# Patient Record
Sex: FEMALE | Race: WHITE | Employment: OTHER | URBAN - NONMETROPOLITAN AREA
[De-identification: names, ages, dates, MRNs, and addresses within clinical notes are randomized per-mention and may not be internally consistent; named-entity substitution may affect disease eponyms.]

---

## 2017-06-01 PROBLEM — Z86.79 H/O CLASS III ANGINA PECTORIS: Status: ACTIVE | Noted: 2017-06-01

## 2017-06-01 PROBLEM — I34.0 NON-RHEUMATIC MITRAL REGURGITATION: Status: ACTIVE | Noted: 2017-06-01

## 2017-06-01 PROBLEM — I10 HTN, GOAL BELOW 130/80: Status: ACTIVE | Noted: 2017-06-01

## 2017-06-01 PROBLEM — R94.39 ABNORMAL NUCLEAR STRESS TEST: Status: ACTIVE | Noted: 2017-06-01

## 2017-10-12 ENCOUNTER — OFFICE VISIT (OUTPATIENT)
Dept: RHEUMATOLOGY | Age: 77
End: 2017-10-12
Payer: MEDICARE

## 2017-10-12 VITALS
RESPIRATION RATE: 98 BRPM | DIASTOLIC BLOOD PRESSURE: 75 MMHG | HEART RATE: 50 BPM | WEIGHT: 172 LBS | HEIGHT: 66 IN | BODY MASS INDEX: 27.64 KG/M2 | SYSTOLIC BLOOD PRESSURE: 147 MMHG

## 2017-10-12 DIAGNOSIS — M54.42 CHRONIC MIDLINE LOW BACK PAIN WITH LEFT-SIDED SCIATICA: ICD-10-CM

## 2017-10-12 DIAGNOSIS — M25.511 CHRONIC RIGHT SHOULDER PAIN: ICD-10-CM

## 2017-10-12 DIAGNOSIS — M25.50 POLYARTHRALGIA: Primary | ICD-10-CM

## 2017-10-12 DIAGNOSIS — G89.29 CHRONIC MIDLINE LOW BACK PAIN WITH LEFT-SIDED SCIATICA: ICD-10-CM

## 2017-10-12 DIAGNOSIS — G89.29 CHRONIC RIGHT SHOULDER PAIN: ICD-10-CM

## 2017-10-12 DIAGNOSIS — M25.561 CHRONIC PAIN OF RIGHT KNEE: ICD-10-CM

## 2017-10-12 DIAGNOSIS — G89.29 CHRONIC PAIN OF RIGHT KNEE: ICD-10-CM

## 2017-10-12 PROCEDURE — 4040F PNEUMOC VAC/ADMIN/RCVD: CPT | Performed by: INTERNAL MEDICINE

## 2017-10-12 PROCEDURE — 4004F PT TOBACCO SCREEN RCVD TLK: CPT | Performed by: INTERNAL MEDICINE

## 2017-10-12 PROCEDURE — 1090F PRES/ABSN URINE INCON ASSESS: CPT | Performed by: INTERNAL MEDICINE

## 2017-10-12 PROCEDURE — G8598 ASA/ANTIPLAT THER USED: HCPCS | Performed by: INTERNAL MEDICINE

## 2017-10-12 PROCEDURE — G8427 DOCREV CUR MEDS BY ELIG CLIN: HCPCS | Performed by: INTERNAL MEDICINE

## 2017-10-12 PROCEDURE — 99204 OFFICE O/P NEW MOD 45 MIN: CPT | Performed by: INTERNAL MEDICINE

## 2017-10-12 PROCEDURE — G8419 CALC BMI OUT NRM PARAM NOF/U: HCPCS | Performed by: INTERNAL MEDICINE

## 2017-10-12 PROCEDURE — G8484 FLU IMMUNIZE NO ADMIN: HCPCS | Performed by: INTERNAL MEDICINE

## 2017-10-12 ASSESSMENT — ENCOUNTER SYMPTOMS
WHEEZING: 1
HEARTBURN: 1
COUGH: 1
DOUBLE VISION: 1
BLURRED VISION: 1
ORTHOPNEA: 0
CONSTIPATION: 1

## 2017-10-12 NOTE — PROGRESS NOTES
point injections. , physical therapy, tramadol (no relief), gabapentin (no relief), lyrica (not covered by insurance). Associated symptoms:  Swelling of the finger joints but denies redness/warmth  Daily Left leg swelling  AM stiffness lasting 20-25 minutes, improved w/ movement. + gelling   pre-term labor loss b/c of tubal pregnancy. Urinary incontinence for the past 3 years,  Leg weakness with prolonged standing, walking that's improved with sitting. Denies fecal incontinence, saddle anesthesia. - throbbing pian in the legs, greatest in the evening and occasionally waking the pt. Improved w/ movement.     -denies Photosenstivity, Rash, dry mouth/dry eyes, oral/nasal sores, Raynaud's, digital ulcerations, skin tightening, renal disease, foamy urination, hematuria, sz's, blood clots,, AIHA, leukpenia/lymphopenia, thrombocytopenia, hair loss, serositis, arthritis. Cancer screening up to date per pt   Travel:denies   Exposure(s): denies     ROS:  Review of Systems   Constitutional: Positive for malaise/fatigue. Negative for chills, fever and weight loss. Eyes: Positive for blurred vision and double vision. Dry eyes, itching eyes   Respiratory: Positive for cough and wheezing. Cardiovascular: Positive for chest pain (with activity), palpitations and leg swelling. Negative for orthopnea and PND. Gastrointestinal: Positive for constipation and heartburn. Musculoskeletal: Positive for myalgias. Skin: Negative. Neurological: Positive for dizziness (with getting up from seated position quickly) and weakness (generalized, but greatest in the legs). Negative for tingling. Endo/Heme/Allergies: Bruises/bleeds easily. Psychiatric/Behavioral: Positive for depression. The patient is nervous/anxious.         PAST MEDICAL HISTORY  Past Medical History:   Diagnosis Date    Arthritis     Asthma 1978    Atrial fibrillation [facility] [address]     Blood circulation, collateral     CAD (coronary artery disease)     CHF (congestive [facility] failure) ([facility] 1999    Chronic kidney disease, stage III (moderate)     COPD (chronic obstructive pulmonary disease) ([facility])     Depression     Diabetes mellitus ([facility] [address]     Diabetic hypertension-nephrosis syndrome ([facility])     Dysuria     GERD (gastroesophageal reflux disease)     Hyperlipidemia     Hypertension     Unspecified cerebral artery occlusion with cerebral infarction 1999    Dr Elliott Hernandez informed patient after Heart Attack       SOCIAL HISTORY  Social History     Social History    Marital status:      Spouse name: N/A    Number of children: 2    Years of education: 10     Social History Main Topics    Smoking status: Current Every Day Smoker     Packs/day: 0.50     Years: 10.00     Types: Cigarettes     Start date: 10/21/2013    Smokeless tobacco: Not on file    Alcohol use No    Drug use: No    Sexual activity: No     Other Topics Concern    Not on file     Social History Narrative    No narrative on file       FAMILY HISTORY  Family History   Problem Relation Age of Onset    Heart Disease Mother     Diabetes Mother     Kidney Disease Mother     Heart Disease Father     Diabetes Father     Cancer Sister     Stroke Brother     Cancer Sister        SURGICAL HISTORY  Past Surgical History:   Procedure Laterality Date    APPENDECTOMY      BACK SURGERY      BLADDER SUSPENSION      CARDIAC CATHETERIZATION  2009    with stent  [facility]    CARPAL TUNNEL RELEASE      COLONOSCOPY  2013    ENDOSCOPY, COLON, DIAGNOSTIC      ESOPHAGEAL DILATATION      HYSTERECTOMY  1974    JOINT REPLACEMENT  1992    Left Knee       ALLERGIES  Allergies   Allergen Reactions    Nuts [Peanut-Containing Drug Products]      Pt suffers from diverticulitis and nut products irritate her stomach.     Strawberry (Diagnostic)      Diverticulitis    Sulfa Antibiotics     Tape [place] Tape] Other (See Comments)     tears skin off    Bactrim [Sulfamethoxazole-Trimethoprim] nitroGLYCERIN (NITROSTAT) 0.4 MG SL tablet Place 0.4 mg under the tongue every 5 minutes as needed. No current facility-administered medications for this visit. Objective:  BP (!) 147/75   Pulse 50   Resp (!) 98   Ht 5' 6\" (1.676 m)   Wt 172 lb (78 kg)   BMI 27.76 kg/m²     General: No distress. Alert. Eyes: P ERRL. No sclera icterus. No conjunctival injection. ENT: No discharge. Pharynx clear. Neck: Trachea midline. Normal thyroid. Resp: No accessory muscle use. No crackles. No wheezing. No rhonchi. No dullness on percussion. CV: Regular rate. Regular rhythm. No mumur or rub. No edema. GI: Non-tender. Non-distended. No masses. No organmegaly. Normal bowel sounds. M/S:   Upper extremities:  Muscle strength 5/5, FROM, No Synovitis   Shoulder: (+) dania, speed, hawking, infraspinatous, and scarf testing. Wrist: fullness along the ulnar styloid bilaterally. Fingers:     - tender: MCPs Right #2-3,  PIPs: Right # 2-5, Left # 5, DIPs: left # 2,3    - fullness/swelling PIPs Right 2-4, Left # 5   - heberden nodes bilat. Flexion deformites as several DIPS bilat. Lower Extremities:   Muscle strength 5/5, FROM, No Synovitis   - crepitus of the Rt knee. Negative Rt knee medila/latearl compression, anterior/posterior draw, patellar grind. Spine:   - limited given pt's inability to get on table. - tenderness along the lumbosarcral spine, Negative SLR       Neuro: CN II-XII grossly intact, DTR's 2/4 bilat and equal  Psych: Oriented to person, place, time. No anxiety or agitation. Skin: Warm and dry. No nodule on exposed extremities. No rash on exposed extremities. Lymph: No cervical LAD. No supraclavicular LAD.       RAPID 3    LABS:  CBC  Lab Results   Component Value Date    WBC 8.2 06/01/2017    RBC 4.04 06/01/2017    HGB 12.4 06/01/2017    HCT 38.6 06/01/2017    MCV 95.5 06/01/2017    MCH 30.8 06/01/2017    MCHC 32.2 06/01/2017    RDW 14.4 06/01/2017     06/01/2017 CMP  Lab Results   Component Value Date    CALCIUM 10.6 06/01/2017    LABALBU 4.2 06/01/2017    PROT 7.0 06/01/2017     06/01/2017    K 4.6 06/01/2017    CO2 24 06/01/2017     06/01/2017    BUN 39 06/01/2017    CREATININE 1.4 06/01/2017    ALT 12 06/01/2017    AST 13 06/01/2017       HgBA1c: No components found for: HGBA1C    Lab Results   Component Value Date    TSH 1.297 12/03/2013     No results found for: VITD25      No results found for: JAMAL  No components found for: SSAABIGGREF  No components found for: SSBABIGGREF  No components found for: SMITHABIGGAR  No results found for: DSDNAAB   No results found for: C3, C4  No components found for: CYCCITPEPIGG  No results found for: RF    No components found for: CANCASCRN, APANCASCRN  No results found for: SEDRATE  No results found for: CRP    RADIOLOGY:       Assessment/ Plan:  1. Polyarthralgia  The patient reports genearlized joint pain in right shoulder, hands, entire back, knees. Symptoms started: back pain started several years ago with 2 reported lumbar spine surgery. The Rt shoulder pain has been present for \"years\". Knee pain present years ago but s/p left Knee replacement ~5 years ago improved sx's until the past year. - knee pain started over the past year. Pain in the hands x 8-9 years. Pt has been treated by Dr. Zhen Anguiano for the past 8-9 year. Tx'ed with plaquenil 200mg and once monthly trigger point injections   Previous therapy:Lumbar Epidural injections (10/11/17) from main management, monthly trigger point injections. , physical therapy, tramadol (no relief), gabapentin (no relief), lyrica (not covered by insurance). Prednisone (high blood sugars and high blood pressure)   Current therapy: Plaquenil 200mg daily, Cymbalta 30mg daily, trazadone 100mg nightly,     - exam with mild fullness/swelling at the Right > left PIP, tender Rt MCPs, PIP, left PIPs and DIP. Mild fullness/swelling along the ulnar styloids. (+) heberden nodes     - ? Crystalline arthropathy (Gout vs CPPD), evaluation for RA given the fullness of the PIPs, ? Sjogren vs SLE given PIP involvement and sicca sx's. ? infalmmatory osteoarthritis. - continue plaquenil 200mg qd at this time. - if creatinine clearance drops to < 30 pt may benefit from d/c of cymbalta. - Sedimentation Rate; Future  - C-Reactive Protein; Future  - CBC Auto Differential; Future  - Comprehensive Metabolic Panel; Future  - Rheumatoid Factor; Future  - Cyclic Citrul Peptide Antibody, IgG; Future  - Anti SSA; Future  - Anti SSB; Future  - XR HAND LEFT (MIN 3 VIEWS); Future  - XR HAND RIGHT (MIN 3 VIEWS); Future  - XR KNEE RIGHT (3 VIEWS); Future  - Uric Acid; Future  - Ferritin; Future  - Iron; Future  - Magnesium; Future    2. Chronic pain of right knee  - H/o osteoarthritis, knee pain worse with wt bearing, improved w/ non-wt bearing.   - avoinding oral NSAIDs given CKD and CAD   - topical PENNSAID sample provided. - XR KNEE RIGHT (3 VIEWS); Future  - could try knee injection, could try physical therapy      3. Chronic midline low back pain with left-sided sciatica  Chronic back pain with persistent pain. Failed Tramadol, back surgery x 2  - avoinding oral NSAIDs given CKD and CAD   - on cymbalta 30mg daily  - recent lumbar epidural injections from pain management. - defer treatment to pain management. 4. Chronic right shoulder pain  Chronic lowcliazed shoulder pain worse w/ movement. Difficulty reaching above the head, behind the head and back. Prior hx of Rt rotator cuff injury and repair. Exam with (+) impingement testing, AC joint and lateral shoulder tenderness. Suspected rotator cuff syndrome +/- bursitis and osteoarthritis. - x-rya evaluation for further evaluation   - could try knee injection, could try physical therapy      Return in about 2 months (around 12/12/2017).     Electronically signed by Cristofer Alonzo DO on 10/12/2017 at 1:35 PM      Thank you for allowing me to participate in the care of this patient. Please call if there are any questions.

## 2017-10-12 NOTE — LETTER
[address] [facility] [address]  Phone: [phone]  Fax: [phone]    No ref. provider found        October 12, 2017       Patient: Anya Kay   MR Number: [id number]   YOB: 1940   Date of Visit: 10/12/2017       Dear Dr. Castro Orr ref. provider found: Thank you for the request for consultation for Megha Encarnacion to me for the evaluation of her polyarthralgia. Below are the relevant portions of my assessment and plan of care. If you have questions, please do not hesitate to call me. I look forward to following Racheal Cobian along with you.     Sincerely,        Eunice DAS DO    [facility] providers:  Rigo Yeboah DO  [address] [place] [address]  VIA Mail

## 2017-11-09 LAB
ALBUMIN SERPL-MCNC: 3.8 G/DL
ALP BLD-CCNC: 54 U/L
ALT SERPL-CCNC: 21 U/L
ANION GAP SERPL CALCULATED.3IONS-SCNC: 13 MMOL/L
AST SERPL-CCNC: 21 U/L
BASOPHILS ABSOLUTE: 0 /ΜL
BASOPHILS RELATIVE PERCENT: 0.6 %
BILIRUB SERPL-MCNC: 0.5 MG/DL (ref 0.1–1.4)
BUN BLDV-MCNC: 24 MG/DL
C-REACTIVE PROTEIN: 0.19
CALCIUM SERPL-MCNC: 10.5 MG/DL
CHLORIDE BLD-SCNC: 109 MMOL/L
CO2: 24 MMOL/L
CREAT SERPL-MCNC: 1.36 MG/DL
EOSINOPHILS ABSOLUTE: 0.2 /ΜL
EOSINOPHILS RELATIVE PERCENT: 3.8 %
FERRITIN: 48.3 NG/ML (ref 9–150)
GFR CALCULATED: 38
GLUCOSE BLD-MCNC: 89 MG/DL
HCT VFR BLD CALC: 34 % (ref 36–46)
HEMOGLOBIN: 11.3 G/DL (ref 12–16)
IRON: 92
LYMPHOCYTES ABSOLUTE: 1.4 /ΜL
LYMPHOCYTES RELATIVE PERCENT: 24.2 %
MAGNESIUM: 1.8 MG/DL
MCH RBC QN AUTO: 31.7 PG
MCHC RBC AUTO-ENTMCNC: 33.2 G/DL
MCV RBC AUTO: 95.3 FL
MONOCYTES ABSOLUTE: 0.5 /ΜL
MONOCYTES RELATIVE PERCENT: 9.6 %
NEUTROPHILS ABSOLUTE: 3.5 /ΜL
NEUTROPHILS RELATIVE PERCENT: 61.8 %
PDW BLD-RTO: 13.4 %
PLATELET # BLD: 267 K/ΜL
PMV BLD AUTO: 8.6 FL
POTASSIUM SERPL-SCNC: 4.1 MMOL/L
RBC # BLD: 3.57 10^6/ΜL
RHEUMATOID FACTOR: <15
SODIUM BLD-SCNC: 142 MMOL/L
TOTAL PROTEIN: 6.9
URIC ACID: 5.5
WBC # BLD: 5.6 10^3/ML

## 2017-11-13 ENCOUNTER — TELEPHONE (OUTPATIENT)
Dept: RHEUMATOLOGY | Age: 77
End: 2017-11-13

## 2017-11-13 DIAGNOSIS — E83.52 HYPERCALCEMIA: Primary | ICD-10-CM

## 2017-11-13 DIAGNOSIS — M19.90 INFLAMMATORY ARTHRITIS: ICD-10-CM

## 2017-11-13 DIAGNOSIS — M25.50 POLYARTHRALGIA: ICD-10-CM

## 2017-11-13 RX ORDER — LEFLUNOMIDE 10 MG/1
10 TABLET ORAL DAILY
Qty: 30 TABLET | Refills: 1 | Status: SHIPPED | OUTPATIENT
Start: 2017-11-13 | End: 2017-11-13 | Stop reason: SDUPTHER

## 2017-11-13 RX ORDER — LEFLUNOMIDE 10 MG/1
10 TABLET ORAL DAILY
Qty: 30 TABLET | Refills: 0 | Status: SHIPPED | OUTPATIENT
Start: 2017-11-13 | End: 2017-12-28 | Stop reason: SINTOL

## 2017-11-13 NOTE — TELEPHONE ENCOUNTER
Pt called and informed of the mild anemia, Creatinine elevation (consistent with her CKD stage 3), and hypercalcemia. X-ray bilateral  hands revealed revealed diffuse degenerative joint disease throughout the hand and wrist bilaterally. (+) chondrocalcinosis of the triangular fibrocartilage. Polyarthralgia: ? Inflammatory arthritis. - given the joint swelling/fullness, distribution and Chondrocalcinosis on x-ray there is concern for possible CPPD arthropathy. - prednisone previously provided no significant relief. - discused tx option of colchicine or DMARDs. After discussion of side effects the pt has opted to pursue Arava 10mg daily. -  We discussed the risks of Arava including liver toxicity, blood dyscrasias, and increased risk of infection. she was instructed to avoid ETOH use. she was instructed to stop Arava at the onset of an infection. she was instructed to call my office if there was concern for infection. Hypercalcemia:   - checking PTH, SPEP, Ionized calcium, vitamin D 25-oh and 1-25-OH. Pt reported understanding and had no further questions.

## 2017-12-20 RX ORDER — HYDROXYCHLOROQUINE SULFATE 200 MG/1
200 TABLET, FILM COATED ORAL DAILY
Qty: 90 TABLET | Refills: 1 | Status: SHIPPED | OUTPATIENT
Start: 2017-12-20 | End: 2018-05-03 | Stop reason: SDUPTHER

## 2017-12-28 ENCOUNTER — HOSPITAL ENCOUNTER (OUTPATIENT)
Age: 77
Discharge: HOME | End: 2017-12-28
Payer: MEDICARE

## 2017-12-28 ENCOUNTER — OFFICE VISIT (OUTPATIENT)
Dept: RHEUMATOLOGY | Age: 77
End: 2017-12-28
Payer: MEDICARE

## 2017-12-28 VITALS
BODY MASS INDEX: 27.83 KG/M2 | DIASTOLIC BLOOD PRESSURE: 70 MMHG | WEIGHT: 173.2 LBS | RESPIRATION RATE: 14 BRPM | SYSTOLIC BLOOD PRESSURE: 137 MMHG | HEART RATE: 97 BPM | HEIGHT: 66 IN

## 2017-12-28 DIAGNOSIS — Z51.81 MEDICATION MONITORING ENCOUNTER: Primary | ICD-10-CM

## 2017-12-28 DIAGNOSIS — M25.50 POLYARTHRALGIA: ICD-10-CM

## 2017-12-28 DIAGNOSIS — E83.52 HYPERCALCEMIA: ICD-10-CM

## 2017-12-28 DIAGNOSIS — R20.2 PARESTHESIA OF BOTH HANDS: ICD-10-CM

## 2017-12-28 LAB
CALCIUM IONIZED: 1.36 MMOL/L (ref 1.12–1.32)
CALCIUM SERPL-MCNC: 11 MG/DL (ref 8.5–10.5)
HBV SURFACE AB TITR SER: NEGATIVE {TITER}
HEPATITIS C ANTIBODY: NEGATIVE
PTH INTACT: 79.8 PG/ML (ref 15–65)
VITAMIN D 25-HYDROXY: 34 NG/ML (ref 30–100)

## 2017-12-28 PROCEDURE — 4040F PNEUMOC VAC/ADMIN/RCVD: CPT | Performed by: INTERNAL MEDICINE

## 2017-12-28 PROCEDURE — 82652 VIT D 1 25-DIHYDROXY: CPT

## 2017-12-28 PROCEDURE — G8427 DOCREV CUR MEDS BY ELIG CLIN: HCPCS | Performed by: INTERNAL MEDICINE

## 2017-12-28 PROCEDURE — G8598 ASA/ANTIPLAT THER USED: HCPCS | Performed by: INTERNAL MEDICINE

## 2017-12-28 PROCEDURE — 83970 ASSAY OF PARATHORMONE: CPT

## 2017-12-28 PROCEDURE — 86803 HEPATITIS C AB TEST: CPT

## 2017-12-28 PROCEDURE — G8484 FLU IMMUNIZE NO ADMIN: HCPCS | Performed by: INTERNAL MEDICINE

## 2017-12-28 PROCEDURE — 4004F PT TOBACCO SCREEN RCVD TLK: CPT | Performed by: INTERNAL MEDICINE

## 2017-12-28 PROCEDURE — 82310 ASSAY OF CALCIUM: CPT

## 2017-12-28 PROCEDURE — G8419 CALC BMI OUT NRM PARAM NOF/U: HCPCS | Performed by: INTERNAL MEDICINE

## 2017-12-28 PROCEDURE — 1090F PRES/ABSN URINE INCON ASSESS: CPT | Performed by: INTERNAL MEDICINE

## 2017-12-28 PROCEDURE — 82306 VITAMIN D 25 HYDROXY: CPT

## 2017-12-28 PROCEDURE — 86706 HEP B SURFACE ANTIBODY: CPT

## 2017-12-28 PROCEDURE — 36415 COLL VENOUS BLD VENIPUNCTURE: CPT

## 2017-12-28 PROCEDURE — 1123F ACP DISCUSS/DSCN MKR DOCD: CPT | Performed by: INTERNAL MEDICINE

## 2017-12-28 PROCEDURE — G8400 PT W/DXA NO RESULTS DOC: HCPCS | Performed by: INTERNAL MEDICINE

## 2017-12-28 PROCEDURE — 84165 PROTEIN E-PHORESIS SERUM: CPT

## 2017-12-28 PROCEDURE — 84160 ASSAY OF PROTEIN ANY SOURCE: CPT

## 2017-12-28 PROCEDURE — 82330 ASSAY OF CALCIUM: CPT

## 2017-12-28 PROCEDURE — 86704 HEP B CORE ANTIBODY TOTAL: CPT

## 2017-12-28 PROCEDURE — 99214 OFFICE O/P EST MOD 30 MIN: CPT | Performed by: INTERNAL MEDICINE

## 2017-12-28 RX ORDER — AMLODIPINE BESYLATE 5 MG/1
5 TABLET ORAL DAILY
Status: ON HOLD | COMMUNITY
End: 2020-10-01

## 2017-12-28 ASSESSMENT — ENCOUNTER SYMPTOMS
CONSTIPATION: 1
ORTHOPNEA: 0
HEARTBURN: 1
COUGH: 1
BLURRED VISION: 1
DOUBLE VISION: 1
WHEEZING: 1

## 2017-12-28 NOTE — PROGRESS NOTES
Fingers:     - tender: MCPs Right #2-3,  PIPs: Right # 2-5, Left # 5, DIPs: left # 2,3    - fullness/swelling PIPs Right 2,4, Left  3,5   - heberden nodes bilat. Flexion deformites as several DIPS bilat. Lower Extremities:   Muscle strength 5/5, FROM, No Synovitis   - crepitus of the Rt knee. Negative Rt knee medila/latearl compression, anterior/posterior draw, patellar grind. Spine:   - limited given pt's inability to get on table. - tenderness and hypertonicity along the Right cervical perispinal musculature and Right upper trapezius  - tenderness along the lumbosarcral spine, Negative SLR       Psych: Oriented to person, place, time. No anxiety or agitation. Skin: Warm and dry. No nodule on exposed extremities. No rash on exposed extremities. Lymph: No cervical LAD. No supraclavicular LAD.       RAPID 3:    LABS:  CBC  Lab Results   Component Value Date    WBC 5.6 11/09/2017    RBC 3.57 11/09/2017    HGB 11.3 11/09/2017    HCT 34.0 11/09/2017    MCV 95.3 11/09/2017    MCH 31.7 11/09/2017    MCHC 33.2 11/09/2017    RDW 13.4 11/09/2017     11/09/2017       CMP  Lab Results   Component Value Date    CALCIUM 10.5 11/09/2017    LABALBU 3.8 11/09/2017    PROT 7.0 06/01/2017     11/09/2017    K 4.1 11/09/2017    CO2 24 11/09/2017     11/09/2017    BUN 24 11/09/2017    CREATININE 1.36 11/09/2017    ALT 21 11/09/2017    AST 21 11/09/2017       HgBA1c: No components found for: HGBA1C    Lab Results   Component Value Date    TSH 1.297 12/03/2013     No results found for: VITD25      No results found for: JAMAL  No components found for: SSAABIGGREF  No components found for: SSBABIGGREF  No components found for: SMITHABIGGAR  No results found for: DSDNAAB   No results found for: C3, C4  No components found for: CYCCITPEPIGG  Lab Results   Component Value Date    RF <15 11/09/2017       No components found for: CANCASCRN, APANCASCRN  No results found for: SEDRATE  Lab Results   Component Value Date    CRP 0.19 11/09/2017       RADIOLOGY:       Assessment/ Plan:  1. Polyarthralgia  The patient reports genearlized joint pain in right shoulder, hands, entire back, knees. Symptoms started: back pain started several years ago with 2 reported lumbar spine surgery. The Rt shoulder pain has been present for \"years\". Knee pain present years ago but s/p left Knee replacement ~5 years ago improved sx's until the past year. - knee pain started over the past year. Pain in the hands x 8-9 years. Pt has been treated by Dr. Yosvany Abbott for the past 8-9 year. Tx'ed with plaquenil 200mg and once monthly trigger point injections  - Previous therapy:Lumbar Epidural injections (10/11/17) from main management, monthly trigger point injections. , physical therapy, tramadol (no relief), gabapentin (no relief), lyrica (not covered by insurance). Prednisone (high blood sugars and high blood pressure)    - Current therapy: Plaquenil 200mg daily, Cymbalta 30mg daily, trazadone 100mg nightly,    -negative Evaluation of RA, SLE, Sjogren, Gout, secondary causes of CPPD arthropathy (iron, mag studies)   - ? CPPD arthropathy vs seronegative inflammatory arthritis vs inflammatory osteoarthritis. - d/c arava 10mg qd b/c of Diarrhea   - continue plaquenil 200mg qd.    - discussed trial of colchicine or methotrexate    - pt opting to starrt methotrexate 7.5mg  q wk. Risks of therapy discussed with the patient including risk of hepatotoxicity, bone marrow toxicity, pneumonitis, cancers, lymphoma, teratogenicity and susceptibility to infections. Written information provided. - leucovorin 5mg q wk (to decrease pill burden)   Hepatitis panel checked and negative. CxR obtained prior to initiation of therapy. Hold MTX while treated for infections. Avoid live vaccines. Hypercalcemia:   - checking PTH, SPEP, Ionized calcium, vitamin D 25-oh and 1-25-OH.      Bilateral hand paresthesia: suspected carpal tunnel syndrome.   - worsening over the past few months. - ? If related to # 1  - pt asked to try wrist splinting nightly. - discussed EMG/NCV if sx's worsen    Chronic pain of right knee  - H/o osteoarthritis, knee pain worse with wt bearing, improved w/ non-wt bearing.   - avoinding oral NSAIDs given CKD and CAD   - topical PENNSAID sample provided. - XR KNEE RIGHT (3 VIEWS); 11/2017 no abnoramlities (discussed results with pt)    - could try knee injection or  physical therapy      Chronic right shoulder pain  Chronic lowcliazed shoulder pain worse w/ movement. Difficulty reaching above the head, behind the head and back. Prior hx of Rt rotator cuff injury and repair. Exam with (+) impingement testing, AC joint and lateral shoulder tenderness. Suspected rotator cuff syndrome +/- bursitis and osteoarthritis. - x-rya evaluation for further evaluation if sx's persist    - could try shoulder injection, could try physical therapy    Chronic midline low back pain with left-sided sciatica  Chronic back pain with persistent pain. Failed Tramadol, back surgery x 2  - avoinding oral NSAIDs given CKD and CAD   - on cymbalta 30mg daily  - recent lumbar epidural injections from pain management. - defer treatment to pain management. No Follow-up on file. Electronically signed by Sujatha Jaime DO on 12/28/2017 at 1:03 PM      Thank you for allowing me to participate in the care of this patient. Please call if there are any questions.

## 2017-12-29 ENCOUNTER — TELEPHONE (OUTPATIENT)
Dept: RHEUMATOLOGY | Age: 77
End: 2017-12-29

## 2017-12-29 DIAGNOSIS — R79.89 HIGH SERUM PARATHYROID HORMONE (PTH): ICD-10-CM

## 2017-12-29 DIAGNOSIS — E83.52 HYPERCALCEMIA: Primary | ICD-10-CM

## 2017-12-29 NOTE — TELEPHONE ENCOUNTER
Pt called and informed of the hypercalcemia and PTH elevation.     - Vitamin D level pending    Hypercalcemia:   - pt asked to stop supplemental calcium from her PCP  - re-evaluation of Calcium level in 1-2 weeks  - can be related to hyperparathryoidism     PTH elevation:   - vitamin D levels pending.   - ? If related to CKD vs primary   - pt asked to follow with PCP. She reported understanding and had no additional questions.

## 2017-12-30 LAB — HEPATITIS B CORE TOTAL ANTIBODY: NEGATIVE

## 2017-12-31 LAB
PROTEIN ELECTROPHORESIS, SERUM: NORMAL
VITAMIN D 1,25-DIHYDROXY: 26.4 PG/ML (ref 19.9–79.3)

## 2018-02-27 ENCOUNTER — OFFICE VISIT (OUTPATIENT)
Dept: RHEUMATOLOGY | Age: 78
End: 2018-02-27
Payer: MEDICARE

## 2018-02-27 VITALS
HEIGHT: 66 IN | SYSTOLIC BLOOD PRESSURE: 140 MMHG | DIASTOLIC BLOOD PRESSURE: 66 MMHG | BODY MASS INDEX: 27.48 KG/M2 | HEART RATE: 98 BPM | WEIGHT: 171 LBS | RESPIRATION RATE: 14 BRPM

## 2018-02-27 DIAGNOSIS — M70.62 GREATER TROCHANTERIC BURSITIS OF LEFT HIP: ICD-10-CM

## 2018-02-27 DIAGNOSIS — G89.29 CHRONIC MIDLINE LOW BACK PAIN WITHOUT SCIATICA: ICD-10-CM

## 2018-02-27 DIAGNOSIS — Z51.81 MEDICATION MONITORING ENCOUNTER: ICD-10-CM

## 2018-02-27 DIAGNOSIS — M54.50 CHRONIC MIDLINE LOW BACK PAIN WITHOUT SCIATICA: ICD-10-CM

## 2018-02-27 DIAGNOSIS — E83.52 HYPERCALCEMIA: ICD-10-CM

## 2018-02-27 DIAGNOSIS — M19.90 INFLAMMATORY ARTHRITIS: Primary | ICD-10-CM

## 2018-02-27 PROCEDURE — G8598 ASA/ANTIPLAT THER USED: HCPCS | Performed by: INTERNAL MEDICINE

## 2018-02-27 PROCEDURE — 20610 DRAIN/INJ JOINT/BURSA W/O US: CPT | Performed by: INTERNAL MEDICINE

## 2018-02-27 PROCEDURE — G8427 DOCREV CUR MEDS BY ELIG CLIN: HCPCS | Performed by: INTERNAL MEDICINE

## 2018-02-27 PROCEDURE — 1123F ACP DISCUSS/DSCN MKR DOCD: CPT | Performed by: INTERNAL MEDICINE

## 2018-02-27 PROCEDURE — 4004F PT TOBACCO SCREEN RCVD TLK: CPT | Performed by: INTERNAL MEDICINE

## 2018-02-27 PROCEDURE — G8484 FLU IMMUNIZE NO ADMIN: HCPCS | Performed by: INTERNAL MEDICINE

## 2018-02-27 PROCEDURE — 4040F PNEUMOC VAC/ADMIN/RCVD: CPT | Performed by: INTERNAL MEDICINE

## 2018-02-27 PROCEDURE — G8400 PT W/DXA NO RESULTS DOC: HCPCS | Performed by: INTERNAL MEDICINE

## 2018-02-27 PROCEDURE — 1090F PRES/ABSN URINE INCON ASSESS: CPT | Performed by: INTERNAL MEDICINE

## 2018-02-27 PROCEDURE — 99214 OFFICE O/P EST MOD 30 MIN: CPT | Performed by: INTERNAL MEDICINE

## 2018-02-27 PROCEDURE — G8419 CALC BMI OUT NRM PARAM NOF/U: HCPCS | Performed by: INTERNAL MEDICINE

## 2018-02-27 RX ORDER — LEUCOVORIN CALCIUM 5 MG/1
5 TABLET ORAL WEEKLY
Qty: 12 TABLET | Refills: 0 | Status: SHIPPED | OUTPATIENT
Start: 2018-02-27 | End: 2018-04-20 | Stop reason: SDUPTHER

## 2018-02-27 RX ORDER — TRIAMCINOLONE ACETONIDE 40 MG/ML
40 INJECTION, SUSPENSION INTRA-ARTICULAR; INTRAMUSCULAR ONCE
Status: COMPLETED | OUTPATIENT
Start: 2018-02-27 | End: 2018-02-27

## 2018-02-27 RX ORDER — METHYLPREDNISOLONE ACETATE 80 MG/ML
40 INJECTION, SUSPENSION INTRA-ARTICULAR; INTRALESIONAL; INTRAMUSCULAR; SOFT TISSUE ONCE
Status: CANCELLED | OUTPATIENT
Start: 2018-02-27 | End: 2018-02-27

## 2018-02-27 RX ADMIN — TRIAMCINOLONE ACETONIDE 40 MG: 40 INJECTION, SUSPENSION INTRA-ARTICULAR; INTRAMUSCULAR at 17:30

## 2018-02-27 ASSESSMENT — ENCOUNTER SYMPTOMS
WHEEZING: 1
CONSTIPATION: 1
HEARTBURN: 1
ORTHOPNEA: 0
DOUBLE VISION: 1
COUGH: 1
BLURRED VISION: 1

## 2018-02-27 NOTE — PROGRESS NOTES
Epidural injections (10/11/17) from main management, monthly trigger point injections. , physical therapy, tramadol (no relief), gabapentin (no relief), lyrica (not covered by insurance). ROS:  Review of Systems   Constitutional: Positive for malaise/fatigue. Negative for chills, fever and weight loss. Eyes: Positive for blurred vision and double vision. Dry eyes, itching eyes   Respiratory: Positive for cough and wheezing. Cardiovascular: Positive for chest pain (with activity), palpitations and leg swelling. Negative for orthopnea and PND. Gastrointestinal: Positive for constipation and heartburn. Genitourinary: Positive for urgency (waking up several times per night). Musculoskeletal: Positive for myalgias. Skin: Negative. Neurological: Positive for dizziness (with getting up from seated position quickly) and weakness (generalized, but greatest in the legs). Negative for tingling. Endo/Heme/Allergies: Bruises/bleeds easily. Psychiatric/Behavioral: Positive for depression. The patient is nervous/anxious. PAST MEDICAL HISTORY  Past Medical History:   Diagnosis Date    Arthritis     Asthma 1978    Atrial fibrillation ([facility] [address]     Blood circulation, collateral     CAD (coronary artery disease)     CHF (congestive heart failure) ([facility] 1999    Chronic kidney disease, stage III (moderate)     COPD (chronic obstructive pulmonary disease) ([facility] [address]     Depression     Diabetes mellitus ([facility])     Diabetic hypertension-nephrosis syndrome ([facility])     Dysuria     GERD (gastroesophageal reflux disease)     Hyperlipidemia     Hypertension     Unspecified cerebral artery occlusion with cerebral infarction 1999    Dr Hunter Grace informed patient after Heart Attack       SOCIAL HISTORY  Social History     Social History    Marital status:       Spouse name: N/A    Number of children: 2    Years of education: 10     Social History Main Topics    Smoking status: Current Every Day Fingers:     - tender: MCPs Right #2-3,  PIPs: Right # 2-5, Left # 5, DIPs: left # 2,3    - fullness/swelling PIPs Right 2,4, Left  2-5   - heberden nodes bilat. Flexion deformites as several DIPS bilat. Lower Extremities:   Muscle strength 5/5, FROM, No Synovitis   - Left outer hips: tenderness overlying the greater trochanter  - crepitus of the Rt knee. Negative Rt knee medila/latearl compression, anterior/posterior draw, patellar grind. Spine:   - limited given pt's inability to get on table. - tenderness and hypertonicity along the Right cervical perispinal musculature and Right upper trapezius  - tenderness along the lumbosarcral spine, Negative SLR       Psych: Oriented to person, place, time. No anxiety or agitation. Skin: Warm and dry. No nodule on exposed extremities. No rash on exposed extremities. Lymph: No cervical LAD. No supraclavicular LAD.       RAPID 3:    LABS:  CBC  Lab Results   Component Value Date    WBC 5.6 11/09/2017    RBC 3.57 11/09/2017    HGB 11.3 11/09/2017    HCT 34.0 11/09/2017    MCV 95.3 11/09/2017    MCH 31.7 11/09/2017    MCHC 33.2 11/09/2017    RDW 13.4 11/09/2017     11/09/2017       CMP  Lab Results   Component Value Date    CALCIUM 11.0 12/28/2017    LABALBU 3.8 11/09/2017    PROT 7.0 06/01/2017     11/09/2017    K 4.1 11/09/2017    CO2 24 11/09/2017     11/09/2017    BUN 24 11/09/2017    CREATININE 1.36 11/09/2017    ALT 21 11/09/2017    AST 21 11/09/2017       HgBA1c: No components found for: HGBA1C    Lab Results   Component Value Date    TSH 1.297 12/03/2013     Lab Results   Component Value Date    VITD25 34 12/28/2017         No results found for: JAMAL  No components found for: SSAABIGGREF  No components found for: SSBABIGGREF  No components found for: SMITHABIGGAR  No results found for: DSDNAAB   No results found for: C3, C4  No components found for: CYCCITPEPIGG  Lab Results   Component Value Date    RF <15 11/09/2017       No pain, written consent was obtained for the procedure. The site was prepped with Betadine and  Chlorhexadine. 1ml of Kenalog 40 mg/ml mixed with 1 ml of lidocaine was injected into the bursa. The injection site was cleansed with topical alcohol and a dressing was applied. The patient was asked to continue to rest the for a few more days before resuming regular activities. It may be more painful for the first 1-2 days. Watch for fever, or increased swelling or persistent pain in the joint. Call or return to clinic prn if such symptoms occur or there is failure to improve as anticipated. Complications:  None; patient tolerated the procedure well.

## 2018-03-08 LAB
ALBUMIN SERPL-MCNC: 4 G/DL
ALP BLD-CCNC: 55 U/L
ALT SERPL-CCNC: 14 U/L
ANION GAP SERPL CALCULATED.3IONS-SCNC: 10 MMOL/L
AST SERPL-CCNC: 17 U/L
BASOPHILS ABSOLUTE: 0.1 /ΜL
BASOPHILS RELATIVE PERCENT: 0.7 %
BILIRUB SERPL-MCNC: 0.4 MG/DL (ref 0.1–1.4)
BUN BLDV-MCNC: 31 MG/DL
CALCIUM SERPL-MCNC: 10.7 MG/DL
CHLORIDE BLD-SCNC: 108 MMOL/L
CO2: 25 MMOL/L
CREAT SERPL-MCNC: 1.5 MG/DL
EOSINOPHILS ABSOLUTE: 0.2 /ΜL
EOSINOPHILS RELATIVE PERCENT: 2.8 %
GFR CALCULATED: 34
GLUCOSE BLD-MCNC: 94 MG/DL
HCT VFR BLD CALC: 35.6 % (ref 36–46)
HEMOGLOBIN: 11.9 G/DL (ref 12–16)
LYMPHOCYTES ABSOLUTE: 2.1 /ΜL
LYMPHOCYTES RELATIVE PERCENT: 25.9 %
MCH RBC QN AUTO: 31.7 PG
MCHC RBC AUTO-ENTMCNC: 33.6 G/DL
MCV RBC AUTO: 94.5 FL
MONOCYTES ABSOLUTE: 0.5 /ΜL
MONOCYTES RELATIVE PERCENT: 6.5 %
NEUTROPHILS ABSOLUTE: 5.2 /ΜL
NEUTROPHILS RELATIVE PERCENT: 64.1 %
PDW BLD-RTO: 13.7 %
PLATELET # BLD: 340 K/ΜL
PMV BLD AUTO: 8.9 FL
POTASSIUM SERPL-SCNC: 4.5 MMOL/L
RBC # BLD: 3.77 10^6/ΜL
SODIUM BLD-SCNC: 138 MMOL/L
TOTAL PROTEIN: 6.4
WBC # BLD: 8.2 10^3/ML

## 2018-03-12 ENCOUNTER — TELEPHONE (OUTPATIENT)
Dept: RHEUMATOLOGY | Age: 78
End: 2018-03-12

## 2018-03-12 DIAGNOSIS — M19.90 INFLAMMATORY ARTHRITIS: ICD-10-CM

## 2018-03-29 LAB
ALBUMIN SERPL-MCNC: 4 G/DL
ALP BLD-CCNC: 54 U/L
ALT SERPL-CCNC: 17 U/L
ANION GAP SERPL CALCULATED.3IONS-SCNC: NORMAL MMOL/L
AST SERPL-CCNC: 20 U/L
BASOPHILS ABSOLUTE: 0 /ΜL
BASOPHILS RELATIVE PERCENT: 0.8 %
BILIRUB SERPL-MCNC: 0.4 MG/DL (ref 0.1–1.4)
BUN BLDV-MCNC: 30 MG/DL
CALCIUM SERPL-MCNC: 10.4 MG/DL
CHLORIDE BLD-SCNC: 110 MMOL/L
CO2: 22 MMOL/L
CREAT SERPL-MCNC: 1.4 MG/DL
EOSINOPHILS ABSOLUTE: 0.2 /ΜL
EOSINOPHILS RELATIVE PERCENT: 4.7 %
GFR CALCULATED: 35
GLUCOSE BLD-MCNC: 91 MG/DL
HCT VFR BLD CALC: 33.8 % (ref 36–46)
HEMOGLOBIN: 11.3 G/DL (ref 12–16)
LYMPHOCYTES ABSOLUTE: 1.3 /ΜL
LYMPHOCYTES RELATIVE PERCENT: 27.9 %
MCH RBC QN AUTO: 31.7 PG
MCHC RBC AUTO-ENTMCNC: 33.5 G/DL
MCV RBC AUTO: 94.5 FL
MONOCYTES ABSOLUTE: 0.4 /ΜL
MONOCYTES RELATIVE PERCENT: 7.8 %
NEUTROPHILS ABSOLUTE: 2.7 /ΜL
NEUTROPHILS RELATIVE PERCENT: 58.8 %
PDW BLD-RTO: 14.4 %
PLATELET # BLD: 306 K/ΜL
PMV BLD AUTO: 8.5 FL
POTASSIUM SERPL-SCNC: 4.2 MMOL/L
RBC # BLD: 3.58 10^6/ΜL
SODIUM BLD-SCNC: 139 MMOL/L
TOTAL PROTEIN: 6.5
WBC # BLD: 4.5 10^3/ML

## 2018-04-02 ENCOUNTER — TELEPHONE (OUTPATIENT)
Dept: RHEUMATOLOGY | Age: 78
End: 2018-04-02

## 2018-04-19 DIAGNOSIS — M19.90 INFLAMMATORY ARTHRITIS: ICD-10-CM

## 2018-04-19 LAB
ALBUMIN SERPL-MCNC: 3.7 G/DL
ALP BLD-CCNC: 50 U/L
ALT SERPL-CCNC: 17 U/L
ANION GAP SERPL CALCULATED.3IONS-SCNC: 10 MMOL/L
AST SERPL-CCNC: 21 U/L
BASOPHILS ABSOLUTE: 0 /ΜL
BASOPHILS RELATIVE PERCENT: 0.3 %
BILIRUB SERPL-MCNC: 0.4 MG/DL (ref 0.1–1.4)
BUN BLDV-MCNC: 28 MG/DL
CALCIUM SERPL-MCNC: 10.3 MG/DL
CHLORIDE BLD-SCNC: 109 MMOL/L
CO2: 25 MMOL/L
CREAT SERPL-MCNC: 1.4 MG/DL
EOSINOPHILS ABSOLUTE: 0.1 /ΜL
EOSINOPHILS RELATIVE PERCENT: 2.1 %
GFR CALCULATED: 36
GLUCOSE BLD-MCNC: 93 MG/DL
HCT VFR BLD CALC: 31.3 % (ref 36–46)
HEMOGLOBIN: 10.4 G/DL (ref 12–16)
LYMPHOCYTES ABSOLUTE: 1.3 /ΜL
LYMPHOCYTES RELATIVE PERCENT: 18.3 %
MCH RBC QN AUTO: 32 PG
MCHC RBC AUTO-ENTMCNC: 33.4 G/DL
MCV RBC AUTO: 95.7 FL
MONOCYTES ABSOLUTE: 0.4 /ΜL
MONOCYTES RELATIVE PERCENT: 5.2 %
NEUTROPHILS ABSOLUTE: 5.2 /ΜL
NEUTROPHILS RELATIVE PERCENT: 74.1 %
PDW BLD-RTO: 15.1 %
PLATELET # BLD: 265 K/ΜL
PMV BLD AUTO: 8.4 FL
POTASSIUM SERPL-SCNC: 4.3 MMOL/L
RBC # BLD: 3.26 10^6/ΜL
SODIUM BLD-SCNC: 140 MMOL/L
TOTAL PROTEIN: 6.3
WBC # BLD: 7 10^3/ML

## 2018-04-20 DIAGNOSIS — M19.90 INFLAMMATORY ARTHRITIS: ICD-10-CM

## 2018-04-20 RX ORDER — LEUCOVORIN CALCIUM 5 MG/1
5 TABLET ORAL WEEKLY
Qty: 12 TABLET | Refills: 0 | Status: SHIPPED | OUTPATIENT
Start: 2018-04-20 | End: 2018-05-03 | Stop reason: SDUPTHER

## 2018-05-03 ENCOUNTER — OFFICE VISIT (OUTPATIENT)
Dept: RHEUMATOLOGY | Age: 78
End: 2018-05-03
Payer: MEDICARE

## 2018-05-03 VITALS
OXYGEN SATURATION: 97 % | DIASTOLIC BLOOD PRESSURE: 63 MMHG | BODY MASS INDEX: 27 KG/M2 | SYSTOLIC BLOOD PRESSURE: 140 MMHG | HEIGHT: 66 IN | WEIGHT: 168 LBS | HEART RATE: 70 BPM

## 2018-05-03 DIAGNOSIS — Z51.81 MEDICATION MONITORING ENCOUNTER: ICD-10-CM

## 2018-05-03 DIAGNOSIS — M54.50 CHRONIC MIDLINE LOW BACK PAIN WITHOUT SCIATICA: ICD-10-CM

## 2018-05-03 DIAGNOSIS — R20.2 PARESTHESIA OF BOTH HANDS: ICD-10-CM

## 2018-05-03 DIAGNOSIS — M19.90 INFLAMMATORY ARTHRITIS: Primary | ICD-10-CM

## 2018-05-03 DIAGNOSIS — G89.29 CHRONIC MIDLINE LOW BACK PAIN WITHOUT SCIATICA: ICD-10-CM

## 2018-05-03 PROCEDURE — 4004F PT TOBACCO SCREEN RCVD TLK: CPT | Performed by: INTERNAL MEDICINE

## 2018-05-03 PROCEDURE — G8419 CALC BMI OUT NRM PARAM NOF/U: HCPCS | Performed by: INTERNAL MEDICINE

## 2018-05-03 PROCEDURE — 4040F PNEUMOC VAC/ADMIN/RCVD: CPT | Performed by: INTERNAL MEDICINE

## 2018-05-03 PROCEDURE — G8400 PT W/DXA NO RESULTS DOC: HCPCS | Performed by: INTERNAL MEDICINE

## 2018-05-03 PROCEDURE — 1090F PRES/ABSN URINE INCON ASSESS: CPT | Performed by: INTERNAL MEDICINE

## 2018-05-03 PROCEDURE — 99214 OFFICE O/P EST MOD 30 MIN: CPT | Performed by: INTERNAL MEDICINE

## 2018-05-03 PROCEDURE — G8427 DOCREV CUR MEDS BY ELIG CLIN: HCPCS | Performed by: INTERNAL MEDICINE

## 2018-05-03 PROCEDURE — 1123F ACP DISCUSS/DSCN MKR DOCD: CPT | Performed by: INTERNAL MEDICINE

## 2018-05-03 PROCEDURE — G8598 ASA/ANTIPLAT THER USED: HCPCS | Performed by: INTERNAL MEDICINE

## 2018-05-03 RX ORDER — HYDROXYCHLOROQUINE SULFATE 200 MG/1
200 TABLET, FILM COATED ORAL DAILY
Qty: 90 TABLET | Refills: 1 | Status: SHIPPED | OUTPATIENT
Start: 2018-05-03 | End: 2018-08-28 | Stop reason: SDUPTHER

## 2018-05-03 RX ORDER — OXYCODONE AND ACETAMINOPHEN 10; 325 MG/1; MG/1
1 TABLET ORAL EVERY 4 HOURS PRN
Status: ON HOLD | COMMUNITY
End: 2020-11-06

## 2018-05-03 RX ORDER — LEUCOVORIN CALCIUM 5 MG/1
5 TABLET ORAL WEEKLY
Qty: 12 TABLET | Refills: 0 | Status: SHIPPED | OUTPATIENT
Start: 2018-05-03 | End: 2018-06-27 | Stop reason: SDUPTHER

## 2018-05-03 RX ORDER — INSULIN GLARGINE 100 [IU]/ML
8 INJECTION, SOLUTION SUBCUTANEOUS NIGHTLY
COMMUNITY
End: 2018-06-26

## 2018-06-26 ENCOUNTER — OFFICE VISIT (OUTPATIENT)
Dept: RHEUMATOLOGY | Age: 78
End: 2018-06-26
Payer: MEDICARE

## 2018-06-26 VITALS
SYSTOLIC BLOOD PRESSURE: 172 MMHG | BODY MASS INDEX: 26.68 KG/M2 | OXYGEN SATURATION: 96 % | WEIGHT: 166 LBS | HEART RATE: 59 BPM | HEIGHT: 66 IN | DIASTOLIC BLOOD PRESSURE: 62 MMHG

## 2018-06-26 DIAGNOSIS — G89.29 CHRONIC RIGHT SHOULDER PAIN: ICD-10-CM

## 2018-06-26 DIAGNOSIS — Z51.81 MEDICATION MONITORING ENCOUNTER: ICD-10-CM

## 2018-06-26 DIAGNOSIS — M25.511 CHRONIC RIGHT SHOULDER PAIN: ICD-10-CM

## 2018-06-26 DIAGNOSIS — G89.29 CHRONIC MIDLINE LOW BACK PAIN WITHOUT SCIATICA: ICD-10-CM

## 2018-06-26 DIAGNOSIS — M54.50 CHRONIC MIDLINE LOW BACK PAIN WITHOUT SCIATICA: ICD-10-CM

## 2018-06-26 DIAGNOSIS — R20.2 PARESTHESIA OF BOTH HANDS: ICD-10-CM

## 2018-06-26 DIAGNOSIS — M19.90 INFLAMMATORY ARTHRITIS: Primary | ICD-10-CM

## 2018-06-26 PROCEDURE — 1090F PRES/ABSN URINE INCON ASSESS: CPT | Performed by: INTERNAL MEDICINE

## 2018-06-26 PROCEDURE — 99214 OFFICE O/P EST MOD 30 MIN: CPT | Performed by: INTERNAL MEDICINE

## 2018-06-26 PROCEDURE — 4040F PNEUMOC VAC/ADMIN/RCVD: CPT | Performed by: INTERNAL MEDICINE

## 2018-06-26 PROCEDURE — 1123F ACP DISCUSS/DSCN MKR DOCD: CPT | Performed by: INTERNAL MEDICINE

## 2018-06-26 PROCEDURE — G8400 PT W/DXA NO RESULTS DOC: HCPCS | Performed by: INTERNAL MEDICINE

## 2018-06-26 PROCEDURE — 4004F PT TOBACCO SCREEN RCVD TLK: CPT | Performed by: INTERNAL MEDICINE

## 2018-06-26 PROCEDURE — G8419 CALC BMI OUT NRM PARAM NOF/U: HCPCS | Performed by: INTERNAL MEDICINE

## 2018-06-26 PROCEDURE — G8598 ASA/ANTIPLAT THER USED: HCPCS | Performed by: INTERNAL MEDICINE

## 2018-06-26 PROCEDURE — G8428 CUR MEDS NOT DOCUMENT: HCPCS | Performed by: INTERNAL MEDICINE

## 2018-06-26 ASSESSMENT — ENCOUNTER SYMPTOMS
ABDOMINAL PAIN: 0
SPUTUM PRODUCTION: 0
HEMOPTYSIS: 0
BACK PAIN: 1
CONSTIPATION: 0
BLURRED VISION: 1
COUGH: 0
SHORTNESS OF BREATH: 0
DIARRHEA: 0

## 2018-06-27 DIAGNOSIS — M19.90 INFLAMMATORY ARTHRITIS: ICD-10-CM

## 2018-06-27 LAB
ALBUMIN SERPL-MCNC: 4 G/DL
ALP BLD-CCNC: 53 U/L
ALT SERPL-CCNC: 14 U/L
ANION GAP SERPL CALCULATED.3IONS-SCNC: 10 MMOL/L
AST SERPL-CCNC: 17 U/L
BASOPHILS ABSOLUTE: 0 /ΜL
BASOPHILS RELATIVE PERCENT: 0.8 %
BILIRUB SERPL-MCNC: 0.5 MG/DL (ref 0.1–1.4)
BUN BLDV-MCNC: 28 MG/DL
CALCIUM SERPL-MCNC: 10.3 MG/DL
CHLORIDE BLD-SCNC: 109 MMOL/L
CO2: 27 MMOL/L
CREAT SERPL-MCNC: 1.5 MG/DL
EOSINOPHILS ABSOLUTE: 0.1 /ΜL
EOSINOPHILS RELATIVE PERCENT: 1.8 %
GFR CALCULATED: 34
GLUCOSE BLD-MCNC: 84 MG/DL
HCT VFR BLD CALC: 35.8 % (ref 36–46)
HEMOGLOBIN: 11.6 G/DL (ref 12–16)
LYMPHOCYTES ABSOLUTE: 1.4 /ΜL
LYMPHOCYTES RELATIVE PERCENT: 23.2 %
MCH RBC QN AUTO: 32.3 PG
MCHC RBC AUTO-ENTMCNC: 32.4 G/DL
MCV RBC AUTO: 99.7 FL
MONOCYTES ABSOLUTE: 0.4 /ΜL
MONOCYTES RELATIVE PERCENT: 6.5 %
NEUTROPHILS ABSOLUTE: 4 /ΜL
NEUTROPHILS RELATIVE PERCENT: 67.7 %
PDW BLD-RTO: 14.5 %
PLATELET # BLD: 311 K/ΜL
PMV BLD AUTO: 8.6 FL
POTASSIUM SERPL-SCNC: 4.2 MMOL/L
RBC # BLD: 3.59 10^6/ΜL
SODIUM BLD-SCNC: 142 MMOL/L
TOTAL PROTEIN: 6.4
WBC # BLD: 5.9 10^3/ML

## 2018-06-27 RX ORDER — LEUCOVORIN CALCIUM 5 MG/1
5 TABLET ORAL WEEKLY
Qty: 12 TABLET | Refills: 0 | Status: SHIPPED | OUTPATIENT
Start: 2018-06-27 | End: 2018-07-25 | Stop reason: SDUPTHER

## 2018-07-25 DIAGNOSIS — M19.90 INFLAMMATORY ARTHRITIS: ICD-10-CM

## 2018-07-25 LAB
ALBUMIN SERPL-MCNC: 3.7 G/DL
ALP BLD-CCNC: 49 U/L
ALT SERPL-CCNC: 13 U/L
ANION GAP SERPL CALCULATED.3IONS-SCNC: 8 MMOL/L
AST SERPL-CCNC: 17 U/L
BASOPHILS ABSOLUTE: 0 /ΜL
BASOPHILS RELATIVE PERCENT: 0.7 %
BILIRUB SERPL-MCNC: 0.4 MG/DL (ref 0.1–1.4)
BUN BLDV-MCNC: 27 MG/DL
CALCIUM SERPL-MCNC: 9.9 MG/DL
CHLORIDE BLD-SCNC: 110 MMOL/L
CO2: 26 MMOL/L
CREAT SERPL-MCNC: 1.4 MG/DL
EOSINOPHILS ABSOLUTE: 0.2 /ΜL
EOSINOPHILS RELATIVE PERCENT: 3.6 %
GFR CALCULATED: 35
GLUCOSE BLD-MCNC: 94 MG/DL
HCT VFR BLD CALC: 30.4 % (ref 36–46)
HEMOGLOBIN: 10.1 G/DL (ref 12–16)
LYMPHOCYTES ABSOLUTE: 1.2 /ΜL
LYMPHOCYTES RELATIVE PERCENT: 27 %
MCH RBC QN AUTO: ABNORMAL PG
MCHC RBC AUTO-ENTMCNC: ABNORMAL G/DL
MCV RBC AUTO: ABNORMAL FL
MONOCYTES ABSOLUTE: 0.4 /ΜL
MONOCYTES RELATIVE PERCENT: 9.2 %
NEUTROPHILS ABSOLUTE: 2.7 /ΜL
NEUTROPHILS RELATIVE PERCENT: 59.5 %
PDW BLD-RTO: ABNORMAL %
PLATELET # BLD: 305 K/ΜL
PMV BLD AUTO: 8.7 FL
POTASSIUM SERPL-SCNC: 4.4 MMOL/L
RBC # BLD: 3.08 10^6/ΜL
SODIUM BLD-SCNC: 140 MMOL/L
TOTAL PROTEIN: 6
WBC # BLD: 4.6 10^3/ML

## 2018-07-25 RX ORDER — LEUCOVORIN CALCIUM 5 MG/1
5 TABLET ORAL WEEKLY
Qty: 12 TABLET | Refills: 0 | Status: SHIPPED | OUTPATIENT
Start: 2018-07-25 | End: 2018-08-28 | Stop reason: SDUPTHER

## 2018-08-28 DIAGNOSIS — M19.90 INFLAMMATORY ARTHRITIS: ICD-10-CM

## 2018-08-28 LAB
ALBUMIN SERPL-MCNC: 4.1 G/DL
ALP BLD-CCNC: 50 U/L
ALT SERPL-CCNC: 16 U/L
ANION GAP SERPL CALCULATED.3IONS-SCNC: 11 MMOL/L
AST SERPL-CCNC: 18 U/L
BASOPHILS ABSOLUTE: 0 /ΜL
BASOPHILS RELATIVE PERCENT: 0.7 %
BILIRUB SERPL-MCNC: 0.4 MG/DL (ref 0.1–1.4)
BUN BLDV-MCNC: 30 MG/DL
CALCIUM SERPL-MCNC: 10.5 MG/DL
CHLORIDE BLD-SCNC: 109 MMOL/L
CO2: 25 MMOL/L
CREAT SERPL-MCNC: 1.4 MG/DL
EOSINOPHILS ABSOLUTE: 0.2 /ΜL
EOSINOPHILS RELATIVE PERCENT: 4 %
GFR CALCULATED: NORMAL
GLUCOSE BLD-MCNC: 93 MG/DL
HCT VFR BLD CALC: 34.1 % (ref 36–46)
HEMOGLOBIN: 11.4 G/DL (ref 12–16)
LYMPHOCYTES ABSOLUTE: 1.4 /ΜL
LYMPHOCYTES RELATIVE PERCENT: 24.3 %
MCH RBC QN AUTO: 32.6 PG
MCHC RBC AUTO-ENTMCNC: 33.4 G/DL
MCV RBC AUTO: 97.6 FL
MONOCYTES ABSOLUTE: 0.4 /ΜL
MONOCYTES RELATIVE PERCENT: 7.5 %
NEUTROPHILS ABSOLUTE: 3.8 /ΜL
NEUTROPHILS RELATIVE PERCENT: 63.5 %
PDW BLD-RTO: 14 %
PLATELET # BLD: 324 K/ΜL
PMV BLD AUTO: 8.3 FL
POTASSIUM SERPL-SCNC: 4.3 MMOL/L
RBC # BLD: 3.5 10^6/ΜL
SODIUM BLD-SCNC: 141 MMOL/L
TOTAL PROTEIN: 6.8
WBC # BLD: 5.9 10^3/ML

## 2018-08-28 RX ORDER — HYDROXYCHLOROQUINE SULFATE 200 MG/1
200 TABLET, FILM COATED ORAL DAILY
Qty: 90 TABLET | Refills: 1 | Status: SHIPPED | OUTPATIENT
Start: 2018-08-28 | End: 2018-10-24 | Stop reason: SDUPTHER

## 2018-08-28 RX ORDER — LEUCOVORIN CALCIUM 5 MG/1
5 TABLET ORAL WEEKLY
Qty: 12 TABLET | Refills: 0 | Status: SHIPPED | OUTPATIENT
Start: 2018-08-28 | End: 2018-10-24 | Stop reason: SDUPTHER

## 2018-08-28 NOTE — PROGRESS NOTES
The Labs have been reviewed and revealed no significant abnormalities compared to prior evaluation. The Medications have been refilled. Diagnosis Orders   1.  Inflammatory arthritis  leucovorin calcium (WELLCOVORIN) 5 MG tablet    methotrexate (RHEUMATREX) 2.5 MG chemo tablet    hydroxychloroquine (PLAQUENIL) 200 MG tablet

## 2018-10-02 ENCOUNTER — OFFICE VISIT (OUTPATIENT)
Dept: RHEUMATOLOGY | Age: 78
End: 2018-10-02
Payer: MEDICARE

## 2018-10-02 VITALS
SYSTOLIC BLOOD PRESSURE: 139 MMHG | HEART RATE: 54 BPM | DIASTOLIC BLOOD PRESSURE: 59 MMHG | WEIGHT: 162 LBS | OXYGEN SATURATION: 98 % | BODY MASS INDEX: 26.15 KG/M2

## 2018-10-02 DIAGNOSIS — G89.29 CHRONIC MIDLINE LOW BACK PAIN WITH LEFT-SIDED SCIATICA: ICD-10-CM

## 2018-10-02 DIAGNOSIS — Z51.81 MEDICATION MONITORING ENCOUNTER: ICD-10-CM

## 2018-10-02 DIAGNOSIS — M54.50 CHRONIC MIDLINE LOW BACK PAIN WITHOUT SCIATICA: ICD-10-CM

## 2018-10-02 DIAGNOSIS — M54.42 CHRONIC MIDLINE LOW BACK PAIN WITH LEFT-SIDED SCIATICA: ICD-10-CM

## 2018-10-02 DIAGNOSIS — M19.90 INFLAMMATORY ARTHRITIS: Primary | ICD-10-CM

## 2018-10-02 DIAGNOSIS — G89.29 CHRONIC MIDLINE LOW BACK PAIN WITHOUT SCIATICA: ICD-10-CM

## 2018-10-02 PROCEDURE — G8419 CALC BMI OUT NRM PARAM NOF/U: HCPCS | Performed by: INTERNAL MEDICINE

## 2018-10-02 PROCEDURE — 99214 OFFICE O/P EST MOD 30 MIN: CPT | Performed by: INTERNAL MEDICINE

## 2018-10-02 PROCEDURE — 1123F ACP DISCUSS/DSCN MKR DOCD: CPT | Performed by: INTERNAL MEDICINE

## 2018-10-02 PROCEDURE — 1090F PRES/ABSN URINE INCON ASSESS: CPT | Performed by: INTERNAL MEDICINE

## 2018-10-02 PROCEDURE — 1101F PT FALLS ASSESS-DOCD LE1/YR: CPT | Performed by: INTERNAL MEDICINE

## 2018-10-02 PROCEDURE — 4040F PNEUMOC VAC/ADMIN/RCVD: CPT | Performed by: INTERNAL MEDICINE

## 2018-10-02 PROCEDURE — G8484 FLU IMMUNIZE NO ADMIN: HCPCS | Performed by: INTERNAL MEDICINE

## 2018-10-02 PROCEDURE — G8598 ASA/ANTIPLAT THER USED: HCPCS | Performed by: INTERNAL MEDICINE

## 2018-10-02 PROCEDURE — G8400 PT W/DXA NO RESULTS DOC: HCPCS | Performed by: INTERNAL MEDICINE

## 2018-10-02 PROCEDURE — G8427 DOCREV CUR MEDS BY ELIG CLIN: HCPCS | Performed by: INTERNAL MEDICINE

## 2018-10-02 PROCEDURE — 4004F PT TOBACCO SCREEN RCVD TLK: CPT | Performed by: INTERNAL MEDICINE

## 2018-10-02 ASSESSMENT — ENCOUNTER SYMPTOMS
DIARRHEA: 0
SPUTUM PRODUCTION: 0
HEMOPTYSIS: 0
CONSTIPATION: 0
ABDOMINAL PAIN: 0
COUGH: 0
BACK PAIN: 1
SHORTNESS OF BREATH: 0
BLURRED VISION: 1

## 2018-10-02 NOTE — PROGRESS NOTES
could try knee injection or  physical therapy      Chronic right shoulder pain  Chronic lowcliazed shoulder pain worse w/ movement. Difficulty reaching above the head, behind the head and back. Prior hx of Rt rotator cuff injury and repair. Exam with (+) impingement testing, AC joint and lateral shoulder tenderness. Suspected rotator cuff syndrome +/- bursitis and osteoarthritis. - x-rya evaluation for further evaluation if sx's persist    - could try shoulder injection, could try physical therapy    Chronic midline low back pain with left-sided sciatica  Chronic neck pain:    Prior tx: NO relief with lumbar epidural injections from pain management. Mild relief with RFA. Chronic back pain with persistent pain. Failed Tramadol, back surgery x 2  - avoinding oral NSAIDs given CKD and CAD    - Cymbalta 30mg once daily    - percocet 5/325mg    Medication monitoring:   - CBC, CMP, ESR, CRP q 8 kws   - reports previously received the pneumococcal vaccinations and zoster vaccinations. Return in about 4 months (around 2/2/2019).

## 2018-10-23 LAB
ALBUMIN SERPL-MCNC: 3.9 G/DL
ALP BLD-CCNC: 45 U/L
ALT SERPL-CCNC: 14 U/L
ANION GAP SERPL CALCULATED.3IONS-SCNC: 12 MMOL/L
AST SERPL-CCNC: 17 U/L
BASOPHILS ABSOLUTE: 0 /ΜL
BASOPHILS RELATIVE PERCENT: 0.7 %
BILIRUB SERPL-MCNC: 0.4 MG/DL (ref 0.1–1.4)
BUN BLDV-MCNC: 28 MG/DL
CALCIUM SERPL-MCNC: 10.2 MG/DL
CHLORIDE BLD-SCNC: 111 MMOL/L
CO2: 24 MMOL/L
CREAT SERPL-MCNC: 1.4 MG/DL
EOSINOPHILS ABSOLUTE: 0.2 /ΜL
EOSINOPHILS RELATIVE PERCENT: 3.2 %
GFR CALCULATED: NORMAL
GLUCOSE BLD-MCNC: 92 MG/DL
HCT VFR BLD CALC: 32.6 % (ref 36–46)
HEMOGLOBIN: 10.8 G/DL (ref 12–16)
LYMPHOCYTES ABSOLUTE: 1.2 /ΜL
LYMPHOCYTES RELATIVE PERCENT: 22.9 %
MCH RBC QN AUTO: 32.7 PG
MCHC RBC AUTO-ENTMCNC: 33.1 G/DL
MCV RBC AUTO: 99.1 FL
MONOCYTES ABSOLUTE: 0.4 /ΜL
MONOCYTES RELATIVE PERCENT: 7 %
NEUTROPHILS ABSOLUTE: 3.5 /ΜL
NEUTROPHILS RELATIVE PERCENT: 66.2 %
PDW BLD-RTO: 14.8 %
PLATELET # BLD: 311 K/ΜL
PMV BLD AUTO: 8.6 FL
POTASSIUM SERPL-SCNC: 4.6 MMOL/L
RBC # BLD: 3.29 10^6/ΜL
SODIUM BLD-SCNC: 142 MMOL/L
TOTAL PROTEIN: 6.5
WBC # BLD: 5.4 10^3/ML

## 2018-10-24 DIAGNOSIS — M19.90 INFLAMMATORY ARTHRITIS: ICD-10-CM

## 2018-10-24 RX ORDER — HYDROXYCHLOROQUINE SULFATE 200 MG/1
200 TABLET, FILM COATED ORAL DAILY
Qty: 90 TABLET | Refills: 1 | Status: SHIPPED | OUTPATIENT
Start: 2018-10-24 | End: 2019-02-12 | Stop reason: SDUPTHER

## 2018-10-24 RX ORDER — LEUCOVORIN CALCIUM 5 MG/1
5 TABLET ORAL WEEKLY
Qty: 12 TABLET | Refills: 0 | Status: SHIPPED | OUTPATIENT
Start: 2018-10-24 | End: 2019-02-12 | Stop reason: SDUPTHER

## 2018-10-24 NOTE — PROGRESS NOTES
The Labs have been reviewed and revealed no significant abnormalities compared to prior evaluation. The Medications have been refilled.

## 2019-02-07 LAB
ALBUMIN SERPL-MCNC: 3.9 G/DL
ALP BLD-CCNC: 51 U/L
ALT SERPL-CCNC: 22 U/L
ANION GAP SERPL CALCULATED.3IONS-SCNC: 1 MMOL/L
AST SERPL-CCNC: 21 U/L
BASOPHILS ABSOLUTE: 0 /ΜL
BASOPHILS RELATIVE PERCENT: 0.2 %
BILIRUB SERPL-MCNC: 0.5 MG/DL (ref 0.1–1.4)
BUN BLDV-MCNC: 12 MG/DL
C-REACTIVE PROTEIN: 0.07
CALCIUM SERPL-MCNC: 9.4 MG/DL
CHLORIDE BLD-SCNC: 104 MMOL/L
CO2: 27 MMOL/L
CREAT SERPL-MCNC: 0.9 MG/DL
EOSINOPHILS ABSOLUTE: 0 /ΜL
EOSINOPHILS RELATIVE PERCENT: 0.2 %
GFR CALCULATED: 60
GLUCOSE BLD-MCNC: 98 MG/DL
HCT VFR BLD CALC: 42.2 % (ref 36–46)
HEMOGLOBIN: 14 G/DL (ref 12–16)
LYMPHOCYTES ABSOLUTE: 1.5 /ΜL
LYMPHOCYTES RELATIVE PERCENT: 13.1 %
MCH RBC QN AUTO: 30.2 PG
MCHC RBC AUTO-ENTMCNC: 33.1 G/DL
MCV RBC AUTO: 91.1 FL
MONOCYTES ABSOLUTE: 0.8 /ΜL
MONOCYTES RELATIVE PERCENT: 7.5 %
NEUTROPHILS ABSOLUTE: 8.8 /ΜL
NEUTROPHILS RELATIVE PERCENT: 79 %
PDW BLD-RTO: 13.7 %
PLATELET # BLD: 184 K/ΜL
PMV BLD AUTO: 10.4 FL
POTASSIUM SERPL-SCNC: 4.6 MMOL/L
RBC # BLD: 4.63 10^6/ΜL
SEDIMENTATION RATE, ERYTHROCYTE: 12
SODIUM BLD-SCNC: 139 MMOL/L
TOTAL PROTEIN: 7.7
WBC # BLD: 11.2 10^3/ML

## 2019-02-12 ENCOUNTER — OFFICE VISIT (OUTPATIENT)
Dept: RHEUMATOLOGY | Age: 79
End: 2019-02-12
Payer: MEDICARE

## 2019-02-12 VITALS
OXYGEN SATURATION: 97 % | BODY MASS INDEX: 26.15 KG/M2 | HEART RATE: 61 BPM | DIASTOLIC BLOOD PRESSURE: 76 MMHG | WEIGHT: 162.04 LBS | SYSTOLIC BLOOD PRESSURE: 157 MMHG

## 2019-02-12 DIAGNOSIS — G89.29 CHRONIC MIDLINE LOW BACK PAIN WITH LEFT-SIDED SCIATICA: ICD-10-CM

## 2019-02-12 DIAGNOSIS — Z78.0 POST-MENOPAUSAL: Primary | ICD-10-CM

## 2019-02-12 DIAGNOSIS — M70.62 GREATER TROCHANTERIC BURSITIS OF LEFT HIP: ICD-10-CM

## 2019-02-12 DIAGNOSIS — R20.2 PARESTHESIA OF BOTH HANDS: ICD-10-CM

## 2019-02-12 DIAGNOSIS — M54.42 CHRONIC MIDLINE LOW BACK PAIN WITH LEFT-SIDED SCIATICA: ICD-10-CM

## 2019-02-12 DIAGNOSIS — Z51.81 MEDICATION MONITORING ENCOUNTER: ICD-10-CM

## 2019-02-12 DIAGNOSIS — M19.90 INFLAMMATORY ARTHRITIS: ICD-10-CM

## 2019-02-12 PROCEDURE — 1090F PRES/ABSN URINE INCON ASSESS: CPT | Performed by: INTERNAL MEDICINE

## 2019-02-12 PROCEDURE — G8419 CALC BMI OUT NRM PARAM NOF/U: HCPCS | Performed by: INTERNAL MEDICINE

## 2019-02-12 PROCEDURE — 1101F PT FALLS ASSESS-DOCD LE1/YR: CPT | Performed by: INTERNAL MEDICINE

## 2019-02-12 PROCEDURE — G8427 DOCREV CUR MEDS BY ELIG CLIN: HCPCS | Performed by: INTERNAL MEDICINE

## 2019-02-12 PROCEDURE — 99214 OFFICE O/P EST MOD 30 MIN: CPT | Performed by: INTERNAL MEDICINE

## 2019-02-12 PROCEDURE — 4040F PNEUMOC VAC/ADMIN/RCVD: CPT | Performed by: INTERNAL MEDICINE

## 2019-02-12 PROCEDURE — 1123F ACP DISCUSS/DSCN MKR DOCD: CPT | Performed by: INTERNAL MEDICINE

## 2019-02-12 PROCEDURE — G8484 FLU IMMUNIZE NO ADMIN: HCPCS | Performed by: INTERNAL MEDICINE

## 2019-02-12 PROCEDURE — 4004F PT TOBACCO SCREEN RCVD TLK: CPT | Performed by: INTERNAL MEDICINE

## 2019-02-12 PROCEDURE — G8400 PT W/DXA NO RESULTS DOC: HCPCS | Performed by: INTERNAL MEDICINE

## 2019-02-12 PROCEDURE — G8598 ASA/ANTIPLAT THER USED: HCPCS | Performed by: INTERNAL MEDICINE

## 2019-02-12 RX ORDER — HYDROXYCHLOROQUINE SULFATE 200 MG/1
200 TABLET, FILM COATED ORAL DAILY
Qty: 90 TABLET | Refills: 1 | Status: SHIPPED | OUTPATIENT
Start: 2019-02-12 | End: 2019-03-12 | Stop reason: SDUPTHER

## 2019-02-12 RX ORDER — LEUCOVORIN CALCIUM 5 MG/1
5 TABLET ORAL WEEKLY
Qty: 12 TABLET | Refills: 0 | Status: SHIPPED | OUTPATIENT
Start: 2019-02-12 | End: 2019-03-12 | Stop reason: SDUPTHER

## 2019-02-12 ASSESSMENT — ENCOUNTER SYMPTOMS
VOMITING: 0
EYE REDNESS: 0
CONSTIPATION: 0
COUGH: 1
BACK PAIN: 1
WHEEZING: 1
ABDOMINAL PAIN: 0
NAUSEA: 0
SHORTNESS OF BREATH: 1
EYES NEGATIVE: 1
EYE PAIN: 0
COLOR CHANGE: 0
GASTROINTESTINAL NEGATIVE: 1

## 2019-02-28 ENCOUNTER — HOSPITAL ENCOUNTER (OUTPATIENT)
Age: 79
Discharge: HOME | End: 2019-02-28
Payer: MEDICARE

## 2019-02-28 ENCOUNTER — HOSPITAL ENCOUNTER (OUTPATIENT)
Dept: GENERAL RADIOLOGY | Age: 79
Discharge: HOME | End: 2019-02-28
Payer: MEDICARE

## 2019-02-28 ENCOUNTER — TELEPHONE (OUTPATIENT)
Dept: RHEUMATOLOGY | Age: 79
End: 2019-02-28

## 2019-02-28 ENCOUNTER — HOSPITAL ENCOUNTER (OUTPATIENT)
Dept: WOMENS IMAGING | Age: 79
Discharge: HOME | End: 2019-02-28
Payer: MEDICARE

## 2019-02-28 DIAGNOSIS — M85.89 OSTEOPENIA OF MULTIPLE SITES: ICD-10-CM

## 2019-02-28 DIAGNOSIS — Z51.81 MEDICATION MONITORING ENCOUNTER: ICD-10-CM

## 2019-02-28 DIAGNOSIS — M19.90 INFLAMMATORY ARTHRITIS: ICD-10-CM

## 2019-02-28 DIAGNOSIS — Z78.0 POST-MENOPAUSAL: ICD-10-CM

## 2019-02-28 PROCEDURE — 77080 DXA BONE DENSITY AXIAL: CPT

## 2019-02-28 PROCEDURE — 71046 X-RAY EXAM CHEST 2 VIEWS: CPT

## 2019-02-28 RX ORDER — DIPHENHYDRAMINE HYDROCHLORIDE 50 MG/ML
50 INJECTION INTRAMUSCULAR; INTRAVENOUS ONCE
Status: CANCELLED | OUTPATIENT
Start: 2019-02-28 | End: 2019-02-28

## 2019-02-28 RX ORDER — METHYLPREDNISOLONE SODIUM SUCCINATE 125 MG/2ML
125 INJECTION, POWDER, LYOPHILIZED, FOR SOLUTION INTRAMUSCULAR; INTRAVENOUS ONCE
Status: CANCELLED | OUTPATIENT
Start: 2019-02-28 | End: 2019-02-28

## 2019-02-28 RX ORDER — SODIUM CHLORIDE 9 MG/ML
100 INJECTION, SOLUTION INTRAVENOUS CONTINUOUS
Status: CANCELLED | OUTPATIENT
Start: 2019-02-28

## 2019-02-28 RX ORDER — 0.9 % SODIUM CHLORIDE 0.9 %
10 VIAL (ML) INJECTION ONCE
Status: CANCELLED | OUTPATIENT
Start: 2019-02-28 | End: 2019-02-28

## 2019-03-12 DIAGNOSIS — M19.90 INFLAMMATORY ARTHRITIS: ICD-10-CM

## 2019-03-12 LAB
ALBUMIN SERPL-MCNC: 4 G/DL
ALP BLD-CCNC: 47 U/L
ALT SERPL-CCNC: 12 U/L
ANION GAP SERPL CALCULATED.3IONS-SCNC: 1.6 MMOL/L
AST SERPL-CCNC: 17 U/L
BASOPHILS ABSOLUTE: 0 /ΜL
BASOPHILS RELATIVE PERCENT: 0.6 %
BILIRUB SERPL-MCNC: 0.4 MG/DL (ref 0.1–1.4)
BUN BLDV-MCNC: 21 MG/DL
C-REACTIVE PROTEIN: 0.07
CALCIUM SERPL-MCNC: 10.4 MG/DL
CHLORIDE BLD-SCNC: 110 MMOL/L
CO2: 28 MMOL/L
CREAT SERPL-MCNC: 1 MG/DL
EOSINOPHILS ABSOLUTE: 0.3 /ΜL
EOSINOPHILS RELATIVE PERCENT: 6 %
GFR CALCULATED: 51
GLUCOSE BLD-MCNC: 99 MG/DL
HCT VFR BLD CALC: 35.2 % (ref 36–46)
HEMOGLOBIN: 11.6 G/DL (ref 12–16)
LYMPHOCYTES ABSOLUTE: 0.9 /ΜL
LYMPHOCYTES RELATIVE PERCENT: 16.4 %
MCH RBC QN AUTO: 31.8 PG
MCHC RBC AUTO-ENTMCNC: 32.8 G/DL
MCV RBC AUTO: 96.9 FL
MONOCYTES ABSOLUTE: 0.5 /ΜL
MONOCYTES RELATIVE PERCENT: 8.1 %
NEUTROPHILS ABSOLUTE: 4 /ΜL
NEUTROPHILS RELATIVE PERCENT: 68.9 %
PDW BLD-RTO: 15.2 %
PLATELET # BLD: 318 K/ΜL
PMV BLD AUTO: 8 FL
POTASSIUM SERPL-SCNC: 3.9 MMOL/L
RBC # BLD: 3.63 10^6/ΜL
SEDIMENTATION RATE, ERYTHROCYTE: 5
SODIUM BLD-SCNC: 144 MMOL/L
TOTAL PROTEIN: 6.5
WBC # BLD: 5.8 10^3/ML

## 2019-03-12 RX ORDER — HYDROXYCHLOROQUINE SULFATE 200 MG/1
200 TABLET, FILM COATED ORAL DAILY
Qty: 90 TABLET | Refills: 1 | Status: SHIPPED | OUTPATIENT
Start: 2019-03-12 | End: 2019-04-02 | Stop reason: ALTCHOICE

## 2019-03-12 RX ORDER — LEUCOVORIN CALCIUM 5 MG/1
5 TABLET ORAL WEEKLY
Qty: 12 TABLET | Refills: 0 | Status: SHIPPED | OUTPATIENT
Start: 2019-03-12 | End: 2019-06-09 | Stop reason: SDUPTHER

## 2019-04-02 ENCOUNTER — OFFICE VISIT (OUTPATIENT)
Dept: RHEUMATOLOGY | Age: 79
End: 2019-04-02
Payer: MEDICARE

## 2019-04-02 VITALS
DIASTOLIC BLOOD PRESSURE: 80 MMHG | HEIGHT: 66 IN | WEIGHT: 162.04 LBS | BODY MASS INDEX: 26.04 KG/M2 | SYSTOLIC BLOOD PRESSURE: 132 MMHG | HEART RATE: 57 BPM | OXYGEN SATURATION: 99 %

## 2019-04-02 DIAGNOSIS — M70.62 GREATER TROCHANTERIC BURSITIS OF LEFT HIP: ICD-10-CM

## 2019-04-02 DIAGNOSIS — M54.42 CHRONIC MIDLINE LOW BACK PAIN WITH LEFT-SIDED SCIATICA: ICD-10-CM

## 2019-04-02 DIAGNOSIS — Z51.81 MEDICATION MONITORING ENCOUNTER: ICD-10-CM

## 2019-04-02 DIAGNOSIS — M19.90 INFLAMMATORY ARTHRITIS: Primary | ICD-10-CM

## 2019-04-02 DIAGNOSIS — M15.9 OSTEOARTHRITIS OF MULTIPLE JOINTS, UNSPECIFIED OSTEOARTHRITIS TYPE: ICD-10-CM

## 2019-04-02 DIAGNOSIS — M85.89 OSTEOPENIA OF MULTIPLE SITES: ICD-10-CM

## 2019-04-02 DIAGNOSIS — G89.29 CHRONIC MIDLINE LOW BACK PAIN WITH LEFT-SIDED SCIATICA: ICD-10-CM

## 2019-04-02 PROCEDURE — 4040F PNEUMOC VAC/ADMIN/RCVD: CPT | Performed by: INTERNAL MEDICINE

## 2019-04-02 PROCEDURE — 1090F PRES/ABSN URINE INCON ASSESS: CPT | Performed by: INTERNAL MEDICINE

## 2019-04-02 PROCEDURE — 99214 OFFICE O/P EST MOD 30 MIN: CPT | Performed by: INTERNAL MEDICINE

## 2019-04-02 PROCEDURE — G8427 DOCREV CUR MEDS BY ELIG CLIN: HCPCS | Performed by: INTERNAL MEDICINE

## 2019-04-02 PROCEDURE — G8598 ASA/ANTIPLAT THER USED: HCPCS | Performed by: INTERNAL MEDICINE

## 2019-04-02 PROCEDURE — G8399 PT W/DXA RESULTS DOCUMENT: HCPCS | Performed by: INTERNAL MEDICINE

## 2019-04-02 PROCEDURE — G8419 CALC BMI OUT NRM PARAM NOF/U: HCPCS | Performed by: INTERNAL MEDICINE

## 2019-04-02 PROCEDURE — 4004F PT TOBACCO SCREEN RCVD TLK: CPT | Performed by: INTERNAL MEDICINE

## 2019-04-02 PROCEDURE — 1123F ACP DISCUSS/DSCN MKR DOCD: CPT | Performed by: INTERNAL MEDICINE

## 2019-04-02 ASSESSMENT — ENCOUNTER SYMPTOMS
NAUSEA: 0
SHORTNESS OF BREATH: 1
COUGH: 1
EYES NEGATIVE: 1
EYE PAIN: 0
CONSTIPATION: 0
VOMITING: 0
ABDOMINAL PAIN: 0
EYE REDNESS: 0
BACK PAIN: 1
GASTROINTESTINAL NEGATIVE: 1
COLOR CHANGE: 0
WHEEZING: 1

## 2019-04-02 NOTE — PROGRESS NOTES
[facility] RHEUMATOLOGY FOLLOW UP NOTE       Date Of Service: 4/2/2019  Provider: Zo Mcclure DO    Name: Michael Mcnamara   MRN: [id number]    Chief Complaint(s)     Chief Complaint   Patient presents with    Follow-up     6 week         History of Present Illness (HPI)     Michael Mcnamara  is a(n)79 y.o. female with a hx of Obesity, angina, CAD, T2DM, Non-rheumatic mitral regurg, HTN, GERD, CKD stage III, COPD, depression, here for the f/u of her osteoarthritis multiple joints,  inflammatory arthritis, chondrocalcinosis in b/l wrist (? CPPD arthropathy), Hypercalcemia,      · Denies new medications    · Pt scheduled for left hip replacement around May 2018   · No relief with methotrexate titration   · Injections of the left hips, troc bursal and dorsal left foot --- by Dr. Herminio Laboy --- short term pain relief   Generalied joint pains \"whole body pain\". Fingers, wrists, , shoulder, hips, knee, ankles, neck and back. Pain 10/10. Most severe pain in the shoulder and entire spine. Timing : all days. cosntant daily pain. Some shootingp ain in the hands and right left. Aggravating factors:  Weather changes. Hands: use. Knee: standing, wt bearing. Back: walking, standing, bending, prolonged sitting. Neck: turning head to left. Alleviating factors: none. + numbness of b/l finger tips --- variable constant. Continued dropping objects. Worse with increased use. waking pt at night. - dry eyes -- no relief with eye drops, + dry mouth   -     Current therapy:   Plaquenil 200mg daily  Methotrexate 20 mg q wk   Leucovorin 5mg weekly   Cymbalta 30mg daily   Trazadone 100mg nightly   Percocet 5/325mg      REVIEW OF SYSTEMS: (ROS)    Review of Systems   Constitutional: Positive for fatigue. Negative for fever and unexpected weight change. HENT: Negative. Negative for congestion and mouth sores. Eyes: Negative. Negative for pain and redness.    Respiratory: Positive for cough, shortness of breath and wheezing. Cardiovascular: Negative. Negative for chest pain and leg swelling. Gastrointestinal: Negative. Negative for abdominal pain, constipation, nausea and vomiting. Endocrine: Negative for polyuria. Genitourinary: Negative. Negative for difficulty urinating, frequency and hematuria. Musculoskeletal: Positive for arthralgias, back pain, joint swelling and myalgias. Skin: Negative. Negative for color change and rash. Neurological: Positive for weakness and numbness. Negative for dizziness and headaches. Hematological: Negative. Negative for adenopathy. Does not bruise/bleed easily. Psychiatric/Behavioral: Negative. Negative for agitation and sleep disturbance. The patient is not nervous/anxious.           PAST MEDICAL HISTORY      Past Medical History:   Diagnosis Date    Arthritis     Asthma 1978    [facility]rial [facility] [facility]     Blood circulation, collateral     CAD (coronary artery disease)     CHF (congestive heart failure) ([facility]) 1999    Chronic kidney disease, stage III (moderate) ([facility])     COPD (chronic obstructive pulmonary disease) [facility] [address] [facility]     Depression     Diabetes mellitus ([facility] [address] [facility]     Diabetic hypertension-nephrosis syndrome ([facility])     Dysuria     GERD (gastroesophageal reflux disease)     Hyperlipidemia     Hypertension     Unspecified cerebral artery occlusion with cerebral infarction 1999    Dr Jewell Oshea informed patient after Heart Attack       PAST SURGICAL HISTORY      Past Surgical History:   Procedure Laterality Date    APPENDECTOMY      BACK SURGERY      lumbar spine    BLADDER SUSPENSION      CARDIAC CATHETERIZATION  2009    [facility] stent  [facility]    CARPAL TUNNEL RELEASE      COLONOSCOPY  2013    ENDOSCOPY, COLON, DIAGNOSTIC      ESOPHAGEAL DILATATION      HYSTERECTOMY  1974    JOINT REPLACEMENT Left 1992    Left Knee       FAMILY HISTORY      Family History   Problem Relation Age of Onset    Heart Disease Mother     Diabetes Mother     Kidney Disease Mother     Heart Disease Father     Diabetes Father     Cancer Sister     Stroke Brother     Cancer Sister        SOCIAL HISTORY      Social History     Tobacco History     Smoking Status  Light Tobacco Smoker Smoking Start Date  10/21/2013 Smoking Frequency  0.15 packs/day for 10 years (1.5 pk yrs) Smoking Tobacco Type  Cigarettes    Smokeless Tobacco Use  Never Used          Alcohol History     Alcohol Use Status  No          Drug Use     Drug Use Status  No          Sexual Activity     Sexually Active  No                ALLERGIES     Allergies   Allergen Reactions    Nuts [Peanut-Containing Drug Products]      Pt suffers from diverticulitis and nut products irritate her stomach.  Strawberry (Diagnostic)      Diverticulitis    Sulfa Antibiotics     Tape Roca Bills Tape] Other (See Comments)     tears skin off    Bactrim [Sulfamethoxazole-Trimethoprim] Rash     Little red rash    Chocolate Rash       CURRENT MEDICATIONS      Current Outpatient Medications   Medication Sig Dispense Refill    methotrexate (RHEUMATREX) 2.5 MG chemo tablet TAKE 8 TABLETS BY MOUTH ONCE WEEKLY 4 TABLETS IN THE MORNING AND 4 TABLETS IN THE EVENING. 32 tablet 0    leucovorin calcium (WELLCOVORIN) 5 MG tablet Take 1 tablet by mouth once a week 12 tablet 0    hydroxychloroquine (PLAQUENIL) 200 MG tablet Take 1 tablet by mouth daily 90 tablet 1    insulin glargine (LANTUS) 100 UNIT/ML injection pen Inject into the skin nightly      oxyCODONE-acetaminophen (PERCOCET)  MG per tablet Take 1 tablet by mouth every 4 hours as needed for Pain. Jared Duggan amLODIPine (NORVASC) 5 MG tablet Take 5 mg by mouth daily      dexlansoprazole (DEXILANT) 60 MG CPDR delayed release capsule Take 60 mg by mouth daily      isosorbide mononitrate (IMDUR) 120 MG extended release tablet Take 1 tablet by mouth daily 30 tablet 3    hydrALAZINE (APRESOLINE) 100 MG tablet Take 1 tablet by mouth 3 times daily (Patient taking differently: Take 50 mg by mouth 3 times daily ) 90 tablet 3    metoprolol tartrate (LOPRESSOR) 100 MG tablet Take 1 tablet by mouth daily 60 tablet 3    potassium chloride (KLOR-CON) 20 MEQ packet Take 10 mEq by mouth daily       pantoprazole (PROTONIX) 40 MG tablet Take 40 mg by mouth daily      atorvastatin (LIPITOR) 40 MG tablet Take 40 mg by mouth daily      famotidine (PEPCID) 40 MG tablet Take 40 mg by mouth daily      fenofibrate (TRICOR) 145 MG tablet Take 145 mg by mouth daily      aspirin 81 MG EC tablet Take 162 mg by mouth daily      ranolazine (RANEXA) 500 MG SR tablet Take 1 tablet by mouth 2 times daily. 60 tablet 3    budesonide-formoterol (SYMBICORT) 160-4.5 MCG/ACT AERO Inhale 2 puffs into the lungs 2 times daily.  spironolactone (ALDACTONE) 50 MG tablet Take 50 mg by mouth daily.  magnesium oxide (MAG-OX) 400 MG tablet Take 400 mg by mouth 2 times daily.  DULoxetine (CYMBALTA) 30 MG capsule Take 30 mg by mouth 2 times daily.  albuterol (PROVENTIL HFA;VENTOLIN HFA) 108 (90 BASE) MCG/ACT inhaler Inhale 2 puffs into the lungs every 6 hours as needed.  montelukast (SINGULAIR) 5 MG chewable tablet Take 5 mg by mouth nightly.  traZODone (DESYREL) 50 MG tablet Take 100 mg by mouth nightly       nitroGLYCERIN (NITROSTAT) 0.4 MG SL tablet Place 0.4 mg under the tongue every 5 minutes as needed. No current facility-administered medications for this visit. PHYSICAL EXAMINATION / OBJECTIVE     Objective:  /80 (Site: Left Upper Arm, Position: Sitting, Cuff Size: Medium Adult)   Pulse 57   Ht 5' 5.98\" (1.676 m)   Wt 162 lb 0.6 oz (73.5 kg)   SpO2 99%   BMI 26.17 kg/m²     Physical Exam    General  Alert, cooperative, oriented, mood and affect appropriate. Well-nourished, well-developed. Head  Normocephalic, no scars   Eyes   No sclera icterus.  No conjunctival injection,  EOMI   ENT No discharge, pharynx clear, moist mucous membranes, no nasal sores appreciated Respiratory  CTA bilaterally, no rales, rhonchi, wheezing , symmetric chest expansion , No accessory muscle use. Cardiovascular  Regular rate, rhythm, NO murmurs, gallops, rubs , edema   Integumentary Bruising bilat upper ext. Psych Oriented to person, place, time. No anxiety or agitation , verbalize understanding of conversation, coherent speech. Lymph No lymphadenopathy in the cervical or supraclavicular regions    Extremities No cyanosis, clubbing, or edema. Pulses 4+ and equal bilaterally (radial, dorsal pedis, posterior tibial)    Msk:    Muscle strength 5/5, FROM, No Synovitis   Shoulder: (+) dania, speed, hawking, infraspinatous, and scarf testing.  + swelling bilateral shoulder     Wrist: swelling along Rt wrist.  tenosynovitis around the ulnar styloid bilat. (+) bilateral phalen testing. Fingers:                            - tender: mcps, pips, wrists,                - heberden nodes bilat. Flexion deformites as several DIPS bilat. - synovitis of PIPs Right 3-4 , Left 3-4           Lower Extremities:   Muscle strength 5/5, FROM, No Synovitis   - Left outer hips: tenderness overlying the greater trochanter  - knees: tender- medialknee bilat  - ankles: tender bilat. Back/spine - pain along the lumbosacral spine and cervical spine.    - tenderness and hypertonicity along the Right cervical perispinal musculature and Right upper trapezius  - thoracic kyphsosi    Dermatology No rashes, lesions, scars , nodules on exposed extremities             RAPID3 Composite Score  4/2/2019 --- RAPID 3: 3.7 + 10 + 9 = 22.7     RAPID3 Total Score: 21.3      Remission: <3  Low Disease Activity: <6  Moderate Disease Activity: >=6 and <=12  High Disease Activity: >12     LABS      CBC  Lab Results   Component Value Date    WBC 5.8 03/12/2019    RBC 3.63 03/12/2019    HGB 11.6 03/12/2019    HCT 35.2 03/12/2019    MCV 96.9 03/12/2019    MCH 31.8 03/12/2019    MCHC 32.8 03/12/2019    RDW 15.2 03/12/2019     03/12/2019       CMP  Lab Results   Component Value Date    CALCIUM 10.4 03/12/2019    LABALBU 4.0 03/12/2019    PROT 7.0 06/01/2017     03/12/2019    K 3.9 03/12/2019    CO2 28 03/12/2019     03/12/2019    BUN 21 03/12/2019    CREATININE 1.0 03/12/2019    ALKPHOS 47 03/12/2019    ALT 12 03/12/2019    AST 17 03/12/2019       HgBA1c: No components found for: HGBA1C    Lab Results   Component Value Date    TSH 1.297 12/03/2013     Lab Results   Component Value Date    VITD25 34 12/28/2017         No results found for: ANASCRN  No results found for: SSA  No results found for: SSB  No results found for: ANTI-SMITH  No results found for: DSDNAAB   No results found for: ANTIRNP  No results found for: C3, C4  No results found for: CCPAB  Lab Results   Component Value Date    RF <15 11/09/2017       No components found for: CANCASCRN, APANCASCRN  Lab Results   Component Value Date    SEDRATE 5 03/12/2019     Lab Results   Component Value Date    CRP 0.07 03/12/2019       RADIOLOGY:         ASSESSMENT/PLAN    Assessment   Plan   1. Inflammatory arthritis:  --- worse actrivity   -  chondrocalcinosis in b/l wrist (? CPPD arthropathy)  Vs seronegative inflammatory arthritis vs inflammatory OA  The patient reports genearlized joint pain in right shoulder, hands, entire back, knees. Symptoms started: back pain started several years ago with 2 reported lumbar spine surgery. The Rt shoulder pain has been present for \"years\". Knee pain present years ago but s/p left Knee replacement ~5 years ago improved sx's until the past year. - knee pain started over the past year. Pain in the hands x 8-9 years. Pt has been treated by Dr. Max Doran for the past 8-9 year. Tx'ed with plaquenil 200mg and once monthly trigger point injections  - Previous therapy:Lumbar Epidural injections (10/11/17) from main management, monthly trigger point injections.  , physical therapy, tramadol (no relief), gabapentin (no relief), lyrica (not covered by insurance). Prednisone (high blood sugars and high blood pressure) arava 10mg qd (Diarrhea)   - Current therapy: Plaquenil 200mg daily, Cymbalta 30mg daily, trazadone 100mg nightly,                    -negative Evaluation of RA, SLE, Sjogren, Gout, secondary causes of CPPD arthropathy (iron, mag studies)                  -  stop plaquenil 200mg qd. (Eye exam May 17, 2018) --- no relief   -  Transition to . Methotrexate  To 20mg subcu o weekly b/c decreased synovitis with oral formulation   -  Leucovorin 5mg q wk (to decrease pill burden)               Left lateral hip pain:   - prior tx: bursal injections: relief (february 2018), no relief on repeat by Dr. Wing Huang  - stress test for cardiac clearance for her hip replacement.             - Hip replacement possibly in may 2019                        Bilateral hand paresthesia: worsened sx's  - prior bilateral carpal tunnel   - increased weakaness.      Chronic pain of right knee  - H/o osteoarthritis, knee pain worse with wt bearing, improved w/ non-wt bearing.   - XR Rt knee; 11/2017 no abnoramlities (discussed results with pt)   - avoinding oral NSAIDs given CKD and CAD , failed Tx:        Chronic bilateral shoulder pain. Chronic lowcliazed shoulder pain worse w/ movement. Difficulty reaching above the head, behind the head and back. Prior hx of Rt rotator cuff injury and repair. Exam with (+) impingement testing, AC joint and lateral shoulder tenderness. Suspected rotator cuff syndrome +/- bursitis and osteoarthritis. - x-ray evaluation ordered                  - could try shoulder injection, could try physical therapy     Chronic midline low back pain with left-sided sciatica  Chronic neck pain:    Prior tx: NO relief with lumbar epidural injections from pain management. Mild relief with RFA. Chronic back pain with persistent pain.  Failed Tramadol, back surgery x 2  - avoinding oral NSAIDs given CKD and CAD

## 2019-04-10 ENCOUNTER — TELEPHONE (OUTPATIENT)
Dept: RHEUMATOLOGY | Age: 79
End: 2019-04-10

## 2019-04-10 NOTE — TELEPHONE ENCOUNTER
Patient called and notified of Prolia injection being scheduled for April 16th @ 11:00. She voiced understanding.

## 2019-04-16 ENCOUNTER — HOSPITAL ENCOUNTER (OUTPATIENT)
Dept: NURSING | Age: 79
Discharge: HOME | End: 2019-04-16
Payer: MEDICARE

## 2019-04-16 VITALS
RESPIRATION RATE: 20 BRPM | HEART RATE: 99 BPM | SYSTOLIC BLOOD PRESSURE: 163 MMHG | TEMPERATURE: 96.7 F | DIASTOLIC BLOOD PRESSURE: 80 MMHG | OXYGEN SATURATION: 98 %

## 2019-04-16 DIAGNOSIS — M85.89 OSTEOPENIA OF MULTIPLE SITES: Primary | ICD-10-CM

## 2019-04-16 PROCEDURE — 96372 THER/PROPH/DIAG INJ SC/IM: CPT

## 2019-04-16 PROCEDURE — 6360000002 HC RX W HCPCS: Performed by: INTERNAL MEDICINE

## 2019-04-16 RX ORDER — SODIUM CHLORIDE 9 MG/ML
100 INJECTION, SOLUTION INTRAVENOUS CONTINUOUS
Status: CANCELLED | OUTPATIENT
Start: 2019-10-15

## 2019-04-16 RX ORDER — 0.9 % SODIUM CHLORIDE 0.9 %
10 VIAL (ML) INJECTION ONCE
Status: CANCELLED | OUTPATIENT
Start: 2019-10-15

## 2019-04-16 RX ORDER — METHYLPREDNISOLONE SODIUM SUCCINATE 125 MG/2ML
125 INJECTION, POWDER, LYOPHILIZED, FOR SOLUTION INTRAMUSCULAR; INTRAVENOUS ONCE
Status: CANCELLED | OUTPATIENT
Start: 2019-10-15

## 2019-04-16 RX ORDER — DIPHENHYDRAMINE HYDROCHLORIDE 50 MG/ML
50 INJECTION INTRAMUSCULAR; INTRAVENOUS ONCE
Status: CANCELLED | OUTPATIENT
Start: 2019-10-15

## 2019-04-16 RX ADMIN — DENOSUMAB 60 MG: 60 INJECTION SUBCUTANEOUS at 11:17

## 2019-04-16 ASSESSMENT — PAIN - FUNCTIONAL ASSESSMENT: PAIN_FUNCTIONAL_ASSESSMENT: 0-10

## 2019-04-16 NOTE — PROGRESS NOTES
__M__ Safety:       (Environmental)   [place] to environment   Ensure ID band is correct and in place/ allergy band as needed   Assess for fall risk   Initiate fall precautions as applicable (fall band, side rails, etc.)   Call light within reach   Bed in low position/ wheels locked

## 2019-04-16 NOTE — PROGRESS NOTES
Patient being discharged in stable condition. Written and verbal instructions given to patient, she  voices no questions or concerns.

## 2019-04-23 LAB
ALBUMIN SERPL-MCNC: 3.6 G/DL
ALP BLD-CCNC: 56 U/L
ALT SERPL-CCNC: 16 U/L
ANION GAP SERPL CALCULATED.3IONS-SCNC: 1.2 MMOL/L
AST SERPL-CCNC: 15 U/L
BASOPHILS ABSOLUTE: 0 /ΜL
BASOPHILS RELATIVE PERCENT: 0.4 %
BILIRUB SERPL-MCNC: 0.3 MG/DL (ref 0.1–1.4)
BUN BLDV-MCNC: 18 MG/DL
C-REACTIVE PROTEIN: 2.29
CALCIUM SERPL-MCNC: 8.8 MG/DL
CHLORIDE BLD-SCNC: 111 MMOL/L
CO2: 23 MMOL/L
CREAT SERPL-MCNC: 0.9 MG/DL
EOSINOPHILS ABSOLUTE: 0.5 /ΜL
EOSINOPHILS RELATIVE PERCENT: 7 %
GFR CALCULATED: 58
GLUCOSE BLD-MCNC: 130 MG/DL
HCT VFR BLD CALC: 34 % (ref 36–46)
HEMOGLOBIN: 11.2 G/DL (ref 12–16)
LYMPHOCYTES ABSOLUTE: 0.8 /ΜL
LYMPHOCYTES RELATIVE PERCENT: 12.2 %
MCH RBC QN AUTO: 31.8 PG
MCHC RBC AUTO-ENTMCNC: 32.8 G/DL
MCV RBC AUTO: 97.1 FL
MONOCYTES ABSOLUTE: 0.6 /ΜL
MONOCYTES RELATIVE PERCENT: 8.3 %
NEUTROPHILS ABSOLUTE: 4.9 /ΜL
NEUTROPHILS RELATIVE PERCENT: 72.1 %
PDW BLD-RTO: 14.7 %
PLATELET # BLD: 362 K/ΜL
PMV BLD AUTO: 7.4 FL
POTASSIUM SERPL-SCNC: 3.9 MMOL/L
RBC # BLD: 3.5 10^6/ΜL
SEDIMENTATION RATE, ERYTHROCYTE: 40
SODIUM BLD-SCNC: 143 MMOL/L
TOTAL PROTEIN: 6.6
WBC # BLD: 6.9 10^3/ML

## 2019-04-24 NOTE — PROGRESS NOTES
ESR/CRP elevation  ? Worsening inflammatory arthritis versus infection versus other causes of inflammation    Anemia:  Mild progression. Reevaluation in 4 weeks.

## 2019-04-25 ENCOUNTER — TELEPHONE (OUTPATIENT)
Dept: RHEUMATOLOGY | Age: 79
End: 2019-04-25

## 2019-04-25 NOTE — TELEPHONE ENCOUNTER
We can hold on changing the medications if she is not having any increased pain for symptoms. Would like for her to call if the symptosm worsen.

## 2019-05-30 LAB
ALBUMIN SERPL-MCNC: 3.9 G/DL
ALP BLD-CCNC: 52 U/L
ALT SERPL-CCNC: 12 U/L
ANION GAP SERPL CALCULATED.3IONS-SCNC: 1.3 MMOL/L
AST SERPL-CCNC: 14 U/L
BASOPHILS ABSOLUTE: 0.1 /ΜL
BASOPHILS RELATIVE PERCENT: 0.5 %
BILIRUB SERPL-MCNC: 0.5 MG/DL (ref 0.1–1.4)
BUN BLDV-MCNC: 23 MG/DL
C-REACTIVE PROTEIN: 1.35
CALCIUM SERPL-MCNC: 9.9 MG/DL
CHLORIDE BLD-SCNC: 109 MMOL/L
CO2: 24 MMOL/L
CREAT SERPL-MCNC: 1.2 MG/DL
EOSINOPHILS ABSOLUTE: 0.1 /ΜL
EOSINOPHILS RELATIVE PERCENT: 1.4 %
GFR CALCULATED: 52
GLUCOSE BLD-MCNC: 104 MG/DL
HCT VFR BLD CALC: 34.9 % (ref 36–46)
HEMOGLOBIN: 11.5 G/DL (ref 12–16)
LYMPHOCYTES ABSOLUTE: 0.9 /ΜL
LYMPHOCYTES RELATIVE PERCENT: 8.3 %
MCH RBC QN AUTO: 31.3 PG
MCHC RBC AUTO-ENTMCNC: 33.1 G/DL
MCV RBC AUTO: 94.5 FL
MONOCYTES ABSOLUTE: 0.6 /ΜL
MONOCYTES RELATIVE PERCENT: 5.8 %
NEUTROPHILS ABSOLUTE: 8.9 /ΜL
NEUTROPHILS RELATIVE PERCENT: 84 %
PDW BLD-RTO: 15.4 %
PLATELET # BLD: 389 K/ΜL
PMV BLD AUTO: 7.6 FL
POTASSIUM SERPL-SCNC: 4.2 MMOL/L
RBC # BLD: 3.69 10^6/ΜL
SEDIMENTATION RATE, ERYTHROCYTE: 23
SODIUM BLD-SCNC: 140 MMOL/L
TOTAL PROTEIN: 7
WBC # BLD: 10.7 10^3/ML

## 2019-06-07 DIAGNOSIS — M19.90 INFLAMMATORY ARTHRITIS: ICD-10-CM

## 2019-06-09 RX ORDER — LEUCOVORIN CALCIUM 5 MG/1
5 TABLET ORAL WEEKLY
Qty: 12 TABLET | Refills: 0 | Status: SHIPPED | OUTPATIENT
Start: 2019-06-09 | End: 2019-12-02 | Stop reason: SDUPTHER

## 2019-07-02 LAB
ALBUMIN SERPL-MCNC: 3.7 G/DL
ALP BLD-CCNC: 44 U/L
ALT SERPL-CCNC: 18 U/L
ANION GAP SERPL CALCULATED.3IONS-SCNC: 1.3 MMOL/L
AST SERPL-CCNC: 23 U/L
BASOPHILS ABSOLUTE: 0 /ΜL
BASOPHILS RELATIVE PERCENT: 0.8 %
BILIRUB SERPL-MCNC: 0.3 MG/DL (ref 0.1–1.4)
BUN BLDV-MCNC: 22 MG/DL
C-REACTIVE PROTEIN: 1.79
CALCIUM SERPL-MCNC: 9.1 MG/DL
CHLORIDE BLD-SCNC: 111 MMOL/L
CO2: 21 MMOL/L
CREAT SERPL-MCNC: 1.3 MG/DL
EOSINOPHILS ABSOLUTE: 0.1 /ΜL
EOSINOPHILS RELATIVE PERCENT: 2.3 %
GFR CALCULATED: 40
GLUCOSE BLD-MCNC: 174 MG/DL
HCT VFR BLD CALC: 34.3 % (ref 36–46)
HEMOGLOBIN: 11.3 G/DL (ref 12–16)
LYMPHOCYTES ABSOLUTE: 0.7 /ΜL
LYMPHOCYTES RELATIVE PERCENT: 12.2 %
MCH RBC QN AUTO: 30.4 PG
MCHC RBC AUTO-ENTMCNC: 32.9 G/DL
MCV RBC AUTO: 92.5 FL
MONOCYTES ABSOLUTE: 0.5 /ΜL
MONOCYTES RELATIVE PERCENT: 7.8 %
NEUTROPHILS ABSOLUTE: 4.5 /ΜL
NEUTROPHILS RELATIVE PERCENT: 76.9 %
PDW BLD-RTO: 15.8 %
PLATELET # BLD: 373 K/ΜL
PMV BLD AUTO: 8 FL
POTASSIUM SERPL-SCNC: 3.9 MMOL/L
RBC # BLD: 3.71 10^6/ΜL
SEDIMENTATION RATE, ERYTHROCYTE: 27
SODIUM BLD-SCNC: 142 MMOL/L
TOTAL PROTEIN: 6.5
WBC # BLD: 5.9 10^3/ML

## 2019-07-03 DIAGNOSIS — M19.90 INFLAMMATORY ARTHRITIS: Primary | ICD-10-CM

## 2019-07-03 NOTE — PROGRESS NOTES
Diagnosis Orders   1. Inflammatory arthritis  methotrexate (RHEUMATREX) 2.5 MG chemo tablet     - Esr/crp elevation, mild lymphopenia  - may need to discuss additional medication treatment options if synovitis still present   - repeat labs in 8 weeks.

## 2019-08-22 ENCOUNTER — OFFICE VISIT (OUTPATIENT)
Dept: RHEUMATOLOGY | Age: 79
End: 2019-08-22
Payer: MEDICARE

## 2019-08-22 VITALS
DIASTOLIC BLOOD PRESSURE: 82 MMHG | HEART RATE: 99 BPM | BODY MASS INDEX: 26.52 KG/M2 | WEIGHT: 165 LBS | HEIGHT: 66 IN | OXYGEN SATURATION: 99 % | SYSTOLIC BLOOD PRESSURE: 130 MMHG

## 2019-08-22 DIAGNOSIS — Z51.81 MEDICATION MONITORING ENCOUNTER: ICD-10-CM

## 2019-08-22 DIAGNOSIS — M85.89 OSTEOPENIA OF MULTIPLE SITES: ICD-10-CM

## 2019-08-22 DIAGNOSIS — R06.09 DOE (DYSPNEA ON EXERTION): ICD-10-CM

## 2019-08-22 DIAGNOSIS — M54.42 CHRONIC MIDLINE LOW BACK PAIN WITH LEFT-SIDED SCIATICA: ICD-10-CM

## 2019-08-22 DIAGNOSIS — M19.90 INFLAMMATORY ARTHRITIS: Primary | ICD-10-CM

## 2019-08-22 DIAGNOSIS — G89.29 CHRONIC MIDLINE LOW BACK PAIN WITH LEFT-SIDED SCIATICA: ICD-10-CM

## 2019-08-22 DIAGNOSIS — M15.9 OSTEOARTHRITIS OF MULTIPLE JOINTS, UNSPECIFIED OSTEOARTHRITIS TYPE: ICD-10-CM

## 2019-08-22 DIAGNOSIS — M70.62 GREATER TROCHANTERIC BURSITIS OF LEFT HIP: ICD-10-CM

## 2019-08-22 PROCEDURE — 1123F ACP DISCUSS/DSCN MKR DOCD: CPT | Performed by: INTERNAL MEDICINE

## 2019-08-22 PROCEDURE — G8598 ASA/ANTIPLAT THER USED: HCPCS | Performed by: INTERNAL MEDICINE

## 2019-08-22 PROCEDURE — G8427 DOCREV CUR MEDS BY ELIG CLIN: HCPCS | Performed by: INTERNAL MEDICINE

## 2019-08-22 PROCEDURE — G8419 CALC BMI OUT NRM PARAM NOF/U: HCPCS | Performed by: INTERNAL MEDICINE

## 2019-08-22 PROCEDURE — 1090F PRES/ABSN URINE INCON ASSESS: CPT | Performed by: INTERNAL MEDICINE

## 2019-08-22 PROCEDURE — G8399 PT W/DXA RESULTS DOCUMENT: HCPCS | Performed by: INTERNAL MEDICINE

## 2019-08-22 PROCEDURE — 4040F PNEUMOC VAC/ADMIN/RCVD: CPT | Performed by: INTERNAL MEDICINE

## 2019-08-22 PROCEDURE — 4004F PT TOBACCO SCREEN RCVD TLK: CPT | Performed by: INTERNAL MEDICINE

## 2019-08-22 PROCEDURE — 99215 OFFICE O/P EST HI 40 MIN: CPT | Performed by: INTERNAL MEDICINE

## 2019-08-22 ASSESSMENT — ENCOUNTER SYMPTOMS
CONSTIPATION: 0
EYE PAIN: 0
BACK PAIN: 1
COLOR CHANGE: 0
EYES NEGATIVE: 1
VOMITING: 0
GASTROINTESTINAL NEGATIVE: 1
WHEEZING: 1
EYE REDNESS: 0
COUGH: 1
ABDOMINAL PAIN: 0
NAUSEA: 0
SHORTNESS OF BREATH: 1

## 2019-08-22 ASSESSMENT — JOINT PAIN
TOTAL NUMBER OF SWOLLEN JOINTS: 15
TOTAL NUMBER OF TENDER JOINTS: 18

## 2019-08-22 NOTE — PATIENT INSTRUCTIONS
rare type of lymphoma (cancer) of the liver, spleen, and bone marrow that can be fatal. This has occurred mainly in teenagers and young men with Crohn's disease or ulcerative colitis. However, anyone with an inflammatory autoimmune disorder may have a higher risk of lymphoma. Talk with your doctor about your own risk. Tell your doctor if you are pregnant or plan to become pregnant. Your baby could have an increased risk of infection for up to 6 months if you use golimumab during pregnancy. If you use golimumab during pregnancy, your baby should not receive a live vaccine for the first 6 months after birth. You should not breast-feed while using this medicine. Do not give this medication to anyone under 25years old without medical advice. How should I use golimumab? Follow all directions on your prescription label and read all medication guides or instruction sheets. Use the medicine exactly as directed. If you have ever had tuberculosis or hepatitis B, golimumab can cause these conditions to come back or get worse. You should be tested for these conditions before you start using golimumab. Golimumab is sometimes given as an infusion into a vein. A healthcare provider will give you this type of injection once every 4 to 8 weeks. Golimumab may also be injected under the skin once every 2 to 4 weeks. A healthcare provider may teach you how to properly use the medication by yourself. Read and carefully follow any Instructions for Use provided with your medicine. Ask your doctor or pharmacist if you have questions. Prepare your injection only when you are ready to give it. Do not use if the medicine has changed colors or has particles in it. Call your pharmacist for new medicine. Your care provider will show you the best places on your body to inject golimumab. Use a different place each time you give an injection. Do not inject into the same place two times in a row.   Golimumab can weaken your immune appetite, weight loss, and feeling very tired;  · skin sores, warmth, or redness;  · diarrhea, stomach pain, coughing up blood; or  · increased urination, or burning when you urinate. Also call your doctor at once if you have:  · skin growths or changes in skin appearance;  · shortness of breath with swelling of your ankles or feet;  · vision changes;  · numbness or tingly feeling, weakness in your arms or legs;  · pale skin, easy bruising or bleeding;  · liver problems --right-sided upper stomach pain, loss of appetite, dark urine, leeanna-colored stools, jaundice (yellowing of the skin or eyes);  · new or worsening symptoms of lupus --muscle or joint pain, and a skin rash on your cheeks or arms that worsens in sunlight; or  · signs of psoriasis --red or scaly patches of skin, flaking, pus. Common side effects may include:  · infections, cold or flu symptoms;  · abnormal liver function tests;  · high blood pressure;  · rash; or  · pain, itching, redness, or swelling where the medicine was injected. This is not a complete list of side effects and others may occur. Call your doctor for medical advice about side effects. You may report side effects to FDA at 6-261-FDA-2497. What other drugs will affect golimumab? Tell your doctor about all your other medicines, especially:  · anakinra;  · abatacept, etanercept; or  · adalimumab, certolizumab, infliximab, rituximab, or tocilizumab. This list is not complete. Other drugs may affect golimumab, including prescription and over-the-counter medicines, vitamins, and herbal products. Not all possible drug interactions are listed here. Where can I get more information? Your pharmacist can provide more information about golimumab. Remember, keep this and all other medicines out of the reach of children, never share your medicines with others, and use this medication only for the indication prescribed.   Every effort has been made to ensure that the information provided by

## 2019-08-22 NOTE — PROGRESS NOTES
[facility] RHEUMATOLOGY FOLLOW UP NOTE       Date Of Service: 8/22/2019  Provider: Eric Escobedo DO    Name: King Busch   MRN: [id number]    Chief Complaint(s)     Chief Complaint   Patient presents with    Follow-up     4 MONTHS- INFLAMMATORY ARTHRITIS         History of Present Illness (HPI)     King Busch  is a(n)79 y.o. female with a hx of Obesity, angina, CAD, T2DM, Non-rheumatic mitral regurg, HTN, GERD, CKD stage III, COPD, depression, here for the f/u of her osteoarthritis multiple joints,  inflammatory arthritis, chondrocalcinosis in b/l wrist (? CPPD arthropathy), Hypercalcemia,      · WORSENING PAIN FOR PAST SEVERAL MONTHS   Generalied joint pains \"whole body pain\". Fingers, wrists, ELBOWS, shoulder, hips, knee, ankles, neck and back. Pain 8.5/10. Most severe pain in the shoulder and entire spine. Timing : all days. cosntant daily pain. shooting pain in hands. + numbness of hands. Aggravating factors:  Weather changes. Hands: use. Knee: standing, wt bearing. Back: walking, standing, bending, prolonged sitting. Neck: turning head to left. Alleviating factors: none. + numbness of b/l finger tips --- variable constant. Continued dropping objects. Worse with increased use. waking pt at night.   + joint swelling  Sicca - dry mouth and eyes, + coug, SOB, constipation, myalgia - generalized. - intermittent lower extrmeity rash fot the past year. + denies pain, itching,. Current therapy:   Plaquenil 200mg daily  Methotrexate 20 mg q wk   Leucovorin 5mg weekly   Cymbalta 30mg bid   diclofenac gel   Trazadone 100mg nightly   Percocet 5/325mg      REVIEW OF SYSTEMS: (ROS)    Review of Systems   Constitutional: Positive for fatigue. Negative for fever and unexpected weight change. HENT: Negative. Negative for congestion and mouth sores. Eyes: Negative. Negative for pain and redness. Respiratory: Positive for cough, shortness of breath and wheezing.     Cardiovascular: Father     Diabetes Father     Cancer Sister     Stroke Brother     Cancer Sister        SOCIAL HISTORY      Social History     Tobacco History     Smoking Status  Light Tobacco Smoker Smoking Start Date  10/21/2013 Smoking Frequency  0.15 packs/day for 10 years (1.5 pk yrs) Smoking Tobacco Type  Cigarettes    Smokeless Tobacco Use  Never Used          Alcohol History     Alcohol Use Status  No          Drug Use     Drug Use Status  No          Sexual Activity     Sexually Active  No                ALLERGIES     Allergies   Allergen Reactions    Nuts [Peanut-Containing Drug Products]      Pt suffers from diverticulitis and nut products irritate her stomach.  Strawberry (Diagnostic)      Diverticulitis    Sulfa Antibiotics     Tape Chidi Leas Tape] Other (See Comments)     tears skin off    Bactrim [Sulfamethoxazole-Trimethoprim] Rash     Little red rash    Chocolate Rash       CURRENT MEDICATIONS      Current Outpatient Medications   Medication Sig Dispense Refill    methotrexate (RHEUMATREX) 2.5 MG chemo tablet Take 8 tablets by mouth once a week 32 tablet 0    leucovorin calcium (WELLCOVORIN) 5 MG tablet Take 1 tablet by mouth once a week 12 tablet 0    diclofenac sodium 1 % GEL Apply 2 g topically 4 times daily 2 Tube 1    insulin glargine (LANTUS) 100 UNIT/ML injection pen Inject into the skin nightly      oxyCODONE-acetaminophen (PERCOCET)  MG per tablet Take 1 tablet by mouth every 4 hours as needed for Pain. Sim Lofty amLODIPine (NORVASC) 5 MG tablet Take 5 mg by mouth daily      dexlansoprazole (DEXILANT) 60 MG CPDR delayed release capsule Take 60 mg by mouth daily      isosorbide mononitrate (IMDUR) 120 MG extended release tablet Take 1 tablet by mouth daily 30 tablet 3    hydrALAZINE (APRESOLINE) 100 MG tablet Take 1 tablet by mouth 3 times daily (Patient taking differently: Take 50 mg by mouth 3 times daily ) 90 tablet 3    metoprolol tartrate (LOPRESSOR) 100 MG tablet Take 1 tablet

## 2019-09-05 ENCOUNTER — TELEPHONE (OUTPATIENT)
Dept: RHEUMATOLOGY | Age: 79
End: 2019-09-05

## 2019-09-05 DIAGNOSIS — M06.00 SERONEGATIVE RHEUMATOID ARTHRITIS (HCC): ICD-10-CM

## 2019-09-05 RX ORDER — METHYLPREDNISOLONE SODIUM SUCCINATE 125 MG/2ML
125 INJECTION, POWDER, LYOPHILIZED, FOR SOLUTION INTRAMUSCULAR; INTRAVENOUS ONCE
Status: CANCELLED | OUTPATIENT
Start: 2019-09-05

## 2019-09-05 RX ORDER — DIPHENHYDRAMINE HCL 25 MG
25 TABLET ORAL ONCE
Status: CANCELLED | OUTPATIENT
Start: 2019-09-05

## 2019-09-05 RX ORDER — SODIUM CHLORIDE 0.9 % (FLUSH) 0.9 %
5 SYRINGE (ML) INJECTION PRN
Status: CANCELLED | OUTPATIENT
Start: 2019-09-05

## 2019-09-05 RX ORDER — CETIRIZINE HYDROCHLORIDE 10 MG/1
10 TABLET ORAL ONCE
Status: CANCELLED | OUTPATIENT
Start: 2019-09-05

## 2019-09-05 RX ORDER — CETIRIZINE HYDROCHLORIDE 5 MG/1
10 TABLET ORAL ONCE
Status: CANCELLED | OUTPATIENT
Start: 2019-09-05

## 2019-09-05 RX ORDER — SODIUM CHLORIDE 9 MG/ML
INJECTION, SOLUTION INTRAVENOUS ONCE
Status: CANCELLED | OUTPATIENT
Start: 2019-09-05

## 2019-09-05 RX ORDER — SODIUM CHLORIDE 9 MG/ML
INJECTION, SOLUTION INTRAVENOUS CONTINUOUS
Status: CANCELLED | OUTPATIENT
Start: 2019-09-05

## 2019-09-05 RX ORDER — ACETAMINOPHEN 325 MG/1
650 TABLET ORAL ONCE
Status: CANCELLED | OUTPATIENT
Start: 2019-09-05

## 2019-09-05 RX ORDER — DIPHENHYDRAMINE HYDROCHLORIDE 50 MG/ML
50 INJECTION INTRAMUSCULAR; INTRAVENOUS ONCE
Status: CANCELLED | OUTPATIENT
Start: 2019-09-05

## 2019-09-05 RX ORDER — HEPARIN SODIUM (PORCINE) LOCK FLUSH IV SOLN 100 UNIT/ML 100 UNIT/ML
500 SOLUTION INTRAVENOUS PRN
Status: CANCELLED | OUTPATIENT
Start: 2019-09-05

## 2019-09-05 RX ORDER — SODIUM CHLORIDE 0.9 % (FLUSH) 0.9 %
10 SYRINGE (ML) INJECTION PRN
Status: CANCELLED | OUTPATIENT
Start: 2019-09-05

## 2019-09-05 RX ORDER — EPINEPHRINE 1 MG/ML
0.3 INJECTION, SOLUTION, CONCENTRATE INTRAVENOUS PRN
Status: CANCELLED | OUTPATIENT
Start: 2019-09-05

## 2019-09-05 NOTE — TELEPHONE ENCOUNTER
Seronegative rheumatoid arthritis. - TB testing negative Sept 2019   - start renflexis 3mg/kg in the near future.

## 2019-09-09 LAB
LEFT VENTRICULAR EJECTION FRACTION HIGH VALUE: NORMAL %
LEFT VENTRICULAR EJECTION FRACTION MODE: NORMAL
LV EF: NORMAL %

## 2019-09-11 DIAGNOSIS — R06.09 DOE (DYSPNEA ON EXERTION): ICD-10-CM

## 2019-09-12 ENCOUNTER — TELEPHONE (OUTPATIENT)
Dept: RHEUMATOLOGY | Age: 79
End: 2019-09-12

## 2019-09-12 NOTE — TELEPHONE ENCOUNTER
Patient called and notified of results. She voiced understanding. Patient okay with pursuing Remicade. Side effects reviewed and she voiced understanding. Order already in epic. Patient will call [facility] (phone # provided) to schedule.

## 2019-09-27 DIAGNOSIS — M19.90 INFLAMMATORY ARTHRITIS: ICD-10-CM

## 2019-09-27 DIAGNOSIS — Z51.81 MEDICATION MONITORING ENCOUNTER: Primary | ICD-10-CM

## 2019-10-03 LAB
ALBUMIN SERPL-MCNC: 4 G/DL
ALP BLD-CCNC: 55 U/L
ALT SERPL-CCNC: 46 U/L
ANION GAP SERPL CALCULATED.3IONS-SCNC: 12 MMOL/L
AST SERPL-CCNC: 45 U/L
BASOPHILS ABSOLUTE: 0 /ΜL
BASOPHILS RELATIVE PERCENT: 0.5 %
BILIRUB SERPL-MCNC: 0.4 MG/DL (ref 0.1–1.4)
BUN BLDV-MCNC: 18 MG/DL
C-REACTIVE PROTEIN: 0.21
CALCIUM SERPL-MCNC: 10.7 MG/DL
CHLORIDE BLD-SCNC: 108 MMOL/L
CO2: 25 MMOL/L
CREAT SERPL-MCNC: 0.9 MG/DL
EOSINOPHILS ABSOLUTE: 0.2 /ΜL
EOSINOPHILS RELATIVE PERCENT: 4 %
GFR CALCULATED: 60
GLUCOSE BLD-MCNC: 88 MG/DL
HCT VFR BLD CALC: 33.8 % (ref 36–46)
HEMOGLOBIN: 11.4 G/DL (ref 12–16)
LYMPHOCYTES ABSOLUTE: 0.9 /ΜL
LYMPHOCYTES RELATIVE PERCENT: 18.9 %
MCH RBC QN AUTO: 30.4 PG
MCHC RBC AUTO-ENTMCNC: 33.6 G/DL
MCV RBC AUTO: 90.4 FL
MONOCYTES ABSOLUTE: 0.1 /ΜL
MONOCYTES RELATIVE PERCENT: 3.1 %
NEUTROPHILS ABSOLUTE: 3.4 /ΜL
NEUTROPHILS RELATIVE PERCENT: 73.5 %
PDW BLD-RTO: 17.2 %
PLATELET # BLD: 357 K/ΜL
PMV BLD AUTO: 7.7 FL
POTASSIUM SERPL-SCNC: 3.7 MMOL/L
RBC # BLD: 3.74 10^6/ΜL
SEDIMENTATION RATE, ERYTHROCYTE: 17
SODIUM BLD-SCNC: 141 MMOL/L
TOTAL PROTEIN: 6.8
WBC # BLD: 4.7 10^3/ML

## 2019-10-04 DIAGNOSIS — M19.90 INFLAMMATORY ARTHRITIS: ICD-10-CM

## 2019-10-07 ENCOUNTER — TELEPHONE (OUTPATIENT)
Dept: RHEUMATOLOGY | Age: 79
End: 2019-10-07

## 2019-10-22 DIAGNOSIS — M19.90 INFLAMMATORY ARTHRITIS: ICD-10-CM

## 2019-10-22 LAB
ALBUMIN SERPL-MCNC: 4 G/DL
ALP BLD-CCNC: 62 U/L
ALT SERPL-CCNC: 12 U/L
ANION GAP SERPL CALCULATED.3IONS-SCNC: 12 MMOL/L
AST SERPL-CCNC: 17 U/L
BASOPHILS ABSOLUTE: 0 /ΜL
BASOPHILS RELATIVE PERCENT: 0.4 %
BILIRUB SERPL-MCNC: 0.4 MG/DL (ref 0.1–1.4)
BUN BLDV-MCNC: 22 MG/DL
C-REACTIVE PROTEIN: 0.14
CALCIUM SERPL-MCNC: 10.3 MG/DL
CHLORIDE BLD-SCNC: 107 MMOL/L
CO2: 25 MMOL/L
CREAT SERPL-MCNC: 1 MG/DL
EOSINOPHILS ABSOLUTE: 0.2 /ΜL
EOSINOPHILS RELATIVE PERCENT: 4 %
GFR CALCULATED: 54
GLUCOSE BLD-MCNC: 85 MG/DL
HCT VFR BLD CALC: 35.1 % (ref 36–46)
HEMOGLOBIN: 11.1 G/DL (ref 12–16)
LYMPHOCYTES ABSOLUTE: 1.1 /ΜL
LYMPHOCYTES RELATIVE PERCENT: 17.8 %
MCH RBC QN AUTO: 29.5 PG
MCHC RBC AUTO-ENTMCNC: 31.7 G/DL
MCV RBC AUTO: 93.3 FL
MONOCYTES ABSOLUTE: 0.6 /ΜL
MONOCYTES RELATIVE PERCENT: 9.9 %
NEUTROPHILS ABSOLUTE: 4.1 /ΜL
NEUTROPHILS RELATIVE PERCENT: 67.9 %
PDW BLD-RTO: 16.8 %
PLATELET # BLD: 299 K/ΜL
PMV BLD AUTO: 8.5 FL
POTASSIUM SERPL-SCNC: 4.2 MMOL/L
RBC # BLD: 3.77 10^6/ΜL
SEDIMENTATION RATE, ERYTHROCYTE: 13
SODIUM BLD-SCNC: 140 MMOL/L
TOTAL PROTEIN: 7
WBC # BLD: 6 10^3/ML

## 2019-10-24 ENCOUNTER — HOSPITAL ENCOUNTER (OUTPATIENT)
Dept: NURSING | Age: 79
End: 2019-10-24
Payer: MEDICARE

## 2019-10-31 ENCOUNTER — HOSPITAL ENCOUNTER (OUTPATIENT)
Dept: NURSING | Age: 79
Discharge: HOME | End: 2019-10-31
Payer: MEDICARE

## 2019-10-31 VITALS
OXYGEN SATURATION: 97 % | HEART RATE: 72 BPM | TEMPERATURE: 97.4 F | DIASTOLIC BLOOD PRESSURE: 78 MMHG | RESPIRATION RATE: 20 BRPM | WEIGHT: 160 LBS | SYSTOLIC BLOOD PRESSURE: 165 MMHG | BODY MASS INDEX: 25.84 KG/M2

## 2019-10-31 DIAGNOSIS — M85.89 OSTEOPENIA OF MULTIPLE SITES: Primary | ICD-10-CM

## 2019-10-31 PROCEDURE — 96372 THER/PROPH/DIAG INJ SC/IM: CPT

## 2019-10-31 PROCEDURE — 6360000002 HC RX W HCPCS: Performed by: INTERNAL MEDICINE

## 2019-10-31 RX ORDER — METHYLPREDNISOLONE SODIUM SUCCINATE 125 MG/2ML
125 INJECTION, POWDER, LYOPHILIZED, FOR SOLUTION INTRAMUSCULAR; INTRAVENOUS ONCE
Status: CANCELLED | OUTPATIENT
Start: 2020-04-16

## 2019-10-31 RX ORDER — 0.9 % SODIUM CHLORIDE 0.9 %
10 VIAL (ML) INJECTION ONCE
Status: CANCELLED | OUTPATIENT
Start: 2020-04-16

## 2019-10-31 RX ORDER — SODIUM CHLORIDE 9 MG/ML
100 INJECTION, SOLUTION INTRAVENOUS CONTINUOUS
Status: CANCELLED | OUTPATIENT
Start: 2020-04-16

## 2019-10-31 RX ORDER — DIPHENHYDRAMINE HYDROCHLORIDE 50 MG/ML
50 INJECTION INTRAMUSCULAR; INTRAVENOUS ONCE
Status: CANCELLED | OUTPATIENT
Start: 2020-04-16

## 2019-10-31 RX ADMIN — DENOSUMAB 60 MG: 60 INJECTION SUBCUTANEOUS at 12:08

## 2019-10-31 ASSESSMENT — PAIN - FUNCTIONAL ASSESSMENT: PAIN_FUNCTIONAL_ASSESSMENT: 0-10

## 2019-10-31 ASSESSMENT — PAIN DESCRIPTION - DESCRIPTORS: DESCRIPTORS: CONSTANT

## 2019-11-25 DIAGNOSIS — M19.90 INFLAMMATORY ARTHRITIS: ICD-10-CM

## 2019-11-29 LAB
ALBUMIN SERPL-MCNC: 3.6 G/DL
ALP BLD-CCNC: 56 U/L
ALT SERPL-CCNC: 31 U/L
ANION GAP SERPL CALCULATED.3IONS-SCNC: 11 MMOL/L
AST SERPL-CCNC: 32 U/L
BASOPHILS ABSOLUTE: 0 /ΜL
BASOPHILS RELATIVE PERCENT: 0.8 %
BILIRUB SERPL-MCNC: 0.3 MG/DL (ref 0.1–1.4)
BUN BLDV-MCNC: 15 MG/DL
C-REACTIVE PROTEIN: 0.14
CALCIUM SERPL-MCNC: 8.2 MG/DL
CHLORIDE BLD-SCNC: 111 MMOL/L
CO2: 20 MMOL/L
CREAT SERPL-MCNC: 0.85 MG/DL
EOSINOPHILS ABSOLUTE: 0.2 /ΜL
EOSINOPHILS RELATIVE PERCENT: 4.4 %
GFR CALCULATED: 60
GLUCOSE BLD-MCNC: 93 MG/DL
HCT VFR BLD CALC: 31.2 % (ref 36–46)
HEMOGLOBIN: 10.2 G/DL (ref 12–16)
LYMPHOCYTES ABSOLUTE: 1 /ΜL
LYMPHOCYTES RELATIVE PERCENT: 22.8 %
MCH RBC QN AUTO: 29.4 PG
MCHC RBC AUTO-ENTMCNC: 32.6 G/DL
MCV RBC AUTO: 90.2 FL
MONOCYTES ABSOLUTE: 0.3 /ΜL
MONOCYTES RELATIVE PERCENT: 6.8 %
NEUTROPHILS ABSOLUTE: 3 /ΜL
NEUTROPHILS RELATIVE PERCENT: 65.2 %
PDW BLD-RTO: 16.5 %
PLATELET # BLD: 268 K/ΜL
PMV BLD AUTO: 8.4 FL
POTASSIUM SERPL-SCNC: 4.2 MMOL/L
RBC # BLD: 3.46 10^6/ΜL
SEDIMENTATION RATE, ERYTHROCYTE: 13
SODIUM BLD-SCNC: 138 MMOL/L
TOTAL PROTEIN: 6.5
WBC # BLD: 4.6 10^3/ML

## 2019-12-02 DIAGNOSIS — M19.90 INFLAMMATORY ARTHRITIS: ICD-10-CM

## 2019-12-02 RX ORDER — LEUCOVORIN CALCIUM 5 MG/1
5 TABLET ORAL WEEKLY
Qty: 12 TABLET | Refills: 0 | Status: SHIPPED | OUTPATIENT
Start: 2019-12-02 | End: 2020-01-16 | Stop reason: ALTCHOICE

## 2019-12-03 ENCOUNTER — OFFICE VISIT (OUTPATIENT)
Dept: RHEUMATOLOGY | Age: 79
End: 2019-12-03
Payer: MEDICARE

## 2019-12-03 ENCOUNTER — TELEPHONE (OUTPATIENT)
Dept: RHEUMATOLOGY | Age: 79
End: 2019-12-03

## 2019-12-03 VITALS
HEART RATE: 88 BPM | HEIGHT: 66 IN | OXYGEN SATURATION: 96 % | SYSTOLIC BLOOD PRESSURE: 142 MMHG | DIASTOLIC BLOOD PRESSURE: 90 MMHG | WEIGHT: 171 LBS | BODY MASS INDEX: 27.48 KG/M2

## 2019-12-03 DIAGNOSIS — M54.42 CHRONIC MIDLINE LOW BACK PAIN WITH LEFT-SIDED SCIATICA: ICD-10-CM

## 2019-12-03 DIAGNOSIS — M85.89 OSTEOPENIA OF MULTIPLE SITES: ICD-10-CM

## 2019-12-03 DIAGNOSIS — M19.90 INFLAMMATORY ARTHRITIS: Primary | ICD-10-CM

## 2019-12-03 DIAGNOSIS — M06.00 SERONEGATIVE RHEUMATOID ARTHRITIS (HCC): ICD-10-CM

## 2019-12-03 DIAGNOSIS — M15.9 OSTEOARTHRITIS OF MULTIPLE JOINTS, UNSPECIFIED OSTEOARTHRITIS TYPE: ICD-10-CM

## 2019-12-03 DIAGNOSIS — G89.29 CHRONIC MIDLINE LOW BACK PAIN WITH LEFT-SIDED SCIATICA: ICD-10-CM

## 2019-12-03 DIAGNOSIS — Z51.81 MEDICATION MONITORING ENCOUNTER: ICD-10-CM

## 2019-12-03 PROCEDURE — G8417 CALC BMI ABV UP PARAM F/U: HCPCS | Performed by: INTERNAL MEDICINE

## 2019-12-03 PROCEDURE — 1123F ACP DISCUSS/DSCN MKR DOCD: CPT | Performed by: INTERNAL MEDICINE

## 2019-12-03 PROCEDURE — G8484 FLU IMMUNIZE NO ADMIN: HCPCS | Performed by: INTERNAL MEDICINE

## 2019-12-03 PROCEDURE — G8598 ASA/ANTIPLAT THER USED: HCPCS | Performed by: INTERNAL MEDICINE

## 2019-12-03 PROCEDURE — 1090F PRES/ABSN URINE INCON ASSESS: CPT | Performed by: INTERNAL MEDICINE

## 2019-12-03 PROCEDURE — G8427 DOCREV CUR MEDS BY ELIG CLIN: HCPCS | Performed by: INTERNAL MEDICINE

## 2019-12-03 PROCEDURE — G8399 PT W/DXA RESULTS DOCUMENT: HCPCS | Performed by: INTERNAL MEDICINE

## 2019-12-03 PROCEDURE — 4040F PNEUMOC VAC/ADMIN/RCVD: CPT | Performed by: INTERNAL MEDICINE

## 2019-12-03 PROCEDURE — 4004F PT TOBACCO SCREEN RCVD TLK: CPT | Performed by: INTERNAL MEDICINE

## 2019-12-03 PROCEDURE — 99214 OFFICE O/P EST MOD 30 MIN: CPT | Performed by: INTERNAL MEDICINE

## 2019-12-03 RX ORDER — METHYLPREDNISOLONE SODIUM SUCCINATE 125 MG/2ML
125 INJECTION, POWDER, LYOPHILIZED, FOR SOLUTION INTRAMUSCULAR; INTRAVENOUS ONCE
Status: CANCELLED | OUTPATIENT
Start: 2020-04-20

## 2019-12-03 RX ORDER — DIPHENHYDRAMINE HYDROCHLORIDE 50 MG/ML
50 INJECTION INTRAMUSCULAR; INTRAVENOUS ONCE
Status: CANCELLED | OUTPATIENT
Start: 2020-04-20

## 2019-12-03 RX ORDER — SODIUM CHLORIDE 9 MG/ML
INJECTION, SOLUTION INTRAVENOUS CONTINUOUS
Status: CANCELLED | OUTPATIENT
Start: 2020-04-20

## 2019-12-03 RX ORDER — EPINEPHRINE 1 MG/ML
0.3 INJECTION, SOLUTION, CONCENTRATE INTRAVENOUS PRN
Status: CANCELLED | OUTPATIENT
Start: 2020-04-20

## 2019-12-03 ASSESSMENT — ENCOUNTER SYMPTOMS
VOMITING: 0
WHEEZING: 1
ABDOMINAL PAIN: 0
COLOR CHANGE: 0
COUGH: 1
BACK PAIN: 1
EYE PAIN: 0
GASTROINTESTINAL NEGATIVE: 1
EYE REDNESS: 0
SHORTNESS OF BREATH: 1
EYES NEGATIVE: 1
NAUSEA: 0
CONSTIPATION: 0

## 2019-12-03 ASSESSMENT — JOINT PAIN
TOTAL NUMBER OF TENDER JOINTS: 9
TOTAL NUMBER OF SWOLLEN JOINTS: 9

## 2020-01-06 NOTE — TELEPHONE ENCOUNTER
DOLV: 12/03/19  DONV: 01/30/20  LAST LAB DRAW: 11/29/19 q4wk  LAST TB TEST: 08/29/19 negative    Phoned patient and informed need for labs. She states she will go next week    Lab Results   Component Value Date     11/29/2019    K 4.2 11/29/2019     11/29/2019    CO2 20 11/29/2019    BUN 15 11/29/2019    CREATININE 0.85 11/29/2019    GLUCOSE 93 11/29/2019    CALCIUM 8.2 11/29/2019    PROT 7.0 06/01/2017    LABALBU 3.6 11/29/2019    BILITOT 0.3 11/29/2019    ALKPHOS 56 11/29/2019    AST 32 11/29/2019    ALT 31 11/29/2019    LABGLOM 60 11/29/2019       No results for input(s): WBC, HGB, HCT, MCV, PLT in the last 720 hours.     Lab Results   Component Value Date    SEDRATE 13 11/29/2019       Lab Results   Component Value Date    CRP 0.14 11/29/2019

## 2020-01-14 LAB
ALBUMIN SERPL-MCNC: 4 G/DL
ALP BLD-CCNC: 52 U/L
ALT SERPL-CCNC: 13 U/L
ANION GAP SERPL CALCULATED.3IONS-SCNC: 11 MMOL/L
AST SERPL-CCNC: 15 U/L
BASOPHILS ABSOLUTE: 0.1 /ΜL
BASOPHILS RELATIVE PERCENT: 0.8 %
BILIRUB SERPL-MCNC: 0.4 MG/DL (ref 0.1–1.4)
BUN BLDV-MCNC: 22 MG/DL
C-REACTIVE PROTEIN: 0.16
CALCIUM SERPL-MCNC: 10.2 MG/DL
CHLORIDE BLD-SCNC: 108 MMOL/L
CO2: 24 MMOL/L
CREAT SERPL-MCNC: 0.8 MG/DL
EOSINOPHILS ABSOLUTE: 0.1 /ΜL
EOSINOPHILS RELATIVE PERCENT: 2 %
GFR CALCULATED: 60
GLUCOSE BLD-MCNC: 98 MG/DL
HCT VFR BLD CALC: 35.4 % (ref 36–46)
HEMOGLOBIN: 11.5 G/DL (ref 12–16)
LYMPHOCYTES ABSOLUTE: 1.2 /ΜL
LYMPHOCYTES RELATIVE PERCENT: 18.9 %
MCH RBC QN AUTO: 28.1 PG
MCHC RBC AUTO-ENTMCNC: 32.5 G/DL
MCV RBC AUTO: 86.5 FL
MONOCYTES ABSOLUTE: 0.5 /ΜL
MONOCYTES RELATIVE PERCENT: 6.9 %
NEUTROPHILS ABSOLUTE: 4.7 /ΜL
NEUTROPHILS RELATIVE PERCENT: 71.4 %
PDW BLD-RTO: 17.5 %
PLATELET # BLD: 398 K/ΜL
PMV BLD AUTO: 8.1 FL
POTASSIUM SERPL-SCNC: 3.9 MMOL/L
RBC # BLD: 4.09 10^6/ΜL
SEDIMENTATION RATE, ERYTHROCYTE: 25
SODIUM BLD-SCNC: 139 MMOL/L
TOTAL PROTEIN: 7
WBC # BLD: 6.6 10^3/ML

## 2020-01-16 ENCOUNTER — TELEPHONE (OUTPATIENT)
Dept: PHYSICAL MEDICINE AND REHAB | Age: 80
End: 2020-01-16

## 2020-01-16 RX ORDER — AZATHIOPRINE 50 MG/1
TABLET ORAL
Qty: 60 TABLET | Refills: 0 | Status: SHIPPED | OUTPATIENT
Start: 2020-01-16 | End: 2020-02-14

## 2020-01-16 RX ORDER — PREDNISONE 1 MG/1
5 TABLET ORAL DAILY
Qty: 30 TABLET | Refills: 1 | Status: SHIPPED | OUTPATIENT
Start: 2020-01-16 | End: 2020-03-17

## 2020-01-16 NOTE — TELEPHONE ENCOUNTER
----- Message from LONDON Wang CNP sent at 1/15/2020  9:11 AM EST -----  Anemia back to baseline. Calcium level now normal. Medications refilled, repeat labs in 4 weeks.

## 2020-01-17 ENCOUNTER — TELEPHONE (OUTPATIENT)
Dept: RHEUMATOLOGY | Age: 80
End: 2020-01-17

## 2020-01-30 ENCOUNTER — OFFICE VISIT (OUTPATIENT)
Dept: RHEUMATOLOGY | Age: 80
End: 2020-01-30
Payer: MEDICARE

## 2020-01-30 VITALS
SYSTOLIC BLOOD PRESSURE: 122 MMHG | OXYGEN SATURATION: 96 % | HEART RATE: 80 BPM | DIASTOLIC BLOOD PRESSURE: 68 MMHG | WEIGHT: 163.2 LBS | BODY MASS INDEX: 26.23 KG/M2 | HEIGHT: 66 IN

## 2020-01-30 PROCEDURE — G8427 DOCREV CUR MEDS BY ELIG CLIN: HCPCS | Performed by: INTERNAL MEDICINE

## 2020-01-30 PROCEDURE — 4040F PNEUMOC VAC/ADMIN/RCVD: CPT | Performed by: INTERNAL MEDICINE

## 2020-01-30 PROCEDURE — 99214 OFFICE O/P EST MOD 30 MIN: CPT | Performed by: INTERNAL MEDICINE

## 2020-01-30 PROCEDURE — G8484 FLU IMMUNIZE NO ADMIN: HCPCS | Performed by: INTERNAL MEDICINE

## 2020-01-30 PROCEDURE — 1123F ACP DISCUSS/DSCN MKR DOCD: CPT | Performed by: INTERNAL MEDICINE

## 2020-01-30 PROCEDURE — 4004F PT TOBACCO SCREEN RCVD TLK: CPT | Performed by: INTERNAL MEDICINE

## 2020-01-30 PROCEDURE — G8399 PT W/DXA RESULTS DOCUMENT: HCPCS | Performed by: INTERNAL MEDICINE

## 2020-01-30 PROCEDURE — 1090F PRES/ABSN URINE INCON ASSESS: CPT | Performed by: INTERNAL MEDICINE

## 2020-01-30 PROCEDURE — G8417 CALC BMI ABV UP PARAM F/U: HCPCS | Performed by: INTERNAL MEDICINE

## 2020-01-30 ASSESSMENT — ENCOUNTER SYMPTOMS
EYES NEGATIVE: 1
COUGH: 1
BACK PAIN: 1
CONSTIPATION: 0
EYE PAIN: 0
WHEEZING: 1
EYE REDNESS: 0
SHORTNESS OF BREATH: 1
COLOR CHANGE: 0
GASTROINTESTINAL NEGATIVE: 1
ABDOMINAL PAIN: 0
NAUSEA: 0
VOMITING: 0

## 2020-01-30 NOTE — PROGRESS NOTES
[facility] RHEUMATOLOGY FOLLOW UP NOTE       Date Of Service: 1/30/2020  Provider: Marcio Napoles DO    Name: Brittany Orozco   MRN: [id number]    Chief Complaint(s)     Chief Complaint   Patient presents with    Follow-up     2 months- inflammatory arthritis        History of Present Illness (HPI)     Brittany Orozco  is a(n)80 y.o. female with a hx of Obesity, angina, CAD, T2DM, Non-rheumatic mitral regurg, HTN, GERD, CKD stage III, COPD, depression, here for the f/u of her osteoarthritis multiple joints,  inflammatory arthritis, chondrocalcinosis in b/l wrist (? CPPD arthropathy), Hypercalcemia,      · Stopped azathioprine b/c n/v  · Prednisone provided no relief. Generalied joint pains \"whole body pain\". Fingers, wrists, elbows, shoulder, hips, knee, ankles, neck and back. Pain 10/10. Timing : all day. cosntant daily pain. shooting pain in hands. + numbness of hands. Aggravating factors:  Weather changes. Hands: use. Knee: standing, wt bearing. Back: walking, standing, bending, prolonged sitting. Neck: turning head to left. Alleviating factors: none. AM stiffness lasting at least 3 hour. Denies joint swelling. Swelling bilateral b/l MCPs, lower extremities. + numbness of b/l finger tips dry mouth and eyes, + coug, SOB, constipation, myalgia - generalized. Jaw pain / \"weakness\" with \"opening mouth\"    - no recurrence of lower extremity rash. Current therapy:   Plaquenil 200mg daily  Methotrexate 7.5 mg q wk   Leucovorin 5mg weekly   Cymbalta 30mg bid   diclofenac gel   Trazadone 100mg nightly   Percocet 5/325mg q 6 prn pain     Prior tx:   Methotrexate 20mg, 15mg, 10mg -- LFT elevation of lymphopenia  Azathioprine -- nausea / vomiting         REVIEW OF SYSTEMS: (ROS)    Review of Systems   Constitutional: Positive for fatigue. Negative for fever and unexpected weight change. HENT: Negative. Negative for congestion and mouth sores. Eyes: Negative. Negative for pain and redness. Respiratory: Positive for cough, shortness of breath and wheezing. Cardiovascular: Negative. Negative for chest pain and leg swelling. Gastrointestinal: Negative. Negative for abdominal pain, constipation, nausea and vomiting. Endocrine: Negative for polyuria. Genitourinary: Negative. Negative for difficulty urinating, frequency and hematuria. Musculoskeletal: Positive for arthralgias, back pain, joint swelling and myalgias. Skin: Positive for rash. Negative for color change. Neurological: Positive for weakness, numbness and headaches. Negative for dizziness. Hematological: Negative. Negative for adenopathy. Does not bruise/bleed easily. Psychiatric/Behavioral: Negative. Negative for agitation and sleep disturbance. The patient is not nervous/anxious.           PAST MEDICAL HISTORY      Past Medical History:   Diagnosis Date    Arthritis     Asthma 1978    Atrial fibrillation [facility]     Blood circulation, collateral     CAD (coronary artery disease)     [facility]congestive [facility] failure) ([facility]) 1999    Chronic kidney disease, stage III (moderate) ([facility])     COPD (chronic obstructive pulmonary disease) ([facility]     Depression     Diabetes mellitus ([facility] [address] [facility]     Diabetic hypertension-nephrosis syndrome ([facility])     Dysuria     GERD (gastroesophageal reflux disease)     Hyperlipidemia     Hypertension     Unspecified cerebral artery occlusion with cerebral infarction 1999    Dr Gerard Zambrano informed patient after Heart Attack       PAST SURGICAL HISTORY      Past Surgical History:   Procedure Laterality Date    APPENDECTOMY      BACK SURGERY      lumbar spine    BLADDER SUSPENSION      CARDIAC CATHETERIZATION  2009    with stent  [facility]    CARPAL TUNNEL RELEASE      COLONOSCOPY  2013    ENDOSCOPY, COLON, DIAGNOSTIC      ESOPHAGEAL DILATATION      HYSTERECTOMY  1974    JOINT REPLACEMENT Left 1992    Left Knee       FAMILY HISTORY      Family History   Problem Relation Age of Onset    Heart 01/14/2020    PROT 7.0 06/01/2017     01/14/2020    K 3.9 01/14/2020    CO2 24 01/14/2020     01/14/2020    BUN 22 01/14/2020    CREATININE 0.8 01/14/2020    ALKPHOS 52 01/14/2020    ALT 13 01/14/2020    AST 15 01/14/2020       HgBA1c: No components found for: HGBA1C    Lab Results   Component Value Date    TSH 1.297 12/03/2013     Lab Results   Component Value Date    VITD25 34 12/28/2017         No results found for: ANASCRN  No results found for: SSA  No results found for: SSB  No results found for: ANTI-SMITH  No results found for: DSDNAAB   No results found for: ANTIRNP  No results found for: C3, C4  No results found for: CCPAB  Lab Results   Component Value Date    RF <15 11/09/2017       No components found for: CANCASCRN, APANCASCRN  Lab Results   Component Value Date    SEDRATE 25 01/14/2020     Lab Results   Component Value Date    CRP 0.16 01/14/2020       RADIOLOGY:         ASSESSMENT/PLAN    Assessment   Plan    The patient reports genearlized joint pain in right shoulder, hands, entire back, knees. Symptoms started: back pain started several years ago with 2 reported lumbar spine surgery. The Rt shoulder pain has been present for \"years\". Knee pain present years ago but s/p left Knee replacement ~5 years ago improved sx's until the past year. - knee pain started over the past year. Pain in the hands x 8-9 years. Pt has been treated by Dr. Feliz Rayo for the past 8-9 year. Tx'ed with plaquenil 200mg and once monthly trigger point injections    1. suspected seronegative RA   --- worsened from last evaluation -- dx -- suspected seronegative RA vs undiff inflammatory arthritis vs crystalline arthritis (Chondrocalcinosis in bilaterl wrists) - negative for JAMAL, RF, CCP, SSA, SSB, Normal uric acid and secondary evaluation for CPPD arthropathy    - Previous therapy:Lumbar Epidural injections (10/11/17) from main management, monthly trigger point injections.  , physical therapy, tramadol (no relief),

## 2020-02-12 LAB — T4 FREE: 0.99

## 2020-02-14 RX ORDER — AZATHIOPRINE 50 MG/1
50 TABLET ORAL 2 TIMES DAILY
Qty: 60 TABLET | Refills: 0 | Status: SHIPPED | OUTPATIENT
Start: 2020-02-14 | End: 2020-07-13

## 2020-03-12 RX ORDER — LEUCOVORIN CALCIUM 5 MG/1
5 TABLET ORAL
Qty: 12 TABLET | Refills: 0 | Status: SHIPPED | OUTPATIENT
Start: 2020-03-12 | End: 2020-06-10

## 2020-03-17 RX ORDER — PREDNISONE 1 MG/1
TABLET ORAL
Qty: 30 TABLET | Refills: 1 | Status: SHIPPED | OUTPATIENT
Start: 2020-03-17 | End: 2020-05-11

## 2020-03-23 RX ORDER — AZATHIOPRINE 50 MG/1
TABLET ORAL
Qty: 60 TABLET | Refills: 0 | OUTPATIENT
Start: 2020-03-23

## 2020-04-06 RX ORDER — HYDROXYCHLOROQUINE SULFATE 200 MG/1
TABLET, FILM COATED ORAL
Qty: 90 TABLET | Refills: 1 | Status: SHIPPED | OUTPATIENT
Start: 2020-04-06 | End: 2020-07-13 | Stop reason: SDUPTHER

## 2020-05-06 ENCOUNTER — OFFICE VISIT (OUTPATIENT)
Dept: RHEUMATOLOGY | Age: 80
End: 2020-05-06
Payer: MEDICARE

## 2020-05-06 PROCEDURE — 99441 PR PHYS/QHP TELEPHONE EVALUATION 5-10 MIN: CPT | Performed by: INTERNAL MEDICINE

## 2020-05-06 ASSESSMENT — ENCOUNTER SYMPTOMS
GASTROINTESTINAL NEGATIVE: 1
EYE PAIN: 0
EYES NEGATIVE: 1
COLOR CHANGE: 0
SHORTNESS OF BREATH: 1
EYE REDNESS: 0
CONSTIPATION: 0
VOMITING: 0
NAUSEA: 0
ABDOMINAL PAIN: 0
WHEEZING: 1
BACK PAIN: 1
COUGH: 1

## 2020-05-06 NOTE — PROGRESS NOTES
Axel Kitchen is a 78 y.o. female evaluated via telephone on 2020. Communication via : Telephone --- PER PATIENT REQUEST    Identity verified by: Patient :    Consent:She and/or health care decision maker is aware that that she may receive a bill for this telephone/virtual service, depending on her insurance coverage, and has provided verbal consent to proceed: Yes    I affirm this is a Patient Initiated Episode with an Established Patient who has not had a related appointment within my department in the past 7 days or scheduled within the next 24 hours. Documentation: -- communicated with the patient and/or health care decision maker the  information as listed below. Details of this discussion including any medical advice provided -- SEE Assessment and Plan. Total Time on this encounter : minutes: 5-10 minutes    Note: not billable if this call serves to triage the patient into an appointment for the relevant concern      [address] [facility] NOTE       Date Of Service: 2020  Provider: Anna Hicks DO    Name: Axel Kitchen   MRN: [id number]    Chief Complaint(s)     Chief Complaint   Patient presents with    Follow-up     inflammatory arthritisi        History of Present Illness (HPI)     Axel Kitchen  is a(n)80 y.o. female with a hx of Obesity, angina, CAD, T2DM, Non-rheumatic mitral regurg, HTN, GERD, CKD stage III, COPD, depression, here for the f/u of her osteoarthritis multiple joints,  inflammatory arthritis, chondrocalcinosis in b/l wrist (? CPPD arthropathy), Hypercalcemia,      · MRI lumbar spine  Recently performed --- b/c pain with left sided radiculopathy after recent fall. Generalied joint pains \"whole body pain\". Fingers, wrists, elbows, shoulder, hips, knee, ankles, neck and back. Pain 10/10. Timing : all day. cosntant daily pain. Greatest from the knees down.  + numbness of hands. Aggravating factors:  Weather changes. Hands: use.  Knee: Current Outpatient Medications   Medication Sig Dispense Refill    hydroxychloroquine (PLAQUENIL) 200 MG tablet TAKE 1 TABLET BY MOUTH EVERY DAY 90 tablet 1    predniSONE (DELTASONE) 5 MG tablet TAKE 1 TABLET BY MOUTH EVERY DAY 30 tablet 1    leucovorin calcium (WELLCOVORIN) 5 MG tablet Take 1 tablet by mouth every 7 days 12 tablet 0    azaTHIOprine (IMURAN) 50 MG tablet Take 1 tablet by mouth 2 times daily 60 tablet 0    diclofenac sodium 1 % GEL Apply 2 g topically 4 times daily 2 Tube 1    insulin glargine (LANTUS) 100 UNIT/ML injection pen Inject into the skin nightly      oxyCODONE-acetaminophen (PERCOCET)  MG per tablet Take 1 tablet by mouth every 4 hours as needed for Pain. Claudette Main amLODIPine (NORVASC) 5 MG tablet Take 5 mg by mouth daily      dexlansoprazole (DEXILANT) 60 MG CPDR delayed release capsule Take 60 mg by mouth daily      isosorbide mononitrate (IMDUR) 120 MG extended release tablet Take 1 tablet by mouth daily 30 tablet 3    hydrALAZINE (APRESOLINE) 100 MG tablet Take 1 tablet by mouth 3 times daily (Patient taking differently: Take 50 mg by mouth 3 times daily ) 90 tablet 3    metoprolol tartrate (LOPRESSOR) 100 MG tablet Take 1 tablet by mouth daily 60 tablet 3    potassium chloride (KLOR-CON) 20 MEQ packet Take 10 mEq by mouth daily       pantoprazole (PROTONIX) 40 MG tablet Take 40 mg by mouth daily      atorvastatin (LIPITOR) 40 MG tablet Take 40 mg by mouth daily      famotidine (PEPCID) 40 MG tablet Take 40 mg by mouth daily      fenofibrate (TRICOR) 145 MG tablet Take 145 mg by mouth daily      aspirin 81 MG EC tablet Take 162 mg by mouth daily      ranolazine (RANEXA) 500 MG SR tablet Take 1 tablet by mouth 2 times daily. 60 tablet 3    budesonide-formoterol (SYMBICORT) 160-4.5 MCG/ACT AERO Inhale 2 puffs into the lungs 2 times daily.  spironolactone (ALDACTONE) 50 MG tablet Take 50 mg by mouth daily.       magnesium oxide (MAG-OX) 400 MG tablet Take

## 2020-05-11 RX ORDER — PREDNISONE 1 MG/1
TABLET ORAL
Qty: 30 TABLET | Refills: 1 | Status: SHIPPED | OUTPATIENT
Start: 2020-05-11 | End: 2020-07-02

## 2020-05-26 ENCOUNTER — HOSPITAL ENCOUNTER (OUTPATIENT)
Dept: NURSING | Age: 80
Discharge: HOME | End: 2020-05-26
Payer: MEDICARE

## 2020-05-26 VITALS
TEMPERATURE: 98.6 F | DIASTOLIC BLOOD PRESSURE: 67 MMHG | OXYGEN SATURATION: 97 % | SYSTOLIC BLOOD PRESSURE: 150 MMHG | RESPIRATION RATE: 18 BRPM | HEART RATE: 73 BPM

## 2020-05-26 DIAGNOSIS — M85.89 OSTEOPENIA OF MULTIPLE SITES: Primary | ICD-10-CM

## 2020-05-26 PROCEDURE — 96372 THER/PROPH/DIAG INJ SC/IM: CPT

## 2020-05-26 PROCEDURE — 6360000002 HC RX W HCPCS: Performed by: INTERNAL MEDICINE

## 2020-05-26 RX ORDER — SODIUM CHLORIDE 9 MG/ML
INJECTION, SOLUTION INTRAVENOUS CONTINUOUS
Status: CANCELLED | OUTPATIENT
Start: 2020-11-24

## 2020-05-26 RX ORDER — METHYLPREDNISOLONE SODIUM SUCCINATE 125 MG/2ML
125 INJECTION, POWDER, LYOPHILIZED, FOR SOLUTION INTRAMUSCULAR; INTRAVENOUS ONCE
Status: CANCELLED | OUTPATIENT
Start: 2020-11-24

## 2020-05-26 RX ORDER — DIPHENHYDRAMINE HYDROCHLORIDE 50 MG/ML
50 INJECTION INTRAMUSCULAR; INTRAVENOUS ONCE
Status: CANCELLED | OUTPATIENT
Start: 2020-11-24

## 2020-05-26 RX ADMIN — DENOSUMAB 60 MG: 60 INJECTION SUBCUTANEOUS at 13:04

## 2020-05-26 NOTE — PROGRESS NOTES
1255 pt arrives via wheelchair for prolia injection. Injection explained and questions answered. PT RIGHTS AND RESPONSIBILITIES OFFERED TO PT.  1304 injection given. Pt tolerated it well with no complaints. Vitals stable. Pt discharged via wheelchair with instructions with no complaints.                  __m__ Safety:       (Environmental)   [place] to environment   Ensure ID band is correct and in place/ allergy band as needed   Assess for fall risk   Initiate fall precautions as applicable (fall band, side rails, etc.)   Call light within reach   Bed in low position/ wheels locked    __m__ Pain:        Assess pain level and characteristics   Administer analgesics as ordered   Assess effectiveness of pain management and report to MD as needed    __m__ Knowledge Deficit:   Assess baseline knowledge   Provide teaching at level of understanding   Provide teaching via preferred learning method   Evaluate teaching effectiveness    __m__ Hemodynamic/Respiratory Status:       (Pre and Post Procedure Monitoring)   Assess/Monitor vital signs and LOC   Assess Baseline SpO2 prior to any sedation   Obtain weight/height   Assess vital signs/ LOC until patient meets discharge criteria   Monitor procedure site and notify MD of any issues

## 2020-07-02 RX ORDER — PREDNISONE 1 MG/1
TABLET ORAL
Qty: 30 TABLET | Refills: 1 | Status: SHIPPED | OUTPATIENT
Start: 2020-07-02 | End: 2020-08-31

## 2020-07-13 ENCOUNTER — OFFICE VISIT (OUTPATIENT)
Dept: RHEUMATOLOGY | Age: 80
End: 2020-07-13
Payer: MEDICARE

## 2020-07-13 VITALS
HEART RATE: 65 BPM | SYSTOLIC BLOOD PRESSURE: 132 MMHG | WEIGHT: 170 LBS | BODY MASS INDEX: 27.32 KG/M2 | DIASTOLIC BLOOD PRESSURE: 78 MMHG | HEIGHT: 66 IN | OXYGEN SATURATION: 96 %

## 2020-07-13 PROCEDURE — G8427 DOCREV CUR MEDS BY ELIG CLIN: HCPCS | Performed by: NURSE PRACTITIONER

## 2020-07-13 PROCEDURE — 1090F PRES/ABSN URINE INCON ASSESS: CPT | Performed by: NURSE PRACTITIONER

## 2020-07-13 PROCEDURE — G8417 CALC BMI ABV UP PARAM F/U: HCPCS | Performed by: NURSE PRACTITIONER

## 2020-07-13 PROCEDURE — 4004F PT TOBACCO SCREEN RCVD TLK: CPT | Performed by: NURSE PRACTITIONER

## 2020-07-13 PROCEDURE — 99214 OFFICE O/P EST MOD 30 MIN: CPT | Performed by: NURSE PRACTITIONER

## 2020-07-13 PROCEDURE — 1123F ACP DISCUSS/DSCN MKR DOCD: CPT | Performed by: NURSE PRACTITIONER

## 2020-07-13 PROCEDURE — 4040F PNEUMOC VAC/ADMIN/RCVD: CPT | Performed by: NURSE PRACTITIONER

## 2020-07-13 PROCEDURE — G8399 PT W/DXA RESULTS DOCUMENT: HCPCS | Performed by: NURSE PRACTITIONER

## 2020-07-13 RX ORDER — DIPHENHYDRAMINE HCL 25 MG
25 TABLET ORAL ONCE
Status: CANCELLED | OUTPATIENT
Start: 2020-07-13

## 2020-07-13 RX ORDER — SODIUM CHLORIDE 9 MG/ML
INJECTION, SOLUTION INTRAVENOUS ONCE
Status: CANCELLED | OUTPATIENT
Start: 2020-07-13

## 2020-07-13 RX ORDER — ACETAMINOPHEN 325 MG/1
650 TABLET ORAL ONCE
Status: CANCELLED | OUTPATIENT
Start: 2020-07-13

## 2020-07-13 RX ORDER — DIPHENHYDRAMINE HYDROCHLORIDE 50 MG/ML
50 INJECTION INTRAMUSCULAR; INTRAVENOUS ONCE
Status: CANCELLED | OUTPATIENT
Start: 2020-07-13

## 2020-07-13 RX ORDER — EPINEPHRINE 1 MG/ML
0.3 INJECTION, SOLUTION, CONCENTRATE INTRAVENOUS PRN
Status: CANCELLED | OUTPATIENT
Start: 2020-07-13

## 2020-07-13 RX ORDER — CETIRIZINE HYDROCHLORIDE 5 MG/1
10 TABLET ORAL ONCE
Status: CANCELLED | OUTPATIENT
Start: 2020-07-13

## 2020-07-13 RX ORDER — SODIUM CHLORIDE 0.9 % (FLUSH) 0.9 %
10 SYRINGE (ML) INJECTION PRN
Status: CANCELLED | OUTPATIENT
Start: 2020-07-13

## 2020-07-13 RX ORDER — SODIUM CHLORIDE 0.9 % (FLUSH) 0.9 %
5 SYRINGE (ML) INJECTION PRN
Status: CANCELLED | OUTPATIENT
Start: 2020-07-13

## 2020-07-13 RX ORDER — CETIRIZINE HYDROCHLORIDE 10 MG/1
10 TABLET ORAL ONCE
Status: CANCELLED | OUTPATIENT
Start: 2020-07-13

## 2020-07-13 RX ORDER — SODIUM CHLORIDE 9 MG/ML
INJECTION, SOLUTION INTRAVENOUS CONTINUOUS
Status: CANCELLED | OUTPATIENT
Start: 2020-07-13

## 2020-07-13 RX ORDER — METHYLPREDNISOLONE SODIUM SUCCINATE 125 MG/2ML
125 INJECTION, POWDER, LYOPHILIZED, FOR SOLUTION INTRAMUSCULAR; INTRAVENOUS ONCE
Status: CANCELLED | OUTPATIENT
Start: 2020-07-13

## 2020-07-13 RX ORDER — HEPARIN SODIUM (PORCINE) LOCK FLUSH IV SOLN 100 UNIT/ML 100 UNIT/ML
500 SOLUTION INTRAVENOUS PRN
Status: CANCELLED | OUTPATIENT
Start: 2020-07-13

## 2020-07-13 RX ORDER — HYDROXYCHLOROQUINE SULFATE 200 MG/1
TABLET, FILM COATED ORAL
Qty: 90 TABLET | Refills: 1 | Status: SHIPPED | OUTPATIENT
Start: 2020-07-13 | End: 2020-10-12 | Stop reason: SDUPTHER

## 2020-07-13 RX ORDER — AZATHIOPRINE 50 MG/1
50 TABLET ORAL 2 TIMES DAILY
Qty: 60 TABLET | Refills: 0 | Status: CANCELLED | OUTPATIENT
Start: 2020-07-13

## 2020-07-13 ASSESSMENT — ENCOUNTER SYMPTOMS
COUGH: 1
DIARRHEA: 0
CONSTIPATION: 0
ABDOMINAL PAIN: 0
SHORTNESS OF BREATH: 1
EYE ITCHING: 0
NAUSEA: 0
BACK PAIN: 1
WHEEZING: 1
EYE PAIN: 0
TROUBLE SWALLOWING: 0

## 2020-07-13 NOTE — PROGRESS NOTES
[address] [facility] NOTE       Date Of Service: 7/13/2020  Provider: Lori La APRN - CNP    Name: Derrick Dan   MRN: [id number]    Chief Complaint(s)   No chief complaint on file. History of Present Illness (HPI)     Derrick Dan  is a(n)80 y.o. female with a hx of obesity, angina, CAD, T2DM, non-rheumatic mitral regurg, HTN, GERD, CKD stage III, COPD, depression here for the f/u evaluation of osteoarthritis multiple joints, inflammatory arthritis, chondrocalcinosis bilateral wrists (? CPPD arthropathy), hypercalcemia. Interval hx:    - has not started remicaide    pain affecting the bilateral hands, wrists, elbows, shoulders, hips, knees, toes, neck, back  Pain on a scale 0-10: 9/10  Type of pain: constant  Timing: n/a  Aggravating factors: weather changes, hands: use, knees: standing, back: walking, standing, bending, prolonged sitting. Neck: turning head to left  Alleviating factors: none    Associated symptoms:  + swelling/  Redness/ warmth (bilateral knees), + AM stiffness lasting ~ all day        REVIEW OF SYSTEMS: (ROS)    Review of Systems   Constitutional: Negative for fatigue, fever and unexpected weight change. HENT: Negative for congestion and trouble swallowing. Dry mouth   Eyes: Negative for pain and itching. Dry eyes   Respiratory: Positive for cough, shortness of breath and wheezing. Cardiovascular: Positive for chest pain. Negative for leg swelling. Gastrointestinal: Negative for abdominal pain, constipation, diarrhea and nausea. Endocrine: Negative for cold intolerance and heat intolerance. Genitourinary: Negative for difficulty urinating, frequency and urgency. Musculoskeletal: Positive for arthralgias, back pain, joint swelling, myalgias and neck pain. Skin: Negative for rash. Neurological: Negative for dizziness, weakness, numbness and headaches. Hematological: Bruises/bleeds easily.    Psychiatric/Behavioral: [facility] [place] (Diagnostic)      Diverticulitis    Sulfa Antibiotics     Tape Clarnce Gell Tape] Other (See Comments)     tears skin off    Bactrim [Sulfamethoxazole-Trimethoprim] Rash     Little red rash    Chocolate Rash       CURRENT MEDICATIONS      Current Outpatient Medications   Medication Sig Dispense Refill    predniSONE (DELTASONE) 5 MG tablet TAKE 1 TABLET BY MOUTH EVERY DAY 30 tablet 1    hydroxychloroquine (PLAQUENIL) 200 MG tablet TAKE 1 TABLET BY MOUTH EVERY DAY 90 tablet 1    azaTHIOprine (IMURAN) 50 MG tablet Take 1 tablet by mouth 2 times daily 60 tablet 0    diclofenac sodium 1 % GEL Apply 2 g topically 4 times daily 2 Tube 1    insulin glargine (LANTUS) 100 UNIT/ML injection pen Inject into the skin nightly      oxyCODONE-acetaminophen (PERCOCET)  MG per tablet Take 1 tablet by mouth every 4 hours as needed for Pain. Sloan Trent amLODIPine (NORVASC) 5 MG tablet Take 5 mg by mouth daily      dexlansoprazole (DEXILANT) 60 MG CPDR delayed release capsule Take 60 mg by mouth daily      isosorbide mononitrate (IMDUR) 120 MG extended release tablet Take 1 tablet by mouth daily 30 tablet 3    hydrALAZINE (APRESOLINE) 100 MG tablet Take 1 tablet by mouth 3 times daily (Patient taking differently: Take 50 mg by mouth 3 times daily ) 90 tablet 3    metoprolol tartrate (LOPRESSOR) 100 MG tablet Take 1 tablet by mouth daily 60 tablet 3    potassium chloride (KLOR-CON) 20 MEQ packet Take 10 mEq by mouth daily       pantoprazole (PROTONIX) 40 MG tablet Take 40 mg by mouth daily      atorvastatin (LIPITOR) 40 MG tablet Take 40 mg by mouth daily      famotidine (PEPCID) 40 MG tablet Take 40 mg by mouth daily      fenofibrate (TRICOR) 145 MG tablet Take 145 mg by mouth daily      aspirin 81 MG EC tablet Take 162 mg by mouth daily      ranolazine (RANEXA) 500 MG SR tablet Take 1 tablet by mouth 2 times daily.  60 tablet 3    budesonide-formoterol (SYMBICORT) 160-4.5 MCG/ACT AERO Inhale 2 puffs into hips,  No Deformities and intact ROM  Knees :   + tender right, + warmth right  Ankles:   + tender bilat, no swelling, No Deformities and intact ROM  Feet:       + MTP compression, No Deformities and intact ROM        RAPID 3:   7/13/2020 --- RAPID 3: 2.3 + 9 + 8.5 = 22.8     Remission: <3  Low Disease Activity: <6  Moderate Disease Activity: >=6 and <=12  High Disease Activity: >12     LABS    CBC  Lab Results   Component Value Date    WBC 6.6 01/14/2020    RBC 4.09 01/14/2020    HGB 11.5 01/14/2020    HCT 35.4 01/14/2020    MCV 86.5 01/14/2020    MCH 28.1 01/14/2020    MCHC 32.5 01/14/2020    RDW 17.5 01/14/2020     01/14/2020       CMP  Lab Results   Component Value Date    CALCIUM 10.2 01/14/2020    LABALBU 4.0 01/14/2020    PROT 7.0 06/01/2017     01/14/2020    K 3.9 01/14/2020    CO2 24 01/14/2020     01/14/2020    BUN 22 01/14/2020    CREATININE 0.8 01/14/2020    ALKPHOS 52 01/14/2020    ALT 13 01/14/2020    AST 15 01/14/2020       HgBA1c: No components found for: HGBA1C    Lab Results   Component Value Date    VITD25 34 12/28/2017         No results found for: ANASCRN  No results found for: SSA  No results found for: SSB  No results found for: ANTI-SMITH  No results found for: DSDNAAB   No results found for: ANTIRNP  No results found for: C3, C4  No results found for: CCPAB  Lab Results   Component Value Date    RF <15 11/09/2017       No components found for: CANCASCRN, APANCASCRN  Lab Results   Component Value Date    SEDRATE 25 01/14/2020     Lab Results   Component Value Date    CRP 0.16 01/14/2020       RADIOLOGY:         ASSESSMENT/PLAN    Assessment   Plan     Seronegative rheumatoid arthritis  - negative JAMAL, RF, CCP, SSA, SSB, normal uric acid and negative secondary evaluation for CPPD arthropathy  - prior tx: lumbar epidural injections, trigger point injections, PT, tramadol, gabapentin, prednisone, arava (diarrhea), azathioprine (nausea, vomiting)  - avoiding IL6 & GUILLERMO- diverticulitis, T cell inhibition- COPD   - methotrexate 7.5 mg subcu weekly   - leucovorin 5 mg weekly   - percocet 5/325 mg from pain management   - remicaide 3 mg/kg q 8 weeks- will have patient get infusion scheduled prior to leaving office    Bilateral hip osteoarthritis  Trochanteric bursitis  - prior tx: bursal injections   - hip replacement on hold due to cardiac concerns    Bilateral hand paresthesias  - prior CTS    Chronic pain of right knee  - h/o osteoarthritis, knee pain worse with wt bearing, improved w/ non wt bearing  - XR right knee (11/2017)- no abnormalities  - avoiding oral NSAIDs given CKD and CAD   - cymbalta 30 mg daily   - percocet from pain management    Osteopenia w/ elevated FRAX  - 13%  Major and 4.5% hip   - Prolia 60 mg subcu q 6 months (Oct 2019)    Medication monitoring   - CBC, CMP, sed rate, CRP q 8 weeks- DUE FOR LABS      No follow-ups on file. Electronically signed by LONDON Estrada CNP on 7/13/2020 at 1:33 PM    New Prescriptions    No medications on file       Thank you for allowing me to participate in the care of this patient. Please call if there are any questions.

## 2020-07-22 ENCOUNTER — HOSPITAL ENCOUNTER (OUTPATIENT)
Dept: NURSING | Age: 80
Discharge: HOME | End: 2020-07-22
Payer: MEDICARE

## 2020-07-22 VITALS
SYSTOLIC BLOOD PRESSURE: 128 MMHG | HEART RATE: 52 BPM | WEIGHT: 170 LBS | OXYGEN SATURATION: 97 % | TEMPERATURE: 97.1 F | RESPIRATION RATE: 16 BRPM | DIASTOLIC BLOOD PRESSURE: 69 MMHG | BODY MASS INDEX: 27.45 KG/M2

## 2020-07-22 DIAGNOSIS — M06.00 SERONEGATIVE RHEUMATOID ARTHRITIS (HCC): Primary | ICD-10-CM

## 2020-07-22 PROCEDURE — 6370000000 HC RX 637 (ALT 250 FOR IP): Performed by: NURSE PRACTITIONER

## 2020-07-22 PROCEDURE — 2580000003 HC RX 258: Performed by: NURSE PRACTITIONER

## 2020-07-22 PROCEDURE — 6360000002 HC RX W HCPCS: Performed by: NURSE PRACTITIONER

## 2020-07-22 PROCEDURE — 96365 THER/PROPH/DIAG IV INF INIT: CPT

## 2020-07-22 PROCEDURE — 96366 THER/PROPH/DIAG IV INF ADDON: CPT

## 2020-07-22 RX ORDER — SODIUM CHLORIDE 0.9 % (FLUSH) 0.9 %
10 SYRINGE (ML) INJECTION PRN
Status: CANCELLED | OUTPATIENT
Start: 2020-08-05

## 2020-07-22 RX ORDER — CETIRIZINE HYDROCHLORIDE 10 MG/1
10 TABLET ORAL ONCE
Status: CANCELLED | OUTPATIENT
Start: 2020-08-05

## 2020-07-22 RX ORDER — SODIUM CHLORIDE 9 MG/ML
INJECTION, SOLUTION INTRAVENOUS ONCE
Status: COMPLETED | OUTPATIENT
Start: 2020-07-22 | End: 2020-07-22

## 2020-07-22 RX ORDER — DIPHENHYDRAMINE HCL 25 MG
25 TABLET ORAL ONCE
Status: CANCELLED | OUTPATIENT
Start: 2020-08-05

## 2020-07-22 RX ORDER — SODIUM CHLORIDE 0.9 % (FLUSH) 0.9 %
5 SYRINGE (ML) INJECTION PRN
Status: CANCELLED | OUTPATIENT
Start: 2020-08-05

## 2020-07-22 RX ORDER — ACETAMINOPHEN 325 MG/1
650 TABLET ORAL ONCE
Status: CANCELLED | OUTPATIENT
Start: 2020-08-05

## 2020-07-22 RX ORDER — SODIUM CHLORIDE 9 MG/ML
INJECTION, SOLUTION INTRAVENOUS CONTINUOUS
Status: CANCELLED | OUTPATIENT
Start: 2020-08-05

## 2020-07-22 RX ORDER — SODIUM CHLORIDE 9 MG/ML
INJECTION, SOLUTION INTRAVENOUS ONCE
Status: CANCELLED | OUTPATIENT
Start: 2020-08-05

## 2020-07-22 RX ORDER — CETIRIZINE HYDROCHLORIDE 10 MG/1
10 TABLET ORAL ONCE
Status: COMPLETED | OUTPATIENT
Start: 2020-07-22 | End: 2020-07-22

## 2020-07-22 RX ORDER — METHYLPREDNISOLONE SODIUM SUCCINATE 125 MG/2ML
125 INJECTION, POWDER, LYOPHILIZED, FOR SOLUTION INTRAMUSCULAR; INTRAVENOUS ONCE
Status: CANCELLED | OUTPATIENT
Start: 2020-08-05

## 2020-07-22 RX ORDER — HEPARIN SODIUM (PORCINE) LOCK FLUSH IV SOLN 100 UNIT/ML 100 UNIT/ML
500 SOLUTION INTRAVENOUS PRN
Status: CANCELLED | OUTPATIENT
Start: 2020-08-05

## 2020-07-22 RX ORDER — DIPHENHYDRAMINE HCL 25 MG
25 TABLET ORAL ONCE
Status: COMPLETED | OUTPATIENT
Start: 2020-07-22 | End: 2020-07-22

## 2020-07-22 RX ORDER — DIPHENHYDRAMINE HYDROCHLORIDE 50 MG/ML
50 INJECTION INTRAMUSCULAR; INTRAVENOUS ONCE
Status: CANCELLED | OUTPATIENT
Start: 2020-08-05

## 2020-07-22 RX ORDER — SODIUM CHLORIDE 0.9 % (FLUSH) 0.9 %
10 SYRINGE (ML) INJECTION PRN
Status: DISCONTINUED | OUTPATIENT
Start: 2020-07-22 | End: 2020-07-23 | Stop reason: HOSPADM

## 2020-07-22 RX ORDER — ACETAMINOPHEN 325 MG/1
650 TABLET ORAL ONCE
Status: COMPLETED | OUTPATIENT
Start: 2020-07-22 | End: 2020-07-22

## 2020-07-22 RX ADMIN — SODIUM CHLORIDE: 9 INJECTION, SOLUTION INTRAVENOUS at 13:03

## 2020-07-22 RX ADMIN — SODIUM CHLORIDE 230 MG: 9 INJECTION, SOLUTION INTRAVENOUS at 13:30

## 2020-07-22 RX ADMIN — Medication 10 ML: at 13:02

## 2020-07-22 RX ADMIN — CETIRIZINE HYDROCHLORIDE 10 MG: 10 TABLET, FILM COATED ORAL at 13:07

## 2020-07-22 RX ADMIN — ACETAMINOPHEN 650 MG: 325 TABLET ORAL at 13:07

## 2020-07-22 RX ADMIN — DIPHENHYDRAMINE HCL 25 MG: 25 TABLET ORAL at 13:07

## 2020-07-22 ASSESSMENT — PAIN - FUNCTIONAL ASSESSMENT: PAIN_FUNCTIONAL_ASSESSMENT: 0-10

## 2020-07-22 NOTE — PROGRESS NOTES
1240 Patient arrived to [facility] via wheelchair for renflexis infusion. Oriented to room and call light  PT RIGHTS AND RESPONSIBILITIES OFFERED TO PT.  1330 Medication started patient denies any needs  1430 Patient denies any needs at this time stated just tired  1542 Medication infused patient tolerated well  [phone] Discharge instructions given to patient verbalized understanding.  Patient discharged to home in stable condition    __m__ Safety:       (Environmental)  [place] to environment   Ensure ID band is correct and in place/ allergy band as needed   Assess for fall risk   Initiate fall precautions as applicable (fall band, side rails, etc.)   Call light within reach   Bed in low position/ wheels locked    __m__ Pain:        Assess pain level and characteristics   Administer analgesics as ordered   Assess effectiveness of pain management and report to MD as needed    __m__ Knowledge Deficit:   Assess baseline knowledge   Provide teaching at level of understanding   Provide teaching via preferred learning method   Evaluate teaching effectiveness    __m__ Hemodynamic/Respiratory Status:       (Pre and Post Procedure Monitoring)   Assess/Monitor vital signs and LOC   Assess Baseline SpO2 prior to any sedation   Obtain weight/height   Assess vital signs/ LOC until patient meets discharge criteria   Monitor procedure site and notify MD of any issues

## 2020-08-05 ENCOUNTER — HOSPITAL ENCOUNTER (OUTPATIENT)
Dept: NURSING | Age: 80
Discharge: HOME | DRG: 291 | End: 2020-08-05
Payer: MEDICARE

## 2020-08-05 ENCOUNTER — APPOINTMENT (OUTPATIENT)
Dept: GENERAL RADIOLOGY | Age: 80
DRG: 291 | End: 2020-08-05
Payer: MEDICARE

## 2020-08-05 ENCOUNTER — HOSPITAL ENCOUNTER (INPATIENT)
Age: 80
LOS: 5 days | Discharge: HOME | DRG: 291 | End: 2020-08-10
Attending: STUDENT IN AN ORGANIZED HEALTH CARE EDUCATION/TRAINING PROGRAM | Admitting: INTERNAL MEDICINE
Payer: MEDICARE

## 2020-08-05 VITALS
WEIGHT: 170 LBS | RESPIRATION RATE: 24 BRPM | SYSTOLIC BLOOD PRESSURE: 231 MMHG | DIASTOLIC BLOOD PRESSURE: 122 MMHG | HEART RATE: 133 BPM | TEMPERATURE: 97.9 F | BODY MASS INDEX: 27.45 KG/M2 | OXYGEN SATURATION: 96 %

## 2020-08-05 DIAGNOSIS — M06.00 SERONEGATIVE RHEUMATOID ARTHRITIS (HCC): Primary | ICD-10-CM

## 2020-08-05 PROBLEM — J96.00 ACUTE RESPIRATORY FAILURE (HCC): Status: ACTIVE | Noted: 2020-08-05

## 2020-08-05 LAB
ANION GAP SERPL CALCULATED.3IONS-SCNC: 13 MEQ/L (ref 8–16)
BASOPHILS # BLD: 0.4 %
BASOPHILS ABSOLUTE: 0 THOU/MM3 (ref 0–0.1)
BUN BLDV-MCNC: 19 MG/DL (ref 7–22)
CALCIUM SERPL-MCNC: 9.5 MG/DL (ref 8.5–10.5)
CHLORIDE BLD-SCNC: 107 MEQ/L (ref 98–111)
CO2: 20 MEQ/L (ref 23–33)
CREAT SERPL-MCNC: 0.8 MG/DL (ref 0.4–1.2)
EKG ATRIAL RATE: 141 BPM
EKG Q-T INTERVAL: 324 MS
EKG QRS DURATION: 116 MS
EKG QTC CALCULATION (BAZETT): 496 MS
EKG R AXIS: 44 DEGREES
EKG T AXIS: -23 DEGREES
EKG VENTRICULAR RATE: 141 BPM
EOSINOPHIL # BLD: 0.4 %
EOSINOPHILS ABSOLUTE: 0 THOU/MM3 (ref 0–0.4)
ERYTHROCYTE [DISTWIDTH] IN BLOOD BY AUTOMATED COUNT: 15.1 % (ref 11.5–14.5)
ERYTHROCYTE [DISTWIDTH] IN BLOOD BY AUTOMATED COUNT: 48.5 FL (ref 35–45)
GFR SERPL CREATININE-BSD FRML MDRD: 69 ML/MIN/1.73M2
GLUCOSE BLD-MCNC: 151 MG/DL (ref 70–108)
GLUCOSE BLD-MCNC: 197 MG/DL (ref 70–108)
HCT VFR BLD CALC: 34.4 % (ref 37–47)
HEMOGLOBIN: 10.5 GM/DL (ref 12–16)
IMMATURE GRANS (ABS): 0.02 THOU/MM3 (ref 0–0.07)
IMMATURE GRANULOCYTES: 0.2 %
IRON: 23 UG/DL (ref 50–170)
LYMPHOCYTES # BLD: 19 %
LYMPHOCYTES ABSOLUTE: 2.1 THOU/MM3 (ref 1–4.8)
MCH RBC QN AUTO: 27.1 PG (ref 26–33)
MCHC RBC AUTO-ENTMCNC: 30.5 GM/DL (ref 32.2–35.5)
MCV RBC AUTO: 88.9 FL (ref 81–99)
MONOCYTES # BLD: 6.1 %
MONOCYTES ABSOLUTE: 0.7 THOU/MM3 (ref 0.4–1.3)
NUCLEATED RED BLOOD CELLS: 0 /100 WBC
OSMOLALITY CALCULATION: 284.6 MOSMOL/KG (ref 275–300)
PLATELET # BLD: 308 THOU/MM3 (ref 130–400)
PMV BLD AUTO: 9.9 FL (ref 9.4–12.4)
POTASSIUM SERPL-SCNC: 4.5 MEQ/L (ref 3.5–5.2)
PRO-BNP: 2793 PG/ML (ref 0–1800)
RBC # BLD: 3.87 MILL/MM3 (ref 4.2–5.4)
SEG NEUTROPHILS: 73.9 %
SEGMENTED NEUTROPHILS ABSOLUTE COUNT: 8.1 THOU/MM3 (ref 1.8–7.7)
SODIUM BLD-SCNC: 140 MEQ/L (ref 135–145)
TOTAL IRON BINDING CAPACITY: 303 UG/DL (ref 171–450)
TROPONIN T: < 0.01 NG/ML
WBC # BLD: 10.9 THOU/MM3 (ref 4.8–10.8)

## 2020-08-05 PROCEDURE — 36415 COLL VENOUS BLD VENIPUNCTURE: CPT

## 2020-08-05 PROCEDURE — 83550 IRON BINDING TEST: CPT

## 2020-08-05 PROCEDURE — 84484 ASSAY OF TROPONIN QUANT: CPT

## 2020-08-05 PROCEDURE — 85025 COMPLETE CBC W/AUTO DIFF WBC: CPT

## 2020-08-05 PROCEDURE — 96375 TX/PRO/DX INJ NEW DRUG ADDON: CPT

## 2020-08-05 PROCEDURE — 6360000002 HC RX W HCPCS: Performed by: STUDENT IN AN ORGANIZED HEALTH CARE EDUCATION/TRAINING PROGRAM

## 2020-08-05 PROCEDURE — 6370000000 HC RX 637 (ALT 250 FOR IP): Performed by: FAMILY MEDICINE

## 2020-08-05 PROCEDURE — 94640 AIRWAY INHALATION TREATMENT: CPT

## 2020-08-05 PROCEDURE — 93005 ELECTROCARDIOGRAM TRACING: CPT

## 2020-08-05 PROCEDURE — 96366 THER/PROPH/DIAG IV INF ADDON: CPT

## 2020-08-05 PROCEDURE — 71045 X-RAY EXAM CHEST 1 VIEW: CPT

## 2020-08-05 PROCEDURE — 82948 REAGENT STRIP/BLOOD GLUCOSE: CPT

## 2020-08-05 PROCEDURE — 6370000000 HC RX 637 (ALT 250 FOR IP): Performed by: NURSE PRACTITIONER

## 2020-08-05 PROCEDURE — 94761 N-INVAS EAR/PLS OXIMETRY MLT: CPT

## 2020-08-05 PROCEDURE — 6370000000 HC RX 637 (ALT 250 FOR IP): Performed by: STUDENT IN AN ORGANIZED HEALTH CARE EDUCATION/TRAINING PROGRAM

## 2020-08-05 PROCEDURE — 2140000000 HC CCU INTERMEDIATE R&B

## 2020-08-05 PROCEDURE — 2580000003 HC RX 258: Performed by: PHYSICIAN ASSISTANT

## 2020-08-05 PROCEDURE — 96365 THER/PROPH/DIAG IV INF INIT: CPT

## 2020-08-05 PROCEDURE — 6370000000 HC RX 637 (ALT 250 FOR IP): Performed by: PHYSICIAN ASSISTANT

## 2020-08-05 PROCEDURE — 6360000002 HC RX W HCPCS

## 2020-08-05 PROCEDURE — 80048 BASIC METABOLIC PNL TOTAL CA: CPT

## 2020-08-05 PROCEDURE — 99223 1ST HOSP IP/OBS HIGH 75: CPT | Performed by: PHYSICIAN ASSISTANT

## 2020-08-05 PROCEDURE — 6360000002 HC RX W HCPCS: Performed by: PHYSICIAN ASSISTANT

## 2020-08-05 PROCEDURE — 99282 EMERGENCY DEPT VISIT SF MDM: CPT

## 2020-08-05 PROCEDURE — 2580000003 HC RX 258: Performed by: NURSE PRACTITIONER

## 2020-08-05 PROCEDURE — 96374 THER/PROPH/DIAG INJ IV PUSH: CPT

## 2020-08-05 PROCEDURE — 99283 EMERGENCY DEPT VISIT LOW MDM: CPT

## 2020-08-05 PROCEDURE — 83540 ASSAY OF IRON: CPT

## 2020-08-05 PROCEDURE — 6360000002 HC RX W HCPCS: Performed by: NURSE PRACTITIONER

## 2020-08-05 PROCEDURE — 83880 ASSAY OF NATRIURETIC PEPTIDE: CPT

## 2020-08-05 RX ORDER — SODIUM CHLORIDE 9 MG/ML
INJECTION, SOLUTION INTRAVENOUS ONCE
Status: COMPLETED | OUTPATIENT
Start: 2020-08-05 | End: 2020-08-05

## 2020-08-05 RX ORDER — POTASSIUM CHLORIDE 750 MG/1
10 TABLET, FILM COATED, EXTENDED RELEASE ORAL DAILY
Status: DISCONTINUED | OUTPATIENT
Start: 2020-08-06 | End: 2020-08-06 | Stop reason: SDUPTHER

## 2020-08-05 RX ORDER — ACETAMINOPHEN 325 MG/1
650 TABLET ORAL ONCE
Status: COMPLETED | OUTPATIENT
Start: 2020-08-05 | End: 2020-08-05

## 2020-08-05 RX ORDER — SODIUM CHLORIDE 0.9 % (FLUSH) 0.9 %
10 SYRINGE (ML) INJECTION EVERY 12 HOURS SCHEDULED
Status: DISCONTINUED | OUTPATIENT
Start: 2020-08-05 | End: 2020-08-05 | Stop reason: SDUPTHER

## 2020-08-05 RX ORDER — DIPHENHYDRAMINE HCL 25 MG
25 TABLET ORAL ONCE
Status: CANCELLED | OUTPATIENT
Start: 2020-09-02

## 2020-08-05 RX ORDER — MONTELUKAST SODIUM 5 MG/1
5 TABLET, CHEWABLE ORAL NIGHTLY
Status: DISCONTINUED | OUTPATIENT
Start: 2020-08-05 | End: 2020-08-10 | Stop reason: HOSPADM

## 2020-08-05 RX ORDER — HYDRALAZINE HYDROCHLORIDE 50 MG/1
50 TABLET, FILM COATED ORAL 3 TIMES DAILY
Status: DISCONTINUED | OUTPATIENT
Start: 2020-08-05 | End: 2020-08-05

## 2020-08-05 RX ORDER — METHYLPREDNISOLONE SODIUM SUCCINATE 125 MG/2ML
INJECTION, POWDER, LYOPHILIZED, FOR SOLUTION INTRAMUSCULAR; INTRAVENOUS
Status: COMPLETED
Start: 2020-08-05 | End: 2020-08-05

## 2020-08-05 RX ORDER — ACETAMINOPHEN 650 MG/1
650 SUPPOSITORY RECTAL EVERY 6 HOURS PRN
Status: DISCONTINUED | OUTPATIENT
Start: 2020-08-05 | End: 2020-08-10 | Stop reason: HOSPADM

## 2020-08-05 RX ORDER — SODIUM CHLORIDE 9 MG/ML
INJECTION, SOLUTION INTRAVENOUS CONTINUOUS
Status: CANCELLED | OUTPATIENT
Start: 2020-09-02

## 2020-08-05 RX ORDER — FUROSEMIDE 10 MG/ML
40 INJECTION INTRAMUSCULAR; INTRAVENOUS ONCE
Status: COMPLETED | OUTPATIENT
Start: 2020-08-05 | End: 2020-08-05

## 2020-08-05 RX ORDER — PANTOPRAZOLE SODIUM 40 MG/1
40 TABLET, DELAYED RELEASE ORAL DAILY
Status: DISCONTINUED | OUTPATIENT
Start: 2020-08-05 | End: 2020-08-05 | Stop reason: SDUPTHER

## 2020-08-05 RX ORDER — DIPHENHYDRAMINE HYDROCHLORIDE 50 MG/ML
50 INJECTION INTRAMUSCULAR; INTRAVENOUS ONCE
Status: CANCELLED | OUTPATIENT
Start: 2020-09-02

## 2020-08-05 RX ORDER — ALBUTEROL SULFATE 2.5 MG/3ML
5 SOLUTION RESPIRATORY (INHALATION)
Status: COMPLETED | OUTPATIENT
Start: 2020-08-05 | End: 2020-08-07

## 2020-08-05 RX ORDER — DIPHENHYDRAMINE HCL 25 MG
25 TABLET ORAL ONCE
Status: COMPLETED | OUTPATIENT
Start: 2020-08-05 | End: 2020-08-05

## 2020-08-05 RX ORDER — METHYLPREDNISOLONE SODIUM SUCCINATE 125 MG/2ML
125 INJECTION, POWDER, LYOPHILIZED, FOR SOLUTION INTRAMUSCULAR; INTRAVENOUS ONCE
Status: COMPLETED | OUTPATIENT
Start: 2020-08-05 | End: 2020-08-05

## 2020-08-05 RX ORDER — HEPARIN SODIUM (PORCINE) LOCK FLUSH IV SOLN 100 UNIT/ML 100 UNIT/ML
500 SOLUTION INTRAVENOUS PRN
Status: CANCELLED | OUTPATIENT
Start: 2020-09-02

## 2020-08-05 RX ORDER — SODIUM CHLORIDE 0.9 % (FLUSH) 0.9 %
10 SYRINGE (ML) INJECTION PRN
Status: DISCONTINUED | OUTPATIENT
Start: 2020-08-05 | End: 2020-08-05 | Stop reason: SDUPTHER

## 2020-08-05 RX ORDER — DIPHENHYDRAMINE HYDROCHLORIDE 50 MG/ML
INJECTION INTRAMUSCULAR; INTRAVENOUS
Status: DISPENSED
Start: 2020-08-05 | End: 2020-08-06

## 2020-08-05 RX ORDER — ASPIRIN 81 MG/1
162 TABLET ORAL DAILY
Status: DISCONTINUED | OUTPATIENT
Start: 2020-08-06 | End: 2020-08-10 | Stop reason: HOSPADM

## 2020-08-05 RX ORDER — PREDNISONE 1 MG/1
5 TABLET ORAL DAILY
Status: DISCONTINUED | OUTPATIENT
Start: 2020-08-06 | End: 2020-08-10 | Stop reason: HOSPADM

## 2020-08-05 RX ORDER — METOPROLOL TARTRATE 100 MG/1
100 TABLET ORAL DAILY
Status: DISCONTINUED | OUTPATIENT
Start: 2020-08-06 | End: 2020-08-06

## 2020-08-05 RX ORDER — BUDESONIDE AND FORMOTEROL FUMARATE DIHYDRATE 160; 4.5 UG/1; UG/1
2 AEROSOL RESPIRATORY (INHALATION) 2 TIMES DAILY
Status: DISCONTINUED | OUTPATIENT
Start: 2020-08-05 | End: 2020-08-07

## 2020-08-05 RX ORDER — CETIRIZINE HYDROCHLORIDE 10 MG/1
10 TABLET ORAL ONCE
Status: COMPLETED | OUTPATIENT
Start: 2020-08-05 | End: 2020-08-05

## 2020-08-05 RX ORDER — RANOLAZINE 500 MG/1
500 TABLET, EXTENDED RELEASE ORAL 2 TIMES DAILY
Status: DISCONTINUED | OUTPATIENT
Start: 2020-08-05 | End: 2020-08-10 | Stop reason: HOSPADM

## 2020-08-05 RX ORDER — POLYETHYLENE GLYCOL 3350 17 G/17G
17 POWDER, FOR SOLUTION ORAL DAILY PRN
Status: DISCONTINUED | OUTPATIENT
Start: 2020-08-05 | End: 2020-08-05 | Stop reason: SDUPTHER

## 2020-08-05 RX ORDER — FUROSEMIDE 10 MG/ML
40 INJECTION INTRAMUSCULAR; INTRAVENOUS 2 TIMES DAILY
Status: DISCONTINUED | OUTPATIENT
Start: 2020-08-05 | End: 2020-08-06

## 2020-08-05 RX ORDER — METHYLPREDNISOLONE SODIUM SUCCINATE 125 MG/2ML
125 INJECTION, POWDER, LYOPHILIZED, FOR SOLUTION INTRAMUSCULAR; INTRAVENOUS ONCE
Status: DISCONTINUED | OUTPATIENT
Start: 2020-08-05 | End: 2020-08-06 | Stop reason: HOSPADM

## 2020-08-05 RX ORDER — PROMETHAZINE HYDROCHLORIDE 25 MG/1
12.5 TABLET ORAL EVERY 6 HOURS PRN
Status: DISCONTINUED | OUTPATIENT
Start: 2020-08-05 | End: 2020-08-10 | Stop reason: HOSPADM

## 2020-08-05 RX ORDER — INSULIN GLARGINE 100 [IU]/ML
8 INJECTION, SOLUTION SUBCUTANEOUS NIGHTLY
Status: DISCONTINUED | OUTPATIENT
Start: 2020-08-05 | End: 2020-08-10 | Stop reason: HOSPADM

## 2020-08-05 RX ORDER — DULOXETIN HYDROCHLORIDE 30 MG/1
30 CAPSULE, DELAYED RELEASE ORAL 2 TIMES DAILY
Status: DISCONTINUED | OUTPATIENT
Start: 2020-08-05 | End: 2020-08-10 | Stop reason: HOSPADM

## 2020-08-05 RX ORDER — ACETAMINOPHEN 325 MG/1
650 TABLET ORAL ONCE
Status: CANCELLED | OUTPATIENT
Start: 2020-09-02

## 2020-08-05 RX ORDER — CETIRIZINE HYDROCHLORIDE 10 MG/1
10 TABLET ORAL ONCE
Status: CANCELLED | OUTPATIENT
Start: 2020-09-02

## 2020-08-05 RX ORDER — METHYLPREDNISOLONE SODIUM SUCCINATE 125 MG/2ML
INJECTION, POWDER, LYOPHILIZED, FOR SOLUTION INTRAMUSCULAR; INTRAVENOUS
Status: DISCONTINUED
Start: 2020-08-05 | End: 2020-08-06 | Stop reason: HOSPADM

## 2020-08-05 RX ORDER — ASPIRIN 81 MG/1
324 TABLET, CHEWABLE ORAL ONCE
Status: COMPLETED | OUTPATIENT
Start: 2020-08-05 | End: 2020-08-05

## 2020-08-05 RX ORDER — METHYLPREDNISOLONE SODIUM SUCCINATE 125 MG/2ML
125 INJECTION, POWDER, LYOPHILIZED, FOR SOLUTION INTRAMUSCULAR; INTRAVENOUS ONCE
Status: CANCELLED | OUTPATIENT
Start: 2020-09-02

## 2020-08-05 RX ORDER — ACETAMINOPHEN 325 MG/1
650 TABLET ORAL EVERY 6 HOURS PRN
Status: DISCONTINUED | OUTPATIENT
Start: 2020-08-05 | End: 2020-08-05 | Stop reason: SDUPTHER

## 2020-08-05 RX ORDER — HYDRALAZINE HYDROCHLORIDE 20 MG/ML
10 INJECTION INTRAMUSCULAR; INTRAVENOUS EVERY 6 HOURS PRN
Status: DISCONTINUED | OUTPATIENT
Start: 2020-08-05 | End: 2020-08-10 | Stop reason: HOSPADM

## 2020-08-05 RX ORDER — ACETAMINOPHEN 650 MG/1
650 SUPPOSITORY RECTAL EVERY 6 HOURS PRN
Status: DISCONTINUED | OUTPATIENT
Start: 2020-08-05 | End: 2020-08-05 | Stop reason: SDUPTHER

## 2020-08-05 RX ORDER — AMLODIPINE BESYLATE 5 MG/1
5 TABLET ORAL DAILY
Status: DISCONTINUED | OUTPATIENT
Start: 2020-08-06 | End: 2020-08-06

## 2020-08-05 RX ORDER — HYDROXYCHLOROQUINE SULFATE 200 MG/1
200 TABLET, FILM COATED ORAL DAILY
Status: DISCONTINUED | OUTPATIENT
Start: 2020-08-06 | End: 2020-08-10 | Stop reason: HOSPADM

## 2020-08-05 RX ORDER — ONDANSETRON 2 MG/ML
4 INJECTION INTRAMUSCULAR; INTRAVENOUS EVERY 6 HOURS PRN
Status: DISCONTINUED | OUTPATIENT
Start: 2020-08-05 | End: 2020-08-05 | Stop reason: SDUPTHER

## 2020-08-05 RX ORDER — SODIUM CHLORIDE 0.9 % (FLUSH) 0.9 %
10 SYRINGE (ML) INJECTION EVERY 12 HOURS SCHEDULED
Status: DISCONTINUED | OUTPATIENT
Start: 2020-08-05 | End: 2020-08-10 | Stop reason: HOSPADM

## 2020-08-05 RX ORDER — SPIRONOLACTONE 25 MG/1
50 TABLET ORAL DAILY
Status: DISCONTINUED | OUTPATIENT
Start: 2020-08-06 | End: 2020-08-06

## 2020-08-05 RX ORDER — FENOFIBRATE 54 MG/1
54 TABLET ORAL DAILY
Status: DISCONTINUED | OUTPATIENT
Start: 2020-08-06 | End: 2020-08-08

## 2020-08-05 RX ORDER — ACETAMINOPHEN 325 MG/1
650 TABLET ORAL EVERY 6 HOURS PRN
Status: DISCONTINUED | OUTPATIENT
Start: 2020-08-05 | End: 2020-08-10 | Stop reason: HOSPADM

## 2020-08-05 RX ORDER — ISOSORBIDE MONONITRATE 60 MG/1
120 TABLET, EXTENDED RELEASE ORAL DAILY
Status: DISCONTINUED | OUTPATIENT
Start: 2020-08-06 | End: 2020-08-06

## 2020-08-05 RX ORDER — SODIUM CHLORIDE 0.9 % (FLUSH) 0.9 %
10 SYRINGE (ML) INJECTION PRN
Status: DISCONTINUED | OUTPATIENT
Start: 2020-08-05 | End: 2020-08-10 | Stop reason: HOSPADM

## 2020-08-05 RX ORDER — FAMOTIDINE 20 MG/1
40 TABLET, FILM COATED ORAL DAILY
Status: DISCONTINUED | OUTPATIENT
Start: 2020-08-05 | End: 2020-08-05 | Stop reason: ALTCHOICE

## 2020-08-05 RX ORDER — IPRATROPIUM BROMIDE AND ALBUTEROL SULFATE 2.5; .5 MG/3ML; MG/3ML
1 SOLUTION RESPIRATORY (INHALATION) ONCE
Status: COMPLETED | OUTPATIENT
Start: 2020-08-05 | End: 2020-08-05

## 2020-08-05 RX ORDER — SODIUM CHLORIDE 9 MG/ML
INJECTION, SOLUTION INTRAVENOUS ONCE
Status: CANCELLED | OUTPATIENT
Start: 2020-09-02

## 2020-08-05 RX ORDER — ATORVASTATIN CALCIUM 40 MG/1
40 TABLET, FILM COATED ORAL NIGHTLY
Status: DISCONTINUED | OUTPATIENT
Start: 2020-08-05 | End: 2020-08-07

## 2020-08-05 RX ORDER — SODIUM CHLORIDE 0.9 % (FLUSH) 0.9 %
10 SYRINGE (ML) INJECTION PRN
Status: CANCELLED | OUTPATIENT
Start: 2020-09-02

## 2020-08-05 RX ORDER — SODIUM CHLORIDE 0.9 % (FLUSH) 0.9 %
5 SYRINGE (ML) INJECTION PRN
Status: CANCELLED | OUTPATIENT
Start: 2020-09-02

## 2020-08-05 RX ORDER — HYDRALAZINE HYDROCHLORIDE 50 MG/1
100 TABLET, FILM COATED ORAL 3 TIMES DAILY
Status: DISCONTINUED | OUTPATIENT
Start: 2020-08-05 | End: 2020-08-06

## 2020-08-05 RX ORDER — OXYCODONE AND ACETAMINOPHEN 10; 325 MG/1; MG/1
1 TABLET ORAL EVERY 4 HOURS PRN
Status: DISCONTINUED | OUTPATIENT
Start: 2020-08-05 | End: 2020-08-10 | Stop reason: HOSPADM

## 2020-08-05 RX ORDER — SODIUM CHLORIDE 9 MG/ML
INJECTION, SOLUTION INTRAVENOUS CONTINUOUS
Status: DISCONTINUED | OUTPATIENT
Start: 2020-08-05 | End: 2020-08-06 | Stop reason: HOSPADM

## 2020-08-05 RX ORDER — PANTOPRAZOLE SODIUM 40 MG/1
40 TABLET, DELAYED RELEASE ORAL
Status: DISCONTINUED | OUTPATIENT
Start: 2020-08-06 | End: 2020-08-10 | Stop reason: HOSPADM

## 2020-08-05 RX ORDER — ONDANSETRON 2 MG/ML
4 INJECTION INTRAMUSCULAR; INTRAVENOUS EVERY 6 HOURS PRN
Status: DISCONTINUED | OUTPATIENT
Start: 2020-08-05 | End: 2020-08-10 | Stop reason: HOSPADM

## 2020-08-05 RX ORDER — TRAZODONE HYDROCHLORIDE 100 MG/1
100 TABLET ORAL NIGHTLY
Status: DISCONTINUED | OUTPATIENT
Start: 2020-08-05 | End: 2020-08-10 | Stop reason: HOSPADM

## 2020-08-05 RX ORDER — POLYETHYLENE GLYCOL 3350 17 G/17G
17 POWDER, FOR SOLUTION ORAL DAILY PRN
Status: DISCONTINUED | OUTPATIENT
Start: 2020-08-05 | End: 2020-08-07

## 2020-08-05 RX ORDER — PROMETHAZINE HYDROCHLORIDE 25 MG/1
12.5 TABLET ORAL EVERY 6 HOURS PRN
Status: DISCONTINUED | OUTPATIENT
Start: 2020-08-05 | End: 2020-08-05 | Stop reason: SDUPTHER

## 2020-08-05 RX ADMIN — IPRATROPIUM BROMIDE AND ALBUTEROL SULFATE 1 AMPULE: .5; 3 SOLUTION RESPIRATORY (INHALATION) at 16:41

## 2020-08-05 RX ADMIN — ASPIRIN 324 MG: 81 TABLET, CHEWABLE ORAL at 17:25

## 2020-08-05 RX ADMIN — SODIUM CHLORIDE: 9 INJECTION, SOLUTION INTRAVENOUS at 12:10

## 2020-08-05 RX ADMIN — ATORVASTATIN CALCIUM 40 MG: 40 TABLET, FILM COATED ORAL at 22:50

## 2020-08-05 RX ADMIN — DIPHENHYDRAMINE HCL 25 MG: 25 TABLET ORAL at 12:07

## 2020-08-05 RX ADMIN — MAGNESIUM GLUCONATE 500 MG ORAL TABLET 400 MG: 500 TABLET ORAL at 22:50

## 2020-08-05 RX ADMIN — METHYLPREDNISOLONE SODIUM SUCCINATE 125 MG: 125 INJECTION, POWDER, LYOPHILIZED, FOR SOLUTION INTRAMUSCULAR; INTRAVENOUS at 15:58

## 2020-08-05 RX ADMIN — ENOXAPARIN SODIUM 40 MG: 40 INJECTION SUBCUTANEOUS at 22:50

## 2020-08-05 RX ADMIN — CETIRIZINE HYDROCHLORIDE 10 MG: 10 TABLET, FILM COATED ORAL at 12:08

## 2020-08-05 RX ADMIN — MONTELUKAST SODIUM 5 MG: 5 TABLET, CHEWABLE ORAL at 22:50

## 2020-08-05 RX ADMIN — FUROSEMIDE 40 MG: 10 INJECTION, SOLUTION INTRAMUSCULAR; INTRAVENOUS at 17:30

## 2020-08-05 RX ADMIN — SODIUM CHLORIDE 230 MG: 9 INJECTION, SOLUTION INTRAVENOUS at 13:03

## 2020-08-05 RX ADMIN — RANOLAZINE 500 MG: 500 TABLET, FILM COATED, EXTENDED RELEASE ORAL at 22:50

## 2020-08-05 RX ADMIN — ALBUTEROL SULFATE 5 MG: 2.5 SOLUTION RESPIRATORY (INHALATION) at 16:50

## 2020-08-05 RX ADMIN — METHYLPREDNISOLONE SODIUM SUCCINATE 125 MG: 125 INJECTION, POWDER, FOR SOLUTION INTRAMUSCULAR; INTRAVENOUS at 15:58

## 2020-08-05 RX ADMIN — SODIUM CHLORIDE, PRESERVATIVE FREE 10 ML: 5 INJECTION INTRAVENOUS at 22:50

## 2020-08-05 RX ADMIN — INSULIN GLARGINE 8 UNITS: 100 INJECTION, SOLUTION SUBCUTANEOUS at 22:51

## 2020-08-05 RX ADMIN — ACETAMINOPHEN 650 MG: 325 TABLET ORAL at 12:08

## 2020-08-05 RX ADMIN — DULOXETINE HYDROCHLORIDE 30 MG: 30 CAPSULE, DELAYED RELEASE ORAL at 22:50

## 2020-08-05 RX ADMIN — SODIUM CHLORIDE: 9 INJECTION, SOLUTION INTRAVENOUS at 15:58

## 2020-08-05 RX ADMIN — Medication 2 PUFF: at 21:27

## 2020-08-05 RX ADMIN — FUROSEMIDE 40 MG: 10 INJECTION, SOLUTION INTRAMUSCULAR; INTRAVENOUS at 22:50

## 2020-08-05 ASSESSMENT — ENCOUNTER SYMPTOMS
SHORTNESS OF BREATH: 1
ABDOMINAL PAIN: 0
DIARRHEA: 0
NAUSEA: 0
WHEEZING: 1
STRIDOR: 1
GASTROINTESTINAL NEGATIVE: 1
EYE REDNESS: 0
BACK PAIN: 0
SINUS PAIN: 0
SINUS PRESSURE: 0
COUGH: 0
EYES NEGATIVE: 1
CHEST TIGHTNESS: 1
SORE THROAT: 0
PHOTOPHOBIA: 0
EYE ITCHING: 0
EYE PAIN: 0
CONSTIPATION: 0
RHINORRHEA: 0

## 2020-08-05 ASSESSMENT — PAIN SCALES - GENERAL: PAINLEVEL_OUTOF10: 0

## 2020-08-05 NOTE — PROGRESS NOTES
1547 [facility] staff stated that pateint was being discharged and complained of not feeling well. Found patient sitting up in wheelchair with hands on head  stated feels short of breath. Lung sounds with expiratory wheeze t/o, patient questioned if inhalers taken at home and she replied no. Vitals being taken. Heart rate is rapid and irregular  1551 Rapid response called due to potential reaction to renflexis infusion. Emergency medication released and IV being restarted. Monitor shows atrial fib RVR  1552 Rapid response team here [facility] from ICU along with ER staff and Dr. Angie Almanzar along with other staff members  1600 IV infiltrated its believed that solu medrol was not infused into vein attempting restart of IV  1601 unable to restart IV will attempt in ER  [phone] Patient taken to ER via cart with ER staff, Rosi Luong RN and Dr. Angie Almanzar.

## 2020-08-05 NOTE — PROGRESS NOTES
responded to a rapid response in outpatient nursing. Pt was being treated by medical staff.  was told there was no family present, that pt was to be transported by bus back to where she lived.  prayed for the pt outside the room.

## 2020-08-05 NOTE — H&P
Hospitalist - History & Physical      Patient: Cosme Sheikh    Unit/Bed:01/001A  YOB: 1940  MRN: [id number]   Acct: [de-identified]   PCP: Yefri Yeboah DO    Date of Service: Pt seen/examined on 08/05/20  and Admitted to Inpatient with expected LOS greater than two midnights due to medical therapy. Chief Complaint:  Shortness of Breath    Assessment and Plan:-  1. Acute respiratory failure, hypoxia: Unknown etiology at this time; possibly multifactorial with fluid overload and allergic reaction. Pt's baseline is RA. Pt is currently on 4L NC. Continue to monitor and wean. 2. Allergic Reaction: Pt was in outpatient nursing for RA infusion, had allergic reaction with inspiratory and expiratory wheeze and stridor. Pt was given IV steroids and breathing treatment. 3. Fluid Overload: Elevated BNP, CXR shows HF exacerbation. IV Lasix 40mg BID. I&Os. Daily Weights. Echo pending. 4. IDDM: Continue insulin regimen. 5. Accelerated HTN: Elevated BP in the ED. Likely related to anxiety. Continue home medications. Hydralazine PRN. 6. HLD: Statin. 7. RA: Outpatient infusions. Prednisone daily. History Of Present Illness: This is a 66-year-old female who presented to [facility] Emergency Department on 8/5 after a rapid response was called in outpatient nursing. Patient was undergoing rheumatoid arthritis infusion. Patient states that this was the second infusion she was having of this medication. She states that infusion was fine, and went to the restroom. After leaving the restroom she states that her throat was starting to close and she felt like she could not breathe. The patient was brought to the emergency department and given IV steroids and breathing treatments. Patient continues to have inspiratory and expiratory wheezes. The patient states that she felt like she was \"literally dying\". Patient denies any pain.   Patient states that she feels better than she did in the outpatient nursing.      Past Medical History:        Diagnosis Date    Arthritis     Asthma 1978    [facility]rial fibrillation ([facility]     Blood circulation, collateral     CAD (coronary artery disease)     CHF (congestive heart failure) ([facility]) 1999    Chronic kidney disease, stage III (moderate) ([facility])     COPD (chronic obstructive pulmonary disease) ([facility])     Depression     Diabetes mellitus ([facility]     Diabetic hypertension-nephrosis syndrome ([facility])     Dysuria     GERD (gastroesophageal reflux disease)     Hyperlipidemia     Hypertension     Unspecified cerebral artery occlusion with cerebral infarction 1999    Dr Daryle Cake informed patient after Heart Attack       Past Surgical History:        Procedure Laterality Date    APPENDECTOMY      BACK SURGERY      lumbar spine    BLADDER SUSPENSION      CARDIAC CATHETERIZATION  2009    [facility] stent  [facility]    CARPAL TUNNEL RELEASE      COLONOSCOPY  2013    ENDOSCOPY, COLON, DIAGNOSTIC      ESOPHAGEAL DILATATION      HYSTERECTOMY  1974    JOINT REPLACEMENT Left 1992    Left Knee       Home Medications:   Current Facility-Administered Medications on File Prior to Encounter   Medication Dose Route Frequency Provider Last Rate Last Dose    sodium chloride flush 0.9 % injection 10 mL  10 mL Intravenous PRN Madelon Kind, APRN - CNP        0.9 % sodium chloride infusion   Intravenous Continuous Madelon Kind, APRN - CNP   Stopped at 08/05/20 1600    methylPREDNISolone sodium (SOLU-MEDROL) injection 125 mg  125 mg Intravenous Once Rashida Tiwari MD        methylPREDNISolone sodium (SOLU-MEDROL) 125 MG injection              Current Outpatient Medications on File Prior to Encounter   Medication Sig Dispense Refill    inFLIXimab-abda (RENFLEXIS IV) Infuse intravenously      hydroxychloroquine (PLAQUENIL) 200 MG tablet TAKE 1 TABLET BY MOUTH EVERY DAY 90 tablet 1    predniSONE (DELTASONE) 5 MG tablet TAKE 1 TABLET BY MOUTH EVERY DAY 0.4 MG SL tablet Place 0.4 mg under the tongue every 5 minutes as needed. Allergies:    Nuts [peanut-containing drug products]; Strawberry (diagnostic); Sulfa antibiotics; Tape [adhesive tape]; Bactrim [sulfamethoxazole-trimethoprim]; and Chocolate    Social History:    reports that she has been smoking cigarettes. She started smoking about 6 years ago. She has a 1.50 pack-year smoking history. She has never used smokeless tobacco. She reports that she does not drink alcohol or use drugs. Family History:       Problem Relation Age of Onset    Heart Disease Mother     Diabetes Mother     Kidney Disease Mother     Heart Disease Father     Diabetes Father     Cancer Sister     Stroke Brother     Cancer Sister        Diet:  No diet orders on file    Review of systems:     Review of Systems   Constitutional: Negative for activity change, chills, diaphoresis and fatigue. HENT: Negative for sinus pressure, sinus pain and sneezing. Eyes: Negative for pain, redness and itching. Respiratory: Positive for chest tightness, shortness of breath, wheezing and stridor. Cardiovascular: Negative for chest pain, palpitations and leg swelling. Gastrointestinal: Negative for abdominal pain, constipation, diarrhea and nausea. Genitourinary: Negative for dysuria, flank pain and hematuria. Musculoskeletal: Negative for arthralgias, back pain and myalgias. Neurological: Negative for dizziness, weakness, light-headedness and headaches. Psychiatric/Behavioral: Negative for agitation, behavioral problems and confusion. PHYSICAL EXAM:  BP (!) 199/88   Pulse 118   Resp 18   SpO2 99%   General appearance: Chronically ill-appearing female, anxious  HEENT: Normal cephalic, atraumatic without obvious deformity. Pupils equal, round, and reactive to light. Extra ocular muscles intact. Conjunctivae/corneas clear. Neck: Supple, with full range of motion. No jugular venous distention.  Trachea midline. Respiratory:  Normal respiratory effort on 4L NC. Wheezing throughout, inspiratory and expiratory. Cardiovascular: Regular rate and rhythm with normal S1/S2 without murmurs, rubs or gallops. Abdomen: Soft, non-tender, non-distended with normal bowel sounds. Musculoskeletal:  No clubbing, cyanosis or edema bilaterally. Skin: Skin color, texture, turgor normal.  No rashes or lesions. Neurologic:  Neurovascularly intact without any focal sensory/motor deficits. Cranial nerves: II-XII intact, grossly non-focal.  Psychiatric: Alert and oriented, thought content appropriate, normal insight  Capillary Refill: Brisk,< 3 seconds   Peripheral Pulses: +2 palpable, equal bilaterally     Labs:   Recent Labs     08/05/20  1638   WBC 10.9*   HGB 10.5*   HCT 34.4*        Recent Labs     08/05/20  1638      K 4.5      CO2 20*   BUN 19   CREATININE 0.8   CALCIUM 9.5     Radiology:   XR CHEST PORTABLE   Final Result   Moderate CHF. **This report has been created using voice recognition software. It may contain minor errors which are inherent in voice recognition technology. **      Final report electronically signed by Dr. Sushil Hernandez on 8/5/2020 5:06 PM          Electronically signed by ASHER Esquivel on 8/5/2020 at 6:40 PM

## 2020-08-05 NOTE — ED PROVIDER NOTES
[facility] [address] [address] EMERGENCY DEPT  EMERGENCY DEPARTMENT ENCOUNTER      Pt Name: Rhys Clark  MRN: [id number]  [place] 1940  Date of evaluation: 8/5/2020  Provider: Valentin Martinez MD    [address] [place] [address]       Chief Complaint   Patient presents with    Allergic Reaction         HISTORY OF PRESENT ILLNESS   (Location/Symptom, Timing/Onset, Context/Setting, Quality, Duration, Modifying Factors, Severity)  Note limiting factors. Rhys Clark is a 78 y.o. female who presents to the emergency department for possible allergic reaction and respiratory distress    Patient is an 47-year-old female who presents to the emergency department for allergic reaction and respiratory distress. Was at an infusion clinic for rheumatoid arthritis receiving a monoclonal antibody. The patient has had this in the past.  The patient had 1 dose last week. This was her second dose. The patient was walking out of the facility and felt short of breath, chest pain and lightheaded. Patient started to wheeze. EMS agency was called and a rapid was called. The patient was transported to the emergency department for further evaluation. Patient was given 125 of Solu-Medrol by transportation. Nursing Notes were reviewed. REVIEW OF SYSTEMS    (2-9 systems for level 4, 10 or more for level 5)     Review of Systems   Constitutional: Negative. Negative for activity change, appetite change, chills and fever. HENT: Negative. Negative for postnasal drip, rhinorrhea, sneezing and sore throat. Eyes: Negative. Negative for photophobia and visual disturbance. Respiratory: Positive for shortness of breath and wheezing. Negative for cough. Cardiovascular: Positive for chest pain and leg swelling. Negative for palpitations. Gastrointestinal: Negative. Negative for abdominal pain, constipation, diarrhea and nausea. Genitourinary: Negative.   Negative for difficulty urinating, dysuria, vaginal bleeding and vaginal discharge. Musculoskeletal: Negative. Negative for back pain and neck pain. Skin: Negative. Negative for rash and wound. Neurological: Negative. Negative for dizziness, weakness and numbness. Hematological: Negative. Psychiatric/Behavioral: Negative. All other systems reviewed and are negative. Except as noted above the remainder of the review of systems was reviewed and negative. PAST MEDICAL HISTORY     Past Medical History:   Diagnosis Date    Arthritis     Asthma 1978    [facility]     Blood circulation, collateral     CAD (coronary artery disease)     CH[facility]est[facility] 1999    Chronic kidney disease, stage III (moderate) ([facility]     [facility]     Depression     Diabetes [facility]     Diabetic hypertension-nephrosis syndrome ([facility]     Dysuria     GERD (gastroesophageal reflux disease)     Hyperlipidemia     Hypertension     Unspecified cerebral artery occlusion with cerebral infarction 1999    Dr Ivis Monte informed patient after Heart Attack         SURGICAL HISTORY       Past Surgical History:   Procedure Laterality Date    APPENDECTOMY      BACK SURGERY      lumbar spine    BLADDER SUSPENSION      CARDIAC CATHETERIZATION  2009    with stent  [facility]    CARPAL TUNNEL RELEASE      COLONOSCOPY  2013    ENDOSCOPY, COLON, DIAGNOSTIC      ESOPHAGEAL DILATATION      HYSTERECTOMY  1974    JOINT REPLACEMENT Left 1992    Left Knee         CURRENT MEDICATIONS       Previous Medications    ALBUTEROL (PROVENTIL HFA;VENTOLIN HFA) 108 (90 BASE) MCG/ACT INHALER    Inhale 2 puffs into the lungs every 6 hours as needed.     AMLODIPINE (NORVASC) 5 MG TABLET    Take 5 mg by mouth daily    ASPIRIN 81 MG EC TABLET    Take 162 mg by mouth daily    ATORVASTATIN (LIPITOR) 40 MG TABLET    Take 40 mg by mouth daily    BUDESONIDE-FORMOTEROL (SYMBICORT) 160-4.5 MCG/ACT AERO    Inhale 2 puffs into the lungs 2 times daily.    DEXLANSOPRAZOLE (DEXILANT) 60 MG CPDR DELAYED RELEASE CAPSULE    Take 60 mg by mouth daily    DICLOFENAC SODIUM 1 % GEL    Apply 2 g topically 4 times daily    DULOXETINE (CYMBALTA) 30 MG CAPSULE    Take 30 mg by mouth 2 times daily. FAMOTIDINE (PEPCID) 40 MG TABLET    Take 40 mg by mouth daily    FENOFIBRATE (TRICOR) 145 MG TABLET    Take 145 mg by mouth daily    HYDRALAZINE (APRESOLINE) 100 MG TABLET    Take 1 tablet by mouth 3 times daily    HYDROXYCHLOROQUINE (PLAQUENIL) 200 MG TABLET    TAKE 1 TABLET BY MOUTH EVERY DAY    INFLIXIMAB-ABDA (RENFLEXIS IV)    Infuse intravenously    INSULIN GLARGINE (LANTUS) 100 UNIT/ML INJECTION PEN    Inject into the skin nightly    ISOSORBIDE MONONITRATE (IMDUR) 120 MG EXTENDED RELEASE TABLET    Take 1 tablet by mouth daily    MAGNESIUM OXIDE (MAG-OX) 400 MG TABLET    Take 400 mg by mouth 2 times daily. METOPROLOL TARTRATE (LOPRESSOR) 100 MG TABLET    Take 1 tablet by mouth daily    MONTELUKAST (SINGULAIR) 5 MG CHEWABLE TABLET    Take 5 mg by mouth nightly. NITROGLYCERIN (NITROSTAT) 0.4 MG SL TABLET    Place 0.4 mg under the tongue every 5 minutes as needed. OXYCODONE-ACETAMINOPHEN (PERCOCET)  MG PER TABLET    Take 1 tablet by mouth every 4 hours as needed for Pain. Conchetta Peaks PANTOPRAZOLE (PROTONIX) 40 MG TABLET    Take 40 mg by mouth daily    POTASSIUM CHLORIDE (KLOR-CON) 20 MEQ PACKET    Take 10 mEq by mouth daily     PREDNISONE (DELTASONE) 5 MG TABLET    TAKE 1 TABLET BY MOUTH EVERY DAY    RANOLAZINE (RANEXA) 500 MG SR TABLET    Take 1 tablet by mouth 2 times daily. SPIRONOLACTONE (ALDACTONE) 50 MG TABLET    Take 50 mg by mouth daily. TRAZODONE (DESYREL) 50 MG TABLET    Take 100 mg by mouth nightly        ALLERGIES     Nuts [peanut-containing drug products]; Strawberry (diagnostic); Sulfa antibiotics; Tape [adhesive tape];  Bactrim [sulfamethoxazole-trimethoprim]; and Chocolate    FAMILY HISTORY       Family History   Problem Relation Age of Onset    Heart Disease Mother     Diabetes Mother     Kidney Disease Mother     Heart Disease Father     Diabetes Father     Cancer Sister     Stroke Brother     Cancer Sister           SOCIAL HISTORY       Social History     Socioeconomic History    Marital status:      Spouse name: None    Number of children: 2    Years of education: 10    Highest education level: None   Occupational History    None   Social Needs    Financial resource strain: None    Food insecurity     Worry: None     Inability: None    Transportation needs     Medical: None     Non-medical: None   Tobacco Use    Smoking status: Light Tobacco Smoker     Packs/day: 0.15     Years: 10.00     Pack years: 1.50     Types: Cigarettes     Start date: 10/21/2013    Smokeless tobacco: Never Used   Substance and Sexual Activity    Alcohol use: No    Drug use: No    Sexual activity: Never   Lifestyle    Physical activity     Days per week: None     Minutes per session: None    Stress: None   Relationships    Social connections     Talks on phone: None     Gets together: None     Attends [employer] service: None     Active member of club or organization: None     Attends meetings of clubs or organizations: None     Relationship status: None    Intimate partner violence     Fear of current or ex partner: None     Emotionally abused: None     Physically abused: None     Forced sexual activity: None   Other Topics Concern    None   Social History Narrative    None       SCREENINGS                        PHYSICAL EXAM    (up to 7 for level 4, 8 or more for level 5)     ED Triage Vitals   BP Temp Temp src Pulse Resp SpO2 Height Weight   -- -- -- -- -- -- -- --       Physical Exam  Vitals signs and nursing note reviewed. Constitutional:       General: She is in acute distress. Appearance: Normal appearance. She is ill-appearing. HENT:      Head: Normocephalic and atraumatic.       Right Ear: External ear normal.      Left Ear: External ear normal.      Nose: Nose normal.   Eyes:      Extraocular Movements: Extraocular movements intact. Conjunctiva/sclera: Conjunctivae normal.   Cardiovascular:      Rate and Rhythm: Normal rate and regular rhythm. Pulses: Normal pulses. Heart sounds: No murmur. No gallop. Pulmonary:      Effort: Respiratory distress present. Breath sounds: No stridor. Wheezing, rhonchi and rales present. Abdominal:      Palpations: Abdomen is soft. Tenderness: There is no abdominal tenderness. Musculoskeletal: Normal range of motion. General: No tenderness. Skin:     General: Skin is warm and dry. Neurological:      General: No focal deficit present. Mental Status: She is alert and oriented to person, place, and time. Psychiatric:         Mood and Affect: Mood normal.         Behavior: Behavior normal.         DIAGNOSTIC RESULTS     EKG: All EKG's are interpreted by the Emergency Department Physician who either signs or Co-signs this chart in the absence of a cardiologist.    RADIOLOGY:   Non-plain film images such as CT, Ultrasound and MRI are read by the radiologist. Plain radiographic images are visualized and preliminarily interpreted by the emergency physician with the below findings:    Interpretation per the Radiologist below, if available at the time of this note:    XR CHEST PORTABLE   Final Result   Moderate CHF. **This report has been created using voice recognition software. It may contain minor errors which are inherent in voice recognition technology. **      Final report electronically signed by Dr. Rafita Gordon on 8/5/2020 5:06 PM            ED BEDSIDE ULTRASOUND:   Performed by ED Physician - none    LABS:  Labs Reviewed   BASIC METABOLIC PANEL - Abnormal; Notable for the following components:       Result Value    CO2 20 (*)     Glucose 151 (*)     All other components within normal limits   CBC WITH AUTO DIFFERENTIAL - Abnormal; Notable (electronically signed)  Attending Emergency Physician           Paras Emerson MD  08/05/20 2665

## 2020-08-05 NOTE — ED TRIAGE NOTES
Pt presents to rm 1 c/o a possible allergic reaction to an infusion on outpatient nursing. Pt states that she had her second infusion of this, the first being two weeks ago. Pt was getting ready to leave when she states that she was just getting done in the BR and started to feel her throat swelling up making it hard to breathe. PT noted to be in SVT on EKG. Upon arrival to ED, pt placed on cardiac monitor. Dr. Mara Cole at bedside. Call light in reach, Will monitor.

## 2020-08-05 NOTE — ED NOTES
Patient to 3b31 at this time.         [facility] [place] [place]  16/66/82 [id number]

## 2020-08-05 NOTE — PROGRESS NOTES
_m___ Safety:       (Environmental)   [place] to environment   Ensure ID band is correct and in place/ allergy band as needed   Assess for fall risk   Initiate fall precautions as applicable (fall band, side rails, etc.)   Call light within reach   Bed in low position/ wheels locked    _m___ Pain:        Assess pain level and characteristics   Administer analgesics as ordered   Assess effectiveness of pain management and report to MD as needed    __m__ Knowledge Deficit:   Assess baseline knowledge   Provide teaching at level of understanding   Provide teaching via preferred learning method   Evaluate teaching effectiveness    __m__ Hemodynamic/Respiratory Status:       (Pre and Post Procedure Monitoring)   Assess/Monitor vital signs and LOC   Assess Baseline SpO2 prior to any sedation   Obtain weight/height   Assess vital signs/ LOC until patient meets discharge criteria   Monitor procedure site and notify MD of any issues

## 2020-08-05 NOTE — ED NOTES
Pt medicated per order. Updated on POC. Call light in reach. Will monitor.       Chrys Gowers, RN  08/05/20 2578

## 2020-08-06 ENCOUNTER — TELEPHONE (OUTPATIENT)
Dept: RHEUMATOLOGY | Age: 80
End: 2020-08-06

## 2020-08-06 ENCOUNTER — APPOINTMENT (OUTPATIENT)
Dept: GENERAL RADIOLOGY | Age: 80
DRG: 291 | End: 2020-08-06
Payer: MEDICARE

## 2020-08-06 LAB
ANION GAP SERPL CALCULATED.3IONS-SCNC: 12 MEQ/L (ref 8–16)
BASOPHILS # BLD: 0.2 %
BASOPHILS ABSOLUTE: 0 THOU/MM3 (ref 0–0.1)
BUN BLDV-MCNC: 21 MG/DL (ref 7–22)
CALCIUM SERPL-MCNC: 8.9 MG/DL (ref 8.5–10.5)
CHLORIDE BLD-SCNC: 103 MEQ/L (ref 98–111)
CHOLESTEROL, TOTAL: 217 MG/DL (ref 100–199)
CO2: 25 MEQ/L (ref 23–33)
CREAT SERPL-MCNC: 0.8 MG/DL (ref 0.4–1.2)
EKG ATRIAL RATE: 82 BPM
EKG P AXIS: 36 DEGREES
EKG P-R INTERVAL: 184 MS
EKG Q-T INTERVAL: 482 MS
EKG QRS DURATION: 134 MS
EKG QTC CALCULATION (BAZETT): 563 MS
EKG R AXIS: 7 DEGREES
EKG T AXIS: 23 DEGREES
EKG VENTRICULAR RATE: 82 BPM
EOSINOPHIL # BLD: 0 %
EOSINOPHILS ABSOLUTE: 0 THOU/MM3 (ref 0–0.4)
ERYTHROCYTE [DISTWIDTH] IN BLOOD BY AUTOMATED COUNT: 14.8 % (ref 11.5–14.5)
ERYTHROCYTE [DISTWIDTH] IN BLOOD BY AUTOMATED COUNT: 48.1 FL (ref 35–45)
GFR SERPL CREATININE-BSD FRML MDRD: 69 ML/MIN/1.73M2
GLUCOSE BLD-MCNC: 110 MG/DL (ref 70–108)
GLUCOSE BLD-MCNC: 135 MG/DL (ref 70–108)
GLUCOSE BLD-MCNC: 135 MG/DL (ref 70–108)
GLUCOSE BLD-MCNC: 138 MG/DL (ref 70–108)
GLUCOSE BLD-MCNC: 169 MG/DL (ref 70–108)
HCT VFR BLD CALC: 34.1 % (ref 37–47)
HDLC SERPL-MCNC: 80 MG/DL
HEMOGLOBIN: 10.4 GM/DL (ref 12–16)
IMMATURE GRANS (ABS): 0.03 THOU/MM3 (ref 0–0.07)
IMMATURE GRANULOCYTES: 0.5 %
LACTIC ACID: 1.2 MMOL/L (ref 0.5–2.2)
LDL CHOLESTEROL CALCULATED: 126 MG/DL
LV EF: 53 %
LVEF MODALITY: NORMAL
LYMPHOCYTES # BLD: 8.3 %
LYMPHOCYTES ABSOLUTE: 0.5 THOU/MM3 (ref 1–4.8)
MAGNESIUM: 1.8 MG/DL (ref 1.6–2.4)
MCH RBC QN AUTO: 27.4 PG (ref 26–33)
MCHC RBC AUTO-ENTMCNC: 30.5 GM/DL (ref 32.2–35.5)
MCV RBC AUTO: 89.7 FL (ref 81–99)
MONOCYTES # BLD: 0.8 %
MONOCYTES ABSOLUTE: 0 THOU/MM3 (ref 0.4–1.3)
NUCLEATED RED BLOOD CELLS: 0 /100 WBC
PLATELET # BLD: 298 THOU/MM3 (ref 130–400)
PMV BLD AUTO: 10.1 FL (ref 9.4–12.4)
POTASSIUM REFLEX MAGNESIUM: 4 MEQ/L (ref 3.5–5.2)
POTASSIUM SERPL-SCNC: 4 MEQ/L (ref 3.5–5.2)
RBC # BLD: 3.8 MILL/MM3 (ref 4.2–5.4)
SEG NEUTROPHILS: 90.2 %
SEGMENTED NEUTROPHILS ABSOLUTE COUNT: 5.6 THOU/MM3 (ref 1.8–7.7)
SODIUM BLD-SCNC: 140 MEQ/L (ref 135–145)
TRIGL SERPL-MCNC: 55 MG/DL (ref 0–199)
TROPONIN T: < 0.01 NG/ML
WBC # BLD: 6.2 THOU/MM3 (ref 4.8–10.8)

## 2020-08-06 PROCEDURE — 2580000003 HC RX 258: Performed by: PHYSICIAN ASSISTANT

## 2020-08-06 PROCEDURE — 6370000000 HC RX 637 (ALT 250 FOR IP): Performed by: PHYSICIAN ASSISTANT

## 2020-08-06 PROCEDURE — 93010 ELECTROCARDIOGRAM REPORT: CPT | Performed by: INTERNAL MEDICINE

## 2020-08-06 PROCEDURE — 2140000000 HC CCU INTERMEDIATE R&B

## 2020-08-06 PROCEDURE — 94760 N-INVAS EAR/PLS OXIMETRY 1: CPT

## 2020-08-06 PROCEDURE — 6370000000 HC RX 637 (ALT 250 FOR IP): Performed by: FAMILY MEDICINE

## 2020-08-06 PROCEDURE — 93306 TTE W/DOPPLER COMPLETE: CPT

## 2020-08-06 PROCEDURE — 83605 ASSAY OF LACTIC ACID: CPT

## 2020-08-06 PROCEDURE — 80048 BASIC METABOLIC PNL TOTAL CA: CPT

## 2020-08-06 PROCEDURE — 36415 COLL VENOUS BLD VENIPUNCTURE: CPT

## 2020-08-06 PROCEDURE — 82948 REAGENT STRIP/BLOOD GLUCOSE: CPT

## 2020-08-06 PROCEDURE — 93005 ELECTROCARDIOGRAM TRACING: CPT | Performed by: PHYSICIAN ASSISTANT

## 2020-08-06 PROCEDURE — 71046 X-RAY EXAM CHEST 2 VIEWS: CPT

## 2020-08-06 PROCEDURE — 80061 LIPID PANEL: CPT

## 2020-08-06 PROCEDURE — 6360000002 HC RX W HCPCS: Performed by: PHYSICIAN ASSISTANT

## 2020-08-06 PROCEDURE — 94640 AIRWAY INHALATION TREATMENT: CPT

## 2020-08-06 PROCEDURE — 84484 ASSAY OF TROPONIN QUANT: CPT

## 2020-08-06 PROCEDURE — 85025 COMPLETE CBC W/AUTO DIFF WBC: CPT

## 2020-08-06 PROCEDURE — 83735 ASSAY OF MAGNESIUM: CPT

## 2020-08-06 RX ORDER — MUSCLE RUB CREAM 100; 150 MG/G; MG/G
CREAM TOPICAL PRN
Status: DISCONTINUED | OUTPATIENT
Start: 2020-08-06 | End: 2020-08-10 | Stop reason: HOSPADM

## 2020-08-06 RX ORDER — BUMETANIDE 1 MG/1
1 TABLET ORAL DAILY
Status: DISCONTINUED | OUTPATIENT
Start: 2020-08-06 | End: 2020-08-10 | Stop reason: HOSPADM

## 2020-08-06 RX ORDER — METOPROLOL TARTRATE 50 MG/1
50 TABLET, FILM COATED ORAL 2 TIMES DAILY
Status: DISCONTINUED | OUTPATIENT
Start: 2020-08-06 | End: 2020-08-08

## 2020-08-06 RX ORDER — POTASSIUM CHLORIDE 20 MEQ/1
20 TABLET, EXTENDED RELEASE ORAL 2 TIMES DAILY WITH MEALS
Status: DISCONTINUED | OUTPATIENT
Start: 2020-08-06 | End: 2020-08-06

## 2020-08-06 RX ORDER — SPIRONOLACTONE 25 MG/1
25 TABLET ORAL DAILY
Status: DISCONTINUED | OUTPATIENT
Start: 2020-08-06 | End: 2020-08-10 | Stop reason: HOSPADM

## 2020-08-06 RX ADMIN — OXYCODONE HYDROCHLORIDE AND ACETAMINOPHEN 1 TABLET: 10; 325 TABLET ORAL at 08:19

## 2020-08-06 RX ADMIN — Medication 2 PUFF: at 18:33

## 2020-08-06 RX ADMIN — TRAZODONE HYDROCHLORIDE 100 MG: 100 TABLET ORAL at 01:08

## 2020-08-06 RX ADMIN — INSULIN GLARGINE 8 UNITS: 100 INJECTION, SOLUTION SUBCUTANEOUS at 21:03

## 2020-08-06 RX ADMIN — FENOFIBRATE 54 MG: 54 TABLET ORAL at 08:19

## 2020-08-06 RX ADMIN — OXYCODONE HYDROCHLORIDE AND ACETAMINOPHEN 1 TABLET: 10; 325 TABLET ORAL at 01:08

## 2020-08-06 RX ADMIN — SPIRONOLACTONE 50 MG: 25 TABLET ORAL at 08:19

## 2020-08-06 RX ADMIN — RANOLAZINE 500 MG: 500 TABLET, FILM COATED, EXTENDED RELEASE ORAL at 21:00

## 2020-08-06 RX ADMIN — HYDRALAZINE HYDROCHLORIDE 100 MG: 50 TABLET, FILM COATED ORAL at 01:08

## 2020-08-06 RX ADMIN — TRAZODONE HYDROCHLORIDE 100 MG: 100 TABLET ORAL at 21:00

## 2020-08-06 RX ADMIN — RANOLAZINE 500 MG: 500 TABLET, FILM COATED, EXTENDED RELEASE ORAL at 08:19

## 2020-08-06 RX ADMIN — MAGNESIUM GLUCONATE 500 MG ORAL TABLET 400 MG: 500 TABLET ORAL at 08:19

## 2020-08-06 RX ADMIN — Medication 2 PUFF: at 08:01

## 2020-08-06 RX ADMIN — MONTELUKAST SODIUM 5 MG: 5 TABLET, CHEWABLE ORAL at 20:59

## 2020-08-06 RX ADMIN — ENOXAPARIN SODIUM 40 MG: 40 INJECTION SUBCUTANEOUS at 20:59

## 2020-08-06 RX ADMIN — PREDNISONE 5 MG: 5 TABLET ORAL at 08:19

## 2020-08-06 RX ADMIN — METOPROLOL TARTRATE 100 MG: 100 TABLET, FILM COATED ORAL at 08:19

## 2020-08-06 RX ADMIN — SODIUM CHLORIDE, PRESERVATIVE FREE 10 ML: 5 INJECTION INTRAVENOUS at 21:00

## 2020-08-06 RX ADMIN — DULOXETINE HYDROCHLORIDE 30 MG: 30 CAPSULE, DELAYED RELEASE ORAL at 08:19

## 2020-08-06 RX ADMIN — AMLODIPINE BESYLATE 5 MG: 5 TABLET ORAL at 08:19

## 2020-08-06 RX ADMIN — MENTHOL, METHYL SALICYLATE: 10; 15 CREAM TOPICAL at 21:00

## 2020-08-06 RX ADMIN — DULOXETINE HYDROCHLORIDE 30 MG: 30 CAPSULE, DELAYED RELEASE ORAL at 21:00

## 2020-08-06 RX ADMIN — ASPIRIN 162 MG: 81 TABLET ORAL at 08:19

## 2020-08-06 RX ADMIN — ACETAMINOPHEN 650 MG: 325 TABLET ORAL at 20:59

## 2020-08-06 RX ADMIN — MAGNESIUM GLUCONATE 500 MG ORAL TABLET 400 MG: 500 TABLET ORAL at 21:00

## 2020-08-06 RX ADMIN — HYDROXYCHLOROQUINE SULFATE 200 MG: 200 TABLET ORAL at 08:19

## 2020-08-06 RX ADMIN — MENTHOL, METHYL SALICYLATE: 10; 15 CREAM TOPICAL at 10:52

## 2020-08-06 RX ADMIN — ALBUTEROL SULFATE 5 MG: 2.5 SOLUTION RESPIRATORY (INHALATION) at 21:10

## 2020-08-06 RX ADMIN — PANTOPRAZOLE SODIUM 40 MG: 40 TABLET, DELAYED RELEASE ORAL at 08:19

## 2020-08-06 RX ADMIN — OXYCODONE HYDROCHLORIDE AND ACETAMINOPHEN 1 TABLET: 10; 325 TABLET ORAL at 14:53

## 2020-08-06 RX ADMIN — ISOSORBIDE MONONITRATE 120 MG: 60 TABLET ORAL at 08:19

## 2020-08-06 RX ADMIN — SODIUM CHLORIDE, PRESERVATIVE FREE 10 ML: 5 INJECTION INTRAVENOUS at 08:22

## 2020-08-06 RX ADMIN — ATORVASTATIN CALCIUM 40 MG: 40 TABLET, FILM COATED ORAL at 20:59

## 2020-08-06 ASSESSMENT — PAIN DESCRIPTION - PAIN TYPE
TYPE: CHRONIC PAIN
TYPE: CHRONIC PAIN

## 2020-08-06 ASSESSMENT — PAIN SCALES - GENERAL
PAINLEVEL_OUTOF10: 10

## 2020-08-06 ASSESSMENT — PAIN DESCRIPTION - LOCATION: LOCATION: GENERALIZED

## 2020-08-06 NOTE — PROGRESS NOTES
Pt admitted to  31 17 09 from ED. Complaints: Acute respiratory failure. IV none infusing into the wrist right, condition patent and no redness at a rate of 0 mls/ hour with about 0 mls in the bag still. IV site free of s/s of infection or infiltration. Vital signs obtained. Assessment and data collection initiated. Two nurse skin assessment performed by Tommie Mobley and Prerna Alvarez. Oriented to room. Policies and procedures for 3B explained. Waldemar Talley RN discussed hourly rounding with patient addressing 5 P's. Fall prevention and safety brochure discussed with patient. Bed alarm on. Call light in reach. The best day to schedule a follow up Dr appointment is: Wednesday p.m. Explained patients right to have family, representative or physician notified of their admission. Patient has Declined for physician to be notified. Patient has declined for family/representative to be notified. All questions answered with no further questions at this time.

## 2020-08-06 NOTE — CONSULTS
very much for allowing us to share in the management of  this patient. We will follow up with you.         Harvey Magallon M.D.    D: 08/06/2020 16:15:03       T: 08/06/2020 17:15:27     [email or web address]  Job#: [id number]     Doc#: [id number]    CC:

## 2020-08-06 NOTE — PLAN OF CARE
Problem: Falls - Risk of:  Goal: Will remain free from falls  Description: Will remain free from falls  Outcome: Ongoing  Goal: Absence of physical injury  Description: Absence of physical injury  Outcome: Ongoing  Patient remains free from falls thus far this shift. Call light within reach, bedside rails up x 2, non-skid socks on. Bed alarm on     Problem: Pain:  Goal: Pain level will decrease  Description: Pain level will decrease  Outcome: Ongoing  Goal: Control of acute pain  Description: Control of acute pain  Outcome: Ongoing  Goal: Control of chronic pain  Description: Control of chronic pain  Outcome: Ongoing  Patient has chronic pain from rheumatoid arthritis. PRN percocet given. Problem: Respiratory:  Goal: Ability to maintain a clear airway will improve  Description: Ability to maintain a clear airway will improve  Outcome: Ongoing  Goal: Ability to maintain adequate ventilation will improve  Description: Ability to maintain adequate ventilation will improve  Outcome: Ongoing  Goal: Complications related to the disease process, condition or treatment will be avoided or minimized  Description: Complications related to the disease process, condition or treatment will be avoided or minimized  Outcome: Ongoing  Monitoring lung sounds. Patient on 2 liters of oxygen. Some complaints of throat soreness/tightness. Air movement heard upper respiratory. Will monitor closely. Problem: Skin Integrity:  Goal: Risk for impaired skin integrity will decrease  Description: Risk for impaired skin integrity will decrease  Outcome: Ongoing  Monitoring for skin changes. Care plan reviewed with patient. Patient verbalize understanding of the plan of care and contribute to goal setting.

## 2020-08-06 NOTE — TELEPHONE ENCOUNTER
REceived call from out patient nursing stating the patient developed SOB, rapid heart rate up discharged fromt he infusion clinic after her first dosing of infliximab (biosimilar). Found to have A. FIB with RVR, and CXR concerning for CHF. Admitted started on diuretics. BNP elevation noted. Respiratory symptoms and CXR improved currently after diuresis. Seronegative rheumatoid arthritis:   - STOP any further therapies w/ infliximab b/c infusion reaction 8/5/2020  - will need to see in clinic in 1-2 weeks post discharge to discuss additional treatment option.

## 2020-08-06 NOTE — PLAN OF CARE
Problem: Respiratory:  Goal: Ability to maintain adequate ventilation will improve  Description: Ability to maintain adequate ventilation will improve  8/6/2020 1440 by Uzair Raymond RCP  Outcome: Ongoing

## 2020-08-06 NOTE — PROGRESS NOTES
Assessment and Plan:        1. Acute sob after inflixamab infusion; cxr c/w chf; she is improved this am, CXR looks better. .  Check trop, consult cardiol. Change diuretic po  ekg RBBB  2. hbp- stable  3. Dm- stable      CC:  sob  HPI:  Pt with hx cad, RA, became sob after med infusion; felt her throat was closing. cxr suggested chf.  ROS (12 point review of systems completed. Pertinent positives noted.  Otherwise ROS is negative) : cad with frequent chest pain, much nonexertional, left chest.  PMH:  Per HPI  SHX:  Smokes 3 cigs per day; lives alone  FHX: Noncontributory    Allergies: See above    Medications:       bumetanide  1 mg Oral Daily    amLODIPine  5 mg Oral Daily    aspirin  162 mg Oral Daily    atorvastatin  40 mg Oral Nightly    budesonide-formoterol  2 puff Inhalation BID    traZODone  100 mg Oral Nightly    spironolactone  50 mg Oral Daily    ranolazine  500 mg Oral BID    predniSONE  5 mg Oral Daily    montelukast  5 mg Oral Nightly    metoprolol  100 mg Oral Daily    isosorbide mononitrate  120 mg Oral Daily    hydroxychloroquine  200 mg Oral Daily    magnesium oxide  400 mg Oral BID    pantoprazole  40 mg Oral QAM AC    DULoxetine  30 mg Oral BID    fenofibrate  54 mg Oral Daily    sodium chloride flush  10 mL Intravenous 2 times per day    enoxaparin  40 mg Subcutaneous Q24H    insulin glargine  8 Units Subcutaneous Nightly    hydrALAZINE  100 mg Oral TID       Vital Signs:   BP (!) 119/56   Pulse 85   Temp 98.6 °F (37 °C) (Oral)   Resp 19   Wt 162 lb 4 oz (73.6 kg)   SpO2 99%   BMI 26.20 kg/m²      Intake/Output Summary (Last 24 hours) at 8/6/2020 0754  Last data filed at 8/6/2020 0500  Gross per 24 hour   Intake 360 ml   Output 1200 ml   Net -840 ml        Physical Examination: General appearance - chronically ill appearing  Mental status - alert, oriented to person, place, and time  Neck - supple, no significant adenopathy, no JVD, or carotid bruits  Chest - fine crackles left  Heart - no murmurs noted, no gallops noted, no JVD, tachycardic  Abdomen - soft, nontender, nondistended, no masses or organomegaly  Neurological - alert, oriented, normal speech, no focal findings or movement disorder noted  Musculoskeletal - no muscular tenderness noted  Extremities - no pedal edema noted  Skin - normal coloration and turgor, no rashes, no suspicious skin lesions noted    Data: (All radiographs, tracings, PFTs, and imaging are personally viewed and interpreted unless otherwise noted).     Reviewed cxr, labs      Electronically signed by Mahesh Mills MD on 8/6/2020 at 7:54 AM

## 2020-08-07 ENCOUNTER — APPOINTMENT (OUTPATIENT)
Dept: GENERAL RADIOLOGY | Age: 80
DRG: 291 | End: 2020-08-07
Payer: MEDICARE

## 2020-08-07 ENCOUNTER — APPOINTMENT (OUTPATIENT)
Dept: CT IMAGING | Age: 80
DRG: 291 | End: 2020-08-07
Payer: MEDICARE

## 2020-08-07 LAB
ANION GAP SERPL CALCULATED.3IONS-SCNC: 11 MEQ/L (ref 8–16)
ANION GAP SERPL CALCULATED.3IONS-SCNC: 14 MEQ/L (ref 8–16)
BACTERIA: ABNORMAL
BILIRUBIN URINE: NEGATIVE
BLOOD, URINE: NEGATIVE
BUN BLDV-MCNC: 49 MG/DL (ref 7–22)
BUN BLDV-MCNC: 56 MG/DL (ref 7–22)
CALCIUM SERPL-MCNC: 8.4 MG/DL (ref 8.5–10.5)
CALCIUM SERPL-MCNC: 8.7 MG/DL (ref 8.5–10.5)
CASTS: ABNORMAL /LPF
CASTS: ABNORMAL /LPF
CHARACTER, URINE: CLEAR
CHLORIDE BLD-SCNC: 101 MEQ/L (ref 98–111)
CHLORIDE BLD-SCNC: 102 MEQ/L (ref 98–111)
CO2: 22 MEQ/L (ref 23–33)
CO2: 23 MEQ/L (ref 23–33)
COLOR: YELLOW
CREAT SERPL-MCNC: 1.4 MG/DL (ref 0.4–1.2)
CREAT SERPL-MCNC: 1.8 MG/DL (ref 0.4–1.2)
CREATININE URINE: 128.7 MG/DL
CRYSTALS: ABNORMAL
EOSINOPHIL SMEAR: NORMAL
EPITHELIAL CELLS, UA: ABNORMAL /HPF
GFR SERPL CREATININE-BSD FRML MDRD: 27 ML/MIN/1.73M2
GFR SERPL CREATININE-BSD FRML MDRD: 36 ML/MIN/1.73M2
GLUCOSE BLD-MCNC: 114 MG/DL (ref 70–108)
GLUCOSE BLD-MCNC: 119 MG/DL (ref 70–108)
GLUCOSE BLD-MCNC: 124 MG/DL (ref 70–108)
GLUCOSE BLD-MCNC: 135 MG/DL (ref 70–108)
GLUCOSE BLD-MCNC: 136 MG/DL (ref 70–108)
GLUCOSE BLD-MCNC: 156 MG/DL (ref 70–108)
GLUCOSE BLD-MCNC: 161 MG/DL (ref 70–108)
GLUCOSE, URINE: NEGATIVE MG/DL
KETONES, URINE: NEGATIVE
LEUKOCYTE ESTERASE, URINE: ABNORMAL
MAGNESIUM: 2.5 MG/DL (ref 1.6–2.4)
MISCELLANEOUS LAB TEST RESULT: ABNORMAL
NITRITE, URINE: NEGATIVE
PH UA: 5 (ref 5–9)
POTASSIUM SERPL-SCNC: 4.5 MEQ/L (ref 3.5–5.2)
POTASSIUM SERPL-SCNC: 4.7 MEQ/L (ref 3.5–5.2)
PROCALCITONIN: 0.05 NG/ML (ref 0.01–0.09)
PROTEIN UA: 100 MG/DL
RBC URINE: ABNORMAL /HPF
RENAL EPITHELIAL, UA: ABNORMAL
SODIUM BLD-SCNC: 135 MEQ/L (ref 135–145)
SODIUM BLD-SCNC: 138 MEQ/L (ref 135–145)
SODIUM URINE: 23 MEQ/L
SPECIFIC GRAVITY UA: 1.02 (ref 1–1.03)
SPECIMEN: NORMAL
UREA NITROGEN, UR: 1188 MG/DL
UROBILINOGEN, URINE: 0.2 EU/DL (ref 0–1)
WBC UA: ABNORMAL /HPF
YEAST: ABNORMAL

## 2020-08-07 PROCEDURE — 84540 ASSAY OF URINE/UREA-N: CPT

## 2020-08-07 PROCEDURE — 6370000000 HC RX 637 (ALT 250 FOR IP): Performed by: INTERNAL MEDICINE

## 2020-08-07 PROCEDURE — 84156 ASSAY OF PROTEIN URINE: CPT

## 2020-08-07 PROCEDURE — 36415 COLL VENOUS BLD VENIPUNCTURE: CPT

## 2020-08-07 PROCEDURE — 83735 ASSAY OF MAGNESIUM: CPT

## 2020-08-07 PROCEDURE — 2140000000 HC CCU INTERMEDIATE R&B

## 2020-08-07 PROCEDURE — 6360000002 HC RX W HCPCS: Performed by: PHYSICIAN ASSISTANT

## 2020-08-07 PROCEDURE — 6370000000 HC RX 637 (ALT 250 FOR IP): Performed by: FAMILY MEDICINE

## 2020-08-07 PROCEDURE — 80048 BASIC METABOLIC PNL TOTAL CA: CPT

## 2020-08-07 PROCEDURE — 94640 AIRWAY INHALATION TREATMENT: CPT

## 2020-08-07 PROCEDURE — 2580000003 HC RX 258: Performed by: PHYSICIAN ASSISTANT

## 2020-08-07 PROCEDURE — 94669 MECHANICAL CHEST WALL OSCILL: CPT

## 2020-08-07 PROCEDURE — 6370000000 HC RX 637 (ALT 250 FOR IP): Performed by: PHYSICIAN ASSISTANT

## 2020-08-07 PROCEDURE — 71250 CT THORAX DX C-: CPT

## 2020-08-07 PROCEDURE — 2580000003 HC RX 258: Performed by: INTERNAL MEDICINE

## 2020-08-07 PROCEDURE — 87899 AGENT NOS ASSAY W/OPTIC: CPT

## 2020-08-07 PROCEDURE — 6360000002 HC RX W HCPCS: Performed by: INTERNAL MEDICINE

## 2020-08-07 PROCEDURE — 84145 PROCALCITONIN (PCT): CPT

## 2020-08-07 PROCEDURE — 89190 NASAL SMEAR FOR EOSINOPHILS: CPT

## 2020-08-07 PROCEDURE — 2700000000 HC OXYGEN THERAPY PER DAY

## 2020-08-07 PROCEDURE — 87449 NOS EACH ORGANISM AG IA: CPT

## 2020-08-07 PROCEDURE — 71045 X-RAY EXAM CHEST 1 VIEW: CPT

## 2020-08-07 PROCEDURE — 84300 ASSAY OF URINE SODIUM: CPT

## 2020-08-07 PROCEDURE — 82948 REAGENT STRIP/BLOOD GLUCOSE: CPT

## 2020-08-07 PROCEDURE — 99233 SBSQ HOSP IP/OBS HIGH 50: CPT | Performed by: INTERNAL MEDICINE

## 2020-08-07 PROCEDURE — 81001 URINALYSIS AUTO W/SCOPE: CPT

## 2020-08-07 PROCEDURE — 94760 N-INVAS EAR/PLS OXIMETRY 1: CPT

## 2020-08-07 PROCEDURE — 82570 ASSAY OF URINE CREATININE: CPT

## 2020-08-07 RX ORDER — AZITHROMYCIN 250 MG/1
250 TABLET, FILM COATED ORAL DAILY
Status: DISCONTINUED | OUTPATIENT
Start: 2020-08-08 | End: 2020-08-08

## 2020-08-07 RX ORDER — POLYETHYLENE GLYCOL 3350 17 G/17G
17 POWDER, FOR SOLUTION ORAL DAILY
Status: DISCONTINUED | OUTPATIENT
Start: 2020-08-07 | End: 2020-08-10 | Stop reason: HOSPADM

## 2020-08-07 RX ORDER — ATORVASTATIN CALCIUM 80 MG/1
80 TABLET, FILM COATED ORAL NIGHTLY
Status: DISCONTINUED | OUTPATIENT
Start: 2020-08-07 | End: 2020-08-10 | Stop reason: HOSPADM

## 2020-08-07 RX ORDER — SENNA PLUS 8.6 MG/1
1 TABLET ORAL 2 TIMES DAILY
Status: DISCONTINUED | OUTPATIENT
Start: 2020-08-07 | End: 2020-08-10 | Stop reason: HOSPADM

## 2020-08-07 RX ORDER — AZITHROMYCIN 250 MG/1
500 TABLET, FILM COATED ORAL DAILY
Status: COMPLETED | OUTPATIENT
Start: 2020-08-07 | End: 2020-08-07

## 2020-08-07 RX ORDER — METHYLPREDNISOLONE SODIUM SUCCINATE 40 MG/ML
40 INJECTION, POWDER, LYOPHILIZED, FOR SOLUTION INTRAMUSCULAR; INTRAVENOUS EVERY 12 HOURS
Status: DISCONTINUED | OUTPATIENT
Start: 2020-08-07 | End: 2020-08-09

## 2020-08-07 RX ORDER — BUDESONIDE 0.5 MG/2ML
0.5 INHALANT ORAL 2 TIMES DAILY
Status: DISCONTINUED | OUTPATIENT
Start: 2020-08-07 | End: 2020-08-10 | Stop reason: HOSPADM

## 2020-08-07 RX ORDER — ISOSORBIDE MONONITRATE 60 MG/1
60 TABLET, EXTENDED RELEASE ORAL 2 TIMES DAILY
Status: DISCONTINUED | OUTPATIENT
Start: 2020-08-07 | End: 2020-08-10 | Stop reason: HOSPADM

## 2020-08-07 RX ADMIN — ENOXAPARIN SODIUM 40 MG: 40 INJECTION SUBCUTANEOUS at 21:03

## 2020-08-07 RX ADMIN — CEFTRIAXONE SODIUM 1 G: 1 INJECTION, POWDER, FOR SOLUTION INTRAMUSCULAR; INTRAVENOUS at 21:03

## 2020-08-07 RX ADMIN — HYDROXYCHLOROQUINE SULFATE 200 MG: 200 TABLET ORAL at 10:18

## 2020-08-07 RX ADMIN — ISOSORBIDE MONONITRATE 60 MG: 60 TABLET ORAL at 21:03

## 2020-08-07 RX ADMIN — MAGNESIUM GLUCONATE 500 MG ORAL TABLET 400 MG: 500 TABLET ORAL at 20:10

## 2020-08-07 RX ADMIN — SENNOSIDES 8.6 MG: 8.6 TABLET, FILM COATED ORAL at 20:10

## 2020-08-07 RX ADMIN — DULOXETINE HYDROCHLORIDE 30 MG: 30 CAPSULE, DELAYED RELEASE ORAL at 20:10

## 2020-08-07 RX ADMIN — TRAZODONE HYDROCHLORIDE 100 MG: 100 TABLET ORAL at 20:10

## 2020-08-07 RX ADMIN — BUDESONIDE 500 MCG: 0.5 INHALANT RESPIRATORY (INHALATION) at 18:14

## 2020-08-07 RX ADMIN — AZITHROMYCIN DIHYDRATE 500 MG: 250 TABLET, FILM COATED ORAL at 21:03

## 2020-08-07 RX ADMIN — PANTOPRAZOLE SODIUM 40 MG: 40 TABLET, DELAYED RELEASE ORAL at 04:20

## 2020-08-07 RX ADMIN — OXYCODONE HYDROCHLORIDE AND ACETAMINOPHEN 1 TABLET: 10; 325 TABLET ORAL at 20:10

## 2020-08-07 RX ADMIN — METOPROLOL TARTRATE 50 MG: 50 TABLET, FILM COATED ORAL at 20:10

## 2020-08-07 RX ADMIN — MENTHOL, METHYL SALICYLATE: 10; 15 CREAM TOPICAL at 10:20

## 2020-08-07 RX ADMIN — SENNOSIDES 8.6 MG: 8.6 TABLET, FILM COATED ORAL at 15:30

## 2020-08-07 RX ADMIN — Medication 2 PUFF: at 08:12

## 2020-08-07 RX ADMIN — SODIUM CHLORIDE, PRESERVATIVE FREE 10 ML: 5 INJECTION INTRAVENOUS at 10:19

## 2020-08-07 RX ADMIN — ALBUTEROL SULFATE 5 MG: 2.5 SOLUTION RESPIRATORY (INHALATION) at 05:38

## 2020-08-07 RX ADMIN — RANOLAZINE 500 MG: 500 TABLET, FILM COATED, EXTENDED RELEASE ORAL at 20:10

## 2020-08-07 RX ADMIN — PREDNISONE 5 MG: 5 TABLET ORAL at 10:18

## 2020-08-07 RX ADMIN — DULOXETINE HYDROCHLORIDE 30 MG: 30 CAPSULE, DELAYED RELEASE ORAL at 10:18

## 2020-08-07 RX ADMIN — METHYLPREDNISOLONE SODIUM SUCCINATE 40 MG: 40 INJECTION, POWDER, FOR SOLUTION INTRAMUSCULAR; INTRAVENOUS at 15:30

## 2020-08-07 RX ADMIN — INSULIN GLARGINE 8 UNITS: 100 INJECTION, SOLUTION SUBCUTANEOUS at 20:10

## 2020-08-07 RX ADMIN — RANOLAZINE 500 MG: 500 TABLET, FILM COATED, EXTENDED RELEASE ORAL at 10:18

## 2020-08-07 RX ADMIN — MONTELUKAST SODIUM 5 MG: 5 TABLET, CHEWABLE ORAL at 20:10

## 2020-08-07 RX ADMIN — FENOFIBRATE 54 MG: 54 TABLET ORAL at 12:23

## 2020-08-07 RX ADMIN — ASPIRIN 162 MG: 81 TABLET ORAL at 10:17

## 2020-08-07 RX ADMIN — METOPROLOL TARTRATE 50 MG: 50 TABLET, FILM COATED ORAL at 10:18

## 2020-08-07 RX ADMIN — POLYETHYLENE GLYCOL 3350 17 G: 17 POWDER, FOR SOLUTION ORAL at 15:30

## 2020-08-07 RX ADMIN — OXYCODONE HYDROCHLORIDE AND ACETAMINOPHEN 1 TABLET: 10; 325 TABLET ORAL at 04:20

## 2020-08-07 RX ADMIN — MAGNESIUM GLUCONATE 500 MG ORAL TABLET 400 MG: 500 TABLET ORAL at 10:18

## 2020-08-07 RX ADMIN — ATORVASTATIN CALCIUM 80 MG: 80 TABLET, FILM COATED ORAL at 21:03

## 2020-08-07 RX ADMIN — SODIUM CHLORIDE, PRESERVATIVE FREE 10 ML: 5 INJECTION INTRAVENOUS at 20:10

## 2020-08-07 ASSESSMENT — PAIN SCALES - GENERAL
PAINLEVEL_OUTOF10: 3
PAINLEVEL_OUTOF10: 10
PAINLEVEL_OUTOF10: 10
PAINLEVEL_OUTOF10: 9

## 2020-08-07 ASSESSMENT — PAIN DESCRIPTION - PROGRESSION: CLINICAL_PROGRESSION: GRADUALLY WORSENING

## 2020-08-07 ASSESSMENT — PAIN DESCRIPTION - ORIENTATION: ORIENTATION: LEFT

## 2020-08-07 ASSESSMENT — PAIN - FUNCTIONAL ASSESSMENT: PAIN_FUNCTIONAL_ASSESSMENT: ACTIVITIES ARE NOT PREVENTED

## 2020-08-07 ASSESSMENT — PAIN DESCRIPTION - FREQUENCY: FREQUENCY: CONTINUOUS

## 2020-08-07 ASSESSMENT — PAIN DESCRIPTION - DESCRIPTORS: DESCRIPTORS: SHARP

## 2020-08-07 ASSESSMENT — PAIN DESCRIPTION - LOCATION: LOCATION: HIP

## 2020-08-07 ASSESSMENT — PAIN DESCRIPTION - ONSET: ONSET: ON-GOING

## 2020-08-07 ASSESSMENT — PAIN DESCRIPTION - DIRECTION: RADIATING_TOWARDS: DOWN LEG

## 2020-08-07 ASSESSMENT — PAIN DESCRIPTION - PAIN TYPE: TYPE: CHRONIC PAIN

## 2020-08-07 NOTE — PROGRESS NOTES
4.0 08/06/2020     08/07/2020    CO2 22 08/07/2020    BUN 49 08/07/2020    CREATININE 1.8 08/07/2020    CALCIUM 8.7 08/07/2020      COAG PROFILE:  Lab Results   Component Value Date    APTT 72.6 09/27/2014    INR 1.02 09/27/2014       Assessment:     Active Problems:    Diabetes mellitus (HCC)    CAD (coronary artery disease)    Hypertension    COPD (chronic obstructive pulmonary disease) ([facility])    Seronegative rheumatoid arthritis ([facility]    Acute respiratory failure ([facility]  Resolved Problems:    * No resolved hospital problems.  *      Plan:   Patient has sob and not feeling well   no chest pain    cxr shows rll infiltrate    h/o cad    chronic back pain    cardiac wise is stable    try to avoid over diuresing    possible pneumonia   Continue conservative rx    will see periodically

## 2020-08-07 NOTE — PLAN OF CARE
Problem: Impaired respiratory status  Goal: Clear lung sounds  Outcome: Ongoing  Note: Patient is on her medication regiment. MDI ordered BID.

## 2020-08-07 NOTE — PROGRESS NOTES
Hospitalist Progress Note    Patient:  Sarah Waldrop      Unit/Bed:3B-31/031-A    YOB: 1940    MRN: [id number]       Acct: [de-identified]     PCP: Steven Yeboah DO    Date of Admission: 8/5/2020      Assessment/Plan:  Active Hospital Problems    Diagnosis Date Noted    Acute respiratory failure ([facility] [J96.00] 08/05/2020    Seronegative rheumatoid arthritis ([facility] [M06.00] 09/05/2019    CAD (coronary artery disease) [I25.10]     COPD (chronic obstructive pulmonary disease) ([facility] [J44.9]     Hypertension [I10]     Diabetes mellitus ([facility] [E11.9]          Acute Hypoxic Respiratory Failure/Dyspnea: multifactorial - fluid overload/flash pulm edema vs inflixamab reaction vs asthma exacerbation. Also on DDX is pneumonia with new infiltrate noted on 8/7 CXR. Low suspicion for PE with no LE edema, no chest pain. Alternative etiologies explain hypoxia. -s/p diuresis with EMMA, now holding lasix  -further eval/rx of infiltrate, asthma as noted below.    -there is infiltrate on CXR and CT chest   -however the procal is neg and she is afebrile, thus will hold off on antibiotics  -suspect Asthma exacerbation is playing role  -CT chest obtained. LLL infiltrate and diffuse interstitial edema. RLL atelectasis. -Consideration for RA associated DPLD? ? Consider further workup as outpatient with HRCT. -acapella and IS    LLL infiltrate: associated with congestion. As noted above, negative procal. No fever and low suspicion for infection, however patient is immunosupressed so will have low threshold to treat. 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 However previously had been higher several years back. Suspect prerenal related to hypotension and aggressive diuresis. Was also on aldactone during diuresis. -creat up from 0.8 to 1.8 on 8/7 with rise in BUN as well.   -will stop diuretic and hold aldactone.   Was on celebrex as OP, will hold this as well.   -antihypertensives adjusted by [address] [place] [address] Emergency Department on 8/5 after a rapid response was called in outpatient nursing. Patient was undergoing rheumatoid arthritis infusion. Patient states that this was the second infusion she was having of this medication. She states that infusion was fine, and went to the restroom. After leaving the restroom she states that her throat was starting to close and she felt like she could not breathe. The patient was brought to the emergency department and given IV steroids and breathing treatments. Patient continues to have inspiratory and expiratory wheezes. The patient states that she felt like she was \"literally dying\". Patient denies any pain. Patient states that she feels better than she did in the outpatient nursing. \"     Subjective (past 24 hours):  Patient reports after leaving from infusion, felt like her moth was very dry and could not open. Not sure if lip or tongue swelling, but admitted to soreness. This was her second injection (first time had sore throat only). BP on arrival was elevated and there was evidence of pulmonary edema on XR. There was no rash/pruritis. No wheezsing. Improved after diuresis however had some hypotension, so here home medications were adjusted with the assistance of Dr. Rosario Ross. Although her I/O doesn't reflect this, her UOP was increased subjectively. No history of tobacco abuse or lung disease. Today, overall feeling better than yesterday. Reports her breathing is much easier - but does report a lot of mucus/congestion - some whitish green and thick. No wheezing. No fevers or chills. No N/V/D but does report constipation, on percocet (is on laxative at home).         Medications:  Reviewed    Infusion Medications   Scheduled Medications    [Held by provider] bumetanide  1 mg Oral Daily    metoprolol tartrate  50 mg Oral BID    [Held by provider] spironolactone  25 mg Oral Daily    aspirin  162 mg Oral Daily    atorvastatin 40 mg Oral Nightly    budesonide-formoterol  2 puff Inhalation BID    traZODone  100 mg Oral Nightly    ranolazine  500 mg Oral BID    predniSONE  5 mg Oral Daily    montelukast  5 mg Oral Nightly    hydroxychloroquine  200 mg Oral Daily    magnesium oxide  400 mg Oral BID    pantoprazole  40 mg Oral QAM AC    DULoxetine  30 mg Oral BID    fenofibrate  54 mg Oral Daily    sodium chloride flush  10 mL Intravenous 2 times per day    enoxaparin  40 mg Subcutaneous Q24H    insulin glargine  8 Units Subcutaneous Nightly     PRN Meds: muscle rub, oxyCODONE-acetaminophen, sodium chloride flush, acetaminophen **OR** acetaminophen, polyethylene glycol, promethazine **OR** ondansetron, hydrALAZINE      Intake/Output Summary (Last 24 hours) at 8/7/2020 1052  Last data filed at 8/7/2020 0425  Gross per 24 hour   Intake 1060 ml   Output 350 ml   Net 710 ml     Weight change:       Exam:  /75   Pulse 59   Temp 97.8 °F (36.6 °C) (Oral)   Resp 20   Wt 162 lb 4 oz (73.6 kg)   SpO2 93%   BMI 26.20 kg/m²     General appearance: No apparent distress, well developed, appears stated age. Eyes:  Pupils equal, round, and reactive to light. Conjunctivae/corneas clear. HENT: Head normal in appearance. External nares normal.  Oral mucosa moist without lesions. Hearing grossly intact. Neck: Supple, with full range of motion. No jugular venous distention. Trachea midline. Respiratory:  Normal respiratory effort. Rhonchorous sounds throughout, and also wheezing noted on inspiration/expiration more so in upper fields. Cardiovascular: Normal rate, regular rhythm with normal S1/S2 without murmurs. No lower extremity edema. Abdomen: Soft, non-tender, non-distended with normal bowel sounds. Musculoskeletal: Normal range of motion in extremities. There is no joint swelling. Tenderness in multiple joints including feet and hands. Skin: Skin color, texture, turgor normal.  No rashes or lesions.   Neurologic: Neurovascularly intact without any focal sensory/motor deficits in the upper and lower extremities. Cranial nerves:  grossly non-focal.  Psychiatric: Alert and oriented, thought content appropriate, normal insight. Capillary Refill: Brisk,< 3 seconds. Peripheral Pulses: +2 palpable, equal bilaterally. Labs:   Recent Labs     08/05/20  1638 08/06/20  0358   WBC 10.9* 6.2   HGB 10.5* 10.4*   HCT 34.4* 34.1*    298     Recent Labs     08/05/20  1638 08/06/20  0358 08/07/20  0423    140 138   K 4.5 4.0  4.0 4.5    103 102   CO2 20* 25 22*   BUN 19 21 49*   CREATININE 0.8 0.8 1.8*   CALCIUM 9.5 8.9 8.7     No results for input(s): AST, ALT, BILIDIR, BILITOT, ALKPHOS in the last 72 hours. No results for input(s): INR in the last 72 hours. No results for input(s): Norvel Bunny in the last 72 hours. Microbiology:      Urinalysis:      Lab Results   Component Value Date    NITRU NEGATIVE 09/27/2012    WBCUA 0-2 09/27/2012    BACTERIA FEW 09/27/2012    RBCUA 10-15 09/27/2012    BLOODU MODERATE 09/27/2012    GLUCOSEU NEGATIVE 09/27/2012       Radiology:  XR CHEST (2 VW)   Final Result   1. Mild cardiomegaly. Tiny bilateral pleural effusions. 2. Minimal residual atelectasis/pneumonia/edema right lung base. 3. Overall appearance of chest markedly improved from prior. **This report has been created using voice recognition software. It may contain minor errors which are inherent in voice recognition technology. **      Final report electronically signed by Dr. Suzanne Cintron on 8/6/2020 8:02 AM      XR CHEST PORTABLE   Final Result   Moderate CHF. **This report has been created using voice recognition software. It may contain minor errors which are inherent in voice recognition technology. **      Final report electronically signed by Dr. Cash Lebron on 8/5/2020 5:06 PM      XR CHEST PORTABLE    (Results Pending)       DVT prophylaxis: [x] Lovenox \

## 2020-08-08 ENCOUNTER — APPOINTMENT (OUTPATIENT)
Dept: ULTRASOUND IMAGING | Age: 80
DRG: 291 | End: 2020-08-08
Payer: MEDICARE

## 2020-08-08 LAB
ANION GAP SERPL CALCULATED.3IONS-SCNC: 11 MEQ/L (ref 8–16)
BACTERIA: ABNORMAL
BILIRUBIN URINE: NEGATIVE
BLOOD, URINE: NEGATIVE
BUN BLDV-MCNC: 66 MG/DL (ref 7–22)
CALCIUM SERPL-MCNC: 8.2 MG/DL (ref 8.5–10.5)
CASTS: ABNORMAL /LPF
CASTS: ABNORMAL /LPF
CHARACTER, URINE: CLEAR
CHLORIDE BLD-SCNC: 101 MEQ/L (ref 98–111)
CO2: 23 MEQ/L (ref 23–33)
COLOR: YELLOW
CREAT SERPL-MCNC: 1.6 MG/DL (ref 0.4–1.2)
CREATININE URINE: 120.7 MG/DL
CRYSTALS: ABNORMAL
EKG ATRIAL RATE: 78 BPM
EKG P AXIS: 38 DEGREES
EKG P-R INTERVAL: 176 MS
EKG Q-T INTERVAL: 514 MS
EKG QRS DURATION: 142 MS
EKG QTC CALCULATION (BAZETT): 525 MS
EKG T AXIS: 9 DEGREES
EKG VENTRICULAR RATE: 63 BPM
EOSINOPHIL SMEAR: NORMAL
EPITHELIAL CELLS, UA: ABNORMAL /HPF
GFR SERPL CREATININE-BSD FRML MDRD: 31 ML/MIN/1.73M2
GLUCOSE BLD-MCNC: 150 MG/DL (ref 70–108)
GLUCOSE BLD-MCNC: 155 MG/DL (ref 70–108)
GLUCOSE BLD-MCNC: 199 MG/DL (ref 70–108)
GLUCOSE, URINE: NEGATIVE MG/DL
KETONES, URINE: NEGATIVE
LEGIONELLA PNEUMOPHILIA AG, URINE: NEGATIVE
LEUKOCYTE EST, POC: ABNORMAL
MAGNESIUM: 2.7 MG/DL (ref 1.6–2.4)
MISCELLANEOUS LAB TEST RESULT: ABNORMAL
NITRITE, URINE: NEGATIVE
PH UA: 5 (ref 5–9)
POTASSIUM SERPL-SCNC: 5.2 MEQ/L (ref 3.5–5.2)
PRO-BNP: 8878 PG/ML (ref 0–1800)
PROT/CREAT RATIO, UR: 0.76
PROTEIN UA: NEGATIVE MG/DL
PROTEIN, URINE: 92.3 MG/DL
RBC URINE: ABNORMAL /HPF
RENAL EPITHELIAL, UA: ABNORMAL
SARS-COV-2, NAAT: NOT DETECTED
SODIUM BLD-SCNC: 135 MEQ/L (ref 135–145)
SODIUM URINE: 51 MEQ/L
SPECIFIC GRAVITY UA: 1.01 (ref 1–1.03)
SPECIMEN: NORMAL
STREP PNEUMO AG, UR: NEGATIVE
TROPONIN T: < 0.01 NG/ML
TSH SERPL DL<=0.05 MIU/L-ACNC: 0.64 UIU/ML (ref 0.4–4.2)
UROBILINOGEN, URINE: 0.2 EU/DL (ref 0–1)
WBC UA: ABNORMAL /HPF
YEAST: ABNORMAL

## 2020-08-08 PROCEDURE — 83880 ASSAY OF NATRIURETIC PEPTIDE: CPT

## 2020-08-08 PROCEDURE — 6370000000 HC RX 637 (ALT 250 FOR IP): Performed by: PHYSICIAN ASSISTANT

## 2020-08-08 PROCEDURE — 6360000002 HC RX W HCPCS: Performed by: PHYSICIAN ASSISTANT

## 2020-08-08 PROCEDURE — 94761 N-INVAS EAR/PLS OXIMETRY MLT: CPT

## 2020-08-08 PROCEDURE — 94669 MECHANICAL CHEST WALL OSCILL: CPT

## 2020-08-08 PROCEDURE — 99233 SBSQ HOSP IP/OBS HIGH 50: CPT | Performed by: INTERNAL MEDICINE

## 2020-08-08 PROCEDURE — 93010 ELECTROCARDIOGRAM REPORT: CPT | Performed by: INTERNAL MEDICINE

## 2020-08-08 PROCEDURE — 2580000003 HC RX 258: Performed by: INTERNAL MEDICINE

## 2020-08-08 PROCEDURE — 94640 AIRWAY INHALATION TREATMENT: CPT

## 2020-08-08 PROCEDURE — 6360000002 HC RX W HCPCS: Performed by: INTERNAL MEDICINE

## 2020-08-08 PROCEDURE — 94660 CPAP INITIATION&MGMT: CPT

## 2020-08-08 PROCEDURE — 84300 ASSAY OF URINE SODIUM: CPT

## 2020-08-08 PROCEDURE — 82948 REAGENT STRIP/BLOOD GLUCOSE: CPT

## 2020-08-08 PROCEDURE — 6370000000 HC RX 637 (ALT 250 FOR IP): Performed by: INTERNAL MEDICINE

## 2020-08-08 PROCEDURE — 2140000000 HC CCU INTERMEDIATE R&B

## 2020-08-08 PROCEDURE — 89190 NASAL SMEAR FOR EOSINOPHILS: CPT

## 2020-08-08 PROCEDURE — 80048 BASIC METABOLIC PNL TOTAL CA: CPT

## 2020-08-08 PROCEDURE — 81001 URINALYSIS AUTO W/SCOPE: CPT

## 2020-08-08 PROCEDURE — 76770 US EXAM ABDO BACK WALL COMP: CPT

## 2020-08-08 PROCEDURE — 36415 COLL VENOUS BLD VENIPUNCTURE: CPT

## 2020-08-08 PROCEDURE — 83735 ASSAY OF MAGNESIUM: CPT

## 2020-08-08 PROCEDURE — 84484 ASSAY OF TROPONIN QUANT: CPT

## 2020-08-08 PROCEDURE — 6370000000 HC RX 637 (ALT 250 FOR IP): Performed by: FAMILY MEDICINE

## 2020-08-08 PROCEDURE — 93005 ELECTROCARDIOGRAM TRACING: CPT | Performed by: INTERNAL MEDICINE

## 2020-08-08 PROCEDURE — 84443 ASSAY THYROID STIM HORMONE: CPT

## 2020-08-08 PROCEDURE — U0002 COVID-19 LAB TEST NON-CDC: HCPCS

## 2020-08-08 PROCEDURE — 2700000000 HC OXYGEN THERAPY PER DAY

## 2020-08-08 PROCEDURE — 2580000003 HC RX 258: Performed by: PHYSICIAN ASSISTANT

## 2020-08-08 RX ORDER — FUROSEMIDE 10 MG/ML
40 INJECTION INTRAMUSCULAR; INTRAVENOUS ONCE
Status: COMPLETED | OUTPATIENT
Start: 2020-08-08 | End: 2020-08-08

## 2020-08-08 RX ORDER — DOXYCYCLINE HYCLATE 100 MG
100 TABLET ORAL EVERY 12 HOURS SCHEDULED
Status: DISCONTINUED | OUTPATIENT
Start: 2020-08-08 | End: 2020-08-10 | Stop reason: HOSPADM

## 2020-08-08 RX ADMIN — POLYETHYLENE GLYCOL 3350 17 G: 17 POWDER, FOR SOLUTION ORAL at 11:24

## 2020-08-08 RX ADMIN — SENNOSIDES 8.6 MG: 8.6 TABLET, FILM COATED ORAL at 11:23

## 2020-08-08 RX ADMIN — ALBUTEROL SULFATE 2.5 MG: 2.5 SOLUTION RESPIRATORY (INHALATION) at 20:12

## 2020-08-08 RX ADMIN — CEFTRIAXONE SODIUM 1 G: 1 INJECTION, POWDER, FOR SOLUTION INTRAMUSCULAR; INTRAVENOUS at 21:30

## 2020-08-08 RX ADMIN — NALOXEGOL OXALATE 12.5 MG: 12.5 TABLET, FILM COATED ORAL at 11:23

## 2020-08-08 RX ADMIN — OXYCODONE HYDROCHLORIDE AND ACETAMINOPHEN 1 TABLET: 10; 325 TABLET ORAL at 21:48

## 2020-08-08 RX ADMIN — METHYLPREDNISOLONE SODIUM SUCCINATE 40 MG: 40 INJECTION, POWDER, FOR SOLUTION INTRAMUSCULAR; INTRAVENOUS at 13:58

## 2020-08-08 RX ADMIN — ALBUTEROL SULFATE 2.5 MG: 2.5 SOLUTION RESPIRATORY (INHALATION) at 14:12

## 2020-08-08 RX ADMIN — PANTOPRAZOLE SODIUM 40 MG: 40 TABLET, DELAYED RELEASE ORAL at 06:15

## 2020-08-08 RX ADMIN — SODIUM CHLORIDE, PRESERVATIVE FREE 10 ML: 5 INJECTION INTRAVENOUS at 21:33

## 2020-08-08 RX ADMIN — MENTHOL, METHYL SALICYLATE: 10; 15 CREAM TOPICAL at 21:30

## 2020-08-08 RX ADMIN — INSULIN GLARGINE 8 UNITS: 100 INJECTION, SOLUTION SUBCUTANEOUS at 21:38

## 2020-08-08 RX ADMIN — DOXYCYCLINE HYCLATE 100 MG: 100 TABLET, COATED ORAL at 21:24

## 2020-08-08 RX ADMIN — RANOLAZINE 500 MG: 500 TABLET, FILM COATED, EXTENDED RELEASE ORAL at 11:23

## 2020-08-08 RX ADMIN — DOXYCYCLINE HYCLATE 100 MG: 100 TABLET, COATED ORAL at 11:24

## 2020-08-08 RX ADMIN — RANOLAZINE 500 MG: 500 TABLET, FILM COATED, EXTENDED RELEASE ORAL at 21:24

## 2020-08-08 RX ADMIN — BUDESONIDE 500 MCG: 0.5 INHALANT RESPIRATORY (INHALATION) at 07:43

## 2020-08-08 RX ADMIN — ISOSORBIDE MONONITRATE 60 MG: 60 TABLET ORAL at 11:24

## 2020-08-08 RX ADMIN — DULOXETINE HYDROCHLORIDE 30 MG: 30 CAPSULE, DELAYED RELEASE ORAL at 21:24

## 2020-08-08 RX ADMIN — ISOSORBIDE MONONITRATE 60 MG: 60 TABLET ORAL at 21:26

## 2020-08-08 RX ADMIN — METHYLPREDNISOLONE SODIUM SUCCINATE 40 MG: 40 INJECTION, POWDER, FOR SOLUTION INTRAMUSCULAR; INTRAVENOUS at 01:10

## 2020-08-08 RX ADMIN — MONTELUKAST SODIUM 5 MG: 5 TABLET, CHEWABLE ORAL at 21:24

## 2020-08-08 RX ADMIN — ASPIRIN 162 MG: 81 TABLET ORAL at 11:23

## 2020-08-08 RX ADMIN — OXYCODONE HYDROCHLORIDE AND ACETAMINOPHEN 1 TABLET: 10; 325 TABLET ORAL at 11:25

## 2020-08-08 RX ADMIN — FUROSEMIDE 40 MG: 10 INJECTION, SOLUTION INTRAMUSCULAR; INTRAVENOUS at 11:12

## 2020-08-08 RX ADMIN — SODIUM CHLORIDE, PRESERVATIVE FREE 10 ML: 5 INJECTION INTRAVENOUS at 11:25

## 2020-08-08 RX ADMIN — TRAZODONE HYDROCHLORIDE 100 MG: 100 TABLET ORAL at 21:30

## 2020-08-08 RX ADMIN — ENOXAPARIN SODIUM 40 MG: 40 INJECTION SUBCUTANEOUS at 21:45

## 2020-08-08 RX ADMIN — ATORVASTATIN CALCIUM 80 MG: 80 TABLET, FILM COATED ORAL at 21:24

## 2020-08-08 RX ADMIN — ALBUTEROL SULFATE 2.5 MG: 2.5 SOLUTION RESPIRATORY (INHALATION) at 10:39

## 2020-08-08 RX ADMIN — FENOFIBRATE 54 MG: 54 TABLET ORAL at 11:24

## 2020-08-08 RX ADMIN — MAGNESIUM GLUCONATE 500 MG ORAL TABLET 400 MG: 500 TABLET ORAL at 11:23

## 2020-08-08 RX ADMIN — BUDESONIDE 500 MCG: 0.5 INHALANT RESPIRATORY (INHALATION) at 20:12

## 2020-08-08 RX ADMIN — MAGNESIUM GLUCONATE 500 MG ORAL TABLET 400 MG: 500 TABLET ORAL at 21:24

## 2020-08-08 RX ADMIN — HYDROXYCHLOROQUINE SULFATE 200 MG: 200 TABLET ORAL at 11:23

## 2020-08-08 RX ADMIN — DULOXETINE HYDROCHLORIDE 30 MG: 30 CAPSULE, DELAYED RELEASE ORAL at 11:24

## 2020-08-08 RX ADMIN — SENNOSIDES 8.6 MG: 8.6 TABLET, FILM COATED ORAL at 21:24

## 2020-08-08 ASSESSMENT — PAIN SCALES - GENERAL
PAINLEVEL_OUTOF10: 8
PAINLEVEL_OUTOF10: 9
PAINLEVEL_OUTOF10: 9

## 2020-08-08 ASSESSMENT — PAIN DESCRIPTION - PROGRESSION: CLINICAL_PROGRESSION: NOT CHANGED

## 2020-08-08 ASSESSMENT — PAIN - FUNCTIONAL ASSESSMENT: PAIN_FUNCTIONAL_ASSESSMENT: ACTIVITIES ARE NOT PREVENTED

## 2020-08-08 ASSESSMENT — PAIN DESCRIPTION - DESCRIPTORS: DESCRIPTORS: SHARP

## 2020-08-08 ASSESSMENT — PAIN DESCRIPTION - ORIENTATION: ORIENTATION: LEFT

## 2020-08-08 ASSESSMENT — PAIN DESCRIPTION - FREQUENCY: FREQUENCY: CONTINUOUS

## 2020-08-08 ASSESSMENT — PAIN DESCRIPTION - DIRECTION: RADIATING_TOWARDS: DOWN LEG

## 2020-08-08 ASSESSMENT — PAIN DESCRIPTION - ONSET: ONSET: ON-GOING

## 2020-08-08 ASSESSMENT — PAIN DESCRIPTION - LOCATION: LOCATION: HIP

## 2020-08-08 ASSESSMENT — PAIN DESCRIPTION - PAIN TYPE: TYPE: CHRONIC PAIN

## 2020-08-08 NOTE — PROGRESS NOTES
Hospitalist Progress Note    Patient:  Bernardo Banuelos      Unit/Bed:3B-31/031-A    YOB: 1940    MRN: [id number]       Acct: [de-identified]     PCP: Gamaliel Yeboah DO    Date of Admission: 8/5/2020      Assessment/Plan:  Active Hospital Problems    Diagnosis Date Noted    [facility] [J96.00] 08/05/2020    Seronegative rheumatoid arthritis ([facility] [M06.00] 09/05/2019    CAD (coronary artery disease) [I25.10]     COPD (chronic obstructive pulmonary disease) ([facility] [J44.9]     Hypertension [I10]     Diabetes mellitus ([facility] [E11.9]          Acute Hypoxic Respiratory Failure/Dyspnea: multifactorial - fluid overload/flash pulm edema vs inflixamab reaction vs asthma exacerbation. Also consideration for pneumonia with new infiltrate noted on 8/7 CXR. Low suspicion for PE with no LE edema, no chest pain-alternative etiologies explain hypoxia. -see below for diuresis and management of asthma / antibiotics  -CT chest obtained. LLL infiltrate and diffuse interstitial edema. RLL atelectasis. -Consideration for RA associated DPLD? ? Consider further workup as outpatient with HRCT. -check COVID  -acapella and IS    Chest pressure: intermittent at rest, noted negative troponin on arrival, and EKG with RBBB and non-specific EKG changes. -repeat EKG and troponin, reach out to Dr. Gunjan Hernandez  -continue GDMT as noted below (see CAD)  -nitro prn    CAP: her procal is low, however is immunosupressed on infliximab and had mild leukocytosis on arrival. Will treat as CAP, with Rocephin and Doxy (has prolonged QT so avoid Azithro). Will check legionella and strep. Check covid since not done on arrival.      Acute exacerbation of Asthma:  -start on solumedrol 40mg BID  -change symbicort to pulmicort and albuterol prn/WA  -antibiotics as above    Acute decompensated Diastolic HF with acute exacerbation: elevated BNP and fluid overload on CXR, no significant peripheral edema.   Favor more flash pulm edema after transfusion given overall picutre. -Echo completed which does show grade 2 diastolic dysfunction however a normal EF. Elevated RVSP was noted. 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 However previously had been higher several years back. Suspect prerenal related to hypotension and aggressive diuresis. Was also on aldactone during diuresis. Was on celebrex as OP, will hold this as well.   -creat up from 0.8 to 1.8 on 8/7 with rise in BUN as well. FeNa and FeUrea indicative of prerenal from hypotension after diuresis. -improved to 1.4 then worsened to 1.6 this am.  I feel that she is still fluid overloaded, and pressure can handle diuresis now. Will give 1x dose lasix as noted above. -Will consult Nephrology (follows with Dr. Guerrero Backer group) for ongoing recommendations and workup.  -noted sterile pyuria on UA, otherwise bland UA - negative eosinophils  -check renal US. Protein creat ratio is 0.76.    -hold aldactone.    -antihypertensives adjusted by Cardiology. Will continue to hold.   -strict I/O, monitor UOP    Sinus Bradycardia: noted overnight, check TSH - normal. Reduce lopressor dose and add hold parameters. QT prolongation: noted 496qtc on arrival and 563 qtc on second ekg. Will avoid QT prolonging medications as able, likely the RBBB is causing prolongation at baseline. Telemetry, monitor Mg, K. ? ?Allergic Reaction to Infliximab: Her allergy list has been updated. Hx of CAD s/p stenting: trop negative. Continue home meds - ASA, lopressor, ranexa, imdur. Constipation: acute on chronic, on opioids at baseline. Add on movantic daily. Continue current bowel regimen.      HLD: continue statin, will increase to higher intensity dose given her elevated cholesterol. Cont fibrate. Tim Meyers HTN: with hypotension after diuresis. Adjusted regimen -> holding aldactone, norvasc, imdur, hydralazine  -now elevated, start back on imdur. Hold aldactone with EMMA. -controlled overnight. MAGDI on CPAP: utilizing home unit. Tim Meyers RA: on plaquenil, prednisone 5mg, previously infilximab. Prn voltaren and percocet. Bowel regimen. Expected discharge date:  1 day - pending renal function improvement. Disposition: home         [] Home       [] TCU       [] Rehab       [] Psych       [] SNF       [] [place]       [] Other-    --------------------------------------------    Chief Complaint: SOB    Hospital Course: Per HPI, \"This is a 68-year-old female who presented to [facility] Emergency Department on 8/5 after a rapid response was called in outpatient nursing. Patient was undergoing rheumatoid arthritis infusion. Patient states that this was the second infusion she was having of this medication. She states that infusion was fine, and went to the restroom. After leaving the restroom she states that her throat was starting to close and she felt like she could not breathe. The patient was brought to the emergency department and given IV steroids and breathing treatments. Patient continues to have inspiratory and expiratory wheezes. The patient states that she felt like she was \"literally dying\". Patient denies any pain. Patient states that she feels better than she did in the outpatient nursing. \"     No history of tobacco abuse or lung disease. Patient reports after leaving from infusion, felt like her moth was very dry and could not open. Not sure if lip or tongue swelling, but admitted to soreness. This was her second injection (first time had sore throat only). BP on arrival was elevated and there was evidence of pulmonary edema on XR. There was no rash/pruritis. No wheezsing. Improved after diuresis however had some hypotension, so here home medications were adjusted with the assistance of Dr. Loulou Simpson. Although her I/O doesn't reflect this, her UOP was increased subjectively. Devloped EMMA on CKD and consulting her Nephrology group. Subjective (past 24 hours): Today patient remains on 2 L, improved from yesterday 4 L. She admits to continued cough with productive sputum, greenish and whitish at times. She denies any fevers or chills. She does also admit to occasional chest heaviness which comes on at rest however is not as frequent as prior to arrival after her infusion. No nausea vomiting or diaphoresis, does admit to constipation but no diarrhea.       Medications:  Reviewed    Infusion Medications   Scheduled Medications    metoprolol tartrate  25 mg Oral BID    furosemide  40 mg Intravenous Once    senna  1 tablet Oral BID    polyethylene glycol  17 g Oral Daily    methylPREDNISolone  40 mg Intravenous Q12H    budesonide  0.5 mg Nebulization BID    atorvastatin  80 mg Oral Nightly    isosorbide mononitrate  60 mg Oral BID    cefTRIAXone (ROCEPHIN) IV  1 g Intravenous Q24H    azithromycin  250 mg Oral Daily    [Held by provider] bumetanide  1 mg Oral Daily    [Held by provider] spironolactone  25 mg Oral Daily    aspirin  162 mg Oral Daily    traZODone  100 mg Oral Nightly    ranolazine  500 mg Oral BID    [Held by provider] predniSONE  5 mg Oral Daily    montelukast  5 mg Oral Nightly    hydroxychloroquine  200 mg Oral Daily    magnesium oxide  400 mg Oral BID    pantoprazole  40 mg Oral QAM AC    DULoxetine  30 mg Oral BID    fenofibrate  54 mg Oral Daily    sodium chloride flush  10 mL Intravenous 2 times per day    enoxaparin  40 mg Subcutaneous Q24H    insulin glargine  8 Units Subcutaneous Nightly     PRN Meds: albuterol, muscle rub, oxyCODONE-acetaminophen, sodium chloride flush, acetaminophen **OR** acetaminophen, promethazine **OR** BILITOT, ALKPHOS in the last 72 hours. No results for input(s): INR in the last 72 hours. No results for input(s): Lindia Dexter in the last 72 hours. Microbiology:      Urinalysis:      Lab Results   Component Value Date    NITRU NEGATIVE 08/07/2020    WBCUA 25-50 08/07/2020    BACTERIA NONE SEEN 08/07/2020    RBCUA 0-2 08/07/2020    BLOODU NEGATIVE 08/07/2020    SPECGRAV 1.023 08/07/2020    GLUCOSEU NEGATIVE 09/27/2012       Radiology:  CT CHEST WO CONTRAST   Final Result   1. Left lower lobe infiltrates suggesting pneumonic infiltrates. 2. Diffuse interstitial edema with a left pleural effusion. 3. Right lower lobe atelectasis. **This report has been created using voice recognition software. It may contain minor errors which are inherent in voice recognition technology. **      Final report electronically signed by Dr. Brandon Gray on 8/7/2020 3:29 PM      XR CHEST PORTABLE   Final Result   Development of left lower lobe airspace infiltrates in addition to persistent mild pulmonary vascular congestion. Pneumonic infiltrates are consideration. **This report has been created using voice recognition software. It may contain minor errors which are inherent in voice recognition technology. **      Final report electronically signed by Dr. Brandon Gray on 8/7/2020 10:57 AM      XR CHEST (2 VW)   Final Result   1. Mild cardiomegaly. Tiny bilateral pleural effusions. 2. Minimal residual atelectasis/pneumonia/edema right lung base. 3. Overall appearance of chest markedly improved from prior. **This report has been created using voice recognition software. It may contain minor errors which are inherent in voice recognition technology. **      Final report electronically signed by Dr. Jill Cerrato on 8/6/2020 8:02 AM      XR CHEST PORTABLE   Final Result   Moderate CHF. **This report has been created using voice recognition software.  It may contain minor errors which are inherent in voice recognition technology. **      Final report electronically signed by Dr. Logan Fabian on 8/5/2020 5:06 PM      US RENAL COMPLETE    (Results Pending)       DVT prophylaxis: [x] Lovenox \                                 [] SCDs                                 [] SQ Heparin                                 [] Encourage ambulation           [] Already on Anticoagulation     Code Status: Full Code    PT/OT Eval Status: tbd    Diet:   DIET LOW SODIUM 2 GM;     Fluids: no    Tele:   [x] yes             [] no      Electronically signed by Abril Martinez DO on 8/8/2020 at 9:20 AM

## 2020-08-08 NOTE — PLAN OF CARE
Problem: Falls - Risk of:  Goal: Will remain free from falls  Description: Will remain free from falls  Outcome: Ongoing  Note: Pt has remained free from falls this shift. Fall precautions in place are falling star magnet, non slip socks, bed alarm on and call light/bedside table within reach. Pt visualized with hourly roundings. Problem: Falls - Risk of:  Goal: Absence of physical injury  Description: Absence of physical injury  Outcome: Ongoing  Note: Pt has remained free from physical injury     Problem: Pain:  Goal: Pain level will decrease  Description: Pain level will decrease  Outcome: Ongoing  Note: Pt stated a pain level of 9/10. Pain goal of 4/10. Prn medications given as ordered. Non pharm interventions are rest and reposition. Will continue to monitor     Problem: Pain:  Goal: Control of acute pain  Description: Control of acute pain  Outcome: Ongoing  Note: Pt stated a pain level of 9/10. Pain goal of 4/10. Prn medications given as ordered. Non pharm interventions are rest and reposition. Will continue to monitor     Problem: Pain:  Goal: Control of chronic pain  Description: Control of chronic pain  Outcome: Ongoing  Note: Pt stated a pain level of 9/10. Pain goal of 4/10. Prn medications given as ordered. Non pharm interventions are rest and reposition.  Will continue to monitor     Problem: Respiratory:  Goal: Ability to maintain a clear airway will improve  Description: Ability to maintain a clear airway will improve  Outcome: Ongoing  Note: Able to maintain a clear airway     Problem: Respiratory:  Goal: Ability to maintain adequate ventilation will improve  Description: Ability to maintain adequate ventilation will improve  Outcome: Ongoing  Note: Able to maintain adequate ventilation on 2L o2 getting cpap when respiratory brings up     Problem: Respiratory:  Goal: Complications related to the disease process, condition or treatment will be avoided or minimized  Description: Complications related to the disease process, condition or treatment will be avoided or minimized  Outcome: Ongoing  Note: No complications at this time will continue to monitor     Problem: Skin Integrity:  Goal: Risk for impaired skin integrity will decrease  Description: Risk for impaired skin integrity will decrease  Outcome: Ongoing  Note: No new skin issues noted this shift     Problem: Impaired respiratory status  Goal: Clear lung sounds  Outcome: Ongoing  Note: Pts lung sounds are ronchi through out and some wheezing     Problem: DISCHARGE BARRIERS  Goal: Patient's continuum of care needs are met  Outcome: Ongoing  Note: Continuum care needs are being met     Care plan reviewed with patient. Patient verbalizes understanding of the plan of care and contribute to goal setting.

## 2020-08-08 NOTE — FLOWSHEET NOTE
[place] [address] PROGRESS NOTE      Patient: Nando Pierre  Room #: 3D-17/909-G            YOB: 1940  Age: 78 y.o. Gender: female            Admit Date & Time: 8/5/2020  4:16 PM    Assessment:  Pt is a 78year old female. Patient shared with me she is dealing with shortness of breath and COPD. Patient told me she has some help at home. Patient told me she is a bit concern about going home after her hospital stay being by herself. Interventions:  I provided active listening and emotional support. I also gave consolation and nurtured hope. Prayed for patient and her family. Outcomes:  Patient showed gratitude and appreciation for the support she received. Engaged in Meena Fernandez. Plan:  (Is there more work to be done regarding the spiritual needs identified? If so, provide details about your suggested plan for follow-up. DELETE THIS parathetical guide prior to filing!)  1. SC will follow up for continue support and nurtured hope.      Electronically signed by Iveth Smiley on 8/8/2020 at [address] [place] [address]  [phone]

## 2020-08-08 NOTE — PLAN OF CARE
Problem: Impaired respiratory status  Goal: Clear lung sounds  8/8/2020 0031 by Sheryle Knudsen, RCP  Outcome: Ongoing  Note: Current treatment regimen appropriate. Patient wearing CPAP of 5 HS, tolerating well.

## 2020-08-08 NOTE — PLAN OF CARE
Problem: Impaired respiratory status  Goal: Clear lung sounds  8/8/2020 0756 by Wilmer Boswell RCP  Outcome: Not Met This Shift   Breath sounds rhonchi, continuing treatments as ordered and encouraged patient to use acapella throughout the day.

## 2020-08-08 NOTE — CONSULTS
[address] [place] [address]                                  CONSULTATION    PATIENT NAME: Kun Richard                    :        1940  MED REC NO:   [id number]                           ROOM:       0031  ACCOUNT NO:   [de-identified]                           ADMIT DATE: 2020  PROVIDER:     SHELBY Pitts Plater:  2020    NEPHROLOGY CONSULT NOTE    REASON FOR CONSULTATION:  Acute kidney injury and CKD stage IIIA. HISTORY OF PRESENT ILLNESS:  The patient is an 76-year-old female with  past medical history of CKD stage IIIA with baseline creatinine of 1 to  1.2, following with Dr. Elfego Donahue as an outpatient; history of rheumatoid  arthritis; and coronary artery disease. She was brought to the  emergency room on 2020 after outpatient transfusion of IV  medication for her rheumatoid arthritis. Apparently, she had infliximab  infusion . Apparently, during this infusion,  she was fine, but after it was done, she went to the restroom and then,  she felt her throat was starting to close and she could not breathe and  she was brought to the emergency department. She was given IV steroids  and then eventually, she was admitted for further evaluation. Nephrology has been consulted for acute kidney injury on top of CKD  stage III. Baseline creatinine as mentioned was around 1 to 1.3 on  ; it has been up to 1.6 today morning and also, review of her chest  x-ray is showing pulmonary congestion with interstitial edema. Her  urine output has been reported barely 400 mL since the last 24 hours. She was given IV Lasix 40 mg once today. Also, it has been noted  fluctuation of her blood pressure while she was in the emergency room  and then when she was admitted to the floor fluctuating between 190 to  90 over 110 to 50s. REVIEW OF SYSTEMS:  12-point organ system has been reviewed.   Positive  has been mentioned in the HPI. The rest is negative. SOCIAL HISTORY:  History of smoking. No alcohol or drug abuse. ALLERGIES:  Please refer to medication reconciliation form for the list  of the multiple medications that the patient has allergies to. PHYSICAL EXAMINATION:  VITAL SIGNS:  Blood pressure is 142/62, respiratory rate of 18, pulse of  72, and oxygen saturation is 97% on room air. GENERAL:  Alert and oriented x4, cooperative, answering my questions, in  no acute distress. HEENT:  Pupil equal, round, reactive to light and accommodation. Extraocular muscles intact. Pink conjunctivae. NECK:  No jugular venous distention at 45 degrees. CHEST:  Bilateral crackles at baseline and decreased breath sounds. CARDIAC:  No murmur. S1 and S2. No rubs or gallops. ABDOMEN:  Soft, nontender, nondistended. Positive bowel sounds. No  hepatosplenomegaly. No CVA tenderness. EXTREMITIES:  1+ pitting edema, lower extremities. No cyanosis. No  clubbing. LABORATORY DATA:  Showed hemoglobin of 10.4, hematocrit of 34.1, and  platelets 215. Sodium of 135, potassium of 5.2, chloride of 101,  bicarbonate of 23, BUN of 66, creatinine of 1.6, and calcium of 8.2. DIAGNOSTIC DATA:  Renal ultrasound of the kidney was reviewed, no  evidence of obstructive uropathy. No hydronephrosis. Urine, negative  for infection and positive leukocycte esterase. CT of the chest without  contrast:  Left lower lobe infiltrates suggesting pneumonic infiltrates  and diffuse interstitial edema with left pleural effusion, right lower  lobe atelectasis. FAMILY HISTORY:  No known family history of chronic kidney disease. PAST SURGICAL HISTORY:  History of appendectomy, back surgery, bladder  suspension, cardiac catheterization, colonoscopy, endoscopy,  hysterectomy, and joint replacement of left knee. HOME MEDICATIONS:  Please refer to medication reconciliation for further  information.     ASSESSMENT AND PLAN:  The patient is an 78year-old admitted with acute  hypoxic respiratory failure, allergic reaction to IV infliximab, acute  kidney injury on top of CKD stage III, acute congestive heart failure. Nephrology has been consulted for further evaluation of current renal  function, oliguric acute kidney injury on CKD stage III, most likely  ischemic tubular necrosis secondary to fluctuation of the patient's  blood pressure and less likely IV infusion of infliximab has been  causing the problem for her kidneys. She has been given few doses of IV  loop diuretics with poor response. PLAN:  Please keep PaO2 above 90s for better oxygenation and perfusion  to the kidneys. Avoid nephrotoxic medications. Keep potassium above 4  and magnesium above 2. Avoid any NSAIDs. I appreciate hospitalist  management of holding Celebrex and spironolactone which could contribute  to current renal function as well. I would consider to repeat UA with  reflex urine culture, obtaining urine electrolytes, urine eosinophils. Continue strict I's and O's and daily weights. VBG and chest x-ray in  the morning. There is no indication for urgent renal replacement  therapy at this moment. If the patient continues to be oliguric and  worsening of interstitial edema, would consider renal replacement  therapy mainly for fluid removal.    I would like to thank you to let me to participate in this patient's  care. Please do not hesitate to call me for any questions or concerns.         Scott Matson M.D.    D: 08/08/2020 14:09:49       T: 08/08/2020 15:12:31     [email or web address]  Job#: [id number]     Doc#: [id number]    CC:

## 2020-08-09 ENCOUNTER — APPOINTMENT (OUTPATIENT)
Dept: GENERAL RADIOLOGY | Age: 80
DRG: 291 | End: 2020-08-09
Payer: MEDICARE

## 2020-08-09 LAB
ANION GAP SERPL CALCULATED.3IONS-SCNC: 9 MEQ/L (ref 8–16)
BASE EXCESS MIXED: 3.3 MMOL/L (ref -2–3)
BUN BLDV-MCNC: 76 MG/DL (ref 7–22)
CALCIUM SERPL-MCNC: 7.7 MG/DL (ref 8.5–10.5)
CHLORIDE BLD-SCNC: 99 MEQ/L (ref 98–111)
CO2: 27 MEQ/L (ref 23–33)
COLLECTED BY:: ABNORMAL
CREAT SERPL-MCNC: 1.8 MG/DL (ref 0.4–1.2)
DEVICE: ABNORMAL
ERYTHROCYTE [DISTWIDTH] IN BLOOD BY AUTOMATED COUNT: 15 % (ref 11.5–14.5)
ERYTHROCYTE [DISTWIDTH] IN BLOOD BY AUTOMATED COUNT: 47.8 FL (ref 35–45)
FERRITIN: 46 NG/ML (ref 10–291)
FOLATE: > 20 NG/ML (ref 4.8–24.2)
GFR SERPL CREATININE-BSD FRML MDRD: 27 ML/MIN/1.73M2
GLUCOSE BLD-MCNC: 146 MG/DL (ref 70–108)
GLUCOSE BLD-MCNC: 165 MG/DL (ref 70–108)
GLUCOSE BLD-MCNC: 220 MG/DL (ref 70–108)
GLUCOSE BLD-MCNC: 224 MG/DL (ref 70–108)
HCO3, MIXED: 29 MMOL/L (ref 23–28)
HCT VFR BLD CALC: 30 % (ref 37–47)
HEMOGLOBIN: 9 GM/DL (ref 12–16)
IRON SATURATION: 7 % (ref 20–50)
IRON: 19 UG/DL (ref 50–170)
LD: 182 U/L (ref 100–190)
MAGNESIUM: 2.5 MG/DL (ref 1.6–2.4)
MCH RBC QN AUTO: 26.5 PG (ref 26–33)
MCHC RBC AUTO-ENTMCNC: 30 GM/DL (ref 32.2–35.5)
MCV RBC AUTO: 88.5 FL (ref 81–99)
O2 SAT, MIXED: 87 %
PCO2, MIXED VENOUS: 47 MMHG (ref 41–51)
PH, MIXED: 7.4 (ref 7.31–7.41)
PLATELET # BLD: 278 THOU/MM3 (ref 130–400)
PMV BLD AUTO: 10.8 FL (ref 9.4–12.4)
PO2 MIXED: 53 MMHG (ref 25–40)
POTASSIUM SERPL-SCNC: 4.9 MEQ/L (ref 3.5–5.2)
RBC # BLD: 3.39 MILL/MM3 (ref 4.2–5.4)
SODIUM BLD-SCNC: 135 MEQ/L (ref 135–145)
TOTAL IRON BINDING CAPACITY: 254 UG/DL (ref 171–450)
TOTAL PROTEIN: 6 G/DL (ref 6.1–8)
VITAMIN B-12: 636 PG/ML (ref 211–911)
WBC # BLD: 9.1 THOU/MM3 (ref 4.8–10.8)

## 2020-08-09 PROCEDURE — 2140000000 HC CCU INTERMEDIATE R&B

## 2020-08-09 PROCEDURE — 71045 X-RAY EXAM CHEST 1 VIEW: CPT

## 2020-08-09 PROCEDURE — 6370000000 HC RX 637 (ALT 250 FOR IP): Performed by: INTERNAL MEDICINE

## 2020-08-09 PROCEDURE — 82948 REAGENT STRIP/BLOOD GLUCOSE: CPT

## 2020-08-09 PROCEDURE — 82607 VITAMIN B-12: CPT

## 2020-08-09 PROCEDURE — 85027 COMPLETE CBC AUTOMATED: CPT

## 2020-08-09 PROCEDURE — 83735 ASSAY OF MAGNESIUM: CPT

## 2020-08-09 PROCEDURE — 36415 COLL VENOUS BLD VENIPUNCTURE: CPT

## 2020-08-09 PROCEDURE — 94660 CPAP INITIATION&MGMT: CPT

## 2020-08-09 PROCEDURE — 6360000002 HC RX W HCPCS: Performed by: INTERNAL MEDICINE

## 2020-08-09 PROCEDURE — 2580000003 HC RX 258: Performed by: INTERNAL MEDICINE

## 2020-08-09 PROCEDURE — 94761 N-INVAS EAR/PLS OXIMETRY MLT: CPT

## 2020-08-09 PROCEDURE — 94669 MECHANICAL CHEST WALL OSCILL: CPT

## 2020-08-09 PROCEDURE — 83615 LACTATE (LD) (LDH) ENZYME: CPT

## 2020-08-09 PROCEDURE — 84155 ASSAY OF PROTEIN SERUM: CPT

## 2020-08-09 PROCEDURE — 6370000000 HC RX 637 (ALT 250 FOR IP): Performed by: PHYSICIAN ASSISTANT

## 2020-08-09 PROCEDURE — 80048 BASIC METABOLIC PNL TOTAL CA: CPT

## 2020-08-09 PROCEDURE — 2580000003 HC RX 258: Performed by: PHYSICIAN ASSISTANT

## 2020-08-09 PROCEDURE — 82746 ASSAY OF FOLIC ACID SERUM: CPT

## 2020-08-09 PROCEDURE — 82728 ASSAY OF FERRITIN: CPT

## 2020-08-09 PROCEDURE — 6370000000 HC RX 637 (ALT 250 FOR IP): Performed by: FAMILY MEDICINE

## 2020-08-09 PROCEDURE — 83550 IRON BINDING TEST: CPT

## 2020-08-09 PROCEDURE — 83540 ASSAY OF IRON: CPT

## 2020-08-09 PROCEDURE — 94640 AIRWAY INHALATION TREATMENT: CPT

## 2020-08-09 PROCEDURE — 99233 SBSQ HOSP IP/OBS HIGH 50: CPT | Performed by: INTERNAL MEDICINE

## 2020-08-09 RX ORDER — AMLODIPINE BESYLATE 5 MG/1
5 TABLET ORAL DAILY
Status: DISCONTINUED | OUTPATIENT
Start: 2020-08-09 | End: 2020-08-10 | Stop reason: HOSPADM

## 2020-08-09 RX ORDER — PREDNISONE 20 MG/1
40 TABLET ORAL DAILY
Status: DISCONTINUED | OUTPATIENT
Start: 2020-08-10 | End: 2020-08-10 | Stop reason: HOSPADM

## 2020-08-09 RX ORDER — SODIUM CHLORIDE 0.9 % (FLUSH) 0.9 %
10 SYRINGE (ML) INJECTION EVERY 12 HOURS SCHEDULED
Status: DISCONTINUED | OUTPATIENT
Start: 2020-08-09 | End: 2020-08-09 | Stop reason: SDUPTHER

## 2020-08-09 RX ORDER — DEXTROSE MONOHYDRATE 50 MG/ML
100 INJECTION, SOLUTION INTRAVENOUS PRN
Status: DISCONTINUED | OUTPATIENT
Start: 2020-08-09 | End: 2020-08-10 | Stop reason: HOSPADM

## 2020-08-09 RX ORDER — SODIUM CHLORIDE 0.9 % (FLUSH) 0.9 %
10 SYRINGE (ML) INJECTION PRN
Status: DISCONTINUED | OUTPATIENT
Start: 2020-08-09 | End: 2020-08-09 | Stop reason: SDUPTHER

## 2020-08-09 RX ORDER — NICOTINE POLACRILEX 4 MG
15 LOZENGE BUCCAL PRN
Status: DISCONTINUED | OUTPATIENT
Start: 2020-08-09 | End: 2020-08-10 | Stop reason: HOSPADM

## 2020-08-09 RX ORDER — DOCUSATE SODIUM 100 MG/1
100 CAPSULE, LIQUID FILLED ORAL 2 TIMES DAILY
Status: DISCONTINUED | OUTPATIENT
Start: 2020-08-09 | End: 2020-08-10 | Stop reason: HOSPADM

## 2020-08-09 RX ORDER — BISACODYL 10 MG
10 SUPPOSITORY, RECTAL RECTAL ONCE
Status: COMPLETED | OUTPATIENT
Start: 2020-08-09 | End: 2020-08-09

## 2020-08-09 RX ORDER — DEXTROSE MONOHYDRATE 25 G/50ML
12.5 INJECTION, SOLUTION INTRAVENOUS PRN
Status: DISCONTINUED | OUTPATIENT
Start: 2020-08-09 | End: 2020-08-10 | Stop reason: HOSPADM

## 2020-08-09 RX ADMIN — SODIUM CHLORIDE, PRESERVATIVE FREE 10 ML: 5 INJECTION INTRAVENOUS at 21:47

## 2020-08-09 RX ADMIN — OXYCODONE HYDROCHLORIDE AND ACETAMINOPHEN 1 TABLET: 10; 325 TABLET ORAL at 08:39

## 2020-08-09 RX ADMIN — ASPIRIN 162 MG: 81 TABLET ORAL at 08:39

## 2020-08-09 RX ADMIN — DOXYCYCLINE HYCLATE 100 MG: 100 TABLET, COATED ORAL at 21:44

## 2020-08-09 RX ADMIN — DOXYCYCLINE HYCLATE 100 MG: 100 TABLET, COATED ORAL at 08:39

## 2020-08-09 RX ADMIN — DULOXETINE HYDROCHLORIDE 30 MG: 30 CAPSULE, DELAYED RELEASE ORAL at 21:44

## 2020-08-09 RX ADMIN — DOCUSATE SODIUM 100 MG: 100 CAPSULE, LIQUID FILLED ORAL at 21:43

## 2020-08-09 RX ADMIN — INSULIN LISPRO 2 UNITS: 100 INJECTION, SOLUTION INTRAVENOUS; SUBCUTANEOUS at 17:18

## 2020-08-09 RX ADMIN — METOPROLOL TARTRATE 25 MG: 25 TABLET ORAL at 21:44

## 2020-08-09 RX ADMIN — TRAZODONE HYDROCHLORIDE 100 MG: 100 TABLET ORAL at 21:43

## 2020-08-09 RX ADMIN — ISOSORBIDE MONONITRATE 60 MG: 60 TABLET ORAL at 08:39

## 2020-08-09 RX ADMIN — DULOXETINE HYDROCHLORIDE 30 MG: 30 CAPSULE, DELAYED RELEASE ORAL at 08:39

## 2020-08-09 RX ADMIN — METOPROLOL TARTRATE 25 MG: 25 TABLET ORAL at 08:39

## 2020-08-09 RX ADMIN — ALBUTEROL SULFATE 2.5 MG: 2.5 SOLUTION RESPIRATORY (INHALATION) at 19:35

## 2020-08-09 RX ADMIN — ATORVASTATIN CALCIUM 80 MG: 80 TABLET, FILM COATED ORAL at 21:43

## 2020-08-09 RX ADMIN — MAGNESIUM GLUCONATE 500 MG ORAL TABLET 400 MG: 500 TABLET ORAL at 21:43

## 2020-08-09 RX ADMIN — SODIUM CHLORIDE 300 MG: 9 INJECTION, SOLUTION INTRAVENOUS at 12:01

## 2020-08-09 RX ADMIN — MAGNESIUM GLUCONATE 500 MG ORAL TABLET 400 MG: 500 TABLET ORAL at 08:39

## 2020-08-09 RX ADMIN — SODIUM CHLORIDE, PRESERVATIVE FREE 10 ML: 5 INJECTION INTRAVENOUS at 08:42

## 2020-08-09 RX ADMIN — METHYLPREDNISOLONE SODIUM SUCCINATE 40 MG: 40 INJECTION, POWDER, FOR SOLUTION INTRAMUSCULAR; INTRAVENOUS at 04:41

## 2020-08-09 RX ADMIN — POLYETHYLENE GLYCOL 3350 17 G: 17 POWDER, FOR SOLUTION ORAL at 08:40

## 2020-08-09 RX ADMIN — SENNOSIDES 8.6 MG: 8.6 TABLET, FILM COATED ORAL at 08:39

## 2020-08-09 RX ADMIN — MONTELUKAST SODIUM 5 MG: 5 TABLET, CHEWABLE ORAL at 21:44

## 2020-08-09 RX ADMIN — RANOLAZINE 500 MG: 500 TABLET, FILM COATED, EXTENDED RELEASE ORAL at 21:43

## 2020-08-09 RX ADMIN — HYDROXYCHLOROQUINE SULFATE 200 MG: 200 TABLET ORAL at 08:39

## 2020-08-09 RX ADMIN — MENTHOL, METHYL SALICYLATE: 10; 15 CREAM TOPICAL at 22:17

## 2020-08-09 RX ADMIN — NALOXEGOL OXALATE 12.5 MG: 12.5 TABLET, FILM COATED ORAL at 08:39

## 2020-08-09 RX ADMIN — BISACODYL 10 MG: 10 SUPPOSITORY RECTAL at 11:52

## 2020-08-09 RX ADMIN — ISOSORBIDE MONONITRATE 60 MG: 60 TABLET ORAL at 21:44

## 2020-08-09 RX ADMIN — ALBUTEROL SULFATE 2.5 MG: 2.5 SOLUTION RESPIRATORY (INHALATION) at 16:01

## 2020-08-09 RX ADMIN — CEFTRIAXONE SODIUM 1 G: 1 INJECTION, POWDER, FOR SOLUTION INTRAMUSCULAR; INTRAVENOUS at 21:45

## 2020-08-09 RX ADMIN — ALBUTEROL SULFATE 2.5 MG: 2.5 SOLUTION RESPIRATORY (INHALATION) at 13:12

## 2020-08-09 RX ADMIN — RANOLAZINE 500 MG: 500 TABLET, FILM COATED, EXTENDED RELEASE ORAL at 08:39

## 2020-08-09 RX ADMIN — DOCUSATE SODIUM 100 MG: 100 CAPSULE, LIQUID FILLED ORAL at 11:52

## 2020-08-09 RX ADMIN — OXYCODONE HYDROCHLORIDE AND ACETAMINOPHEN 1 TABLET: 10; 325 TABLET ORAL at 21:43

## 2020-08-09 RX ADMIN — BUDESONIDE 500 MCG: 0.5 INHALANT RESPIRATORY (INHALATION) at 09:14

## 2020-08-09 RX ADMIN — ALBUTEROL SULFATE 2.5 MG: 2.5 SOLUTION RESPIRATORY (INHALATION) at 09:08

## 2020-08-09 RX ADMIN — PANTOPRAZOLE SODIUM 40 MG: 40 TABLET, DELAYED RELEASE ORAL at 08:07

## 2020-08-09 RX ADMIN — AMLODIPINE BESYLATE 5 MG: 5 TABLET ORAL at 11:52

## 2020-08-09 RX ADMIN — INSULIN GLARGINE 8 UNITS: 100 INJECTION, SOLUTION SUBCUTANEOUS at 21:44

## 2020-08-09 RX ADMIN — Medication 10 ML: at 04:44

## 2020-08-09 RX ADMIN — SENNOSIDES 8.6 MG: 8.6 TABLET, FILM COATED ORAL at 21:43

## 2020-08-09 RX ADMIN — BUDESONIDE 500 MCG: 0.5 INHALANT RESPIRATORY (INHALATION) at 19:35

## 2020-08-09 ASSESSMENT — PAIN DESCRIPTION - ONSET
ONSET: ON-GOING
ONSET: ON-GOING

## 2020-08-09 ASSESSMENT — PAIN - FUNCTIONAL ASSESSMENT
PAIN_FUNCTIONAL_ASSESSMENT: ACTIVITIES ARE NOT PREVENTED
PAIN_FUNCTIONAL_ASSESSMENT: PREVENTS OR INTERFERES SOME ACTIVE ACTIVITIES AND ADLS

## 2020-08-09 ASSESSMENT — PAIN SCALES - GENERAL
PAINLEVEL_OUTOF10: 9
PAINLEVEL_OUTOF10: 8

## 2020-08-09 ASSESSMENT — PAIN DESCRIPTION - ORIENTATION
ORIENTATION: LOWER
ORIENTATION: LOWER

## 2020-08-09 ASSESSMENT — PAIN DESCRIPTION - PAIN TYPE
TYPE: CHRONIC PAIN

## 2020-08-09 ASSESSMENT — PAIN DESCRIPTION - PROGRESSION
CLINICAL_PROGRESSION: NOT CHANGED
CLINICAL_PROGRESSION: NOT CHANGED

## 2020-08-09 ASSESSMENT — PAIN DESCRIPTION - FREQUENCY
FREQUENCY: CONTINUOUS
FREQUENCY: CONTINUOUS

## 2020-08-09 ASSESSMENT — PAIN DESCRIPTION - LOCATION
LOCATION: BACK
LOCATION: GENERALIZED
LOCATION: BACK

## 2020-08-09 ASSESSMENT — PAIN DESCRIPTION - DIRECTION: RADIATING_TOWARDS: DOWN LEG

## 2020-08-09 ASSESSMENT — PAIN DESCRIPTION - DESCRIPTORS
DESCRIPTORS: CONSTANT
DESCRIPTORS: SHARP

## 2020-08-09 NOTE — PLAN OF CARE
Problem: Falls - Risk of:  Goal: Will remain free from falls  Description: Will remain free from falls  Outcome: Ongoing  Note: Pt has remained free from falls this shift. Fall precautions in place are falling star magnet, non slip socks, bed alarm on and call light/bedside table within reach. Pt visualized with hourly roundings. Problem: Falls - Risk of:  Goal: Absence of physical injury  Description: Absence of physical injury  Outcome: Ongoing  Note: Pt has remained free from physical injury     Problem: Pain:  Goal: Pain level will decrease  Description: Pain level will decrease  Outcome: Ongoing  Note: Pt stated a pain level of 9/10. Pain goal of 4/10. Prn medications given as ordered. Non pharm interventions are rest and reposition. Will continue to monitor     Problem: Pain:  Goal: Control of acute pain  Description: Control of acute pain  Outcome: Ongoing  Note:  As above. Will continue to monitor     Problem: Pain:  Goal: Control of chronic pain  Description: Control of chronic pain  Outcome: Ongoing  Note: As above. Will continue to monitor     Problem: Respiratory:  Goal: Ability to maintain a clear airway will improve  Description: Ability to maintain a clear airway will improve  Outcome: Ongoing  Note: Able to maintain a clear airway. Maintains pulse ox >92%. Patient wore cpap from midnight to 0430 this morning and states how much better she feels. She talks in full sentences without difficulty. Problem: Respiratory:  Goal: Ability to maintain adequate ventilation will improve  Description: Ability to maintain adequate ventilation will improve  Outcome: Ongoing  Note:  On room air and wore cpap at night from midnight to 0430.      Problem: Respiratory:  Goal: Complications related to the disease process, condition or treatment will be avoided or minimized  Description: Complications related to the disease process, condition or treatment will be avoided or minimized  Outcome: Ongoing  Note: No complications at this time will continue to monitor     Problem: Skin Integrity:  Goal: Risk for impaired skin integrity will decrease  Description: Risk for impaired skin integrity will decrease  Outcome: Ongoing  Note: No new skin issues noted this shift     Problem: Impaired respiratory status  Goal: Clear lung sounds  Outcome: Ongoing  Note: Pts lung sounds are rhonchus throughout at beginning of shift then only fine crackles right posterior chest at 4am.     Problem: DISCHARGE BARRIERS  Goal: Patient's continuum of care needs are met  Outcome: Ongoing  Note: Continuum care needs are being met     Care plan reviewed with patient. Patient verbalizes understanding of the plan of care and contribute to goal setting.

## 2020-08-09 NOTE — PLAN OF CARE
Problem: Impaired respiratory status  Goal: Clear lung sounds  8/9/2020 1940 by Fercho Watt  Outcome: Ongoing

## 2020-08-09 NOTE — PROGRESS NOTES
Hospitalist Progress Note    Patient:  Asa Scale      Unit/Bed:3B-31/031-A    YOB: 1940    MRN: [id number]       Acct: [de-identified]     PCP: Chet Yeboah DO    Date of Admission: 8/5/2020      Assessment/Plan:  Active Hospital Problems    Diagnosis Date Noted    Acute respiratory failure ([facility] [J96.00] 08/05/2020    Seronegative rheumatoid arthritis ([facility] [M06.00] 09/05/2019    CAD (coronary artery disease) [I25.10]     COPD (chronic obstructive pulmonary disease) ([facility] [J44.9]     Hypertension [I10]     Diabetes mellitus ([facility] [E11.9]          Acute Hypoxic Respiratory Failure/Dyspnea: multifactorial - fluid overload/flash pulm edema vs inflixamab reaction vs asthma exacerbation. Also consideration for pneumonia with new infiltrate noted on 8/7 CXR. Low suspicion for PE with no LE edema, no chest pain-alternative etiologies explain hypoxia. -see below for diuresis and management of asthma / antibiotics  -CT chest obtained. LLL infiltrate and diffuse interstitial edema. RLL atelectasis. -CXR showing improvement, there was some bilateral pleural effusion -> will see if this can be drained (orders placed for thor today). -Consideration for RA associated DPLD? ? Consider further workup as outpatient with HRCT. -acapella and IS  -Resolved    CAP: her procal is low, however is immunosupressed on infliximab and had mild leukocytosis on arrival. Will treat as CAP, with Rocephin and Doxy (has prolonged QT so avoid Azithro) - antibiotics started on 8/7. Negative legionella and strep, negative Covid. Acute exacerbation of Asthma:  -start on solumedrol 40mg BID -> change to prednisone tomorrow 40mg daily. Taper back to home maintenance dose (for her RA)  -change symbicort to pulmicort and albuterol prn/WA  -antibiotics as above  -improved    Acute decompensated Diastolic HF with acute exacerbation: elevated BNP and fluid overload on CXR, no significant peripheral edema. Favor more flash pulm edema after transfusion given overall picutre. -Echo completed: which does show grade 2 diastolic dysfunction however a normal EF. Elevated RVSP was noted. 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 However previously had been higher several years back. Suspect prerenal related to hypotension and aggressive diuresis. Was also on aldactone during diuresis. Was on celebrex as OP, will hold this as well.   -creat up from 0.8 to 1.8 on 8/7 improved to 1.4 then worsened to 1.6 and now 1.8 this am after diuresis attempt 8/8. -Appreciate Nephrology assistance (follows with Dr. Alex Julien group)  -noted sterile pyuria on UA, otherwise bland UA - negative eosinophils x2  -FeNa/FeUrea indicate a prerenal process.    -essentially normal renal US (elevated resistant index c/w medicorenal disease). Protein creat ratio is 0.76.    -hold aldactone. Avoid hypotension  -strict I/O, monitor UOP    Mild Acute Anemia: with low iron, iron sat, and low normal ferritin. Will order dose of venofer 300mg x1. Consider outpatient GI eval, vs inpatient if ongoing drop. 10.9 on arrival to 9.0 (baseline around 11). Sinus Bradycardia: noted 8/8, check TSH - normal. Reduce lopressor dose and add hold parameters. Improved. QT prolongation: noted 496qtc on arrival and 563 qtc on second ekg, repeat  on 8/8.    -Will avoid QT prolonging medications as able, likely the RBBB is causing prolongation at baseline. Telemetry, monitor Mg, K. ? ?Allergic Reaction to Infliximab: Her allergy list has been updated. Hx of CAD s/p stenting: trop negative. Continue home meds - ASA, lopressor, ranexa, imdur.      Chest pressure: intermittent at rest, noted negative troponin on arrival, and EKG with RBBB and non-specific EKG changes. -repeat EKG and troponin is unremarkable, reach out to Dr. David Duffy  -continue GDMT as noted below (see CAD)  -echo no WMA. -nitro prn   -suspect related to respiratory issues. DM2: continue basal bolus regimen. Monitor while on steroids. Controlled. Constipation: acute on chronic, on opioids at baseline.   -Added on movantic daily 8/8. Continue Senna BID and miralax daily  -Add colace BID and dulcolax pr 1x on 8/9 for continued constipation  -BS still present, if no BM by tomorrow will consider KUB    HLD: continue statin, will increase to higher intensity dose given her elevated cholesterol. Cont fibrate. HTN: with hypotension after diuresis. Adjusted regimen -> holding aldactone, norvasc, imdur, hydralazine  -restarted back on imdur. Hold aldactone with EMMA. -controlled but still slight elevation - add back norvasc 5mg    MAGDI on CPAP: utilizing home unit. Ruth Matias RA: on plaquenil, prednisone 5mg, previously infilximab. Prn voltaren and percocet. Bowel regimen. Expected discharge date:  1 day pending renal recovery    Disposition: home         [] Home       [] TCU       [] Rehab       [] Psych       [] SNF       [] [facility]       [] Other-    --------------------------------------------    Chief Complaint: SOB    Hospital Course: Per HPI, \"This is a 58-year-old female who presented to [facility] Emergency Department on 8/5 after a rapid response was called in outpatient nursing. Patient was undergoing rheumatoid arthritis infusion. Patient states that this was the second infusion she was having of this medication. She states that infusion was fine, and went to the restroom. After leaving the restroom she states that her throat was starting to close and she felt like she could not breathe. The patient was brought to the emergency department and given IV steroids and breathing treatments.   Patient continues to have inspiratory and expiratory wheezes. The patient states that she felt like she was \"literally dying\". Patient denies any pain. Patient states that she feels better than she did in the outpatient nursing. \"     No history of tobacco abuse or lung disease. Patient reports after leaving from infusion, felt like her moth was very dry and could not open. Not sure if lip or tongue swelling, but admitted to soreness. This was her second injection (first time had sore throat only). BP on arrival was elevated and there was evidence of pulmonary edema on XR. There was no rash/pruritis. No wheezsing. Improved after diuresis however had some hypotension, so here home medications were adjusted with the assistance of Dr. Nabil Saenz. Although her I/O doesn't reflect this, her UOP was increased subjectively. Devloped EMMA on CKD and consulting her Nephrology group. Subjective (past 24 hours):    Patient's feeling better from a respiratory perspective, denies any further wheezing or wet cough, is now on room air satting well. No fevers or chills overnight. Does report some urine output after being given Lasix yesterday. No change in her chronic joint pain. She does continue to complain of constipation and feeling of bloating. No nausea or vomiting. She is not sure if she has passed flatus since arrival.  Cough is better.        Medications:  Reviewed    Infusion Medications   Scheduled Medications    metoprolol tartrate  25 mg Oral BID    naloxegol  12.5 mg Oral QAM    doxycycline hyclate  100 mg Oral 2 times per day    albuterol  2.5 mg Nebulization Q4H While awake    senna  1 tablet Oral BID    polyethylene glycol  17 g Oral Daily    methylPREDNISolone  40 mg Intravenous Q12H    budesonide  0.5 mg Nebulization BID    atorvastatin  80 mg Oral Nightly    isosorbide mononitrate  60 mg Oral BID    cefTRIAXone (ROCEPHIN) IV  1 g Intravenous Q24H    [Held by provider] bumetanide  1 mg Oral Daily    [Held by provider] spironolactone  25 mg Oral Daily    aspirin  162 mg Oral Daily    traZODone  100 mg Oral Nightly    ranolazine  500 mg Oral BID    [Held by provider] predniSONE  5 mg Oral Daily    montelukast  5 mg Oral Nightly    hydroxychloroquine  200 mg Oral Daily    magnesium oxide  400 mg Oral BID    pantoprazole  40 mg Oral QAM AC    DULoxetine  30 mg Oral BID    sodium chloride flush  10 mL Intravenous 2 times per day    enoxaparin  40 mg Subcutaneous Q24H    insulin glargine  8 Units Subcutaneous Nightly     PRN Meds: muscle rub, oxyCODONE-acetaminophen, sodium chloride flush, acetaminophen **OR** acetaminophen, promethazine **OR** ondansetron, hydrALAZINE      Intake/Output Summary (Last 24 hours) at 8/9/2020 1048  Last data filed at 8/9/2020 1042  Gross per 24 hour   Intake 1440 ml   Output 1550 ml   Net -110 ml     Weight change: 2 lb 9.6 oz (1.179 kg)      Exam:  /68   Pulse 61   Temp 99.1 °F (37.3 °C) (Oral)   Resp 16   Wt 165 lb 12.8 oz (75.2 kg)   SpO2 92%   BMI 26.77 kg/m²     General appearance: No apparent distress, well developed, appears stated age. Eyes:  Pupils equal, round, and reactive to light. Conjunctivae/corneas clear. HENT: Head normal in appearance. External nares normal.  Oral mucosa moist without lesions. Hearing grossly intact. Neck: Supple, with full range of motion. No jugular venous distention. Trachea midline. Respiratory:  Normal respiratory effort. Rhonchi has completely resolved, there are some mild rales in the left lower lobe greater than the right lower lobe, no wheezing present. Cardiovascular: Normal rate, regular rhythm with normal S1/S2 without murmurs. No lower extremity edema. Abdomen: Soft, mild tender/distended with NABS  Musculoskeletal: Normal range of motion in extremities. There is no joint swelling. Tenderness in multiple joints including feet and hands.    Skin: Skin color, texture, turgor normal.  No rashes or lesions. Neurologic:  Neurovascularly intact without any focal sensory/motor deficits in the upper and lower extremities. Cranial nerves:  grossly non-focal.  Psychiatric: Alert and oriented, thought content appropriate, normal insight. Capillary Refill: Brisk,< 3 seconds. Peripheral Pulses: +2 palpable, equal bilaterally. Labs:   Recent Labs     08/09/20  0419   WBC 9.1   HGB 9.0*   HCT 30.0*        Recent Labs     08/07/20  1655 08/08/20  0425 08/09/20  0419    135 135   K 4.7 5.2 4.9    101 99   CO2 23 23 27   BUN 56* 66* 76*   CREATININE 1.4* 1.6* 1.8*   CALCIUM 8.4* 8.2* 7.7*     No results for input(s): AST, ALT, BILIDIR, BILITOT, ALKPHOS in the last 72 hours. No results for input(s): INR in the last 72 hours. No results for input(s): Ge Lust in the last 72 hours. Microbiology:      Urinalysis:      Lab Results   Component Value Date    NITRU NEGATIVE 08/08/2020    WBCUA 0-2 08/08/2020    BACTERIA NONE SEEN 08/08/2020    RBCUA NONE SEEN 08/08/2020    BLOODU NEGATIVE 08/08/2020    SPECGRAV 1.012 08/08/2020    GLUCOSEU NEGATIVE 09/27/2012       Radiology:  XR CHEST PORTABLE   Final Result      Improving left lower lobe pneumonia or alveolar pulmonary edema and improved left pleural effusion. Persistent diffuse bilateral interstitial infiltrates. **This report has been created using voice recognition software. It may contain minor errors which are inherent in voice recognition technology. **      Final report electronically signed by Dr. Thu Carmona on 8/9/2020 3:14 AM      US RENAL COMPLETE   Final Result   Elevated renal cortical echogenicity elevated left resistive index bilateral renal cortical thinning. Findings consistent with chronic medical renal disease. **This report has been created using voice recognition software. It may contain minor errors which are inherent in voice recognition technology. **      Final report electronically signed by Dr. Flora Oliveira on 8/8/2020 9:23 AM      CT CHEST WO CONTRAST   Final Result   1. Left lower lobe infiltrates suggesting pneumonic infiltrates. 2. Diffuse interstitial edema with a left pleural effusion. 3. Right lower lobe atelectasis. **This report has been created using voice recognition software. It may contain minor errors which are inherent in voice recognition technology. **      Final report electronically signed by Dr. Flora Oliveira on 8/7/2020 3:29 PM      XR CHEST PORTABLE   Final Result   Development of left lower lobe airspace infiltrates in addition to persistent mild pulmonary vascular congestion. Pneumonic infiltrates are consideration. **This report has been created using voice recognition software. It may contain minor errors which are inherent in voice recognition technology. **      Final report electronically signed by Dr. Flora Oliveira on 8/7/2020 10:57 AM      XR CHEST (2 VW)   Final Result   1. Mild cardiomegaly. Tiny bilateral pleural effusions. 2. Minimal residual atelectasis/pneumonia/edema right lung base. 3. Overall appearance of chest markedly improved from prior. **This report has been created using voice recognition software. It may contain minor errors which are inherent in voice recognition technology. **      Final report electronically signed by Dr. Arturo Noel on 8/6/2020 8:02 AM      XR CHEST PORTABLE   Final Result   Moderate CHF. **This report has been created using voice recognition software. It may contain minor errors which are inherent in voice recognition technology. **      Final report electronically signed by Dr. Brian Ferrari on 8/5/2020 5:06 PM      [address]    (Results Pending)   XR CHEST PORTABLE    (Results Pending)       DVT prophylaxis: [x] Lovenox - hold for thora                                 [] SCDs                                 [] SQ Heparin                                 [] Encourage

## 2020-08-09 NOTE — PLAN OF CARE
Problem: Falls - Risk of:  Goal: Will remain free from falls  Description: Will remain free from falls  Outcome: Ongoing  Note: Assessment & interventions provided throughout shift. Bed locked & in low position, call light in reach, side-rails up x2, bed/chair alarm utilized, non-slip socks on when ambulating, reminded patient to use call light to call for assistance. Bed alarm on. Goal: Absence of physical injury  Description: Absence of physical injury  Outcome: Ongoing     Problem: Pain:  Goal: Pain level will decrease  Description: Pain level will decrease  Outcome: Ongoing  Note: Ongoing assessment & interventions provided throughout shift. Reminded patient to report any pain, pressure, or shortness of breath to the nurse. Pain medications provided per physician's orders. Goal: Control of acute pain  Description: Control of acute pain  Outcome: Ongoing  Goal: Control of chronic pain  Description: Control of chronic pain  Outcome: Ongoing     Problem: Respiratory:  Goal: Ability to maintain a clear airway will improve  Description: Ability to maintain a clear airway will improve  Outcome: Ongoing  Note: Thoracentesis planned for tomorrow. Goal: Ability to maintain adequate ventilation will improve  Description: Ability to maintain adequate ventilation will improve  Outcome: Ongoing  Goal: Complications related to the disease process, condition or treatment will be avoided or minimized  Description: Complications related to the disease process, condition or treatment will be avoided or minimized  Outcome: Ongoing     Problem: Skin Integrity:  Goal: Risk for impaired skin integrity will decrease  Description: Risk for impaired skin integrity will decrease  Outcome: Ongoing  Note: Ongoing assessment & interventions provided throughout shift. Skin assessments provided. Encouraging/assisting patient to turn as needed.        Problem: DISCHARGE BARRIERS  Goal: Patient's continuum of care needs are

## 2020-08-10 ENCOUNTER — APPOINTMENT (OUTPATIENT)
Dept: ULTRASOUND IMAGING | Age: 80
DRG: 291 | End: 2020-08-10
Payer: MEDICARE

## 2020-08-10 VITALS
OXYGEN SATURATION: 93 % | RESPIRATION RATE: 18 BRPM | DIASTOLIC BLOOD PRESSURE: 69 MMHG | BODY MASS INDEX: 27 KG/M2 | SYSTOLIC BLOOD PRESSURE: 150 MMHG | TEMPERATURE: 98.7 F | WEIGHT: 167.2 LBS | HEART RATE: 63 BPM

## 2020-08-10 LAB
ANION GAP SERPL CALCULATED.3IONS-SCNC: 10 MEQ/L (ref 8–16)
BUN BLDV-MCNC: 73 MG/DL (ref 7–22)
CALCIUM SERPL-MCNC: 7.7 MG/DL (ref 8.5–10.5)
CHLORIDE BLD-SCNC: 101 MEQ/L (ref 98–111)
CO2: 26 MEQ/L (ref 23–33)
CREAT SERPL-MCNC: 1.6 MG/DL (ref 0.4–1.2)
ERYTHROCYTE [DISTWIDTH] IN BLOOD BY AUTOMATED COUNT: 15 % (ref 11.5–14.5)
ERYTHROCYTE [DISTWIDTH] IN BLOOD BY AUTOMATED COUNT: 48.3 FL (ref 35–45)
GFR SERPL CREATININE-BSD FRML MDRD: 31 ML/MIN/1.73M2
GLUCOSE BLD-MCNC: 128 MG/DL (ref 70–108)
GLUCOSE BLD-MCNC: 152 MG/DL (ref 70–108)
GLUCOSE BLD-MCNC: 174 MG/DL (ref 70–108)
HCT VFR BLD CALC: 30.9 % (ref 37–47)
HEMOGLOBIN: 9.4 GM/DL (ref 12–16)
MAGNESIUM: 2.8 MG/DL (ref 1.6–2.4)
MCH RBC QN AUTO: 26.9 PG (ref 26–33)
MCHC RBC AUTO-ENTMCNC: 30.4 GM/DL (ref 32.2–35.5)
MCV RBC AUTO: 88.5 FL (ref 81–99)
PLATELET # BLD: 275 THOU/MM3 (ref 130–400)
PMV BLD AUTO: 10.9 FL (ref 9.4–12.4)
POTASSIUM SERPL-SCNC: 4.6 MEQ/L (ref 3.5–5.2)
RBC # BLD: 3.49 MILL/MM3 (ref 4.2–5.4)
SODIUM BLD-SCNC: 137 MEQ/L (ref 135–145)
WBC # BLD: 9.3 THOU/MM3 (ref 4.8–10.8)

## 2020-08-10 PROCEDURE — 76604 US EXAM CHEST: CPT

## 2020-08-10 PROCEDURE — 2580000003 HC RX 258: Performed by: PHYSICIAN ASSISTANT

## 2020-08-10 PROCEDURE — 83735 ASSAY OF MAGNESIUM: CPT

## 2020-08-10 PROCEDURE — 6370000000 HC RX 637 (ALT 250 FOR IP): Performed by: PHYSICIAN ASSISTANT

## 2020-08-10 PROCEDURE — 99239 HOSP IP/OBS DSCHRG MGMT >30: CPT | Performed by: INTERNAL MEDICINE

## 2020-08-10 PROCEDURE — 94640 AIRWAY INHALATION TREATMENT: CPT

## 2020-08-10 PROCEDURE — 36415 COLL VENOUS BLD VENIPUNCTURE: CPT

## 2020-08-10 PROCEDURE — 85027 COMPLETE CBC AUTOMATED: CPT

## 2020-08-10 PROCEDURE — 94669 MECHANICAL CHEST WALL OSCILL: CPT

## 2020-08-10 PROCEDURE — 80048 BASIC METABOLIC PNL TOTAL CA: CPT

## 2020-08-10 PROCEDURE — 6370000000 HC RX 637 (ALT 250 FOR IP): Performed by: INTERNAL MEDICINE

## 2020-08-10 PROCEDURE — 94760 N-INVAS EAR/PLS OXIMETRY 1: CPT

## 2020-08-10 PROCEDURE — 0W9B3ZZ DRAINAGE OF LEFT PLEURAL CAVITY, PERCUTANEOUS APPROACH: ICD-10-PCS | Performed by: RADIOLOGY

## 2020-08-10 PROCEDURE — 6360000002 HC RX W HCPCS: Performed by: INTERNAL MEDICINE

## 2020-08-10 PROCEDURE — 82948 REAGENT STRIP/BLOOD GLUCOSE: CPT

## 2020-08-10 RX ORDER — POLYETHYLENE GLYCOL 3350 17 G/17G
17 POWDER, FOR SOLUTION ORAL DAILY
Qty: 527 G | Refills: 1 | Status: SHIPPED | OUTPATIENT
Start: 2020-08-11 | End: 2020-09-10

## 2020-08-10 RX ORDER — HYDRALAZINE HYDROCHLORIDE 50 MG/1
25 TABLET, FILM COATED ORAL 3 TIMES DAILY
Qty: 90 TABLET | Refills: 1 | Status: ON HOLD | OUTPATIENT
Start: 2020-08-10 | End: 2020-11-11 | Stop reason: HOSPADM

## 2020-08-10 RX ORDER — ATORVASTATIN CALCIUM 40 MG/1
80 TABLET, FILM COATED ORAL DAILY
Qty: 60 TABLET | Refills: 2 | Status: SHIPPED | OUTPATIENT
Start: 2020-08-10

## 2020-08-10 RX ORDER — CEFDINIR 300 MG/1
300 CAPSULE ORAL 2 TIMES DAILY
Qty: 10 CAPSULE | Refills: 0 | Status: SHIPPED | OUTPATIENT
Start: 2020-08-10 | End: 2020-08-15

## 2020-08-10 RX ORDER — SENNA PLUS 8.6 MG/1
1 TABLET ORAL 2 TIMES DAILY
Qty: 60 TABLET | Refills: 0 | Status: SHIPPED | OUTPATIENT
Start: 2020-08-10 | End: 2020-09-09

## 2020-08-10 RX ORDER — PSEUDOEPHEDRINE HCL 30 MG
100 TABLET ORAL 2 TIMES DAILY
Qty: 60 CAPSULE | Refills: 1 | Status: SHIPPED | OUTPATIENT
Start: 2020-08-10

## 2020-08-10 RX ORDER — DOXYCYCLINE HYCLATE 100 MG
100 TABLET ORAL EVERY 12 HOURS SCHEDULED
Qty: 10 TABLET | Refills: 0 | Status: SHIPPED | OUTPATIENT
Start: 2020-08-10 | End: 2020-08-15

## 2020-08-10 RX ORDER — HYDRALAZINE HYDROCHLORIDE 25 MG/1
25 TABLET, FILM COATED ORAL EVERY 8 HOURS SCHEDULED
Status: DISCONTINUED | OUTPATIENT
Start: 2020-08-10 | End: 2020-08-10 | Stop reason: HOSPADM

## 2020-08-10 RX ORDER — GREEN TEA/HOODIA GORDONII 315-12.5MG
1 CAPSULE ORAL DAILY
Qty: 15 TABLET | Refills: 0 | Status: SHIPPED | OUTPATIENT
Start: 2020-08-10 | End: 2020-08-25

## 2020-08-10 RX ORDER — PREDNISONE 20 MG/1
TABLET ORAL
Qty: 11 TABLET | Refills: 0 | Status: SHIPPED | OUTPATIENT
Start: 2020-08-11 | End: 2020-08-20

## 2020-08-10 RX ADMIN — SODIUM CHLORIDE, PRESERVATIVE FREE 10 ML: 5 INJECTION INTRAVENOUS at 10:44

## 2020-08-10 RX ADMIN — HYDRALAZINE HYDROCHLORIDE 25 MG: 25 TABLET, FILM COATED ORAL at 15:30

## 2020-08-10 RX ADMIN — SENNOSIDES 8.6 MG: 8.6 TABLET, FILM COATED ORAL at 10:36

## 2020-08-10 RX ADMIN — PANTOPRAZOLE SODIUM 40 MG: 40 TABLET, DELAYED RELEASE ORAL at 06:17

## 2020-08-10 RX ADMIN — NALOXEGOL OXALATE 12.5 MG: 12.5 TABLET, FILM COATED ORAL at 10:36

## 2020-08-10 RX ADMIN — DOCUSATE SODIUM 100 MG: 100 CAPSULE, LIQUID FILLED ORAL at 10:41

## 2020-08-10 RX ADMIN — DULOXETINE HYDROCHLORIDE 30 MG: 30 CAPSULE, DELAYED RELEASE ORAL at 10:37

## 2020-08-10 RX ADMIN — DOXYCYCLINE HYCLATE 100 MG: 100 TABLET, COATED ORAL at 10:37

## 2020-08-10 RX ADMIN — BUDESONIDE 500 MCG: 0.5 INHALANT RESPIRATORY (INHALATION) at 07:59

## 2020-08-10 RX ADMIN — MAGNESIUM GLUCONATE 500 MG ORAL TABLET 400 MG: 500 TABLET ORAL at 10:36

## 2020-08-10 RX ADMIN — POLYETHYLENE GLYCOL 3350 17 G: 17 POWDER, FOR SOLUTION ORAL at 10:41

## 2020-08-10 RX ADMIN — PREDNISONE 40 MG: 20 TABLET ORAL at 10:36

## 2020-08-10 RX ADMIN — METOPROLOL TARTRATE 25 MG: 25 TABLET ORAL at 10:36

## 2020-08-10 RX ADMIN — ALBUTEROL SULFATE 2.5 MG: 2.5 SOLUTION RESPIRATORY (INHALATION) at 07:59

## 2020-08-10 RX ADMIN — AMLODIPINE BESYLATE 5 MG: 5 TABLET ORAL at 10:37

## 2020-08-10 RX ADMIN — HYDROXYCHLOROQUINE SULFATE 200 MG: 200 TABLET ORAL at 10:37

## 2020-08-10 RX ADMIN — RANOLAZINE 500 MG: 500 TABLET, FILM COATED, EXTENDED RELEASE ORAL at 10:36

## 2020-08-10 RX ADMIN — ASPIRIN 162 MG: 81 TABLET ORAL at 10:37

## 2020-08-10 RX ADMIN — ALBUTEROL SULFATE 2.5 MG: 2.5 SOLUTION RESPIRATORY (INHALATION) at 11:31

## 2020-08-10 RX ADMIN — ISOSORBIDE MONONITRATE 60 MG: 60 TABLET ORAL at 10:36

## 2020-08-10 NOTE — PROGRESS NOTES
Admit Date: 8/5/2020  Hospital day {0-10:70277}    Subjective:     Patient {HX; GENERAL COMPLAINTS:22977}.    Medication side effects: {med side fx:31136}    Scheduled Meds:   hydrALAZINE  25 mg Oral 3 times per day    predniSONE  40 mg Oral Daily    docusate sodium  100 mg Oral BID    amLODIPine  5 mg Oral Daily    insulin lispro  0-6 Units Subcutaneous TID WC    insulin lispro  0-3 Units Subcutaneous Nightly    metoprolol tartrate  25 mg Oral BID    naloxegol  12.5 mg Oral QAM    doxycycline hyclate  100 mg Oral 2 times per day    albuterol  2.5 mg Nebulization Q4H While awake    senna  1 tablet Oral BID    polyethylene glycol  17 g Oral Daily    budesonide  0.5 mg Nebulization BID    atorvastatin  80 mg Oral Nightly    isosorbide mononitrate  60 mg Oral BID    cefTRIAXone (ROCEPHIN) IV  1 g Intravenous Q24H    [Held by provider] bumetanide  1 mg Oral Daily    [Held by provider] spironolactone  25 mg Oral Daily    aspirin  162 mg Oral Daily    traZODone  100 mg Oral Nightly    ranolazine  500 mg Oral BID    [Held by provider] predniSONE  5 mg Oral Daily    montelukast  5 mg Oral Nightly    hydroxychloroquine  200 mg Oral Daily    magnesium oxide  400 mg Oral BID    pantoprazole  40 mg Oral QAM AC    DULoxetine  30 mg Oral BID    sodium chloride flush  10 mL Intravenous 2 times per day    [Held by provider] enoxaparin  40 mg Subcutaneous Q24H    insulin glargine  8 Units Subcutaneous Nightly     Continuous Infusions:   dextrose       PRN Meds:glucose, dextrose, glucagon (rDNA), dextrose, muscle rub, oxyCODONE-acetaminophen, sodium chloride flush, acetaminophen **OR** acetaminophen, promethazine **OR** ondansetron, hydrALAZINE    Review of Systems  {Review Of Systems:99932}    Objective:     Patient Vitals for the past 8 hrs:   BP Temp Temp src Pulse Resp SpO2   08/10/20 1129 (!) 182/72 98.7 °F (37.1 °C) Oral 63 18 93 %   08/10/20 0823 (!) 147/70 98.9 °F (37.2 °C) Oral 64 20 93 % 08/10/20 0759 -- -- -- -- -- 94 %     I/O last 3 completed shifts: In: 983.6 [P.O.:930; I.V.:53.6]  Out: 1300 [Urine:1300]    {Exam; Complete Normal And System Select:77953}    ECG: {Findings; diagnostics ecg readin}. Data Review:   CBC:  Lab Results   Component Value Date    WBC 9.3 08/10/2020    RBC 3.49 08/10/2020    HGB 9.4 08/10/2020    HCT 30.9 08/10/2020    MCV 88.5 08/10/2020    MCH 26.9 08/10/2020    MCHC 30.4 08/10/2020    RDW 17.5 2020     08/10/2020    MPV 10.9 08/10/2020     BMP  Lab Results   Component Value Date     08/10/2020    K 4.6 08/10/2020    K 4.0 2020     08/10/2020    CO2 26 08/10/2020    BUN 73 08/10/2020    CREATININE 1.6 08/10/2020    CALCIUM 7.7 08/10/2020      COAG PROFILE:  Lab Results   Component Value Date    APTT 72.6 2014    INR 1.02 2014       Assessment:     Principal Problem:    Acute respiratory failure (HCC)  Active Problems:    Diabetes mellitus (HCC)    CAD (coronary artery disease)    Hypertension    COPD (chronic obstructive pulmonary disease) (HCC)    Seronegative rheumatoid arthritis ([facility]  Resolved Problems:    * No resolved hospital problems.  *      Plan:     ***

## 2020-08-10 NOTE — PLAN OF CARE
Problem: Falls - Risk of:  Goal: Will remain free from falls  Description: Will remain free from falls  Outcome: Ongoing  Note: Pt has remained free from falls this shift. Fall precautions in place are falling star magnet, non slip socks, bed alarm on and call light/bedside table within reach. Pt visualized with hourly roundings. Problem: Falls - Risk of:  Goal: Absence of physical injury  Description: Absence of physical injury  Outcome: Ongoing  Note:  No apparent physical injury tonight     Problem: Pain:  Goal: Pain level will decrease  Description: Pain level will decrease  Outcome: Ongoing  Note: Pt stated a pain level of 9/10. Pain goal of 4/10. Percocet given as ordered. Non pharm interventions are rest and reposition. Will continue to monitor     Problem: Pain:  Goal: Control of acute pain  Description: Control of acute pain  Outcome: Ongoing  Note:  As above. Will continue to monitor     Problem: Pain:  Goal: Control of chronic pain  Description: Control of chronic pain  Outcome: Ongoing  Note: As above. Will continue to monitor     Problem: Respiratory:  Goal: Ability to maintain a clear airway will improve  Description: Ability to maintain a clear airway will improve  Outcome: Ongoing  Note: Able to maintain a clear airway. Maintains pulse ox >92%. Patient wore cpap from midnight to 0430 this morning and states how much better she feels. She talks in full sentences without difficulty. Problem: Respiratory:  Goal: Ability to maintain adequate ventilation will improve  Description: Ability to maintain adequate ventilation will improve  Outcome: Ongoing  Note:  On room air and wore cpap at night from midnight to 0430.      Problem: Respiratory:  Goal: Complications related to the disease process, condition or treatment will be avoided or minimized  Description: Complications related to the disease process, condition or treatment will be avoided or minimized  Outcome: Ongoing  Note: No complications at this time will continue to monitor     Problem: Skin Integrity:  Goal: Risk for impaired skin integrity will decrease  Description: Risk for impaired skin integrity will decrease  Outcome: Ongoing  Note: No new skin issues noted this shift     Problem: Impaired respiratory status  Goal: Clear lung sounds  Outcome: Ongoing  Note: Pts lung sounds are rhonchus with initial assessment then then cleared. Problem: DISCHARGE BARRIERS  Goal: Patient's continuum of care needs are met  Outcome: Ongoing  Note: Continuum care needs are being met     Care plan reviewed with patient. Patient verbalizes understanding of the plan of care and contribute to goal setting.

## 2020-08-10 NOTE — FLOWSHEET NOTE
[facility] PROGRESS NOTE      Patient: Junito Mena  Room #: 1E-26/871-K            YOB: 1940  Age: 78 y.o. Gender: female            Admit Date & Time: 8/5/2020  4:16 PM    Assessment:  Pt was laying in bed and appeared to be asleep. Pt aroused to her name. Pt hopes to be released today and is eager to sleep in her own bed. Pt has home health care 3 days a week and her daughter lives near by. Pt does not have a Mu-ism that she is a part of at this time, but considers herself Evangelical and her chart has been changed to reflect this. Pt states that she has an AD in her safety deposit box.  encouraged her to bring it to her next doctor's appointment so it can be scanned into her records. Interventions:   provided a listening presence.  inquired about pt's AD status. Outcomes:  (Pt was appreciative of the visit. Plan:  1. Follow up on AD status if pt remains in the hospital.  2. Provide spiritual support and encouragement.     Electronically signed by Luis Roger on 8/10/2020 at 3:11 PM.  [address] [facility]  [phone]       08/10/20 1430   Encounter Summary   Services provided to: Patient   Referral/Consult From: [facility]   Support System Children   Continue Visiting Yes  (8/10)   Complexity of Encounter Moderate   Length of Encounter 15 minutes   Advance Care Planning Yes   Spiritual/[employer]   Type Spiritual support   Assessment Approachable;Passive   Intervention Active listening;Explored coping resources;Sustaining presence/ Ministry of presence   Outcome Connection/belonging;Engaged in conversation

## 2020-08-10 NOTE — CARE COORDINATION
8/10/20, 1:44 PM EDT    Patient goals/plan/ treatment preferences discussed by  and . Patient goals/plan/ treatment preferences reviewed with patient/ family. Patient/ family verbalize understanding of discharge plan and are in agreement with goal/plan/treatment preferences. Understanding was demonstrated using the teach back method. AVS provided by RN at time of discharge, which includes all necessary medical information pertaining to the patients current course of illness, treatment, post-discharge goals of care, and treatment preferences. Services After Discharge  Services At/After Discharge: Nursing Services, Aide Services, In cab([facility])   IMM Letter  IMM Letter given to Patient/Family/Significant other/Guardian/POA/by[de-identified]   IMM Letter date given[de-identified] 08/10/20  IMM Letter time given[de-identified] 7904  Shoshana Ormond will be discharged to home today where she lives alone. She will resume her [employer] services including [facility] aid, home delivered Meals and ERS. There are new orders for RN and aid through [address] [place] [address]. Called and left a message for Panchito Huang at [address] [place] [address] to make her aware of the discharge and new orders.
8/7/20, 10:57 AM EDT    DISCHARGE PLANNING EVALUATION    Received update from KENNY Starks-patient is an anticipated discharge for either later today or tomorrow. Call to Alicia Tee with [facility] with update. Call placed to [address] left for her re: discharge plans. Directions left on chart for staff for completion of discharge. 8/7/20, 11:02 AM EDT    Patient goals/plan/ treatment preferences discussed by  and . Patient goals/plan/ treatment preferences reviewed with patient/ family. Patient/ family verbalize understanding of discharge plan and are in agreement with goal/plan/treatment preferences. Understanding was demonstrated using the teach back method. AVS provided by RN at time of discharge, which includes all necessary medical information pertaining to the patients current course of illness, treatment, post-discharge goals of care, and treatment preferences.     Services After Discharge  Services At/After Discharge: Aide Services, Nursing Services, Safety Alert, Community Resources([facility]/[employer])   IMM Letter  IMM Letter given to Patient/Family/Significant other/Guardian/POA/by[de-identified] Staff  IMM Letter date given[de-identified] 08/05/20  IMM Letter time given[de-identified] [phone]
8/7/20, 11:57 AM EDT    DISCHARGE ON GOING [employer] [employer] day: 2  Location: -31/031-A Reason for admit: Acute respiratory failure ([address] [J96.00]   Procedure: 8/6 Echo - EF 50-55%. Treatment Plan of Care: Hospitalist and Cardiology following. BUN 49 (21), creatinine 1.8 (0.8). Magnesium 2.5. Discussed with Dr. Chemo Molina, was planning discharge home today, but BUN and creatinine increased overnight. Plan to give fluids and recheck labs later today. If ok, will discharge, if not pt will stay. Barriers to Discharge: elevated BUN/creatinine. PCP: Steven Yeboah DO  Readmission Risk Score: 24%  Patient Goals/Plan/Treatment Preferences: Lives alone. Has DME. Current with [facility]. SW following.
DISCHARGE/PLANNING EVALUATION  8/6/20, 4:24 PM EDT    Reason for Referral: Current with [employer]port  Mental Status: Alert and oriented to person, place and time. Decision Making: Independent with decision making. Family/Social/Home Environment: Lives at home alone. She [place] and her daughter lives in the same community. She has a daughter Meryle Rob (lives in trailer court) and son Amy Gibson in [facility]. Current Services including food security, transportation and housekeeping: Current with [facility] for aid services. Verified she is current with Norrine Frees at the agency. She is agreeable to new nursing HH at discharge. Norrine Frees is aware. She has Passport. Her CM is Tanya García [phone]. Tanya García reports that David Hammer is current with [facility] for aids, meals, transport and ERS. Current Equipment:Ramp, C-pap, rollator walker, high toilet, ramp, cane, shower chair, railing inside home. Payment Source:Medicare and Medicaid. Concerns or Barriers to Discharge: None  Post acute provider list with quality measures, geographic area and applicable managed care information provided. Questions regarding selection process answered:Current with [facility]. Teach Back Method used with Dee Dee regarding care plan and discharge planning. Patient verbalized understanding of the plan of care and contribute to goal setting. Patient goals, treatment preferences and discharge plan: Patient plans to return home at discharge with current services. She has [facility] for aids and would be agreeable to new RN. She does not want PT and OT. She also has meals and an ERS through [employer]. Electronically signed by CORY Jolly on 8/6/2020 at 4:24 PM
Troponin negative. Lasix iv bid. Lovenox daily. Diet: DIET LOW SODIUM 2 GM;   Smoking status:  reports that she has been smoking cigarettes. She started smoking about 6 years ago. She has a 1.50 pack-year smoking history. She has never used smokeless tobacco.   PCP: Rigo Yeboah DO  Readmission 30 days or less: No  Readmission Risk Score: 21%    Discharge Planning Evaluation  Current Residence:  Private Residence  Living Arrangements:  Alone   Support Systems:  Children, Family Members  Current Services PTA:     Potential Assistance Needed:  N/A  Potential Assistance Purchasing Medications:  No  Does patient want to participate in local refill/ meds to beds program?  No  [facility]  Patient expects to be discharged to:  home  Expected Discharge date:  08/08/20  Follow Up Appointment: Best Day/ Time: Wednesday PM    Patient Goals/Plan/Treatment Preferences: Spoke with pt. She lives at home alone. She does not drive, she uses [facility] to get to appts. States her daughter helps her at home as well. She has a Rollator in her room with her. Denies other DME needs. States she has aides that come 3 times a week from [address] [place] [address]. States she is current with [employer]. Current with her PCP. Transportation/Food Security/Housekeeping Addressed:  No issues identified.     Evaluation: yes

## 2020-08-10 NOTE — PROGRESS NOTES
AVS discussed with patient. Discussed new medications with patient and her follow up appointment. Patient has no questions or concerns at this time. Patient discharged in stable condition per wheelchair.

## 2020-08-10 NOTE — PLAN OF CARE
Problem: Impaired respiratory status  Goal: Clear lung sounds  8/10/2020 0804 by Alicia Chaves RCP  Outcome: Ongoing   Continue therapy as ordered to improve breath sounds/aeration.

## 2020-08-11 NOTE — DISCHARGE SUMMARY
Hospital Medicine Discharge Summary      Patient Identification:   Nando Pierre   : 1940  MRN: [id number]   Account: [de-identified]      Patient's PCP: Maribel Hassan DO    Admit Date: 2020     Discharge Date: 8/10/2020      Admitting Physician: No admitting provider for patient encounter. Discharge Physician: Vika Decker DO       Hospital Course:   Nando Pierre is a 78 y.o. female with past medical history of type 2 diabetes, CAD status post stenting, hypertension, hyperlipidemia, MAGDI on CPAP, rheumatoid arthritis on Plaquenil, prednisone admitted to [facility] on 2020 for shortness of breath that began after her infliximab infusion, which was her second dose she received. It is unclear if this was a transfusion reaction, however this is less likely as she had no anaphylactoid type symptoms such as angioedema, severe hypotension, hives, or wheezing upon initial arrival.  She is actually hypertensive and showed evidence of fluid overload. She was initially diuresed however then developed hypotension after IV Lasix was given, and following this developed an EMMA. Diuretics were held and she showed some improvement in EMMA that evening, however the following morning creatinine again worsened and was at 1.8. Given her history of CKD and prior creatinine elevations intermittently, nephrology was consulted for ongoing management. Work-up including renal ultrasound, urine eosinophils, and urinalysis was generally unremarkable and suspect that her etiology was overaggressive diuresis and hypotension. Her creatinine stabilized and will be discharged with a level of 1.6 with close follow-up with her nephrologist and repeat BMP. She is also started on steroids for a suspected asthma exacerbation, as she did develop some wheezing later during her course.   There is evidence of infiltrates on chest x-ray and she had a productive cough so started on empiric antibiotics as well, while her pro-David was normal her immunosuppression may have falsely lowered this. She gradually had improvement in her symptoms and was weaned down to room air and was able to ambulate without any desaturation. She did have mild anemia which showed iron deficiency and received a dose of Venofer, recommend consideration for GI evaluation with endoscopy as outpatient. We will repeat a chest x-ray PA and LAT in 6 weeks to document improvement in lung findings. Set up with home health care nurse per patient's preference. Adjustments to her medication regimen included cessation of Aldactone due to EMMA, decreasing hydralazine and Lopressor for bradycardia and low BP, and increasing her bowel regimen due to constipation likely related to opioid use as outpatient. She will continue on cefdinir and doxycycline for 5 additional days to complete her course of antibiotics. She will also continue on lactobacillus for GI prophylaxis. The patient was stable for discharge - all consultants were contacted and in agreement with plan for discharge. Appropriate follow up appointment was arranged prior to discharge. Please see below or view chart for more details from hospital course. Discharge Diagnoses:    Acute Hypoxic Respiratory Failure/Dyspnea: multifactorial - fluid overload/flash pulm edema vs inflixamab reaction vs asthma exacerbation. Also consideration for pneumonia with new infiltrate noted on 8/7 CXR. Low suspicion for PE with no LE edema, no chest pain-alternative etiologies explain hypoxia. -see below for diuresis and management of asthma / antibiotics  -CT chest obtained. LLL infiltrate and diffuse interstitial edema. RLL atelectasis. -CXR showing improvement, there was some bilateral pleural effusion -> will see if this can be drained (orders placed for thor today -> not enough for drainage). -Consideration for RA associated DPLD? ? Consider further workup as outpatient with HRCT. -acapella and IS  -Resolved - no O2 requirement upon ambulation     CAP: her procal is low, however is immunosupressed on infliximab and had mild leukocytosis on arrival. Will treat as CAP, with Rocephin and Doxy (has prolonged QT so avoid Azithro) - antibiotics started on 8/7. Negative legionella and strep, negative Covid.      Acute exacerbation of Asthma:  -start on solumedrol 40mg BID -> change to prednisone tomorrow 40mg daily. Taper back to home maintenance dose (for her RA)  -change symbicort to pulmicort and albuterol prn/WA  -antibiotics as above  -steroids  -improved     Acute decompensated Diastolic HF with acute exacerbation: elevated BNP and fluid overload on CXR, no significant peripheral edema. Favor more flash pulm edema after transfusion given overall picutre. -Echo completed: which does show grade 2 diastolic dysfunction however a normal EF. Elevated RVSP was noted. 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 However previously had been higher several years back. Suspect prerenal related to hypotension and aggressive diuresis. Was also on aldactone during diuresis. Was on celebrex as OP, will hold this as well.   -creat up from 0.8 to 1.8 on 8/7 improved to 1.4 then worsened to 1.6 and now 1.8 this am after diuresis attempt 8/8. -Appreciate Nephrology assistance (follows with Dr. Zulma Lawson group)  -noted sterile pyuria on UA, otherwise bland UA - negative eosinophils x2  -FeNa/FeUrea indicate a prerenal process.    -essentially normal renal US (elevated resistant index c/w medicorenal disease). Protein creat ratio is 0.76.    -hold aldactone.   Avoid hypotension  -stabilized at around 1.6  -strict I/O, monitor UOP     Mild Acute Anemia: with low iron, iron sat, and low normal ferritin. Will order dose of venofer 300mg x1. Consider outpatient GI eval.  10.9 on arrival to 9.0 (baseline around 11). Stablilized. Sinus Bradycardia: noted 8/8, check TSH - normal. Reduce lopressor dose and add hold parameters. Improved.      QT prolongation: noted 496qtc on arrival and 563 qtc on second ekg, repeat  on 8/8.    -Will avoid QT prolonging medications as able, likely the RBBB is causing prolongation at baseline. Telemetry, monitor Mg, K.      ?? Allergic Reaction to Infliximab: Her allergy list has been updated.     Hx of CAD s/p stenting: trop negative. Continue home meds - ASA, lopressor, ranexa, imdur. DM2: continue basal bolus regimen. Monitor while on steroids. Controlled.      Constipation: acute on chronic, on opioids at baseline.   -Added on movantic daily 8/8. Continue Senna BID and miralax daily  -Add colace BID and dulcolax pr 1x on 8/9 for continued constipation  -improved     HLD: continue statin, will increase to higher intensity dose given her elevated cholesterol. Cont fibrate.      HTN: with hypotension after diuresis. Adjusted regimen -> holding aldactone, norvasc, imdur, hydralazine  -restarted back on imdur. Hold aldactone with EMMA. -controlled but still slight elevation - add back norvasc 5mg     MAGDI on CPAP: utilizing home unit. Kristian Montelongo RA: on plaquenil, prednisone 5mg, previously infilximab. Prn voltaren and percocet. Bowel regimen. The patient was seen and examined on day of discharge and this discharge summary is in conjunction with any daily progress note from day of discharge.         Exam:     Vitals:  Vitals:    08/10/20 0759 08/10/20 0823 08/10/20 1129 08/10/20 1530   BP:  (!) 147/70 (!) 182/72 (!) 150/69   Pulse:  64 63    Resp:  20 18    Temp:  98.9 °F (37.2 °C) 98.7 °F (37.1 °C)    TempSrc:  Oral Oral    SpO2: 94% 93% 93%    Weight:         Weight: Weight: 167 lb 3.2 oz (75.8 kg)     24 of pleural fluid within the left chest however insufficient quantity to safely perform an ultrasound-guided thoracentesis. **This report has been created using voice recognition software. It may contain minor errors which are inherent in voice recognition technology. **      Final report electronically signed by Dr. Stephanie Figueroa on 8/10/2020 9:18 AM      XR CHEST PORTABLE   Final Result      Improving left lower lobe pneumonia or alveolar pulmonary edema and improved left pleural effusion. Persistent diffuse bilateral interstitial infiltrates. **This report has been created using voice recognition software. It may contain minor errors which are inherent in voice recognition technology. **      Final report electronically signed by Dr. Monisha Miller on 8/9/2020 3:14 AM      US RENAL COMPLETE   Final Result   Elevated renal cortical echogenicity elevated left resistive index bilateral renal cortical thinning. Findings consistent with chronic medical renal disease. **This report has been created using voice recognition software. It may contain minor errors which are inherent in voice recognition technology. **      Final report electronically signed by Dr. Beltran Hoffman on 8/8/2020 9:23 AM      CT CHEST WO CONTRAST   Final Result   1. Left lower lobe infiltrates suggesting pneumonic infiltrates. 2. Diffuse interstitial edema with a left pleural effusion. 3. Right lower lobe atelectasis. **This report has been created using voice recognition software. It may contain minor errors which are inherent in voice recognition technology. **      Final report electronically signed by Dr. Beltran Hoffman on 8/7/2020 3:29 PM      XR CHEST PORTABLE   Final Result   Development of left lower lobe airspace infiltrates in addition to persistent mild pulmonary vascular congestion. Pneumonic infiltrates are consideration. **This report has been created using voice recognition software.   It may contain minor errors which are inherent in voice recognition technology. **      Final report electronically signed by Dr. Gab Wynn on 8/7/2020 10:57 AM      XR CHEST (2 VW)   Final Result   1. Mild cardiomegaly. Tiny bilateral pleural effusions. 2. Minimal residual atelectasis/pneumonia/edema right lung base. 3. Overall appearance of chest markedly improved from prior. **This report has been created using voice recognition software. It may contain minor errors which are inherent in voice recognition technology. **      Final report electronically signed by Dr. Lisa Casanova on 8/6/2020 8:02 AM      XR CHEST PORTABLE   Final Result   Moderate CHF. **This report has been created using voice recognition software. It may contain minor errors which are inherent in voice recognition technology. **      Final report electronically signed by Dr. Nelly Cortez on 8/5/2020 5:06 PM      XR CHEST STANDARD (2 VW)    (Results Pending)          Consults:     IP CONSULT TO DIETITIAN  IP CONSULT TO CARDIOLOGY  IP CONSULT TO NEPHROLOGY  IP CONSULT TO HOME CARE NEEDS    Disposition:    [x] Home with home RN       [] TCU       [] Rehab       [] Psych       [] SNF       [] [place]       [] Other-    Condition at Discharge: Stable    Code Status:  Prior full    Patient Instructions:    Discharge lab work:  BMP 1 week  Activity: activity as tolerated  Diet: No diet orders on file      Follow-up visits:   Clemencia Lehman DO  [address] [place], [place] [phone]    Go on 8/18/2020  2:30 PM    Merlene Manrique MD  [place] [address]  [phone]    Go on 8/19/2020  11:15AM ; [facility] of [address] [facility] [address]  [phone]        [facility], [address] [place] [address] 2891 [phone]    Go on 8/17/2020  9:15 am          Discharge Medications:        Medication List START taking these medications    cefdinir 300 MG capsule  Commonly known as:  OMNICEF  Take 1 capsule by mouth 2 times daily for 5 days  Notes to patient:  Antibiotic for lung infection     docusate 100 MG Caps  Commonly known as:  COLACE, DULCOLAX  Take 100 mg by mouth 2 times daily  Notes to patient:  Helps with constipation     doxycycline hyclate 100 MG tablet  Commonly known as:  VIBRA-TABS  Take 1 tablet by mouth every 12 hours for 5 days  Notes to patient:  Antibiotic for lung infection     polyethylene glycol 17 g packet  Commonly known as:  GLYCOLAX  Take 17 g by mouth daily  Notes to patient:  For constipation, add to 4-6 ounces of liquid and drink. Probiotic Acidophilus Tabs  Take 1 tablet by mouth daily for 15 days  Notes to patient:  probiotic     senna 8.6 MG tablet  Commonly known as:  SENOKOT  Take 1 tablet by mouth 2 times daily  Notes to patient:  laxative        CHANGE how you take these medications    atorvastatin 40 MG tablet  Commonly known as:  LIPITOR  Take 2 tablets by mouth daily  What changed:  how much to take  Notes to patient:  Note dose change, decreases cholesterol     hydrALAZINE 50 MG tablet  Commonly known as:  APRESOLINE  Take 0.5 tablets by mouth 3 times daily  What changed:    · medication strength  · how much to take  Notes to patient:  Decreases blood pressure     metoprolol tartrate 25 MG tablet  Commonly known as:  LOPRESSOR  Take 1 tablet by mouth 2 times daily  What changed:    · medication strength  · how much to take  · when to take this  Notes to patient:  Decreases heart rate, allows heart to work more efficiently     * predniSONE 5 MG tablet  Commonly known as:  DELTASONE  TAKE 1 TABLET BY MOUTH EVERY DAY  What changed:  Another medication with the same name was added. Make sure you understand how and when to take each.   Notes to patient:  steroid     * predniSONE 20 MG tablet  Commonly known as:  DELTASONE  Take 2 tablets by mouth daily for 3 days, THEN 1 tablet daily for 4 days, THEN 0.5 tablets daily for 2 days. Start taking on:  August 11, 2020  What changed: You were already taking a medication with the same name, and this prescription was added. Make sure you understand how and when to take each. Notes to patient:  Steroid to decrease inflammation         * This list has 2 medication(s) that are the same as other medications prescribed for you. Read the directions carefully, and ask your doctor or other care provider to review them with you. CONTINUE taking these medications    albuterol sulfate  (90 Base) MCG/ACT inhaler     amLODIPine 5 MG tablet  Commonly known as:  NORVASC     aspirin 81 MG EC tablet     Dexilant 60 MG Cpdr delayed release capsule  Generic drug:  dexlansoprazole     diclofenac sodium 1 % Gel  Commonly known as:  VOLTAREN  Apply 2 g topically 4 times daily     DULoxetine 30 MG extended release capsule  Commonly known as:  CYMBALTA     famotidine 40 MG tablet  Commonly known as:  PEPCID     fenofibrate 145 MG tablet  Commonly known as:  TRICOR     hydroxychloroquine 200 MG tablet  Commonly known as:  PLAQUENIL  TAKE 1 TABLET BY MOUTH EVERY DAY     insulin glargine 100 UNIT/ML injection pen  Commonly known as:  LANTUS;BASAGLAR     isosorbide mononitrate 120 MG extended release tablet  Commonly known as:  Imdur  Take 1 tablet by mouth daily     magnesium oxide 400 MG tablet  Commonly known as:  MAG-OX     montelukast 5 MG chewable tablet  Commonly known as:  SINGULAIR     nitroGLYCERIN 0.4 MG SL tablet  Commonly known as:  NITROSTAT     oxyCODONE-acetaminophen  MG per tablet  Commonly known as:  PERCOCET     potassium chloride 20 MEQ packet  Commonly known as:  KLOR-CON     ranolazine 500 MG extended release tablet  Commonly known as:  Ranexa  Take 1 tablet by mouth 2 times daily.      Symbicort 160-4.5 MCG/ACT Aero  Generic drug:  budesonide-formoterol  Notes to patient:  Rinse mouth after     traZODone 50 MG

## 2020-08-12 NOTE — PROGRESS NOTES
CLINICAL PHARMACY NOTE: MEDS TO [email or web address] [facility] Patient?: No  Total # of Prescriptions Filled: 10   The following medications were delivered to the patient:  Senna  Acidophilus  Docusate 100mg  Metoprolol Tartrate 25mg  Prednisone 20mg  Atorvastatin 40mg  Hydralazine 50mg  Doxycycline 100mg  Cefdinir 300mg  Clearlax  Total # of Interventions Completed: 2  Time Spent (min): 30    Additional Documentation:

## 2020-08-31 RX ORDER — PREDNISONE 1 MG/1
TABLET ORAL
Qty: 30 TABLET | Refills: 1 | Status: SHIPPED | OUTPATIENT
Start: 2020-08-31 | End: 2020-11-06

## 2020-09-10 LAB
ALBUMIN SERPL-MCNC: 3.6 G/DL
ALP BLD-CCNC: 52 U/L
ALT SERPL-CCNC: 18 U/L
ANION GAP SERPL CALCULATED.3IONS-SCNC: 10 MMOL/L
AST SERPL-CCNC: 19 U/L
BASOPHILS ABSOLUTE: 0 /ΜL
BASOPHILS RELATIVE PERCENT: 0.6 %
BILIRUB SERPL-MCNC: 0.3 MG/DL (ref 0.1–1.4)
BUN BLDV-MCNC: 19 MG/DL
C-REACTIVE PROTEIN: 0.14
CALCIUM SERPL-MCNC: 8.9 MG/DL
CHLORIDE BLD-SCNC: 111 MMOL/L
CO2: 25 MMOL/L
CREAT SERPL-MCNC: 0.9 MG/DL
EOSINOPHILS ABSOLUTE: 0.1 /ΜL
EOSINOPHILS RELATIVE PERCENT: 2.1 %
GFR CALCULATED: 60
GLUCOSE BLD-MCNC: 104 MG/DL
HCT VFR BLD CALC: 34.7 % (ref 36–46)
HEMOGLOBIN: 11.2 G/DL (ref 12–16)
LYMPHOCYTES ABSOLUTE: 2.5 /ΜL
LYMPHOCYTES RELATIVE PERCENT: 40.8 %
MCH RBC QN AUTO: 27.4 PG
MCHC RBC AUTO-ENTMCNC: 32.2 G/DL
MCV RBC AUTO: 85.1 FL
MONOCYTES ABSOLUTE: 0.6 /ΜL
MONOCYTES RELATIVE PERCENT: 10.2 %
NEUTROPHILS ABSOLUTE: 2.8 /ΜL
NEUTROPHILS RELATIVE PERCENT: 46.3 %
PDW BLD-RTO: 18.2 %
PLATELET # BLD: 232 K/ΜL
PMV BLD AUTO: 8.7 FL
POTASSIUM SERPL-SCNC: 4.7 MMOL/L
RBC # BLD: 4.08 10^6/ΜL
SEDIMENTATION RATE, ERYTHROCYTE: 7
SODIUM BLD-SCNC: 141 MMOL/L
TOTAL PROTEIN: 6.3
WBC # BLD: 6.1 10^3/ML

## 2020-09-14 ENCOUNTER — TELEPHONE (OUTPATIENT)
Dept: RHEUMATOLOGY | Age: 80
End: 2020-09-14

## 2020-09-14 NOTE — TELEPHONE ENCOUNTER
----- Message from Lucero Shen DO sent at 9/11/2020 10:55 AM EDT -----  Blood testing significantly improved with resolution of anemia and normalization of the inflammatory markers. Kidney function test have returned to normal limits.

## 2020-09-15 ENCOUNTER — TELEPHONE (OUTPATIENT)
Dept: PHYSICAL MEDICINE AND REHAB | Age: 80
End: 2020-09-15

## 2020-09-15 ENCOUNTER — OFFICE VISIT (OUTPATIENT)
Dept: RHEUMATOLOGY | Age: 80
End: 2020-09-15
Payer: MEDICARE

## 2020-09-15 VITALS
WEIGHT: 170.8 LBS | OXYGEN SATURATION: 96 % | TEMPERATURE: 97.8 F | BODY MASS INDEX: 27.45 KG/M2 | DIASTOLIC BLOOD PRESSURE: 68 MMHG | HEART RATE: 75 BPM | HEIGHT: 66 IN | SYSTOLIC BLOOD PRESSURE: 150 MMHG

## 2020-09-15 PROCEDURE — G8399 PT W/DXA RESULTS DOCUMENT: HCPCS | Performed by: INTERNAL MEDICINE

## 2020-09-15 PROCEDURE — 99214 OFFICE O/P EST MOD 30 MIN: CPT | Performed by: INTERNAL MEDICINE

## 2020-09-15 PROCEDURE — G8417 CALC BMI ABV UP PARAM F/U: HCPCS | Performed by: INTERNAL MEDICINE

## 2020-09-15 PROCEDURE — 1090F PRES/ABSN URINE INCON ASSESS: CPT | Performed by: INTERNAL MEDICINE

## 2020-09-15 PROCEDURE — G8427 DOCREV CUR MEDS BY ELIG CLIN: HCPCS | Performed by: INTERNAL MEDICINE

## 2020-09-15 PROCEDURE — 4004F PT TOBACCO SCREEN RCVD TLK: CPT | Performed by: INTERNAL MEDICINE

## 2020-09-15 PROCEDURE — 1123F ACP DISCUSS/DSCN MKR DOCD: CPT | Performed by: INTERNAL MEDICINE

## 2020-09-15 PROCEDURE — 4040F PNEUMOC VAC/ADMIN/RCVD: CPT | Performed by: INTERNAL MEDICINE

## 2020-09-15 ASSESSMENT — ENCOUNTER SYMPTOMS
DIARRHEA: 0
TROUBLE SWALLOWING: 0
EYE ITCHING: 0
EYE PAIN: 0
NAUSEA: 0
ABDOMINAL PAIN: 0
COUGH: 1
WHEEZING: 1
BACK PAIN: 1
CONSTIPATION: 0
SHORTNESS OF BREATH: 1

## 2020-09-15 NOTE — PROGRESS NOTES
[facility] RHEUMATOLOGY FOLLOW UP NOTE       Date Of Service: 9/15/2020  Provider: Ariana Alfaro DO    Name: Mindi Faustin   MRN: [id number]    Chief Complaint(s)     Chief Complaint   Patient presents with    Follow-up      seronegative rheumatoid arthritis        History of Present Illness (HPI)     Mindi Faustin  is a(n)80 y.o. female with a hx of obesity, angina, CAD, T2DM, non-rheumatic mitral regurg, HTN, GERD, CKD stage III, COPD, depression here for the f/u evaluation of osteoarthritis multiple joints, inflammatory arthritis, chondrocalcinosis bilateral wrists (? CPPD arthropathy), hypercalcemia. Interval hx:     -- remiciade stopped -- developed acute SOB, elevated heart rate, vision change in the Right eye. Diagnosed with A. Fib w/ RVR, CXR concerning for CHF. STarted on diruretics, found to have BNP elevation. -- pain management evaluation last week with no significant pain relief from injections. pain affecting the bilateral hands, wrists, elbows, shoulders, hips, knees, toes, neck, back Pain on a scale 0-10: 9/10,  Type of pain: constant Timing: n/a Aggravating factors: weather changes, hands: use, knees: standing, back: walking, standing, bending, prolonged sitting. Neck: turning head to left Alleviating factors: percocet . Associated symptoms:+ swelling/  Redness/ warmth (bilateral knees), + AM stiffness lasting ~ all day. Continued swelling of hands reported. Osteoporosis - 3 falls since the last evaluation. Denies injury/fractures. Last prolia may 2020. REVIEW OF SYSTEMS: (ROS)    Review of Systems   Constitutional: Negative for fatigue, fever and unexpected weight change. HENT: Negative for congestion and trouble swallowing. Dry mouth   Eyes: Negative for pain and itching. Dry eyes   Respiratory: Positive for cough, shortness of breath and wheezing. Cardiovascular: Positive for chest pain. Negative for leg swelling.    Gastrointestinal: Negative for abdominal pain, constipation, diarrhea and nausea. Endocrine: Negative for cold intolerance and heat intolerance. Genitourinary: Negative for difficulty urinating, frequency and urgency. Musculoskeletal: Positive for arthralgias, back pain, joint swelling, myalgias and neck pain. Skin: Negative for rash. Neurological: Negative for dizziness, weakness, numbness and headaches. Hematological: Bruises/bleeds easily. Psychiatric/Behavioral: The patient is not nervous/anxious.         PAST MEDICAL HISTORY      Past Medical History:   Diagnosis Date    Arthritis     Asthma 1978    [facility]rial fibrillation [facility]     Blood circulation, collateral     CAD (coronary artery disease)     CHF (congestive heart failure) ([facility] 1999    [facility], stage III (moderate) [facility]     COPD (chronic obstructive pulmonary disease) ([facility]     Depression     Diabetes mellitus ([facility]     Diabetic hypertension-nephrosis syndrome (HCC)     Dysuria     GERD (gastroesophageal reflux disease)     Hyperlipidemia     Hypertension     Unspecified cerebral artery occlusion with cerebral infarction 1999    Dr Ramachandran Amen informed patient after Heart Attack       PAST SURGICAL HISTORY      Past Surgical History:   Procedure Laterality Date    APPENDECTOMY      BACK SURGERY      lumbar spine    BLADDER SUSPENSION      CARDIAC CATHETERIZATION  2009    [facility] stent  [facility]    CARPAL TUNNEL RELEASE      COLONOSCOPY  2013    ENDOSCOPY, COLON, DIAGNOSTIC      ESOPHAGEAL DILATATION      HYSTERECTOMY  1974    JOINT REPLACEMENT Left 1992    Left Knee       FAMILY HISTORY      Family History   Problem Relation Age of Onset    Heart Disease Mother     Diabetes Mother     Kidney Disease Mother     Heart Disease Father     Diabetes Father     Cancer Sister     Stroke Brother     Cancer Sister        SOCIAL HISTORY      Social History     Tobacco History     Smoking Status  Light Tobacco Smoker Smoking Start Date  10/21/2013 Smoking Frequency  0.15 packs/day for 10 years (1.5 pk yrs) Smoking Tobacco Type  Cigarettes    Smokeless Tobacco Use  Never Used          Alcohol History     Alcohol Use Status  No          Drug Use     Drug Use Status  No          Sexual Activity     Sexually Active  Never                ALLERGIES     Allergies   Allergen Reactions    Renflexis [Infliximab] Shortness Of Breath and Palpitations    Nuts [Peanut-Containing Drug Products]      Pt suffers from diverticulitis and nut products irritate her stomach.  Strawberry (Diagnostic)      Diverticulitis    Sulfa Antibiotics     Tape Homero Sers Tape] Other (See Comments)     tears skin off    Bactrim [Sulfamethoxazole-Trimethoprim] Rash     Little red rash    Chocolate Rash       CURRENT MEDICATIONS      Current Outpatient Medications   Medication Sig Dispense Refill    predniSONE (DELTASONE) 5 MG tablet TAKE 1 TABLET BY MOUTH EVERY DAY 30 tablet 1    atorvastatin (LIPITOR) 40 MG tablet Take 2 tablets by mouth daily 60 tablet 2    docusate sodium (COLACE, DULCOLAX) 100 MG CAPS Take 100 mg by mouth 2 times daily 60 capsule 1    metoprolol tartrate (LOPRESSOR) 25 MG tablet Take 1 tablet by mouth 2 times daily 60 tablet 3    hydrALAZINE (APRESOLINE) 50 MG tablet Take 0.5 tablets by mouth 3 times daily 90 tablet 1    hydroxychloroquine (PLAQUENIL) 200 MG tablet TAKE 1 TABLET BY MOUTH EVERY DAY 90 tablet 1    diclofenac sodium 1 % GEL Apply 2 g topically 4 times daily 2 Tube 1    insulin glargine (LANTUS) 100 UNIT/ML injection pen Inject 8 Units into the skin nightly       oxyCODONE-acetaminophen (PERCOCET)  MG per tablet Take 1 tablet by mouth every 4 hours as needed for Pain. Lance Goldman amLODIPine (NORVASC) 5 MG tablet Take 5 mg by mouth daily      dexlansoprazole (DEXILANT) 60 MG CPDR delayed release capsule Take 60 mg by mouth daily      isosorbide mononitrate (IMDUR) 120 MG extended release tablet Take 1 tablet by mouth daily 30 tablet 3    potassium chloride (KLOR-CON) 20 MEQ packet Take 10 mEq by mouth daily       famotidine (PEPCID) 40 MG tablet Take 40 mg by mouth daily      fenofibrate (TRICOR) 145 MG tablet Take 145 mg by mouth daily      aspirin 81 MG EC tablet Take 162 mg by mouth daily      ranolazine (RANEXA) 500 MG SR tablet Take 1 tablet by mouth 2 times daily. 60 tablet 3    budesonide-formoterol (SYMBICORT) 160-4.5 MCG/ACT AERO Inhale 2 puffs into the lungs 2 times daily.  magnesium oxide (MAG-OX) 400 MG tablet Take 400 mg by mouth 2 times daily.  DULoxetine (CYMBALTA) 30 MG capsule Take 30 mg by mouth 2 times daily.  albuterol (PROVENTIL HFA;VENTOLIN HFA) 108 (90 BASE) MCG/ACT inhaler Inhale 2 puffs into the lungs every 6 hours as needed.  montelukast (SINGULAIR) 5 MG chewable tablet Take 5 mg by mouth nightly.  traZODone (DESYREL) 50 MG tablet Take 100 mg by mouth nightly       nitroGLYCERIN (NITROSTAT) 0.4 MG SL tablet Place 0.4 mg under the tongue every 5 minutes as needed. No current facility-administered medications for this visit. PHYSICAL EXAMINATION / OBJECTIVE   Objective:  BP (!) 150/68 (Site: Left Upper Arm, Position: Sitting, Cuff Size: Large Adult)   Pulse 75   Temp 97.8 °F (36.6 °C)   Ht 5' 5.98\" (1.676 m)   Wt 170 lb 12.8 oz (77.5 kg)   SpO2 96%   BMI 27.58 kg/m²     Physical Exam  Vitals signs reviewed. Constitutional:       Appearance: She is well-developed. Neck:      Musculoskeletal: Normal range of motion and neck supple. Cardiovascular:      Rate and Rhythm: Normal rate and regular rhythm. Pulmonary:      Effort: Pulmonary effort is normal.      Breath sounds: Normal breath sounds. Abdominal:      Palpations: Abdomen is soft. Tenderness: There is no abdominal tenderness. Skin:     General: Skin is warm and dry. Findings: No rash. Neurological:      Mental Status: She is alert and oriented to person, place, and time.       Deep Tendon Reflexes: Reflexes are normal and symmetric. Psychiatric:         Thought Content: Thought content normal.     Musculoskeletal:     Normal gait. Strength 5/5 in biceps, triceps, hips, knees,      Upper extremities:   Shoulders:  + tender bilat,  Elbows:      + tender bila   Wrists        + tender bilat with swelling along the lateral aspect of wrist  Hands:     + tender bilat MCPs, PIPs. + boggy PIPs bilat, MCPS bilat. Lower extremities:  Hips:      + tender bilat outer hips,  No Deformities and intact ROM  Knees :   + tender  Bilat.    Ankles:   + tender bilat, no swelling,   Feet:       + MTP compression,    Spine- tender         RAPID 3:   9/15/2020 --- RAPID 3: 2.3 + 9 + 8.5 = 22.8     Remission: <3  Low Disease Activity: <6  Moderate Disease Activity: >=6 and <=12  High Disease Activity: >12     LABS    CBC  Lab Results   Component Value Date    WBC 6.1 09/10/2020    RBC 4.08 09/10/2020    HGB 11.2 09/10/2020    HCT 34.7 09/10/2020    MCV 85.1 09/10/2020    MCH 27.4 09/10/2020    MCHC 32.2 09/10/2020    RDW 18.2 09/10/2020     09/10/2020       CMP  Lab Results   Component Value Date    CALCIUM 8.9 09/10/2020    LABALBU 3.6 09/10/2020    PROT 6.0 08/09/2020     09/10/2020    K 4.7 09/10/2020    K 4.0 08/06/2020    CO2 25 09/10/2020     09/10/2020    BUN 19 09/10/2020    CREATININE 0.9 09/10/2020    ALKPHOS 52 09/10/2020    ALT 18 09/10/2020    AST 19 09/10/2020       HgBA1c: No components found for: HGBA1C    Lab Results   Component Value Date    VITD25 34 12/28/2017         No results found for: ANASCRN  No results found for: SSA  No results found for: SSB  No results found for: ANTI-SMITH  No results found for: DSDNAAB   No results found for: ANTIRNP  No results found for: C3, C4  No results found for: CCPAB  Lab Results   Component Value Date    RF <15 11/09/2017       No components found for: CANCASCRN, APANCASCRN  Lab Results   Component Value Date    SEDRATE 7 09/10/2020     Lab Results   Component Value Date    CRP 0.14 09/10/2020       RADIOLOGY:       TTE 8/5/2020:   Summary   Normal left ventricle size and systolic function. Ejection fraction was   estimated at 50-55%. There were no regional left ventricular wall motion   abnormalities and wall thickness was within normal limits. Features were consistent with a pseudonormal left ventricular filling   pattern, with concomitant abnormal relaxation and increased filling   pressure (grade 2 diastolic dysfunction). The right ventricular size was normal with normal systolic function and   wall thickness. Right ventricular systolic pressure of 40 mm Hg consistent with mild   pulmonary hypertension. The mitral valve structure was normal with normal leaflet separation. DOPPLER: The transmitral velocity was within the normal range with no   evidence for mitral stenosis. There was moderate central jetf mitral   regurgitation. Signature  ASSESSMENT/PLAN    Assessment   Plan     Seronegative rheumatoid arthritis  - negative JAMAL, RF, CCP, SSA, SSB, normal uric acid and negative secondary evaluation for CPPD arthropathy. - Previous therapy:Lumbar Epidural injections (10/11/17) from main management, monthly trigger point injections. , physical therapy, tramadol (no relief), gabapentin (no relief), lyrica (not covered by insurance). Prednisone (high blood sugars and high blood pressure) arava 10mg qd (Diarrhea) , Sulfa allergy remicaide 3 mg/kg q 8 weeks (heart failure exacerbation), sulfa allergic. , methotrexate 15mg (LFT elevation)    - avoiding IL6 & GUILLERMO- diverticulitis, T cell inhibition- COPD     -  Limited Tx options - discussed trial of rituxan -- information provided. Asked to call if she would. - methotrexate 7.5 mg subcu weekly   - leucovorin 5 mg weekly   - percocet 5/325 mg from pain management   - referral to Dr. Juan Simmons for 2nd opinon.       Bilateral hip osteoarthritis  Trochanteric bursitis  - prior tx: bursal injections   - hip replacement on hold due to cardiac concerns    Bilateral hand paresthesias  - prior CTS, cont wrist swelling. Chronic pain of right knee  - h/o osteoarthritis, knee pain worse with wt bearing, improved w/ non wt bearing  - XR right knee (11/2017)- no abnormalities  - avoiding oral NSAIDs given CKD and CAD   - cymbalta 30 mg daily   - percocet from pain management    Osteopenia w/ elevated FRAX   - 13%  Major and 4.5% hip (FEB 2019)   - Prolia 60 mg subcu q 6 months (Oct 2019)    Tobacco dependence - stressed importance of smoking cessation. Medication monitoring   - CBC, CMP, sed rate, CRP q 8 weeks- DUE FOR LABS      No follow-ups on file. Electronically signed by Rhonda Archuleta DO on 9/15/2020 at 2:06 PM    New Prescriptions    No medications on file       Thank you for allowing me to participate in the care of this patient. Please call if there are any questions.

## 2020-09-15 NOTE — PATIENT INSTRUCTIONS
Patient Education        rituximab  Pronunciation:  travis Millermonisha Truxima  What is the most important information I should know about rituximab? Rituximab may cause a serious brain infection that can lead to disability or death. Call your doctor right away if you have problems with speech, thought, vision, or muscle movement. These symptoms may start gradually and get worse quickly. Tell your doctor if you have ever had hepatitis B. Rituximab can cause this condition to come back or get worse. Severe skin problems can also occur during treatment with rituximab. Call your doctor if you have painful skin or mouth sores, or a severe skin rash with blistering, peeling, or pus. Some side effects may occur during the injection or within 24 hours afterward. Tell your caregiver right away  if you feel itchy, dizzy, weak, light-headed, short of breath, or if you have chest pain, wheezing, sudden cough, or pounding heartbeats or fluttering in your chest.  What is rituximab? Rituximab is used alone or in combination with other medicines to treat the following conditions in adults:  · non-Hodgkin's lymphoma or chronic lymphocytic leukemia;  · rheumatoid arthritis; or  · a severe autoimmune reaction that causes blisters and breakdown of the skin and mucous membranes. Rituximab is also used in adults and children at least 3years old with certain rare disorders that cause inflammation of blood vessels and other tissues in the body. Rituximab may also be used for purposes not listed in this medication guide. What should I discuss with my healthcare provider before receiving rituximab? Rituximab may cause a serious brain infection that can lead to disability or death. This infection may be more likely if have used an immunosuppressant drug in the past, or if you have received rituximab with a stem cell transplant. You should not be treated with rituximab if you are allergic to it.   Tell your doctor if you have ever had:  · liver disease or hepatitis (or if you are a carrier of hepatitis B);  · an active infection, including herpes, shingles, cytomegalovirus, chickenpox, parvovirus, West Nile virus, or hepatitis B or C;  · kidney disease;  · lung disease or a breathing disorder;  · a weak immune system (caused by disease or by using certain medicines);  · heart disease, angina (chest pain), or heart rhythm disorder; or  · if you have used rituximab in the past.  You should be up-to-date on any needed immunizations before starting treatment with rituximab. Tell your doctor if you (or a child receiving rituximab) have received any vaccines within the past 4 weeks. Do not use rituximab if you are pregnant. It could harm the unborn baby. Use effective birth control to prevent pregnancy while you are using this medicine and for at least 12 months after your last dose. Do not breastfeed while using this medicine,  and for at least 6 months after your last dose. How is rituximab given? Your doctor will perform blood tests to make sure you do not have conditions that would prevent you from safely using rituximab. Rituximab is given as an infusion into a vein. A healthcare provider will give you this injection. Rituximab is not given daily. Your schedule will depend on the condition being treated. Follow your doctor's dosing instructions very carefully. Before each injection, you may be given other medications to prevent certain side effects of rituximab. You will need frequent medical tests. If you've ever had hepatitis B, using rituximab can cause this virus to become active or get worse. You may need frequent liver function tests while using this medicine and for several months after you stop. If you need surgery, tell the surgeon ahead of time that you are using rituximab. What happens if I miss a dose? Call your doctor if you miss an appointment for your rituximab injection.   What happens if I overdose? Since this medication is given by a healthcare professional in a medical setting, an overdose is unlikely to occur. What should I avoid while receiving rituximab? Do not receive a \"live\" vaccine while using rituximab. Live vaccines include measles, mumps, rubella (MMR), rotavirus, typhoid, yellow fever, varicella (chickenpox), zoster (shingles), and nasal flu (influenza) vaccine. What are the possible side effects of rituximab? Get emergency medical help if you have signs of an allergic reaction (hives, difficult breathing, swelling in your face or throat) or a severe skin reaction (fever, sore throat, burning eyes, skin pain, red or purple skin rash with blistering and peeling). Some side effects may occur during the injection (or within 24 hours afterward). Tell your caregiver right away  if you feel itchy, dizzy, weak, light-headed, short of breath, or if you have chest pain, wheezing, sudden cough, or pounding heartbeats or fluttering in your chest.  Rituximab may cause a serious brain infection that can lead to disability or death. Call your doctor right away if you have any of the following symptoms (which may start gradually and get worse quickly):  · confusion, memory problems, or other changes in your mental state;  · weakness on one side of your body;  · vision changes; or  · problems with speech or walking. Call your doctor at once if you have any of these other side effects, even if they occur several months after you receive rituximab, or after your treatment ends.   · fever, chills, cold or flu symptoms, cough, sore throat, headache, earache;  · pain or burning when you urinate;  · painful skin or mouth sores, or a severe skin rash with blistering, peeling, or pus;  · redness, warmth, or swelling of the skin;  · severe stomach pain, vomiting, constipation, bloody or tarry stools;  · chest pain, irregular heartbeats;  · tiredness or jaundice (yellowing of the skin or eyes);  · low red blood cells (anemia) --pale skin, unusual tiredness, feeling light-headed or short of breath, cold hands and feet; or  · signs of tumor cell breakdown --confusion, weakness, muscle cramps, nausea, vomiting, fast or slow heart rate, decreased urination, tingling in your hands and feet or around your mouth. Common side effects may include:  · fever, chills, body aches, anemia, infections;  · nausea, diarrhea;  · swelling in your hands or feet;  · headache, weakness;  · painful urination;  · muscle spasms;  · depressed mood; or  · cold symptoms such as stuffy nose, sneezing, sore throat. This is not a complete list of side effects and others may occur. Call your doctor for medical advice about side effects. You may report side effects to FDA at 8-809-FDA-2583. What other drugs will affect rituximab? Tell your doctor about all your other medicines, especially:  · medicines to treat conditions such as rheumatoid arthritis, Crohn's disease, ulcerative colitis, or psoriasis --adalimumab, certolizumab, etanercept, golimumab, infliximab, leflunomide, methotrexate, sulfasalazine, tocilizumab, tofacitinib, and others. This list is not complete. Other drugs may affect rituximab, including prescription and over-the-counter medicines, vitamins, and herbal products. Not all possible drug interactions are listed here. Where can I get more information? Your doctor or pharmacist can provide more information about rituximab. Remember, keep this and all other medicines out of the reach of children, never share your medicines with others, and use this medication only for the indication prescribed. Every effort has been made to ensure that the information provided by [address] is accurate, up-to-date, and complete, but no guarantee is made to that effect. Drug information contained herein may be time sensitive.  [employer] information has been compiled for use by healthcare practitioners and consumers in the [place] and therefore [employer] does not warrant that uses outside of the [place] are appropriate, unless specifically indicated otherwise. [facility] drug information does not endorse drugs, diagnose patients or recommend therapy. [facility] drug information is an informational resource designed to assist licensed healthcare practitioners in caring for their patients and/or to serve consumers viewing this service as a supplement to, and not a substitute for, the expertise, skill, knowledge and judgment of healthcare practitioners. The absence of a warning for a given drug or drug combination in no way should be construed to indicate that the drug or drug combination is safe, effective or appropriate for any given patient. [facility] does not assume any responsibility for any aspect of healthcare administered with the aid of information [facility] provides. The information contained herein is not intended to cover all possible uses, directions, precautions, warnings, drug interactions, allergic reactions, or adverse effects. If you have questions about the drugs you are taking, check with your doctor, nurse or pharmacist.  Copyright 2307-5134 [address]: 13.01. Revision date: 2/10/2020. Care instructions adapted under license by [facility] ([facility]). If you have questions about a medical condition or this instruction, always ask your healthcare professional. [place] [address] any warranty or liability for your use of this information.

## 2020-09-15 NOTE — TELEPHONE ENCOUNTER
Called Dr. Morley Rank office regarding referral.  He is not taking new patients at this time. Please advise.

## 2020-09-28 ENCOUNTER — HOSPITAL ENCOUNTER (OUTPATIENT)
Age: 80
Discharge: HOME | End: 2020-09-28
Payer: MEDICARE

## 2020-09-28 PROCEDURE — U0003 INFECTIOUS AGENT DETECTION BY NUCLEIC ACID (DNA OR RNA); SEVERE ACUTE RESPIRATORY SYNDROME CORONAVIRUS 2 (SARS-COV-2) (CORONAVIRUS DISEASE [COVID-19]), AMPLIFIED PROBE TECHNIQUE, MAKING USE OF HIGH THROUGHPUT TECHNOLOGIES AS DESCRIBED BY CMS-2020-01-R: HCPCS

## 2020-09-29 LAB
PERFORMING LAB: NORMAL
REPORT: NORMAL
SARS-COV-2: NOT DETECTED

## 2020-09-30 RX ORDER — DIPHENHYDRAMINE HCL 25 MG
50 TABLET ORAL ONCE
Status: CANCELLED | OUTPATIENT
Start: 2020-09-30 | End: 2020-09-30

## 2020-10-01 ENCOUNTER — HOSPITAL ENCOUNTER (OUTPATIENT)
Dept: INPATIENT UNIT | Age: 80
Discharge: HOME | End: 2020-10-01
Attending: INTERNAL MEDICINE | Admitting: INTERNAL MEDICINE
Payer: MEDICARE

## 2020-10-01 VITALS
SYSTOLIC BLOOD PRESSURE: 163 MMHG | BODY MASS INDEX: 27.66 KG/M2 | RESPIRATION RATE: 21 BRPM | HEART RATE: 79 BPM | WEIGHT: 166 LBS | OXYGEN SATURATION: 95 % | TEMPERATURE: 98.5 F | HEIGHT: 65 IN | DIASTOLIC BLOOD PRESSURE: 82 MMHG

## 2020-10-01 LAB
ABO: NORMAL
ALBUMIN SERPL-MCNC: 3.8 G/DL (ref 3.5–5.1)
ALP BLD-CCNC: 53 U/L (ref 38–126)
ALT SERPL-CCNC: 13 U/L (ref 11–66)
ANION GAP SERPL CALCULATED.3IONS-SCNC: 10 MEQ/L (ref 8–16)
ANTIBODY SCREEN: NORMAL
AST SERPL-CCNC: 14 U/L (ref 5–40)
BILIRUB SERPL-MCNC: 0.3 MG/DL (ref 0.3–1.2)
BUN BLDV-MCNC: 24 MG/DL (ref 7–22)
CALCIUM SERPL-MCNC: 9.5 MG/DL (ref 8.5–10.5)
CHLORIDE BLD-SCNC: 112 MEQ/L (ref 98–111)
CHOLESTEROL, TOTAL: 232 MG/DL (ref 100–199)
CO2: 23 MEQ/L (ref 23–33)
CREAT SERPL-MCNC: 0.9 MG/DL (ref 0.4–1.2)
EKG ATRIAL RATE: 53 BPM
EKG Q-T INTERVAL: 498 MS
EKG QRS DURATION: 128 MS
EKG QTC CALCULATION (BAZETT): 498 MS
EKG R AXIS: -5 DEGREES
EKG T AXIS: 38 DEGREES
EKG VENTRICULAR RATE: 60 BPM
ERYTHROCYTE [DISTWIDTH] IN BLOOD BY AUTOMATED COUNT: 18.5 % (ref 11.5–14.5)
ERYTHROCYTE [DISTWIDTH] IN BLOOD BY AUTOMATED COUNT: 60.3 FL (ref 35–45)
GFR SERPL CREATININE-BSD FRML MDRD: 60 ML/MIN/1.73M2
GLUCOSE BLD-MCNC: 103 MG/DL (ref 70–108)
HCT VFR BLD CALC: 39 % (ref 37–47)
HDLC SERPL-MCNC: 74 MG/DL
HEMOGLOBIN: 12.2 GM/DL (ref 12–16)
INR BLD: 1.06 (ref 0.85–1.13)
LDL CHOLESTEROL CALCULATED: 133 MG/DL
MCH RBC QN AUTO: 27.9 PG (ref 26–33)
MCHC RBC AUTO-ENTMCNC: 31.3 GM/DL (ref 32.2–35.5)
MCV RBC AUTO: 89 FL (ref 81–99)
PLATELET # BLD: 206 THOU/MM3 (ref 130–400)
PMV BLD AUTO: 10.4 FL (ref 9.4–12.4)
POTASSIUM REFLEX MAGNESIUM: 4.7 MEQ/L (ref 3.5–5.2)
RBC # BLD: 4.38 MILL/MM3 (ref 4.2–5.4)
RH FACTOR: NORMAL
SODIUM BLD-SCNC: 145 MEQ/L (ref 135–145)
TOTAL PROTEIN: 6.8 G/DL (ref 6.1–8)
TRIGL SERPL-MCNC: 125 MG/DL (ref 0–199)
WBC # BLD: 8 THOU/MM3 (ref 4.8–10.8)

## 2020-10-01 PROCEDURE — 2580000003 HC RX 258: Performed by: INTERNAL MEDICINE

## 2020-10-01 PROCEDURE — 86850 RBC ANTIBODY SCREEN: CPT

## 2020-10-01 PROCEDURE — 85027 COMPLETE CBC AUTOMATED: CPT

## 2020-10-01 PROCEDURE — 93005 ELECTROCARDIOGRAM TRACING: CPT | Performed by: INTERNAL MEDICINE

## 2020-10-01 PROCEDURE — 85610 PROTHROMBIN TIME: CPT

## 2020-10-01 PROCEDURE — C1769 GUIDE WIRE: HCPCS

## 2020-10-01 PROCEDURE — 36415 COLL VENOUS BLD VENIPUNCTURE: CPT

## 2020-10-01 PROCEDURE — 2709999900 HC NON-CHARGEABLE SUPPLY

## 2020-10-01 PROCEDURE — 93458 L HRT ARTERY/VENTRICLE ANGIO: CPT | Performed by: INTERNAL MEDICINE

## 2020-10-01 PROCEDURE — 6360000004 HC RX CONTRAST MEDICATION: Performed by: INTERNAL MEDICINE

## 2020-10-01 PROCEDURE — 6360000002 HC RX W HCPCS

## 2020-10-01 PROCEDURE — 86900 BLOOD TYPING SEROLOGIC ABO: CPT

## 2020-10-01 PROCEDURE — C1894 INTRO/SHEATH, NON-LASER: HCPCS

## 2020-10-01 PROCEDURE — 86901 BLOOD TYPING SEROLOGIC RH(D): CPT

## 2020-10-01 PROCEDURE — 2500000003 HC RX 250 WO HCPCS

## 2020-10-01 PROCEDURE — 80061 LIPID PANEL: CPT

## 2020-10-01 PROCEDURE — 80053 COMPREHEN METABOLIC PANEL: CPT

## 2020-10-01 RX ORDER — ONDANSETRON 2 MG/ML
4 INJECTION INTRAMUSCULAR; INTRAVENOUS EVERY 6 HOURS PRN
Status: DISCONTINUED | OUTPATIENT
Start: 2020-10-01 | End: 2020-10-01 | Stop reason: HOSPADM

## 2020-10-01 RX ORDER — ONDANSETRON 4 MG/1
4 TABLET, FILM COATED ORAL 2 TIMES DAILY PRN
Status: ON HOLD | COMMUNITY
End: 2020-11-06

## 2020-10-01 RX ORDER — CELECOXIB 100 MG/1
100 CAPSULE ORAL 2 TIMES DAILY
Status: ON HOLD | COMMUNITY
End: 2020-11-06

## 2020-10-01 RX ORDER — LORATADINE 10 MG/1
10 TABLET ORAL DAILY
Status: ON HOLD | COMMUNITY
End: 2020-11-06

## 2020-10-01 RX ORDER — SODIUM CHLORIDE 0.9 % (FLUSH) 0.9 %
10 SYRINGE (ML) INJECTION PRN
Status: DISCONTINUED | OUTPATIENT
Start: 2020-10-01 | End: 2020-10-01 | Stop reason: HOSPADM

## 2020-10-01 RX ORDER — ASPIRIN 325 MG
325 TABLET ORAL ONCE
Status: DISCONTINUED | OUTPATIENT
Start: 2020-10-01 | End: 2020-10-01 | Stop reason: HOSPADM

## 2020-10-01 RX ORDER — NITROGLYCERIN 0.4 MG/1
0.4 TABLET SUBLINGUAL EVERY 5 MIN PRN
Status: DISCONTINUED | OUTPATIENT
Start: 2020-10-01 | End: 2020-10-01 | Stop reason: HOSPADM

## 2020-10-01 RX ORDER — ATROPINE SULFATE 0.4 MG/ML
0.5 AMPUL (ML) INJECTION
Status: DISCONTINUED | OUTPATIENT
Start: 2020-10-01 | End: 2020-10-01 | Stop reason: HOSPADM

## 2020-10-01 RX ORDER — SODIUM CHLORIDE 9 MG/ML
INJECTION, SOLUTION INTRAVENOUS CONTINUOUS
Status: DISCONTINUED | OUTPATIENT
Start: 2020-10-01 | End: 2020-10-01 | Stop reason: HOSPADM

## 2020-10-01 RX ORDER — CLONIDINE HYDROCHLORIDE 0.1 MG/1
0.1 TABLET ORAL 2 TIMES DAILY
Status: ON HOLD | COMMUNITY
End: 2020-11-06

## 2020-10-01 RX ORDER — ACETAMINOPHEN 325 MG/1
650 TABLET ORAL EVERY 4 HOURS PRN
Status: DISCONTINUED | OUTPATIENT
Start: 2020-10-01 | End: 2020-10-01 | Stop reason: HOSPADM

## 2020-10-01 RX ORDER — SODIUM CHLORIDE 0.9 % (FLUSH) 0.9 %
10 SYRINGE (ML) INJECTION EVERY 12 HOURS SCHEDULED
Status: DISCONTINUED | OUTPATIENT
Start: 2020-10-01 | End: 2020-10-01 | Stop reason: HOSPADM

## 2020-10-01 RX ORDER — SODIUM CHLORIDE 9 MG/ML
100 INJECTION, SOLUTION INTRAVENOUS CONTINUOUS
Status: DISCONTINUED | OUTPATIENT
Start: 2020-10-01 | End: 2020-10-01 | Stop reason: HOSPADM

## 2020-10-01 RX ADMIN — IOPAMIDOL 140 ML: 755 INJECTION, SOLUTION INTRAVENOUS at 12:05

## 2020-10-01 RX ADMIN — SODIUM CHLORIDE: 9 INJECTION, SOLUTION INTRAVENOUS at 08:54

## 2020-10-01 NOTE — H&P
[address] [address]   Sedation/Analgesia History and Physical    Pt Name: Mercedez Valerio  MRN: [id number]  YOB: 1940  Provider Performing Procedure: Payton Rowe MD  Primary Care Physician: Kylie Madrigal DO      Pre-Procedure: Chest pain, possible coronary artery disease, abnormal stress test    Consent: I have discussed with the patient and/or the patient representative the indication, alternatives, and the possible risks and/or complications of the planned procedure and the anesthesia methods. The patient and/or representative appear to understand and agree to proceed. Medical History:   has a past medical history of Arthritis, Asthma, Atrial fibrillation ([address] Blood circulation, collateral, CAD (coronary artery disease), CHF (congestive heart failure) ([address] Chronic kidney disease, stage III (moderate) ([address] COPD (chronic obstructive pulmonary disease) ([address] Depression, Diabetes mellitus ([address] Diabetic hypertension-nephrosis syndrome ([address] Dysuria, GERD (gastroesophageal reflux disease), Hyperlipidemia, Hypertension, Kidney disease, and Unspecified cerebral artery occlusion with cerebral infarction. .    Surgical History:     has a past surgical history that includes back surgery; Colonoscopy (2013); Cardiac catheterization (2009); joint replacement (Left, 1992); Hysterectomy (1974); Appendectomy; bladder suspension; Endoscopy, colon, diagnostic; Carpal tunnel release; and Esophagus dilation. Allergies:    Allergies as of 10/01/2020 - Review Complete 09/29/2020   Allergen Reaction Noted    Renflexis [infliximab] Shortness Of Breath and Palpitations 08/06/2020    Nuts [peanut-containing drug products]  10/12/2017    [place] (diagnostic)  05/26/2017    Sulfa antibiotics  09/27/2012    Tape [employer]gene Mings tape] Other (See Comments) 11/21/2013    Bactrim [sulfamethoxazole-trimethoprim] Rash 12/02/2013    Chocolate Rash 09/26/2014       Medications:   Coumadin use last 5 days: Billie Mcguire MD  Electronically signed 10/1/2020 at 7:32 AM  Patient Name: Ian Gibbons Record Number: [id number]  Date: 10/1/2020   Time: 7:32 AM   Room/Bed: 2E-05/005-A

## 2020-10-01 NOTE — PROCEDURES
[address] [place] [address]                            CARDIAC CATHETERIZATION    PATIENT NAME: Yandel Garza                    :        1940  MED REC NO:   [id number]                           ROOM:       0005  ACCOUNT NO:   [de-identified]                           ADMIT DATE: 10/01/2020  PROVIDER:     Liza Agarwal. Cheri Peralta M.D.    Yuliana Cervantes:  10/01/2020    INDICATION FOR PROCEDURE:  This is a patient who is an 24-year-old lady,  who was in the hospital recently with the chest pain, shortness of  breath, NSTEMI; treated medically, continued to be symptomatic. She did  have a stress Cardiolite test, which was abnormal; treated medically and  continued to have the chest pain, admitted for a heart cath and possible  intervention. The patient understands the procedure, the benefits, the  risks, the alternative methods of treatment, the possible complications,  and wants it to be done. PROCEDURES PERFORMED:  1.  IV conscious sedation:  The patient was given IV conscious sedation  in incremental dosages by circulating cath lab RN, monitored by the cath  lab monitor tech under my supervision. The procedure started at 11:42  and finished at 12:15. No acute complication from the procedure. Estimated blood loss about 15 mL. 2.  Left heart cath: The right radial artery was cannulated with a  6-Japanese radial sheath. The patient was given verapamil, nitroglycerin,  and heparin through the sheath per protocol. The coronary angiogram showed the RCA is a dominant artery. The RCA was  occluded proximally. There is left-to-right collaterals filling the PDA  and posterolateral branch of the RCA. The left ventriculogram showed hypertrophic cardiomyopathy and ejection  fraction around 45% to 50%. There was no mitral regurg and no gradient  across the aortic valve.   There was significant diastolic dysfunction of  the left

## 2020-10-01 NOTE — PROGRESS NOTES
Discharged home in stable condition. Patient verbalizes understanding of discharge instructions and follow-up.

## 2020-10-01 NOTE — OP NOTE
Operative Note      Patient: Mau Polk  Sedation/Analgesia Post Sedation Record      Pt Name: Mary Kelley  MRN: [id number]  YOB: 1940  Procedure Performed By: Megan Bacon MD  Primary Care Physician: Azeem Holbrook DO    POST-PROCEDURE    Physician: Megan Bacon MD    Procedure Performed:  Left Heart Cath    Sedation/Anesthesia:  Local Anesthesia and IV Conscious Sedation with continuous O2 monitoring    Estimated Blood Loss:  Minimal    Specimens Removed:  None    Complications:  None     Post Procedure Diagnosis/Findings:  Coronary Artery Disease    Recommendations:  Medical treatment and review films.        Megan Bacon MD  Electronically signed 10/1/2020 at 3:15 PM

## 2020-10-02 PROCEDURE — 93010 ELECTROCARDIOGRAM REPORT: CPT | Performed by: INTERNAL MEDICINE

## 2020-10-09 NOTE — TELEPHONE ENCOUNTER
Bipin Franks called requesting a refill on the following medications:  Requested Prescriptions     Pending Prescriptions Disp Refills    hydroxychloroquine (PLAQUENIL) 200 MG tablet 90 tablet 1     Sig: TAKE 1 TABLET BY MOUTH [address] [facility] verified:  yes      Date of last visit: 9-  Date of next visit (if applicable): 3/29/3114

## 2020-10-12 RX ORDER — HYDROXYCHLOROQUINE SULFATE 200 MG/1
TABLET, FILM COATED ORAL
Qty: 90 TABLET | Refills: 1 | Status: SHIPPED | OUTPATIENT
Start: 2020-10-12 | End: 2021-09-08 | Stop reason: SDUPTHER

## 2020-11-03 PROBLEM — I10 HYPERTENSION: Status: RESOLVED | Noted: 2020-11-03 | Resolved: 2020-11-03

## 2020-11-05 ENCOUNTER — APPOINTMENT (OUTPATIENT)
Dept: GENERAL RADIOLOGY | Age: 80
DRG: 948 | End: 2020-11-05
Payer: MEDICARE

## 2020-11-05 ENCOUNTER — HOSPITAL ENCOUNTER (EMERGENCY)
Age: 80
Discharge: HOME | DRG: 948 | End: 2020-11-05
Attending: FAMILY MEDICINE
Payer: MEDICARE

## 2020-11-05 ENCOUNTER — APPOINTMENT (OUTPATIENT)
Dept: CT IMAGING | Age: 80
DRG: 948 | End: 2020-11-05
Payer: MEDICARE

## 2020-11-05 VITALS
HEART RATE: 56 BPM | WEIGHT: 168 LBS | OXYGEN SATURATION: 97 % | RESPIRATION RATE: 18 BRPM | DIASTOLIC BLOOD PRESSURE: 86 MMHG | TEMPERATURE: 98.1 F | BODY MASS INDEX: 27 KG/M2 | HEIGHT: 66 IN | SYSTOLIC BLOOD PRESSURE: 171 MMHG

## 2020-11-05 LAB
ALBUMIN SERPL-MCNC: 3.6 G/DL (ref 3.5–5.1)
ALP BLD-CCNC: 48 U/L (ref 38–126)
ALT SERPL-CCNC: 8 U/L (ref 11–66)
ANION GAP SERPL CALCULATED.3IONS-SCNC: 9 MEQ/L (ref 8–16)
APTT: 28.5 SECONDS (ref 22–38)
AST SERPL-CCNC: 14 U/L (ref 5–40)
BASOPHILS # BLD: 0.4 %
BASOPHILS ABSOLUTE: 0 THOU/MM3 (ref 0–0.1)
BILIRUB SERPL-MCNC: 0.3 MG/DL (ref 0.3–1.2)
BILIRUBIN DIRECT: < 0.2 MG/DL (ref 0–0.3)
BUN BLDV-MCNC: 21 MG/DL (ref 7–22)
C-REACTIVE PROTEIN: 0.06 MG/DL (ref 0–1)
CALCIUM SERPL-MCNC: 10.4 MG/DL (ref 8.5–10.5)
CHLORIDE BLD-SCNC: 104 MEQ/L (ref 98–111)
CO2: 25 MEQ/L (ref 23–33)
CREAT SERPL-MCNC: 1.1 MG/DL (ref 0.4–1.2)
EKG ATRIAL RATE: 55 BPM
EKG P-R INTERVAL: 176 MS
EKG Q-T INTERVAL: 526 MS
EKG QRS DURATION: 134 MS
EKG QTC CALCULATION (BAZETT): 503 MS
EKG R AXIS: -12 DEGREES
EKG T AXIS: 6 DEGREES
EKG VENTRICULAR RATE: 55 BPM
EOSINOPHIL # BLD: 2.8 %
EOSINOPHILS ABSOLUTE: 0.2 THOU/MM3 (ref 0–0.4)
ERYTHROCYTE [DISTWIDTH] IN BLOOD BY AUTOMATED COUNT: 17.3 % (ref 11.5–14.5)
ERYTHROCYTE [DISTWIDTH] IN BLOOD BY AUTOMATED COUNT: 56.2 FL (ref 35–45)
GFR SERPL CREATININE-BSD FRML MDRD: 47 ML/MIN/1.73M2
GLUCOSE BLD-MCNC: 95 MG/DL (ref 70–108)
HCT VFR BLD CALC: 37 % (ref 37–47)
HEMOGLOBIN: 11.9 GM/DL (ref 12–16)
IMMATURE GRANS (ABS): 0.01 THOU/MM3 (ref 0–0.07)
IMMATURE GRANULOCYTES: 0.1 %
INR BLD: 1.17 (ref 0.85–1.13)
LYMPHOCYTES # BLD: 31.2 %
LYMPHOCYTES ABSOLUTE: 2.4 THOU/MM3 (ref 1–4.8)
MCH RBC QN AUTO: 28.7 PG (ref 26–33)
MCHC RBC AUTO-ENTMCNC: 32.2 GM/DL (ref 32.2–35.5)
MCV RBC AUTO: 89.4 FL (ref 81–99)
MONOCYTES # BLD: 8.6 %
MONOCYTES ABSOLUTE: 0.7 THOU/MM3 (ref 0.4–1.3)
NUCLEATED RED BLOOD CELLS: 0 /100 WBC
OSMOLALITY CALCULATION: 278.5 MOSMOL/KG (ref 275–300)
PLATELET # BLD: 250 THOU/MM3 (ref 130–400)
PMV BLD AUTO: 10.9 FL (ref 9.4–12.4)
POTASSIUM SERPL-SCNC: 4.5 MEQ/L (ref 3.5–5.2)
PROCALCITONIN: 0.06 NG/ML (ref 0.01–0.09)
RBC # BLD: 4.14 MILL/MM3 (ref 4.2–5.4)
SEG NEUTROPHILS: 56.9 %
SEGMENTED NEUTROPHILS ABSOLUTE COUNT: 4.4 THOU/MM3 (ref 1.8–7.7)
SODIUM BLD-SCNC: 138 MEQ/L (ref 135–145)
TOTAL PROTEIN: 6.1 G/DL (ref 6.1–8)
TROPONIN T: < 0.01 NG/ML
WBC # BLD: 7.8 THOU/MM3 (ref 4.8–10.8)

## 2020-11-05 PROCEDURE — 99285 EMERGENCY DEPT VISIT HI MDM: CPT

## 2020-11-05 PROCEDURE — 85730 THROMBOPLASTIN TIME PARTIAL: CPT

## 2020-11-05 PROCEDURE — 82248 BILIRUBIN DIRECT: CPT

## 2020-11-05 PROCEDURE — 72125 CT NECK SPINE W/O DYE: CPT

## 2020-11-05 PROCEDURE — 84145 PROCALCITONIN (PCT): CPT

## 2020-11-05 PROCEDURE — 36415 COLL VENOUS BLD VENIPUNCTURE: CPT

## 2020-11-05 PROCEDURE — 85025 COMPLETE CBC W/AUTO DIFF WBC: CPT

## 2020-11-05 PROCEDURE — 86140 C-REACTIVE PROTEIN: CPT

## 2020-11-05 PROCEDURE — 72170 X-RAY EXAM OF PELVIS: CPT

## 2020-11-05 PROCEDURE — 71045 X-RAY EXAM CHEST 1 VIEW: CPT

## 2020-11-05 PROCEDURE — 93010 ELECTROCARDIOGRAM REPORT: CPT | Performed by: INTERNAL MEDICINE

## 2020-11-05 PROCEDURE — 85610 PROTHROMBIN TIME: CPT

## 2020-11-05 PROCEDURE — 6370000000 HC RX 637 (ALT 250 FOR IP): Performed by: FAMILY MEDICINE

## 2020-11-05 PROCEDURE — 72072 X-RAY EXAM THORAC SPINE 3VWS: CPT

## 2020-11-05 PROCEDURE — 93005 ELECTROCARDIOGRAM TRACING: CPT | Performed by: FAMILY MEDICINE

## 2020-11-05 PROCEDURE — 80053 COMPREHEN METABOLIC PANEL: CPT

## 2020-11-05 PROCEDURE — 73564 X-RAY EXAM KNEE 4 OR MORE: CPT

## 2020-11-05 PROCEDURE — 70450 CT HEAD/BRAIN W/O DYE: CPT

## 2020-11-05 PROCEDURE — 84484 ASSAY OF TROPONIN QUANT: CPT

## 2020-11-05 RX ORDER — OXYCODONE HYDROCHLORIDE AND ACETAMINOPHEN 5; 325 MG/1; MG/1
1 TABLET ORAL ONCE
Status: COMPLETED | OUTPATIENT
Start: 2020-11-05 | End: 2020-11-05

## 2020-11-05 RX ORDER — OXYCODONE HYDROCHLORIDE AND ACETAMINOPHEN 5; 325 MG/1; MG/1
1 TABLET ORAL EVERY 6 HOURS PRN
Qty: 12 TABLET | Refills: 0 | Status: ON HOLD | OUTPATIENT
Start: 2020-11-05 | End: 2020-11-11 | Stop reason: HOSPADM

## 2020-11-05 RX ADMIN — OXYCODONE HYDROCHLORIDE AND ACETAMINOPHEN 1 TABLET: 5; 325 TABLET ORAL at 13:12

## 2020-11-05 ASSESSMENT — ENCOUNTER SYMPTOMS
NAUSEA: 0
COUGH: 1
EYE DISCHARGE: 0
VOMITING: 0
ABDOMINAL PAIN: 0
BACK PAIN: 1

## 2020-11-05 ASSESSMENT — PAIN SCALES - GENERAL
PAINLEVEL_OUTOF10: 9
PAINLEVEL_OUTOF10: 5

## 2020-11-05 ASSESSMENT — PAIN DESCRIPTION - PAIN TYPE: TYPE: ACUTE PAIN

## 2020-11-05 ASSESSMENT — PAIN DESCRIPTION - LOCATION: LOCATION: HEAD;LEG;ARM

## 2020-11-05 ASSESSMENT — PAIN DESCRIPTION - ORIENTATION: ORIENTATION: LEFT

## 2020-11-05 NOTE — ACP (ADVANCE CARE PLANNING)
Advance Care Planning     Advance Care Planning Activator (Inpatient)  Conversation Note      Date of ACP Conversation: 11/5/2020    Conversation Conducted with: Patient with Decision Making Capacity    ACP Activator: Larisa Correa    \"Who would you like to name as your primary health care decision-maker? \"               Name: Emanuel Chung        Relationship: Daughter          Phone number: [phone]  Becca Mosqueda this person be reached easily? \" Yes  \"Who would you like to name as your back-up decision maker? \"   Name: Godfrey Higuera        Relationship: Daughter          Phone number: [phone]  Becca Mosqueda this person be reached easily? \" Yes    Care Preferences    Ventilation: \"If you were in your present state of health and suddenly became very ill and were unable to breathe on your own, what would your preference be about the use of a ventilator (breathing machine) if it were available to you? \"      Would the patient desire the use of ventilator (breathing machine)?: yes    \"If your health worsens and it becomes clear that your chance of recovery is unlikely, what would your preference be about the use of a ventilator (breathing machine) if it were available to you? \"     Would the patient desire the use of ventilator (breathing machine)?: No      Resuscitation  \"CPR works best to restart the heart when there is a sudden event, like a heart attack, in someone who is otherwise healthy. Unfortunately, CPR does not typically restart the heart for people who have serious health conditions or who are very sick. \"    \"In the event your heart stopped as a result of an underlying serious health condition, would you want attempts to be made to restart your heart (answer \"yes\" for attempt to resuscitate) or would you prefer a natural death (answer \"no\" for do not attempt to resuscitate)? \" yes       [x] Yes   [] No   Educated Patient / Vishnu Gagnon regarding differences between Advance Directives and portable DNR orders. Length of ACP Conversation in minutes:  34    Conversation Outcomes:  [x] ACP discussion completed  [] Existing advance directive reviewed with patient; no changes to patient's previously recorded wishes  [] New Advance Directive completed  [] Portable Do Not Rescitate prepared for Provider review and signature  [] POLST/POST/MOLST/MOST prepared for Provider review and signature      Follow-up plan:    [] Schedule follow-up conversation to continue planning  [x] Referred individual to Provider for additional questions/concerns   [] Advised patient/agent/surrogate to review completed ACP document and update if needed with changes in condition, patient preferences or care setting    [] This note routed to one or more involved healthcare providers     Lottie Nicholas was resting alone in the room. She shared that she had fallen this morning and had to crawl to the door to allow the EMT's access into her house. She thought, from that experience, that she broke something and wanted checked. She also worried about bump on her head. - Patient explained that she is in relatively good health and although she doesn't want to be supported by mechanical devices, she is willing to use them short term if necessary.   - Lottie Nicholas thought she had an AD at home so this staff suggested she take it to her net appt with her heart Doctor. She stated understanding and will plan to comply with the request.   - No further follow-up at this time.

## 2020-11-05 NOTE — ED PROVIDER NOTES
[facility]  eMERGENCY dEPARTMENT eNCOUnter          [address] [facility]       Chief Complaint   Patient presents with    Fall    Headache    Leg Pain     left       Nurses Notes reviewed and I agree except as noted in the HPI. HISTORY OF PRESENT ILLNESS    Michele Davidson is a 78 y.o. female who presents from a fall    Location/Symptom: fall at home  Timing/Onset: 11/5/2020  Context/Setting: Was ambulating at home felt weak  Lost balance fell to the floor  No LOC   on aspirin daily  History of diabetes  Quality: Bumped head and with left parieto-occipital contusion  Some left-sided neck pain, mid thoracic pain  No injury to the arms or legs  No low back pain  Duration: Over the last few hours  Modifying Factors: Brought to the ER by EMS  Severity: 4/10    REVIEW OF SYSTEMS     Review of Systems   Constitutional: Negative for chills and fever. HENT: Positive for congestion. Eyes: Negative for discharge. Respiratory: Positive for cough. Still smokes    Chronic COPD   Cardiovascular: Negative for chest pain and leg swelling. Gastrointestinal: Negative for abdominal pain, nausea and vomiting. Genitourinary: Negative for dysuria. Musculoskeletal: Positive for back pain and neck pain. Midthoracic back pain     Left neck pain    Previous left knee replacement       Skin:        Some easy bruising   Neurological: Positive for headaches. Negative for speech difficulty, weakness and numbness. Hematological:        On aspirin daily   Psychiatric/Behavioral: Negative for confusion.           PAST MEDICAL HISTORY    has a past medical history of Arthritis, Asthma, Atrial fibrillation ([address] Blood circulation, collateral, CAD (coronary artery disease), CHF (congestive heart failure) ([address] Chronic kidney disease, stage III (moderate), COPD (chronic obstructive pulmonary disease) ([address] Depression, Diabetes mellitus ([address] Diabetic hypertension-nephrosis syndrome ([address] Dysuria, GERD (gastroesophageal reflux disease), Hyperlipidemia, Hypertension, Kidney disease, and Unspecified cerebral artery occlusion with cerebral infarction. SURGICAL HISTORY      has a past surgical history that includes back surgery; Colonoscopy (2013); Cardiac catheterization (2009); joint replacement (Left, 1992); Hysterectomy (1974); Appendectomy; bladder suspension; Endoscopy, colon, diagnostic; Carpal tunnel release; and Esophagus dilation. CURRENT MEDICATIONS       Previous Medications    ALBUTEROL (PROVENTIL HFA;VENTOLIN HFA) 108 (90 BASE) MCG/ACT INHALER    Inhale 2 puffs into the lungs every 6 hours as needed. ASPIRIN 81 MG EC TABLET    Take 162 mg by mouth daily    ATORVASTATIN (LIPITOR) 40 MG TABLET    Take 2 tablets by mouth daily    CELECOXIB (CELEBREX) 100 MG CAPSULE    Take 100 mg by mouth 2 times daily    CLONIDINE (CATAPRES) 0.1 MG TABLET    Take 0.1 mg by mouth 2 times daily    DEXLANSOPRAZOLE (DEXILANT) 60 MG CPDR DELAYED RELEASE CAPSULE    Take 60 mg by mouth daily    DICLOFENAC SODIUM 1 % GEL    Apply 2 g topically 4 times daily    DOCUSATE SODIUM (COLACE, DULCOLAX) 100 MG CAPS    Take 100 mg by mouth 2 times daily    FENOFIBRATE (TRICOR) 145 MG TABLET    Take 145 mg by mouth daily    HYDRALAZINE (APRESOLINE) 50 MG TABLET    Take 0.5 tablets by mouth 3 times daily    HYDROXYCHLOROQUINE (PLAQUENIL) 200 MG TABLET    TAKE 1 TABLET BY MOUTH EVERY DAY    INSULIN GLARGINE (LANTUS) 100 UNIT/ML INJECTION PEN    Inject 8 Units into the skin nightly     ISOSORBIDE MONONITRATE (IMDUR) 120 MG EXTENDED RELEASE TABLET    Take 1 tablet by mouth daily    LORATADINE (CLARITIN) 10 MG TABLET    Take 10 mg by mouth daily    MAGNESIUM OXIDE (MAG-OX) 400 MG TABLET    Take 400 mg by mouth 2 times daily. METOPROLOL TARTRATE (LOPRESSOR) 25 MG TABLET    Take 1 tablet by mouth 2 times daily    MONTELUKAST (SINGULAIR) 5 MG CHEWABLE TABLET    Take 5 mg by mouth nightly.       NITROGLYCERIN (NITROSTAT) 0.4 MG SL TABLET    Place 0.4 mg under the tongue every 5 minutes as needed. ONDANSETRON (ZOFRAN) 4 MG TABLET    Take 4 mg by mouth 2 times daily as needed for Nausea or Vomiting    OXYCODONE-ACETAMINOPHEN (PERCOCET)  MG PER TABLET    Take 1 tablet by mouth every 4 hours as needed for Pain. [place] POTASSIUM CHLORIDE (KLOR-CON) 20 MEQ PACKET    Take 10 mEq by mouth daily     PREDNISONE (DELTASONE) 5 MG TABLET    TAKE 1 TABLET BY MOUTH EVERY DAY    RANOLAZINE (RANEXA) 500 MG SR TABLET    Take 1 tablet by mouth 2 times daily. TRAZODONE (DESYREL) 50 MG TABLET    Take 100 mg by mouth nightly        ALLERGIES     is allergic to renflexis [infliximab]; nuts [peanut-containing drug products]; strawberry (diagnostic); sulfa antibiotics; tape [adhesive tape]; bactrim [sulfamethoxazole-trimethoprim]; and chocolate. FAMILY HISTORY     She indicated that her mother is . She indicated that her father is . She indicated that both of her sisters are . She indicated that all of her three brothers are . family history includes Cancer in her sister and sister; Diabetes in her father and mother; Heart Disease in her father and mother; Kidney Disease in her mother; Stroke in her brother. SOCIAL HISTORY      reports that she has been smoking cigarettes. She started smoking about 7 years ago. She has a 1.50 pack-year smoking history. She has never used smokeless tobacco. She reports that she does not drink alcohol or use drugs. PHYSICAL EXAM     INITIAL VITALS:  height is 5' 6\" (1.676 m) and weight is 168 lb (76.2 kg). Her oral temperature is 98.1 °F (36.7 °C). Her blood pressure is 181/74 (abnormal) and her pulse is 55. Her respiration is 16 and oxygen saturation is 95%. Physical Exam  Vitals signs and nursing note reviewed. Constitutional:       Comments: GCS 15   HENT:      Head: Normocephalic.       Comments: Scalp contusion left parietal occipital region with tenderness      Eyes: General: No scleral icterus. Extraocular Movements: Extraocular movements intact. Pupils: Pupils are equal, round, and reactive to light. Neck:      Musculoskeletal: Normal range of motion and neck supple. Muscular tenderness present. Comments: Some tenderness along the left lateral neck diffusely  Cardiovascular:      Rate and Rhythm: Normal rate and regular rhythm. Pulses: Normal pulses. Heart sounds: Normal heart sounds. Pulmonary:      Comments: Rhonchi in both lungs    Congested cough  Abdominal:      Palpations: Abdomen is soft. Tenderness: There is no abdominal tenderness. There is no guarding or rebound. Musculoskeletal:         General: No swelling. Comments: Tender along left lateral neck    Tender along the mid thoracic back paraspinal region    Not tender no low back    Degenerative changes of both hands and wrists but no acute tenderness of the arms    Left total knee replacement scar well-healed    No peripheral edema    No focal tenderness in the legs   Skin:     General: Skin is warm and dry. Neurological:      General: No focal deficit present. Mental Status: She is alert. Psychiatric:         Mood and Affect: Mood normal.         Behavior: Behavior normal.           DIFFERENTIAL DIAGNOSIS:     Fall with scalp contusion evaluate for fracture or intracranial bleeding    Left neck pain evaluate for traumatic injury    Chest congestion consistent with COPD  Consider pneumonia, Covid        DIAGNOSTIC RESULTS     EKG: All EKG's are interpreted by the Emergency Department Physician who either signs or Co-signs this chart in the absence of a cardiologist.    EKG showed sinus rhythm with rate 55. QRS complexes show normal axis, 134 ms conduction.   Voltage for LVH with QRS widening  ST-T waves show no acute change  Compared to old EKG, no change      RADIOLOGY: non-plain film images(s) such as CT, Ultrasound and MRI are read by the radiologist.  The patient had a CT scan of the brain which demonstrates soft tissue swelling in the left parieto-occipital region. No skull fracture or intracranial bleeding per radiology reading    CT cervical spine show degenerative changes    Pelvic x-ray single view negative for pelvic or hip fracture. Lumbar spine rods visible    Left knee x-ray, 4 view shows total knee replacement stable no fracture.     Chest x-ray single view shows cardiomegaly but otherwise lungs clear    Thoracic spine x-rays reviewed show degenerative changes but no acute fracture      [x] Visualized and interpreted by me   [x] Radiologist's Wet Read Report Reviewed   [] Discussed with Radiologist.    LABS:   Labs Reviewed   PROTIME-INR - Abnormal; Notable for the following components:       Result Value    INR 1.17 (*)     All other components within normal limits   CBC WITH AUTO DIFFERENTIAL - Abnormal; Notable for the following components:    RBC 4.14 (*)     Hemoglobin 11.9 (*)     RDW-CV 17.3 (*)     RDW-SD 56.2 (*)     All other components within normal limits   HEPATIC FUNCTION PANEL - Abnormal; Notable for the following components:    ALT 8 (*)     All other components within normal limits   GLOMERULAR FILTRATION RATE, ESTIMATED - Abnormal; Notable for the following components:    Est, Glom Filt Rate 47 (*)     All other components within normal limits   APTT   C-REACTIVE PROTEIN   BASIC METABOLIC PANEL   TROPONIN   PROCALCITONIN   ANION GAP   OSMOLALITY   COVID-19   COVID-19       EMERGENCY DEPARTMENT COURSE:   Vitals:    Vitals:    11/05/20 1158 11/05/20 1207 11/05/20 1336   BP:  (!) 181/74    Pulse: (!) 46  55   Resp: 18  16   Temp: 98.1 °F (36.7 °C)     TempSrc: Oral     SpO2: 96%  95%   Weight: 168 lb (76.2 kg)     Height: 5' 6\" (1.676 m)       Nursing notes reviewed    Percocet 1 p.o. for pain    X-rays are negative    Hemoglobin 11.9    Chest x-ray was clear with no pneumonia    CRP normal    Covid-19 swab pending    Troponin normal    Discharge home    CRITICAL CARE:   none    CONSULTS:  none    PROCEDURES:  None    FINAL IMPRESSION      1. Fall, initial encounter    2. Contusion of scalp, initial encounter          DISPOSITION/PLAN     Limited number of Percocet refilled    Activity as tolerated    PATIENT REFERRED TO:  Mike Yeboah, DO  [address] [place] [address]  [phone]    In 1 week        DISCHARGE MEDICATIONS:  New Prescriptions    OXYCODONE-ACETAMINOPHEN (PERCOCET) 5-325 MG PER TABLET    Take 1 tablet by mouth every 6 hours as needed for Pain for up to 3 days.        (Please note that portions of this note were completed with a voice recognition program.  Efforts were made to edit the dictations but occasionally words are mis-transcribed.)    MD Arcelia Gagnon MD  11/05/20 0529

## 2020-11-05 NOTE — ED NOTES
Patient presents to the ED with complaints of head pain, left arm and leg pain due to a fall. She states that her legs gave out on her and down she went. She smacked her head on the floor. She had no loss of consciousness. She has a knot on the back left side of her head.       Sunil Collins LPN  75/59/97 1638

## 2020-11-06 ENCOUNTER — APPOINTMENT (OUTPATIENT)
Dept: GENERAL RADIOLOGY | Age: 80
DRG: 948 | End: 2020-11-06
Payer: MEDICARE

## 2020-11-06 ENCOUNTER — HOSPITAL ENCOUNTER (INPATIENT)
Age: 80
LOS: 6 days | Discharge: NURSING, REHAB OR LONG-TERM CARE | DRG: 948 | End: 2020-11-12
Attending: FAMILY MEDICINE | Admitting: INTERNAL MEDICINE
Payer: MEDICARE

## 2020-11-06 ENCOUNTER — APPOINTMENT (OUTPATIENT)
Dept: CT IMAGING | Age: 80
DRG: 948 | End: 2020-11-06
Payer: MEDICARE

## 2020-11-06 PROBLEM — K57.92 DIVERTICULITIS: Status: ACTIVE | Noted: 2020-11-06

## 2020-11-06 PROBLEM — I10 HYPERTENSION: Status: ACTIVE | Noted: 2020-11-03

## 2020-11-06 LAB
ALBUMIN SERPL-MCNC: 3.3 G/DL (ref 3.5–5.1)
ALP BLD-CCNC: 40 U/L (ref 38–126)
ALT SERPL-CCNC: 8 U/L (ref 11–66)
ANION GAP SERPL CALCULATED.3IONS-SCNC: 11 MEQ/L (ref 8–16)
AST SERPL-CCNC: 15 U/L (ref 5–40)
BASOPHILS # BLD: 0.4 %
BASOPHILS ABSOLUTE: 0 THOU/MM3 (ref 0–0.1)
BILIRUB SERPL-MCNC: 0.2 MG/DL (ref 0.3–1.2)
BUN BLDV-MCNC: 23 MG/DL (ref 7–22)
C-REACTIVE PROTEIN: 0.17 MG/DL (ref 0–1)
CALCIUM SERPL-MCNC: 10.2 MG/DL (ref 8.5–10.5)
CHLORIDE BLD-SCNC: 108 MEQ/L (ref 98–111)
CO2: 24 MEQ/L (ref 23–33)
CREAT SERPL-MCNC: 1.2 MG/DL (ref 0.4–1.2)
EKG ATRIAL RATE: 68 BPM
EKG P AXIS: 34 DEGREES
EKG P-R INTERVAL: 164 MS
EKG Q-T INTERVAL: 490 MS
EKG QRS DURATION: 136 MS
EKG QTC CALCULATION (BAZETT): 521 MS
EKG R AXIS: -16 DEGREES
EKG T AXIS: -4 DEGREES
EKG VENTRICULAR RATE: 68 BPM
EOSINOPHIL # BLD: 1.9 %
EOSINOPHILS ABSOLUTE: 0.2 THOU/MM3 (ref 0–0.4)
ERYTHROCYTE [DISTWIDTH] IN BLOOD BY AUTOMATED COUNT: 17.2 % (ref 11.5–14.5)
ERYTHROCYTE [DISTWIDTH] IN BLOOD BY AUTOMATED COUNT: 55.4 FL (ref 35–45)
GFR SERPL CREATININE-BSD FRML MDRD: 43 ML/MIN/1.73M2
GLUCOSE BLD-MCNC: 176 MG/DL (ref 70–108)
GLUCOSE BLD-MCNC: 96 MG/DL (ref 70–108)
HCT VFR BLD CALC: 37.2 % (ref 37–47)
HEMOGLOBIN: 12 GM/DL (ref 12–16)
IMMATURE GRANS (ABS): 0.02 THOU/MM3 (ref 0–0.07)
IMMATURE GRANULOCYTES: 0.3 %
LYMPHOCYTES # BLD: 25.6 %
LYMPHOCYTES ABSOLUTE: 2 THOU/MM3 (ref 1–4.8)
MCH RBC QN AUTO: 28.5 PG (ref 26–33)
MCHC RBC AUTO-ENTMCNC: 32.3 GM/DL (ref 32.2–35.5)
MCV RBC AUTO: 88.4 FL (ref 81–99)
MONOCYTES # BLD: 8.8 %
MONOCYTES ABSOLUTE: 0.7 THOU/MM3 (ref 0.4–1.3)
NUCLEATED RED BLOOD CELLS: 0 /100 WBC
OSMOLALITY CALCULATION: 293 MOSMOL/KG (ref 275–300)
PLATELET # BLD: 260 THOU/MM3 (ref 130–400)
PMV BLD AUTO: 10.4 FL (ref 9.4–12.4)
POTASSIUM REFLEX MAGNESIUM: 3.6 MEQ/L (ref 3.5–5.2)
RBC # BLD: 4.21 MILL/MM3 (ref 4.2–5.4)
SARS-COV-2, NAAT: NOT DETECTED
SEG NEUTROPHILS: 63 %
SEGMENTED NEUTROPHILS ABSOLUTE COUNT: 5 THOU/MM3 (ref 1.8–7.7)
SODIUM BLD-SCNC: 143 MEQ/L (ref 135–145)
TOTAL PROTEIN: 6.1 G/DL (ref 6.1–8)
WBC # BLD: 8 THOU/MM3 (ref 4.8–10.8)

## 2020-11-06 PROCEDURE — 87081 CULTURE SCREEN ONLY: CPT

## 2020-11-06 PROCEDURE — 6370000000 HC RX 637 (ALT 250 FOR IP): Performed by: NURSE PRACTITIONER

## 2020-11-06 PROCEDURE — 2580000003 HC RX 258: Performed by: INTERNAL MEDICINE

## 2020-11-06 PROCEDURE — 36415 COLL VENOUS BLD VENIPUNCTURE: CPT

## 2020-11-06 PROCEDURE — 93005 ELECTROCARDIOGRAM TRACING: CPT | Performed by: INTERNAL MEDICINE

## 2020-11-06 PROCEDURE — 2580000003 HC RX 258: Performed by: NURSE PRACTITIONER

## 2020-11-06 PROCEDURE — 73502 X-RAY EXAM HIP UNI 2-3 VIEWS: CPT

## 2020-11-06 PROCEDURE — 80053 COMPREHEN METABOLIC PANEL: CPT

## 2020-11-06 PROCEDURE — 82948 REAGENT STRIP/BLOOD GLUCOSE: CPT

## 2020-11-06 PROCEDURE — 6360000002 HC RX W HCPCS: Performed by: INTERNAL MEDICINE

## 2020-11-06 PROCEDURE — 99285 EMERGENCY DEPT VISIT HI MDM: CPT

## 2020-11-06 PROCEDURE — 96374 THER/PROPH/DIAG INJ IV PUSH: CPT

## 2020-11-06 PROCEDURE — 85025 COMPLETE CBC W/AUTO DIFF WBC: CPT

## 2020-11-06 PROCEDURE — 86140 C-REACTIVE PROTEIN: CPT

## 2020-11-06 PROCEDURE — 99222 1ST HOSP IP/OBS MODERATE 55: CPT | Performed by: INTERNAL MEDICINE

## 2020-11-06 PROCEDURE — 2500000003 HC RX 250 WO HCPCS: Performed by: NURSE PRACTITIONER

## 2020-11-06 PROCEDURE — 6370000000 HC RX 637 (ALT 250 FOR IP): Performed by: PHYSICIAN ASSISTANT

## 2020-11-06 PROCEDURE — 87641 MR-STAPH DNA AMP PROBE: CPT

## 2020-11-06 PROCEDURE — 72192 CT PELVIS W/O DYE: CPT

## 2020-11-06 PROCEDURE — 6370000000 HC RX 637 (ALT 250 FOR IP): Performed by: INTERNAL MEDICINE

## 2020-11-06 PROCEDURE — 6360000002 HC RX W HCPCS: Performed by: NURSE PRACTITIONER

## 2020-11-06 PROCEDURE — 87500 VANOMYCIN DNA AMP PROBE: CPT

## 2020-11-06 PROCEDURE — 1200000003 HC TELEMETRY R&B

## 2020-11-06 PROCEDURE — U0002 COVID-19 LAB TEST NON-CDC: HCPCS

## 2020-11-06 PROCEDURE — 94761 N-INVAS EAR/PLS OXIMETRY MLT: CPT

## 2020-11-06 RX ORDER — ISOSORBIDE MONONITRATE 60 MG/1
120 TABLET, EXTENDED RELEASE ORAL DAILY
Status: DISCONTINUED | OUTPATIENT
Start: 2020-11-06 | End: 2020-11-12 | Stop reason: HOSPADM

## 2020-11-06 RX ORDER — HYDROCODONE BITARTRATE AND ACETAMINOPHEN 5; 325 MG/1; MG/1
1 TABLET ORAL ONCE
Status: COMPLETED | OUTPATIENT
Start: 2020-11-06 | End: 2020-11-06

## 2020-11-06 RX ORDER — SODIUM CHLORIDE 0.9 % (FLUSH) 0.9 %
10 SYRINGE (ML) INJECTION PRN
Status: DISCONTINUED | OUTPATIENT
Start: 2020-11-06 | End: 2020-11-12 | Stop reason: HOSPADM

## 2020-11-06 RX ORDER — ATORVASTATIN CALCIUM 80 MG/1
80 TABLET, FILM COATED ORAL DAILY
Status: DISCONTINUED | OUTPATIENT
Start: 2020-11-07 | End: 2020-11-12 | Stop reason: HOSPADM

## 2020-11-06 RX ORDER — PANTOPRAZOLE SODIUM 40 MG/1
40 TABLET, DELAYED RELEASE ORAL
Status: DISCONTINUED | OUTPATIENT
Start: 2020-11-07 | End: 2020-11-06

## 2020-11-06 RX ORDER — SPIRONOLACTONE 50 MG/1
50 TABLET, FILM COATED ORAL DAILY
Status: ON HOLD | COMMUNITY
End: 2021-09-30

## 2020-11-06 RX ORDER — FAMOTIDINE 40 MG/1
40 TABLET, FILM COATED ORAL DAILY
Status: ON HOLD | COMMUNITY
End: 2021-11-08 | Stop reason: HOSPADM

## 2020-11-06 RX ORDER — ACETAMINOPHEN 650 MG/1
650 SUPPOSITORY RECTAL EVERY 6 HOURS PRN
Status: DISCONTINUED | OUTPATIENT
Start: 2020-11-06 | End: 2020-11-12 | Stop reason: HOSPADM

## 2020-11-06 RX ORDER — PREDNISONE 1 MG/1
5 TABLET ORAL DAILY
Status: DISCONTINUED | OUTPATIENT
Start: 2020-11-07 | End: 2020-11-12 | Stop reason: HOSPADM

## 2020-11-06 RX ORDER — MONTELUKAST SODIUM 5 MG/1
5 TABLET, CHEWABLE ORAL NIGHTLY
Status: DISCONTINUED | OUTPATIENT
Start: 2020-11-06 | End: 2020-11-06

## 2020-11-06 RX ORDER — CLONIDINE HYDROCHLORIDE 0.1 MG/1
0.1 TABLET ORAL 2 TIMES DAILY
Status: DISCONTINUED | OUTPATIENT
Start: 2020-11-06 | End: 2020-11-12 | Stop reason: HOSPADM

## 2020-11-06 RX ORDER — OXYCODONE AND ACETAMINOPHEN 10; 325 MG/1; MG/1
1 TABLET ORAL EVERY 8 HOURS PRN
Status: DISCONTINUED | OUTPATIENT
Start: 2020-11-06 | End: 2020-11-08

## 2020-11-06 RX ORDER — AMLODIPINE BESYLATE 10 MG/1
10 TABLET ORAL DAILY
Status: ON HOLD | COMMUNITY
End: 2021-09-28

## 2020-11-06 RX ORDER — ISOSORBIDE MONONITRATE 120 MG/1
120 TABLET, EXTENDED RELEASE ORAL DAILY
Status: ON HOLD | COMMUNITY
End: 2021-09-28

## 2020-11-06 RX ORDER — TRAZODONE HYDROCHLORIDE 100 MG/1
100 TABLET ORAL NIGHTLY
Status: DISCONTINUED | OUTPATIENT
Start: 2020-11-06 | End: 2020-11-12 | Stop reason: HOSPADM

## 2020-11-06 RX ORDER — VALSARTAN AND HYDROCHLOROTHIAZIDE 320; 25 MG/1; MG/1
1 TABLET, FILM COATED ORAL DAILY
Status: ON HOLD | COMMUNITY
End: 2021-09-28

## 2020-11-06 RX ORDER — SODIUM CHLORIDE 0.9 % (FLUSH) 0.9 %
10 SYRINGE (ML) INJECTION EVERY 12 HOURS SCHEDULED
Status: DISCONTINUED | OUTPATIENT
Start: 2020-11-06 | End: 2020-11-12 | Stop reason: HOSPADM

## 2020-11-06 RX ORDER — ASPIRIN 81 MG/1
162 TABLET ORAL DAILY
Status: DISCONTINUED | OUTPATIENT
Start: 2020-11-06 | End: 2020-11-12 | Stop reason: HOSPADM

## 2020-11-06 RX ORDER — HYDROXYCHLOROQUINE SULFATE 200 MG/1
200 TABLET, FILM COATED ORAL DAILY
Status: DISCONTINUED | OUTPATIENT
Start: 2020-11-07 | End: 2020-11-12 | Stop reason: HOSPADM

## 2020-11-06 RX ORDER — POLYETHYLENE GLYCOL 3350 17 G/17G
17 POWDER, FOR SOLUTION ORAL DAILY PRN
Status: DISCONTINUED | OUTPATIENT
Start: 2020-11-06 | End: 2020-11-12 | Stop reason: HOSPADM

## 2020-11-06 RX ORDER — AZATHIOPRINE 50 MG/1
50 TABLET ORAL DAILY
COMMUNITY
End: 2021-01-28 | Stop reason: SDUPTHER

## 2020-11-06 RX ORDER — GABAPENTIN 100 MG/1
100 CAPSULE ORAL 3 TIMES DAILY
Status: ON HOLD | COMMUNITY
End: 2022-04-26 | Stop reason: HOSPADM

## 2020-11-06 RX ORDER — DOCUSATE SODIUM 100 MG/1
100 CAPSULE, LIQUID FILLED ORAL 2 TIMES DAILY
Status: DISCONTINUED | OUTPATIENT
Start: 2020-11-06 | End: 2020-11-12 | Stop reason: HOSPADM

## 2020-11-06 RX ORDER — OXYCODONE AND ACETAMINOPHEN 10; 325 MG/1; MG/1
1 TABLET ORAL EVERY 4 HOURS PRN
Status: DISCONTINUED | OUTPATIENT
Start: 2020-11-06 | End: 2020-11-06

## 2020-11-06 RX ORDER — PANTOPRAZOLE SODIUM 40 MG/1
40 TABLET, DELAYED RELEASE ORAL DAILY
Status: ON HOLD | COMMUNITY
End: 2021-09-28

## 2020-11-06 RX ORDER — RANOLAZINE 500 MG/1
500 TABLET, EXTENDED RELEASE ORAL 2 TIMES DAILY
Status: DISCONTINUED | OUTPATIENT
Start: 2020-11-06 | End: 2020-11-12 | Stop reason: HOSPADM

## 2020-11-06 RX ORDER — ALUMINA, MAGNESIA, AND SIMETHICONE 2400; 2400; 240 MG/30ML; MG/30ML; MG/30ML
30 SUSPENSION ORAL
Status: ON HOLD | COMMUNITY
End: 2021-09-28

## 2020-11-06 RX ORDER — ONDANSETRON 4 MG/1
4 TABLET, FILM COATED ORAL 2 TIMES DAILY PRN
Status: DISCONTINUED | OUTPATIENT
Start: 2020-11-06 | End: 2020-11-12 | Stop reason: HOSPADM

## 2020-11-06 RX ORDER — DULOXETIN HYDROCHLORIDE 30 MG/1
30 CAPSULE, DELAYED RELEASE ORAL 2 TIMES DAILY
Status: ON HOLD | COMMUNITY
End: 2022-04-26 | Stop reason: HOSPADM

## 2020-11-06 RX ORDER — ALBUTEROL SULFATE 90 UG/1
2 AEROSOL, METERED RESPIRATORY (INHALATION) EVERY 6 HOURS PRN
Status: DISCONTINUED | OUTPATIENT
Start: 2020-11-06 | End: 2020-11-12 | Stop reason: HOSPADM

## 2020-11-06 RX ORDER — INSULIN GLARGINE 100 [IU]/ML
8 INJECTION, SOLUTION SUBCUTANEOUS NIGHTLY
Status: DISCONTINUED | OUTPATIENT
Start: 2020-11-06 | End: 2020-11-12 | Stop reason: HOSPADM

## 2020-11-06 RX ORDER — HYDROCODONE BITARTRATE AND ACETAMINOPHEN 5; 325 MG/1; MG/1
1 TABLET ORAL ONCE
Status: DISCONTINUED | OUTPATIENT
Start: 2020-11-06 | End: 2020-11-06

## 2020-11-06 RX ORDER — POTASSIUM CHLORIDE 1.5 G/1.77G
10 POWDER, FOR SOLUTION ORAL DAILY
Status: DISCONTINUED | OUTPATIENT
Start: 2020-11-06 | End: 2020-11-06

## 2020-11-06 RX ORDER — CETIRIZINE HYDROCHLORIDE 5 MG/1
5 TABLET ORAL DAILY
Status: DISCONTINUED | OUTPATIENT
Start: 2020-11-07 | End: 2020-11-12 | Stop reason: HOSPADM

## 2020-11-06 RX ORDER — HYDRALAZINE HYDROCHLORIDE 50 MG/1
50 TABLET, FILM COATED ORAL 3 TIMES DAILY
Status: DISCONTINUED | OUTPATIENT
Start: 2020-11-06 | End: 2020-11-07

## 2020-11-06 RX ORDER — PROMETHAZINE HYDROCHLORIDE 25 MG/1
12.5 TABLET ORAL EVERY 6 HOURS PRN
Status: DISCONTINUED | OUTPATIENT
Start: 2020-11-06 | End: 2020-11-06

## 2020-11-06 RX ORDER — MONTELUKAST SODIUM 10 MG/1
10 TABLET ORAL NIGHTLY
Status: DISCONTINUED | OUTPATIENT
Start: 2020-11-06 | End: 2020-11-12 | Stop reason: HOSPADM

## 2020-11-06 RX ORDER — ONDANSETRON 2 MG/ML
4 INJECTION INTRAMUSCULAR; INTRAVENOUS EVERY 6 HOURS PRN
Status: DISCONTINUED | OUTPATIENT
Start: 2020-11-06 | End: 2020-11-06

## 2020-11-06 RX ORDER — NITROGLYCERIN 0.4 MG/1
0.4 TABLET SUBLINGUAL EVERY 5 MIN PRN
Status: DISCONTINUED | OUTPATIENT
Start: 2020-11-06 | End: 2020-11-12 | Stop reason: HOSPADM

## 2020-11-06 RX ORDER — CLONIDINE HYDROCHLORIDE 0.1 MG/1
0.1 TABLET ORAL ONCE
Status: COMPLETED | OUTPATIENT
Start: 2020-11-06 | End: 2020-11-06

## 2020-11-06 RX ORDER — ACETAMINOPHEN 325 MG/1
650 TABLET ORAL EVERY 6 HOURS PRN
Status: DISCONTINUED | OUTPATIENT
Start: 2020-11-06 | End: 2020-11-12 | Stop reason: HOSPADM

## 2020-11-06 RX ADMIN — Medication 10 ML: at 22:08

## 2020-11-06 RX ADMIN — HYDRALAZINE HYDROCHLORIDE 50 MG: 50 TABLET, FILM COATED ORAL at 22:09

## 2020-11-06 RX ADMIN — ISOSORBIDE MONONITRATE 120 MG: 60 TABLET ORAL at 22:09

## 2020-11-06 RX ADMIN — DOCUSATE SODIUM 100 MG: 100 CAPSULE, LIQUID FILLED ORAL at 21:40

## 2020-11-06 RX ADMIN — OXYCODONE HYDROCHLORIDE AND ACETAMINOPHEN 1 TABLET: 10; 325 TABLET ORAL at 20:40

## 2020-11-06 RX ADMIN — CLONIDINE HYDROCHLORIDE 0.1 MG: 0.1 TABLET ORAL at 22:09

## 2020-11-06 RX ADMIN — CLONIDINE HYDROCHLORIDE 0.1 MG: 0.1 TABLET ORAL at 12:01

## 2020-11-06 RX ADMIN — DICLOFENAC 2 G: 10 GEL TOPICAL at 22:09

## 2020-11-06 RX ADMIN — INSULIN GLARGINE 8 UNITS: 100 INJECTION, SOLUTION SUBCUTANEOUS at 22:09

## 2020-11-06 RX ADMIN — MONTELUKAST SODIUM 10 MG: 10 TABLET ORAL at 22:08

## 2020-11-06 RX ADMIN — RANOLAZINE 500 MG: 500 TABLET, FILM COATED, EXTENDED RELEASE ORAL at 22:08

## 2020-11-06 RX ADMIN — METRONIDAZOLE 500 MG: 500 INJECTION, SOLUTION INTRAVENOUS at 15:34

## 2020-11-06 RX ADMIN — HYDROCODONE BITARTRATE AND ACETAMINOPHEN 1 TABLET: 5; 325 TABLET ORAL at 12:07

## 2020-11-06 RX ADMIN — ASPIRIN 162 MG: 81 TABLET, COATED ORAL at 21:40

## 2020-11-06 RX ADMIN — METOPROLOL TARTRATE 25 MG: 25 TABLET, FILM COATED ORAL at 22:08

## 2020-11-06 RX ADMIN — ENOXAPARIN SODIUM 40 MG: 40 INJECTION SUBCUTANEOUS at 21:40

## 2020-11-06 RX ADMIN — MAGNESIUM GLUCONATE 500 MG ORAL TABLET 400 MG: 500 TABLET ORAL at 22:08

## 2020-11-06 RX ADMIN — CEFTRIAXONE SODIUM 1 G: 1 INJECTION, POWDER, FOR SOLUTION INTRAMUSCULAR; INTRAVENOUS at 15:04

## 2020-11-06 ASSESSMENT — PAIN SCALES - GENERAL
PAINLEVEL_OUTOF10: 9
PAINLEVEL_OUTOF10: 8
PAINLEVEL_OUTOF10: 9
PAINLEVEL_OUTOF10: 8
PAINLEVEL_OUTOF10: 8
PAINLEVEL_OUTOF10: 9
PAINLEVEL_OUTOF10: 8
PAINLEVEL_OUTOF10: 9

## 2020-11-06 ASSESSMENT — PAIN DESCRIPTION - ORIENTATION
ORIENTATION: LEFT

## 2020-11-06 ASSESSMENT — ENCOUNTER SYMPTOMS
SHORTNESS OF BREATH: 0
ABDOMINAL PAIN: 1
COLOR CHANGE: 0
DIARRHEA: 0
NAUSEA: 0
RHINORRHEA: 0
ABDOMINAL DISTENTION: 0
WHEEZING: 0
SORE THROAT: 0
VOMITING: 0
CONSTIPATION: 0
COUGH: 0

## 2020-11-06 ASSESSMENT — PAIN DESCRIPTION - PAIN TYPE
TYPE: ACUTE PAIN

## 2020-11-06 ASSESSMENT — PAIN DESCRIPTION - LOCATION
LOCATION: HIP;LEG
LOCATION: LEG;HIP
LOCATION: HIP

## 2020-11-06 NOTE — ED NOTES
Bed: 023A  Expected date:   Expected time:   Means of arrival: [facility]  Comments:     Janel Berry RN  11/06/20 1285

## 2020-11-06 NOTE — ED NOTES
Pt resting on cot in stable condition. Vitals stable. Call light in reach.  Will continue to monitor      Abdias Arthur RN  11/06/20 0426

## 2020-11-06 NOTE — H&P
History & Physical        Patient:  Grzegorz Mcclendon  YOB: 1940    MRN: [id number]     Acct: [de-identified]    PCP: Terry Yeboah DO    Date of Admission: 11/6/2020    Date of Service: Pt seen/examined on 11/06/20  and Admitted to Inpatient with expected LOS greater than two midnights due to medical therapy. Chief Complaint:  Multiple falls, weakness, diarrhea, abdominal cramps      History Of Present Illness:    78 y.o. female who presented to [address] [place] [address] with above sxs for the past 48 hr. She had complaints of multiple episodes of her legs shaking spontaneously. She actually had a fall on her left side bumping her head and side. She has also had some diarrhea. She denied any fever or anosmia. She was seen in ER 11.5 and discharged. However she continued to be weak and shaky and was brought to ER. She had a ct of abdomen and the dx of diverticulitis was entertained. Thus the Hospitalist Service was asked to admit her although I doubt the dx.     Past Medical History:          Diagnosis Date    Arthritis     Asthma 1978    Atrial fibrillation ([facility] [address]     Blood circulation, collateral     CAD (coronary artery disease)     CHF [facility]congestive heart failure) ([facility]) 1999    Chronic kidney disease, stage III (moderate)     COPD (chronic obstructive pulmonary disease) ([facility])     Depression     Diabetes mellitus (HCC)     Diabetic hypertension-nephrosis syndrome (HCC)     Dysuria     GERD (gastroesophageal reflux disease)     Hyperlipidemia     Hypertension     Kidney disease     stage 3 dr [place]    Unspecified cerebral artery occlusion with cerebral infarction 1999    Dr Staci George informed patient after Heart Attack       Past Surgical History:          Procedure Laterality Date    APPENDECTOMY      BACK SURGERY      lumbar spine    BLADDER SUSPENSION      CARDIAC CATHETERIZATION  2009    with stent  [facility]    CARPAL TUNNEL RELEASE      COLONOSCOPY  2013    ENDOSCOPY, COLON, DIAGNOSTIC      ESOPHAGEAL DILATATION      HYSTERECTOMY  1974    JOINT REPLACEMENT Left 1992    Left Knee       Medications Prior to Admission:      Prior to Admission medications    Medication Sig Start Date End Date Taking? Authorizing Provider   predniSONE (DELTASONE) 5 MG tablet TAKE 1 TABLET BY MOUTH EVERY DAY 11/6/20   LONDON Mercado CNP   oxyCODONE-acetaminophen (PERCOCET) 5-325 MG per tablet Take 1 tablet by mouth every 6 hours as needed for Pain for up to 3 days. 11/5/20 11/8/20  Nicole Gann MD   hydroxychloroquine (PLAQUENIL) 200 MG tablet TAKE 1 TABLET BY MOUTH EVERY DAY 10/12/20   LONDON Mercado CNP   cloNIDine (CATAPRES) 0.1 MG tablet Take 0.1 mg by mouth 2 times daily    Historical Provider, MD   loratadine (CLARITIN) 10 MG tablet Take 10 mg by mouth daily    Historical Provider, MD   celecoxib (CELEBREX) 100 MG capsule Take 100 mg by mouth 2 times daily    Historical Provider, MD   ondansetron (ZOFRAN) 4 MG tablet Take 4 mg by mouth 2 times daily as needed for Nausea or Vomiting    Historical Provider, MD   atorvastatin (LIPITOR) 40 MG tablet Take 2 tablets by mouth daily 8/10/20   Xochitl Montgomery,    docusate sodium (COLACE, DULCOLAX) 100 MG CAPS Take 100 mg by mouth 2 times daily 8/10/20   Xochitl Montgomery DO   metoprolol tartrate (LOPRESSOR) 25 MG tablet Take 1 tablet by mouth 2 times daily 8/10/20   Xochitl Montgomery DO   hydrALAZINE (APRESOLINE) 50 MG tablet Take 0.5 tablets by mouth 3 times daily  Patient taking differently: Take 50 mg by mouth 3 times daily  8/10/20   Xochitl Montgomery DO   diclofenac sodium 1 % GEL Apply 2 g topically 4 times daily 4/2/19   Pedro Pizarro,    insulin glargine (LANTUS) 100 UNIT/ML injection pen Inject 8 Units into the skin nightly     Historical Provider, MD   oxyCODONE-acetaminophen (PERCOCET)  MG per tablet Take 1 tablet by mouth every 4 hours as needed for Pain. Tha Valero     Historical Provider, MD   dexlansoprazole (DEXILANT) 60 MG CPDR delayed release capsule Take 60 mg by mouth daily    Historical Provider, MD   isosorbide mononitrate (IMDUR) 120 MG extended release tablet Take 1 tablet by mouth daily 6/1/17   Elizabeth Saenz MD   potassium chloride (KLOR-CON) 20 MEQ packet Take 10 mEq by mouth daily     Historical Provider, MD   fenofibrate (TRICOR) 145 MG tablet Take 145 mg by mouth daily    Historical Provider, MD   aspirin 81 MG EC tablet Take 162 mg by mouth daily    Historical Provider, MD   ranolazine (RANEXA) 500 MG SR tablet Take 1 tablet by mouth 2 times daily. 9/27/14   Isabel Delacruz MD   magnesium oxide (MAG-OX) 400 MG tablet Take 400 mg by mouth 2 times daily. Historical Provider, MD   albuterol (PROVENTIL HFA;VENTOLIN HFA) 108 (90 BASE) MCG/ACT inhaler Inhale 2 puffs into the lungs every 6 hours as needed. Historical Provider, MD   montelukast (SINGULAIR) 5 MG chewable tablet Take 5 mg by mouth nightly. Historical Provider, MD   traZODone (DESYREL) 50 MG tablet Take 100 mg by mouth nightly     Historical Provider, MD   nitroGLYCERIN (NITROSTAT) 0.4 MG SL tablet Place 0.4 mg under the tongue every 5 minutes as needed. Historical Provider, MD       Allergies:  Renflexis [infliximab]; Nuts [peanut-containing drug products]; Strawberry (diagnostic); Sulfa antibiotics; Tape [adhesive tape]; Bactrim [sulfamethoxazole-trimethoprim]; and Chocolate    Social History:      The patient currently lives alone    TOBACCO:   reports that she has been smoking cigarettes. She started smoking about 7 years ago. She has a 1.50 pack-year smoking history. She has never used smokeless tobacco.  ETOH:   reports no history of alcohol use. Family History:      .  Positive as follows:        Problem Relation Age of Onset    Heart Disease Mother     Diabetes Mother     Kidney Disease Mother     Heart Disease Father     Diabetes Father     Cancer Sister     Stroke Brother     Cancer Sister        Diet:  No diet orders on file    REVIEW OF SYSTEMS:   Pertinent positives as noted in the HPI. All other systems reviewed and negative. PHYSICAL EXAM:    BP (!) 166/74   Pulse 59   Temp 98.5 °F (36.9 °C) (Oral)   Resp 16   Ht 5' 6\" (1.676 m)   Wt 168 lb (76.2 kg)   SpO2 96%   BMI 27.12 kg/m²     General appearance:  No apparent distress, appears stated age and cooperative. Chronically ill appearing    HEENT:  Normal cephalic, atraumatic without obvious deformity. Pupils equal, round, and reactive to light. Extra ocular muscles intact. Conjunctivae/corneas clear. Neck: Supple, with full range of motion. No jugular venous distention. Trachea midline. Respiratory:  Normal respiratory effort. Clear to auscultation, bilaterally without Rales/Wheezes/Rhonchi. Cardiovascular:  Regular rate and rhythm with normal S1/S2 without murmurs, rubs or gallops. Abdomen: Soft, non-tender, non-distended with normal bowel sounds. Musculoskeletal:  No clubbing, cyanosis or edema bilaterally. Full range of motion without deformity. Skin: Skin color, texture, turgor normal.  No rashes or lesions. Neurologic:  Neurovascularly intact without any focal sensory/motor deficits. Cranial nerves: II-XII intact, grossly non-focal.  Psychiatric:  Alert and oriented, thought content appropriate, normal insight        Labs:     Recent Labs     11/05/20  1318 11/06/20  1456   WBC 7.8 8.0   HGB 11.9* 12.0   HCT 37.0 37.2    260     Recent Labs     11/05/20  1318 11/06/20  1456    143   K 4.5 3.6    108   CO2 25 24   BUN 21 23*   CREATININE 1.1 1.2   CALCIUM 10.4 10.2     Recent Labs     11/05/20  1318 11/06/20  1456   AST 14 15   ALT 8* 8*   BILIDIR <0.2  --    BILITOT 0.3 0.2*   ALKPHOS 48 40     Recent Labs     11/05/20  1318   INR 1.17*     No results for input(s): Kai Parrish in the last 72 hours.     Urinalysis:      Lab Results   Component Value Date    NITRU NEGATIVE 08/08/2020    WBCUA 0-2 08/08/2020 BACTERIA NONE SEEN 08/08/2020    RBCUA NONE SEEN 08/08/2020    BLOODU NEGATIVE 08/08/2020    SPECGRAV 1.012 08/08/2020    GLUCOSEU NEGATIVE 09/27/2012       Intake & Output:  No intake/output data recorded. No intake/output data recorded. Radiology:     CXR: I have reviewed the CXR with the following interpretation: no acute dz  EKG:  I have reviewed the EKG with the following interpretation: nsr, pac, RBBB    CT PELVIS WO CONTRAST Additional Contrast? None   Final Result   1. No acute fracture or malalignment. Degenerative changes as described above. 2. There is sigmoid diverticulosis. There is also colonic wall thickening involving the sigmoid colon. This could be related to colonic wall hypertrophy from chronic diverticular disease versus sequela from early mild diverticulitis. **This report has been created using voice recognition software. It may contain minor errors which are inherent in voice recognition technology. **      Final report electronically signed by Dr. Leigh Santamaria on 11/6/2020 11:45 AM      XR HIP 2-3 VW W PELVIS LEFT   Final Result   No acute findings. Severe DJD left hip. **This report has been created using voice recognition software. It may contain minor errors which are inherent in voice recognition technology. **      Final report electronically signed by Dr. Anabela Bangura on 11/6/2020 11:11 AM           DVT prophylaxis: {dvt prophylaxis:88652    Code Status: Prior      PT/OT Eval Status: ORDERED    Disposition:Home? Active Hospital Problems    Diagnosis Date Noted    Diverticulitis [K57.92] 11/06/2020       PLAN:    1. Possible diverticultis- doubt, as no LLQ tenderness on exam- will observe. Check crp  2. Multiple falls- PT, soc service  3. Copd- continue home meds  4. Cad- continue home meds-   5.  In view of recent weakness and diarrhea, covid test in ER        Thank you Rigo Yeboah DO for the opportunity to be involved in this patient's care.    Electronically signed by Geovani Oscar MD on 11/6/2020 at 3:45 PM

## 2020-11-06 NOTE — ED NOTES
Pt resting on cot in stable condition. Vitals stable. Call light in reach. Lights dimmed for comfort.  Will continue to monitor     Pinky Sky RN  11/06/20 1799

## 2020-11-06 NOTE — ED NOTES
Pt transported to 6E66 on cart in stable condition. Floor contacted before transport. Spoke with Mariaa Jacome.      Alberta Frey  11/06/20 [id number]

## 2020-11-06 NOTE — ED NOTES
Pt resting on cot in stable condition. Pt eyes closed. Lights dimmed for comfort. Vitals stable. Call light in reach. Awaiting room assignment at this time.  Will continue to monitor     Yannick Hebert RN  11/06/20 [id number]

## 2020-11-06 NOTE — ED NOTES
Resting on cot in stable condition. Lights dimmed for comfort. Vitals stable. Call light in reach.  Will continue to monitor      Melissa Baez RN  11/06/20 5229

## 2020-11-06 NOTE — ED PROVIDER NOTES
[facility] ENCOUNTER          Pt Name: Kemal Portillo  MRN: [id number]  [place] 1940  Date of evaluation: 11/6/2020  Treating Resident Physician: Monique Pedraza MD  Supervising Physician: MD Anna Rodriguez       Chief Complaint   Patient presents with    Hip Pain     History obtained from the patient. HISTORY OF PRESENT ILLNESS    HPI  Kemal Portillo is a 78 y.o. female who presents to the emergency department for evaluation of left hip pain. Patient presented to the ER with complaint of fall this morning. Patient states that she also fell last night was in the emergency department and discharged home yesterday evening. Patient complains of left hip pain radiating to left abdomen that is a 8 out of 10 on pain scale. Is aggravated by touching area and alleviated by nothing. Patient also states that she is unable to ambulate due to the pain. Patient denies any fever, syncope, or chest pain. The patient has no other acute complaints at this time. REVIEW OF SYSTEMS   Review of Systems   Constitutional: Negative for fatigue and fever. HENT: Negative for ear pain, rhinorrhea and sore throat. Respiratory: Negative for cough, shortness of breath and wheezing. Cardiovascular: Negative for chest pain, palpitations and leg swelling. Gastrointestinal: Positive for abdominal pain. Negative for abdominal distention, constipation, diarrhea, nausea and vomiting. Endocrine: Negative for polydipsia and polyuria. Genitourinary: Negative for difficulty urinating and dysuria. Musculoskeletal: Negative for arthralgias. Left hip pain   Skin: Negative for color change, pallor and rash. Neurological: Negative for dizziness, seizures, syncope, weakness and numbness.          PAST MEDICAL AND SURGICAL HISTORY     Past Medical History:   Diagnosis Date    Arthritis     Asthma 1978    At[facility] fibrillation ([facility] [address]     Blood circulation, collateral     CAD (coronary artery disease)     CH[facility]congestive [facility] failure) ([facility] 1999    Chronic kidney disease, stage III (moderate)     COPD (chronic obstructive pulmonary disease) ([facility])     Depression     Diabetes mellitus ([facility])     Diabetic hypertension-nephrosis syndrome ([facility])     Dysuria     GERD (gastroesophageal reflux disease)     Hyperlipidemia     Hypertension     Kidney disease     stage 3 dr Natalie Moody Unspecified cerebral artery occlusion with cerebral infarction 1999    Dr Christin Quinn informed patient after Heart Attack     Past Surgical History:   Procedure Laterality Date    APPENDECTOMY      BACK SURGERY      lumbar spine    BLADDER SUSPENSION      CARDIAC [facility]  2009    [facility]    CARPAL TUNNEL RELEASE      COLONOSCOPY  2013    ENDOSCOPY, COLON, DIAGNOSTIC      ESOPHAGEAL DILATATION      HYSTERECTOMY  1974    JOINT REPLACEMENT Left 1992    Left Knee         MEDICATIONS     Current Facility-Administered Medications:     metronidazole (FLAGYL) 500 mg in NaCl 100 mL IVPB premix, 500 mg, Intravenous, Once, Cliff Mead MD    cefTRIAXone (ROCEPHIN) 1 g IVPB in 50 mL D5W minibag, 1 g, Intravenous, Once, Cliff Mead MD, Last Rate: 100 mL/hr at 11/06/20 1504, 1 g at 11/06/20 1504    Current Outpatient Medications:     predniSONE (DELTASONE) 5 MG tablet, TAKE 1 TABLET BY MOUTH EVERY DAY, Disp: 30 tablet, Rfl: 3    oxyCODONE-acetaminophen (PERCOCET) 5-325 MG per tablet, Take 1 tablet by mouth every 6 hours as needed for Pain for up to 3 days. , Disp: 12 tablet, Rfl: 0    hydroxychloroquine (PLAQUENIL) 200 MG tablet, TAKE 1 TABLET BY MOUTH EVERY DAY, Disp: 90 tablet, Rfl: 1    cloNIDine (CATAPRES) 0.1 MG tablet, Take 0.1 mg by mouth 2 times daily, Disp: , Rfl:     loratadine (CLARITIN) 10 MG tablet, Take 10 mg by mouth daily, Disp: , Rfl:     celecoxib (CELEBREX) 100 MG capsule, Take 100 mg by mouth 2 times daily, Disp: , Rfl:    ondansetron (ZOFRAN) 4 MG tablet, Take 4 mg by mouth 2 times daily as needed for Nausea or Vomiting, Disp: , Rfl:     atorvastatin (LIPITOR) 40 MG tablet, Take 2 tablets by mouth daily, Disp: 60 tablet, Rfl: 2    docusate sodium (COLACE, DULCOLAX) 100 MG CAPS, Take 100 mg by mouth 2 times daily, Disp: 60 capsule, Rfl: 1    metoprolol tartrate (LOPRESSOR) 25 MG tablet, Take 1 tablet by mouth 2 times daily, Disp: 60 tablet, Rfl: 3    hydrALAZINE (APRESOLINE) 50 MG tablet, Take 0.5 tablets by mouth 3 times daily (Patient taking differently: Take 50 mg by mouth 3 times daily ), Disp: 90 tablet, Rfl: 1    diclofenac sodium 1 % GEL, Apply 2 g topically 4 times daily, Disp: 2 Tube, Rfl: 1    insulin glargine (LANTUS) 100 UNIT/ML injection pen, Inject 8 Units into the skin nightly , Disp: , Rfl:     oxyCODONE-acetaminophen (PERCOCET)  MG per tablet, Take 1 tablet by mouth every 4 hours as needed for Pain. ., Disp: , Rfl:     dexlansoprazole (DEXILANT) 60 MG CPDR delayed release capsule, Take 60 mg by mouth daily, Disp: , Rfl:     isosorbide mononitrate (IMDUR) 120 MG extended release tablet, Take 1 tablet by mouth daily, Disp: 30 tablet, Rfl: 3    potassium chloride (KLOR-CON) 20 MEQ packet, Take 10 mEq by mouth daily , Disp: , Rfl:     fenofibrate (TRICOR) 145 MG tablet, Take 145 mg by mouth daily, Disp: , Rfl:     aspirin 81 MG EC tablet, Take 162 mg by mouth daily, Disp: , Rfl:     ranolazine (RANEXA) 500 MG SR tablet, Take 1 tablet by mouth 2 times daily. , Disp: 60 tablet, Rfl: 3    magnesium oxide (MAG-OX) 400 MG tablet, Take 400 mg by mouth 2 times daily. , Disp: , Rfl:     albuterol (PROVENTIL HFA;VENTOLIN HFA) 108 (90 BASE) MCG/ACT inhaler, Inhale 2 puffs into the lungs every 6 hours as needed. , Disp: , Rfl:     montelukast (SINGULAIR) 5 MG chewable tablet, Take 5 mg by mouth nightly.  , Disp: , Rfl:     traZODone (DESYREL) 50 MG tablet, Take 100 mg by mouth nightly , Disp: , Rfl:    nitroGLYCERIN (NITROSTAT) 0.4 MG SL tablet, Place 0.4 mg under the tongue every 5 minutes as needed. , Disp: , Rfl:       SOCIAL HISTORY     Social History     Social History Narrative    Not on file     Social History     Tobacco Use    Smoking status: Light Tobacco Smoker     Packs/day: 0.15     Years: 10.00     Pack years: 1.50     Types: Cigarettes     Start date: 10/21/2013    Smokeless tobacco: Never Used   Substance Use Topics    Alcohol use: No    Drug use: No         ALLERGIES     Allergies   Allergen Reactions    Renflexis [Infliximab] Shortness Of Breath and Palpitations    Nuts [Peanut-Containing Drug Products]      Pt suffers from diverticulitis and nut products irritate her stomach.  Strawberry (Diagnostic)      Diverticulitis    Sulfa Antibiotics     Tape Cheney [place] Tape] Other (See Comments)     tears skin off    Bactrim [Sulfamethoxazole-Trimethoprim] Rash     Little red rash    Chocolate Rash         FAMILY HISTORY     Family History   Problem Relation Age of Onset    Heart Disease Mother     Diabetes Mother     Kidney Disease Mother     Heart Disease Father     Diabetes Father     Cancer Sister     Stroke Brother     Cancer Sister          PREVIOUS RECORDS   Previous records reviewed today. PHYSICAL EXAM     ED Triage Vitals [11/06/20 0909]   BP Temp Temp Source Pulse Resp SpO2 Height Weight   (!) 184/137 98.5 °F (36.9 °C) Oral 65 16 97 % 5' 6\" (1.676 m) 168 lb (76.2 kg)     Initial vital signs and nursing assessment reviewed and normal. Pulsoximetry is normal per my interpretation. Additional Vital Signs:  Vitals:    11/06/20 1505   BP: (!) 166/74   Pulse: 59   Resp: 16   Temp:    SpO2: 96%       Physical Exam  Constitutional:       Appearance: Normal appearance. HENT:      Head: Normocephalic.       Right Ear: External ear normal.      Left Ear: External ear normal.      Nose: Nose normal.      Mouth/Throat:      Mouth: Mucous membranes are moist.      Pharynx: Oropharynx is clear. Eyes:      Extraocular Movements: Extraocular movements intact. Conjunctiva/sclera: Conjunctivae normal.      Pupils: Pupils are equal, round, and reactive to light. Neck:      Musculoskeletal: Normal range of motion and neck supple. Cardiovascular:      Rate and Rhythm: Normal rate and regular rhythm. Pulses: Normal pulses. Heart sounds: Normal heart sounds. Pulmonary:      Effort: Pulmonary effort is normal.      Breath sounds: Normal breath sounds. Abdominal:      General: Bowel sounds are normal.      Palpations: Abdomen is soft. Musculoskeletal: Normal range of motion. Comments: Left hip pain   Skin:     General: Skin is warm and dry. Capillary Refill: Capillary refill takes less than 2 seconds. Neurological:      General: No focal deficit present. Mental Status: She is alert and oriented to person, place, and time. MEDICAL DECISION MAKING   Initial Assessment: diverticulitis, hypertension, multiple falls  Plan: Patient presented to the ER with recent fall x2. Patient here today with left abdomen and left hip pain. Patient had plain imaging films yesterday and repeated today that showed no acute fracture. Due to continued pain and patient stating it hurt to bad to ambulate CT of pelvis without contrast was completed. CT of pelvis showed no acute fractures however did show possible early diverticulitis. On reexamination patient is able to identify more left lower abdomen pain which is worse with palpation. This is consistent with a finding of early diverticulitis. Patient will receive metronidazole and Rocephin at this time to treat appropriately. Patient lives at home and has had decreased mobility with multiple falls the last few days secondary to pain. Patient will be admitted to hospitalist this time for further follow-up.         ED RESULTS   Laboratory results:  Labs Reviewed   CBC WITH AUTO DIFFERENTIAL - Abnormal; Notable for the following components:       Result Value    RDW-CV 17.2 (*)     RDW-SD 55.4 (*)     All other components within normal limits   COMPREHENSIVE METABOLIC PANEL W/ REFLEX TO MG FOR LOW K       Radiologic studies results:  CT PELVIS WO CONTRAST Additional Contrast? None   Final Result   1. No acute fracture or malalignment. Degenerative changes as described above. 2. There is sigmoid diverticulosis. There is also colonic wall thickening involving the sigmoid colon. This could be related to colonic wall hypertrophy from chronic diverticular disease versus sequela from early mild diverticulitis. **This report has been created using voice recognition software. It may contain minor errors which are inherent in voice recognition technology. **      Final report electronically signed by Dr. Leigh Santamaria on 11/6/2020 11:45 AM      XR HIP 2-3 VW W PELVIS LEFT   Final Result   No acute findings. Severe DJD left hip. **This report has been created using voice recognition software. It may contain minor errors which are inherent in voice recognition technology. **      Final report electronically signed by Dr. Anabela Bangura on 11/6/2020 11:11 AM          ED Medications administered this visit:   Medications   metronidazole (FLAGYL) 500 mg in NaCl 100 mL IVPB premix (has no administration in time range)   cefTRIAXone (ROCEPHIN) 1 g IVPB in 50 mL D5W minibag (1 g Intravenous New Bag 11/6/20 1504)   cloNIDine (CATAPRES) tablet 0.1 mg (0.1 mg Oral Given 11/6/20 1201)   HYDROcodone-acetaminophen (NORCO) 5-325 MG per tablet 1 tablet (1 tablet Oral Given 11/6/20 1207)         ED COURSE     ED Course as of Nov 06 1519   Fri Nov 06, 2020   1508 CT PELVIS WO CONTRAST Additional Contrast? None [CN]      ED Course User Index  [CN] Logan Power MD         MEDICATION CHANGES     New Prescriptions    No medications on file         FINAL DISPOSITION     Final diagnoses:   Multiple falls   Mobility impaired Diverticulitis of colon     Condition: condition: fair  Dispo: admit to med surg      This transcription was electronically signed. Parts of this transcriptions may have been dictated by use of voice recognition software and electronically transcribed, and parts may have been transcribed with the assistance of an ED scribe. The transcription may contain errors not detected in proofreading. Please refer to my supervising physician's documentation if my documentation differs.     Electronically Signed: Eliverto Mcardle, 11/06/20, 3:19 PM         Eliverto Mcardle, MD  Resident  11/06/20 2238

## 2020-11-06 NOTE — ED NOTES
Pt resting on cot in stable condition. Vitals stable. Pt denies needs. Call light in reach.  Will continue to monitor     Odin Bonilla RN  11/06/20 6538

## 2020-11-06 NOTE — ED NOTES
Pt resting on cot with eyes closed. Lights dimmed for comfort. Call light in reach.  Will continue to monitor      Mami Reeves RN  11/06/20 3828

## 2020-11-06 NOTE — ED NOTES
Call Zachary, her grandson at discharge and he will pick her up. His phone number [phone].       Tiffanie Fontana RN  11/06/20 4808

## 2020-11-06 NOTE — ED NOTES
ED to inpatient nurses report    Chief Complaint   Patient presents with    Hip Pain      Present to ED from home  LOC: alert and orientated to name, place, date  Vital signs   Vitals:    11/06/20 1201 11/06/20 1202 11/06/20 1320 11/06/20 1505   BP: (!) 222/75 (!) 222/75 (!) 159/70 (!) 166/74   Pulse:  67 63 59   Resp:  16 16 16   Temp:       TempSrc:       SpO2:  98% 96% 96%   Weight:       Height:          Oxygen Baseline room air    Current needs required none   LDAs:   Peripheral IV 08/09/20 Distal;Right;Dorsal Forearm (Active)       Peripheral IV 11/06/20 Left Shoulder (Active)     Mobility: Requires assistance * 2  Pending ED orders: none  Present condition: stable    Electronically signed by Kylah Fernandez RN on 11/6/2020 at 3:57 PM     Kylah Fernandez RN  11/06/20 [id number]

## 2020-11-06 NOTE — ED NOTES
Pt reassessed at this time. Vitals remain stable. Pt still complaining of left hip pain 9/10. Provider notified. Rocephin started at this time. Call light in reach.  Will continue to monitor      Griselda Hill RN  11/06/20 [id number]

## 2020-11-06 NOTE — ED TRIAGE NOTES
Pt presents to ED by EMS with c/o left hip and leg pain. Pt states she was evaluated yesterday after she fell in her kitchen. Pt states there was no broken bones and she was sent home on pain management. Pt states she has been having a hard time getting around yet at home this morning and wanted re-evaluated. Pt typically ambulates with a walker. Vitals stable.  Call light in reach

## 2020-11-07 LAB
GLUCOSE BLD-MCNC: 103 MG/DL (ref 70–108)
GLUCOSE BLD-MCNC: 105 MG/DL (ref 70–108)
GLUCOSE BLD-MCNC: 144 MG/DL (ref 70–108)
GLUCOSE BLD-MCNC: 90 MG/DL (ref 70–108)
MRSA SCREEN RT-PCR: NEGATIVE
VANCOMYCIN RESISTANT ENTEROCOCCUS: NEGATIVE

## 2020-11-07 PROCEDURE — 6370000000 HC RX 637 (ALT 250 FOR IP): Performed by: PHYSICIAN ASSISTANT

## 2020-11-07 PROCEDURE — 94760 N-INVAS EAR/PLS OXIMETRY 1: CPT

## 2020-11-07 PROCEDURE — 6360000002 HC RX W HCPCS: Performed by: STUDENT IN AN ORGANIZED HEALTH CARE EDUCATION/TRAINING PROGRAM

## 2020-11-07 PROCEDURE — 6360000002 HC RX W HCPCS: Performed by: PHYSICIAN ASSISTANT

## 2020-11-07 PROCEDURE — 1200000003 HC TELEMETRY R&B

## 2020-11-07 PROCEDURE — 6370000000 HC RX 637 (ALT 250 FOR IP): Performed by: STUDENT IN AN ORGANIZED HEALTH CARE EDUCATION/TRAINING PROGRAM

## 2020-11-07 PROCEDURE — 82948 REAGENT STRIP/BLOOD GLUCOSE: CPT

## 2020-11-07 PROCEDURE — 99232 SBSQ HOSP IP/OBS MODERATE 35: CPT | Performed by: INTERNAL MEDICINE

## 2020-11-07 PROCEDURE — 2580000003 HC RX 258: Performed by: INTERNAL MEDICINE

## 2020-11-07 PROCEDURE — 6370000000 HC RX 637 (ALT 250 FOR IP): Performed by: INTERNAL MEDICINE

## 2020-11-07 RX ORDER — DEXTROSE MONOHYDRATE 25 G/50ML
12.5 INJECTION, SOLUTION INTRAVENOUS PRN
Status: DISCONTINUED | OUTPATIENT
Start: 2020-11-07 | End: 2020-11-12 | Stop reason: HOSPADM

## 2020-11-07 RX ORDER — SPIRONOLACTONE 25 MG/1
50 TABLET ORAL DAILY
Status: DISCONTINUED | OUTPATIENT
Start: 2020-11-07 | End: 2020-11-12 | Stop reason: HOSPADM

## 2020-11-07 RX ORDER — NICOTINE POLACRILEX 4 MG
15 LOZENGE BUCCAL PRN
Status: DISCONTINUED | OUTPATIENT
Start: 2020-11-07 | End: 2020-11-12 | Stop reason: HOSPADM

## 2020-11-07 RX ORDER — VALSARTAN 320 MG/1
320 TABLET ORAL DAILY
Status: DISCONTINUED | OUTPATIENT
Start: 2020-11-07 | End: 2020-11-12

## 2020-11-07 RX ORDER — CALCIUM CARBONATE 500(1250)
500 TABLET ORAL DAILY
Status: DISCONTINUED | OUTPATIENT
Start: 2020-11-07 | End: 2020-11-12 | Stop reason: HOSPADM

## 2020-11-07 RX ORDER — PANTOPRAZOLE SODIUM 40 MG/1
40 TABLET, DELAYED RELEASE ORAL DAILY
Status: DISCONTINUED | OUTPATIENT
Start: 2020-11-07 | End: 2020-11-12 | Stop reason: HOSPADM

## 2020-11-07 RX ORDER — DULOXETIN HYDROCHLORIDE 30 MG/1
30 CAPSULE, DELAYED RELEASE ORAL 2 TIMES DAILY
Status: DISCONTINUED | OUTPATIENT
Start: 2020-11-07 | End: 2020-11-12 | Stop reason: HOSPADM

## 2020-11-07 RX ORDER — AZATHIOPRINE 50 MG/1
50 TABLET ORAL DAILY
Status: DISCONTINUED | OUTPATIENT
Start: 2020-11-07 | End: 2020-11-12 | Stop reason: HOSPADM

## 2020-11-07 RX ORDER — ISOSORBIDE MONONITRATE 60 MG/1
120 TABLET, EXTENDED RELEASE ORAL DAILY
Status: DISCONTINUED | OUTPATIENT
Start: 2020-11-07 | End: 2020-11-07 | Stop reason: SDUPTHER

## 2020-11-07 RX ORDER — FAMOTIDINE 20 MG/1
40 TABLET, FILM COATED ORAL DAILY
Status: DISCONTINUED | OUTPATIENT
Start: 2020-11-07 | End: 2020-11-10

## 2020-11-07 RX ORDER — DEXTROSE MONOHYDRATE 50 MG/ML
100 INJECTION, SOLUTION INTRAVENOUS PRN
Status: DISCONTINUED | OUTPATIENT
Start: 2020-11-07 | End: 2020-11-12 | Stop reason: HOSPADM

## 2020-11-07 RX ORDER — VALSARTAN AND HYDROCHLOROTHIAZIDE 320; 25 MG/1; MG/1
1 TABLET, FILM COATED ORAL DAILY
Status: DISCONTINUED | OUTPATIENT
Start: 2020-11-07 | End: 2020-11-07 | Stop reason: CLARIF

## 2020-11-07 RX ORDER — HYDRALAZINE HYDROCHLORIDE 50 MG/1
100 TABLET, FILM COATED ORAL 3 TIMES DAILY
Status: DISCONTINUED | OUTPATIENT
Start: 2020-11-07 | End: 2020-11-12 | Stop reason: HOSPADM

## 2020-11-07 RX ORDER — AMLODIPINE BESYLATE 10 MG/1
10 TABLET ORAL DAILY
Status: DISCONTINUED | OUTPATIENT
Start: 2020-11-07 | End: 2020-11-12 | Stop reason: HOSPADM

## 2020-11-07 RX ORDER — HYDROCHLOROTHIAZIDE 25 MG/1
25 TABLET ORAL DAILY
Status: DISCONTINUED | OUTPATIENT
Start: 2020-11-07 | End: 2020-11-12

## 2020-11-07 RX ORDER — MAGNESIUM HYDROXIDE/ALUMINUM HYDROXICE/SIMETHICONE 120; 1200; 1200 MG/30ML; MG/30ML; MG/30ML
30 SUSPENSION ORAL
Status: DISCONTINUED | OUTPATIENT
Start: 2020-11-07 | End: 2020-11-12 | Stop reason: HOSPADM

## 2020-11-07 RX ORDER — GABAPENTIN 100 MG/1
100 CAPSULE ORAL 3 TIMES DAILY
Status: DISCONTINUED | OUTPATIENT
Start: 2020-11-07 | End: 2020-11-12 | Stop reason: HOSPADM

## 2020-11-07 RX ADMIN — MAGNESIUM GLUCONATE 500 MG ORAL TABLET 400 MG: 500 TABLET ORAL at 21:33

## 2020-11-07 RX ADMIN — DICLOFENAC 2 G: 10 GEL TOPICAL at 21:33

## 2020-11-07 RX ADMIN — DOCUSATE SODIUM 100 MG: 100 CAPSULE, LIQUID FILLED ORAL at 21:34

## 2020-11-07 RX ADMIN — OXYCODONE HYDROCHLORIDE AND ACETAMINOPHEN 1 TABLET: 10; 325 TABLET ORAL at 05:01

## 2020-11-07 RX ADMIN — SPIRONOLACTONE 50 MG: 25 TABLET ORAL at 15:42

## 2020-11-07 RX ADMIN — TRIMETHOBENZAMIDE HYDROCHLORIDE 200 MG: 100 INJECTION INTRAMUSCULAR at 02:16

## 2020-11-07 RX ADMIN — VALSARTAN 320 MG: 320 TABLET, FILM COATED ORAL at 15:43

## 2020-11-07 RX ADMIN — RANOLAZINE 500 MG: 500 TABLET, FILM COATED, EXTENDED RELEASE ORAL at 21:33

## 2020-11-07 RX ADMIN — DULOXETINE HYDROCHLORIDE 30 MG: 30 CAPSULE, DELAYED RELEASE ORAL at 21:34

## 2020-11-07 RX ADMIN — PANTOPRAZOLE SODIUM 40 MG: 40 TABLET, DELAYED RELEASE ORAL at 15:43

## 2020-11-07 RX ADMIN — RANOLAZINE 500 MG: 500 TABLET, FILM COATED, EXTENDED RELEASE ORAL at 10:02

## 2020-11-07 RX ADMIN — HYDRALAZINE HYDROCHLORIDE 100 MG: 50 TABLET, FILM COATED ORAL at 21:33

## 2020-11-07 RX ADMIN — ALUMINUM HYDROXIDE, MAGNESIUM HYDROXIDE, AND SIMETHICONE 30 ML: 200; 200; 20 SUSPENSION ORAL at 15:44

## 2020-11-07 RX ADMIN — CLONIDINE HYDROCHLORIDE 0.1 MG: 0.1 TABLET ORAL at 21:34

## 2020-11-07 RX ADMIN — INSULIN GLARGINE 8 UNITS: 100 INJECTION, SOLUTION SUBCUTANEOUS at 21:33

## 2020-11-07 RX ADMIN — ASPIRIN 162 MG: 81 TABLET, COATED ORAL at 10:02

## 2020-11-07 RX ADMIN — MAGNESIUM GLUCONATE 500 MG ORAL TABLET 400 MG: 500 TABLET ORAL at 10:02

## 2020-11-07 RX ADMIN — CETIRIZINE HYDROCHLORIDE 5 MG: 5 TABLET ORAL at 10:02

## 2020-11-07 RX ADMIN — CLONIDINE HYDROCHLORIDE 0.1 MG: 0.1 TABLET ORAL at 10:02

## 2020-11-07 RX ADMIN — ATORVASTATIN CALCIUM 80 MG: 80 TABLET, FILM COATED ORAL at 10:02

## 2020-11-07 RX ADMIN — Medication 10 ML: at 10:03

## 2020-11-07 RX ADMIN — ENOXAPARIN SODIUM 40 MG: 40 INJECTION SUBCUTANEOUS at 21:33

## 2020-11-07 RX ADMIN — AMLODIPINE BESYLATE 10 MG: 10 TABLET ORAL at 15:43

## 2020-11-07 RX ADMIN — METOPROLOL TARTRATE 25 MG: 25 TABLET, FILM COATED ORAL at 21:33

## 2020-11-07 RX ADMIN — DICLOFENAC 2 G: 10 GEL TOPICAL at 10:03

## 2020-11-07 RX ADMIN — GABAPENTIN 100 MG: 100 CAPSULE ORAL at 15:43

## 2020-11-07 RX ADMIN — HYDRALAZINE HYDROCHLORIDE 100 MG: 50 TABLET, FILM COATED ORAL at 15:43

## 2020-11-07 RX ADMIN — HYDROCHLOROTHIAZIDE 25 MG: 25 TABLET ORAL at 15:43

## 2020-11-07 RX ADMIN — HYDROXYCHLOROQUINE SULFATE 200 MG: 200 TABLET ORAL at 10:02

## 2020-11-07 RX ADMIN — OXYCODONE HYDROCHLORIDE AND ACETAMINOPHEN 1 TABLET: 10; 325 TABLET ORAL at 15:43

## 2020-11-07 RX ADMIN — ALUMINUM HYDROXIDE, MAGNESIUM HYDROXIDE, AND SIMETHICONE 30 ML: 200; 200; 20 SUSPENSION ORAL at 21:33

## 2020-11-07 RX ADMIN — GABAPENTIN 100 MG: 100 CAPSULE ORAL at 21:34

## 2020-11-07 RX ADMIN — METOPROLOL TARTRATE 25 MG: 25 TABLET, FILM COATED ORAL at 10:02

## 2020-11-07 RX ADMIN — FAMOTIDINE 40 MG: 20 TABLET, FILM COATED ORAL at 15:43

## 2020-11-07 RX ADMIN — DOCUSATE SODIUM 100 MG: 100 CAPSULE, LIQUID FILLED ORAL at 10:02

## 2020-11-07 RX ADMIN — CALCIUM 500 MG: 500 TABLET ORAL at 15:43

## 2020-11-07 RX ADMIN — DULOXETINE HYDROCHLORIDE 30 MG: 30 CAPSULE, DELAYED RELEASE ORAL at 15:43

## 2020-11-07 RX ADMIN — HYDRALAZINE HYDROCHLORIDE 50 MG: 50 TABLET, FILM COATED ORAL at 10:02

## 2020-11-07 RX ADMIN — AZATHIOPRINE 50 MG: 50 TABLET ORAL at 15:43

## 2020-11-07 RX ADMIN — MONTELUKAST SODIUM 10 MG: 10 TABLET ORAL at 21:33

## 2020-11-07 RX ADMIN — ISOSORBIDE MONONITRATE 120 MG: 60 TABLET ORAL at 10:02

## 2020-11-07 RX ADMIN — DICLOFENAC 2 G: 10 GEL TOPICAL at 15:44

## 2020-11-07 RX ADMIN — PREDNISONE 5 MG: 5 TABLET ORAL at 10:02

## 2020-11-07 ASSESSMENT — PAIN DESCRIPTION - ORIENTATION
ORIENTATION: LEFT
ORIENTATION: LEFT

## 2020-11-07 ASSESSMENT — PAIN DESCRIPTION - PAIN TYPE
TYPE: ACUTE PAIN
TYPE: ACUTE PAIN

## 2020-11-07 ASSESSMENT — PAIN DESCRIPTION - LOCATION
LOCATION: HEAD;HIP
LOCATION: HIP

## 2020-11-07 ASSESSMENT — PAIN SCALES - GENERAL
PAINLEVEL_OUTOF10: 9
PAINLEVEL_OUTOF10: 9
PAINLEVEL_OUTOF10: 6
PAINLEVEL_OUTOF10: 6

## 2020-11-07 NOTE — FLOWSHEET NOTE
11/06/20 1927   Provider Notification   Reason for Communication Review case  (Patient unstable and Telemetry ordered)   Provider Name Chelsea Werner   Provider Notification Advance Practice Clinician (CNS, NP, CNM, CRNA, PA)   Method of Communication Secure Message   Response Waiting for response  (Patient being transfered to [phone])   Notification Time 1927

## 2020-11-07 NOTE — PROGRESS NOTES
Pt arrived to 6E66 by bed. Transferred to bed in room. Alert and oriented x4. Vital signs unstable. Pt complaining of pain on top of head and left hip region at a 9 out of 10. STAT EKG ordered. EKG showed abnormalities. Telemetry ordered for patient. Waiting to transfer.

## 2020-11-07 NOTE — PROGRESS NOTES
Hospitalist Progress Note      Patient:  Micah Roque    Unit/Bed:4K-10/010-A  YOB: 1940  MRN: [id number]   Acct: [de-identified]   PCP: Anahy Yeboah DO  Date of Admission: 11/6/2020    Assessment/Plan:    1. Other malaise-new onset. 11/7/2020: Patient's multiple falls are likely secondary to deconditioning. This is because of the patient's electrolyte panel and EKG did not show any obvious cause for syncopal episodes, though EKG did show occasional PVCs. This is also likely based on the patient's description of her falling episodes. However, the differential diagnosis is broad and includes visual impairment, gait impairment, environmental hazards, arrhythmia, polypharmacy, alcohol use, orthostatic hypotension, vertigo, and syncope. Vasovagal or orthostatic hypotension are possibilities. Transient arrhythmias are also possibility based on the patient's description of occasional heart palpitations. Stroke is unlikely as patient had no obvious clinical signs of stroke. Seizure is unlikely as patient did not have classic presentation. -11/7/2020: We will maintain patient on telemetry monitoring. We will obtain orthostatic vital signs. We will discharge patient to rehabilitation facility if she is amenable to this. We will consult PT/OT. We will follow-up with neurology and PCP as an outpatient. Will consider outpatient event monitoring. 2.  Unspecified fall-new onset. 11/7/2020: The patient fell yesterday and suffered a parietal-occipital contusion. Left-sided neck pain was also noted. CT head without contrast showed no acute changes, per radiology. CT cervical spine without contrast showed no acute changes, per radiology. X-ray of left knee demonstrates no acute findings per radiology. X-ray of the pelvis and 2-3 view x-ray of the hip showed no acute findings.   However, 2-3 view x-ray of the hip did show severe degenerative joint disease of the left hip, per radiology. -11/7/2020: We will monitor the patient clinically. We will transfer patient from  to [place] [id number]. 3.  Doubtful diverticulitis based on imaging findings-new onset. 11/7/2020: This is based on findings on CT pelvis without contrast.  However, patient does not have any abdominal pain, fever, or tenderness to palpation. In addition, the patient's CRP and pro calcitonin are in the normal range. -11/7/2020: We will monitor the patient clinically. This is to include a full abdominal exam at least every day. 4. Nicotine dependence, unspecified, uncomplicated-stable. 11/7/2020: The patient states that she smokes about 3 cigarettes a day. The patient further stated that she would be open to quitting.    -11/7/2020: The patient was counseled with regards to tobacco cessation. The patient stated that she is open to quitting completely. 5.  Unilateral primary osteoarthritis, left hip-stable. 11/7/2020: We know this based on the patient's report of severe left-sided hip pain and based on radiology findings of the 2-3 view x-ray of the hip.    -11/7/2020: We will maintain patient on as needed acetaminophen and Percocet. We will escalate pain management if necessary. We will follow-up with PCP and orthopedic surgery as an outpatient. 6.  Multiple medications of unknown purpose. 11/7/2020: These include azathioprine and duloxetine. -11/7/2020: We will follow-up with PCP as an outpatient. 7.  Gastroesophageal reflux disease, unspecified-stable. 11/7/2020: The patient complained of symptoms of chest tightness with difficulty taking a deep breath, which nursing reported to me. The patient stated that she gets these symptoms whenever she does not get Pepcid for GERD.    -11/7/2020: We will restart patient on Pepcid 40 mg daily and reassess. 8.  Benign essential hypertension-stable    11/7/2020:  We know this based on the patient's history and based on very high blood pressure of 184/137 on admission which eventually reached as high as 222/75. The patient's latest blood pressure was 122/71. This represents a drop in blood pressure from 192/82 at 1141 to 122/71 on 1500. Concern for possible orthostatic or ischemic complications secondary to acute blood pressure drop. -11/7/2020: We will maintain the patient on home antihypertensives including hydralazine, Norvasc, Aldactone, valsartan, hydrochlorothiazide, and clonidine. Hydralazine dose was increased from 50 mg 3 times daily to 100 mg 3 times daily. Night team was informed, will follow patient. We will place hold parameters on all antihypertensives to not be given if systolic blood pressure is less than 130. Patient will be clinically reevaluated in the morning. Note: The patient was seen again at around 2000 hours on 11/7/2020. The patient denied any dizziness. The patient affirmed a headache unchanged from admission. The patient affirmed blurry vision that started at 0500 hours, which she attributed to not having her eye drops. The patient had no extremity weakness, facial dropping, or other obvious signs of stroke. We will monitor the patient clinically. We will obtain bmp and urinalysis in the morning. Expected discharge date: 11/9/2020    Disposition:    [] Home       [] TCU       [x] Rehab       [] Psych       [] SNF       [] [facility]       [] Other-    Chief Complaint: Headache and left leg pain status post multiple falls    Opening statement:     The patient is an 78-year-old female smoker with a complex past medical history including CKD stage III, hypertension, hyperlipidemia, GERD, diabetic hypertension nephrosis syndrome, diabetes mellitus, depression, COPD, CHF, CAD, atrial fibrillation, asthma, and arthritis presents with a chief complaint of headache and left leg pain status post fall. Hospital Treatment Course:      According to the ED note, The patient denies current shortness of breath; affirms slight pleurisy. Gastrointestinal: The patient denies current abdominal pain and nausea. : The patient denies dysuria, hesitancy, and kaveh blood. Update: For later nursing report, the patient had subjective dyspnea with difficulty taking a deep breath. The patient stated that this was a symptom she got when she did not take Pepcid. Past medical history, family history, social history and allergies reviewed again and is unchanged since admission. ROS (12 point review of systems completed. Pertinent positives noted.  Otherwise ROS is negative)     Medications:  Reviewed    Infusion Medications    dextrose       Scheduled Medications    hydrALAZINE  100 mg Oral TID    aluminum & magnesium hydroxide-simethicone  30 mL Oral 4x Daily AC & HS    amLODIPine  10 mg Oral Daily    azaTHIOprine  50 mg Oral Daily    calcium elemental  500 mg Oral Daily    DULoxetine  30 mg Oral BID    famotidine  40 mg Oral Daily    gabapentin  100 mg Oral TID    isosorbide mononitrate  120 mg Oral Daily    pantoprazole  40 mg Oral Daily    spironolactone  50 mg Oral Daily    valsartan  320 mg Oral Daily    And    hydroCHLOROthiazide  25 mg Oral Daily    aspirin  162 mg Oral Daily    atorvastatin  80 mg Oral Daily    cloNIDine  0.1 mg Oral BID    diclofenac sodium  2 g Topical 4x Daily    docusate sodium  100 mg Oral BID    hydroxychloroquine  200 mg Oral Daily    insulin glargine  8 Units Subcutaneous Nightly    isosorbide mononitrate  120 mg Oral Daily    cetirizine  5 mg Oral Daily    magnesium oxide  400 mg Oral BID    metoprolol tartrate  25 mg Oral BID    predniSONE  5 mg Oral Daily    ranolazine  500 mg Oral BID    [Held by provider] traZODone  100 mg Oral Nightly    sodium chloride flush  10 mL Intravenous 2 times per day    enoxaparin  40 mg Subcutaneous Nightly    montelukast  10 mg Oral Nightly     PRN Meds: glucose, dextrose, glucagon (rDNA), dextrose, albuterol sulfate HFA, nitroGLYCERIN, ondansetron, sodium chloride flush, acetaminophen **OR** acetaminophen, polyethylene glycol, oxyCODONE-acetaminophen, trimethobenzamide      Intake/Output Summary (Last 24 hours) at 11/7/2020 1520  Last data filed at 11/7/2020 1303  Gross per 24 hour   Intake 440 ml   Output 300 ml   Net 140 ml       Diet:  DIET GENERAL; Carb Control: 4 carb choices (60 gms)/meal; No Added Salt (3-4 GM)    Exam:  BP (!) 192/82   Pulse 76   Temp 98.2 °F (36.8 °C) (Oral)   Resp 22   Ht 5' 6\" (1.676 m)   Wt 165 lb (74.8 kg)   SpO2 96%   BMI 26.63 kg/m²   General appearance: Awake, alert, and in no acute distress. Capable of answering questions and following commands. HEENT: Pupils equal, round, and reactive to light. Conjunctivae/corneas clear. Neck: Supple, with full range of motion. No jugular venous distention. Trachea midline. Respiratory: Some bibasilar and expiratory wheezes. Otherwise, normal breath sounds. Cardiovascular: Regular rate and rhythm with normal S1/S2 without murmurs, rubs or gallops. Abdomen: Soft, non-tender, non-distended with normal bowel sounds. Musculoskeletal: passive and active ROM x 4 extremities. Skin: Skin color, texture, turgor normal.  No rashes or lesions. Capillary Refill: Brisk,< 3 seconds   Peripheral Pulses: +2 palpable, equal bilaterally     Labs:   Recent Labs     11/05/20  1318 11/06/20  1456   WBC 7.8 8.0   HGB 11.9* 12.0   HCT 37.0 37.2    260     Recent Labs     11/05/20  1318 11/06/20  1456    143   K 4.5 3.6    108   CO2 25 24   BUN 21 23*   CREATININE 1.1 1.2   CALCIUM 10.4 10.2     Recent Labs     11/05/20  1318 11/06/20  1456   AST 14 15   ALT 8* 8*   BILIDIR <0.2  --    BILITOT 0.3 0.2*   ALKPHOS 48 40     Recent Labs     11/05/20  1318   INR 1.17*     No results for input(s): CKTOTAL, TROPONINI in the last 72 hours.     Microbiology:    Blood culture #1: No results found for: [facility]    Blood culture #2:No results found for: Todd Kim    Organism:No results found for: ORG    No results found for: LABGRAM    MRSA culture only:No results found for: [facility]    Urine culture: No results found for: LABURIN    Respiratory culture: No results found for: CULTRESP    Aerobic and Anaerobic :  No results found for: LABAERO  No results found for: LABANAE    Urinalysis:      Lab Results   Component Value Date    NITRU NEGATIVE 08/08/2020    WBCUA 0-2 08/08/2020    BACTERIA NONE SEEN 08/08/2020    RBCUA NONE SEEN 08/08/2020    BLOODU NEGATIVE 08/08/2020    SPECGRAV 1.012 08/08/2020    GLUCOSEU NEGATIVE 09/27/2012       Radiology:  CT PELVIS WO CONTRAST Additional Contrast? None   Final Result   1. No acute fracture or malalignment. Degenerative changes as described above. 2. There is sigmoid diverticulosis. There is also colonic wall thickening involving the sigmoid colon. This could be related to colonic wall hypertrophy from chronic diverticular disease versus sequela from early mild diverticulitis. **This report has been created using voice recognition software. It may contain minor errors which are inherent in voice recognition technology. **      Final report electronically signed by Dr. Chelo Man on 11/6/2020 11:45 AM      XR HIP 2-3 VW W PELVIS LEFT   Final Result   No acute findings. Severe DJD left hip. **This report has been created using voice recognition software. It may contain minor errors which are inherent in voice recognition technology. **      Final report electronically signed by Dr. Liudmila Schuster on 11/6/2020 11:11 AM        Ct Pelvis Wo Contrast Additional Contrast? None    Result Date: 11/6/2020  PROCEDURE: CT PELVIS WO CONTRAST CLINICAL INFORMATION: left pelvis pain COMPARISON: 12/2/2013 TECHNIQUE:  Axial CT images were obtained through the pelvis without contrast. Coronal and sagittal reformatted images were rendered.  All CT scans at this facility use dose modulation, iterative reconstruction, and/or weight-based dosing when appropriate  to reduce radiation dose to as low as reasonably achievable. FINDINGS: There is partial visualization of posterior lower lumbar fusion hardware. The visualized surgical hardware appears grossly intact. No acute fracture or malalignment is seen. There is joint space during within the hip joints bilaterally. Spurring along the acetabula are noted bilaterally. There is also increased sclerosis noted along the acetabula bilaterally. There is sigmoid diverticulosis. There is also colonic wall thickening involving the sigmoid colon. This could be related to colonic wall hypertrophy from chronic diverticular disease versus sequela from early mild diverticulitis. 1. No acute fracture or malalignment. Degenerative changes as described above. 2. There is sigmoid diverticulosis. There is also colonic wall thickening involving the sigmoid colon. This could be related to colonic wall hypertrophy from chronic diverticular disease versus sequela from early mild diverticulitis. **This report has been created using voice recognition software. It may contain minor errors which are inherent in voice recognition technology. ** Final report electronically signed by Dr. Zac Yee on 11/6/2020 11:45 AM    Xr Hip 2-3 Vw W Pelvis Left    Result Date: 11/6/2020  PROCEDURE: XR HIP 2-3 VW W PELVIS LEFT CLINICAL INFORMATION: pain after fall COMPARISON: No prior study. TECHNIQUE: A single AP view of the pelvis was obtained in addition to AP and frog-leg views of the left hip. FINDINGS: The hip joints, sacroiliac joints, and bones of the pelvis are intact. Normal abduction of left hip is demonstrated. Evidence for severe degenerative arthritis left hip. Prior lower lumbar fusion. Findings of osteoporosis. No acute findings. Severe DJD left hip. **This report has been created using voice recognition software.   It may contain minor errors which are inherent in voice recognition technology. ** Final report electronically signed by Dr. Mitchel Graham on 11/6/2020 11:11 AM      Support Devices (date placed):  [] ETT []Oral / [] Nasal  [] Gastric Tube [] OG / [] NG    [] Central Venous Line (Specify Site):  [] Urinary Catheter  [] Arterial Line (Specify Site)  [x] Peripheral IV access  [] Other:    DVT prophylaxis: [] Lovenox                                 [x] SCDs                                 [] SQ Heparin                                 [] Encourage ambulation           [] Already on Anticoagulation     Code Status: Full Code    Tele:   [x] yes             [] no    Electronically signed by Zain Leon MD, MPH, [facility] on 11/7/2020 at 3:20 PM   Supervised by Dr. Juan Manuel Melendez

## 2020-11-07 NOTE — PROGRESS NOTES
Pt admitted to  4K10 via bed from [address]. Complaints: None. IV IV push only, no IV fluids infusing into the upper arm left, condition patent and no redness. IV site free of s/s of infection or infiltration. Vital signs obtained. Assessment and data collection initiated. Two nurse skin assessment performed by ASHKAN RN and JASIEL RN. Oriented to room. Policies and procedures for 4 explained. All questions answered with no further questions at this time. Fall prevention and safety brochure discussed with patient. Bed alarm on. Call light in reach. Would you like your Primary Care Physician notified?  no   The best day to schedule a follow up Dr appointment is:  Tuesday a.m.

## 2020-11-07 NOTE — FLOWSHEET NOTE
11/07/20 1004   Provider Notification   Reason for Communication Review case   Provider Name ADRIANNE YOO [facility]   Provider Notification Physician   Method of Communication Call   Response No new orders   Notification Time 1004   Talked with Dr. ADRIANNE POMPA [facility] on phone, informed heart rate in 50s, but SBP 190s at this time. Multiple heart meds ordered for right now, discuss which medications they are, Dr. [facility] states to give all at this time.

## 2020-11-08 LAB
AMORPHOUS: ABNORMAL
ANION GAP SERPL CALCULATED.3IONS-SCNC: 10 MEQ/L (ref 8–16)
APTT: 29.7 SECONDS (ref 22–38)
BACTERIA: ABNORMAL
BILIRUBIN URINE: NEGATIVE
BLOOD, URINE: NEGATIVE
BUN BLDV-MCNC: 28 MG/DL (ref 7–22)
CALCIUM SERPL-MCNC: 9.9 MG/DL (ref 8.5–10.5)
CASTS: ABNORMAL /LPF
CASTS: ABNORMAL /LPF
CHARACTER, URINE: CLEAR
CHLORIDE BLD-SCNC: 104 MEQ/L (ref 98–111)
CO2: 24 MEQ/L (ref 23–33)
COLOR: ABNORMAL
CREAT SERPL-MCNC: 1.3 MG/DL (ref 0.4–1.2)
CRYSTALS: ABNORMAL
EPITHELIAL CELLS, UA: ABNORMAL /HPF
GFR SERPL CREATININE-BSD FRML MDRD: 39 ML/MIN/1.73M2
GLUCOSE BLD-MCNC: 103 MG/DL (ref 70–108)
GLUCOSE BLD-MCNC: 119 MG/DL (ref 70–108)
GLUCOSE, URINE: NEGATIVE MG/DL
INR BLD: 1.12 (ref 0.85–1.13)
KETONES, URINE: NEGATIVE
LEUKOCYTE ESTERASE, URINE: ABNORMAL
MISCELLANEOUS LAB TEST RESULT: ABNORMAL
MRSA SCREEN: NORMAL
NITRITE, URINE: NEGATIVE
PH UA: 6 (ref 5–9)
POTASSIUM SERPL-SCNC: 4.1 MEQ/L (ref 3.5–5.2)
PROTEIN UA: 100 MG/DL
RBC URINE: ABNORMAL /HPF
RENAL EPITHELIAL, UA: ABNORMAL
SODIUM BLD-SCNC: 138 MEQ/L (ref 135–145)
SPECIFIC GRAVITY UA: 1.02 (ref 1–1.03)
UROBILINOGEN, URINE: 1 EU/DL (ref 0–1)
WBC UA: ABNORMAL /HPF
YEAST: ABNORMAL

## 2020-11-08 PROCEDURE — 6370000000 HC RX 637 (ALT 250 FOR IP): Performed by: STUDENT IN AN ORGANIZED HEALTH CARE EDUCATION/TRAINING PROGRAM

## 2020-11-08 PROCEDURE — 99232 SBSQ HOSP IP/OBS MODERATE 35: CPT | Performed by: NURSE PRACTITIONER

## 2020-11-08 PROCEDURE — 85730 THROMBOPLASTIN TIME PARTIAL: CPT

## 2020-11-08 PROCEDURE — 80048 BASIC METABOLIC PNL TOTAL CA: CPT

## 2020-11-08 PROCEDURE — 6360000002 HC RX W HCPCS: Performed by: STUDENT IN AN ORGANIZED HEALTH CARE EDUCATION/TRAINING PROGRAM

## 2020-11-08 PROCEDURE — 85610 PROTHROMBIN TIME: CPT

## 2020-11-08 PROCEDURE — 93010 ELECTROCARDIOGRAM REPORT: CPT | Performed by: INTERNAL MEDICINE

## 2020-11-08 PROCEDURE — 2580000003 HC RX 258: Performed by: STUDENT IN AN ORGANIZED HEALTH CARE EDUCATION/TRAINING PROGRAM

## 2020-11-08 PROCEDURE — 51798 US URINE CAPACITY MEASURE: CPT

## 2020-11-08 PROCEDURE — 1200000003 HC TELEMETRY R&B

## 2020-11-08 PROCEDURE — 81001 URINALYSIS AUTO W/SCOPE: CPT

## 2020-11-08 PROCEDURE — 51701 INSERT BLADDER CATHETER: CPT

## 2020-11-08 PROCEDURE — 82948 REAGENT STRIP/BLOOD GLUCOSE: CPT

## 2020-11-08 PROCEDURE — 36415 COLL VENOUS BLD VENIPUNCTURE: CPT

## 2020-11-08 RX ORDER — TRAMADOL HYDROCHLORIDE 50 MG/1
50 TABLET ORAL EVERY 6 HOURS PRN
Status: DISCONTINUED | OUTPATIENT
Start: 2020-11-08 | End: 2020-11-12 | Stop reason: HOSPADM

## 2020-11-08 RX ADMIN — HYDRALAZINE HYDROCHLORIDE 100 MG: 50 TABLET, FILM COATED ORAL at 15:36

## 2020-11-08 RX ADMIN — OXYCODONE HYDROCHLORIDE AND ACETAMINOPHEN 1 TABLET: 10; 325 TABLET ORAL at 06:14

## 2020-11-08 RX ADMIN — GABAPENTIN 100 MG: 100 CAPSULE ORAL at 09:47

## 2020-11-08 RX ADMIN — DOCUSATE SODIUM 100 MG: 100 CAPSULE, LIQUID FILLED ORAL at 09:48

## 2020-11-08 RX ADMIN — Medication 10 ML: at 20:54

## 2020-11-08 RX ADMIN — DOCUSATE SODIUM 100 MG: 100 CAPSULE, LIQUID FILLED ORAL at 20:56

## 2020-11-08 RX ADMIN — PREDNISONE 5 MG: 5 TABLET ORAL at 09:48

## 2020-11-08 RX ADMIN — MAGNESIUM GLUCONATE 500 MG ORAL TABLET 400 MG: 500 TABLET ORAL at 12:20

## 2020-11-08 RX ADMIN — ISOSORBIDE MONONITRATE 120 MG: 60 TABLET ORAL at 12:21

## 2020-11-08 RX ADMIN — HYDRALAZINE HYDROCHLORIDE 100 MG: 50 TABLET, FILM COATED ORAL at 20:56

## 2020-11-08 RX ADMIN — ALUMINUM HYDROXIDE, MAGNESIUM HYDROXIDE, AND SIMETHICONE 30 ML: 200; 200; 20 SUSPENSION ORAL at 06:00

## 2020-11-08 RX ADMIN — ASPIRIN 162 MG: 81 TABLET, COATED ORAL at 09:48

## 2020-11-08 RX ADMIN — DULOXETINE HYDROCHLORIDE 30 MG: 30 CAPSULE, DELAYED RELEASE ORAL at 09:48

## 2020-11-08 RX ADMIN — DICLOFENAC 2 G: 10 GEL TOPICAL at 12:21

## 2020-11-08 RX ADMIN — Medication 10 ML: at 12:21

## 2020-11-08 RX ADMIN — HYDRALAZINE HYDROCHLORIDE 100 MG: 50 TABLET, FILM COATED ORAL at 09:47

## 2020-11-08 RX ADMIN — CALCIUM 500 MG: 500 TABLET ORAL at 07:00

## 2020-11-08 RX ADMIN — ATORVASTATIN CALCIUM 80 MG: 80 TABLET, FILM COATED ORAL at 09:48

## 2020-11-08 RX ADMIN — AMLODIPINE BESYLATE 10 MG: 10 TABLET ORAL at 12:21

## 2020-11-08 RX ADMIN — METOPROLOL TARTRATE 25 MG: 25 TABLET, FILM COATED ORAL at 20:55

## 2020-11-08 RX ADMIN — ALUMINUM HYDROXIDE, MAGNESIUM HYDROXIDE, AND SIMETHICONE 30 ML: 200; 200; 20 SUSPENSION ORAL at 12:21

## 2020-11-08 RX ADMIN — DULOXETINE HYDROCHLORIDE 30 MG: 30 CAPSULE, DELAYED RELEASE ORAL at 20:55

## 2020-11-08 RX ADMIN — RANOLAZINE 500 MG: 500 TABLET, FILM COATED, EXTENDED RELEASE ORAL at 20:54

## 2020-11-08 RX ADMIN — HYDROXYCHLOROQUINE SULFATE 200 MG: 200 TABLET ORAL at 09:47

## 2020-11-08 RX ADMIN — CLONIDINE HYDROCHLORIDE 0.1 MG: 0.1 TABLET ORAL at 20:56

## 2020-11-08 RX ADMIN — AZATHIOPRINE 50 MG: 50 TABLET ORAL at 09:48

## 2020-11-08 RX ADMIN — HYDROCHLOROTHIAZIDE 25 MG: 25 TABLET ORAL at 09:48

## 2020-11-08 RX ADMIN — CLONIDINE HYDROCHLORIDE 0.1 MG: 0.1 TABLET ORAL at 09:48

## 2020-11-08 RX ADMIN — ENOXAPARIN SODIUM 40 MG: 40 INJECTION SUBCUTANEOUS at 20:55

## 2020-11-08 RX ADMIN — CETIRIZINE HYDROCHLORIDE 5 MG: 5 TABLET ORAL at 12:21

## 2020-11-08 RX ADMIN — METOPROLOL TARTRATE 25 MG: 25 TABLET, FILM COATED ORAL at 12:20

## 2020-11-08 RX ADMIN — PANTOPRAZOLE SODIUM 40 MG: 40 TABLET, DELAYED RELEASE ORAL at 06:00

## 2020-11-08 RX ADMIN — GABAPENTIN 100 MG: 100 CAPSULE ORAL at 20:54

## 2020-11-08 RX ADMIN — MONTELUKAST SODIUM 10 MG: 10 TABLET ORAL at 20:54

## 2020-11-08 RX ADMIN — SPIRONOLACTONE 50 MG: 25 TABLET ORAL at 09:47

## 2020-11-08 RX ADMIN — VALSARTAN 320 MG: 320 TABLET, FILM COATED ORAL at 09:48

## 2020-11-08 RX ADMIN — DICLOFENAC 2 G: 10 GEL TOPICAL at 20:56

## 2020-11-08 RX ADMIN — RANOLAZINE 500 MG: 500 TABLET, FILM COATED, EXTENDED RELEASE ORAL at 09:48

## 2020-11-08 RX ADMIN — FAMOTIDINE 40 MG: 20 TABLET, FILM COATED ORAL at 09:48

## 2020-11-08 RX ADMIN — ACETAMINOPHEN 650 MG: 325 TABLET ORAL at 12:21

## 2020-11-08 RX ADMIN — DICLOFENAC 2 G: 10 GEL TOPICAL at 15:36

## 2020-11-08 RX ADMIN — MAGNESIUM GLUCONATE 500 MG ORAL TABLET 400 MG: 500 TABLET ORAL at 20:56

## 2020-11-08 ASSESSMENT — PAIN SCALES - GENERAL
PAINLEVEL_OUTOF10: 0
PAINLEVEL_OUTOF10: 3
PAINLEVEL_OUTOF10: 0
PAINLEVEL_OUTOF10: 9

## 2020-11-08 ASSESSMENT — PAIN - FUNCTIONAL ASSESSMENT: PAIN_FUNCTIONAL_ASSESSMENT: PREVENTS OR INTERFERES SOME ACTIVE ACTIVITIES AND ADLS

## 2020-11-08 ASSESSMENT — PAIN SCALES - PAIN ASSESSMENT IN ADVANCED DEMENTIA (PAINAD)
NEGVOCALIZATION: 0
BREATHING: 0
FACIALEXPRESSION: 1
TOTALSCORE: 2
CONSOLABILITY: 0
BODYLANGUAGE: 1

## 2020-11-08 ASSESSMENT — PAIN DESCRIPTION - ORIENTATION: ORIENTATION: OTHER (COMMENT)

## 2020-11-08 ASSESSMENT — PAIN DESCRIPTION - FREQUENCY: FREQUENCY: CONTINUOUS

## 2020-11-08 ASSESSMENT — PAIN DESCRIPTION - DESCRIPTORS: DESCRIPTORS: PATIENT UNABLE TO DESCRIBE

## 2020-11-08 ASSESSMENT — PAIN DESCRIPTION - PROGRESSION: CLINICAL_PROGRESSION: NOT CHANGED

## 2020-11-08 ASSESSMENT — PAIN DESCRIPTION - DIRECTION: RADIATING_TOWARDS: BACK

## 2020-11-08 ASSESSMENT — PAIN DESCRIPTION - PAIN TYPE: TYPE: ACUTE PAIN

## 2020-11-08 ASSESSMENT — PAIN DESCRIPTION - ONSET: ONSET: ON-GOING

## 2020-11-08 ASSESSMENT — PAIN DESCRIPTION - LOCATION: LOCATION: LEG

## 2020-11-08 NOTE — PROCEDURES
Bladder scan was completed by JEWELL Garcia at 1100am. The residual amount of 395ml was resulted to [employer].

## 2020-11-08 NOTE — PROGRESS NOTES
Patients son, Shayy Yoder called and updates provided, transferred into patients room and patient spoke with son. Shayy Yoder stated that he wishes to be called with updates regarding social work and possible placement, phone number updated in chart.

## 2020-11-08 NOTE — PROGRESS NOTES
Hospitalist Progress Note      Patient:  Irasema Workman    Unit/Bed:5K-17/017-A  YOB: 1940  MRN: [id number]   Acct: [de-identified]   PCP: Humberto Yeboah DO  Date of Admission: 11/6/2020    Assessment/Plan:    1. Geriatric polypharmacy: This patient is on a tremendous amount of medications and needs to be carefully analyzed his this should be high on the differential for contributions of physical deconditioning ultimately leading to her fall. 2. Physical deconditioning: Geriatric polypharmacy high on the differential list.  Increased falls and weakness with fatigue/malaise. Workup relatively unremarkable. Continue to maintain telemetry, monitor orthostatic vitals, continue PT/OT, and recommend following up with neurology and PCP outpatient. 3. Fall: Secondary to geriatric polypharmacy versus physical deconditioning. The patient sustained a parietal-occipital contusion. CT head and neck demonstrated no acute findings. X-ray of left knee, pelvis, and hip were all clear. · Discontinue Percocet immediately. Downgrade pain control to tramadol 50 mg every 6 hours as needed for moderate-severe pain. Utilize Tylenol for mild pain. · Echo performed on 8/6/2020 demonstrated normal left ventricular size and systolic function with an EF of 50-55% with grade 2 diastolic dysfunction. Mild pulmonary hypertension and moderate central left mitral regurgitation also noted. · Carotid ultrasound ordered to assess for stenosis, results pending. 4. Essential hypertension: History, stable. Currently normotensive. Continue Norvasc, Imdur, Lopressor, hydralazine, Aldactone, valsartan, and Catapres. Strong recommendations for outpatient cardiology follow-up to further manage. 5. Chronic diastolic HFpEF: Echo performed on 8/6/2020 demonstrated normal left ventricular size and systolic function with an EF of 50-55% with grade 2 diastolic dysfunction.   Mild pulmonary hypertension and moderate central left mitral regurgitation also noted. · Maintain strict I&O's, daily weights, telemetry, and daily sodium restricted, cardiac diet. 6. Primary osteoarthritis: Noted to the left hip, stable. 7. GERD: Continue Pepcid. 8. Tobacco abuse: Patient counseled on the importance of smoking cessation, continue nicotine replacement therapy. Disposition: ECF placement recommended    Chief Complaint: Multiple falls, weakness, diarrhea, abdominal cramps    Initial H and P:-    **From Chart Review:  \"78 y.o. female who presented to [address] [place] [address] with above sxs for the past 48 hr. She had complaints of multiple episodes of her legs shaking spontaneously. She actually had a fall on her left side bumping her head and side. She has also had some diarrhea. She denied any fever or anosmia. She was seen in ER 11.5 and discharged. However she continued to be weak and shaky and was brought to ER. She had a ct of abdomen and the dx of diverticulitis was entertained. Thus the Hospitalist Service was asked to admit her although I doubt the dx. \"    Subjective (past 24 hours):   Patient resting in bed at the time of the interview eating her breakfast.  Currently denies any physical complaints and had no changes to report overnight and into the morning. She was updated on plan of care and had no other needs or questions at this time. Past medical history, family history, social history and allergies reviewed again and is unchanged since admission. ROS (14 point review of systems completed. Pertinent positives noted. Otherwise ROS is negative) :  GENERAL: No fever,chills, or night sweats. SKIN: No lesions or rashes. HEAD: No headaches or recent injury. EYES: No acute changes in vision, no diplopia or blurred vision. EARS: No hearing loss, no tinnitus. NOSE/THROAT: No rhinorrhea or pharyngitis, no nasal drainage.   NECK: No lumps or unusual neck stiffness. PULMONARY: Respirations easy and non-labored, no acute distress. CARDIAC: No chest pain, pressure, Negative for lower leg edema. GI: Abdomen is soft and non-tender, non-distended. PERIPHERAL VASCULAR: No intermittent claudication or unusual leg cramps. MUSCULOSKELETAL: Occasional arthralgias, myalgias. NEUROLOGICAL: Denies any headache, near syncope, seizures or syncope. HEMATOLOGIC:  No unusual bruising or bleeding. PSYCH: Denies any homicidal or suicidial ideations.     Medications:  Reviewed    Infusion Medications    dextrose       Scheduled Medications    hydrALAZINE  100 mg Oral TID    aluminum & magnesium hydroxide-simethicone  30 mL Oral 4x Daily AC & HS    amLODIPine  10 mg Oral Daily    azaTHIOprine  50 mg Oral Daily    calcium elemental  500 mg Oral Daily    DULoxetine  30 mg Oral BID    famotidine  40 mg Oral Daily    gabapentin  100 mg Oral TID    pantoprazole  40 mg Oral Daily    spironolactone  50 mg Oral Daily    valsartan  320 mg Oral Daily    And    hydroCHLOROthiazide  25 mg Oral Daily    aspirin  162 mg Oral Daily    atorvastatin  80 mg Oral Daily    cloNIDine  0.1 mg Oral BID    diclofenac sodium  2 g Topical 4x Daily    docusate sodium  100 mg Oral BID    hydroxychloroquine  200 mg Oral Daily    insulin glargine  8 Units Subcutaneous Nightly    isosorbide mononitrate  120 mg Oral Daily    cetirizine  5 mg Oral Daily    magnesium oxide  400 mg Oral BID    metoprolol tartrate  25 mg Oral BID    predniSONE  5 mg Oral Daily    ranolazine  500 mg Oral BID    [Held by provider] traZODone  100 mg Oral Nightly    sodium chloride flush  10 mL Intravenous 2 times per day    enoxaparin  40 mg Subcutaneous Nightly    montelukast  10 mg Oral Nightly     PRN Meds: glucose, dextrose, glucagon (rDNA), dextrose, albuterol sulfate HFA, nitroGLYCERIN, ondansetron, sodium chloride flush, acetaminophen **OR** acetaminophen, polyethylene glycol, oxyCODONE-acetaminophen, trimethobenzamide      Intake/Output Summary (Last 24 hours) at 11/8/2020 0924  Last data filed at 11/7/2020 1303  Gross per 24 hour   Intake 240 ml   Output 200 ml   Net 40 ml       Diet:  DIET GENERAL; Carb Control: 4 carb choices (60 gms)/meal; No Added Salt (3-4 GM)    Exam:  /89   Pulse 67   Temp 98 °F (36.7 °C) (Oral)   Resp 16   Ht 5' 6\" (1.676 m)   Wt 165 lb (74.8 kg)   SpO2 93%   BMI 26.63 kg/m²     General appearance: Alert and pleasant geriatric female. No apparent distress, appears stated age and cooperative. HEENT: Pupils equal, round, and reactive to light. Conjunctivae/corneas clear. Neck: Supple, with full range of motion. No jugular venous distention. Trachea midline. Respiratory:  Normal respiratory effort. Clear to auscultation, bilaterally without Rales/Wheezes/Rhonchi. Cardiovascular: Regular rate and rhythm with normal S1/S2 without murmurs, rubs or gallops. Abdomen: Soft, non-tender, non-distended with normal bowel sounds. Musculoskeletal: Passive and active ROM x 4 extremities. Skin: Skin color, texture, turgor normal.  No rashes or lesions. Neurologic:  Neurovascularly intact without any focal sensory/motor deficits. Cranial nerves: II-XII intact, grossly non-focal.  Psychiatric: Alert and appropriate in conversation. Thought content scattered, insight sporadic. Cognitive impairment suspected.   Capillary Refill: Brisk,< 3 seconds   Peripheral Pulses: +2 palpable, equal bilaterally     Labs:   Recent Labs     11/05/20  1318 11/06/20  1456   WBC 7.8 8.0   HGB 11.9* 12.0   HCT 37.0 37.2    260     Recent Labs     11/05/20  1318 11/06/20  1456 11/08/20  0734    143 138   K 4.5 3.6 4.1    108 104   CO2 25 24 24   BUN 21 23* 28*   CREATININE 1.1 1.2 1.3*   CALCIUM 10.4 10.2 9.9     Recent Labs     11/05/20  1318 11/06/20  1456   AST 14 15   ALT 8* 8*   BILIDIR <0.2  --    BILITOT 0.3 0.2*   ALKPHOS 48 40     Recent Labs 11/05/20  1318 11/08/20  0734   INR 1.17* 1.12     No results for input(s): CKTOTAL, TROPONINI in the last 72 hours. Microbiology:    Blood culture #1: No results found for: BC    Blood culture #2:No results found for: Jadyn Ruts    Organism:No results found for: ORG    No results found for: LABGRAM    MRSA culture only:No results found for: [facility]    Urine culture: No results found for: LABURIN    Respiratory culture: No results found for: CULTRESP    Aerobic and Anaerobic :  No results found for: LABAERO  No results found for: LABANAE    Urinalysis:      Lab Results   Component Value Date    NITRU NEGATIVE 08/08/2020    WBCUA 0-2 08/08/2020    BACTERIA NONE SEEN 08/08/2020    RBCUA NONE SEEN 08/08/2020    BLOODU NEGATIVE 08/08/2020    SPECGRAV 1.012 08/08/2020    Lety [place] 994 NEGATIVE 09/27/2012       Radiology:  CT PELVIS WO CONTRAST Additional Contrast? None   Final Result   1. No acute fracture or malalignment. Degenerative changes as described above. 2. There is sigmoid diverticulosis. There is also colonic wall thickening involving the sigmoid colon. This could be related to colonic wall hypertrophy from chronic diverticular disease versus sequela from early mild diverticulitis. **This report has been created using voice recognition software. It may contain minor errors which are inherent in voice recognition technology. **      Final report electronically signed by Dr. Saleem Hall on 11/6/2020 11:45 AM      XR HIP 2-3 VW W PELVIS LEFT   Final Result   No acute findings. Severe DJD left hip. **This report has been created using voice recognition software. It may contain minor errors which are inherent in voice recognition technology. **      Final report electronically signed by Dr. Danielito Watkins on 11/6/2020 11:11 AM        Ct Pelvis Wo Contrast Additional Contrast? None    Result Date: 11/6/2020  PROCEDURE: CT PELVIS WO CONTRAST CLINICAL INFORMATION: left pelvis pain COMPARISON: 12/2/2013 TECHNIQUE:  Axial CT images were obtained through the pelvis without contrast. Coronal and sagittal reformatted images were rendered. All CT scans at this facility use dose modulation, iterative reconstruction, and/or weight-based dosing when appropriate  to reduce radiation dose to as low as reasonably achievable. FINDINGS: There is partial visualization of posterior lower lumbar fusion hardware. The visualized surgical hardware appears grossly intact. No acute fracture or malalignment is seen. There is joint space during within the hip joints bilaterally. Spurring along the acetabula are noted bilaterally. There is also increased sclerosis noted along the acetabula bilaterally. There is sigmoid diverticulosis. There is also colonic wall thickening involving the sigmoid colon. This could be related to colonic wall hypertrophy from chronic diverticular disease versus sequela from early mild diverticulitis. 1. No acute fracture or malalignment. Degenerative changes as described above. 2. There is sigmoid diverticulosis. There is also colonic wall thickening involving the sigmoid colon. This could be related to colonic wall hypertrophy from chronic diverticular disease versus sequela from early mild diverticulitis. **This report has been created using voice recognition software. It may contain minor errors which are inherent in voice recognition technology. ** Final report electronically signed by Dr. Jesse Holliday on 11/6/2020 11:45 AM    Xr Hip 2-3 Vw W Pelvis Left    Result Date: 11/6/2020  PROCEDURE: XR HIP 2-3 VW W PELVIS LEFT CLINICAL INFORMATION: pain after fall COMPARISON: No prior study. TECHNIQUE: A single AP view of the pelvis was obtained in addition to AP and frog-leg views of the left hip. FINDINGS: The hip joints, sacroiliac joints, and bones of the pelvis are intact. Normal abduction of left hip is demonstrated. Evidence for severe degenerative arthritis left hip. Prior lower lumbar fusion.  Findings of osteoporosis. No acute findings. Severe DJD left hip. **This report has been created using voice recognition software. It may contain minor errors which are inherent in voice recognition technology. ** Final report electronically signed by Dr. Alexa Albarran on 11/6/2020 11:11 AM      **This report has been created using voice recognition software. It may contain minor errors which are inherent in voice recognition technology. **  Electronically signed by LONDON Mai CNP on 11/8/2020 at 9:24 AM

## 2020-11-09 ENCOUNTER — APPOINTMENT (OUTPATIENT)
Dept: INTERVENTIONAL RADIOLOGY/VASCULAR | Age: 80
DRG: 948 | End: 2020-11-09
Payer: MEDICARE

## 2020-11-09 LAB
ANION GAP SERPL CALCULATED.3IONS-SCNC: 13 MEQ/L (ref 8–16)
BUN BLDV-MCNC: 44 MG/DL (ref 7–22)
CALCIUM SERPL-MCNC: 10.4 MG/DL (ref 8.5–10.5)
CHLORIDE BLD-SCNC: 105 MEQ/L (ref 98–111)
CO2: 23 MEQ/L (ref 23–33)
CREAT SERPL-MCNC: 1.6 MG/DL (ref 0.4–1.2)
ERYTHROCYTE [DISTWIDTH] IN BLOOD BY AUTOMATED COUNT: 17.4 % (ref 11.5–14.5)
ERYTHROCYTE [DISTWIDTH] IN BLOOD BY AUTOMATED COUNT: 58.2 FL (ref 35–45)
GFR SERPL CREATININE-BSD FRML MDRD: 31 ML/MIN/1.73M2
GLUCOSE BLD-MCNC: 104 MG/DL (ref 70–108)
GLUCOSE BLD-MCNC: 114 MG/DL (ref 70–108)
GLUCOSE BLD-MCNC: 127 MG/DL (ref 70–108)
GLUCOSE BLD-MCNC: 135 MG/DL (ref 70–108)
GLUCOSE BLD-MCNC: 154 MG/DL (ref 70–108)
HCT VFR BLD CALC: 39.3 % (ref 37–47)
HEMOGLOBIN: 12.3 GM/DL (ref 12–16)
MCH RBC QN AUTO: 28.2 PG (ref 26–33)
MCHC RBC AUTO-ENTMCNC: 31.3 GM/DL (ref 32.2–35.5)
MCV RBC AUTO: 90.1 FL (ref 81–99)
PLATELET # BLD: 291 THOU/MM3 (ref 130–400)
PMV BLD AUTO: 10.4 FL (ref 9.4–12.4)
POTASSIUM SERPL-SCNC: 4.2 MEQ/L (ref 3.5–5.2)
RBC # BLD: 4.36 MILL/MM3 (ref 4.2–5.4)
SODIUM BLD-SCNC: 141 MEQ/L (ref 135–145)
WBC # BLD: 8.6 THOU/MM3 (ref 4.8–10.8)

## 2020-11-09 PROCEDURE — 94640 AIRWAY INHALATION TREATMENT: CPT

## 2020-11-09 PROCEDURE — 94760 N-INVAS EAR/PLS OXIMETRY 1: CPT

## 2020-11-09 PROCEDURE — 85027 COMPLETE CBC AUTOMATED: CPT

## 2020-11-09 PROCEDURE — 1200000003 HC TELEMETRY R&B

## 2020-11-09 PROCEDURE — 36415 COLL VENOUS BLD VENIPUNCTURE: CPT

## 2020-11-09 PROCEDURE — 6370000000 HC RX 637 (ALT 250 FOR IP): Performed by: STUDENT IN AN ORGANIZED HEALTH CARE EDUCATION/TRAINING PROGRAM

## 2020-11-09 PROCEDURE — 2580000003 HC RX 258: Performed by: STUDENT IN AN ORGANIZED HEALTH CARE EDUCATION/TRAINING PROGRAM

## 2020-11-09 PROCEDURE — 80048 BASIC METABOLIC PNL TOTAL CA: CPT

## 2020-11-09 PROCEDURE — 93880 EXTRACRANIAL BILAT STUDY: CPT

## 2020-11-09 PROCEDURE — 6360000002 HC RX W HCPCS: Performed by: STUDENT IN AN ORGANIZED HEALTH CARE EDUCATION/TRAINING PROGRAM

## 2020-11-09 PROCEDURE — 99232 SBSQ HOSP IP/OBS MODERATE 35: CPT | Performed by: NURSE PRACTITIONER

## 2020-11-09 PROCEDURE — 6370000000 HC RX 637 (ALT 250 FOR IP): Performed by: NURSE PRACTITIONER

## 2020-11-09 PROCEDURE — 82948 REAGENT STRIP/BLOOD GLUCOSE: CPT

## 2020-11-09 RX ADMIN — GABAPENTIN 100 MG: 100 CAPSULE ORAL at 19:56

## 2020-11-09 RX ADMIN — PREDNISONE 5 MG: 5 TABLET ORAL at 08:28

## 2020-11-09 RX ADMIN — ATORVASTATIN CALCIUM 80 MG: 80 TABLET, FILM COATED ORAL at 08:29

## 2020-11-09 RX ADMIN — METOPROLOL TARTRATE 25 MG: 25 TABLET, FILM COATED ORAL at 08:28

## 2020-11-09 RX ADMIN — Medication 10 ML: at 19:56

## 2020-11-09 RX ADMIN — ENOXAPARIN SODIUM 40 MG: 40 INJECTION SUBCUTANEOUS at 19:57

## 2020-11-09 RX ADMIN — RANOLAZINE 500 MG: 500 TABLET, FILM COATED, EXTENDED RELEASE ORAL at 19:56

## 2020-11-09 RX ADMIN — DICLOFENAC 2 G: 10 GEL TOPICAL at 19:57

## 2020-11-09 RX ADMIN — CETIRIZINE HYDROCHLORIDE 5 MG: 5 TABLET ORAL at 08:29

## 2020-11-09 RX ADMIN — MAGNESIUM GLUCONATE 500 MG ORAL TABLET 400 MG: 500 TABLET ORAL at 19:56

## 2020-11-09 RX ADMIN — DULOXETINE HYDROCHLORIDE 30 MG: 30 CAPSULE, DELAYED RELEASE ORAL at 08:29

## 2020-11-09 RX ADMIN — SPIRONOLACTONE 50 MG: 25 TABLET ORAL at 08:27

## 2020-11-09 RX ADMIN — INSULIN GLARGINE 8 UNITS: 100 INJECTION, SOLUTION SUBCUTANEOUS at 21:07

## 2020-11-09 RX ADMIN — DICLOFENAC 2 G: 10 GEL TOPICAL at 15:04

## 2020-11-09 RX ADMIN — DOCUSATE SODIUM 100 MG: 100 CAPSULE, LIQUID FILLED ORAL at 08:28

## 2020-11-09 RX ADMIN — FAMOTIDINE 40 MG: 20 TABLET, FILM COATED ORAL at 19:56

## 2020-11-09 RX ADMIN — ALBUTEROL SULFATE 2 PUFF: 90 AEROSOL, METERED RESPIRATORY (INHALATION) at 10:10

## 2020-11-09 RX ADMIN — CALCIUM 500 MG: 500 TABLET ORAL at 08:29

## 2020-11-09 RX ADMIN — ALUMINUM HYDROXIDE, MAGNESIUM HYDROXIDE, AND SIMETHICONE 30 ML: 200; 200; 20 SUSPENSION ORAL at 06:23

## 2020-11-09 RX ADMIN — PANTOPRAZOLE SODIUM 40 MG: 40 TABLET, DELAYED RELEASE ORAL at 06:20

## 2020-11-09 RX ADMIN — HYDRALAZINE HYDROCHLORIDE 100 MG: 50 TABLET, FILM COATED ORAL at 23:40

## 2020-11-09 RX ADMIN — AZATHIOPRINE 50 MG: 50 TABLET ORAL at 08:29

## 2020-11-09 RX ADMIN — ASPIRIN 162 MG: 81 TABLET, COATED ORAL at 08:28

## 2020-11-09 RX ADMIN — AMLODIPINE BESYLATE 10 MG: 10 TABLET ORAL at 08:29

## 2020-11-09 RX ADMIN — VALSARTAN 320 MG: 320 TABLET, FILM COATED ORAL at 08:28

## 2020-11-09 RX ADMIN — HYDROCHLOROTHIAZIDE 25 MG: 25 TABLET ORAL at 08:29

## 2020-11-09 RX ADMIN — HYDRALAZINE HYDROCHLORIDE 100 MG: 50 TABLET, FILM COATED ORAL at 08:29

## 2020-11-09 RX ADMIN — GABAPENTIN 100 MG: 100 CAPSULE ORAL at 08:29

## 2020-11-09 RX ADMIN — GABAPENTIN 100 MG: 100 CAPSULE ORAL at 15:03

## 2020-11-09 RX ADMIN — HYDRALAZINE HYDROCHLORIDE 100 MG: 50 TABLET, FILM COATED ORAL at 15:03

## 2020-11-09 RX ADMIN — ALUMINUM HYDROXIDE, MAGNESIUM HYDROXIDE, AND SIMETHICONE 30 ML: 200; 200; 20 SUSPENSION ORAL at 16:42

## 2020-11-09 RX ADMIN — MONTELUKAST SODIUM 10 MG: 10 TABLET ORAL at 19:56

## 2020-11-09 RX ADMIN — Medication 10 ML: at 08:32

## 2020-11-09 RX ADMIN — DULOXETINE HYDROCHLORIDE 30 MG: 30 CAPSULE, DELAYED RELEASE ORAL at 19:55

## 2020-11-09 RX ADMIN — RANOLAZINE 500 MG: 500 TABLET, FILM COATED, EXTENDED RELEASE ORAL at 08:27

## 2020-11-09 RX ADMIN — CLONIDINE HYDROCHLORIDE 0.1 MG: 0.1 TABLET ORAL at 08:29

## 2020-11-09 RX ADMIN — DOCUSATE SODIUM 100 MG: 100 CAPSULE, LIQUID FILLED ORAL at 19:56

## 2020-11-09 RX ADMIN — ALUMINUM HYDROXIDE, MAGNESIUM HYDROXIDE, AND SIMETHICONE 30 ML: 200; 200; 20 SUSPENSION ORAL at 11:04

## 2020-11-09 RX ADMIN — ISOSORBIDE MONONITRATE 120 MG: 60 TABLET ORAL at 08:28

## 2020-11-09 RX ADMIN — DICLOFENAC 2 G: 10 GEL TOPICAL at 16:42

## 2020-11-09 RX ADMIN — HYDROXYCHLOROQUINE SULFATE 200 MG: 200 TABLET ORAL at 08:32

## 2020-11-09 RX ADMIN — TRAMADOL HYDROCHLORIDE 50 MG: 50 TABLET ORAL at 19:56

## 2020-11-09 RX ADMIN — DICLOFENAC 2 G: 10 GEL TOPICAL at 08:32

## 2020-11-09 RX ADMIN — MAGNESIUM GLUCONATE 500 MG ORAL TABLET 400 MG: 500 TABLET ORAL at 08:28

## 2020-11-09 ASSESSMENT — PAIN DESCRIPTION - ONSET: ONSET: ON-GOING

## 2020-11-09 ASSESSMENT — PAIN DESCRIPTION - LOCATION: LOCATION: BACK

## 2020-11-09 ASSESSMENT — PAIN DESCRIPTION - DESCRIPTORS: DESCRIPTORS: ACHING;CONSTANT;DISCOMFORT

## 2020-11-09 ASSESSMENT — PAIN SCALES - GENERAL
PAINLEVEL_OUTOF10: 4
PAINLEVEL_OUTOF10: 0
PAINLEVEL_OUTOF10: 0
PAINLEVEL_OUTOF10: 8
PAINLEVEL_OUTOF10: 0

## 2020-11-09 ASSESSMENT — PAIN DESCRIPTION - ORIENTATION: ORIENTATION: MID;LOWER

## 2020-11-09 ASSESSMENT — PAIN - FUNCTIONAL ASSESSMENT: PAIN_FUNCTIONAL_ASSESSMENT: PREVENTS OR INTERFERES SOME ACTIVE ACTIVITIES AND ADLS

## 2020-11-09 ASSESSMENT — PAIN DESCRIPTION - PROGRESSION
CLINICAL_PROGRESSION: GRADUALLY IMPROVING
CLINICAL_PROGRESSION: GRADUALLY WORSENING

## 2020-11-09 ASSESSMENT — PAIN DESCRIPTION - PAIN TYPE: TYPE: CHRONIC PAIN

## 2020-11-09 ASSESSMENT — PAIN DESCRIPTION - DIRECTION: RADIATING_TOWARDS: LEGS

## 2020-11-09 ASSESSMENT — PAIN DESCRIPTION - FREQUENCY: FREQUENCY: CONTINUOUS

## 2020-11-09 NOTE — PLAN OF CARE
Consult received-see  notes for 11/9//2020-home with [facility] vs ECF. Await therapy evals/recommendations.

## 2020-11-09 NOTE — PROGRESS NOTES
Hospitalist Progress Note      Patient:  Daniel Soto    Unit/Bed:5K-17/017-A  YOB: 1940  MRN: [id number]   Acct: [de-identified]   PCP: Sonam Yeboah DO  Date of Admission: 11/6/2020    Assessment/Plan:    1. Geriatric polypharmacy: This patient is on a tremendous amount of medications and needs to be carefully analyzed his this should be high on the differential for contributions of physical deconditioning ultimately leading to her fall. 2. Physical deconditioning: Geriatric polypharmacy high on the differential list.  Increased falls and weakness with fatigue/malaise. Workup relatively unremarkable. Continue to maintain telemetry, monitor orthostatic vitals, continue PT/OT, and recommend following up with neurology and PCP outpatient. 3. Fall: Secondary to geriatric polypharmacy versus physical deconditioning. The patient sustained a parietal-occipital contusion. CT head and neck demonstrated no acute findings. X-ray of left knee, pelvis, and hip were all clear. · Discontinue Percocet immediately. Downgrade pain control to tramadol 50 mg every 6 hours as needed for moderate-severe pain. Utilize Tylenol for mild pain. · Echo performed on 8/6/2020 demonstrated normal left ventricular size and systolic function with an EF of 50-55% with grade 2 diastolic dysfunction. Mild pulmonary hypertension and moderate central left mitral regurgitation also noted. · Carotid ultrasound revealed mild mural plaque at the carotid bulbs bilaterally with less than 50% stenosis by SRU consensus criteria. 4. Essential hypertension: History, stable. Currently normotensive. Continue Norvasc, Imdur, Lopressor, hydralazine, Aldactone, valsartan, and Catapres. Strong recommendations for outpatient cardiology follow-up to further manage.   5. Chronic diastolic HFpEF: Echo performed on 8/6/2020 demonstrated normal left ventricular size and systolic function with an EF of 50-55% with grade 2 diastolic dysfunction. Mild pulmonary hypertension and moderate central left mitral regurgitation also noted. · Maintain strict I&O's, daily weights, telemetry, and daily sodium restricted, cardiac diet. 6. Primary osteoarthritis: Noted to the left hip, stable. 7. GERD: Continue Pepcid. 8. Tobacco abuse: Patient counseled on the importance of smoking cessation, continue nicotine replacement therapy. Disposition: ECF placement recommended    Chief Complaint: Multiple falls, weakness, diarrhea, abdominal cramps    Initial H and P:-    **From Chart Review:  \"78 y.o. female who presented to [facility] with above sxs for the past 48 hr. She had complaints of multiple episodes of her legs shaking spontaneously. She actually had a fall on her left side bumping her head and side. She has also had some diarrhea. She denied any fever or anosmia. She was seen in ER 11.5 and discharged. However she continued to be weak and shaky and was brought to ER. She had a ct of abdomen and the dx of diverticulitis was entertained. Thus the Hospitalist Service was asked to admit her although I doubt the dx. \"    Subjective (past 24 hours):   Patient resting in bed at the time of the interview. Currently denies any physical complaints and had no changes to report overnight and into the morning. She was updated on plan of care and had no other needs or questions at this time. Past medical history, family history, social history and allergies reviewed again and is unchanged since admission. ROS (14 point review of systems completed. Pertinent positives noted. Otherwise ROS is negative) :  GENERAL: No fever,chills, or night sweats. SKIN: No lesions or rashes. HEAD: No headaches or recent injury. EYES: No acute changes in vision, no diplopia or blurred vision. EARS: No hearing loss, no tinnitus. NOSE/THROAT: No rhinorrhea or pharyngitis, no nasal drainage.   NECK: No lumps or unusual neck stiffness. PULMONARY: Respirations easy and non-labored, no acute distress. CARDIAC: No chest pain, pressure, Negative for lower leg edema. GI: Abdomen is soft and non-tender, non-distended. PERIPHERAL VASCULAR: No intermittent claudication or unusual leg cramps. MUSCULOSKELETAL: Occasional arthralgias, myalgias. NEUROLOGICAL: Denies any headache, near syncope, seizures or syncope. HEMATOLOGIC:  No unusual bruising or bleeding. PSYCH: Denies any homicidal or suicidial ideations.     Medications:  Reviewed    Infusion Medications    dextrose       Scheduled Medications    hydrALAZINE  100 mg Oral TID    aluminum & magnesium hydroxide-simethicone  30 mL Oral 4x Daily AC & HS    amLODIPine  10 mg Oral Daily    azaTHIOprine  50 mg Oral Daily    calcium elemental  500 mg Oral Daily    DULoxetine  30 mg Oral BID    famotidine  40 mg Oral Daily    gabapentin  100 mg Oral TID    pantoprazole  40 mg Oral Daily    spironolactone  50 mg Oral Daily    valsartan  320 mg Oral Daily    And    hydroCHLOROthiazide  25 mg Oral Daily    aspirin  162 mg Oral Daily    atorvastatin  80 mg Oral Daily    cloNIDine  0.1 mg Oral BID    diclofenac sodium  2 g Topical 4x Daily    docusate sodium  100 mg Oral BID    hydroxychloroquine  200 mg Oral Daily    insulin glargine  8 Units Subcutaneous Nightly    isosorbide mononitrate  120 mg Oral Daily    cetirizine  5 mg Oral Daily    magnesium oxide  400 mg Oral BID    metoprolol tartrate  25 mg Oral BID    predniSONE  5 mg Oral Daily    ranolazine  500 mg Oral BID    [Held by provider] traZODone  100 mg Oral Nightly    sodium chloride flush  10 mL Intravenous 2 times per day    enoxaparin  40 mg Subcutaneous Nightly    montelukast  10 mg Oral Nightly     PRN Meds: traMADol, glucose, dextrose, glucagon (rDNA), dextrose, albuterol sulfate HFA, nitroGLYCERIN, ondansetron, sodium chloride flush, acetaminophen **OR** acetaminophen, polyethylene glycol, trimethobenzamide      Intake/Output Summary (Last 24 hours) at 11/9/2020 1859  Last data filed at 11/9/2020 1359  Gross per 24 hour   Intake 220 ml   Output 420 ml   Net -200 ml       Diet:  DIET GENERAL; Carb Control: 4 carb choices (60 gms)/meal; No Added Salt (3-4 GM)    Exam:  /60   Pulse 62   Temp 98.3 °F (36.8 °C) (Oral)   Resp 16   Ht 5' 6\" (1.676 m)   Wt 169 lb 9.6 oz (76.9 kg)   SpO2 92%   BMI 27.37 kg/m²     General appearance: Alert and pleasant geriatric female. No apparent distress, appears stated age and cooperative. HEENT: Pupils equal, round, and reactive to light. Conjunctivae/corneas clear. Neck: Supple, with full range of motion. No jugular venous distention. Trachea midline. Respiratory:  Normal respiratory effort. Clear to auscultation, bilaterally without Rales/Wheezes/Rhonchi. Cardiovascular: Regular rate and rhythm with normal S1/S2 without murmurs, rubs or gallops. Abdomen: Soft, non-tender, non-distended with normal bowel sounds. Musculoskeletal: Passive and active ROM x 4 extremities. Skin: Skin color, texture, turgor normal.  No rashes or lesions. Neurologic:  Neurovascularly intact without any focal sensory/motor deficits. Cranial nerves: II-XII intact, grossly non-focal.  Psychiatric: Alert and appropriate in conversation. Thought content scattered, insight sporadic. Cognitive impairment suspected. Capillary Refill: Brisk,< 3 seconds   Peripheral Pulses: +2 palpable, equal bilaterally     Labs:   Recent Labs     11/09/20  0648   WBC 8.6   HGB 12.3   HCT 39.3        Recent Labs     11/08/20  0734 11/09/20  0648    141   K 4.1 4.2    105   CO2 24 23   BUN 28* 44*   CREATININE 1.3* 1.6*   CALCIUM 9.9 10.4     No results for input(s): AST, ALT, BILIDIR, BILITOT, ALKPHOS in the last 72 hours. Recent Labs     11/08/20  0734   INR 1.12     No results for input(s): Cheryl Albaro in the last 72 hours.     Microbiology:    Blood culture #1: No results found for: BC    Blood culture #2:No results found for: Edmundo Garrett    Organism:No results found for: ORG    No results found for: LABGRAM    MRSA culture only:No results found for: [facility]    Urine culture: No results found for: LABURIN    Respiratory culture: No results found for: CULTRESP    Aerobic and Anaerobic :  No results found for: LABAERO  No results found for: LABANAE    Urinalysis:      Lab Results   Component Value Date    NITRU NEGATIVE 11/08/2020    WBCUA 0-2 11/08/2020    BACTERIA FEW 11/08/2020    RBCUA 0-2 11/08/2020    BLOODU NEGATIVE 11/08/2020    SPECGRAV 1.019 11/08/2020    GLUCOSEU NEGATIVE 09/27/2012       Radiology:  VL DUP CAROTID BILATERAL   Final Result    Mild mural plaque at the carotid bulbs bilaterally with less than 50% stenosis by SRU consensus criteria. **This report has been created using voice recognition software. It may contain minor errors which are inherent in voice recognition technology. **      Final report electronically signed by Dr. Kurt Mcintosh MD on 11/9/2020 3:47 PM      CT PELVIS WO CONTRAST Additional Contrast? None   Final Result   1. No acute fracture or malalignment. Degenerative changes as described above. 2. There is sigmoid diverticulosis. There is also colonic wall thickening involving the sigmoid colon. This could be related to colonic wall hypertrophy from chronic diverticular disease versus sequela from early mild diverticulitis. **This report has been created using voice recognition software. It may contain minor errors which are inherent in voice recognition technology. **      Final report electronically signed by Dr. Lorenza Valverde on 11/6/2020 11:45 AM      XR HIP 2-3 VW W PELVIS LEFT   Final Result   No acute findings. Severe DJD left hip. **This report has been created using voice recognition software. It may contain minor errors which are inherent in voice recognition technology. **      Final report frog-leg views of the left hip. FINDINGS: The hip joints, sacroiliac joints, and bones of the pelvis are intact. Normal abduction of left hip is demonstrated. Evidence for severe degenerative arthritis left hip. Prior lower lumbar fusion. Findings of osteoporosis. No acute findings. Severe DJD left hip. **This report has been created using voice recognition software. It may contain minor errors which are inherent in voice recognition technology. ** Final report electronically signed by Dr. Anoop Padilla on 11/6/2020 11:11 AM      **This report has been created using voice recognition software. It may contain minor errors which are inherent in voice recognition technology. **  Electronically signed by LONDON Bustamante CNP on 11/9/2020 at 6:59 PM

## 2020-11-09 NOTE — CARE COORDINATION
11/9/20, 2:38 PM EST  DISCHARGE PLANNING EVALUATION:    Grzegorz Mcclendon       Admitted from: ER, patient presented after falling at home with left hip and leg pain. 11/6/2020/ [address] day: 3   Location: -17/017-A Reason for admit: Diverticulitis [K57.92] Status: Inpt. Admit order signed?: yes  PMH:  has a past medical history of Arthritis, Asthma, Atrial fibrillation ([address] Blood circulation, collateral, CAD (coronary artery disease), CHF (congestive heart failure) ([address] Chronic kidney disease, stage III (moderate), COPD (chronic obstructive pulmonary disease) ([address] Depression, Diabetes mellitus ([address] Diabetic hypertension-nephrosis syndrome ([address] Dysuria, GERD (gastroesophageal reflux disease), Hyperlipidemia, Hypertension, Kidney disease, and Unspecified cerebral artery occlusion with cerebral infarction. Procedure: N/A  Pertinent abnormal Imaging:X-rays of left knee, chest, pelvis, and thoracic spine, all without acute findings. CT of head and cervical spine, both without acute findings. CT of pelvis:   1. No acute fracture or malalignment. Degenerative changes as described above.         2. There is sigmoid diverticulosis. There is also colonic wall thickening involving the sigmoid colon. This could be related to colonic wall hypertrophy from chronic diverticular disease versus sequela from early mild diverticulitis.         Medications:  Scheduled Meds:   hydrALAZINE  100 mg Oral TID    aluminum & magnesium hydroxide-simethicone  30 mL Oral 4x Daily AC & HS    amLODIPine  10 mg Oral Daily    azaTHIOprine  50 mg Oral Daily    calcium elemental  500 mg Oral Daily    DULoxetine  30 mg Oral BID    famotidine  40 mg Oral Daily    gabapentin  100 mg Oral TID    pantoprazole  40 mg Oral Daily    spironolactone  50 mg Oral Daily    valsartan  320 mg Oral Daily    And    hydroCHLOROthiazide  25 mg Oral Daily    aspirin  162 mg Oral Daily    atorvastatin  80 mg Oral Daily    cloNIDine 0.1 mg Oral BID    diclofenac sodium  2 g Topical 4x Daily    docusate sodium  100 mg Oral BID    hydroxychloroquine  200 mg Oral Daily    insulin glargine  8 Units Subcutaneous Nightly    isosorbide mononitrate  120 mg Oral Daily    cetirizine  5 mg Oral Daily    magnesium oxide  400 mg Oral BID    metoprolol tartrate  25 mg Oral BID    predniSONE  5 mg Oral Daily    ranolazine  500 mg Oral BID    [Held by provider] traZODone  100 mg Oral Nightly    sodium chloride flush  10 mL Intravenous 2 times per day    enoxaparin  40 mg Subcutaneous Nightly    montelukast  10 mg Oral Nightly     Continuous Infusions:   dextrose        Pertinent Info/Orders/Treatment Plan:  Home medications addressed, Lovenox, DM management, prn Tylenol, albuterol, Nitrostat, Zofran, Glycolax, Tigan, and Ultram, daily weights, PT/OT, SCD's, strict I & O, telemetry, up with assistance. Diet: DIET GENERAL; Carb Control: 4 carb choices (60 gms)/meal; No Added Salt (3-4 GM)   Smoking status:  reports that she has been smoking cigarettes. She started smoking about 7 years ago. She has a 1.50 pack-year smoking history. She has never used smokeless tobacco.   PCP: Rigo Yeboah DO  Readmission 30 days or less: No  Readmission Risk Score: 30%    Discharge Planning Evaluation  Current Residence:  Private Residence  Living Arrangements:  Alone   Support Systems:  Children, Family Members  Current Services PTA:     Potential Assistance Needed:  N/A  Potential Assistance Purchasing Medications:  No  Does patient want to participate in local refill/ meds to beds program?  No  [facility]  Patient expects to be discharged to:  home  Expected Discharge date:  11/09/20  Follow Up Appointment: Best Day/ Time: Tuesday PM    Patient Goals/Plan/Treatment Preferences: Lars Lockhart is from home alone and current with [facility] for passport services. She has insurance and a PCP. She has all the DME she needs. Discharge planning pending therapy evaluations. Home with [place] adding skilled nursing and therapies vs. SNF for rehab. Transportation/Food Security/Housekeeping Addressed:  No issues identified.     Evaluation: yes

## 2020-11-09 NOTE — PROGRESS NOTES
[address]  PHYSICAL THERAPY MISSED TREATMENT NOTE  [facility] ONC MED 5K    Date: 2020  Patient Name: Micah Roque        MRN: [id number]   : 1940  (78 y.o.)  Gender: female                REASON FOR MISSED TREATMENT:  Missed Treat. Pt leaving soon for vascular lab, will check back as able.

## 2020-11-09 NOTE — PROGRESS NOTES
Pt uses bedpan at times. External catheter not collecting urine. Pt has saturated her chux X1. Accurate I/Os not possible due to pt inability to keep external catheter in place.

## 2020-11-09 NOTE — PROGRESS NOTES
Physician Progress Note      Sarahi Martin  [facility] #:                  [id number]  :                       1940  ADMIT DATE:       2020 9:05 AM  [address] [place] DATE:  RESPONDING  PROVIDER #:        John Landin CNP          QUERY Louis Cotter,    Pt admitted with  Weakness/ malaise with multiple falls . If possible, please   document in the progress notes and discharge summary if you are evaluating and   / or treating any of the following: The medical record reflects the following:  Risk Factors:elderly  Clinical Indicators: Increased falls and weakness with fatigue/malaise. Physical deconditioning  Treatment:  PT/OT, and recommend following up with neurology and PCP   outpatient  Options provided:  -- Age Related Physical Debility  -- Frailty  -- *** (weakness/fatigue/falls? reason for admission) due to other, Please   document other cause.   -- Other - I will add my own diagnosis  -- Disagree - Not applicable / Not valid  -- Disagree - Clinically unable to determine / Unknown  -- Refer to Clinical Documentation Reviewer    PROVIDER RESPONSE TEXT:    Physical deconditioning    Query created by: Maria Sun on 2020 6:47 AM      Electronically signed by:  John Landin CNP 2020 7:45 AM

## 2020-11-09 NOTE — CARE COORDINATION
DISCHARGE/PLANNING EVALUATION  11/9/20, 1:02 PM EST  8:53 am-received call from Rhianna Azar with [employer]-she is patient's CM. She shared that patient has aide services for 3 hours 3x a week. Providing agency is [address] [place] [address]. She is not aware of any skilled services. She has transportatiion, home delivered meals and an ERS. SW to update her on discharge plans. Reason for Referral: may need NHP  Mental Status: alert and oriented  Decision Making: patient is able to make her own decisions. Family/Social/Home Environment: Spoke with patient re: home situation and discharge needs. Patient reports that she resides alone in a mobile home with ramp to enter. Her bathroom has a tub/shower combination. She uses a seat in the shower. She also has an elevated toilet seat. She states that she has an aide who assists her with her personal care and housekeeping needs and a nurse who sees her 1x a month. She confirmed that she also has home delivered meals, but reports that she is able to fix other items if needed. CESARIO advised her of call from [place]. Patient experienced a fall at home resulting in admission. Discharge plans discussed-home with home health vs ECF for rehab-patient is not certain at this time-will wait therapy evals. Current Services including food security, transportation and housekeeping: no issues identified  Current Equipment: walker, cane, elevated toilet seat, shower seat  Payment Source: medicare and medicaid  Concerns or Barriers to Discharge: ability to return home safely  Post acute provider list with quality measures, geographic area and applicable managed care information provided. Questions regarding selection process answered: not at this time. Teach Back Method used with patient regarding care plan   Patient verbalize understanding of the plan of care and contribute to goal setting.        Patient goals, treatment preferences and discharge plan: undetermined at this time-will await therapy evals/recommendations.      Electronically signed by CORY Marroquin on 11/9/2020 at 1:02 PM

## 2020-11-10 LAB
GLUCOSE BLD-MCNC: 121 MG/DL (ref 70–108)
GLUCOSE BLD-MCNC: 137 MG/DL (ref 70–108)
GLUCOSE BLD-MCNC: 137 MG/DL (ref 70–108)
GLUCOSE BLD-MCNC: 140 MG/DL (ref 70–108)

## 2020-11-10 PROCEDURE — 6360000002 HC RX W HCPCS: Performed by: STUDENT IN AN ORGANIZED HEALTH CARE EDUCATION/TRAINING PROGRAM

## 2020-11-10 PROCEDURE — 2580000003 HC RX 258: Performed by: STUDENT IN AN ORGANIZED HEALTH CARE EDUCATION/TRAINING PROGRAM

## 2020-11-10 PROCEDURE — 6370000000 HC RX 637 (ALT 250 FOR IP): Performed by: NURSE PRACTITIONER

## 2020-11-10 PROCEDURE — 82948 REAGENT STRIP/BLOOD GLUCOSE: CPT

## 2020-11-10 PROCEDURE — 1200000003 HC TELEMETRY R&B

## 2020-11-10 PROCEDURE — 6370000000 HC RX 637 (ALT 250 FOR IP): Performed by: STUDENT IN AN ORGANIZED HEALTH CARE EDUCATION/TRAINING PROGRAM

## 2020-11-10 PROCEDURE — 97162 PT EVAL MOD COMPLEX 30 MIN: CPT

## 2020-11-10 PROCEDURE — 6360000002 HC RX W HCPCS

## 2020-11-10 PROCEDURE — 94761 N-INVAS EAR/PLS OXIMETRY MLT: CPT

## 2020-11-10 PROCEDURE — 97530 THERAPEUTIC ACTIVITIES: CPT

## 2020-11-10 PROCEDURE — 99232 SBSQ HOSP IP/OBS MODERATE 35: CPT | Performed by: NURSE PRACTITIONER

## 2020-11-10 RX ORDER — FAMOTIDINE 20 MG/1
20 TABLET, FILM COATED ORAL DAILY
Status: DISCONTINUED | OUTPATIENT
Start: 2020-11-11 | End: 2020-11-12 | Stop reason: HOSPADM

## 2020-11-10 RX ADMIN — FAMOTIDINE 40 MG: 20 TABLET, FILM COATED ORAL at 08:43

## 2020-11-10 RX ADMIN — ACETAMINOPHEN 650 MG: 325 TABLET ORAL at 21:02

## 2020-11-10 RX ADMIN — ENOXAPARIN SODIUM 30 MG: 30 INJECTION SUBCUTANEOUS at 20:53

## 2020-11-10 RX ADMIN — HYDROCHLOROTHIAZIDE 25 MG: 25 TABLET ORAL at 08:33

## 2020-11-10 RX ADMIN — DICLOFENAC 2 G: 10 GEL TOPICAL at 08:44

## 2020-11-10 RX ADMIN — ATORVASTATIN CALCIUM 80 MG: 80 TABLET, FILM COATED ORAL at 08:36

## 2020-11-10 RX ADMIN — RANOLAZINE 500 MG: 500 TABLET, FILM COATED, EXTENDED RELEASE ORAL at 20:51

## 2020-11-10 RX ADMIN — INSULIN GLARGINE 8 UNITS: 100 INJECTION, SOLUTION SUBCUTANEOUS at 20:52

## 2020-11-10 RX ADMIN — METOPROLOL TARTRATE 25 MG: 25 TABLET, FILM COATED ORAL at 20:51

## 2020-11-10 RX ADMIN — ISOSORBIDE MONONITRATE 120 MG: 60 TABLET ORAL at 08:33

## 2020-11-10 RX ADMIN — RANOLAZINE 500 MG: 500 TABLET, FILM COATED, EXTENDED RELEASE ORAL at 08:33

## 2020-11-10 RX ADMIN — DICLOFENAC 2 G: 10 GEL TOPICAL at 12:39

## 2020-11-10 RX ADMIN — DICLOFENAC 2 G: 10 GEL TOPICAL at 18:31

## 2020-11-10 RX ADMIN — CALCIUM 500 MG: 500 TABLET ORAL at 08:36

## 2020-11-10 RX ADMIN — PANTOPRAZOLE SODIUM 40 MG: 40 TABLET, DELAYED RELEASE ORAL at 06:13

## 2020-11-10 RX ADMIN — HYDROXYCHLOROQUINE SULFATE 200 MG: 200 TABLET ORAL at 08:35

## 2020-11-10 RX ADMIN — AZATHIOPRINE 50 MG: 50 TABLET ORAL at 08:35

## 2020-11-10 RX ADMIN — PREDNISONE 5 MG: 5 TABLET ORAL at 08:33

## 2020-11-10 RX ADMIN — DULOXETINE HYDROCHLORIDE 30 MG: 30 CAPSULE, DELAYED RELEASE ORAL at 08:33

## 2020-11-10 RX ADMIN — DOCUSATE SODIUM 100 MG: 100 CAPSULE, LIQUID FILLED ORAL at 08:41

## 2020-11-10 RX ADMIN — DULOXETINE HYDROCHLORIDE 30 MG: 30 CAPSULE, DELAYED RELEASE ORAL at 20:51

## 2020-11-10 RX ADMIN — CETIRIZINE HYDROCHLORIDE 5 MG: 5 TABLET ORAL at 08:33

## 2020-11-10 RX ADMIN — ASPIRIN 162 MG: 81 TABLET, COATED ORAL at 08:41

## 2020-11-10 RX ADMIN — Medication 10 ML: at 08:44

## 2020-11-10 RX ADMIN — TRAMADOL HYDROCHLORIDE 50 MG: 50 TABLET ORAL at 04:13

## 2020-11-10 RX ADMIN — DICLOFENAC 2 G: 10 GEL TOPICAL at 20:52

## 2020-11-10 RX ADMIN — VALSARTAN 320 MG: 320 TABLET, FILM COATED ORAL at 08:33

## 2020-11-10 RX ADMIN — METOPROLOL TARTRATE 25 MG: 25 TABLET, FILM COATED ORAL at 08:33

## 2020-11-10 RX ADMIN — Medication 10 ML: at 20:53

## 2020-11-10 RX ADMIN — GABAPENTIN 100 MG: 100 CAPSULE ORAL at 08:33

## 2020-11-10 RX ADMIN — HYDRALAZINE HYDROCHLORIDE 100 MG: 50 TABLET, FILM COATED ORAL at 15:01

## 2020-11-10 RX ADMIN — SPIRONOLACTONE 50 MG: 25 TABLET ORAL at 08:32

## 2020-11-10 RX ADMIN — MAGNESIUM GLUCONATE 500 MG ORAL TABLET 400 MG: 500 TABLET ORAL at 20:51

## 2020-11-10 RX ADMIN — GABAPENTIN 100 MG: 100 CAPSULE ORAL at 15:01

## 2020-11-10 RX ADMIN — DOCUSATE SODIUM 100 MG: 100 CAPSULE, LIQUID FILLED ORAL at 20:52

## 2020-11-10 RX ADMIN — MONTELUKAST SODIUM 10 MG: 10 TABLET ORAL at 20:51

## 2020-11-10 RX ADMIN — HYDRALAZINE HYDROCHLORIDE 100 MG: 50 TABLET, FILM COATED ORAL at 08:33

## 2020-11-10 RX ADMIN — ACETAMINOPHEN 650 MG: 325 TABLET ORAL at 15:02

## 2020-11-10 RX ADMIN — GABAPENTIN 100 MG: 100 CAPSULE ORAL at 20:51

## 2020-11-10 RX ADMIN — HYDRALAZINE HYDROCHLORIDE 100 MG: 50 TABLET, FILM COATED ORAL at 20:51

## 2020-11-10 RX ADMIN — ALUMINUM HYDROXIDE, MAGNESIUM HYDROXIDE, AND SIMETHICONE 30 ML: 200; 200; 20 SUSPENSION ORAL at 12:39

## 2020-11-10 RX ADMIN — MAGNESIUM GLUCONATE 500 MG ORAL TABLET 400 MG: 500 TABLET ORAL at 08:32

## 2020-11-10 RX ADMIN — CLONIDINE HYDROCHLORIDE 0.1 MG: 0.1 TABLET ORAL at 20:51

## 2020-11-10 RX ADMIN — ALUMINUM HYDROXIDE, MAGNESIUM HYDROXIDE, AND SIMETHICONE 30 ML: 200; 200; 20 SUSPENSION ORAL at 18:31

## 2020-11-10 RX ADMIN — AMLODIPINE BESYLATE 10 MG: 10 TABLET ORAL at 06:38

## 2020-11-10 RX ADMIN — CLONIDINE HYDROCHLORIDE 0.1 MG: 0.1 TABLET ORAL at 08:33

## 2020-11-10 ASSESSMENT — PAIN DESCRIPTION - ORIENTATION
ORIENTATION: LOWER
ORIENTATION: LOWER
ORIENTATION: MID;LOWER

## 2020-11-10 ASSESSMENT — PAIN DESCRIPTION - ONSET
ONSET: ON-GOING
ONSET: ON-GOING

## 2020-11-10 ASSESSMENT — PAIN DESCRIPTION - DIRECTION
RADIATING_TOWARDS: LEGS
RADIATING_TOWARDS: LEGS

## 2020-11-10 ASSESSMENT — PAIN SCALES - GENERAL
PAINLEVEL_OUTOF10: 7
PAINLEVEL_OUTOF10: 3
PAINLEVEL_OUTOF10: 2
PAINLEVEL_OUTOF10: 7
PAINLEVEL_OUTOF10: 5
PAINLEVEL_OUTOF10: 3

## 2020-11-10 ASSESSMENT — PAIN DESCRIPTION - LOCATION
LOCATION: BACK

## 2020-11-10 ASSESSMENT — PAIN - FUNCTIONAL ASSESSMENT
PAIN_FUNCTIONAL_ASSESSMENT: PREVENTS OR INTERFERES SOME ACTIVE ACTIVITIES AND ADLS
PAIN_FUNCTIONAL_ASSESSMENT: PREVENTS OR INTERFERES SOME ACTIVE ACTIVITIES AND ADLS

## 2020-11-10 ASSESSMENT — PAIN DESCRIPTION - PAIN TYPE
TYPE: CHRONIC PAIN

## 2020-11-10 ASSESSMENT — PAIN DESCRIPTION - PROGRESSION
CLINICAL_PROGRESSION: NOT CHANGED
CLINICAL_PROGRESSION: GRADUALLY IMPROVING
CLINICAL_PROGRESSION: GRADUALLY WORSENING

## 2020-11-10 ASSESSMENT — PAIN DESCRIPTION - FREQUENCY
FREQUENCY: CONTINUOUS
FREQUENCY: CONTINUOUS

## 2020-11-10 ASSESSMENT — PAIN DESCRIPTION - DESCRIPTORS
DESCRIPTORS: SORE;DISCOMFORT;SHARP
DESCRIPTORS: ACHING;CONSTANT

## 2020-11-10 NOTE — CARE COORDINATION
11/10/20, 2:21 PM EST    DISCHARGE PLANNING EVALUATION    Therapy notes reviewed-recommending subacute/skilled nursing facility. Spoke with patient-advised her of therapy recommendations. Patient stated her understanding and shared that she had been at [facility] in the past. She would be interested in this facility again. She would like for SW to also speak with her daughter Nazanin-call to daughter from patient room-no answer and message left for her to contact . Patient agreeable to  contacting [facility] with referral information. 2:29 PM Call to Bettye Irene at [facility] have female beds available. She does not anticipate any issues with accepting patient, but will get back with  after staff reviews.

## 2020-11-10 NOTE — FLOWSHEET NOTE
11/10/20 0615   Provider Notification   Reason for Communication Review case   Provider Name Adriane Del Castillo, [place]   Provider Notification Advance Practice Clinician (CNS, NP, CNM, CRNA, PA)   Method of Communication Secure Message   Response See orders   Notification Time [id number]: RN [facility], [place] to notify of elevated BP. Patient /86 manual.     [id number]: [facility] stated to give morning dose of Amlodipine early. 4729: Amlodipine given.

## 2020-11-10 NOTE — PROGRESS NOTES
[address] [place] [address]  INPATIENT PHYSICAL THERAPY  EVALUATION  [facility] ONC MED 5K - 1M-61/138-I    Time In: 4933  Time Out: 1030  Timed Code Treatment Minutes: 8 Minutes  Minutes: 22          Date: 11/10/2020  Patient Name: Nasra Hollis,  Gender:  female        MRN: [id number]  : 1940  (78 y.o.)      Referring Practitioner: Deb Robbins MD  Diagnosis: Diverticulitis  Additional Pertinent Hx: 78 y.o. female who presented to [address] [place] [address] with above sxs for the past 48 hr. She had complaints of multiple episodes of her legs shaking spontaneously. She actually had a fall on her left side bumping her head and side. She has also had some diarrhea. She denied any fever or anosmia. She was seen in ER 11.5 and discharged. However she continued to be weak and shaky and was brought to ER. She had a ct of abdomen and the dx of diverticulitis was entertained. Thus the Hospitalist Service was asked to admit her although I doubt the dx. Restrictions/Precautions:  Restrictions/Precautions: Fall Risk  Position Activity Restriction  Other position/activity restrictions: Confusion    Subjective:  Chart Reviewed: Yes  Patient assessed for rehabilitation services?: Yes  Family / Caregiver Present: No  Subjective: RN approved session, pt is supine in bed and agreeable, very confused, constant cues for direction and safety.     General:  Overall Orientation Status: Impaired  Orientation Level: Oriented to person, Disoriented to place, Disoriented to situation  Follows Commands: Within Functional Limits    Vision: Within Functional Limits    Hearing: Within functional limits         Pain: denies    Social/Functional History:    Lives With: Alone  Type of Home: Mobile home  Home Layout: One level  Home Access: Ramped entrance  Home Equipment: 4 wheeled walker      Ambulation Assistance: Independent  Transfer Assistance: Independent    Active : No     Additional Comments: Pt amb with rollator; has strength and functional mobility. Prognosis: Good    REQUIRES PT FOLLOW UP: Yes    Discharge Recommendations:  Discharge Recommendations: [address]    Patient Education:  PT Education: PT Role, Plan of Care    Equipment Recommendations:  Equipment Needed: No    Plan:  Times per week: 3-5x GM  Current Treatment Recommendations: Strengthening, Functional Mobility Training, Transfer Training, Balance Training, Endurance Training, Gait Training, Stair training, Neuromuscular Re-education, Safety Education & Training, Patient/Caregiver Education & Training, Equipment Evaluation, Education, & procurement    Goals:  Patient goals : does not state  Short term goals  Time Frame for Short term goals: by discharge  Short term goal 1: Pt to transfer supine <--> sit SBA to enable pt to get in/out of bed. Short term goal 2: Pt to transfer sit <--> stand SBA for increased functional mobility. Short term goal 3: Pt to ambulate >50 feet with RW SBA for household ambulation. Long term goals  Time Frame for Long term goals : NA due to short length of stay. Following session, patient left in safe position with all fall risk precautions in place.

## 2020-11-11 ENCOUNTER — APPOINTMENT (OUTPATIENT)
Dept: GENERAL RADIOLOGY | Age: 80
DRG: 948 | End: 2020-11-11
Payer: MEDICARE

## 2020-11-11 LAB
GLUCOSE BLD-MCNC: 118 MG/DL (ref 70–108)
GLUCOSE BLD-MCNC: 135 MG/DL (ref 70–108)

## 2020-11-11 PROCEDURE — 6360000002 HC RX W HCPCS

## 2020-11-11 PROCEDURE — 6370000000 HC RX 637 (ALT 250 FOR IP): Performed by: STUDENT IN AN ORGANIZED HEALTH CARE EDUCATION/TRAINING PROGRAM

## 2020-11-11 PROCEDURE — 1200000003 HC TELEMETRY R&B

## 2020-11-11 PROCEDURE — 6370000000 HC RX 637 (ALT 250 FOR IP): Performed by: NURSE PRACTITIONER

## 2020-11-11 PROCEDURE — 99238 HOSP IP/OBS DSCHRG MGMT 30/<: CPT | Performed by: INTERNAL MEDICINE

## 2020-11-11 PROCEDURE — 6370000000 HC RX 637 (ALT 250 FOR IP)

## 2020-11-11 PROCEDURE — 71046 X-RAY EXAM CHEST 2 VIEWS: CPT

## 2020-11-11 PROCEDURE — 94760 N-INVAS EAR/PLS OXIMETRY 1: CPT

## 2020-11-11 PROCEDURE — 6360000002 HC RX W HCPCS: Performed by: STUDENT IN AN ORGANIZED HEALTH CARE EDUCATION/TRAINING PROGRAM

## 2020-11-11 PROCEDURE — 2580000003 HC RX 258: Performed by: STUDENT IN AN ORGANIZED HEALTH CARE EDUCATION/TRAINING PROGRAM

## 2020-11-11 PROCEDURE — 82948 REAGENT STRIP/BLOOD GLUCOSE: CPT

## 2020-11-11 RX ORDER — CLONIDINE HYDROCHLORIDE 0.1 MG/1
0.1 TABLET ORAL 2 TIMES DAILY
Qty: 60 TABLET | Refills: 3 | Status: SHIPPED | OUTPATIENT
Start: 2020-11-11

## 2020-11-11 RX ORDER — HYDRALAZINE HYDROCHLORIDE 100 MG/1
100 TABLET, FILM COATED ORAL 3 TIMES DAILY
Qty: 90 TABLET | Refills: 3 | Status: ON HOLD | OUTPATIENT
Start: 2020-11-11 | End: 2021-09-28

## 2020-11-11 RX ADMIN — ALUMINUM HYDROXIDE, MAGNESIUM HYDROXIDE, AND SIMETHICONE 30 ML: 200; 200; 20 SUSPENSION ORAL at 20:22

## 2020-11-11 RX ADMIN — SPIRONOLACTONE 50 MG: 25 TABLET ORAL at 09:40

## 2020-11-11 RX ADMIN — FAMOTIDINE 20 MG: 20 TABLET, FILM COATED ORAL at 09:41

## 2020-11-11 RX ADMIN — HYDRALAZINE HYDROCHLORIDE 100 MG: 50 TABLET, FILM COATED ORAL at 20:21

## 2020-11-11 RX ADMIN — Medication 10 ML: at 20:23

## 2020-11-11 RX ADMIN — CALCIUM 500 MG: 500 TABLET ORAL at 09:43

## 2020-11-11 RX ADMIN — ENOXAPARIN SODIUM 30 MG: 30 INJECTION SUBCUTANEOUS at 20:23

## 2020-11-11 RX ADMIN — ALUMINUM HYDROXIDE, MAGNESIUM HYDROXIDE, AND SIMETHICONE 30 ML: 200; 200; 20 SUSPENSION ORAL at 07:40

## 2020-11-11 RX ADMIN — MAGNESIUM GLUCONATE 500 MG ORAL TABLET 400 MG: 500 TABLET ORAL at 09:41

## 2020-11-11 RX ADMIN — GABAPENTIN 100 MG: 100 CAPSULE ORAL at 23:23

## 2020-11-11 RX ADMIN — RANOLAZINE 500 MG: 500 TABLET, FILM COATED, EXTENDED RELEASE ORAL at 20:20

## 2020-11-11 RX ADMIN — TRAMADOL HYDROCHLORIDE 50 MG: 50 TABLET ORAL at 20:22

## 2020-11-11 RX ADMIN — PANTOPRAZOLE SODIUM 40 MG: 40 TABLET, DELAYED RELEASE ORAL at 07:39

## 2020-11-11 RX ADMIN — DOCUSATE SODIUM 100 MG: 100 CAPSULE, LIQUID FILLED ORAL at 20:22

## 2020-11-11 RX ADMIN — ISOSORBIDE MONONITRATE 120 MG: 60 TABLET ORAL at 09:41

## 2020-11-11 RX ADMIN — HYDROCHLOROTHIAZIDE 25 MG: 25 TABLET ORAL at 09:41

## 2020-11-11 RX ADMIN — DULOXETINE HYDROCHLORIDE 30 MG: 30 CAPSULE, DELAYED RELEASE ORAL at 09:40

## 2020-11-11 RX ADMIN — CETIRIZINE HYDROCHLORIDE 5 MG: 5 TABLET ORAL at 09:41

## 2020-11-11 RX ADMIN — GABAPENTIN 100 MG: 100 CAPSULE ORAL at 19:02

## 2020-11-11 RX ADMIN — INSULIN GLARGINE 8 UNITS: 100 INJECTION, SOLUTION SUBCUTANEOUS at 21:17

## 2020-11-11 RX ADMIN — GABAPENTIN 100 MG: 100 CAPSULE ORAL at 09:40

## 2020-11-11 RX ADMIN — PREDNISONE 5 MG: 5 TABLET ORAL at 09:42

## 2020-11-11 RX ADMIN — AMLODIPINE BESYLATE 10 MG: 10 TABLET ORAL at 09:43

## 2020-11-11 RX ADMIN — METOPROLOL TARTRATE 25 MG: 25 TABLET, FILM COATED ORAL at 20:21

## 2020-11-11 RX ADMIN — RANOLAZINE 500 MG: 500 TABLET, FILM COATED, EXTENDED RELEASE ORAL at 09:40

## 2020-11-11 RX ADMIN — MONTELUKAST SODIUM 10 MG: 10 TABLET ORAL at 20:20

## 2020-11-11 RX ADMIN — DICLOFENAC 2 G: 10 GEL TOPICAL at 20:24

## 2020-11-11 RX ADMIN — DOCUSATE SODIUM 100 MG: 100 CAPSULE, LIQUID FILLED ORAL at 09:40

## 2020-11-11 RX ADMIN — MAGNESIUM GLUCONATE 500 MG ORAL TABLET 400 MG: 500 TABLET ORAL at 20:21

## 2020-11-11 RX ADMIN — HYDRALAZINE HYDROCHLORIDE 100 MG: 50 TABLET, FILM COATED ORAL at 09:40

## 2020-11-11 RX ADMIN — HYDROXYCHLOROQUINE SULFATE 200 MG: 200 TABLET ORAL at 09:43

## 2020-11-11 RX ADMIN — ALUMINUM HYDROXIDE, MAGNESIUM HYDROXIDE, AND SIMETHICONE 30 ML: 200; 200; 20 SUSPENSION ORAL at 18:59

## 2020-11-11 RX ADMIN — AZATHIOPRINE 50 MG: 50 TABLET ORAL at 09:41

## 2020-11-11 RX ADMIN — VALSARTAN 320 MG: 320 TABLET, FILM COATED ORAL at 09:40

## 2020-11-11 RX ADMIN — ASPIRIN 162 MG: 81 TABLET, COATED ORAL at 09:40

## 2020-11-11 RX ADMIN — DULOXETINE HYDROCHLORIDE 30 MG: 30 CAPSULE, DELAYED RELEASE ORAL at 20:21

## 2020-11-11 RX ADMIN — ACETAMINOPHEN 650 MG: 325 TABLET ORAL at 09:41

## 2020-11-11 RX ADMIN — METOPROLOL TARTRATE 25 MG: 25 TABLET, FILM COATED ORAL at 09:40

## 2020-11-11 RX ADMIN — CLONIDINE HYDROCHLORIDE 0.1 MG: 0.1 TABLET ORAL at 20:22

## 2020-11-11 RX ADMIN — ATORVASTATIN CALCIUM 80 MG: 80 TABLET, FILM COATED ORAL at 09:41

## 2020-11-11 RX ADMIN — CLONIDINE HYDROCHLORIDE 0.1 MG: 0.1 TABLET ORAL at 09:41

## 2020-11-11 ASSESSMENT — PAIN SCALES - GENERAL
PAINLEVEL_OUTOF10: 8
PAINLEVEL_OUTOF10: 7
PAINLEVEL_OUTOF10: 4
PAINLEVEL_OUTOF10: 4

## 2020-11-11 ASSESSMENT — PAIN DESCRIPTION - FREQUENCY: FREQUENCY: CONTINUOUS

## 2020-11-11 ASSESSMENT — PAIN DESCRIPTION - LOCATION
LOCATION: BACK
LOCATION: LEG;BACK

## 2020-11-11 ASSESSMENT — PAIN DESCRIPTION - ONSET: ONSET: ON-GOING

## 2020-11-11 ASSESSMENT — PAIN DESCRIPTION - PROGRESSION
CLINICAL_PROGRESSION: NOT CHANGED
CLINICAL_PROGRESSION: GRADUALLY IMPROVING

## 2020-11-11 ASSESSMENT — PAIN DESCRIPTION - DESCRIPTORS
DESCRIPTORS: ACHING
DESCRIPTORS: ACHING

## 2020-11-11 ASSESSMENT — PAIN DESCRIPTION - ORIENTATION: ORIENTATION: LOWER

## 2020-11-11 ASSESSMENT — PAIN DESCRIPTION - PAIN TYPE
TYPE: CHRONIC PAIN
TYPE: CHRONIC PAIN

## 2020-11-11 NOTE — DISCHARGE INSTR - COC
Continuity of Care Form    Patient Name: Pravin Akbar   :  1940  MRN:  [id number]    Admit date:  2020  Discharge date:  2020    Code Status Order: Full Code   Advance Directives:   [facility] Documentation       Date/Time Healthcare Directive Type of Healthcare Directive Copy in [facility] [address] Agent's Name Healthcare Agent's Phone Number    [phone]  Yes, patient has an advance directive for healthcare treatment  Durable power of  for health care;Living will  No, copy requested from family  Healthcare power of   Juan Manuel Schaeffer              Admitting Physician:  Ricki Lowe MD  PCP: Anni Valerio DO    [facility] 5K-17/017-A  Discharging Unit Phone Number:[phone]    Emergency Contact:   Extended Emergency Contact Information  Primary Emergency Contact: [facility] of [address] [place] [address] Phone: [phone]  Relation: Child  Secondary Emergency Contact:  [address] of [address] [place] [address] Phone: [phone]  Relation: Child    Past Surgical History:  Past Surgical History:   Procedure Laterality Date    APPENDECTOMY      BACK SURGERY      lumbar spine    BLADDER SUSPENSION      CARDIAC CATHETERIZATION      with stent  [facility]    CARPAL TUNNEL RELEASE      COLONOSCOPY  2013    ENDOSCOPY, COLON, DIAGNOSTIC      ESOPHAGEAL DILATATION      HYSTERECTOMY  1974    JOINT REPLACEMENT Left 1992    Left Knee       Immunization History:   Immunization History   Administered Date(s) Administered    Influenza Virus Vaccine 10/04/2012       Active Problems:  Patient Active Problem List   Diagnosis Code    Syncope R55    Diabetes mellitus ([facility] E11.9    CAD (coronary artery disease) I25.10    Hypertension I10    Atrial fibrillation (HCC) I48.91    GERD (gastroesophageal reflux disease) K21.9    Chronic kidney disease, stage III (moderate) N18.30    COPD (chronic obstructive pulmonary disease) ([facility]) J44.9    Dysuria R30.0    Diabetic hypertension-nephrosis syndrome ([facility]) E11.21    Hypokalemia E87.6    Abnormal nuclear cardiac imaging test R93.1    Back pain, chronic M54.9, G89.29    Obesity (BMI 35.0-39.9 without comorbidity) E66.9    H/O class III angina pectoris Z86.79    HTN, goal below 130/80 I10    Non-rheumatic mitral regurgitation I34.0    Abnormal nuclear stress test R94.39    Osteopenia of multiple sites M85.89    Seronegative rheumatoid arthritis ([facility] M06.00    Acute respiratory failure (HCC) J96.00    Diverticulitis K57.92    Diverticulitis of colon K57.32    Multiple falls R29.6       Isolation/Infection:   Isolation            No Isolation          Patient Infection Status       Infection Onset Added Last Indicated Last Indicated By Review Planned Expiration Resolved Resolved By    None active    Resolved    COVID-19 Rule Out 11/05/20 11/05/20 11/06/20 COVID-19 (Ordered)   11/06/20 Rule-Out Test Resulted    COVID-19 Rule Out 09/28/20 09/28/20 09/28/20 COVID-19 (Ordered)   09/29/20 Rule-Out Test Resulted    COVID-19 Rule Out 08/08/20 08/08/20 08/08/20 COVID-19 (Ordered)   08/08/20 Rule-Out Test Resulted            Nurse Assessment:  Last Vital Signs: BP (!) 110/52   Pulse 63   Temp 98.6 °F (37 °C) (Oral)   Resp 18   Ht 5' 6\" (1.676 m)   Wt 155 lb 8 oz (70.5 kg)   SpO2 93%   BMI 25.10 kg/m²     Last documented pain score (0-10 scale): Pain Level: 7  Last Weight:   Wt Readings from Last 1 Encounters:   11/10/20 155 lb 8 oz (70.5 kg)     Mental Status:  oriented, alert and able to concentrate and follow conversation    IV Access:  - None    Nursing Mobility/ADLs:  Walking   Assisted  Transfer  Assisted  Bathing  Assisted  Dressing  Assisted  Toileting  Assisted  Feeding  Independent  Med Admin  Assisted  Med Delivery   whole.  Bigger Pills in apple sauce    Wound Care Documentation and Therapy:  Wound 11/06/20 Buttocks Right stage I, non blanchable redness (Active)   Wound Etiology Pressure Stage  1 11/11/20 0430   Dressing/Treatment Protective barrier 11/11/20 0430   Wound Assessment Pink/red 11/11/20 0430   Drainage Amount None 11/11/20 0430   Soni-wound Assessment Blanchable erythema 11/11/20 0430   Number of days: 4        Elimination:  Continence:   · Bowel: Yes  · Bladder: No  Urinary Catheter: None   Colostomy/Ileostomy/Ileal Conduit: No       Date of Last BM: 11/12/2020    Intake/Output Summary (Last 24 hours) at 11/11/2020 1412  Last data filed at 11/11/2020 0432  Gross per 24 hour   Intake 600 ml   Output 0 ml   Net 600 ml     I/O last 3 completed shifts: In: 1 [P.O.:960; I.V.:10]  Out: 0     Safety Concerns: At Risk for Falls    Impairments/Disabilities:      None    Nutrition Therapy:  Current Nutrition Therapy:   - Oral Diet:  Carb Control 4 carbs/meal (1800kcals/day) and Low Sodium (3-4gm)    Routes of Feeding: Oral  Liquids: Thin Liquids  Daily Fluid Restriction: no  Last Modified Barium Swallow with Video (Video Swallowing Test): not done    Treatments at the Time of Hospital Discharge:   Respiratory Treatments: none  Oxygen Therapy:  is not on home oxygen therapy.   Ventilator:    - No ventilator support    Rehab Therapies: Physical Therapy and Occupational Therapy  Weight Bearing Status/Restrictions: No weight bearing restirctions  Other Medical Equipment (for information only, NOT a DME order):  walker and bedside commode  Other Treatments: none    Patient's personal belongings (please select all that are sent with patient):  None    RN SIGNATURE:  Electronically signed by Megan Rosales RN on 11/12/20 at 10:10 AM EST    CASE MANAGEMENT/SOCIAL WORK SECTION    Inpatient Status Date:  11/06/2020    Readmission Risk Assessment Score:  Readmission Risk              Risk of Unplanned Readmission:        31           Discharging to Facility/ Agency   · Name: [facility]  · Address: [address] [place] [address] [address], [place], [address] [place] [address]  · Phone: [phone]  · Fax: [phone]    Dialysis Facility (if applicable)   · Name:  · Address:  · Dialysis Schedule:  · Phone:  · Fax:    / signature: Electronically signed by Shaun Libman, LSW on 11/11/20 at 2:32 PM EST    PHYSICIAN SECTION    Prognosis: Fair    Condition at Discharge: Stable    Rehab Potential (if transferring to Rehab): Fair    Recommended Labs or Other Treatments After Discharge: BP monitoring    Physician Certification: I certify the above information and transfer of Salma Nurse  is necessary for the continuing treatment of the diagnosis listed and that she requires [facility] for less 30 days.      Update Admission H&P: No change in H&P    PHYSICIAN SIGNATURE:  Electronically signed by June Benitez MD on 11/12/20 at 8:54 AM EST

## 2020-11-11 NOTE — CARE COORDINATION
11/11/20, 9:59 AM EST    DISCHARGE PLANNING EVALUATION      CESARIO received a call from the daughter and Mary Ellen Boucher. CESARIO explained the need for ECF and that her mother chosen [address]. CESARIO explained the Medicare benefit. She was in agreement with this. CESARIO updated the CM  11/11/20, 12:20 PM EST    DISCHARGE PLANNING EVALUATION      SW received a call from the granddaughter, Tatiana Duffy. She would like to take her grandmother home, states she can be with her 24/7 along with her spouse, and plans on taking time off work. She has worked as an [employer] so she has training in how to care for a patient. CESARIO advised that this could happen today but that she would be notified either way. Her home is one story, the address is [address] [place] [address]. Her phone number--[phone]. CESARIO updated the nurse and CM  11/11/20, 1:42 PM EST    DISCHARGE PLANNING EVALUATION      CESARIO spoke to the patient and informed her of the niece's plan. She was not certain what she should do. CESARIO suggested that the daughter Pretty Ramos, be recalled to assist with the plan. CESARIO called Pretty Ramos, transferred the call in to patient. The nurse then called CESARIO to explain that the daughter, the son and patient all want ECF. CESARIO called Victoriano Edmond with [address], now not sure if she will have a bed as their staff feels she is a fall risk. She will reevaluate the situation and call CESARIO back  11/11/20, 2:22 PM EST    DISCHARGE PLANNING EVALUATION      CESARIO spoke to [place] again and advised that the patient uses her call light appropriately and that she does not try to get out of bed without help. She hopes now to be able to take her, but she can't today, but likely tomorrow. CESARIO updated the nurse who will let the attending know. CESARIO spoke to the POA and daughter, Pretty Ramos, to update on the above. She states her son will call Tatiana Duffy and let her know of the plan.   CESARIO has updated the CM

## 2020-11-11 NOTE — DISCHARGE SUMMARY
Hospitalist Discharge Summary        Patient: Daniel Soto  YOB: 1940  MRN: [id number]   Acct: [de-identified]    Primary Care Physician: Jose Uribe DO    Admit date  11/6/2020    Discharge date:  11/11/2020 2:15 PM    Chief Complaint on presentation :-  Fall, weakness    Discharge Assessment and Plan:-       1. Physical deconditioning: Geriatric polypharmacy high on the differential list.  Increased falls and weakness with fatigue/malaise. Workup relatively unremarkable. Continue to maintain telemetry, monitor orthostatic vitals, continue PT/OT, and recommend following up with neurology and PCP outpatient. 2. Fall: Secondary to polypharmacy versus physical deconditioning. The patient sustained a parietal-occipital contusion.  CT head and neck demonstrated no acute findings. X-ray of left knee, pelvis, and hip were all clear. · Percocet was discontinued immediately. Pain control downgraded to tramadol 50 mg every 6 hours as needed for moderate-severe pain. Utilize Tylenol for mild pain. · Echo performed on 8/6/2020 demonstrated normal left ventricular size and systolic function with an EF of 50-55% with grade 2 diastolic dysfunction. Mild pulmonary hypertension and moderate central left mitral regurgitation also noted. · Carotid ultrasound revealed mild mural plaque at the carotid bulbs bilaterally with less than 50% stenosis by SRU consensus criteria. 4. Essential hypertension: History, stable. Currently normotensive. Continue Norvasc, Imdur, Lopressor, hydralazine, Aldactone, valsartan, and Catapres. Strong recommendations for outpatient cardiology follow-up to further manage. 5. Chronic diastolic HFpEF: Echo performed on 8/6/2020 demonstrated normal left ventricular size and systolic function with an EF of 50-55% with grade 2 diastolic dysfunction. Mild pulmonary hypertension and moderate central left mitral regurgitation also noted.     · Maintain strict I&O's, daily weights, telemetry, and daily sodium restricted, cardiac diet. 6. Primary osteoarthritis: Noted to the left hip, stable. 7. GERD: Continue Pepcid. 8. Tobacco abuse: Patient counseled on the importance of smoking cessation, continue nicotine replacement therapy. Initial H and P and Hospital course:-    78 y. o. female who presented to [address] [place] [address] with above sxs for the past 48 hr.  She had complaints of multiple episodes of her legs shaking spontaneously.  She actually had a fall on her left side bumping her head and side.  She has also had some diarrhea.  She denied any fever or anosmia.  She was seen in ER 11.5 and discharged. [facility] she continued to be weak and shaky and was brought to ER. Ladan Yang had a ct of abdomen and the dx of diverticulitis was entertained.  Thus the Hospitalist Service was asked to admit her. Patient was admitted for fall. She was noted to have elevated blood pressure and questionable diverticulitis. She was never started on antibiotics as she never was symptomatic. Patient had imaging that was negative of any fractures. She did have deconditioning and required physical therapy. Therapy recommended SNF. Patient blood pressure was difficult to control, required multiple adjustments. On the day of discharge patient blood pressure was elevated in the morning before she got her medications. After her medications were given, blood pressure improved well. Patient had no other symptoms. She was tolerating diet. She had abdominal pain. No fevers chills. No nausea or vomiting. She does complain of left hip pain which is chronic. Patient will be discharged to skilled nursing facility for further rehab.     Physical Exam:-  Vitals:   Patient Vitals for the past 24 hrs:   BP Temp Temp src Pulse Resp SpO2   11/11/20 1400 (!) 110/52 -- -- -- -- --   11/11/20 1155 124/61 98.6 °F (37 °C) Oral 63 18 93 %   11/11/20 0915 (!) 208/86 99 °F (37.2 °C) Oral 70 20 91 % 11/11/20 0631 -- -- -- -- 18 92 %   11/11/20 0430 (!) 150/68 97.6 °F (36.4 °C) Oral 58 16 94 %   11/10/20 2043 (!) 154/67 98.8 °F (37.1 °C) Oral 63 20 93 %   11/10/20 2006 -- -- -- -- 18 94 %   11/10/20 1449 133/62 97.5 °F (36.4 °C) Oral 66 16 94 %     Weight:   Weight: 155 lb 8 oz (70.5 kg)   24 hour intake/output:     Intake/Output Summary (Last 24 hours) at 11/11/2020 1415  Last data filed at 11/11/2020 0432  Gross per 24 hour   Intake 600 ml   Output 0 ml   Net 600 ml       1. General appearance: No apparent distress, appears stated age and cooperative. 2. HEENT: Normal cephalic, atraumatic without obvious deformity. Pupils equal, round, and reactive to light. Extra ocular muscles intact. Conjunctivae/corneas clear. 3. Neck: Supple, with full range of motion. No jugular venous distention. Trachea midline. 4. Respiratory:  Normal respiratory effort. Clear to auscultation, bilaterally without Rales/Wheezes/Rhonchi. 5. Cardiovascular: Regular rate and rhythm with normal S1/S2 without murmurs, rubs or gallops. 6. Abdomen: Soft, non-tender, non-distended with normal bowel sounds. 7. Musculoskeletal:  No clubbing, cyanosis or edema bilaterally. 8. Skin: Skin color, texture, turgor normal.  No rashes or lesions. 9. Neurologic:  Neurovascularly intact without any focal sensory/motor deficits. Cranial nerves: II-XII intact, grossly non-focal.  10. Psychiatric: Alert and oriented, thought content appropriate, normal insight  11. Capillary Refill: Brisk,< 3 seconds   12.  Peripheral Pulses: +2 palpable, equal bilaterally       Discharge Medications:-      Medication List      CHANGE how you take these medications    hydrALAZINE 100 MG tablet  Commonly known as:  APRESOLINE  Take 1 tablet by mouth 3 times daily  What changed:    · medication strength  · how much to take     isosorbide mononitrate 120 MG extended release tablet  Commonly known as:  IMDUR  What changed:  Another medication with the same name was removed. Continue taking this medication, and follow the directions you see here. metoprolol tartrate 25 MG tablet  Commonly known as:  LOPRESSOR  Take 1 tablet by mouth 2 times daily  What changed:  how much to take        CONTINUE taking these medications    albuterol sulfate  (90 Base) MCG/ACT inhaler     amLODIPine 10 MG tablet  Commonly known as:  NORVASC     aspirin 81 MG EC tablet     atorvastatin 40 MG tablet  Commonly known as:  LIPITOR  Take 2 tablets by mouth daily     azaTHIOprine 50 MG tablet  Commonly known as:  IMURAN     calcium carbonate 600 MG Tabs tablet     cloNIDine 0.1 MG tablet  Commonly known as:  CATAPRES  Take 1 tablet by mouth 2 times daily     Dexilant 60 MG Cpdr delayed release capsule  Generic drug:  dexlansoprazole     docusate 100 MG Caps  Commonly known as:  COLACE, DULCOLAX  Take 100 mg by mouth 2 times daily     DULoxetine 30 MG extended release capsule  Commonly known as:  CYMBALTA     famotidine 40 MG tablet  Commonly known as:  PEPCID     fenofibrate 145 MG tablet  Commonly known as:  TRICOR     gabapentin 100 MG capsule  Commonly known as:  NEURONTIN     hydroxychloroquine 200 MG tablet  Commonly known as:  PLAQUENIL  TAKE 1 TABLET BY MOUTH EVERY DAY     insulin glargine 100 UNIT/ML injection pen  Commonly known as:  LANTUS;BASAGLAR     Magnesium Oxide 250 MG Tabs     montelukast 5 MG chewable tablet  Commonly known as:  SINGULAIR     Mylanta Maximum Strength 400-400-40 MG/5ML Susp  Generic drug:  aluminum & magnesium hydroxide-simethicone     nitroGLYCERIN 0.4 MG SL tablet  Commonly known as:  NITROSTAT     pantoprazole 40 MG tablet  Commonly known as:  PROTONIX     potassium chloride 20 MEQ packet  Commonly known as:  KLOR-CON     predniSONE 5 MG tablet  Commonly known as:  DELTASONE  TAKE 1 TABLET BY MOUTH EVERY DAY     ranolazine 500 MG extended release tablet  Commonly known as:  Ranexa  Take 1 tablet by mouth 2 times daily.      spironolactone 50 MG tablet  Commonly known as:  ALDACTONE     traZODone 50 MG tablet  Commonly known as:  DESYREL     valsartan-hydroCHLOROthiazide 320-25 MG per tablet  Commonly known as:  DIOVAN-HCT        STOP taking these medications    celecoxib 100 MG capsule  Commonly known as:  CELEBREX     diclofenac sodium 1 % Gel  Commonly known as:  VOLTAREN     loratadine 10 MG tablet  Commonly known as:  CLARITIN     ondansetron 4 MG tablet  Commonly known as:  ZOFRAN     oxyCODONE-acetaminophen  MG per tablet  Commonly known as:  PERCOCET     oxyCODONE-acetaminophen 5-325 MG per tablet  Commonly known as:  PERCOCET           Where to Get Your Medications      These medications were sent to [id number] [place] [facility] [address] - F [phone]  [address] [place] [address]    Phone:  [phone]   · predniSONE 5 MG tablet     You can get these medications from any pharmacy    Bring a paper prescription for each of these medications  · cloNIDine 0.1 MG tablet  · hydrALAZINE 100 MG tablet  · metoprolol tartrate 25 MG tablet          Labs :-  Recent Results (from the past 72 hour(s))   POCT glucose    Collection Time: 11/08/20  8:44 PM   Result Value Ref Range    POC Glucose 119 (H) 70 - 108 mg/dl   CBC    Collection Time: 11/09/20  6:48 AM   Result Value Ref Range    WBC 8.6 4.8 - 10.8 thou/mm3    RBC 4.36 4.20 - 5.40 mill/mm3    Hemoglobin 12.3 12.0 - 16.0 gm/dl    Hematocrit 39.3 37.0 - 47.0 %    MCV 90.1 81.0 - 99.0 fL    MCH 28.2 26.0 - 33.0 pg    MCHC 31.3 (L) 32.2 - 35.5 gm/dl    RDW-CV 17.4 (H) 11.5 - 14.5 %    RDW-SD 58.2 (H) 35.0 - 45.0 fL    Platelets 070 594 - 733 thou/mm3    MPV 10.4 9.4 - 12.4 fL   Basic Metabolic Panel    Collection Time: 11/09/20  6:48 AM   Result Value Ref Range    Sodium 141 135 - 145 meq/L    Potassium 4.2 3.5 - 5.2 meq/L    Chloride 105 98 - 111 meq/L    CO2 23 23 - 33 meq/L    Glucose 114 (H) 70 - 108 mg/dL    BUN 44 (H) 7 - 22 mg/dL    CREATININE 1.6 (H) 0.4 - 1.2 mg/dL    Calcium 10.4 8.5 - 10.5 mg/dL   Anion Gap    Collection Time: 11/09/20  6:48 AM   Result Value Ref Range    Anion Gap 13.0 8.0 - 16.0 meq/L   Glomerular Filtration Rate, Estimated    Collection Time: 11/09/20  6:48 AM   Result Value Ref Range    Est, Glom Filt Rate 31 (A) ml/min/1.73m2   POCT Glucose    Collection Time: 11/09/20  8:23 AM   Result Value Ref Range    POC Glucose 104 70 - 108 mg/dl   POCT glucose    Collection Time: 11/09/20 11:43 AM   Result Value Ref Range    POC Glucose 135 (H) 70 - 108 mg/dl   POCT glucose    Collection Time: 11/09/20  5:02 PM   Result Value Ref Range    POC Glucose 127 (H) 70 - 108 mg/dl   POCT Glucose    Collection Time: 11/09/20  8:43 PM   Result Value Ref Range    POC Glucose 154 (H) 70 - 108 mg/dl   POCT glucose    Collection Time: 11/10/20  7:23 AM   Result Value Ref Range    POC Glucose 121 (H) 70 - 108 mg/dl   POCT glucose    Collection Time: 11/10/20 12:33 PM   Result Value Ref Range    POC Glucose 137 (H) 70 - 108 mg/dl   POCT glucose    Collection Time: 11/10/20  4:47 PM   Result Value Ref Range    POC Glucose 140 (H) 70 - 108 mg/dl   POCT glucose    Collection Time: 11/10/20  7:48 PM   Result Value Ref Range    POC Glucose 137 (H) 70 - 108 mg/dl        Microbiology:    Blood culture #1: No results found for: BC    Blood culture #2:No results found for: BLOODCULT2    Organism:  No results found for: LABGRAM    MRSA culture only:No results found for: [address] [place] [address]    Urine culture: No results found for: LABURIN  No results found for: ORG     Respiratory culture: No results found for: CULTRESP    Aerobic and Anaerobic :  No results found for: LABAERO  No results found for: LABANAE    Urinalysis:      Lab Results   Component Value Date    NITRU NEGATIVE 11/08/2020    WBCUA 0-2 11/08/2020    BACTERIA FEW 11/08/2020    RBCUA 0-2 11/08/2020    BLOODU NEGATIVE 11/08/2020    SPECGRAV 1.019 11/08/2020    GLUCOSEU NEGATIVE 09/27/2012       Radiology:-  Ct Pelvis Wo Contrast Additional Contrast? None    Result Date: 11/6/2020  PROCEDURE: CT PELVIS WO CONTRAST CLINICAL INFORMATION: left pelvis pain COMPARISON: 12/2/2013 TECHNIQUE:  Axial CT images were obtained through the pelvis without contrast. Coronal and sagittal reformatted images were rendered. All CT scans at this facility use dose modulation, iterative reconstruction, and/or weight-based dosing when appropriate  to reduce radiation dose to as low as reasonably achievable. FINDINGS: There is partial visualization of posterior lower lumbar fusion hardware. The visualized surgical hardware appears grossly intact. No acute fracture or malalignment is seen. There is joint space during within the hip joints bilaterally. Spurring along the acetabula are noted bilaterally. There is also increased sclerosis noted along the acetabula bilaterally. There is sigmoid diverticulosis. There is also colonic wall thickening involving the sigmoid colon. This could be related to colonic wall hypertrophy from chronic diverticular disease versus sequela from early mild diverticulitis. 1. No acute fracture or malalignment. Degenerative changes as described above. 2. There is sigmoid diverticulosis. There is also colonic wall thickening involving the sigmoid colon. This could be related to colonic wall hypertrophy from chronic diverticular disease versus sequela from early mild diverticulitis. **This report has been created using voice recognition software. It may contain minor errors which are inherent in voice recognition technology. ** Final report electronically signed by Dr. Noe Rosales on 11/6/2020 11:45 AM    Xr Hip 2-3 Vw W Pelvis Left    Result Date: 11/6/2020  PROCEDURE: XR HIP 2-3 VW W PELVIS LEFT CLINICAL INFORMATION: pain after fall COMPARISON: No prior study. TECHNIQUE: A single AP view of the pelvis was obtained in addition to AP and frog-leg views of the left hip.  FINDINGS: The hip joints, sacroiliac joints, and bones of the pelvis are intact. Normal abduction of left hip is demonstrated. Evidence for severe degenerative arthritis left hip. Prior lower lumbar fusion. Findings of osteoporosis. No acute findings. Severe DJD left hip. **This report has been created using voice recognition software. It may contain minor errors which are inherent in voice recognition technology. ** Final report electronically signed by Dr. Bernice Pace on 11/6/2020 11:11 AM       Follow-up scheduled after discharge :-    in the next few days with [address], DO    Consultations during this hospital stay:-  [] NONE [] Cardiology  [] Nephrology  [] Hemo onco   [] GI   [] ID  [] Endocrine  [] Pulm    [] Neuro    [] Psych   [] Urology  [] ENT   [] G SURGERY   []Ortho    []CV surg    [] Palliative  [] Hospice [] Pain management   []    []TCU   [] PT/OT  OTHERS:-    Disposition: SNF  Condition at Discharge: Stable    Time Spent:- 25 minutes    Electronically signed by Walker Story MD on 11/11/2020 at 2:15 PM  Discharging Hospitalist

## 2020-11-11 NOTE — PROGRESS NOTES
Hospitalist Progress Note      Patient:  Sinan Mean    Unit/Bed:5K-17/017-A  YOB: 1940  MRN: [id number]   Acct: [de-identified]   PCP: Teresa Yeboah DO  Date of Admission: 11/6/2020    Assessment/Plan:    1. Geriatric polypharmacy: This patient is on a tremendous amount of medications and needs to be carefully analyzed as this should be high on the differential for contributions of physical deconditioning ultimately leading to her fall. Close PCP follow-up highly recommended to optimize management of care. 2. Physical deconditioning: Geriatric polypharmacy high on the differential list.  Increased falls and weakness with fatigue/malaise. Workup relatively unremarkable. Continue to maintain telemetry, monitor orthostatic vitals, continue PT/OT, and recommend following up with neurology and PCP outpatient. 3. Fall: Secondary to geriatric polypharmacy versus physical deconditioning. The patient sustained a parietal-occipital contusion. CT head and neck demonstrated no acute findings. X-ray of left knee, pelvis, and hip were all clear. · Percocet was discontinued immediately. Pain control downgraded to tramadol 50 mg every 6 hours as needed for moderate-severe pain. Utilize Tylenol for mild pain. · Echo performed on 8/6/2020 demonstrated normal left ventricular size and systolic function with an EF of 50-55% with grade 2 diastolic dysfunction. Mild pulmonary hypertension and moderate central left mitral regurgitation also noted. · Carotid ultrasound revealed mild mural plaque at the carotid bulbs bilaterally with less than 50% stenosis by SRU consensus criteria. 4. Essential hypertension: History, stable. Currently normotensive. Continue Norvasc, Imdur, Lopressor, hydralazine, Aldactone, valsartan, and Catapres. Strong recommendations for outpatient cardiology follow-up to further manage.   5. Chronic diastolic HFpEF: Echo performed on 8/6/2020 demonstrated normal left ventricular size and systolic function with an EF of 50-55% with grade 2 diastolic dysfunction. Mild pulmonary hypertension and moderate central left mitral regurgitation also noted. · Maintain strict I&O's, daily weights, telemetry, and daily sodium restricted, cardiac diet. 6. Primary osteoarthritis: Noted to the left hip, stable. 7. GERD: Continue Pepcid. 8. Tobacco abuse: Patient counseled on the importance of smoking cessation, continue nicotine replacement therapy. Disposition: ECF placement recommended, social work assisting with communications with the [facility] home. Chief Complaint: Multiple falls, weakness, diarrhea, abdominal cramps    Initial H and P:-    **From Chart Review:  \"78 y.o. female who presented to [facility] with above sxs for the past 48 hr. She had complaints of multiple episodes of her legs shaking spontaneously. She actually had a fall on her left side bumping her head and side. She has also had some diarrhea. She denied any fever or anosmia. She was seen in ER 11.5 and discharged. However she continued to be weak and shaky and was brought to ER. She had a ct of abdomen and the dx of diverticulitis was entertained. Thus the Hospitalist Service was asked to admit her although I doubt the dx. \"    Subjective (past 24 hours):   Patient resting in bed at the time of the interview. Currently denies any physical complaints and had no changes to report overnight and into the morning. She was updated on plan of care and had no other needs or questions at this time. Past medical history, family history, social history and allergies reviewed again and is unchanged since admission. ROS (14 point review of systems completed. Pertinent positives noted. Otherwise ROS is negative) :  GENERAL: No fever,chills, or night sweats. SKIN: No lesions or rashes. HEAD: No headaches or recent injury.   EYES: No acute changes in vision, no diplopia or blurred vision. EARS: No hearing loss, no tinnitus. NOSE/THROAT: No rhinorrhea or pharyngitis, no nasal drainage. NECK: No lumps or unusual neck stiffness. PULMONARY: Respirations easy and non-labored, no acute distress. CARDIAC: No chest pain, pressure, Negative for lower leg edema. GI: Abdomen is soft and non-tender, non-distended. PERIPHERAL VASCULAR: No intermittent claudication or unusual leg cramps. MUSCULOSKELETAL: Occasional arthralgias, myalgias. NEUROLOGICAL: Denies any headache, near syncope, seizures or syncope. HEMATOLOGIC:  No unusual bruising or bleeding. PSYCH: Denies any homicidal or suicidial ideations.     Medications:  Reviewed    Infusion Medications    dextrose       Scheduled Medications    enoxaparin  30 mg Subcutaneous Nightly    [START ON 11/11/2020] famotidine  20 mg Oral Daily    hydrALAZINE  100 mg Oral TID    aluminum & magnesium hydroxide-simethicone  30 mL Oral 4x Daily AC & HS    amLODIPine  10 mg Oral Daily    azaTHIOprine  50 mg Oral Daily    calcium elemental  500 mg Oral Daily    DULoxetine  30 mg Oral BID    gabapentin  100 mg Oral TID    pantoprazole  40 mg Oral Daily    spironolactone  50 mg Oral Daily    valsartan  320 mg Oral Daily    And    hydroCHLOROthiazide  25 mg Oral Daily    aspirin  162 mg Oral Daily    atorvastatin  80 mg Oral Daily    cloNIDine  0.1 mg Oral BID    diclofenac sodium  2 g Topical 4x Daily    docusate sodium  100 mg Oral BID    hydroxychloroquine  200 mg Oral Daily    insulin glargine  8 Units Subcutaneous Nightly    isosorbide mononitrate  120 mg Oral Daily    cetirizine  5 mg Oral Daily    magnesium oxide  400 mg Oral BID    metoprolol tartrate  25 mg Oral BID    predniSONE  5 mg Oral Daily    ranolazine  500 mg Oral BID    [Held by provider] traZODone  100 mg Oral Nightly    sodium chloride flush  10 mL Intravenous 2 times per day    montelukast  10 mg Oral Nightly     PRN Meds: traMADol, the last 72 hours. Recent Labs     11/08/20  0734   INR 1.12     No results for input(s): Preet Chang in the last 72 hours. Microbiology:    Blood culture #1: No results found for: BC    Blood culture #2:No results found for: Shannon Holes    Organism:No results found for: ORG    No results found for: LABGRAM    MRSA culture only:No results found for: [facility]    Urine culture: No results found for: LABURIN    Respiratory culture: No results found for: CULTRESP    Aerobic and Anaerobic :  No results found for: LABAERO  No results found for: LABANAE    Urinalysis:      Lab Results   Component Value Date    NITRU NEGATIVE 11/08/2020    WBCUA 0-2 11/08/2020    BACTERIA FEW 11/08/2020    RBCUA 0-2 11/08/2020    BLOODU NEGATIVE 11/08/2020    SPECGRAV 1.019 11/08/2020    GLUCOSEU NEGATIVE 09/27/2012       Radiology:  VL DUP CAROTID BILATERAL   Final Result    Mild mural plaque at the carotid bulbs bilaterally with less than 50% stenosis by SRU consensus criteria. **This report has been created using voice recognition software. It may contain minor errors which are inherent in voice recognition technology. **      Final report electronically signed by Dr. Osmar Harden MD on 11/9/2020 3:47 PM      CT PELVIS WO CONTRAST Additional Contrast? None   Final Result   1. No acute fracture or malalignment. Degenerative changes as described above. 2. There is sigmoid diverticulosis. There is also colonic wall thickening involving the sigmoid colon. This could be related to colonic wall hypertrophy from chronic diverticular disease versus sequela from early mild diverticulitis. **This report has been created using voice recognition software. It may contain minor errors which are inherent in voice recognition technology. **      Final report electronically signed by Dr. Ghada Abebe on 11/6/2020 11:45 AM      XR HIP 2-3 VW W PELVIS LEFT   Final Result   No acute findings. Severe DJD left hip. **This report has been created using voice recognition software. It may contain minor errors which are inherent in voice recognition technology. **      Final report electronically signed by Dr. Andrade Ro on 11/6/2020 11:11 AM        Ct Pelvis Wo Contrast Additional Contrast? None    Result Date: 11/6/2020  PROCEDURE: CT PELVIS WO CONTRAST CLINICAL INFORMATION: left pelvis pain COMPARISON: 12/2/2013 TECHNIQUE:  Axial CT images were obtained through the pelvis without contrast. Coronal and sagittal reformatted images were rendered. All CT scans at this facility use dose modulation, iterative reconstruction, and/or weight-based dosing when appropriate  to reduce radiation dose to as low as reasonably achievable. FINDINGS: There is partial visualization of posterior lower lumbar fusion hardware. The visualized surgical hardware appears grossly intact. No acute fracture or malalignment is seen. There is joint space during within the hip joints bilaterally. Spurring along the acetabula are noted bilaterally. There is also increased sclerosis noted along the acetabula bilaterally. There is sigmoid diverticulosis. There is also colonic wall thickening involving the sigmoid colon. This could be related to colonic wall hypertrophy from chronic diverticular disease versus sequela from early mild diverticulitis. 1. No acute fracture or malalignment. Degenerative changes as described above. 2. There is sigmoid diverticulosis. There is also colonic wall thickening involving the sigmoid colon. This could be related to colonic wall hypertrophy from chronic diverticular disease versus sequela from early mild diverticulitis. **This report has been created using voice recognition software. It may contain minor errors which are inherent in voice recognition technology. ** Final report electronically signed by Dr. Zac Yee on 11/6/2020 11:45 AM    Xr Hip 2-3 Vw W Pelvis Left    Result Date: 11/6/2020  PROCEDURE: XR HIP 2-3 VW W PELVIS LEFT CLINICAL INFORMATION: pain after fall COMPARISON: No prior study. TECHNIQUE: A single AP view of the pelvis was obtained in addition to AP and frog-leg views of the left hip. FINDINGS: The hip joints, sacroiliac joints, and bones of the pelvis are intact. Normal abduction of left hip is demonstrated. Evidence for severe degenerative arthritis left hip. Prior lower lumbar fusion. Findings of osteoporosis. No acute findings. Severe DJD left hip. **This report has been created using voice recognition software. It may contain minor errors which are inherent in voice recognition technology. ** Final report electronically signed by Dr. Neeta Irby on 11/6/2020 11:11 AM      **This report has been created using voice recognition software. It may contain minor errors which are inherent in voice recognition technology. **  Electronically signed by LONDON Bashir CNP on 11/10/2020 at 11:40 PM

## 2020-11-11 NOTE — CARE COORDINATION
Dr. Felipe Emerson [facility]. He plans to discharge patient today, pending her blood pressure. Electronically signed by Jack Hi RN on 11/11/20 at 9:59 AM EST

## 2020-11-12 VITALS
OXYGEN SATURATION: 93 % | BODY MASS INDEX: 25.81 KG/M2 | SYSTOLIC BLOOD PRESSURE: 105 MMHG | HEIGHT: 66 IN | WEIGHT: 160.6 LBS | TEMPERATURE: 97.7 F | DIASTOLIC BLOOD PRESSURE: 51 MMHG | HEART RATE: 66 BPM | RESPIRATION RATE: 20 BRPM

## 2020-11-12 LAB
GLUCOSE BLD-MCNC: 103 MG/DL (ref 70–108)
GLUCOSE BLD-MCNC: 97 MG/DL (ref 70–108)
SARS-COV-2, PCR: NOT DETECTED

## 2020-11-12 PROCEDURE — 6370000000 HC RX 637 (ALT 250 FOR IP)

## 2020-11-12 PROCEDURE — U0002 COVID-19 LAB TEST NON-CDC: HCPCS

## 2020-11-12 PROCEDURE — 6370000000 HC RX 637 (ALT 250 FOR IP): Performed by: STUDENT IN AN ORGANIZED HEALTH CARE EDUCATION/TRAINING PROGRAM

## 2020-11-12 PROCEDURE — 82948 REAGENT STRIP/BLOOD GLUCOSE: CPT

## 2020-11-12 PROCEDURE — 6370000000 HC RX 637 (ALT 250 FOR IP): Performed by: NURSE PRACTITIONER

## 2020-11-12 PROCEDURE — 6360000002 HC RX W HCPCS: Performed by: STUDENT IN AN ORGANIZED HEALTH CARE EDUCATION/TRAINING PROGRAM

## 2020-11-12 RX ORDER — VALSARTAN 320 MG/1
320 TABLET ORAL NIGHTLY
Status: DISCONTINUED | OUTPATIENT
Start: 2020-11-12 | End: 2020-11-12 | Stop reason: HOSPADM

## 2020-11-12 RX ORDER — HYDROCHLOROTHIAZIDE 25 MG/1
25 TABLET ORAL NIGHTLY
Status: DISCONTINUED | OUTPATIENT
Start: 2020-11-12 | End: 2020-11-12 | Stop reason: HOSPADM

## 2020-11-12 RX ADMIN — HYDROXYCHLOROQUINE SULFATE 200 MG: 200 TABLET ORAL at 08:17

## 2020-11-12 RX ADMIN — CALCIUM 500 MG: 500 TABLET ORAL at 11:38

## 2020-11-12 RX ADMIN — FAMOTIDINE 20 MG: 20 TABLET, FILM COATED ORAL at 09:00

## 2020-11-12 RX ADMIN — DULOXETINE HYDROCHLORIDE 30 MG: 30 CAPSULE, DELAYED RELEASE ORAL at 08:16

## 2020-11-12 RX ADMIN — ISOSORBIDE MONONITRATE 120 MG: 60 TABLET ORAL at 08:18

## 2020-11-12 RX ADMIN — RANOLAZINE 500 MG: 500 TABLET, FILM COATED, EXTENDED RELEASE ORAL at 08:17

## 2020-11-12 RX ADMIN — TRAMADOL HYDROCHLORIDE 50 MG: 50 TABLET ORAL at 04:23

## 2020-11-12 RX ADMIN — PANTOPRAZOLE SODIUM 40 MG: 40 TABLET, DELAYED RELEASE ORAL at 06:30

## 2020-11-12 RX ADMIN — PREDNISONE 5 MG: 5 TABLET ORAL at 08:16

## 2020-11-12 RX ADMIN — METOPROLOL TARTRATE 25 MG: 25 TABLET, FILM COATED ORAL at 11:39

## 2020-11-12 RX ADMIN — SPIRONOLACTONE 50 MG: 25 TABLET ORAL at 11:38

## 2020-11-12 RX ADMIN — AZATHIOPRINE 50 MG: 50 TABLET ORAL at 11:39

## 2020-11-12 RX ADMIN — CLONIDINE HYDROCHLORIDE 0.1 MG: 0.1 TABLET ORAL at 08:21

## 2020-11-12 RX ADMIN — ATORVASTATIN CALCIUM 80 MG: 80 TABLET, FILM COATED ORAL at 08:21

## 2020-11-12 RX ADMIN — AMLODIPINE BESYLATE 10 MG: 10 TABLET ORAL at 08:17

## 2020-11-12 RX ADMIN — MAGNESIUM GLUCONATE 500 MG ORAL TABLET 400 MG: 500 TABLET ORAL at 11:38

## 2020-11-12 RX ADMIN — GABAPENTIN 100 MG: 100 CAPSULE ORAL at 08:16

## 2020-11-12 RX ADMIN — DOCUSATE SODIUM 100 MG: 100 CAPSULE, LIQUID FILLED ORAL at 11:39

## 2020-11-12 RX ADMIN — ACETAMINOPHEN 650 MG: 325 TABLET ORAL at 06:48

## 2020-11-12 RX ADMIN — ASPIRIN 162 MG: 81 TABLET, COATED ORAL at 09:00

## 2020-11-12 RX ADMIN — HYDRALAZINE HYDROCHLORIDE 100 MG: 50 TABLET, FILM COATED ORAL at 08:17

## 2020-11-12 RX ADMIN — CETIRIZINE HYDROCHLORIDE 5 MG: 5 TABLET ORAL at 08:17

## 2020-11-12 ASSESSMENT — PAIN DESCRIPTION - PROGRESSION
CLINICAL_PROGRESSION: GRADUALLY IMPROVING
CLINICAL_PROGRESSION: GRADUALLY IMPROVING
CLINICAL_PROGRESSION: GRADUALLY WORSENING
CLINICAL_PROGRESSION: GRADUALLY IMPROVING
CLINICAL_PROGRESSION: GRADUALLY WORSENING
CLINICAL_PROGRESSION: GRADUALLY IMPROVING

## 2020-11-12 ASSESSMENT — PAIN DESCRIPTION - ORIENTATION
ORIENTATION: LOWER
ORIENTATION: LOWER

## 2020-11-12 ASSESSMENT — PAIN SCALES - GENERAL
PAINLEVEL_OUTOF10: 8
PAINLEVEL_OUTOF10: 5
PAINLEVEL_OUTOF10: 4
PAINLEVEL_OUTOF10: 8
PAINLEVEL_OUTOF10: 4

## 2020-11-12 ASSESSMENT — PAIN DESCRIPTION - PAIN TYPE
TYPE: CHRONIC PAIN
TYPE: CHRONIC PAIN

## 2020-11-12 ASSESSMENT — PAIN DESCRIPTION - LOCATION
LOCATION: BACK
LOCATION: BACK

## 2020-11-12 ASSESSMENT — PAIN DESCRIPTION - DESCRIPTORS
DESCRIPTORS: ACHING
DESCRIPTORS: ACHING

## 2020-11-12 ASSESSMENT — PAIN DESCRIPTION - ONSET
ONSET: ON-GOING
ONSET: ON-GOING

## 2020-11-12 ASSESSMENT — PAIN DESCRIPTION - FREQUENCY
FREQUENCY: CONTINUOUS
FREQUENCY: CONTINUOUS

## 2020-11-12 NOTE — FLOWSHEET NOTE
Prayer and encouragement      11/12/20 1556   Encounter Summary   Services provided to: Patient   Referral/Consult From: Rounding   Continue Visiting No  (11/12 )   Complexity of Encounter Low   Length of Encounter 15 minutes   Routine   Type Initial   Assessment Hopeful;Calm   Intervention [place];Prayer;Nurtured hope   Outcome Acceptance;Expressed gratitude;Encouraged; Hopeful

## 2020-11-12 NOTE — CARE COORDINATION
Discharge orders on chart. Met with Andrew Chaevs. She is in agreement for discharge to [facility] today, copy of Medicare Rights delivered. Andrew Chaves will leave when her transportation to there arrives.  Electronically signed by Lis Gannon RN on 11/12/20 at 9:12 AM EST

## 2020-11-12 NOTE — FLOWSHEET NOTE
11/12/20 1145   Emergency Contacts   Contact 1: Name Roland Aaron 1: Relationship daughter   Notified that patient would be discharged at 1 to [facility]. States that she has patient's glasses and bracelet.

## 2020-11-12 NOTE — CARE COORDINATION
11/12/20, 9:09 AM EST    DISCHARGE PLANNING EVALUATION    Patient is to be discharged today. Spoke with Mansi Nolan at [facility] will be ready this afternoon. Plan to discharge after 1 pm. Numbers obtained for report and to fax [facility]. Spoke with patient and advised her of plan to discharge this afternoon. Transportation discussed-family unable to assist due to work-plan to transport by ambulette. Patient placed call to daughter-SW spoke with her and advised her of discharge and transportation plans. SW to call her with transport time. 9:23 AM Spoke with Julissa Smith at [facility] arranged for 1:30 pm. Paperwork faxed. 9:59 AM Call to VARGAS Vazquez-advised her of plan to discharge patient to [facility] today. 10:01 AM Call to daughter Kamryn Gutiérrez and advised her of transport time. She will follow up with [facility]. 10:27 AM AVS faxed to facility. Number given to RN for report. Call to Mansi Nolan at [facility] with update on discharge arrangements. Patient will be skilled under her medicare benefit. No other needs at this time. 11/12/20, 10:30 AM EST    Patient goals/plan/ treatment preferences discussed by  and . Patient goals/plan/ treatment preferences reviewed with patient/ family. Patient/ family verbalize understanding of discharge plan and are in agreement with goal/plan/treatment preferences. Understanding was demonstrated using the teach back method. AVS provided by RN at time of discharge, which includes all necessary medical information pertaining to the patients current course of illness, treatment, post-discharge goals of care, and treatment preferences.     Services After Discharge  Services At/After Discharge: Skilled Therapy, In Cl, Transport, [facility], Nursing Services([facility])   IMM Letter  IMM Letter given to Patient/Family/Significant other/Guardian/POA/by[de-identified] copy delivered to patient by case manager, Mary Lou  IMM Letter date given[de-identified] 11/12/20  IMM Letter time given[de-identified] [phone]

## 2020-11-30 ENCOUNTER — HOSPITAL ENCOUNTER (OUTPATIENT)
Dept: NURSING | Age: 80
Discharge: HOME | End: 2020-11-30
Payer: MEDICARE

## 2021-01-07 ENCOUNTER — TELEPHONE (OUTPATIENT)
Dept: RHEUMATOLOGY | Age: 81
End: 2021-01-07

## 2021-01-07 DIAGNOSIS — Z51.81 MEDICATION MONITORING ENCOUNTER: Primary | ICD-10-CM

## 2021-01-07 NOTE — TELEPHONE ENCOUNTER
Received call from [facility] regarding patient needing labs drawn before her appt. No active standing orders in epic. Per last office note labwork ordered Q 8 WEEKS.  Labs faxed to [phone]

## 2021-01-08 LAB
ALBUMIN SERPL-MCNC: 3.3 G/DL
ALP BLD-CCNC: 48 U/L
ALT SERPL-CCNC: 9 U/L
ANION GAP SERPL CALCULATED.3IONS-SCNC: 9 MMOL/L
AST SERPL-CCNC: 14 U/L
BASOPHILS ABSOLUTE: 0 /ΜL
BASOPHILS RELATIVE PERCENT: 0.3 %
BILIRUB SERPL-MCNC: 0.4 MG/DL (ref 0.1–1.4)
BUN BLDV-MCNC: 27 MG/DL
C-REACTIVE PROTEIN: 0.7
CALCIUM SERPL-MCNC: 10.2 MG/DL
CHLORIDE BLD-SCNC: 106 MMOL/L
CO2: 29 MMOL/L
CREAT SERPL-MCNC: 1.3 MG/DL
EOSINOPHILS ABSOLUTE: 0.3 /ΜL
EOSINOPHILS RELATIVE PERCENT: 2.9 %
GFR CALCULATED: 38
GLUCOSE BLD-MCNC: 73 MG/DL
HCT VFR BLD CALC: 29.6 % (ref 36–46)
HEMOGLOBIN: 9.9 G/DL (ref 12–16)
LYMPHOCYTES ABSOLUTE: 2.4 /ΜL
LYMPHOCYTES RELATIVE PERCENT: 26.1 %
MCH RBC QN AUTO: 29.8 PG
MCHC RBC AUTO-ENTMCNC: 33.3 G/DL
MCV RBC AUTO: 89.5 FL
MONOCYTES ABSOLUTE: 0.7 /ΜL
MONOCYTES RELATIVE PERCENT: 8.1 %
NEUTROPHILS ABSOLUTE: 5.7 /ΜL
NEUTROPHILS RELATIVE PERCENT: 62.6 %
PDW BLD-RTO: 19 %
PLATELET # BLD: 398 K/ΜL
PMV BLD AUTO: 8.2 FL
POTASSIUM SERPL-SCNC: 3.9 MMOL/L
RBC # BLD: 3.31 10^6/ΜL
SEDIMENTATION RATE, ERYTHROCYTE: 15
SODIUM BLD-SCNC: 140 MMOL/L
TOTAL PROTEIN: 6.1
WBC # BLD: 9.1 10^3/ML

## 2021-01-12 DIAGNOSIS — D64.9 NORMOCYTIC ANEMIA: Primary | ICD-10-CM

## 2021-01-12 NOTE — PROGRESS NOTES
Diagnosis Orders   1. Normocytic anemia  Iron Binding Capacity    Iron    Vitamin B12 & Folate     - ? Iron def vs anemia of chronic disease or overlap. - prior low iron and iron sat , normal TIBC. - repeat evaluation as outlined above.  Given the worsening H/H

## 2021-01-13 ENCOUNTER — TELEPHONE (OUTPATIENT)
Dept: RHEUMATOLOGY | Age: 81
End: 2021-01-13

## 2021-01-13 NOTE — TELEPHONE ENCOUNTER
----- Message from Jordon Osborn DO sent at 1/12/2021  5:04 PM EST -----  Labs testing with worsening anemia. Addition labs needed to further evaluate.

## 2021-01-28 ENCOUNTER — OFFICE VISIT (OUTPATIENT)
Dept: RHEUMATOLOGY | Age: 81
End: 2021-01-28
Payer: MEDICARE

## 2021-01-28 VITALS
OXYGEN SATURATION: 95 % | HEIGHT: 66 IN | WEIGHT: 163 LBS | SYSTOLIC BLOOD PRESSURE: 110 MMHG | TEMPERATURE: 97.3 F | HEART RATE: 61 BPM | BODY MASS INDEX: 26.2 KG/M2 | DIASTOLIC BLOOD PRESSURE: 68 MMHG

## 2021-01-28 DIAGNOSIS — R06.09 DOE (DYSPNEA ON EXERTION): ICD-10-CM

## 2021-01-28 DIAGNOSIS — M06.00 SERONEGATIVE RHEUMATOID ARTHRITIS (HCC): ICD-10-CM

## 2021-01-28 DIAGNOSIS — M19.90 INFLAMMATORY ARTHRITIS: Primary | ICD-10-CM

## 2021-01-28 DIAGNOSIS — M54.42 CHRONIC MIDLINE LOW BACK PAIN WITH LEFT-SIDED SCIATICA: ICD-10-CM

## 2021-01-28 DIAGNOSIS — M15.9 OSTEOARTHRITIS OF MULTIPLE JOINTS, UNSPECIFIED OSTEOARTHRITIS TYPE: ICD-10-CM

## 2021-01-28 DIAGNOSIS — Z51.81 MEDICATION MONITORING ENCOUNTER: ICD-10-CM

## 2021-01-28 DIAGNOSIS — M85.89 OSTEOPENIA OF MULTIPLE SITES: ICD-10-CM

## 2021-01-28 DIAGNOSIS — G89.29 CHRONIC MIDLINE LOW BACK PAIN WITH LEFT-SIDED SCIATICA: ICD-10-CM

## 2021-01-28 PROCEDURE — G8417 CALC BMI ABV UP PARAM F/U: HCPCS | Performed by: INTERNAL MEDICINE

## 2021-01-28 PROCEDURE — 1123F ACP DISCUSS/DSCN MKR DOCD: CPT | Performed by: INTERNAL MEDICINE

## 2021-01-28 PROCEDURE — 4004F PT TOBACCO SCREEN RCVD TLK: CPT | Performed by: INTERNAL MEDICINE

## 2021-01-28 PROCEDURE — 1090F PRES/ABSN URINE INCON ASSESS: CPT | Performed by: INTERNAL MEDICINE

## 2021-01-28 PROCEDURE — G8399 PT W/DXA RESULTS DOCUMENT: HCPCS | Performed by: INTERNAL MEDICINE

## 2021-01-28 PROCEDURE — 4040F PNEUMOC VAC/ADMIN/RCVD: CPT | Performed by: INTERNAL MEDICINE

## 2021-01-28 PROCEDURE — G8427 DOCREV CUR MEDS BY ELIG CLIN: HCPCS | Performed by: INTERNAL MEDICINE

## 2021-01-28 PROCEDURE — G8484 FLU IMMUNIZE NO ADMIN: HCPCS | Performed by: INTERNAL MEDICINE

## 2021-01-28 PROCEDURE — 99215 OFFICE O/P EST HI 40 MIN: CPT | Performed by: INTERNAL MEDICINE

## 2021-01-28 RX ORDER — AZATHIOPRINE 50 MG/1
100 TABLET ORAL DAILY
Qty: 60 TABLET | Refills: 0
Start: 2021-01-28 | End: 2021-03-30 | Stop reason: SDUPTHER

## 2021-01-28 ASSESSMENT — ENCOUNTER SYMPTOMS
WHEEZING: 1
DIARRHEA: 1
SHORTNESS OF BREATH: 1
NAUSEA: 0
CONSTIPATION: 1
EYE PAIN: 0
ABDOMINAL PAIN: 0
EYE ITCHING: 0
BACK PAIN: 1
TROUBLE SWALLOWING: 0
COUGH: 1

## 2021-01-28 NOTE — PROGRESS NOTES
[facility] RHEUMATOLOGY FOLLOW UP NOTE       Date Of Service: 1/28/2021  Provider: Bonnie Melgar DO    Name: Hemanth Jones   MRN: [id number]    Chief Complaint(s)     Chief Complaint   Patient presents with    Follow-up     4 months Rheumatoid Arthritis        History of Present Illness (HPI)     Hemanth Jones  is a(n)81 y.o. female with a hx of obesity, angina, CAD, T2DM, non-rheumatic mitral regurg, HTN, GERD, CKD stage III, COPD, depression here for the f/u evaluation of osteoarthritis multiple joints, inflammatory arthritis, chondrocalcinosis bilateral wrists (? CPPD arthropathy), hypercalcemia. Interval hx:     Left sided weakness since oct 2020 - negative evaluation for CVA per patient. Pain affecting the bilateral hands, wrists, elbows, shoulders, hips, knees, toes, neck, back. Pain up to 9/10. constant Timing: n/a Aggravating factors: weather changes, hands: use, knees: standing, back: walking, standing, bending, prolonged sitting. Neck: turning head to left Alleviating factors: percocet . Associated symptoms:+ swelling/  Redness/ warmth (bilateral knees), + AM stiffness lasting ~ all day. Continued swelling of hands reported. Osteoporosis - 3 falls since the last evaluation. Denies injury/fractures. Last prolia may 2020. Ongoing SOB/CHOWDARY - + cough w/ white sputum, + wheezing - unchanged since the evaluation     tobacc dependence - stopped since oct 2020. REVIEW OF SYSTEMS: (ROS)    Review of Systems   Constitutional: Negative for fatigue, fever and unexpected weight change. HENT: Negative for congestion and trouble swallowing. Dry mouth   Eyes: Negative for pain and itching. Dry eyes   Respiratory: Positive for cough, shortness of breath and wheezing. Cardiovascular: Positive for chest pain. Negative for leg swelling. Gastrointestinal: Positive for constipation and diarrhea. Negative for abdominal pain and nausea. Endocrine: Negative for cold intolerance and heat intolerance. Genitourinary: Negative for difficulty urinating, frequency and urgency. Musculoskeletal: Positive for arthralgias, back pain, joint swelling, myalgias and neck pain. Skin: Negative for rash. Neurological: Negative for dizziness, weakness, numbness and headaches. Hematological: Bruises/bleeds easily. Psychiatric/Behavioral: The patient is not nervous/anxious.         PAST MEDICAL HISTORY      Past Medical History:   Diagnosis Date    Arthritis     Asthma 1978    Atrial fibrillation [facility]     Blood circulation, collateral     CAD (coronary artery disease)     CHF (congestive heart failure) ([facility]) 1999    Chronic kidney disease, stage III (moderate)     COPD [facility]chronic obstructive pulmonary disease) ([facility]     Depression     Diabetes mellitus ([facility]     Diabetic hypertension-nephrosis syndrome ([facility])     Dysuria     GERD (gastroesophageal reflux disease)     Hyperlipidemia     Hypertension     Kidney disease     stage 3 dr Ko Tran    Unspecified cerebral artery occlusion with cerebral infarction 1999    Dr Candido Ann informed patient after Heart Attack       PAST SURGICAL HISTORY      Past Surgical History:   Procedure Laterality Date    APPENDECTOMY      BACK SURGERY      lumbar spine    BLADDER SUSPENSION      CARDIAC CATHETERIZATION  2009    with stent  [facility]    CARPAL TUNNEL RELEASE      COLONOSCOPY  2013    ENDOSCOPY, COLON, DIAGNOSTIC      ESOPHAGEAL DILATATION      HYSTERECTOMY  1974    JOINT REPLACEMENT Left 1992    Left Knee       FAMILY HISTORY      Family History   Problem Relation Age of Onset    Heart Disease Mother     Diabetes Mother     Kidney Disease Mother     Heart Disease Father     Diabetes Father     Cancer Sister     Stroke Brother     Cancer Sister        SOCIAL HISTORY      Social History     Tobacco History     Smoking Status  Light Tobacco Smoker Smoking Start Date  10/21/2013 Smoking Frequency 0.15 packs/day for 10 years (1.5 pk yrs) Smoking Tobacco Type  Cigarettes    Smokeless Tobacco Use  Never Used          Alcohol History     Alcohol Use Status  No          Drug Use     Drug Use Status  No          Sexual Activity     Sexually Active  Never                ALLERGIES     Allergies   Allergen Reactions    Renflexis [Infliximab] Shortness Of Breath and Palpitations    Nuts [Peanut-Containing Drug Products]      Pt suffers from diverticulitis and nut products irritate her stomach.  Strawberry (Diagnostic)      Diverticulitis    Sulfa Antibiotics     Tape Worthy Espinal Tape] Other (See Comments)     tears skin off    Bactrim [Sulfamethoxazole-Trimethoprim] Rash     Little red rash    Chocolate Rash       CURRENT MEDICATIONS      Current Outpatient Medications   Medication Sig Dispense Refill    cloNIDine (CATAPRES) 0.1 MG tablet Take 1 tablet by mouth 2 times daily 60 tablet 3    hydrALAZINE (APRESOLINE) 100 MG tablet Take 1 tablet by mouth 3 times daily 90 tablet 3    metoprolol tartrate (LOPRESSOR) 25 MG tablet Take 1 tablet by mouth 2 times daily 60 tablet 3    predniSONE (DELTASONE) 5 MG tablet TAKE 1 TABLET BY MOUTH EVERY DAY 30 tablet 3    aluminum & magnesium hydroxide-simethicone (MYLANTA MAXIMUM STRENGTH) 400-400-40 MG/5ML SUSP Take 30 mLs by mouth 4 times daily (before meals and nightly)      amLODIPine (NORVASC) 10 MG tablet Take 10 mg by mouth daily      azaTHIOprine (IMURAN) 50 MG tablet Take 50 mg by mouth daily      calcium carbonate 600 MG TABS tablet Take 1 tablet by mouth daily      DULoxetine (CYMBALTA) 30 MG extended release capsule Take 30 mg by mouth 2 times daily      famotidine (PEPCID) 40 MG tablet Take 40 mg by mouth daily      gabapentin (NEURONTIN) 100 MG capsule Take 100 mg by mouth 3 times daily.       isosorbide mononitrate (IMDUR) 120 MG extended release tablet Take 120 mg by mouth daily      pantoprazole (PROTONIX) 40 MG tablet Take 40 mg by mouth daily  spironolactone (ALDACTONE) 50 MG tablet Take 50 mg by mouth daily      valsartan-hydroCHLOROthiazide (DIOVAN-HCT) 320-25 MG per tablet Take 1 tablet by mouth daily      hydroxychloroquine (PLAQUENIL) 200 MG tablet TAKE 1 TABLET BY MOUTH EVERY DAY 90 tablet 1    atorvastatin (LIPITOR) 40 MG tablet Take 2 tablets by mouth daily 60 tablet 2    docusate sodium (COLACE, DULCOLAX) 100 MG CAPS Take 100 mg by mouth 2 times daily 60 capsule 1    insulin glargine (LANTUS) 100 UNIT/ML injection pen Inject 8 Units into the skin nightly       dexlansoprazole (DEXILANT) 60 MG CPDR delayed release capsule Take 60 mg by mouth daily      potassium chloride (KLOR-CON) 20 MEQ packet Take 10 mEq by mouth daily       fenofibrate (TRICOR) 145 MG tablet Take 145 mg by mouth daily      aspirin 81 MG EC tablet Take 162 mg by mouth daily      ranolazine (RANEXA) 500 MG SR tablet Take 1 tablet by mouth 2 times daily. 60 tablet 3    Magnesium Oxide 250 MG TABS Take 400 mg by mouth 2 times daily       albuterol (PROVENTIL HFA;VENTOLIN HFA) 108 (90 BASE) MCG/ACT inhaler Inhale 2 puffs into the lungs every 6 hours as needed.  montelukast (SINGULAIR) 5 MG chewable tablet Take 5 mg by mouth nightly.  traZODone (DESYREL) 50 MG tablet Take 100 mg by mouth nightly       nitroGLYCERIN (NITROSTAT) 0.4 MG SL tablet Place 0.4 mg under the tongue every 5 minutes as needed. No current facility-administered medications for this visit. PHYSICAL EXAMINATION / OBJECTIVE   Objective:  /68 (Site: Left Upper Arm, Position: Sitting, Cuff Size: Medium Adult)   Pulse 61   Temp 97.3 °F (36.3 °C)   Ht 5' 5.98\" (1.676 m)   Wt 163 lb (73.9 kg)   SpO2 95%   BMI 26.32 kg/m²     Physical Exam  Vitals signs reviewed. Constitutional:       Appearance: She is well-developed. Neck:      Musculoskeletal: Normal range of motion and neck supple. Cardiovascular:      Rate and Rhythm: Normal rate and regular rhythm. Pulmonary:      Effort: Pulmonary effort is normal.      Breath sounds: Rales (bilateral posterior chest wall. ) present. Abdominal:      Palpations: Abdomen is soft. Tenderness: There is no abdominal tenderness. Skin:     General: Skin is warm and dry. Findings: No rash. Neurological:      Mental Status: She is alert and oriented to person, place, and time. Deep Tendon Reflexes: Reflexes are normal and symmetric. Psychiatric:         Thought Content: Thought content normal.     Musculoskeletal:     Normal gait. Strength 5/5 in biceps, triceps, hips, knees,      Upper extremities:   Shoulders:  + tender bilat,  Elbows:      + tender bila   Wrists        + tender bilat with swelling along the lateral aspect of wrist  Hands:     + tender bilat MCPs, PIPs. + boggy PIPs bilat, MCPS bilat. Lower extremities:  Hips:      + tender bilat outer hips,  No Deformities and intact ROM  Knees :   + tender  Bilat. Ankles:   + tender bilat,   Feet:       + MTP compression,    Spine- tender lumbar spine.             LABS    CBC  Lab Results   Component Value Date    WBC 9.1 01/08/2021    RBC 3.31 01/08/2021    HGB 9.9 01/08/2021    HCT 29.6 01/08/2021    MCV 89.5 01/08/2021    MCH 29.8 01/08/2021    MCHC 33.3 01/08/2021    RDW 19.0 01/08/2021     01/08/2021       CMP  Lab Results   Component Value Date    CALCIUM 10.2 01/08/2021    LABALBU 3.3 01/08/2021    PROT 6.1 11/06/2020     01/08/2021    K 3.9 01/08/2021    K 3.6 11/06/2020    CO2 29 01/08/2021     01/08/2021    BUN 27 01/08/2021    CREATININE 1.3 01/08/2021    ALKPHOS 48 01/08/2021    ALT 9 01/08/2021    AST 14 01/08/2021       HgBA1c: No components found for: HGBA1C    Lab Results   Component Value Date    VITD25 34 12/28/2017         No results found for: ANASCRN  No results found for: SSA  No results found for: SSB  No results found for: ANTI-SMITH  No results found for: DSDNAAB   No results found for: ANTIRNP No results found for: C3, C4  No results found for: CCPAB  Lab Results   Component Value Date    RF <15 11/09/2017       No components found for: Riki Higgins  Lab Results   Component Value Date    SEDRATE 15 01/08/2021     Lab Results   Component Value Date    CRP 0.70 01/08/2021       RADIOLOGY:       TTE 8/5/2020:   Summary   Normal left ventricle size and systolic function. Ejection fraction was   estimated at 50-55%. There were no regional left ventricular wall motion   abnormalities and wall thickness was within normal limits. Features were consistent with a pseudonormal left ventricular filling   pattern, with concomitant abnormal relaxation and increased filling   pressure (grade 2 diastolic dysfunction). The right ventricular size was normal with normal systolic function and   wall thickness. Right ventricular systolic pressure of 40 mm Hg consistent with mild   pulmonary hypertension. The mitral valve structure was normal with normal leaflet separation. DOPPLER: The transmitral velocity was within the normal range with no   evidence for mitral stenosis. There was moderate central jetf mitral   regurgitation. Signature  ASSESSMENT/PLAN    Assessment   Plan     Seronegative rheumatoid arthritis - worsened since the last evaluation   - negative JAMAL, RF, CCP, SSA, SSB, normal uric acid and negative secondary evaluation for CPPD arthropathy. - Previous therapy:Lumbar Epidural injections (10/11/17) from main management, monthly trigger point injections. , physical therapy, tramadol (no relief), gabapentin (no relief), lyrica (not covered by insurance).  Prednisone (high blood sugars and high blood pressure) arava 10mg qd (Diarrhea) , Sulfa allergy remicaide 3 mg/kg q 8 weeks (heart failure exacerbation), sulfa allergic. , methotrexate 15mg (LFT elevation)    - avoiding IL6 & GUILLERMO- diverticulitis, T cell inhibition- COPD - azathioprine - increase to 100mg daily b/c active inflammatory arthritis. - prednisone 5mg daily   - has been off methotrexate. - would like to start rituxan given the active inflammatory arthritis     CHOWDARY/SOB - active with pulmoanry rhaales. - baseline PFT and HRCT chest ordered    - ? COPD +/- ILD related to # 1. Bilateral hip osteoarthritis  - prior tx: bursal injections   - hip replacement on hold due to cardiac concerns      Chronic pain of right knee  - h/o osteoarthritis, knee pain worse with wt bearing, improved w/ non wt bearing  - XR right knee (11/2017)- no abnormalities  - avoiding oral NSAIDs given CKD and CAD   - cymbalta 30 mg daily   - percocet from pain management    Osteopenia w/ elevated FRAX   - 13%  Major and 4.5% hip (FEB 2019)   - Prolia 60 mg subcu q 6 months (Oct 2019)    Tobacco dependence - stopped oct 2020. Medication monitoring   - CBC, CMP, sed rate, CRP q 4 weeks    Anemia: --- currently treated by nephrology       No follow-ups on file. Electronically signed by Britany Schrader DO on 1/28/2021 at 8:07 AM    New Prescriptions    No medications on file       Thank you for allowing me to participate in the care of this patient. Please call if there are any questions.

## 2021-02-01 ENCOUNTER — TELEPHONE (OUTPATIENT)
Dept: RHEUMATOLOGY | Age: 81
End: 2021-02-01

## 2021-02-01 ENCOUNTER — HOSPITAL ENCOUNTER (OUTPATIENT)
Dept: NURSING | Age: 81
End: 2021-02-01
Payer: MEDICARE

## 2021-02-01 LAB
ALBUMIN SERPL-MCNC: 3.1 G/DL
ALP BLD-CCNC: 61 U/L
ALT SERPL-CCNC: 5 U/L
ANION GAP SERPL CALCULATED.3IONS-SCNC: 11 MMOL/L
AST SERPL-CCNC: 14 U/L
BASOPHILS ABSOLUTE: 0 /ΜL
BASOPHILS RELATIVE PERCENT: 0.5 %
BILIRUB SERPL-MCNC: 0.3 MG/DL (ref 0.1–1.4)
BUN BLDV-MCNC: 27 MG/DL
C-REACTIVE PROTEIN: 1.11
CALCIUM SERPL-MCNC: 9.5 MG/DL
CHLORIDE BLD-SCNC: 109 MMOL/L
CO2: 22 MMOL/L
CREAT SERPL-MCNC: 1.5 MG/DL
EOSINOPHILS ABSOLUTE: 0.4 /ΜL
EOSINOPHILS RELATIVE PERCENT: 4.3 %
GFR CALCULATED: 34
GLUCOSE BLD-MCNC: 91 MG/DL
HCT VFR BLD CALC: 28.5 % (ref 36–46)
HEMOGLOBIN: 9.5 G/DL (ref 12–16)
LYMPHOCYTES ABSOLUTE: 1.4 /ΜL
LYMPHOCYTES RELATIVE PERCENT: 17.3 %
MCH RBC QN AUTO: 31.6 PG
MCHC RBC AUTO-ENTMCNC: 33.3 G/DL
MCV RBC AUTO: 94.8 FL
MONOCYTES ABSOLUTE: 0.6 /ΜL
MONOCYTES RELATIVE PERCENT: 7.7 %
NEUTROPHILS ABSOLUTE: 5.8 /ΜL
NEUTROPHILS RELATIVE PERCENT: 70.2 %
PDW BLD-RTO: 18.3 %
PLATELET # BLD: 322 K/ΜL
PMV BLD AUTO: 7.6 FL
POTASSIUM SERPL-SCNC: 4.4 MMOL/L
RBC # BLD: 3.01 10^6/ΜL
SODIUM BLD-SCNC: 138 MMOL/L
TOTAL PROTEIN: 5.9
WBC # BLD: 8.3 10^3/ML

## 2021-02-01 RX ORDER — METHYLPREDNISOLONE SODIUM SUCCINATE 125 MG/2ML
125 INJECTION, POWDER, LYOPHILIZED, FOR SOLUTION INTRAMUSCULAR; INTRAVENOUS ONCE
Status: CANCELLED | OUTPATIENT
Start: 2021-02-01 | End: 2021-02-01

## 2021-02-01 RX ORDER — SODIUM CHLORIDE 9 MG/ML
INJECTION, SOLUTION INTRAVENOUS CONTINUOUS
Status: CANCELLED | OUTPATIENT
Start: 2021-02-01

## 2021-02-01 RX ORDER — DIPHENHYDRAMINE HYDROCHLORIDE 50 MG/ML
50 INJECTION INTRAMUSCULAR; INTRAVENOUS ONCE
Status: CANCELLED | OUTPATIENT
Start: 2021-02-01 | End: 2021-02-01

## 2021-02-01 RX ORDER — EPINEPHRINE 1 MG/ML
0.3 INJECTION, SOLUTION, CONCENTRATE INTRAVENOUS PRN
Status: CANCELLED | OUTPATIENT
Start: 2021-02-01

## 2021-02-03 ENCOUNTER — HOSPITAL ENCOUNTER (OUTPATIENT)
Dept: NURSING | Age: 81
Discharge: HOME | End: 2021-02-03
Payer: MEDICARE

## 2021-02-03 VITALS
DIASTOLIC BLOOD PRESSURE: 72 MMHG | TEMPERATURE: 97.6 F | RESPIRATION RATE: 18 BRPM | SYSTOLIC BLOOD PRESSURE: 159 MMHG | HEART RATE: 60 BPM | OXYGEN SATURATION: 95 %

## 2021-02-03 DIAGNOSIS — M85.89 OSTEOPENIA OF MULTIPLE SITES: Primary | ICD-10-CM

## 2021-02-03 PROCEDURE — 6360000002 HC RX W HCPCS: Performed by: INTERNAL MEDICINE

## 2021-02-03 PROCEDURE — 96372 THER/PROPH/DIAG INJ SC/IM: CPT

## 2021-02-03 RX ORDER — SODIUM CHLORIDE 9 MG/ML
INJECTION, SOLUTION INTRAVENOUS CONTINUOUS
Status: CANCELLED | OUTPATIENT
Start: 2021-08-04

## 2021-02-03 RX ORDER — METHYLPREDNISOLONE SODIUM SUCCINATE 125 MG/2ML
125 INJECTION, POWDER, LYOPHILIZED, FOR SOLUTION INTRAMUSCULAR; INTRAVENOUS ONCE
Status: CANCELLED | OUTPATIENT
Start: 2021-08-04 | End: 2021-08-04

## 2021-02-03 RX ORDER — DIPHENHYDRAMINE HYDROCHLORIDE 50 MG/ML
50 INJECTION INTRAMUSCULAR; INTRAVENOUS ONCE
Status: CANCELLED | OUTPATIENT
Start: 2021-08-04 | End: 2021-08-04

## 2021-02-03 RX ADMIN — DENOSUMAB 60 MG: 60 INJECTION SUBCUTANEOUS at 12:40

## 2021-02-03 NOTE — PROGRESS NOTES
1235 pt arrives via wheelchair for prolia injection. Injection explained and questions answered. PT RIGHTS AND RESPONSIBILITIES OFFERED TO PT.  1240 injection given. Pt tolerated it well with no complaints. Pt discharged via wheelchair with instructions with no complaints.            __m__ Safety:       (Environmental)   [place] to environment   Ensure ID band is correct and in place/ allergy band as needed   Assess for fall risk   Initiate fall precautions as applicable (fall band, side rails, etc.)   Call light within reach   Bed in low position/ wheels locked    __m__ Pain:        Assess pain level and characteristics   Administer analgesics as ordered   Assess effectiveness of pain management and report to MD as needed    __m__ Knowledge Deficit:   Assess baseline knowledge   Provide teaching at level of understanding   Provide teaching via preferred learning method   Evaluate teaching effectiveness    m____ Hemodynamic/Respiratory Status:       (Pre and Post Procedure Monitoring)   Assess/Monitor vital signs and LOC   Assess Baseline SpO2 prior to any sedation   Obtain weight/height   Assess vital signs/ LOC until patient meets discharge criteria   Monitor procedure site and notify MD of any issues

## 2021-02-08 DIAGNOSIS — D64.9 NORMOCYTIC ANEMIA: Primary | ICD-10-CM

## 2021-02-08 NOTE — TELEPHONE ENCOUNTER
----- Message from Ulices Wilkinson DO sent at 2/8/2021  2:24 PM EST -----  THE ANEMIA HAS CONTINUED TO WORSEN. pLAVE HAVE SOME ADDITIONAL TESTING COMPLETED TO FURTHER EVALUATE THE CAUSE OF THE ANEMIA. pLEASE ALSO SENT THE RESULTS TO THE PATIENTS pcp.

## 2021-02-09 ENCOUNTER — TELEPHONE (OUTPATIENT)
Dept: RHEUMATOLOGY | Age: 81
End: 2021-02-09

## 2021-02-09 NOTE — TELEPHONE ENCOUNTER
----- Message from No Huang DO sent at 2/8/2021  2:24 PM EST -----  THE ANEMIA HAS CONTINUED TO WORSEN. pLAVE HAVE SOME ADDITIONAL TESTING COMPLETED TO FURTHER EVALUATE THE CAUSE OF THE ANEMIA. pLEASE ALSO SENT THE RESULTS TO THE PATIENTS pcp.

## 2021-02-23 ENCOUNTER — HOSPITAL ENCOUNTER (OUTPATIENT)
Dept: CT IMAGING | Age: 81
Discharge: HOME | End: 2021-02-23
Payer: MEDICARE

## 2021-02-23 ENCOUNTER — HOSPITAL ENCOUNTER (OUTPATIENT)
Dept: PULMONOLOGY | Age: 81
Discharge: HOME | End: 2021-02-23
Payer: MEDICARE

## 2021-02-23 DIAGNOSIS — R06.09 DOE (DYSPNEA ON EXERTION): ICD-10-CM

## 2021-02-23 PROCEDURE — 71250 CT THORAX DX C-: CPT

## 2021-02-23 PROCEDURE — 94729 DIFFUSING CAPACITY: CPT

## 2021-02-23 PROCEDURE — 94060 EVALUATION OF WHEEZING: CPT

## 2021-02-23 PROCEDURE — 94726 PLETHYSMOGRAPHY LUNG VOLUMES: CPT

## 2021-02-23 NOTE — PROGRESS NOTES
Prescreening performed prior to testing. The following symptoms may indicate COVID-19 infection:        One of the following criteria:   Temperature taken, patient temperature was 98.1 F. Fever greater 100.0 F -no  Cough -  no  New onset shortness of breath -no  New onset difficulty breathing -no        And/or   Two or more of the following criteria:  Muscle aches -no  Headache -no  Sore throat -no  New onset loss of smell/taste -no  New onset diarrhea -no    Patient's screening was negative. PFT will be performed.

## 2021-02-25 ENCOUNTER — TELEPHONE (OUTPATIENT)
Dept: RHEUMATOLOGY | Age: 81
End: 2021-02-25

## 2021-02-25 NOTE — TELEPHONE ENCOUNTER
----- Message from Michael Aburto DO sent at 2/23/2021  4:59 PM EST -----  The CT of the chest revealed diffuse changes in both lungs which were nonspecific and may be seen with air trapping because of small airway disease such as bronchiolitis or asthma. Please asked the patient if she be willing to see a pulmonologist for further evaluation.

## 2021-03-29 LAB
ALBUMIN SERPL-MCNC: 3.3 G/DL
ALP BLD-CCNC: 48 U/L
ALT SERPL-CCNC: 12 U/L
ANION GAP SERPL CALCULATED.3IONS-SCNC: 2 MMOL/L
AST SERPL-CCNC: 16 U/L
BASOPHILS ABSOLUTE: 0 /ΜL
BASOPHILS RELATIVE PERCENT: 0.4 %
BILIRUB SERPL-MCNC: 0.3 MG/DL (ref 0.1–1.4)
BUN BLDV-MCNC: 29 MG/DL
CALCIUM SERPL-MCNC: 9 MG/DL
CHLORIDE BLD-SCNC: 112 MMOL/L
CO2: 28 MMOL/L
CREAT SERPL-MCNC: 1.4 MG/DL
EOSINOPHILS ABSOLUTE: 0.2 /ΜL
EOSINOPHILS RELATIVE PERCENT: 2.9 %
GFR CALCULATED: 36
GLUCOSE BLD-MCNC: 114 MG/DL
HCT VFR BLD CALC: 37.9 % (ref 36–46)
HEMOGLOBIN: 12.4 G/DL (ref 12–16)
LYMPHOCYTES ABSOLUTE: 1.2 /ΜL
LYMPHOCYTES RELATIVE PERCENT: 18.4 %
MCH RBC QN AUTO: 32.8 PG
MCHC RBC AUTO-ENTMCNC: 32.7 G/DL
MCV RBC AUTO: 100.1 FL
MONOCYTES ABSOLUTE: 0.4 /ΜL
MONOCYTES RELATIVE PERCENT: 6.8 %
NEUTROPHILS ABSOLUTE: 4.5 /ΜL
NEUTROPHILS RELATIVE PERCENT: 71.5 %
PDW BLD-RTO: 15 %
PLATELET # BLD: 287 K/ΜL
PMV BLD AUTO: 8 FL
POTASSIUM SERPL-SCNC: 4.2 MMOL/L
RBC # BLD: 3.79 10^6/ΜL
SEDIMENTATION RATE, ERYTHROCYTE: 5
SODIUM BLD-SCNC: 138 MMOL/L
TOTAL PROTEIN: 5.8
WBC # BLD: 6.3 10^3/ML

## 2021-03-30 RX ORDER — AZATHIOPRINE 50 MG/1
100 TABLET ORAL DAILY
Qty: 60 TABLET | Refills: 0 | Status: SHIPPED | OUTPATIENT
Start: 2021-03-30 | End: 2021-09-08 | Stop reason: SDUPTHER

## 2021-03-30 NOTE — PROGRESS NOTES
rheuatmoid arthritis - seronegative:  Azathioprine 100mg daily     Medication monitoring - repeat labs q 4 weeks x 2

## 2021-04-01 ENCOUNTER — OFFICE VISIT (OUTPATIENT)
Dept: RHEUMATOLOGY | Age: 81
End: 2021-04-01
Payer: MEDICARE

## 2021-04-01 VITALS
DIASTOLIC BLOOD PRESSURE: 62 MMHG | SYSTOLIC BLOOD PRESSURE: 118 MMHG | TEMPERATURE: 97.2 F | HEIGHT: 66 IN | BODY MASS INDEX: 26.2 KG/M2 | OXYGEN SATURATION: 98 % | HEART RATE: 67 BPM | WEIGHT: 163 LBS

## 2021-04-01 DIAGNOSIS — R94.2 ABNORMAL PFTS: ICD-10-CM

## 2021-04-01 DIAGNOSIS — M54.42 CHRONIC MIDLINE LOW BACK PAIN WITH LEFT-SIDED SCIATICA: ICD-10-CM

## 2021-04-01 DIAGNOSIS — R06.09 DOE (DYSPNEA ON EXERTION): ICD-10-CM

## 2021-04-01 DIAGNOSIS — Z51.81 MEDICATION MONITORING ENCOUNTER: ICD-10-CM

## 2021-04-01 DIAGNOSIS — M85.89 OSTEOPENIA OF MULTIPLE SITES: ICD-10-CM

## 2021-04-01 DIAGNOSIS — G89.29 CHRONIC MIDLINE LOW BACK PAIN WITH LEFT-SIDED SCIATICA: ICD-10-CM

## 2021-04-01 DIAGNOSIS — M06.00 SERONEGATIVE RHEUMATOID ARTHRITIS (HCC): Primary | ICD-10-CM

## 2021-04-01 DIAGNOSIS — M15.9 OSTEOARTHRITIS OF MULTIPLE JOINTS, UNSPECIFIED OSTEOARTHRITIS TYPE: ICD-10-CM

## 2021-04-01 PROCEDURE — G8399 PT W/DXA RESULTS DOCUMENT: HCPCS | Performed by: NURSE PRACTITIONER

## 2021-04-01 PROCEDURE — 1090F PRES/ABSN URINE INCON ASSESS: CPT | Performed by: NURSE PRACTITIONER

## 2021-04-01 PROCEDURE — G8417 CALC BMI ABV UP PARAM F/U: HCPCS | Performed by: NURSE PRACTITIONER

## 2021-04-01 PROCEDURE — 4040F PNEUMOC VAC/ADMIN/RCVD: CPT | Performed by: NURSE PRACTITIONER

## 2021-04-01 PROCEDURE — 99214 OFFICE O/P EST MOD 30 MIN: CPT | Performed by: NURSE PRACTITIONER

## 2021-04-01 PROCEDURE — G8427 DOCREV CUR MEDS BY ELIG CLIN: HCPCS | Performed by: NURSE PRACTITIONER

## 2021-04-01 PROCEDURE — 4004F PT TOBACCO SCREEN RCVD TLK: CPT | Performed by: NURSE PRACTITIONER

## 2021-04-01 PROCEDURE — 1123F ACP DISCUSS/DSCN MKR DOCD: CPT | Performed by: NURSE PRACTITIONER

## 2021-04-01 RX ORDER — EPINEPHRINE 1 MG/ML
0.3 INJECTION, SOLUTION, CONCENTRATE INTRAVENOUS PRN
Status: CANCELLED | OUTPATIENT
Start: 2021-06-08

## 2021-04-01 RX ORDER — SODIUM CHLORIDE 0.9 % (FLUSH) 0.9 %
5 SYRINGE (ML) INJECTION PRN
Status: CANCELLED | OUTPATIENT
Start: 2021-06-08

## 2021-04-01 RX ORDER — MEPERIDINE HYDROCHLORIDE 50 MG/ML
12.5 INJECTION INTRAMUSCULAR; INTRAVENOUS; SUBCUTANEOUS ONCE
Status: CANCELLED | OUTPATIENT
Start: 2021-06-08 | End: 2021-04-01

## 2021-04-01 RX ORDER — ACETAMINOPHEN 325 MG/1
650 TABLET ORAL ONCE
Status: CANCELLED | OUTPATIENT
Start: 2021-06-08 | End: 2021-04-01

## 2021-04-01 RX ORDER — METHYLPREDNISOLONE SODIUM SUCCINATE 125 MG/2ML
125 INJECTION, POWDER, LYOPHILIZED, FOR SOLUTION INTRAMUSCULAR; INTRAVENOUS ONCE
Status: CANCELLED | OUTPATIENT
Start: 2021-06-08 | End: 2021-04-01

## 2021-04-01 RX ORDER — HEPARIN SODIUM (PORCINE) LOCK FLUSH IV SOLN 100 UNIT/ML 100 UNIT/ML
500 SOLUTION INTRAVENOUS PRN
Status: CANCELLED | OUTPATIENT
Start: 2021-06-08

## 2021-04-01 RX ORDER — DIPHENHYDRAMINE HYDROCHLORIDE 50 MG/ML
25 INJECTION INTRAMUSCULAR; INTRAVENOUS ONCE
Status: CANCELLED | OUTPATIENT
Start: 2021-06-08 | End: 2021-04-01

## 2021-04-01 RX ORDER — SODIUM CHLORIDE 0.9 % (FLUSH) 0.9 %
10 SYRINGE (ML) INJECTION PRN
Status: CANCELLED | OUTPATIENT
Start: 2021-06-08

## 2021-04-01 RX ORDER — METHYLPREDNISOLONE SODIUM SUCCINATE 125 MG/2ML
100 INJECTION, POWDER, LYOPHILIZED, FOR SOLUTION INTRAMUSCULAR; INTRAVENOUS ONCE
Status: CANCELLED | OUTPATIENT
Start: 2021-06-08 | End: 2021-04-01

## 2021-04-01 RX ORDER — DIPHENHYDRAMINE HYDROCHLORIDE 50 MG/ML
50 INJECTION INTRAMUSCULAR; INTRAVENOUS ONCE
Status: CANCELLED | OUTPATIENT
Start: 2021-06-08 | End: 2021-04-01

## 2021-04-01 RX ORDER — SODIUM CHLORIDE 9 MG/ML
INJECTION, SOLUTION INTRAVENOUS CONTINUOUS
Status: CANCELLED | OUTPATIENT
Start: 2021-06-08

## 2021-04-01 ASSESSMENT — ENCOUNTER SYMPTOMS
CONSTIPATION: 1
BACK PAIN: 1
ABDOMINAL PAIN: 0
VOICE CHANGE: 0
TROUBLE SWALLOWING: 0
DIARRHEA: 1
COUGH: 1
EYE ITCHING: 0
SHORTNESS OF BREATH: 1
NAUSEA: 0
WHEEZING: 1
EYE PAIN: 0

## 2021-04-01 NOTE — PROGRESS NOTES
Hematological: Bruises/bleeds easily. Psychiatric/Behavioral: The patient is not nervous/anxious.         PAST MEDICAL HISTORY      Past Medical History:   Diagnosis Date    Arthritis     Asthma 1978    Atrial fibrillation ([facility] [address]     Blood circulation, collateral     CAD (coronary artery disease)     [facility]congest[facility] failure) ([facility]) 1999    Chronic kidney disease, stage III (moderate)     COPD (chronic obstructive pulmonary disease) ([facility]     Depression     Diabetes mellitus ([facility] [address]     Diabetic hypertension-nephrosis syndrome ([facility])     Dysuria     GERD (gastroesophageal reflux disease)     Hyperlipidemia     Hypertension     Kidney disease     stage 3 dr Fam Aj Unspecified cerebral artery occlusion with cerebral infarction 1999    Dr Daphne Norris informed patient after Heart Attack       PAST SURGICAL HISTORY      Past Surgical History:   Procedure Laterality Date    APPENDECTOMY      BACK SURGERY      lumbar spine    BLADDER SUSPENSION      [facility]ARDIAC CATHETERIZATION  [facility]    [facility] stent  [facility]    CARPAL TUNNEL RELEASE      COLONOSCOPY  2013    ENDOSCOPY, COLON, DIAGNOSTIC      ESOPHAGEAL DILATATION      HYSTERECTOMY  1974    JOINT REPLACEMENT Left 1992    Left Knee       FAMILY HISTORY      Family History   Problem Relation Age of Onset    Heart Disease Mother     Diabetes Mother     Kidney Disease Mother     Heart Disease Father     Diabetes Father     Cancer Sister     Stroke Brother     Cancer Sister        SOCIAL HISTORY      Social History     Tobacco History     Smoking Status  Light Tobacco Smoker Smoking Start Date  10/21/2013 Smoking Frequency  0.15 packs/day for 10 years (1.5 pk yrs) Smoking Tobacco Type  Cigarettes    Smokeless Tobacco Use  Never Used          Alcohol History     Alcohol Use Status  No          Drug Use     Drug Use Status  No          Sexual Activity     Sexually Active  Never                ALLERGIES     Allergies   Allergen Reactions    Renflexis [Infliximab] Shortness Of Breath and Palpitations    Nuts [Peanut-Containing Drug Products]      Pt suffers from diverticulitis and nut products irritate her stomach.  Strawberry (Diagnostic)      Diverticulitis    Sulfa Antibiotics     Tape Alfredo Ora Tape] Other (See Comments)     tears skin off    Bactrim [Sulfamethoxazole-Trimethoprim] Rash     Little red rash    Chocolate Rash       CURRENT MEDICATIONS      Current Outpatient Medications   Medication Sig Dispense Refill    azaTHIOprine (IMURAN) 50 MG tablet Take 2 tablets by mouth daily 60 tablet 0    cloNIDine (CATAPRES) 0.1 MG tablet Take 1 tablet by mouth 2 times daily 60 tablet 3    hydrALAZINE (APRESOLINE) 100 MG tablet Take 1 tablet by mouth 3 times daily 90 tablet 3    metoprolol tartrate (LOPRESSOR) 25 MG tablet Take 1 tablet by mouth 2 times daily 60 tablet 3    predniSONE (DELTASONE) 5 MG tablet TAKE 1 TABLET BY MOUTH EVERY DAY 30 tablet 3    aluminum & magnesium hydroxide-simethicone (MYLANTA MAXIMUM STRENGTH) 400-400-40 MG/5ML SUSP Take 30 mLs by mouth 4 times daily (before meals and nightly)      amLODIPine (NORVASC) 10 MG tablet Take 10 mg by mouth daily      calcium carbonate 600 MG TABS tablet Take 1 tablet by mouth daily      DULoxetine (CYMBALTA) 30 MG extended release capsule Take 30 mg by mouth 2 times daily      famotidine (PEPCID) 40 MG tablet Take 40 mg by mouth daily      gabapentin (NEURONTIN) 100 MG capsule Take 100 mg by mouth 3 times daily.       isosorbide mononitrate (IMDUR) 120 MG extended release tablet Take 120 mg by mouth daily      pantoprazole (PROTONIX) 40 MG tablet Take 40 mg by mouth daily      spironolactone (ALDACTONE) 50 MG tablet Take 50 mg by mouth daily      valsartan-hydroCHLOROthiazide (DIOVAN-HCT) 320-25 MG per tablet Take 1 tablet by mouth daily      hydroxychloroquine (PLAQUENIL) 200 MG tablet TAKE 1 TABLET BY MOUTH EVERY DAY 90 tablet 1    atorvastatin (LIPITOR) 40 MG tablet Take 2 tablets by mouth daily 60 tablet 2    docusate sodium (COLACE, DULCOLAX) 100 MG CAPS Take 100 mg by mouth 2 times daily 60 capsule 1    insulin glargine (LANTUS) 100 UNIT/ML injection pen Inject 8 Units into the skin nightly       dexlansoprazole (DEXILANT) 60 MG CPDR delayed release capsule Take 60 mg by mouth daily      potassium chloride (KLOR-CON) 20 MEQ packet Take 10 mEq by mouth daily       fenofibrate (TRICOR) 145 MG tablet Take 145 mg by mouth daily      aspirin 81 MG EC tablet Take 162 mg by mouth daily      ranolazine (RANEXA) 500 MG SR tablet Take 1 tablet by mouth 2 times daily. 60 tablet 3    Magnesium Oxide 250 MG TABS Take 400 mg by mouth 2 times daily       albuterol (PROVENTIL HFA;VENTOLIN HFA) 108 (90 BASE) MCG/ACT inhaler Inhale 2 puffs into the lungs every 6 hours as needed.  montelukast (SINGULAIR) 5 MG chewable tablet Take 5 mg by mouth nightly.  traZODone (DESYREL) 50 MG tablet Take 100 mg by mouth nightly       nitroGLYCERIN (NITROSTAT) 0.4 MG SL tablet Place 0.4 mg under the tongue every 5 minutes as needed. No current facility-administered medications for this visit. PHYSICAL EXAMINATION / OBJECTIVE   Objective: There were no vitals taken for this visit. Physical Exam  Vitals signs reviewed. Constitutional:       Appearance: She is well-developed. Neck:      Musculoskeletal: Normal range of motion and neck supple. Cardiovascular:      Rate and Rhythm: Normal rate and regular rhythm. Pulmonary:      Effort: Pulmonary effort is normal.      Breath sounds: Normal breath sounds. Abdominal:      Palpations: Abdomen is soft. Tenderness: There is no abdominal tenderness. Skin:     General: Skin is warm and dry. Findings: No rash. Neurological:      Mental Status: She is alert and oriented to person, place, and time. Deep Tendon Reflexes: Reflexes are normal and symmetric. Psychiatric:         Thought Content:  Thought content normal.     Musculoskeletal:     Normal gait. Strength 5/5 in biceps, triceps, hips, knees,      Upper extremities:   Shoulders:  + tender bilat, no swelling,   Elbows:      + tender bilat, no swelling,  Wrists        + tender right, + fullness right  Hands:     + tender bilat MCPs, PIPs, DIPs.  + swelling MCPs, PIPs    Lower extremities:  Hips:      + tender bilat outer hips  Knees :   + tender right, + warmth right  Ankles:   + tender bilat, no swelling,  Feet:       + MTP compression,        RAPID 3:   4/1/2021 --- RAPID 3: 2.3 + 9 + 8.5 = 22.8     Remission: <3  Low Disease Activity: <6  Moderate Disease Activity: >=6 and <=12  High Disease Activity: >12     LABS    CBC  Lab Results   Component Value Date    WBC 6.3 03/29/2021    RBC 3.79 03/29/2021    HGB 12.4 03/29/2021    HCT 37.9 03/29/2021    .1 03/29/2021    MCH 32.8 03/29/2021    MCHC 32.7 03/29/2021    RDW 15.0 03/29/2021     03/29/2021       CMP  Lab Results   Component Value Date    CALCIUM 9.0 03/29/2021    LABALBU 3.3 03/29/2021    PROT 6.1 11/06/2020     03/29/2021    K 4.2 03/29/2021    K 3.6 11/06/2020    CO2 28 03/29/2021     03/29/2021    BUN 29 03/29/2021    CREATININE 1.4 03/29/2021    ALKPHOS 48 03/29/2021    ALT 12 03/29/2021    AST 16 03/29/2021       HgBA1c: No components found for: HGBA1C    Lab Results   Component Value Date    VITD25 34 12/28/2017         No results found for: ANASCRN  No results found for: SSA  No results found for: SSB  No results found for: ANTI-SMITH  No results found for: DSDNAAB   No results found for: ANTIRNP  No results found for: C3, C4  No results found for: CCPAB  Lab Results   Component Value Date    RF <15 11/09/2017       No components found for: CANCASCRN, APANCASCRN  Lab Results   Component Value Date    SEDRATE 5 03/29/2021     Lab Results   Component Value Date    CRP 1.11 02/01/2021       RADIOLOGY:         ASSESSMENT/PLAN    Assessment   Plan     Seronegative rheumatoid arthritis  - negative JAMAL, RF, CCP, SSA, SSB, normal uric acid and negative secondary evaluation for CPPD arthropathy  - prior tx: lumbar epidural injections, trigger point injections, PT, tramadol, gabapentin, prednisone, arava (diarrhea), azathioprine (nausea, vomiting), remicaide 3mg/kg (CHF exacerbation)  - avoiding IL6 & GUILLERMO- diverticulitis, T cell inhibition- COPD   - azathioprine 100 mg daily   - prednisone 5 mg daily   - start rituxan 1000 mg x2. Side effects discussed including leukopenia, infection, infusion reactions can be severe, PML, GBS, RACHAEL virus     CHOWDARY/SOB  - CT chest w/ changes consistent with air trapping- ? Bronchiolitis or asthma  - PFTs w/ mild to mod obstructive lung disease- ? COPD   - referral to pulmonology    Anemia  - additional testing ordered for further evaluation- not completed- orders reprinted   - being treated by nephrology    Bilateral hip osteoarthritis  Trochanteric bursitis  - prior tx: bursal injections   - hip replacement on hold due to cardiac concerns    Chronic pain of right knee  - h/o osteoarthritis, knee pain worse with wt bearing, improved w/ non wt bearing  - XR right knee (11/2017)- no abnormalities  - avoiding oral NSAIDs given CKD and CAD   - cymbalta 30 mg daily   - percocet from pain management    Osteopenia w/ elevated FRAX  - 13%  Major and 4.5% hip   - Prolia 60 mg subcu q 6 months (Oct 2019)- last dose 2/2021    Medication monitoring   - CBC, CMP, sed rate, CRP q 4 weeks x2      No follow-ups on file. Electronically signed by LONDON Garcia CNP on 4/1/2021 at 8:45 AM    New Prescriptions    No medications on file       Thank you for allowing me to participate in the care of this patient. Please call if there are any questions.

## 2021-04-06 ENCOUNTER — TELEPHONE (OUTPATIENT)
Dept: RHEUMATOLOGY | Age: 81
End: 2021-04-06

## 2021-04-06 NOTE — TELEPHONE ENCOUNTER
----- Message from LONDON Garcia CNP sent at 4/5/2021  4:27 PM EDT -----  Tuberculosis testing negative.

## 2021-04-09 ENCOUNTER — HOSPITAL ENCOUNTER (OUTPATIENT)
Dept: INFUSION THERAPY | Age: 81
End: 2021-04-09
Payer: MEDICARE

## 2021-05-18 ENCOUNTER — OFFICE VISIT (OUTPATIENT)
Dept: PULMONOLOGY | Age: 81
End: 2021-05-18
Payer: MEDICARE

## 2021-05-18 VITALS
SYSTOLIC BLOOD PRESSURE: 110 MMHG | OXYGEN SATURATION: 96 % | TEMPERATURE: 98.3 F | DIASTOLIC BLOOD PRESSURE: 66 MMHG | HEIGHT: 65 IN | WEIGHT: 193 LBS | BODY MASS INDEX: 32.15 KG/M2 | HEART RATE: 59 BPM

## 2021-05-18 DIAGNOSIS — R93.89 ABNORMAL CT OF THE CHEST: ICD-10-CM

## 2021-05-18 DIAGNOSIS — J44.9 MODERATE COPD (CHRONIC OBSTRUCTIVE PULMONARY DISEASE) (HCC): Primary | ICD-10-CM

## 2021-05-18 DIAGNOSIS — Z77.22 TOBACCO SMOKE EXPOSURE: ICD-10-CM

## 2021-05-18 DIAGNOSIS — J20.9 ACUTE BRONCHITIS, UNSPECIFIED ORGANISM: ICD-10-CM

## 2021-05-18 PROCEDURE — G8428 CUR MEDS NOT DOCUMENT: HCPCS | Performed by: INTERNAL MEDICINE

## 2021-05-18 PROCEDURE — 99204 OFFICE O/P NEW MOD 45 MIN: CPT | Performed by: INTERNAL MEDICINE

## 2021-05-18 PROCEDURE — 1090F PRES/ABSN URINE INCON ASSESS: CPT | Performed by: INTERNAL MEDICINE

## 2021-05-18 PROCEDURE — 3023F SPIROM DOC REV: CPT | Performed by: INTERNAL MEDICINE

## 2021-05-18 PROCEDURE — 4004F PT TOBACCO SCREEN RCVD TLK: CPT | Performed by: INTERNAL MEDICINE

## 2021-05-18 PROCEDURE — G8417 CALC BMI ABV UP PARAM F/U: HCPCS | Performed by: INTERNAL MEDICINE

## 2021-05-18 PROCEDURE — G8399 PT W/DXA RESULTS DOCUMENT: HCPCS | Performed by: INTERNAL MEDICINE

## 2021-05-18 PROCEDURE — 4040F PNEUMOC VAC/ADMIN/RCVD: CPT | Performed by: INTERNAL MEDICINE

## 2021-05-18 PROCEDURE — G8926 SPIRO NO PERF OR DOC: HCPCS | Performed by: INTERNAL MEDICINE

## 2021-05-18 PROCEDURE — 1123F ACP DISCUSS/DSCN MKR DOCD: CPT | Performed by: INTERNAL MEDICINE

## 2021-05-18 RX ORDER — DOXYCYCLINE HYCLATE 100 MG/1
100 CAPSULE ORAL 2 TIMES DAILY
Qty: 14 CAPSULE | Refills: 0 | Status: SHIPPED | OUTPATIENT
Start: 2021-05-18 | End: 2021-05-25

## 2021-05-18 NOTE — PROGRESS NOTES
Chief Complaint:New patient referred by Thi Wood, abn pft    Mallampati airway Class:4  Neck Circumference: 16inches

## 2021-06-03 ENCOUNTER — OFFICE VISIT (OUTPATIENT)
Dept: RHEUMATOLOGY | Age: 81
End: 2021-06-03
Payer: MEDICARE

## 2021-06-03 VITALS
BODY MASS INDEX: 31.02 KG/M2 | SYSTOLIC BLOOD PRESSURE: 128 MMHG | HEIGHT: 66 IN | WEIGHT: 193 LBS | OXYGEN SATURATION: 92 % | DIASTOLIC BLOOD PRESSURE: 88 MMHG | HEART RATE: 63 BPM

## 2021-06-03 DIAGNOSIS — G89.29 CHRONIC MIDLINE LOW BACK PAIN WITH LEFT-SIDED SCIATICA: ICD-10-CM

## 2021-06-03 DIAGNOSIS — R94.2 ABNORMAL PFTS: ICD-10-CM

## 2021-06-03 DIAGNOSIS — Z51.81 MEDICATION MONITORING ENCOUNTER: ICD-10-CM

## 2021-06-03 DIAGNOSIS — M06.00 SERONEGATIVE RHEUMATOID ARTHRITIS (HCC): Primary | ICD-10-CM

## 2021-06-03 DIAGNOSIS — M54.42 CHRONIC MIDLINE LOW BACK PAIN WITH LEFT-SIDED SCIATICA: ICD-10-CM

## 2021-06-03 DIAGNOSIS — M15.9 OSTEOARTHRITIS OF MULTIPLE JOINTS, UNSPECIFIED OSTEOARTHRITIS TYPE: ICD-10-CM

## 2021-06-03 DIAGNOSIS — M85.89 OSTEOPENIA OF MULTIPLE SITES: ICD-10-CM

## 2021-06-03 PROCEDURE — G8427 DOCREV CUR MEDS BY ELIG CLIN: HCPCS | Performed by: INTERNAL MEDICINE

## 2021-06-03 PROCEDURE — 99214 OFFICE O/P EST MOD 30 MIN: CPT | Performed by: INTERNAL MEDICINE

## 2021-06-03 PROCEDURE — 4004F PT TOBACCO SCREEN RCVD TLK: CPT | Performed by: INTERNAL MEDICINE

## 2021-06-03 PROCEDURE — G8417 CALC BMI ABV UP PARAM F/U: HCPCS | Performed by: INTERNAL MEDICINE

## 2021-06-03 PROCEDURE — 1123F ACP DISCUSS/DSCN MKR DOCD: CPT | Performed by: INTERNAL MEDICINE

## 2021-06-03 PROCEDURE — 4040F PNEUMOC VAC/ADMIN/RCVD: CPT | Performed by: INTERNAL MEDICINE

## 2021-06-03 PROCEDURE — G8399 PT W/DXA RESULTS DOCUMENT: HCPCS | Performed by: INTERNAL MEDICINE

## 2021-06-03 PROCEDURE — 1090F PRES/ABSN URINE INCON ASSESS: CPT | Performed by: INTERNAL MEDICINE

## 2021-06-03 ASSESSMENT — ENCOUNTER SYMPTOMS
CONSTIPATION: 1
BACK PAIN: 1
VOICE CHANGE: 0
NAUSEA: 0
SHORTNESS OF BREATH: 1
ABDOMINAL PAIN: 0
TROUBLE SWALLOWING: 0
EYE PAIN: 0
WHEEZING: 1
EYE ITCHING: 0
COUGH: 1
DIARRHEA: 1

## 2021-06-03 NOTE — PROGRESS NOTES
and neck pain. Skin: Negative for rash. Neurological: Positive for weakness. Negative for dizziness, numbness and headaches. Hematological: Bruises/bleeds easily. Psychiatric/Behavioral: The patient is not nervous/anxious.         PAST MEDICAL HISTORY      Past Medical History:   Diagnosis Date    Arthritis     Asthma 1978    [facility] fibrillation ([facility]     Blood circulation, collateral     CAD (coronary artery disease)     CHF (congestive heart failure) ([facility] 1999    Chronic kidney disease, stage III (moderate)     COPD [facility]chronic obstructive [facility] disease) ([facility]     Depression     Diabetes mellitus ([facility]     Diabetic hypertension-nephrosis syndrome ([facility])     Dysuria     GERD (gastroesophageal reflux disease)     Hyperlipidemia     Hypertension     Kidney disease     stage 3 dr Lorraine Sun Unspecified cerebral artery occlusion with cerebral infarction 1999    Dr July Phillips informed patient after Heart Attack       PAST SURGICAL HISTORY      Past Surgical History:   Procedure Laterality Date    APPENDECTOMY      BACK SURGERY      lumbar spine    BLADDER SUSPENSION      [facility]IAC [facility]HETER[facility]ATION  [facility]    [facility] stent  [facility]    CARPAL TUNNEL RELEASE      COLONOSCOPY  2013    ENDOSCOPY, COLON, DIAGNOSTIC      ESOPHAGEAL DILATATION      HYSTERECTOMY  1974    JOINT REPLACEMENT Left 1992    Left Knee       FAMILY HISTORY      Family History   Problem Relation Age of Onset    Heart Disease Mother     Diabetes Mother     Kidney Disease Mother     Heart Disease Father     Diabetes Father     Cancer Sister     Stroke Brother     Cancer Sister        SOCIAL HISTORY      Social History     Tobacco History     Smoking Status  Light Tobacco Smoker Smoking Start Date  10/21/2013 Smoking Frequency  0.15 packs/day for 10 years (1.5 pk yrs) Smoking Tobacco Type  Cigarettes    Smokeless Tobacco Use  Never Used          Alcohol History     Alcohol Use Status  No          Drug Use     Drug Use Status  No Sexual Activity     Sexually Active  Never                ALLERGIES     Allergies   Allergen Reactions    Renflexis [Infliximab] Shortness Of Breath and Palpitations    Nuts [Peanut-Containing Drug Products]      Pt suffers from diverticulitis and nut products irritate her stomach.  Strawberry (Diagnostic)      Diverticulitis    Sulfa Antibiotics     Tape Liliana Rock Tape] Other (See Comments)     tears skin off    Bactrim [Sulfamethoxazole-Trimethoprim] Rash     Little red rash    Chocolate Rash       CURRENT MEDICATIONS      Current Outpatient Medications   Medication Sig Dispense Refill    Tiotropium Bromide-Olodaterol 2.5-2.5 MCG/ACT AERS Inhale 2 puffs into the lungs daily 1 Inhaler 11    azaTHIOprine (IMURAN) 50 MG tablet Take 2 tablets by mouth daily 60 tablet 0    cloNIDine (CATAPRES) 0.1 MG tablet Take 1 tablet by mouth 2 times daily 60 tablet 3    hydrALAZINE (APRESOLINE) 100 MG tablet Take 1 tablet by mouth 3 times daily 90 tablet 3    metoprolol tartrate (LOPRESSOR) 25 MG tablet Take 1 tablet by mouth 2 times daily 60 tablet 3    predniSONE (DELTASONE) 5 MG tablet TAKE 1 TABLET BY MOUTH EVERY DAY 30 tablet 3    aluminum & magnesium hydroxide-simethicone (MYLANTA MAXIMUM STRENGTH) 400-400-40 MG/5ML SUSP Take 30 mLs by mouth 4 times daily (before meals and nightly)      amLODIPine (NORVASC) 10 MG tablet Take 10 mg by mouth daily      calcium carbonate 600 MG TABS tablet Take 1 tablet by mouth daily      DULoxetine (CYMBALTA) 30 MG extended release capsule Take 30 mg by mouth 2 times daily      famotidine (PEPCID) 40 MG tablet Take 40 mg by mouth daily      gabapentin (NEURONTIN) 100 MG capsule Take 100 mg by mouth 3 times daily.       isosorbide mononitrate (IMDUR) 120 MG extended release tablet Take 120 mg by mouth daily      spironolactone (ALDACTONE) 50 MG tablet Take 50 mg by mouth daily      valsartan-hydroCHLOROthiazide (DIOVAN-HCT) 320-25 MG per tablet Take 1 tablet by and Rhythm: Normal rate and regular rhythm. Pulmonary:      Effort: Pulmonary effort is normal.      Breath sounds: Rales (bilat lung fields) present. Abdominal:      Palpations: Abdomen is soft. Tenderness: There is no abdominal tenderness. Musculoskeletal:      Cervical back: Normal range of motion and neck supple. Skin:     General: Skin is warm and dry. Findings: No rash. Neurological:      Mental Status: She is alert and oriented to person, place, and time. Deep Tendon Reflexes: Reflexes are normal and symmetric. Psychiatric:         Thought Content: Thought content normal.     Musculoskeletal:     Normal gait. Strength 5/5 in biceps, triceps, hips, knees,      Upper extremities:   Shoulders:  + tender bilat, no swelling,   Elbows:      + tender bilat, no swelling,  Wrists        + tender right, + fullness right  Hands:     + tender bilat MCPs, PIPs, DIPs. Swelling MCPs right 2,3,4,  PIPs left # 3. Lower extremities:  Hips:      + tender bilat outer hips  Knees :   + tender right, + warmth right  Ankles:   + tender bilat,   Feet:       + MTP compression, tenderness.          RAPID 3:   6/3/2021 --- RAPID 3:    3.3 + 8 + 9 = 20.3     Remission: <3  Low Disease Activity: <6  Moderate Disease Activity: >=6 and <=12  High Disease Activity: >12     LABS    CBC  Lab Results   Component Value Date    WBC 6.3 03/29/2021    RBC 3.79 03/29/2021    HGB 12.4 03/29/2021    HCT 37.9 03/29/2021    .1 03/29/2021    MCH 32.8 03/29/2021    MCHC 32.7 03/29/2021    RDW 15.0 03/29/2021     03/29/2021       CMP  Lab Results   Component Value Date    CALCIUM 9.0 03/29/2021    LABALBU 3.3 03/29/2021    PROT 6.1 11/06/2020     03/29/2021    K 4.2 03/29/2021    K 3.6 11/06/2020    CO2 28 03/29/2021     03/29/2021    BUN 29 03/29/2021    CREATININE 1.4 03/29/2021    ALKPHOS 48 03/29/2021    ALT 12 03/29/2021    AST 16 03/29/2021       HgBA1c: No components found for: HGBA1C Lab Results   Component Value Date    VITD25 34 12/28/2017         No results found for: ANASCRN  No results found for: SSA  No results found for: SSB  No results found for: ANTI-SMITH  No results found for: DSDNAAB   No results found for: ANTIRNP  No results found for: C3, C4  No results found for: CCPAB  Lab Results   Component Value Date    RF <15 11/09/2017       No components found for: CANCASCRN, APANCASCRN  Lab Results   Component Value Date    SEDRATE 5 03/29/2021     Lab Results   Component Value Date    CRP 1.11 02/01/2021       RADIOLOGY:     CT chest: 2/23/2021:     Diffuse mosaic attenuation of the lungs is nonspecific and may be seen with air trapping secondary to small airway disease such as bronchiolitis or asthma.       Cholelithiasis.       Mild cardiomegaly with calcific atherosclerosis of the coronary arteries. ASSESSMENT/PLAN    Assessment   Plan     Seronegative rheumatoid arthritis  - negative JAMAL, RF, CCP, SSA, SSB, normal uric acid and negative secondary evaluation for CPPD arthropathy  - prior tx: lumbar epidural injections, trigger point injections, PT, tramadol, gabapentin, prednisone, arava (diarrhea), azathioprine (nausea, vomiting), remicaide 3mg/kg (CHF exacerbation) Avoiding IL6 & GUILLERMO- diverticulitis, T cell inhibition- COPD     - azathioprine 100 mg daily   - prednisone 5 mg daily   - plaquenil 200mg twice daily. - start rituxan 1000 mg x2. Side effects discussed including leukopenia, infection, infusion reactions can be severe, PML, GBS, RACHAEL virus   -  Percocet 5/325mg twice daily prn from other provider. COPD:   - CT chest w/ changes consistent with air trapping-  - PFTs w/ mild to mod obstructive lung disease-   - ongoing treatment with pulmology. Anemia - resolved.    - additional testing ordered for further evaluation- not completed- orders reprinted   - being treated by nephrology    Bilateral hip osteoarthritis - prior tx: bursal injections  - hip

## 2021-06-08 ENCOUNTER — HOSPITAL ENCOUNTER (EMERGENCY)
Age: 81
Discharge: HOME | End: 2021-06-08
Attending: FAMILY MEDICINE
Payer: MEDICARE

## 2021-06-08 ENCOUNTER — TELEPHONE (OUTPATIENT)
Dept: RHEUMATOLOGY | Age: 81
End: 2021-06-08

## 2021-06-08 ENCOUNTER — APPOINTMENT (OUTPATIENT)
Dept: GENERAL RADIOLOGY | Age: 81
End: 2021-06-08
Payer: MEDICARE

## 2021-06-08 ENCOUNTER — HOSPITAL ENCOUNTER (OUTPATIENT)
Dept: INFUSION THERAPY | Age: 81
Discharge: HOME | End: 2021-06-08
Payer: MEDICARE

## 2021-06-08 VITALS
DIASTOLIC BLOOD PRESSURE: 77 MMHG | OXYGEN SATURATION: 94 % | HEIGHT: 66 IN | BODY MASS INDEX: 31.71 KG/M2 | TEMPERATURE: 98.6 F | HEART RATE: 77 BPM | SYSTOLIC BLOOD PRESSURE: 160 MMHG | RESPIRATION RATE: 26 BRPM | WEIGHT: 197.3 LBS

## 2021-06-08 VITALS
SYSTOLIC BLOOD PRESSURE: 149 MMHG | OXYGEN SATURATION: 96 % | TEMPERATURE: 97.8 F | RESPIRATION RATE: 20 BRPM | HEART RATE: 66 BPM | DIASTOLIC BLOOD PRESSURE: 51 MMHG

## 2021-06-08 DIAGNOSIS — M06.00 SERONEGATIVE RHEUMATOID ARTHRITIS (HCC): Primary | ICD-10-CM

## 2021-06-08 DIAGNOSIS — T78.2XXA ANAPHYLAXIS, INITIAL ENCOUNTER: Primary | ICD-10-CM

## 2021-06-08 LAB
EKG ATRIAL RATE: 78 BPM
EKG P-R INTERVAL: 184 MS
EKG Q-T INTERVAL: 472 MS
EKG QRS DURATION: 134 MS
EKG QTC CALCULATION (BAZETT): 531 MS
EKG T AXIS: 4 DEGREES
EKG VENTRICULAR RATE: 76 BPM

## 2021-06-08 PROCEDURE — 2580000003 HC RX 258: Performed by: NURSE PRACTITIONER

## 2021-06-08 PROCEDURE — 6360000002 HC RX W HCPCS: Performed by: STUDENT IN AN ORGANIZED HEALTH CARE EDUCATION/TRAINING PROGRAM

## 2021-06-08 PROCEDURE — 96413 CHEMO IV INFUSION 1 HR: CPT

## 2021-06-08 PROCEDURE — 6370000000 HC RX 637 (ALT 250 FOR IP): Performed by: INTERNAL MEDICINE

## 2021-06-08 PROCEDURE — 93010 ELECTROCARDIOGRAM REPORT: CPT | Performed by: NUCLEAR MEDICINE

## 2021-06-08 PROCEDURE — 96376 TX/PRO/DX INJ SAME DRUG ADON: CPT

## 2021-06-08 PROCEDURE — 6360000002 HC RX W HCPCS: Performed by: NURSE PRACTITIONER

## 2021-06-08 PROCEDURE — 96415 CHEMO IV INFUSION ADDL HR: CPT

## 2021-06-08 PROCEDURE — 2500000003 HC RX 250 WO HCPCS: Performed by: STUDENT IN AN ORGANIZED HEALTH CARE EDUCATION/TRAINING PROGRAM

## 2021-06-08 PROCEDURE — 96375 TX/PRO/DX INJ NEW DRUG ADDON: CPT

## 2021-06-08 PROCEDURE — 99213 OFFICE O/P EST LOW 20 MIN: CPT

## 2021-06-08 PROCEDURE — 71045 X-RAY EXAM CHEST 1 VIEW: CPT

## 2021-06-08 PROCEDURE — 6360000002 HC RX W HCPCS

## 2021-06-08 PROCEDURE — 6370000000 HC RX 637 (ALT 250 FOR IP): Performed by: NURSE PRACTITIONER

## 2021-06-08 PROCEDURE — 93005 ELECTROCARDIOGRAM TRACING: CPT | Performed by: STUDENT IN AN ORGANIZED HEALTH CARE EDUCATION/TRAINING PROGRAM

## 2021-06-08 RX ORDER — HEPARIN SODIUM (PORCINE) LOCK FLUSH IV SOLN 100 UNIT/ML 100 UNIT/ML
500 SOLUTION INTRAVENOUS PRN
Status: CANCELLED | OUTPATIENT
Start: 2021-06-22

## 2021-06-08 RX ORDER — SODIUM CHLORIDE 0.9 % (FLUSH) 0.9 %
5 SYRINGE (ML) INJECTION PRN
Status: CANCELLED | OUTPATIENT
Start: 2021-06-22

## 2021-06-08 RX ORDER — EPINEPHRINE 0.3 MG/.3ML
0.3 INJECTION SUBCUTANEOUS ONCE
Qty: 0.3 ML | Refills: 0 | Status: SHIPPED | OUTPATIENT
Start: 2021-06-08 | End: 2021-11-19

## 2021-06-08 RX ORDER — ACETAMINOPHEN 325 MG/1
650 TABLET ORAL ONCE
Status: COMPLETED | OUTPATIENT
Start: 2021-06-08 | End: 2021-06-08

## 2021-06-08 RX ORDER — DIPHENHYDRAMINE HYDROCHLORIDE 50 MG/ML
25 INJECTION INTRAMUSCULAR; INTRAVENOUS ONCE
Status: COMPLETED | OUTPATIENT
Start: 2021-06-08 | End: 2021-06-08

## 2021-06-08 RX ORDER — MEPERIDINE HYDROCHLORIDE 25 MG/ML
12.5 INJECTION INTRAMUSCULAR; INTRAVENOUS; SUBCUTANEOUS ONCE
Status: CANCELLED | OUTPATIENT
Start: 2021-06-22 | End: 2021-06-22

## 2021-06-08 RX ORDER — DIPHENHYDRAMINE HYDROCHLORIDE 50 MG/ML
25 INJECTION INTRAMUSCULAR; INTRAVENOUS ONCE
Status: CANCELLED | OUTPATIENT
Start: 2021-06-22 | End: 2021-06-22

## 2021-06-08 RX ORDER — ALBUTEROL SULFATE 90 UG/1
2 AEROSOL, METERED RESPIRATORY (INHALATION) EVERY 6 HOURS PRN
Status: DISCONTINUED | OUTPATIENT
Start: 2021-06-08 | End: 2021-06-09 | Stop reason: HOSPADM

## 2021-06-08 RX ORDER — ACETAMINOPHEN 325 MG/1
650 TABLET ORAL ONCE
Status: CANCELLED | OUTPATIENT
Start: 2021-06-22 | End: 2021-06-22

## 2021-06-08 RX ORDER — SODIUM CHLORIDE 9 MG/ML
INJECTION, SOLUTION INTRAVENOUS CONTINUOUS
Status: DISCONTINUED | OUTPATIENT
Start: 2021-06-08 | End: 2021-06-08 | Stop reason: HOSPADM

## 2021-06-08 RX ORDER — METHYLPREDNISOLONE SODIUM SUCCINATE 125 MG/2ML
100 INJECTION, POWDER, LYOPHILIZED, FOR SOLUTION INTRAMUSCULAR; INTRAVENOUS ONCE
Status: COMPLETED | OUTPATIENT
Start: 2021-06-08 | End: 2021-06-08

## 2021-06-08 RX ORDER — METHYLPREDNISOLONE SODIUM SUCCINATE 125 MG/2ML
125 INJECTION, POWDER, LYOPHILIZED, FOR SOLUTION INTRAMUSCULAR; INTRAVENOUS ONCE
Status: CANCELLED | OUTPATIENT
Start: 2021-06-22 | End: 2021-06-22

## 2021-06-08 RX ORDER — SODIUM CHLORIDE 0.9 % (FLUSH) 0.9 %
10 SYRINGE (ML) INJECTION PRN
Status: CANCELLED | OUTPATIENT
Start: 2021-06-22

## 2021-06-08 RX ORDER — DIPHENHYDRAMINE HYDROCHLORIDE 50 MG/ML
50 INJECTION INTRAMUSCULAR; INTRAVENOUS ONCE
Status: COMPLETED | OUTPATIENT
Start: 2021-06-08 | End: 2021-06-08

## 2021-06-08 RX ORDER — DIPHENHYDRAMINE HYDROCHLORIDE 50 MG/ML
INJECTION INTRAMUSCULAR; INTRAVENOUS
Status: COMPLETED
Start: 2021-06-08 | End: 2021-06-08

## 2021-06-08 RX ORDER — SODIUM CHLORIDE 9 MG/ML
INJECTION, SOLUTION INTRAVENOUS CONTINUOUS
Status: CANCELLED | OUTPATIENT
Start: 2021-06-22

## 2021-06-08 RX ORDER — DIPHENHYDRAMINE HYDROCHLORIDE 50 MG/ML
50 INJECTION INTRAMUSCULAR; INTRAVENOUS ONCE
Status: CANCELLED | OUTPATIENT
Start: 2021-06-22 | End: 2021-06-22

## 2021-06-08 RX ORDER — METHYLPREDNISOLONE SODIUM SUCCINATE 125 MG/2ML
100 INJECTION, POWDER, LYOPHILIZED, FOR SOLUTION INTRAMUSCULAR; INTRAVENOUS ONCE
Status: CANCELLED | OUTPATIENT
Start: 2021-06-22 | End: 2021-06-22

## 2021-06-08 RX ORDER — SODIUM CHLORIDE 9 MG/ML
INJECTION, SOLUTION INTRAVENOUS CONTINUOUS
Status: DISCONTINUED | OUTPATIENT
Start: 2021-06-08 | End: 2021-06-09 | Stop reason: HOSPADM

## 2021-06-08 RX ADMIN — SODIUM CHLORIDE: 9 INJECTION, SOLUTION INTRAVENOUS at 09:45

## 2021-06-08 RX ADMIN — ACETAMINOPHEN 650 MG: 325 TABLET ORAL at 09:50

## 2021-06-08 RX ADMIN — DIPHENHYDRAMINE HYDROCHLORIDE 50 MG: 50 INJECTION INTRAMUSCULAR; INTRAVENOUS at 12:34

## 2021-06-08 RX ADMIN — ALBUTEROL SULFATE 2 PUFF: 90 AEROSOL, METERED RESPIRATORY (INHALATION) at 12:40

## 2021-06-08 RX ADMIN — HYDROCORTISONE SODIUM SUCCINATE 100 MG: 100 INJECTION, POWDER, FOR SOLUTION INTRAMUSCULAR; INTRAVENOUS at 12:17

## 2021-06-08 RX ADMIN — METHYLPREDNISOLONE SODIUM SUCCINATE 100 MG: 125 INJECTION, POWDER, FOR SOLUTION INTRAMUSCULAR; INTRAVENOUS at 09:54

## 2021-06-08 RX ADMIN — FAMOTIDINE 20 MG: 10 INJECTION, SOLUTION INTRAVENOUS at 15:13

## 2021-06-08 RX ADMIN — EPINEPHRINE 0.3 MG: 1 INJECTION INTRAMUSCULAR; INTRAVENOUS; SUBCUTANEOUS at 15:13

## 2021-06-08 RX ADMIN — SODIUM CHLORIDE 1000 MG: 9 INJECTION, SOLUTION INTRAVENOUS at 09:56

## 2021-06-08 RX ADMIN — DIPHENHYDRAMINE HYDROCHLORIDE 25 MG: 50 INJECTION INTRAMUSCULAR; INTRAVENOUS at 09:51

## 2021-06-08 ASSESSMENT — ENCOUNTER SYMPTOMS
EYE PAIN: 0
FACIAL SWELLING: 1
TROUBLE SWALLOWING: 1
NAUSEA: 0
ABDOMINAL PAIN: 0
VOICE CHANGE: 1
SHORTNESS OF BREATH: 1
VOMITING: 0
COUGH: 0
DIARRHEA: 0
SORE THROAT: 1
SINUS PAIN: 0

## 2021-06-08 ASSESSMENT — PAIN DESCRIPTION - PAIN TYPE: TYPE: CHRONIC PAIN

## 2021-06-08 ASSESSMENT — PAIN SCALES - GENERAL: PAINLEVEL_OUTOF10: 9

## 2021-06-08 ASSESSMENT — PAIN DESCRIPTION - LOCATION: LOCATION: HIP

## 2021-06-08 NOTE — PROGRESS NOTES
Rituxan Administration:  First Infusion:  initial infusion rate should be 50 mg/hr. If no infusion related problems, increase IV rate in 50mg/hr increments every 30 min. Maximum rate of infusion is 400mg/hr or as ordered by physician. Subsequent Infusions: initial rate of 100mg/hr, increase in 100mg/hr increments every 30 minutes. Maximum rate of infusion is 400mg/hr or as ordered by the physician. If hypersensitivity reaction occurs, STOP RITUXAN and maintain plain IV. Notify physician immediately for additional orders.    Pre-medication s administered as ordered [x]   Assess for history of angina or heart arrhythmias [x]   Hypertensive meds held for 12 hours prior to Rituxan administration []   Document blood pressure, pulse, respiratory rate at start of infusion [x]   Document blood pressure, pulse, respiratory rate every 30 min with each increase in IV rate [x]   With each increase in IV rate, document if a reaction is suspected [x]   Document blood pressure, pulse, respiratory rate at the completion of infusion [x]   Other:    []

## 2021-06-08 NOTE — TELEPHONE ENCOUNTER
Received call from out patient nursing --- patient initially presented for right toxin infusion. On initial examination she was having some wheezing and rales. Patient was receiving her Rituxan infusion and developed increasing shortness of breath. During this time she was eating. Nursing reported patient had improvement with cough and fairly quickly receiving Solu-Cortef at 12:15 PM.  Patient had some throat discomfort reported and was given Benadryl at 12:30 PM.  Vitals currently stable with pulse ox of 95% on room air blood pressure mildly elevated at 161/72. There is no report of lip swelling, tongue swelling, rash or other symptoms. .       ddx -duration of food contact versus allergic reaction to medication versus other    -Continue monitoring for the next 30 minutes. If there is no progression of symptoms we may try to continue with the infusion.  - approved nursing to give bonchodilator (albuterol)        Addendum: 4:36 PM.    Outpatient oncology called. Symptoms recurred after initiation of the rituxan  Patient was sent to the ER by stefan for evaluation. After the fact it was noted that the patient had consumed 2 strawberry fruit bars and during the infusion with a known history of strawberry allergy. Given this information can definitely be possible that the tightness of the throat and other symptoms could be related to a food related allergy and not the medication. We will plan on redosing the medication in 2 weeks if the symptoms are resolved.

## 2021-06-08 NOTE — PROGRESS NOTES
Patient again complaining of throat closing and not able to catch breath. Caughing more and rhonchi throughout. Patient also was eating a snack. It is noted that each symptom of reaction also occurred while eating. Ruxience stopped. Dr Jessica Nelson notified. Order to stop Ruxience obtained.

## 2021-06-08 NOTE — ED PROVIDER NOTES
[place] ENCOUNTER          Pt Name: Darren Pyle  MRN: [id number]  [place] 1940  Date of evaluation: 6/8/2021  Treating Resident Physician: Bobbi Carrillo MD  Supervising Physician: Dr. Willian Gray MD    [address] [place] [address]       Chief Complaint   Patient presents with    Allergic Reaction     History obtained from the patient. HISTORY OF PRESENT ILLNESS    HPI  Darren Pyle is a 78 y.o. female who presents to the emergency department for evaluation of allergic reaction. Patient states she has a an allergy to strawberries. Patient states she was at the infusion center to receive infusion for her arthritis when she was eating a Nutrigrain bar that she did not know was strawberry flavored. Patient states she then developed some difficulty breathing and a felt like her mouth with swelling, difficulty talking and difficulty swallowing. Patient received Solu-Cortef and Benadryl at the infusion center. Patient states she still is having some mild difficulty with breathing and her mouth still feels swollen and she has not been difficulty speaking and swallowing. Patient denies any rash. Denies any chest pain. Patient denies any abdominal pain nausea or vomiting    Chart review shows patient has a history of diabetes, coronary artery disease, hypertension, A. fib, GERD, COPD, diverticulitis      Patient denies any new headache fever chills cough chest pain abdominal pain nausea vomiting diarrhea constipation leg swelling. The patient has no other acute complaints at this time. REVIEW OF SYSTEMS   Review of Systems   Constitutional: Negative for chills and fever. HENT: Positive for facial swelling, sore throat, trouble swallowing and voice change. Negative for ear pain and sinus pain. Eyes: Negative for pain. Respiratory: Positive for shortness of breath. Negative for cough.     Cardiovascular: Negative for chest pain, palpitations and leg swelling. Gastrointestinal: Negative for abdominal pain, diarrhea, nausea and vomiting. Genitourinary: Negative for dysuria. Musculoskeletal: Negative for gait problem and neck pain. Skin: Negative for rash and wound. Neurological: Negative for headaches. Psychiatric/Behavioral: Negative for confusion. PAST MEDICAL AND SURGICAL HISTORY     Past Medical History:   Diagnosis Date    Arthritis     Asthma 1978    Atrial fibrillation ([address]     Blood circulation, collateral     CAD (coronary artery disease)     CHF [facility]congestive heart failure) ([facility] 1999    Chronic kidney disease, stage III (moderate) ([facility])     COPD (chronic obstructive pulmonary disease) ([address]     Depression     Diabetes mellitus ([facility]     Diabetic hypertension-nephrosis syndrome ([facility])     Dysuria     GERD (gastroesophageal reflux disease)     Hyperlipidemia     Hypertension     Kidney disease     stage 3 dr Flavio George Unspecified cerebral artery occlusion with cerebral infarction 1999    Dr Eliot Harry informed patient after Heart Attack     Past Surgical History:   Procedure Laterality Date    APPENDECTOMY      BACK SURGERY      lumbar spine    BLADDER SUSPENSION      CARDIAC CATHETERIZATION  2009    [facility] stent  [facility]    CARPAL TUNNEL RELEASE      COLONOSCOPY  2013    ENDOSCOPY, COLON, DIAGNOSTIC      ESOPHAGEAL DILATATION      HYSTERECTOMY  1974    JOINT REPLACEMENT Left 1992    Left Knee         MEDICATIONS   No current facility-administered medications for this encounter.     Current Outpatient Medications:     EPINEPHrine (EPIPEN 2-LORAINE) 0.3 MG/0.3ML SOAJ injection, Inject 0.3 mLs into the muscle once for 1 dose Use as directed for allergic reaction, Disp: 0.3 mL, Rfl: 0    Tiotropium Bromide-Olodaterol 2.5-2.5 MCG/ACT AERS, Inhale 2 puffs into the lungs daily, Disp: 1 Inhaler, Rfl: 11    azaTHIOprine (IMURAN) 50 MG tablet, Take 2 tablets by mouth daily, Disp: 60 tablet, Rfl: 0   cloNIDine (CATAPRES) 0.1 MG tablet, Take 1 tablet by mouth 2 times daily, Disp: 60 tablet, Rfl: 3    hydrALAZINE (APRESOLINE) 100 MG tablet, Take 1 tablet by mouth 3 times daily, Disp: 90 tablet, Rfl: 3    metoprolol tartrate (LOPRESSOR) 25 MG tablet, Take 1 tablet by mouth 2 times daily, Disp: 60 tablet, Rfl: 3    predniSONE (DELTASONE) 5 MG tablet, TAKE 1 TABLET BY MOUTH EVERY DAY, Disp: 30 tablet, Rfl: 3    aluminum & magnesium hydroxide-simethicone (MYLANTA MAXIMUM STRENGTH) 400-400-40 MG/5ML SUSP, Take 30 mLs by mouth 4 times daily (before meals and nightly), Disp: , Rfl:     amLODIPine (NORVASC) 10 MG tablet, Take 10 mg by mouth daily, Disp: , Rfl:     calcium carbonate 600 MG TABS tablet, Take 1 tablet by mouth daily, Disp: , Rfl:     DULoxetine (CYMBALTA) 30 MG extended release capsule, Take 30 mg by mouth 2 times daily, Disp: , Rfl:     famotidine (PEPCID) 40 MG tablet, Take 40 mg by mouth daily, Disp: , Rfl:     gabapentin (NEURONTIN) 100 MG capsule, Take 100 mg by mouth 3 times daily. , Disp: , Rfl:     isosorbide mononitrate (IMDUR) 120 MG extended release tablet, Take 120 mg by mouth daily, Disp: , Rfl:     pantoprazole (PROTONIX) 40 MG tablet, Take 40 mg by mouth daily , Disp: , Rfl:     spironolactone (ALDACTONE) 50 MG tablet, Take 50 mg by mouth daily, Disp: , Rfl:     valsartan-hydroCHLOROthiazide (DIOVAN-HCT) 320-25 MG per tablet, Take 1 tablet by mouth daily, Disp: , Rfl:     hydroxychloroquine (PLAQUENIL) 200 MG tablet, TAKE 1 TABLET BY MOUTH EVERY DAY, Disp: 90 tablet, Rfl: 1    atorvastatin (LIPITOR) 40 MG tablet, Take 2 tablets by mouth daily, Disp: 60 tablet, Rfl: 2    docusate sodium (COLACE, DULCOLAX) 100 MG CAPS, Take 100 mg by mouth 2 times daily, Disp: 60 capsule, Rfl: 1    insulin glargine (LANTUS) 100 UNIT/ML injection pen, Inject 8 Units into the skin nightly , Disp: , Rfl:     dexlansoprazole (DEXILANT) 60 MG CPDR delayed release capsule, Take 60 mg by mouth daily, Disp: , Rfl:     potassium chloride (KLOR-CON) 20 MEQ packet, Take 10 mEq by mouth daily , Disp: , Rfl:     fenofibrate (TRICOR) 145 MG tablet, Take 145 mg by mouth daily, Disp: , Rfl:     aspirin 81 MG EC tablet, Take 162 mg by mouth daily, Disp: , Rfl:     ranolazine (RANEXA) 500 MG SR tablet, Take 1 tablet by mouth 2 times daily. , Disp: 60 tablet, Rfl: 3    Magnesium Oxide 250 MG TABS, Take 400 mg by mouth 2 times daily , Disp: , Rfl:     albuterol (PROVENTIL HFA;VENTOLIN HFA) 108 (90 BASE) MCG/ACT inhaler, Inhale 2 puffs into the lungs every 6 hours as needed. , Disp: , Rfl:     montelukast (SINGULAIR) 5 MG chewable tablet, Take 5 mg by mouth nightly.  , Disp: , Rfl:     traZODone (DESYREL) 50 MG tablet, Take 100 mg by mouth nightly , Disp: , Rfl:     nitroGLYCERIN (NITROSTAT) 0.4 MG SL tablet, Place 0.4 mg under the tongue every 5 minutes as needed.   , Disp: , Rfl:     Facility-Administered Medications Ordered in Other Encounters:     0.9 % sodium chloride infusion, , Intravenous, Continuous, LONDON Dunn CNP, Stopped at 06/08/21 1405    0.9 % sodium chloride infusion, , Intravenous, Continuous, LONDON Dunn CNP    albuterol sulfate  (90 Base) MCG/ACT inhaler 2 puff, 2 puff, Inhalation, Q6H PRN, Lupillo Pertay, DO, 2 puff at 06/08/21 1240      SOCIAL HISTORY     Social History     Social History Narrative    Not on file     Social History     Tobacco Use    Smoking status: Light Tobacco Smoker     Packs/day: 0.15     Years: 10.00     Pack years: 1.50     Types: Cigarettes     Start date: 10/21/2013    Smokeless tobacco: Never Used   Vaping Use    Vaping Use: Never used   Substance Use Topics    Alcohol use: No    Drug use: No         ALLERGIES     Allergies   Allergen Reactions    Renflexis [Infliximab] Shortness Of Breath and Palpitations    Strawberry (Diagnostic) Anaphylaxis     Diverticulitis    Nuts [Peanut-Containing Drug Products]      Pt EpiPen. ED RESULTS   Laboratory results:  Labs Reviewed - No data to display    Radiologic studies results:  XR CHEST PORTABLE   Final Result   1. Moderate cardiomegaly. Prominent eventration right hemidiaphragm. 2. Suggestion of mild atelectasis/pneumonia along right hemidiaphragm and in the lingula. No effusion seen. 3. Mild degenerative changes right shoulder. Marked degenerative changes left shoulder. Abnormal widening left AC joint, probably related to prior surgery. Clinical correlation recommended. **This report has been created using voice recognition software. It may contain minor errors which are inherent in voice recognition technology. **      Final report electronically signed by Dr. Lyric Horne on 6/8/2021 3:12 PM          ED Medications administered this visit:   Medications   famotidine (PEPCID) injection 20 mg (20 mg Intravenous Given 6/8/21 1513)   EPINEPHrine 1 MG/ML injection 0.3 mg (0.3 mg Intramuscular Given 6/8/21 1513)         ED COURSE     ED Course as of Jun 08 1942   Tue Jun 08, 2021   1516 CXR:IMPRESSION:  1. Moderate cardiomegaly. Prominent eventration right hemidiaphragm. 2. Suggestion of mild atelectasis/pneumonia along right hemidiaphragm and in the lingula. No effusion seen. 3. Mild degenerative changes right shoulder. Marked degenerative changes left shoulder. Abnormal widening left AC joint, probably related to prior surgery. Clinical correlation recommended. [CR]   1716 4 hours observation ends at 1913; 7:13 pm.    [CR]   1139 Reevaluated patient, tongue edema improved, difficulty breathing and slurred speech improved. [CR]      ED Course User Index  [CR] Nicol Zimmer MD        Strict return precautions and follow up instructions were discussed with the patient prior to discharge, with which the patient agrees.       MEDICATION CHANGES     New Prescriptions    EPINEPHRINE (EPIPEN 2-LORAINE) 0.3 MG/0.3ML SOAJ INJECTION    Inject 0.3 mLs into the muscle

## 2021-06-08 NOTE — PROGRESS NOTES
Patient again experiencing throat tightening and choking feeling. Worsening rhonchi noted. Squad called. Vitals as charted. Dr Cristofer Harmon notified of above. While transferring to [facility] stretch, EMS staff asked patient if she had any allergies. She stated chocolate and strawberries. When asked if she had eaten any thing today she mentioned eating nutrigrain bar. Wrapper in [address] noted to be strawberry flavor. Off unit to ER via squad. Dr Cristofer Harmon made aware of this as well. Report called to [facility] RN in ER.

## 2021-06-08 NOTE — PLAN OF CARE
Problem: Pain:  Description: Pain management should include both nonpharmacologic and pharmacologic interventions. Goal: Pain level will decrease  Description: Pain level will decrease  Outcome: Met This Shift  Goal: Control of acute pain  Description: Control of acute pain  Outcome: Met This Shift  Goal: Control of chronic pain  Description: Control of chronic pain  Outcome: Met This Shift  Note: Patient rating pain a 9 out of 10 using MIGUEL scale, Pain located in hip. Intervention: Opioid analgesia side-effects  Description: Opioid analgesia side-effects - REMINDER(s):  Bradycardia. Confusion. Constipation. Respiratory function, decreased. Note: Patient encouraged to take prescribed pain medications and call Physician if pain is not controlled. Problem: Musculor/Skeletal Functional Status  Goal: Absence of falls  Outcome: Met This Shift  Note: Patient free from falls while in [facility]. Intervention: Fall precautions  Note: Patient assessed for fall risk on admission to [address] [place] [address]. Fall band placed on patient. Discussed the need to use the call light for assistance prior to getting up out of chair/bed. Problem: Intellectual/Education/Knowledge Deficit  Goal: Teaching initiated upon admission  Outcome: Met This Shift  Note: Patient verbalizes understanding to verbal information given on Ruxience ,action and possible side effects. Aware to call MD if develop complications.     Intervention: Verbal/written education provided  Note: Chemotherapy Teaching     What is Chemotherapy   Drug action [x]   Method of Administration [x]   Handouts given []     Side Effects  Nausea/vomiting [x]   Diarrhea [x]   Fatigue [x]   Signs / Symptoms of infection [x]   Neutropenia [x]   Thrombocytopenia [x]   Alopecia [x]   neuropathy [x]   Delight diet &  the importance of fluids [x]       Micellaneous  Importance of nutrition [x]   Importance of oral hygiene [x]   When to call the MD [x]   Monitoring labs [x] Use of supportive services []     Explanation of Drug Regimen / Frequency  Ruxience: D1C1     Comments  Verbalized understanding to drug,action,side effects and when to call MD         Problem: Discharge Planning:  Goal: Discharged to appropriate level of care  Description: Discharged to appropriate level of care  Outcome: Met This Shift  Note: Verbalized understanding of discharge instructions, follow-up appointments, and when to call the physician. Intervention: Assess readiness for discharge  Description: Assess readiness for discharge  Note: Discuss understanding of discharge instructions,follow-up appointments, and when to call the physician. Care plan reviewed with patient. Patient verbalize understanding of the plan of care and contribute to goal setting.

## 2021-06-08 NOTE — ED TRIAGE NOTES
Patient presents from outpatient oncology for allergic reaction to strawberries. Patient was at infusion center for infusion for her RA. States she was snacking on a strawberry nutrigrain bar throughout her infusion. States she is allergic to strawberries. Infusion center gave 125mg of solu-medrol and 75mg of benadryl. Patient states she doesn't know why she kept eating the nutrigrain bar even though she was allergic to it.

## 2021-06-17 ENCOUNTER — TELEPHONE (OUTPATIENT)
Dept: PULMONOLOGY | Age: 81
End: 2021-06-17

## 2021-06-17 NOTE — TELEPHONE ENCOUNTER
I called Ms. Noris Remy taking care of patient Patricia Segura at given phone number. Unfortunately she  is not available to speak with me at this time. I left a message on Ms. Marvin Aparicio answering service to call back to our office as soon as possible. Please inform Aneesh Harrison to discontinue Combivent. Patient need to be treated with following meds only. Stiolto Respimat ( Tiotropium bromide and Olodaterol) 2.5mcg/2.5mcg per actuation 2 puffs once daily. Albuterol 2.5mg via nebs Q6h prn  Or  Albuterol HFA 2 puffs Q6h PRN- as rescue medication.

## 2021-06-25 ENCOUNTER — TELEPHONE (OUTPATIENT)
Dept: RHEUMATOLOGY | Age: 81
End: 2021-06-25

## 2021-06-25 NOTE — TELEPHONE ENCOUNTER
Dr. Minaya Days okay'd to call pt and confirm no needs    Called pt but she was eating spoke with her nurse Tiera Ramirez stated no issue and the pt has no needs at this time. Told them to contact the office if anything changes.

## 2021-06-25 NOTE — TELEPHONE ENCOUNTER
----- Message from Delia Maria DO sent at 6/9/2021  7:13 AM EDT -----  Will you please call the patient and see if she can come into clinic Friday or do a virtual appointment.

## 2021-08-04 ENCOUNTER — HOSPITAL ENCOUNTER (OUTPATIENT)
Dept: NURSING | Age: 81
Discharge: HOME | End: 2021-08-04
Payer: MEDICARE

## 2021-08-04 VITALS
SYSTOLIC BLOOD PRESSURE: 169 MMHG | HEART RATE: 53 BPM | OXYGEN SATURATION: 92 % | RESPIRATION RATE: 18 BRPM | TEMPERATURE: 97.8 F | DIASTOLIC BLOOD PRESSURE: 85 MMHG

## 2021-08-04 DIAGNOSIS — M85.89 OSTEOPENIA OF MULTIPLE SITES: Primary | ICD-10-CM

## 2021-08-04 PROCEDURE — 6360000002 HC RX W HCPCS: Performed by: INTERNAL MEDICINE

## 2021-08-04 PROCEDURE — 96372 THER/PROPH/DIAG INJ SC/IM: CPT

## 2021-08-04 RX ORDER — DIPHENHYDRAMINE HYDROCHLORIDE 50 MG/ML
50 INJECTION INTRAMUSCULAR; INTRAVENOUS ONCE
Status: CANCELLED | OUTPATIENT
Start: 2022-02-02 | End: 2022-02-02

## 2021-08-04 RX ORDER — METHYLPREDNISOLONE SODIUM SUCCINATE 125 MG/2ML
125 INJECTION, POWDER, LYOPHILIZED, FOR SOLUTION INTRAMUSCULAR; INTRAVENOUS ONCE
Status: CANCELLED | OUTPATIENT
Start: 2022-02-02 | End: 2022-02-02

## 2021-08-04 RX ORDER — SODIUM CHLORIDE 9 MG/ML
INJECTION, SOLUTION INTRAVENOUS CONTINUOUS
Status: CANCELLED | OUTPATIENT
Start: 2022-02-02

## 2021-08-04 RX ADMIN — DENOSUMAB 60 MG: 60 INJECTION SUBCUTANEOUS at 11:54

## 2021-08-04 ASSESSMENT — PAIN - FUNCTIONAL ASSESSMENT: PAIN_FUNCTIONAL_ASSESSMENT: 0-10

## 2021-08-04 NOTE — PROGRESS NOTES
1152 Patient arrived to [facility] in wheelchair for prolia injection. Oriented to room and call light  PT RIGHTS AND RESPONSIBILITIES OFFERED TO PT. Injection given and she denies complaints. Discharge instructions given and explained and she denies questions. Discharge in wheel chair.      __M__ Safety:       (Environmental)   [place] to environment   Ensure ID band is correct and in place/ allergy band as needed   Assess for fall risk   Initiate fall precautions as applicable (fall band, side rails, etc.)   Call light within reach   Bed in low position/ wheels locked    __M__ Pain:        Assess pain level and characteristics   Administer analgesics as ordered   Assess effectiveness of pain management and report to MD as needed    __M__ Knowledge Deficit:   Assess baseline knowledge   Provide teaching at level of understanding   Provide teaching via preferred learning method   Evaluate teaching effectiveness    __M__ Hemodynamic/Respiratory Status:       (Pre and Post Procedure Monitoring)   Assess/Monitor vital signs and LOC   Assess Baseline SpO2 prior to any sedation   Obtain weight/height   Assess vital signs/ LOC until patient meets discharge criteria   Monitor procedure site and notify MD of any issues

## 2021-08-16 ENCOUNTER — HOSPITAL ENCOUNTER (OUTPATIENT)
Dept: CT IMAGING | Age: 81
Discharge: HOME | End: 2021-08-16
Payer: MEDICARE

## 2021-08-16 DIAGNOSIS — J44.9 MODERATE COPD (CHRONIC OBSTRUCTIVE PULMONARY DISEASE) (HCC): ICD-10-CM

## 2021-08-16 DIAGNOSIS — J20.9 ACUTE BRONCHITIS, UNSPECIFIED ORGANISM: ICD-10-CM

## 2021-08-16 DIAGNOSIS — R93.89 ABNORMAL CT OF THE CHEST: ICD-10-CM

## 2021-08-16 DIAGNOSIS — Z77.22 TOBACCO SMOKE EXPOSURE: ICD-10-CM

## 2021-08-16 PROCEDURE — 71250 CT THORAX DX C-: CPT

## 2021-08-17 ENCOUNTER — OFFICE VISIT (OUTPATIENT)
Dept: RHEUMATOLOGY | Age: 81
End: 2021-08-17
Payer: MEDICARE

## 2021-08-17 VITALS
SYSTOLIC BLOOD PRESSURE: 130 MMHG | WEIGHT: 197.31 LBS | DIASTOLIC BLOOD PRESSURE: 72 MMHG | HEART RATE: 56 BPM | BODY MASS INDEX: 31.71 KG/M2 | HEIGHT: 66 IN | OXYGEN SATURATION: 90 %

## 2021-08-17 DIAGNOSIS — M15.9 OSTEOARTHRITIS OF MULTIPLE JOINTS, UNSPECIFIED OSTEOARTHRITIS TYPE: ICD-10-CM

## 2021-08-17 DIAGNOSIS — Z51.81 MEDICATION MONITORING ENCOUNTER: ICD-10-CM

## 2021-08-17 DIAGNOSIS — M54.42 CHRONIC MIDLINE LOW BACK PAIN WITH LEFT-SIDED SCIATICA: ICD-10-CM

## 2021-08-17 DIAGNOSIS — M85.89 OSTEOPENIA OF MULTIPLE SITES: ICD-10-CM

## 2021-08-17 DIAGNOSIS — M06.00 SERONEGATIVE RHEUMATOID ARTHRITIS (HCC): Primary | ICD-10-CM

## 2021-08-17 DIAGNOSIS — G89.29 CHRONIC MIDLINE LOW BACK PAIN WITH LEFT-SIDED SCIATICA: ICD-10-CM

## 2021-08-17 PROCEDURE — 4040F PNEUMOC VAC/ADMIN/RCVD: CPT | Performed by: NURSE PRACTITIONER

## 2021-08-17 PROCEDURE — 4004F PT TOBACCO SCREEN RCVD TLK: CPT | Performed by: NURSE PRACTITIONER

## 2021-08-17 PROCEDURE — 99214 OFFICE O/P EST MOD 30 MIN: CPT | Performed by: NURSE PRACTITIONER

## 2021-08-17 PROCEDURE — 1090F PRES/ABSN URINE INCON ASSESS: CPT | Performed by: NURSE PRACTITIONER

## 2021-08-17 PROCEDURE — 1123F ACP DISCUSS/DSCN MKR DOCD: CPT | Performed by: NURSE PRACTITIONER

## 2021-08-17 PROCEDURE — G8399 PT W/DXA RESULTS DOCUMENT: HCPCS | Performed by: NURSE PRACTITIONER

## 2021-08-17 PROCEDURE — G8417 CALC BMI ABV UP PARAM F/U: HCPCS | Performed by: NURSE PRACTITIONER

## 2021-08-17 PROCEDURE — G8427 DOCREV CUR MEDS BY ELIG CLIN: HCPCS | Performed by: NURSE PRACTITIONER

## 2021-08-17 RX ORDER — EPINEPHRINE 1 MG/ML
0.3 INJECTION, SOLUTION, CONCENTRATE INTRAVENOUS PRN
Status: CANCELLED | OUTPATIENT
Start: 2021-08-17

## 2021-08-17 RX ORDER — DIPHENHYDRAMINE HYDROCHLORIDE 50 MG/ML
50 INJECTION INTRAMUSCULAR; INTRAVENOUS ONCE
Status: CANCELLED | OUTPATIENT
Start: 2021-08-17 | End: 2021-08-17

## 2021-08-17 RX ORDER — METHYLPREDNISOLONE SODIUM SUCCINATE 125 MG/2ML
125 INJECTION, POWDER, LYOPHILIZED, FOR SOLUTION INTRAMUSCULAR; INTRAVENOUS ONCE
Status: CANCELLED | OUTPATIENT
Start: 2021-08-17 | End: 2021-08-17

## 2021-08-17 RX ORDER — SODIUM CHLORIDE 9 MG/ML
INJECTION, SOLUTION INTRAVENOUS CONTINUOUS
Status: CANCELLED | OUTPATIENT
Start: 2021-08-17

## 2021-08-17 ASSESSMENT — ENCOUNTER SYMPTOMS
EYE ITCHING: 0
VOICE CHANGE: 0
DIARRHEA: 0
NAUSEA: 0
SHORTNESS OF BREATH: 1
CONSTIPATION: 1
TROUBLE SWALLOWING: 0
BACK PAIN: 1
ABDOMINAL PAIN: 0
EYE PAIN: 0
WHEEZING: 1
COUGH: 0

## 2021-08-17 NOTE — PROGRESS NOTES
[facility] RHEUMATOLOGY FOLLOW UP NOTE       Date Of Service: 8/17/2021  Provider: LONDON Sawyer - CNP    Name: Mary Kelley   MRN: [id number]    Chief Complaint(s)     Chief Complaint   Patient presents with    Follow-up     2 MONTHS Rheumatoid Arthritis         History of Present Illness (HPI)     Mary Kelley  is a(n)81 y.o. female with a hx of obesity, angina, CAD, T2DM, non-rheumatic mitral regurg, HTN, GERD, CKD stage III, COPD, depression here for the f/u evaluation of osteoarthritis multiple joints, inflammatory arthritis, chondrocalcinosis bilateral wrists (? CPPD arthropathy), hypercalcemia. Interval hx:    - started rituxan 6/8/21    pain affecting the fingers, wrists, elbows, shoulders, hips, knees, ankles, toes, neck  Pain on a scale 0-10: 9/10  Type of pain: constant  Timing: n/a  Aggravating factors: weather changes, hands: use, knees: standing, back: walking, standing, bending, prolonged sitting. Neck: turning head to left  Alleviating factors: none    Associated symptoms:  denies swelling/  Redness/ warmth, + AM stiffness lasting 2-3 hours    REVIEW OF SYSTEMS: (ROS)    Review of Systems   Constitutional: Negative for fatigue, fever and unexpected weight change. HENT: Negative for congestion, trouble swallowing and voice change. Dry mouth   Eyes: Negative for pain and itching. Dry eyes   Respiratory: Positive for shortness of breath and wheezing. Negative for cough. Cardiovascular: Negative for chest pain and leg swelling. Gastrointestinal: Positive for constipation. Negative for abdominal pain, diarrhea and nausea. Endocrine: Negative for cold intolerance and heat intolerance. Genitourinary: Negative for difficulty urinating, frequency and urgency. Musculoskeletal: Positive for arthralgias, back pain, joint swelling and neck pain. Negative for myalgias. Skin: Negative for rash.    Neurological: Negative for dizziness, weakness, numbness and headaches. Hematological: Does not bruise/bleed easily. Psychiatric/Behavioral: Positive for sleep disturbance. The patient is not nervous/anxious.         PAST MEDICAL HISTORY      Past Medical History:   Diagnosis Date    Arthritis     Asthma 1978    [facility]rial fibrillation [facility]     Blood circulation, collateral     CAD (coronary artery disease)     CHF (congestive heart failure) ([facility] [address] 1999    Chronic kidney disease, stage III (moderate) (HCC)     [facility]     Depression     Diabetes mellitus [facility]     Diabetic hypertension-nephrosis syndrome (HCC)     Dysuria     GERD (gastroesophageal reflux disease)     Hyperlipidemia     Hypertension     Kidney disease     stage 3 dr Maame Blount Unspecified cerebral artery occlusion with cerebral infarction 1999    Dr Jadiel Morales informed patient after Heart Attack       PAST SURGICAL HISTORY      Past Surgical History:   Procedure Laterality Date    APPENDECTOMY      BACK SURGERY      lumbar spine    BLADDER SUSPENSION      [facility]  [facility]    CARPAL TUNNEL RELEASE      COLONOSCOPY  2013    ENDOSCOPY, COLON, DIAGNOSTIC      ESOPHAGEAL DILATATION      HYSTERECTOMY  1974    JOINT REPLACEMENT Left 1992    Left Knee       FAMILY HISTORY      Family History   Problem Relation Age of Onset    Heart Disease Mother     Diabetes Mother     Kidney Disease Mother     Heart Disease Father     Diabetes Father     Cancer Sister     Stroke Brother     Cancer Sister        SOCIAL HISTORY      Social History     Tobacco History     Smoking Status  Light Tobacco Smoker Smoking Start Date  10/21/2013 Smoking Frequency  0.15 packs/day for 10 years (1.5 pk yrs) Smoking Tobacco Type  Cigarettes    Smokeless Tobacco Use  Never Used          Alcohol History     Alcohol Use Status  No          Drug Use     Drug Use Status  No          Sexual Activity     Sexually Active  Never ALLERGIES     Allergies   Allergen Reactions    Renflexis [Infliximab] Shortness Of Breath and Palpitations    Strawberry (Diagnostic) Anaphylaxis     Diverticulitis    Nuts [Peanut-Containing Drug Products]      Pt suffers from diverticulitis and nut products irritate her stomach.  Sulfa Antibiotics     Tape Zayda Rice Tape] Other (See Comments)     tears skin off    Bactrim [Sulfamethoxazole-Trimethoprim] Rash     Little red rash    Chocolate Rash       CURRENT MEDICATIONS      Current Outpatient Medications   Medication Sig Dispense Refill    Tiotropium Bromide-Olodaterol 2.5-2.5 MCG/ACT AERS Inhale 2 puffs into the lungs daily 1 Inhaler 11    azaTHIOprine (IMURAN) 50 MG tablet Take 2 tablets by mouth daily 60 tablet 0    cloNIDine (CATAPRES) 0.1 MG tablet Take 1 tablet by mouth 2 times daily 60 tablet 3    hydrALAZINE (APRESOLINE) 100 MG tablet Take 1 tablet by mouth 3 times daily 90 tablet 3    metoprolol tartrate (LOPRESSOR) 25 MG tablet Take 1 tablet by mouth 2 times daily 60 tablet 3    predniSONE (DELTASONE) 5 MG tablet TAKE 1 TABLET BY MOUTH EVERY DAY 30 tablet 3    aluminum & magnesium hydroxide-simethicone (MYLANTA MAXIMUM STRENGTH) 400-400-40 MG/5ML SUSP Take 30 mLs by mouth 4 times daily (before meals and nightly)      amLODIPine (NORVASC) 10 MG tablet Take 10 mg by mouth daily      calcium carbonate 600 MG TABS tablet Take 1 tablet by mouth daily      DULoxetine (CYMBALTA) 30 MG extended release capsule Take 30 mg by mouth 2 times daily      famotidine (PEPCID) 40 MG tablet Take 40 mg by mouth daily      gabapentin (NEURONTIN) 100 MG capsule Take 100 mg by mouth 3 times daily.       isosorbide mononitrate (IMDUR) 120 MG extended release tablet Take 120 mg by mouth daily      pantoprazole (PROTONIX) 40 MG tablet Take 40 mg by mouth daily       spironolactone (ALDACTONE) 50 MG tablet Take 50 mg by mouth daily      valsartan-hydroCHLOROthiazide (DIOVAN-HCT) 320-25 MG per breath sounds. Abdominal:      Palpations: Abdomen is soft. Tenderness: There is no abdominal tenderness. Musculoskeletal:      Cervical back: Normal range of motion and neck supple. Skin:     General: Skin is warm and dry. Findings: No rash. Neurological:      Mental Status: She is alert and oriented to person, place, and time. Deep Tendon Reflexes: Reflexes are normal and symmetric. Psychiatric:         Thought Content: Thought content normal.     Musculoskeletal:     Normal gait. Strength 5/5 in biceps, triceps, hips, knees,      Upper extremities:   Shoulders:  + tender bilat, no swelling,   Elbows:      + tender bilat, no swelling,  Wrists        + tender right, no swelling  Hands:     + tender bilat MCPs, PIPs, DIPs.  + swelling right 2,3 MCP    Lower extremities:  Hips:      + tender bilat outer hips  Knees :   + tender right, no swelling  Ankles:   + tender bilat, no swelling,  Feet:       + MTP compression        RAPID 3:   8/17/2021 --- RAPID 3: 2.3 + 9 + 8.5 = 22.8     Remission: <3  Low Disease Activity: <6  Moderate Disease Activity: >=6 and <=12  High Disease Activity: >12     LABS    CBC  Lab Results   Component Value Date    WBC 6.3 03/29/2021    RBC 3.79 03/29/2021    HGB 12.4 03/29/2021    HCT 37.9 03/29/2021    .1 03/29/2021    MCH 32.8 03/29/2021    MCHC 32.7 03/29/2021    RDW 15.0 03/29/2021     03/29/2021       CMP  Lab Results   Component Value Date    CALCIUM 9.0 03/29/2021    LABALBU 3.3 03/29/2021    PROT 6.1 11/06/2020     03/29/2021    K 4.2 03/29/2021    K 3.6 11/06/2020    CO2 28 03/29/2021     03/29/2021    BUN 29 03/29/2021    CREATININE 1.4 03/29/2021    ALKPHOS 48 03/29/2021    ALT 12 03/29/2021    AST 16 03/29/2021       HgBA1c: No components found for: HGBA1C    Lab Results   Component Value Date    VITD25 34 12/28/2017         No results found for: ANASCRN  No results found for: SSA  No results found for: SSB  No results found for: ANTI-SMITH  No results found for: DSDNAAB   No results found for: ANTIRNP  No results found for: C3, C4  No results found for: CCPAB  Lab Results   Component Value Date    RF <15 11/09/2017       No components found for: Shaun Sara Results   Component Value Date    SEDRATE 5 03/29/2021     Lab Results   Component Value Date    CRP 1.11 02/01/2021       RADIOLOGY:         ASSESSMENT/PLAN    Assessment   Plan     Seronegative rheumatoid arthritis  - negative JAMAL, RF, CCP, SSA, SSB, normal uric acid and negative secondary evaluation for CPPD arthropathy  - prior tx: lumbar epidural injections, trigger point injections, PT, tramadol, gabapentin, prednisone, arava (diarrhea), azathioprine (nausea, vomiting), remicaide 3mg/kg (CHF exacerbation)  - avoiding IL6 & GUILLERMO- diverticulitis, T cell inhibition- COPD   - azathioprine 100 mg daily   - prednisone 5 mg daily   - plaquenil 200 mg BID   - rituxan 1000 mg q 6 months (6/8/2021)   - request recent bloodwork from nursing home- do not have anything since 3/2021     COPD  - CT chest w/ changes consistent with air trapping. PFTs w/ mild to mod obstructive lung disease   - following with pulmonology    Anemia- resolved    Bilateral hip osteoarthritis  - prior tx: bursal injections. Hip replacement on hold due to cardiac concerns    Chronic pain of right knee  - h/o osteoarthritis, knee pain worse with wt bearing, improved w/ non wt bearing  - XR right knee (11/2017)- no abnormalities  - avoiding oral NSAIDs given CKD and CAD   - cymbalta 30 mg daily   - percocet from pain management    Osteopenia w/ elevated FRAX  - 13%  Major and 4.5% hip   - Prolia 60 mg subcu q 6 months (Oct 2019)    Medication monitoring   - CBC, CMP, sed rate, CRP q 8 weeks x2- due now      No follow-ups on file.         Electronically signed by LONDON Botello - CNP on 8/17/2021 at 8:44 AM    New Prescriptions    No medications on file       Thank you for allowing me to

## 2021-08-23 ENCOUNTER — OFFICE VISIT (OUTPATIENT)
Dept: PULMONOLOGY | Age: 81
End: 2021-08-23
Payer: MEDICARE

## 2021-08-23 VITALS
TEMPERATURE: 98 F | HEIGHT: 65 IN | HEART RATE: 52 BPM | BODY MASS INDEX: 30.32 KG/M2 | OXYGEN SATURATION: 95 % | WEIGHT: 182 LBS | DIASTOLIC BLOOD PRESSURE: 82 MMHG | SYSTOLIC BLOOD PRESSURE: 136 MMHG

## 2021-08-23 DIAGNOSIS — E66.9 OBESITY (BMI 35.0-39.9 WITHOUT COMORBIDITY): ICD-10-CM

## 2021-08-23 DIAGNOSIS — J44.9 COPD WITH ASTHMA (HCC): ICD-10-CM

## 2021-08-23 DIAGNOSIS — J45.41 MODERATE PERSISTENT ASTHMA WITH EXACERBATION: Primary | ICD-10-CM

## 2021-08-23 DIAGNOSIS — Z87.891 PERSONAL HISTORY OF TOBACCO USE: ICD-10-CM

## 2021-08-23 LAB
ALBUMIN SERPL-MCNC: NORMAL G/DL
ALP BLD-CCNC: NORMAL U/L
ALT SERPL-CCNC: NORMAL U/L
ANION GAP SERPL CALCULATED.3IONS-SCNC: 8 MMOL/L
AST SERPL-CCNC: NORMAL U/L
BASOPHILS ABSOLUTE: 0 /ΜL
BASOPHILS RELATIVE PERCENT: 0.5 %
BILIRUB SERPL-MCNC: NORMAL MG/DL
BUN BLDV-MCNC: 38 MG/DL
C-REACTIVE PROTEIN: 0.68
CALCIUM SERPL-MCNC: 9.6 MG/DL
CHLORIDE BLD-SCNC: 107 MMOL/L
CO2: 31 MMOL/L
CREAT SERPL-MCNC: 1.7 MG/DL
EOSINOPHILS ABSOLUTE: 0.3 /ΜL
EOSINOPHILS RELATIVE PERCENT: 5.1 %
GFR CALCULATED: 32
GLUCOSE BLD-MCNC: 123 MG/DL
HCT VFR BLD CALC: 32.9 % (ref 36–46)
HEMOGLOBIN: 10.8 G/DL (ref 12–16)
LYMPHOCYTES ABSOLUTE: 0.7 /ΜL
LYMPHOCYTES RELATIVE PERCENT: 11.8 %
MCH RBC QN AUTO: 32.4 PG
MCHC RBC AUTO-ENTMCNC: 32.9 G/DL
MCV RBC AUTO: 98.7 FL
MONOCYTES ABSOLUTE: 0.5 /ΜL
MONOCYTES RELATIVE PERCENT: 8.6 %
NEUTROPHILS ABSOLUTE: 4.3 /ΜL
NEUTROPHILS RELATIVE PERCENT: 74 %
PDW BLD-RTO: 14.2 %
PLATELET # BLD: 313 K/ΜL
PMV BLD AUTO: 8 FL
POTASSIUM SERPL-SCNC: 4.1 MMOL/L
RBC # BLD: 3.33 10^6/ΜL
SEDIMENTATION RATE, ERYTHROCYTE: 14
SODIUM BLD-SCNC: 142 MMOL/L
TOTAL PROTEIN: NORMAL
WBC # BLD: 5.8 10^3/ML

## 2021-08-23 PROCEDURE — G8399 PT W/DXA RESULTS DOCUMENT: HCPCS | Performed by: NURSE PRACTITIONER

## 2021-08-23 PROCEDURE — 1036F TOBACCO NON-USER: CPT | Performed by: NURSE PRACTITIONER

## 2021-08-23 PROCEDURE — G8417 CALC BMI ABV UP PARAM F/U: HCPCS | Performed by: NURSE PRACTITIONER

## 2021-08-23 PROCEDURE — 4040F PNEUMOC VAC/ADMIN/RCVD: CPT | Performed by: NURSE PRACTITIONER

## 2021-08-23 PROCEDURE — 1123F ACP DISCUSS/DSCN MKR DOCD: CPT | Performed by: NURSE PRACTITIONER

## 2021-08-23 PROCEDURE — 1090F PRES/ABSN URINE INCON ASSESS: CPT | Performed by: NURSE PRACTITIONER

## 2021-08-23 PROCEDURE — 99214 OFFICE O/P EST MOD 30 MIN: CPT | Performed by: NURSE PRACTITIONER

## 2021-08-23 PROCEDURE — 3023F SPIROM DOC REV: CPT | Performed by: NURSE PRACTITIONER

## 2021-08-23 PROCEDURE — G8926 SPIRO NO PERF OR DOC: HCPCS | Performed by: NURSE PRACTITIONER

## 2021-08-23 PROCEDURE — G8427 DOCREV CUR MEDS BY ELIG CLIN: HCPCS | Performed by: NURSE PRACTITIONER

## 2021-08-23 RX ORDER — FLUTICASONE FUROATE, UMECLIDINIUM BROMIDE AND VILANTEROL TRIFENATATE 100; 62.5; 25 UG/1; UG/1; UG/1
1 POWDER RESPIRATORY (INHALATION) DAILY
Qty: 30 EACH | Refills: 11 | Status: ON HOLD | OUTPATIENT
Start: 2021-08-23 | End: 2021-09-30

## 2021-08-23 RX ORDER — PREDNISONE 20 MG/1
40 TABLET ORAL DAILY
Qty: 10 TABLET | Refills: 0 | Status: SHIPPED | OUTPATIENT
Start: 2021-08-23 | End: 2021-08-28

## 2021-08-23 ASSESSMENT — ENCOUNTER SYMPTOMS
TROUBLE SWALLOWING: 1
CHEST TIGHTNESS: 1
WHEEZING: 0
NAUSEA: 0
VOMITING: 0
DIARRHEA: 0
EYES NEGATIVE: 1
COUGH: 1
ABDOMINAL PAIN: 0
SHORTNESS OF BREATH: 1

## 2021-08-23 NOTE — PATIENT INSTRUCTIONS
Stop stioto inhaler, start new inhaler called Trelegy. - I sent you with 2 sample today in the office. Wheezing on exam - start prednisone 40 mg burst x 5 days.  Ok to continue long term 5 mg prednisone daily     Scripts printed and sent with patient

## 2021-08-23 NOTE — PROGRESS NOTES
[facility]     Patient: Corina Burnett, 78 y.o.   : 1940    Pt of Dr. Tiffanie Bravo   Patient presents with    Follow-up     COPD  3 month pulmonary follow up with CT 2021        HPI  Lottie Nicholas is here for 3 month  follow up for COPD with CT   tx for COPD exac per Dr Saji Aceves last visit with Doxycyline   Started on Stiolto respimat   Has noticed no improvement in symptoms on Stiolto   History of Abnormal CT chest : HRCT - Diffuse mosaic attenuation of the lungs is nonspecific and may be seen with air trapping secondary to small airway disease such as bronchiolitis or asthma. Cholelithiasis. Mild cardiomegaly with calcific atherosclerosis of the coronary arteries   F/u CT showing same diffuse nonspecific mosaic attenuation   Former work: [employer], Was around glass [employer]s     taking medications as instructed, notes dyspnea, notes cough- productive, clear to yellow mucous , not requiring oxygen- symptoms have been present for years, at baseline. Having trouble swallowing.   Has f/u with GI, history of esophageal stricture     Current Inhalers:  Stiolto , Albuterol     Are the above symptoms at your baseline Yes  Any recent exacerbations that have required oral atb or steroids No  Any new medical issues since last visit : No      Last Spirometry : 2021  Has had COVID 19 vaccines   Not on home O2       SOCIAL HISTORY:  Social History     Tobacco Use    Smoking status: Former Smoker     Packs/day: 0.15     Years: 10.00     Pack years: 1.50     Types: Cigarettes     Start date: 10/21/2013    Smokeless tobacco: Never Used   Vaping Use    Vaping Use: Never used   Substance Use Topics    Alcohol use: No    Drug use: No         CURRENT MEDICATIONS:  Current Outpatient Medications   Medication Sig Dispense Refill    fluticasone-umeclidin-vilant (TRELEGY ELLIPTA) 100-62.5-25 MCG/INH AEPB Inhale 1 puff into the lungs daily 30 each 11    predniSONE (DELTASONE) 20 MG tablet Take 2 tablets by mouth daily for 5 days 10 tablet 0    Tiotropium Bromide-Olodaterol 2.5-2.5 MCG/ACT AERS Inhale 2 puffs into the lungs daily 1 Inhaler 11    azaTHIOprine (IMURAN) 50 MG tablet Take 2 tablets by mouth daily 60 tablet 0    cloNIDine (CATAPRES) 0.1 MG tablet Take 1 tablet by mouth 2 times daily 60 tablet 3    hydrALAZINE (APRESOLINE) 100 MG tablet Take 1 tablet by mouth 3 times daily 90 tablet 3    metoprolol tartrate (LOPRESSOR) 25 MG tablet Take 1 tablet by mouth 2 times daily 60 tablet 3    predniSONE (DELTASONE) 5 MG tablet TAKE 1 TABLET BY MOUTH EVERY DAY 30 tablet 3    aluminum & magnesium hydroxide-simethicone (MYLANTA MAXIMUM STRENGTH) 400-400-40 MG/5ML SUSP Take 30 mLs by mouth 4 times daily (before meals and nightly)      amLODIPine (NORVASC) 10 MG tablet Take 10 mg by mouth daily      calcium carbonate 600 MG TABS tablet Take 1 tablet by mouth daily      DULoxetine (CYMBALTA) 30 MG extended release capsule Take 30 mg by mouth 2 times daily      famotidine (PEPCID) 40 MG tablet Take 40 mg by mouth daily      gabapentin (NEURONTIN) 100 MG capsule Take 100 mg by mouth 3 times daily.       isosorbide mononitrate (IMDUR) 120 MG extended release tablet Take 120 mg by mouth daily      pantoprazole (PROTONIX) 40 MG tablet Take 40 mg by mouth daily       spironolactone (ALDACTONE) 50 MG tablet Take 50 mg by mouth daily      valsartan-hydroCHLOROthiazide (DIOVAN-HCT) 320-25 MG per tablet Take 1 tablet by mouth daily      hydroxychloroquine (PLAQUENIL) 200 MG tablet TAKE 1 TABLET BY MOUTH EVERY DAY 90 tablet 1    atorvastatin (LIPITOR) 40 MG tablet Take 2 tablets by mouth daily 60 tablet 2    docusate sodium (COLACE, DULCOLAX) 100 MG CAPS Take 100 mg by mouth 2 times daily 60 capsule 1    insulin glargine (LANTUS) 100 UNIT/ML injection pen Inject 8 Units into the skin nightly       dexlansoprazole (DEXILANT) 60 MG CPDR delayed release capsule Take 60 mg by mouth daily      potassium chloride (KLOR-CON) 20 MEQ packet Take 10 mEq by mouth daily       fenofibrate (TRICOR) 145 MG tablet Take 145 mg by mouth daily      aspirin 81 MG EC tablet Take 162 mg by mouth daily      ranolazine (RANEXA) 500 MG SR tablet Take 1 tablet by mouth 2 times daily. 60 tablet 3    Magnesium Oxide 250 MG TABS Take 400 mg by mouth 2 times daily       albuterol (PROVENTIL HFA;VENTOLIN HFA) 108 (90 BASE) MCG/ACT inhaler Inhale 2 puffs into the lungs every 6 hours as needed.  montelukast (SINGULAIR) 5 MG chewable tablet Take 5 mg by mouth nightly.  traZODone (DESYREL) 50 MG tablet Take 100 mg by mouth nightly       nitroGLYCERIN (NITROSTAT) 0.4 MG SL tablet Place 0.4 mg under the tongue every 5 minutes as needed.  EPINEPHrine (EPIPEN 2-LORAINE) 0.3 MG/0.3ML SOAJ injection Inject 0.3 mLs into the muscle once for 1 dose Use as directed for allergic reaction 0.3 mL 0     No current facility-administered medications for this visit. Rosario GUERRA   Review of Systems   Constitutional: Negative for activity change, chills, fatigue, fever and unexpected weight change. HENT: Positive for trouble swallowing. Eyes: Negative. Respiratory: Positive for cough, chest tightness and shortness of breath. Negative for wheezing. Cardiovascular: Negative for chest pain, palpitations and leg swelling. Gastrointestinal: Negative for abdominal pain, diarrhea, nausea and vomiting. Genitourinary: Negative. Musculoskeletal: Negative. Skin: Negative. Neurological: Negative. Hematological: Does not bruise/bleed easily. Psychiatric/Behavioral: Negative for suicidal ideas. The patient is not nervous/anxious.          Physical exam   /82 (Site: Left Upper Arm, Position: Sitting)   Pulse 52   Temp 98 °F (36.7 °C)   Ht 5' 5\" (1.651 m)   Wt 182 lb (82.6 kg) Comment: per patient( would not stand on scale)  SpO2 95% Comment: on RA  BMI 30.29 kg/m²      Wt Readings from Last 3 Encounters:   08/23/21 182 lb (82.6 kg)   08/17/21 197 lb 5 oz (89.5 kg)   06/08/21 197 lb 4.8 oz (89.5 kg)     Physical Exam  Vitals and nursing note reviewed. Constitutional:       General: She is not in acute distress. Appearance: She is well-developed. She is obese. HENT:      Mouth/Throat:      Lips: Pink. Mouth: Mucous membranes are moist.      Pharynx: Oropharynx is clear. No oropharyngeal exudate or posterior oropharyngeal erythema. Eyes:      Conjunctiva/sclera: Conjunctivae normal.   Neck:      Vascular: No JVD. Cardiovascular:      Rate and Rhythm: Normal rate and regular rhythm. Heart sounds: No murmur heard. No friction rub. Pulmonary:      Effort: Pulmonary effort is normal. No accessory muscle usage or respiratory distress. Breath sounds: Decreased air movement present. Examination of the right-upper field reveals wheezing. Examination of the left-upper field reveals wheezing. Examination of the right-middle field reveals wheezing. Examination of the left-middle field reveals wheezing. Examination of the right-lower field reveals wheezing. Examination of the left-lower field reveals wheezing. Wheezing present. No rhonchi or rales. Comments: Tight aeration with wheezing throughout   Chest:      Chest wall: No tenderness. Musculoskeletal:      Right lower leg: No edema. Left lower leg: No edema. Skin:     General: Skin is warm and dry. Capillary Refill: Capillary refill takes less than 2 seconds. Nails: There is no clubbing. Neurological:      Mental Status: She is alert. Psychiatric:         Mood and Affect: Mood normal.         Behavior: Behavior normal.         Thought Content:  Thought content normal.         Judgment: Judgment normal.          Test results   Lung Nodule Screening     [] Qualifies    [x]Does not qualify   [] Declined    [] Completed   Pulmonary function tests: Ok to continue long term 5 mg prednisone daily   Scripts printed and sent with patient   Continue Singulair PO nightly   Keep f/u with GI physician for GERD/trouble swallowing     Total time spent on encounter was 35 minutes    Will see Cruzito Moore in: 4 months    Electronically signed by LONDON Hoyos CNP on 8/23/2021 at 3:23 PM

## 2021-08-25 ENCOUNTER — TELEPHONE (OUTPATIENT)
Dept: RHEUMATOLOGY | Age: 81
End: 2021-08-25

## 2021-08-25 NOTE — TELEPHONE ENCOUNTER
----- Message from Sheryle Dus, APRN - CNP sent at 8/24/2021  4:30 PM EDT -----  Please Review  Blood counts with anemia and mild lymphopenia .  Awaiting results from [facility]

## 2021-09-07 DIAGNOSIS — D64.9 NORMOCYTIC ANEMIA: Primary | ICD-10-CM

## 2021-09-07 DIAGNOSIS — N18.30 STAGE 3 CHRONIC KIDNEY DISEASE, UNSPECIFIED WHETHER STAGE 3A OR 3B CKD (HCC): ICD-10-CM

## 2021-09-07 DIAGNOSIS — D72.810 LYMPHOPENIA: ICD-10-CM

## 2021-09-07 DIAGNOSIS — M19.90 INFLAMMATORY ARTHRITIS: ICD-10-CM

## 2021-09-08 ENCOUNTER — TELEPHONE (OUTPATIENT)
Dept: RHEUMATOLOGY | Age: 81
End: 2021-09-08

## 2021-09-08 RX ORDER — AZATHIOPRINE 50 MG/1
100 TABLET ORAL DAILY
Qty: 60 TABLET | Refills: 0 | Status: SHIPPED | OUTPATIENT
Start: 2021-09-08 | End: 2021-11-01 | Stop reason: SDUPTHER

## 2021-09-08 RX ORDER — HYDROXYCHLOROQUINE SULFATE 200 MG/1
TABLET, FILM COATED ORAL
Qty: 90 TABLET | Refills: 1 | Status: SHIPPED | OUTPATIENT
Start: 2021-09-08

## 2021-09-08 NOTE — PROGRESS NOTES
Diagnosis Orders   1. Normocytic anemia  CBC Auto Differential   2. Lymphopenia  CBC Auto Differential   3. Stage 3 chronic kidney disease, unspecified whether stage 3a or 3b CKD (HCC)  Comprehensive Metabolic Panel    Urinalysis   . - repeat labs in 4 weeks.

## 2021-09-08 NOTE — TELEPHONE ENCOUNTER
----- Message from Pat Hidalgo DO sent at 9/8/2021  7:35 AM EDT -----  Lab testing revealing increased kidney function tests which are similar to findings from approximately 2 months ago. Please have your labs repeated in 4 weeks. Your blood counts revealed a mild worsening anemia and a low platelet count which can be related to the inflammatory arthritis or other medications. Again we will repeat these tests in approximately 1 month.

## 2021-09-14 ENCOUNTER — HOSPITAL ENCOUNTER (OUTPATIENT)
Dept: CT IMAGING | Age: 81
Discharge: HOME | End: 2021-09-14
Payer: MEDICARE

## 2021-09-14 DIAGNOSIS — R10.84 ABDOMINAL PAIN, GENERALIZED: ICD-10-CM

## 2021-09-14 PROCEDURE — 74176 CT ABD & PELVIS W/O CONTRAST: CPT

## 2021-09-28 ENCOUNTER — HOSPITAL ENCOUNTER (OUTPATIENT)
Age: 81
Setting detail: OUTPATIENT SURGERY
Discharge: NURSING, REHAB OR LONG-TERM CARE | DRG: 683 | End: 2021-09-28
Attending: INTERNAL MEDICINE | Admitting: INTERNAL MEDICINE
Payer: MEDICARE

## 2021-09-28 ENCOUNTER — ANESTHESIA (OUTPATIENT)
Dept: ENDOSCOPY | Age: 81
DRG: 683 | End: 2021-09-28
Payer: MEDICARE

## 2021-09-28 ENCOUNTER — ANESTHESIA EVENT (OUTPATIENT)
Dept: ENDOSCOPY | Age: 81
DRG: 683 | End: 2021-09-28
Payer: MEDICARE

## 2021-09-28 VITALS
OXYGEN SATURATION: 98 % | BODY MASS INDEX: 29.66 KG/M2 | RESPIRATION RATE: 18 BRPM | TEMPERATURE: 97 F | WEIGHT: 178 LBS | SYSTOLIC BLOOD PRESSURE: 131 MMHG | DIASTOLIC BLOOD PRESSURE: 62 MMHG | HEART RATE: 57 BPM | HEIGHT: 65 IN

## 2021-09-28 VITALS
RESPIRATION RATE: 18 BRPM | DIASTOLIC BLOOD PRESSURE: 69 MMHG | SYSTOLIC BLOOD PRESSURE: 147 MMHG | OXYGEN SATURATION: 99 %

## 2021-09-28 LAB — GLUCOSE BLD-MCNC: 157 MG/DL (ref 70–108)

## 2021-09-28 PROCEDURE — 82948 REAGENT STRIP/BLOOD GLUCOSE: CPT

## 2021-09-28 PROCEDURE — 3609012700 HC EGD DILATION SAVORY: Performed by: INTERNAL MEDICINE

## 2021-09-28 PROCEDURE — 2580000003 HC RX 258: Performed by: INTERNAL MEDICINE

## 2021-09-28 PROCEDURE — 7100000010 HC PHASE II RECOVERY - FIRST 15 MIN: Performed by: INTERNAL MEDICINE

## 2021-09-28 PROCEDURE — 2500000003 HC RX 250 WO HCPCS: Performed by: NURSE ANESTHETIST, CERTIFIED REGISTERED

## 2021-09-28 PROCEDURE — 2709999900 HC NON-CHARGEABLE SUPPLY: Performed by: INTERNAL MEDICINE

## 2021-09-28 PROCEDURE — 0DB48ZX EXCISION OF ESOPHAGOGASTRIC JUNCTION, VIA NATURAL OR ARTIFICIAL OPENING ENDOSCOPIC, DIAGNOSTIC: ICD-10-PCS | Performed by: INTERNAL MEDICINE

## 2021-09-28 PROCEDURE — 3609012400 HC EGD TRANSORAL BIOPSY SINGLE/MULTIPLE: Performed by: INTERNAL MEDICINE

## 2021-09-28 PROCEDURE — 88305 TISSUE EXAM BY PATHOLOGIST: CPT

## 2021-09-28 PROCEDURE — 6360000002 HC RX W HCPCS: Performed by: NURSE ANESTHETIST, CERTIFIED REGISTERED

## 2021-09-28 PROCEDURE — 3700000000 HC ANESTHESIA ATTENDED CARE: Performed by: INTERNAL MEDICINE

## 2021-09-28 PROCEDURE — 0DJ08ZZ INSPECTION OF UPPER INTESTINAL TRACT, VIA NATURAL OR ARTIFICIAL OPENING ENDOSCOPIC: ICD-10-PCS | Performed by: INTERNAL MEDICINE

## 2021-09-28 PROCEDURE — 3700000001 HC ADD 15 MINUTES (ANESTHESIA): Performed by: INTERNAL MEDICINE

## 2021-09-28 PROCEDURE — 7100000011 HC PHASE II RECOVERY - ADDTL 15 MIN: Performed by: INTERNAL MEDICINE

## 2021-09-28 PROCEDURE — 0D758ZZ DILATION OF ESOPHAGUS, VIA NATURAL OR ARTIFICIAL OPENING ENDOSCOPIC: ICD-10-PCS | Performed by: INTERNAL MEDICINE

## 2021-09-28 RX ORDER — BUMETANIDE 1 MG/1
1 TABLET ORAL DAILY
Status: ON HOLD | COMMUNITY
End: 2021-10-01 | Stop reason: SDUPTHER

## 2021-09-28 RX ORDER — LIDOCAINE HYDROCHLORIDE 20 MG/ML
INJECTION, SOLUTION INFILTRATION; PERINEURAL PRN
Status: DISCONTINUED | OUTPATIENT
Start: 2021-09-28 | End: 2021-09-28 | Stop reason: SDUPTHER

## 2021-09-28 RX ORDER — GLYCOPYRROLATE 1 MG/5 ML
SYRINGE (ML) INTRAVENOUS PRN
Status: DISCONTINUED | OUTPATIENT
Start: 2021-09-28 | End: 2021-09-28 | Stop reason: SDUPTHER

## 2021-09-28 RX ORDER — SODIUM CHLORIDE 9 MG/ML
INJECTION, SOLUTION INTRAVENOUS CONTINUOUS
Status: DISCONTINUED | OUTPATIENT
Start: 2021-09-28 | End: 2021-09-28 | Stop reason: HOSPADM

## 2021-09-28 RX ORDER — LOSARTAN POTASSIUM 50 MG/1
50 TABLET ORAL DAILY
COMMUNITY

## 2021-09-28 RX ORDER — CARBOXYMETHYLCELLULOSE SODIUM 10 MG/ML
1 SOLUTION/ DROPS OPHTHALMIC DAILY PRN
COMMUNITY

## 2021-09-28 RX ORDER — SODIUM CHLORIDE 0.9 % (FLUSH) 0.9 %
5-40 SYRINGE (ML) INJECTION EVERY 12 HOURS SCHEDULED
Status: DISCONTINUED | OUTPATIENT
Start: 2021-09-28 | End: 2021-09-28 | Stop reason: HOSPADM

## 2021-09-28 RX ORDER — OXYCODONE HYDROCHLORIDE AND ACETAMINOPHEN 5; 325 MG/1; MG/1
1 TABLET ORAL 3 TIMES DAILY
Status: ON HOLD | COMMUNITY
End: 2022-04-26 | Stop reason: HOSPADM

## 2021-09-28 RX ORDER — ACETAMINOPHEN 500 MG
500 TABLET ORAL EVERY 8 HOURS PRN
Status: ON HOLD | COMMUNITY
End: 2022-04-26 | Stop reason: HOSPADM

## 2021-09-28 RX ORDER — LOPERAMIDE HYDROCHLORIDE 2 MG/1
2 CAPSULE ORAL EVERY 12 HOURS PRN
COMMUNITY

## 2021-09-28 RX ORDER — SODIUM CHLORIDE 9 MG/ML
25 INJECTION, SOLUTION INTRAVENOUS PRN
Status: DISCONTINUED | OUTPATIENT
Start: 2021-09-28 | End: 2021-09-28 | Stop reason: HOSPADM

## 2021-09-28 RX ORDER — SODIUM CHLORIDE 0.9 % (FLUSH) 0.9 %
5-40 SYRINGE (ML) INJECTION PRN
Status: DISCONTINUED | OUTPATIENT
Start: 2021-09-28 | End: 2021-09-28 | Stop reason: HOSPADM

## 2021-09-28 RX ADMIN — SODIUM CHLORIDE: 9 INJECTION, SOLUTION INTRAVENOUS at 15:14

## 2021-09-28 RX ADMIN — PROPOFOL 30 MG: 10 INJECTION, EMULSION INTRAVENOUS at 15:16

## 2021-09-28 RX ADMIN — LIDOCAINE HYDROCHLORIDE 80 MG: 20 INJECTION, SOLUTION INFILTRATION; PERINEURAL at 15:16

## 2021-09-28 RX ADMIN — PROPOFOL 20 MG: 10 INJECTION, EMULSION INTRAVENOUS at 15:23

## 2021-09-28 RX ADMIN — PROPOFOL 30 MG: 10 INJECTION, EMULSION INTRAVENOUS at 15:20

## 2021-09-28 RX ADMIN — Medication 0.2 MG: at 15:16

## 2021-09-28 ASSESSMENT — PAIN SCALES - GENERAL
PAINLEVEL_OUTOF10: 0
PAINLEVEL_OUTOF10: 0

## 2021-09-28 ASSESSMENT — PAIN - FUNCTIONAL ASSESSMENT: PAIN_FUNCTIONAL_ASSESSMENT: 0-10

## 2021-09-28 NOTE — PROGRESS NOTES
EGD completed. Pictures taken. Dilation complete. [address] used. Guide wire spring intact. 1 biopsies taken. Biopsies removed with cold forceps. Specimens fixed in formalin. Specimens sent to lab. Patient tolerated well. Scope  used.

## 2021-09-28 NOTE — ANESTHESIA PRE PROCEDURE
Department of Anesthesiology  Preprocedure Note       Name:  Albina Allen   Age:  78 y.o.  :  1940                                          MRN:  [id number]         Date:  2021      Surgeon: Rosa Rondon):  Nando Sarkar MD    Procedure: Procedure(s):  EGD DILATION    Medications prior to admission:   Prior to Admission medications    Medication Sig Start Date End Date Taking? Authorizing Provider   hydroxychloroquine (PLAQUENIL) 200 MG tablet TAKE 1 TABLET BY MOUTH EVERY DAY 21  Yes Trayton B Juarez, DO   azaTHIOprine (IMURAN) 50 MG tablet Take 2 tablets by mouth daily 21  Yes Trayton B Juarez, DO   fluticasone-umeclidin-vilant (TRELEGY ELLIPTA) 100-62.5-25 MCG/INH AEPB Inhale 1 puff into the lungs daily 21 Yes Melissa Tyson APRN - CNP   cloNIDine (CATAPRES) 0.1 MG tablet Take 1 tablet by mouth 2 times daily 20  Yes He Narayanan MD   metoprolol tartrate (LOPRESSOR) 25 MG tablet Take 1 tablet by mouth 2 times daily 20  Yes He Narayanan MD   predniSONE (DELTASONE) 5 MG tablet TAKE 1 TABLET BY MOUTH EVERY DAY 20  Yes LONDON Cuba - CNP   calcium carbonate 600 MG TABS tablet Take 1 tablet by mouth daily   Yes Historical Provider, MD   DULoxetine (CYMBALTA) 30 MG extended release capsule Take 30 mg by mouth 2 times daily   Yes Historical Provider, MD   famotidine (PEPCID) 40 MG tablet Take 40 mg by mouth daily   Yes Historical Provider, MD   gabapentin (NEURONTIN) 100 MG capsule Take 100 mg by mouth 3 times daily.    Yes Historical Provider, MD   atorvastatin (LIPITOR) 40 MG tablet Take 2 tablets by mouth daily 8/10/20  Yes Mara Pall, DO   docusate sodium (COLACE, DULCOLAX) 100 MG CAPS Take 100 mg by mouth 2 times daily 8/10/20  Yes Drucella Pall, DO   insulin glargine (LANTUS) 100 UNIT/ML injection pen Inject 8 Units into the skin nightly    Yes Historical Provider, MD   dexlansoprazole (DEXILANT) 60 MG CPDR delayed release capsule Take 60 mg by mouth daily   Yes Historical Provider, MD   fenofibrate (TRICOR) 145 MG tablet Take 145 mg by mouth daily   Yes Historical Provider, MD   aspirin 81 MG EC tablet Take 162 mg by mouth daily   Yes Historical Provider, MD   ranolazine (RANEXA) 500 MG SR tablet Take 1 tablet by mouth 2 times daily. 9/27/14  Yes Sandip Martines MD   Magnesium Oxide 250 MG TABS Take 400 mg by mouth 2 times daily    Yes Historical Provider, MD   albuterol (PROVENTIL HFA;VENTOLIN HFA) 108 (90 BASE) MCG/ACT inhaler Inhale 2 puffs into the lungs every 6 hours as needed. Yes Historical Provider, MD   nitroGLYCERIN (NITROSTAT) 0.4 MG SL tablet Place 0.4 mg under the tongue every 5 minutes as needed. Yes Historical Provider, MD   EPINEPHrine (EPIPEN 2-LORAINE) 0.3 MG/0.3ML SOAJ injection Inject 0.3 mLs into the muscle once for 1 dose Use as directed for allergic reaction 6/8/21 6/8/21  Rosy Espinal MD   Tiotropium Bromide-Olodaterol 2.5-2.5 MCG/ACT AERS Inhale 2 puffs into the lungs daily 5/18/21 5/18/22  Sammi Ordaz MD   spironolactone (ALDACTONE) 50 MG tablet Take 50 mg by mouth daily    Historical Provider, MD   traZODone (DESYREL) 50 MG tablet Take 100 mg by mouth nightly     Historical Provider, MD       Current medications:    Current Facility-Administered Medications   Medication Dose Route Frequency Provider Last Rate Last Admin    0.9 % sodium chloride infusion   IntraVENous Continuous Finesse Franks MD        sodium chloride flush 0.9 % injection 5-40 mL  5-40 mL IntraVENous 2 times per day Finesse Franks MD        sodium chloride flush 0.9 % injection 5-40 mL  5-40 mL IntraVENous PRN Finesse Franks MD        0.9 % sodium chloride infusion  25 mL IntraVENous PRN Finesse Franks MD           Allergies:     Allergies   Allergen Reactions    Renflexis [Infliximab] Shortness Of Breath and Palpitations    Strawberry (Diagnostic) Anaphylaxis     Diverticulitis    Nuts [Peanut-Containing Drug Products]      Pt suffers from diverticulitis and nut products irritate her stomach.     Sulfa Antibiotics     Tape Roxi Mean Tape] Other (See Comments)     tears skin off    Bactrim [Sulfamethoxazole-Trimethoprim] Rash     Little red rash    Chocolate Rash       Problem List:    Patient Active Problem List   Diagnosis Code    Syncope R55    Diabetes [facility] E11.9    CAD (coronary artery disease) I25.10    Hypertension I10    Atrial fibrillation ([facility]) I48.91    GERD (gastroesophageal reflux disease) K21.9    Chronic kidney disease, stage III (moderate) ([facility]) N18.30    COPD (chronic obstructive pulmonary disease) ([facility]) J44.9    Dysuria R30.0    Diabetic hypertension-nephrosis syndrome ([facility]) E11.21    Hypokalemia E87.6    Abnormal nuclear cardiac imaging test R93.1    Back pain, chronic M54.9, G89.29    Obesity (BMI 35.0-39.9 without comorbidity) E66.9    H/O class III angina pectoris Z86.79    HTN, goal below 130/80 I10    Non-rheumatic mitral regurgitation I34.0    Abnormal nuclear stress test R94.39    Osteopenia of multiple sites M85.89    Seronegative rheumatoid arthritis ([facility] M06.00    Acute respiratory failure ([facility]) J96.00    Diverticulitis K57.92    Diverticulitis of colon K57.32    Multiple falls R29.6       Past Medical History:        Diagnosis Date    Arthritis     Asthma 1978    Atrial fibrillation ([facility]     Blood circulation, collateral     CAD (coronary artery disease)     CHF (congestive heart failure) ([facility]) 1999    Chronic kidney disease, stage III (moderate) ([facility])     COPD (chronic obstructive pulmonary disease) ([facility])     Depression     Diabetes mellitus ([facility])     Diabetic hypertension-nephrosis syndrome ([facility])     Dysuria     GERD (gastroesophageal reflux disease)     Hyperlipidemia     Hypertension     Kidney disease     stage 3 dr Sajan Rosa    Unspecified cerebral artery occlusion with cerebral infarction 1999    Dr Maricel Acosta informed patient after Heart Attack Past Surgical History:        Procedure Laterality Date    APPENDECTOMY      BACK SURGERY      lumbar spine    BLADDER SUSPENSION      CARDIAC CATHETERIZATION  2009    with stent  [facility]    CARPAL TUNNEL RELEASE      COLONOSCOPY  2013    ENDOSCOPY, COLON, DIAGNOSTIC      ESOPHAGEAL DILATATION      HYSTERECTOMY  1974    JOINT REPLACEMENT Left 1992    Left Knee       Social History:    Social History     Tobacco Use    Smoking status: Former Smoker     Packs/day: 0.15     Years: 10.00     Pack years: 1.50     Types: Cigarettes     Start date: 10/21/2013    Smokeless tobacco: Never Used   Substance Use Topics    Alcohol use: No                                Counseling given: Not Answered      Vital Signs (Current):   Vitals:    09/28/21 1426   BP: (!) 165/71   Pulse: 55   Resp: 22   Temp: 36 °C (96.8 °F)   TempSrc: Temporal   SpO2: 92%   Weight: 178 lb (80.7 kg)   Height: 5' 5\" (1.651 m)                                              BP Readings from Last 3 Encounters:   09/28/21 (!) 165/71   08/23/21 136/82   08/17/21 130/72       NPO Status: Time of last liquid consumption: 0800                        Time of last solid consumption: 2100                        Date of last liquid consumption: 09/27/21                        Date of last solid food consumption: 09/27/21    BMI:   Wt Readings from Last 3 Encounters:   09/28/21 178 lb (80.7 kg)   08/23/21 182 lb (82.6 kg)   08/17/21 197 lb 5 oz (89.5 kg)     Body mass index is 29.62 kg/m².     CBC:   Lab Results   Component Value Date    WBC 5.8 08/23/2021    RBC 3.33 08/23/2021    HGB 10.8 08/23/2021    HCT 32.9 08/23/2021    MCV 98.7 08/23/2021    RDW 14.2 08/23/2021     08/23/2021       CMP:   Lab Results   Component Value Date     08/23/2021    K 4.1 08/23/2021    K 3.6 11/06/2020     08/23/2021    CO2 31 08/23/2021    BUN 38 08/23/2021    CREATININE 1.7 08/23/2021    LABGLOM 32 08/23/2021    LABGLOM 31 11/09/2020    GLUCOSE 123 08/23/2021    PROT 6.1 11/06/2020    CALCIUM 9.6 08/23/2021    BILITOT 0.3 03/29/2021    ALKPHOS 48 03/29/2021    AST 16 03/29/2021    ALT 12 03/29/2021       POC Tests: No results for input(s): POCGLU, POCNA, POCK, POCCL, POCBUN, POCHEMO, POCHCT in the last 72 hours. Coags:   Lab Results   Component Value Date    INR 1.12 11/08/2020    APTT 29.7 11/08/2020       HCG (If Applicable): No results found for: PREGTESTUR, PREGSERUM, HCG, HCGQUANT     ABGs: No results found for: PHART, PO2ART, KZN2INA, RRE7IZZ, BEART, Y6RDBDOJ     Type & Screen (If Applicable):  Lab Results   Component Value Date    LABRH NEG 10/01/2020       Drug/Infectious Status (If Applicable):  Lab Results   Component Value Date    HEPCAB Negative 12/28/2017       COVID-19 Screening (If Applicable):   Lab Results   Component Value Date    COVID19 NOT DETECTED 11/12/2020           Anesthesia Evaluation  Patient summary reviewed and Nursing notes reviewed  Airway: Mallampati: I        Dental:    (+) edentulous      Pulmonary:normal exam    (+) COPD:  asthma:                            Cardiovascular:    (+) hypertension:, angina:, past MI:, CAD:, CABG/stent:, dysrhythmias: atrial fibrillation, CHF:,       ECG reviewed  Rhythm: irregular    Echocardiogram reviewed                  Neuro/Psych:   (+) seizures:, CVA:,             GI/Hepatic/Renal:   (+) GERD:,           Endo/Other:    (+) DiabetesType II DM, , : arthritis:., .                 Abdominal:             Vascular: Other Findings:             Anesthesia Plan      MAC     ASA 3       Induction: intravenous. Plan discussed with attending.                   LONDON Thompson - CRNA   9/28/2021

## 2021-09-28 NOTE — ANESTHESIA POSTPROCEDURE EVALUATION
Department of Anesthesiology  Postprocedure Note    Patient: Sharyn Almanzar  MRN: [id number]  YOB: 1940  Date of evaluation: 9/28/2021  Time:  3:37 PM     Procedure Summary     Date: 09/28/21 Room / Location: [address] [place] [address] Zaira Baltic / Alicia Garcia    Anesthesia Start: 8165 Anesthesia Stop:     Procedures:       EGD DILATION (N/A )      EGD BIOPSY (Left Esophagus) Diagnosis: (DYSPHAGIA, GERD)    Surgeons: Rylie Chase MD Responsible Provider: Zain Cerrato MD    Anesthesia Type: MAC ASA Status: 3          Anesthesia Type: No value filed. Karen Phase I: Karen Score: 9    Karen Phase II:      Last vitals: Reviewed and per EMR flowsheets.        Anesthesia Post Evaluation    Patient location during evaluation: bedside  Patient participation: complete - patient participated  Level of consciousness: awake  Pain score: 0  Airway patency: patent  Nausea & Vomiting: no vomiting  Complications: no  Cardiovascular status: blood pressure returned to baseline and hemodynamically stable  Respiratory status: acceptable  Hydration status: stable

## 2021-09-28 NOTE — POST SEDATION
[facility]  Sedation/Analgesia Post Sedation Record    Patient: Hilario Cogan : 1940  Med Rec#: [id number] Acc#: [id number]   Procedure Performed By: Robbi Ruiz MD  Primary Care Physician: Kelin Hui DO    POST-PROCEDURE    Physicians/Assistants: Robbi Ruiz MD  Procedure Performed:    Sedation/Anesthesia:     Estimated Blood Loss:          ml  Specimens Removed:  []None [x]Other:      Disposition of Specimen:  [x]Pathology []Other      Complications:   [x]None Immediate []Other:     Post-procedure Diagnosis/Findings:             Recommendations:      stricture         Robbi Ruiz MD, MD [facility]  Electronically signed 2021 at 3:28 PM

## 2021-09-28 NOTE — PROGRESS NOTES
Recovery mode. Denies discomfort, taking fluids. Dr. Joshua Baez discussed findings and plan of care with patient and . Discharge instructions provided, understanding verbalized. Report given to a nurse a the nursing home.

## 2021-09-28 NOTE — H&P
[address] [place] [address]  Sedation/Analgesia History & Physical    Patient: Antonio Ríos : 1940  [facility] Rec#: [id number] Acc#: [id number]   Provider Performing Procedure: Adela Norris MD  Primary Care Physician: Justice Campbell DO    PRE-PROCEDURE   Full CODE [x]Yes  DNR-CCA/DNR-CC []Yes   Brief History/Pre-Procedure Diagnosis:dysphagia          MEDICAL HISTORY  []CAD/Valve  []Liver Disease  []Lung Disease []Diabetes  []Hypertension []Renal Disease  []Additional information:       has a past medical history of Arthritis, Asthma, Atrial fibrillation ([facility] Blood circulation, collateral, CAD (coronary artery disease), CHF (congestive heart failure) ([facility] Chronic kidney disease, stage III (moderate) ([facility] COPD (chronic obstructive pulmonary disease) ([facility] Depression, Diabetes mellitus ([facility] Diabetic hypertension-nephrosis syndrome ([facility] Dysuria, GERD (gastroesophageal reflux disease), Hyperlipidemia, Hypertension, Kidney disease, and Unspecified cerebral artery occlusion with cerebral infarction. SURGICAL HISTORY   has a past surgical history that includes back surgery; Colonoscopy (); Cardiac catheterization (); joint replacement (Left, ); Hysterectomy (); Appendectomy; bladder suspension; Endoscopy, colon, diagnostic; Carpal tunnel release; and Esophagus dilation.   Additional information:       ALLERGIES   Allergies as of 2021 - Fully Reviewed 2021   Allergen Reaction Noted    Renflexis [infliximab] Shortness Of Breath and Palpitations 2020    [place] (diagnostic) Anaphylaxis 2017    Nuts [peanut-containing drug products]  10/12/2017    Sulfa antibiotics  2012    Tape [adhesive tape] Other (See Comments) 2013    Bactrim [sulfamethoxazole-trimethoprim] Rash 2013    Chocolate Rash 2014     Additional information:       MEDICATIONS   Coumadin Use Last 7 Days [x]No []Yes  Antiplatelet drug therapy use last 7 days  [x]No []Yes  Other anticoagulant use last 7 days  [x]No []Yes    Current Facility-Administered Medications:     0.9 % sodium chloride infusion, , IntraVENous, Continuous, Miguel Espinal MD    sodium chloride flush 0.9 % injection 5-40 mL, 5-40 mL, IntraVENous, 2 times per day, Miguel Espinal MD    sodium chloride flush 0.9 % injection 5-40 mL, 5-40 mL, IntraVENous, PRN, Miguel Espinal MD    0.9 % sodium chloride infusion, 25 mL, IntraVENous, PRN, Miguel Espinal MD  Prior to Admission medications    Medication Sig Start Date End Date Taking?  Authorizing Provider   hydroxychloroquine (PLAQUENIL) 200 MG tablet TAKE 1 TABLET BY MOUTH EVERY DAY 9/8/21   John Gomez DO   azaTHIOprine (IMURAN) 50 MG tablet Take 2 tablets by mouth daily 9/8/21   John Gomez DO   fluticasone-umeclidin-vilant (TRELEGY ELLIPTA) 100-62.5-25 MCG/INH AEPB Inhale 1 puff into the lungs daily 8/23/21 8/23/22  LONDON Pretty CNP   EPINEPHrine (EPIPEN 2-LORAINE) 0.3 MG/0.3ML SOAJ injection Inject 0.3 mLs into the muscle once for 1 dose Use as directed for allergic reaction 6/8/21 6/8/21  Allen Lucas MD   Tiotropium Bromide-Olodaterol 2.5-2.5 MCG/ACT AERS Inhale 2 puffs into the lungs daily 5/18/21 5/18/22  Rene Rose MD   cloNIDine (CATAPRES) 0.1 MG tablet Take 1 tablet by mouth 2 times daily 11/11/20   Tanner Yao MD   hydrALAZINE (APRESOLINE) 100 MG tablet Take 1 tablet by mouth 3 times daily 11/11/20   Tanner Yao MD   metoprolol tartrate (LOPRESSOR) 25 MG tablet Take 1 tablet by mouth 2 times daily 11/11/20   Tanner Yao MD   predniSONE (DELTASONE) 5 MG tablet TAKE 1 TABLET BY MOUTH EVERY DAY 11/6/20   LONDON Jaeger CNP   aluminum & magnesium hydroxide-simethicone (MYLANTA MAXIMUM STRENGTH) 400-400-40 MG/5ML SUSP Take 30 mLs by mouth 4 times daily (before meals and nightly)    Historical Provider, MD   amLODIPine (NORVASC) 10 MG tablet Take 10 mg by mouth daily    Historical Provider, MD   calcium carbonate 600 MG TABS tablet Take 1 tablet by mouth daily    Historical Provider, MD   DULoxetine (CYMBALTA) 30 MG extended release capsule Take 30 mg by mouth 2 times daily    Historical Provider, MD   famotidine (PEPCID) 40 MG tablet Take 40 mg by mouth daily    Historical Provider, MD   gabapentin (NEURONTIN) 100 MG capsule Take 100 mg by mouth 3 times daily. Historical Provider, MD   isosorbide mononitrate (IMDUR) 120 MG extended release tablet Take 120 mg by mouth daily    Historical Provider, MD   pantoprazole (PROTONIX) 40 MG tablet Take 40 mg by mouth daily     Historical Provider, MD   spironolactone (ALDACTONE) 50 MG tablet Take 50 mg by mouth daily    Historical Provider, MD   valsartan-hydroCHLOROthiazide (DIOVAN-HCT) 320-25 MG per tablet Take 1 tablet by mouth daily    Historical Provider, MD   atorvastatin (LIPITOR) 40 MG tablet Take 2 tablets by mouth daily 8/10/20   Amalia Last DO   docusate sodium (COLACE, DULCOLAX) 100 MG CAPS Take 100 mg by mouth 2 times daily 8/10/20   Amalia Last DO   insulin glargine (LANTUS) 100 UNIT/ML injection pen Inject 8 Units into the skin nightly     Historical Provider, MD   dexlansoprazole (DEXILANT) 60 MG CPDR delayed release capsule Take 60 mg by mouth daily    Historical Provider, MD   potassium chloride (KLOR-CON) 20 MEQ packet Take 10 mEq by mouth daily     Historical Provider, MD   fenofibrate (TRICOR) 145 MG tablet Take 145 mg by mouth daily    Historical Provider, MD   aspirin 81 MG EC tablet Take 162 mg by mouth daily    Historical Provider, MD   ranolazine (RANEXA) 500 MG SR tablet Take 1 tablet by mouth 2 times daily. 9/27/14   [address] [place] [address], MD   Magnesium Oxide 250 MG TABS Take 400 mg by mouth 2 times daily     Historical Provider, MD   albuterol (PROVENTIL HFA;VENTOLIN HFA) 108 (90 BASE) MCG/ACT inhaler Inhale 2 puffs into the lungs every 6 hours as needed.     Historical Provider, MD   montelukast (SINGULAIR) 5 MG chewable planned procedure sedation and anesthesia. (Refer to nursing sedation/analgesia documentation record)  [x]Formulation and discussion of sedation/procedure plan, risks, and expectations with patient and/or responsible adult completed. [x]Patient examined immediately prior to the procedure.  (Refer to nursing sedation/analgesia documentation record)    Miguel Espinal MD, MD   Electronically signed 9/28/2021 at 2:41 PM

## 2021-09-29 ENCOUNTER — HOSPITAL ENCOUNTER (OUTPATIENT)
Dept: GENERAL RADIOLOGY | Age: 81
Discharge: HOME | End: 2021-09-29
Payer: MEDICARE

## 2021-09-29 ENCOUNTER — HOSPITAL ENCOUNTER (INPATIENT)
Age: 81
LOS: 1 days | Discharge: NURSING, REHAB OR LONG-TERM CARE | DRG: 683 | End: 2021-10-01
Attending: EMERGENCY MEDICINE | Admitting: PEDIATRICS
Payer: MEDICARE

## 2021-09-29 ENCOUNTER — APPOINTMENT (OUTPATIENT)
Dept: GENERAL RADIOLOGY | Age: 81
DRG: 683 | End: 2021-09-29
Payer: MEDICARE

## 2021-09-29 DIAGNOSIS — D64.9 ANEMIA, UNSPECIFIED TYPE: ICD-10-CM

## 2021-09-29 DIAGNOSIS — R53.83 OTHER FATIGUE: ICD-10-CM

## 2021-09-29 DIAGNOSIS — J44.9 COPD WITH ASTHMA (HCC): ICD-10-CM

## 2021-09-29 DIAGNOSIS — N39.0 URINARY TRACT INFECTION IN FEMALE: Primary | ICD-10-CM

## 2021-09-29 DIAGNOSIS — N17.9 AKI (ACUTE KIDNEY INJURY) (HCC): ICD-10-CM

## 2021-09-29 DIAGNOSIS — R93.3 ABNORMAL VIRTUAL COLONOSCOPE: ICD-10-CM

## 2021-09-29 LAB
ALBUMIN SERPL-MCNC: 3 G/DL (ref 3.5–5.1)
ALP BLD-CCNC: 46 U/L (ref 38–126)
ALT SERPL-CCNC: 17 U/L (ref 11–66)
ANION GAP SERPL CALCULATED.3IONS-SCNC: 13 MEQ/L (ref 8–16)
AST SERPL-CCNC: 36 U/L (ref 5–40)
BACTERIA: ABNORMAL /HPF
BASOPHILS # BLD: 0.1 %
BASOPHILS ABSOLUTE: 0 THOU/MM3 (ref 0–0.1)
BILIRUB SERPL-MCNC: 0.2 MG/DL (ref 0.3–1.2)
BILIRUBIN URINE: ABNORMAL
BLOOD, URINE: NEGATIVE
BUN BLDV-MCNC: 51 MG/DL (ref 7–22)
CALCIUM SERPL-MCNC: 9.6 MG/DL (ref 8.5–10.5)
CASTS 2: ABNORMAL /LPF
CASTS UA: ABNORMAL /LPF
CHARACTER, URINE: CLEAR
CHLORIDE BLD-SCNC: 99 MEQ/L (ref 98–111)
CO2: 26 MEQ/L (ref 23–33)
COLOR: ABNORMAL
CREAT SERPL-MCNC: 2.3 MG/DL (ref 0.4–1.2)
CRYSTALS, UA: ABNORMAL
EOSINOPHIL # BLD: 0.1 %
EOSINOPHILS ABSOLUTE: 0 THOU/MM3 (ref 0–0.4)
EPITHELIAL CELLS, UA: ABNORMAL /HPF
ERYTHROCYTE [DISTWIDTH] IN BLOOD BY AUTOMATED COUNT: 15.3 % (ref 11.5–14.5)
ERYTHROCYTE [DISTWIDTH] IN BLOOD BY AUTOMATED COUNT: 57.1 FL (ref 35–45)
GFR SERPL CREATININE-BSD FRML MDRD: 20 ML/MIN/1.73M2
GLUCOSE BLD-MCNC: 177 MG/DL (ref 70–108)
GLUCOSE URINE: NEGATIVE MG/DL
HCT VFR BLD CALC: 32 % (ref 37–47)
HEMOGLOBIN: 9.7 GM/DL (ref 12–16)
ICTOTEST: NEGATIVE
IMMATURE GRANS (ABS): 0.09 THOU/MM3 (ref 0–0.07)
IMMATURE GRANULOCYTES: 0.7 %
KETONES, URINE: ABNORMAL
LACTIC ACID: 1.2 MMOL/L (ref 0.5–2)
LEUKOCYTE ESTERASE, URINE: ABNORMAL
LYMPHOCYTES # BLD: 2.4 %
LYMPHOCYTES ABSOLUTE: 0.3 THOU/MM3 (ref 1–4.8)
MCH RBC QN AUTO: 31.4 PG (ref 26–33)
MCHC RBC AUTO-ENTMCNC: 30.3 GM/DL (ref 32.2–35.5)
MCV RBC AUTO: 103.6 FL (ref 81–99)
MISCELLANEOUS 2: ABNORMAL
MONOCYTES # BLD: 5.6 %
MONOCYTES ABSOLUTE: 0.8 THOU/MM3 (ref 0.4–1.3)
NITRITE, URINE: POSITIVE
NUCLEATED RED BLOOD CELLS: 0 /100 WBC
OSMOLALITY CALCULATION: 293.7 MOSMOL/KG (ref 275–300)
PH UA: 5 (ref 5–9)
PLATELET # BLD: 425 THOU/MM3 (ref 130–400)
PMV BLD AUTO: 9.3 FL (ref 9.4–12.4)
POTASSIUM SERPL-SCNC: 4.9 MEQ/L (ref 3.5–5.2)
PRO-BNP: 4379 PG/ML (ref 0–1800)
PROCALCITONIN: 3.12 NG/ML (ref 0.01–0.09)
PROTEIN UA: 30
RBC # BLD: 3.09 MILL/MM3 (ref 4.2–5.4)
RBC URINE: ABNORMAL /HPF
RENAL EPITHELIAL, UA: ABNORMAL
SEG NEUTROPHILS: 91.1 %
SEGMENTED NEUTROPHILS ABSOLUTE COUNT: 12.4 THOU/MM3 (ref 1.8–7.7)
SODIUM BLD-SCNC: 138 MEQ/L (ref 135–145)
SPECIFIC GRAVITY, URINE: 1.02 (ref 1–1.03)
TOTAL PROTEIN: 6 G/DL (ref 6.1–8)
TROPONIN T: 0.02 NG/ML
TSH SERPL DL<=0.05 MIU/L-ACNC: 3.56 UIU/ML (ref 0.4–4.2)
UROBILINOGEN, URINE: 0.2 EU/DL (ref 0–1)
WBC # BLD: 13.6 THOU/MM3 (ref 4.8–10.8)
WBC UA: ABNORMAL /HPF
YEAST: ABNORMAL

## 2021-09-29 PROCEDURE — 84484 ASSAY OF TROPONIN QUANT: CPT

## 2021-09-29 PROCEDURE — 81001 URINALYSIS AUTO W/SCOPE: CPT

## 2021-09-29 PROCEDURE — 93005 ELECTROCARDIOGRAM TRACING: CPT | Performed by: EMERGENCY MEDICINE

## 2021-09-29 PROCEDURE — 83540 ASSAY OF IRON: CPT

## 2021-09-29 PROCEDURE — 80053 COMPREHEN METABOLIC PANEL: CPT

## 2021-09-29 PROCEDURE — 84443 ASSAY THYROID STIM HORMONE: CPT

## 2021-09-29 PROCEDURE — 85025 COMPLETE CBC W/AUTO DIFF WBC: CPT

## 2021-09-29 PROCEDURE — 36415 COLL VENOUS BLD VENIPUNCTURE: CPT

## 2021-09-29 PROCEDURE — 6360000002 HC RX W HCPCS: Performed by: EMERGENCY MEDICINE

## 2021-09-29 PROCEDURE — 71045 X-RAY EXAM CHEST 1 VIEW: CPT

## 2021-09-29 PROCEDURE — 83880 ASSAY OF NATRIURETIC PEPTIDE: CPT

## 2021-09-29 PROCEDURE — 83550 IRON BINDING TEST: CPT

## 2021-09-29 PROCEDURE — 2580000003 HC RX 258: Performed by: EMERGENCY MEDICINE

## 2021-09-29 PROCEDURE — 82550 ASSAY OF CK (CPK): CPT

## 2021-09-29 PROCEDURE — 87040 BLOOD CULTURE FOR BACTERIA: CPT

## 2021-09-29 PROCEDURE — 96365 THER/PROPH/DIAG IV INF INIT: CPT

## 2021-09-29 PROCEDURE — 83605 ASSAY OF LACTIC ACID: CPT

## 2021-09-29 PROCEDURE — 99285 EMERGENCY DEPT VISIT HI MDM: CPT

## 2021-09-29 PROCEDURE — 84145 PROCALCITONIN (PCT): CPT

## 2021-09-29 PROCEDURE — 83036 HEMOGLOBIN GLYCOSYLATED A1C: CPT

## 2021-09-29 RX ORDER — SODIUM CHLORIDE 9 MG/ML
INJECTION, SOLUTION INTRAVENOUS CONTINUOUS
Status: DISCONTINUED | OUTPATIENT
Start: 2021-09-29 | End: 2021-09-30

## 2021-09-29 RX ADMIN — SODIUM CHLORIDE: 9 INJECTION, SOLUTION INTRAVENOUS at 20:49

## 2021-09-29 RX ADMIN — PIPERACILLIN AND TAZOBACTAM 3375 MG: 3; .375 INJECTION, POWDER, LYOPHILIZED, FOR SOLUTION INTRAVENOUS at 23:50

## 2021-09-29 ASSESSMENT — ENCOUNTER SYMPTOMS
ABDOMINAL DISTENTION: 0
STRIDOR: 0
EYE REDNESS: 0
EYE ITCHING: 0
DIARRHEA: 0
EYE DISCHARGE: 0
BACK PAIN: 0
ABDOMINAL PAIN: 0
CONSTIPATION: 0
NAUSEA: 0
PHOTOPHOBIA: 0
VOMITING: 0
COUGH: 0
SHORTNESS OF BREATH: 0
RHINORRHEA: 0
SORE THROAT: 0
EYE PAIN: 0
CHEST TIGHTNESS: 0
WHEEZING: 0

## 2021-09-29 NOTE — ED NOTES
Bed: 019A  Expected date: 9/29/21  Expected time: 7:34 PM  Means of arrival: [facility]  Comments:     Silvino Schaeffer  09/29/21 1943

## 2021-09-30 PROBLEM — N17.9 ACUTE KIDNEY INJURY (HCC): Status: ACTIVE | Noted: 2021-09-30

## 2021-09-30 LAB
ANION GAP SERPL CALCULATED.3IONS-SCNC: 11 MEQ/L (ref 8–16)
AVERAGE GLUCOSE: 138 MG/DL (ref 70–126)
BUN BLDV-MCNC: 49 MG/DL (ref 7–22)
CALCIUM SERPL-MCNC: 8.9 MG/DL (ref 8.5–10.5)
CHLORIDE BLD-SCNC: 100 MEQ/L (ref 98–111)
CO2: 26 MEQ/L (ref 23–33)
CREAT SERPL-MCNC: 2 MG/DL (ref 0.4–1.2)
EKG ATRIAL RATE: 67 BPM
EKG P-R INTERVAL: 216 MS
EKG Q-T INTERVAL: 494 MS
EKG QRS DURATION: 158 MS
EKG QTC CALCULATION (BAZETT): 521 MS
EKG R AXIS: -8 DEGREES
EKG T AXIS: -18 DEGREES
EKG VENTRICULAR RATE: 67 BPM
GFR SERPL CREATININE-BSD FRML MDRD: 24 ML/MIN/1.73M2
GLUCOSE BLD-MCNC: 149 MG/DL (ref 70–108)
GLUCOSE BLD-MCNC: 170 MG/DL (ref 70–108)
GLUCOSE BLD-MCNC: 205 MG/DL (ref 70–108)
GLUCOSE BLD-MCNC: 239 MG/DL (ref 70–108)
GLUCOSE BLD-MCNC: 253 MG/DL (ref 70–108)
HBA1C MFR BLD: 6.6 % (ref 4.4–6.4)
HEMOCCULT STL QL: NEGATIVE
IRON: 24 UG/DL (ref 50–170)
MAGNESIUM: 2.7 MG/DL (ref 1.6–2.4)
OSMOLALITY CALCULATION: 290.8 MOSMOL/KG (ref 275–300)
POTASSIUM SERPL-SCNC: 4.3 MEQ/L (ref 3.5–5.2)
SODIUM BLD-SCNC: 137 MEQ/L (ref 135–145)
TOTAL CK: 333 U/L (ref 30–135)
TOTAL IRON BINDING CAPACITY: 159 UG/DL (ref 171–450)
TROPONIN T: < 0.01 NG/ML

## 2021-09-30 PROCEDURE — 87899 AGENT NOS ASSAY W/OPTIC: CPT

## 2021-09-30 PROCEDURE — 6370000000 HC RX 637 (ALT 250 FOR IP): Performed by: STUDENT IN AN ORGANIZED HEALTH CARE EDUCATION/TRAINING PROGRAM

## 2021-09-30 PROCEDURE — 87449 NOS EACH ORGANISM AG IA: CPT

## 2021-09-30 PROCEDURE — 2700000000 HC OXYGEN THERAPY PER DAY

## 2021-09-30 PROCEDURE — 6370000000 HC RX 637 (ALT 250 FOR IP): Performed by: ANESTHESIOLOGY

## 2021-09-30 PROCEDURE — 36415 COLL VENOUS BLD VENIPUNCTURE: CPT

## 2021-09-30 PROCEDURE — 6360000002 HC RX W HCPCS: Performed by: STUDENT IN AN ORGANIZED HEALTH CARE EDUCATION/TRAINING PROGRAM

## 2021-09-30 PROCEDURE — 80048 BASIC METABOLIC PNL TOTAL CA: CPT

## 2021-09-30 PROCEDURE — 2580000003 HC RX 258: Performed by: STUDENT IN AN ORGANIZED HEALTH CARE EDUCATION/TRAINING PROGRAM

## 2021-09-30 PROCEDURE — 2060000000 HC ICU INTERMEDIATE R&B

## 2021-09-30 PROCEDURE — 94760 N-INVAS EAR/PLS OXIMETRY 1: CPT

## 2021-09-30 PROCEDURE — 93010 ELECTROCARDIOGRAM REPORT: CPT | Performed by: INTERNAL MEDICINE

## 2021-09-30 PROCEDURE — 84484 ASSAY OF TROPONIN QUANT: CPT

## 2021-09-30 PROCEDURE — 94640 AIRWAY INHALATION TREATMENT: CPT

## 2021-09-30 PROCEDURE — 99223 1ST HOSP IP/OBS HIGH 75: CPT | Performed by: PEDIATRICS

## 2021-09-30 PROCEDURE — 83735 ASSAY OF MAGNESIUM: CPT

## 2021-09-30 PROCEDURE — 82272 OCCULT BLD FECES 1-3 TESTS: CPT

## 2021-09-30 PROCEDURE — 82948 REAGENT STRIP/BLOOD GLUCOSE: CPT

## 2021-09-30 RX ORDER — PANTOPRAZOLE SODIUM 40 MG/1
40 TABLET, DELAYED RELEASE ORAL
Status: DISCONTINUED | OUTPATIENT
Start: 2021-09-30 | End: 2021-10-01 | Stop reason: HOSPADM

## 2021-09-30 RX ORDER — POLYETHYLENE GLYCOL 3350 17 G/17G
17 POWDER, FOR SOLUTION ORAL DAILY
Status: DISCONTINUED | OUTPATIENT
Start: 2021-09-30 | End: 2021-10-01 | Stop reason: HOSPADM

## 2021-09-30 RX ORDER — DEXTROSE MONOHYDRATE 50 MG/ML
100 INJECTION, SOLUTION INTRAVENOUS PRN
Status: DISCONTINUED | OUTPATIENT
Start: 2021-09-30 | End: 2021-10-01 | Stop reason: HOSPADM

## 2021-09-30 RX ORDER — HYDROXYCHLOROQUINE SULFATE 200 MG/1
200 TABLET, FILM COATED ORAL DAILY
Status: DISCONTINUED | OUTPATIENT
Start: 2021-09-30 | End: 2021-10-01 | Stop reason: HOSPADM

## 2021-09-30 RX ORDER — HEPARIN SODIUM 5000 [USP'U]/ML
5000 INJECTION, SOLUTION INTRAVENOUS; SUBCUTANEOUS EVERY 8 HOURS SCHEDULED
Status: DISCONTINUED | OUTPATIENT
Start: 2021-09-30 | End: 2021-10-01 | Stop reason: HOSPADM

## 2021-09-30 RX ORDER — ASPIRIN 81 MG/1
162 TABLET ORAL DAILY
Status: DISCONTINUED | OUTPATIENT
Start: 2021-09-30 | End: 2021-10-01 | Stop reason: HOSPADM

## 2021-09-30 RX ORDER — NICOTINE POLACRILEX 4 MG
15 LOZENGE BUCCAL PRN
Status: DISCONTINUED | OUTPATIENT
Start: 2021-09-30 | End: 2021-10-01 | Stop reason: HOSPADM

## 2021-09-30 RX ORDER — ACETAMINOPHEN 650 MG/1
650 SUPPOSITORY RECTAL EVERY 6 HOURS PRN
Status: DISCONTINUED | OUTPATIENT
Start: 2021-09-30 | End: 2021-10-01 | Stop reason: HOSPADM

## 2021-09-30 RX ORDER — INSULIN GLARGINE 100 [IU]/ML
8 INJECTION, SOLUTION SUBCUTANEOUS NIGHTLY
Status: DISCONTINUED | OUTPATIENT
Start: 2021-09-30 | End: 2021-10-01 | Stop reason: HOSPADM

## 2021-09-30 RX ORDER — SODIUM CHLORIDE 9 MG/ML
INJECTION, SOLUTION INTRAVENOUS CONTINUOUS
Status: ACTIVE | OUTPATIENT
Start: 2021-09-30 | End: 2021-10-01

## 2021-09-30 RX ORDER — METOPROLOL TARTRATE 50 MG/1
25 TABLET, FILM COATED ORAL 2 TIMES DAILY
Status: DISCONTINUED | OUTPATIENT
Start: 2021-10-01 | End: 2021-09-30 | Stop reason: ALTCHOICE

## 2021-09-30 RX ORDER — SODIUM CHLORIDE 9 MG/ML
25 INJECTION, SOLUTION INTRAVENOUS PRN
Status: DISCONTINUED | OUTPATIENT
Start: 2021-09-30 | End: 2021-10-01 | Stop reason: HOSPADM

## 2021-09-30 RX ORDER — CALCIUM CARBONATE 200(500)MG
1 TABLET,CHEWABLE ORAL 2 TIMES DAILY
COMMUNITY

## 2021-09-30 RX ORDER — SODIUM CHLORIDE 0.9 % (FLUSH) 0.9 %
5-40 SYRINGE (ML) INJECTION EVERY 12 HOURS SCHEDULED
Status: DISCONTINUED | OUTPATIENT
Start: 2021-09-30 | End: 2021-10-01 | Stop reason: HOSPADM

## 2021-09-30 RX ORDER — POLYETHYLENE GLYCOL 3350 17 G/17G
17 POWDER, FOR SOLUTION ORAL DAILY PRN
Status: DISCONTINUED | OUTPATIENT
Start: 2021-09-30 | End: 2021-10-01 | Stop reason: HOSPADM

## 2021-09-30 RX ORDER — SALIVA STIMULANT COMB. NO.3
SPRAY, NON-AEROSOL (ML) MUCOUS MEMBRANE PRN
COMMUNITY

## 2021-09-30 RX ORDER — 0.9 % SODIUM CHLORIDE 0.9 %
500 INTRAVENOUS SOLUTION INTRAVENOUS ONCE
Status: COMPLETED | OUTPATIENT
Start: 2021-09-30 | End: 2021-09-30

## 2021-09-30 RX ORDER — METOPROLOL SUCCINATE 50 MG/1
50 TABLET, EXTENDED RELEASE ORAL DAILY
COMMUNITY

## 2021-09-30 RX ORDER — GABAPENTIN 100 MG/1
100 CAPSULE ORAL 3 TIMES DAILY
Status: DISCONTINUED | OUTPATIENT
Start: 2021-09-30 | End: 2021-10-01 | Stop reason: HOSPADM

## 2021-09-30 RX ORDER — IPRATROPIUM BROMIDE AND ALBUTEROL SULFATE 2.5; .5 MG/3ML; MG/3ML
1 SOLUTION RESPIRATORY (INHALATION) 4 TIMES DAILY
Status: DISCONTINUED | OUTPATIENT
Start: 2021-09-30 | End: 2021-10-01 | Stop reason: HOSPADM

## 2021-09-30 RX ORDER — SODIUM CHLORIDE 0.9 % (FLUSH) 0.9 %
5-40 SYRINGE (ML) INJECTION PRN
Status: DISCONTINUED | OUTPATIENT
Start: 2021-09-30 | End: 2021-10-01 | Stop reason: HOSPADM

## 2021-09-30 RX ORDER — METOPROLOL SUCCINATE 50 MG/1
50 TABLET, EXTENDED RELEASE ORAL DAILY
Status: DISCONTINUED | OUTPATIENT
Start: 2021-09-30 | End: 2021-10-01 | Stop reason: HOSPADM

## 2021-09-30 RX ORDER — FENOFIBRATE 160 MG/1
160 TABLET ORAL DAILY
Status: DISCONTINUED | OUTPATIENT
Start: 2021-09-30 | End: 2021-09-30

## 2021-09-30 RX ORDER — ATORVASTATIN CALCIUM 80 MG/1
80 TABLET, FILM COATED ORAL DAILY
Status: DISCONTINUED | OUTPATIENT
Start: 2021-09-30 | End: 2021-10-01 | Stop reason: HOSPADM

## 2021-09-30 RX ORDER — COMPRESS.STOCKING,KNEE,REG,MED
EACH MISCELLANEOUS
COMMUNITY

## 2021-09-30 RX ORDER — GUAIFENESIN 600 MG/1
600 TABLET, EXTENDED RELEASE ORAL 2 TIMES DAILY
Status: DISCONTINUED | OUTPATIENT
Start: 2021-09-30 | End: 2021-10-01 | Stop reason: HOSPADM

## 2021-09-30 RX ORDER — DEXAMETHASONE SODIUM PHOSPHATE 4 MG/ML
6 INJECTION, SOLUTION INTRA-ARTICULAR; INTRALESIONAL; INTRAMUSCULAR; INTRAVENOUS; SOFT TISSUE ONCE
Status: COMPLETED | OUTPATIENT
Start: 2021-09-30 | End: 2021-09-30

## 2021-09-30 RX ORDER — LOPERAMIDE HYDROCHLORIDE 2 MG/1
2 CAPSULE ORAL 4 TIMES DAILY PRN
Status: DISCONTINUED | OUTPATIENT
Start: 2021-09-30 | End: 2021-10-01 | Stop reason: HOSPADM

## 2021-09-30 RX ORDER — AZATHIOPRINE 50 MG/1
100 TABLET ORAL DAILY
Status: DISCONTINUED | OUTPATIENT
Start: 2021-09-30 | End: 2021-10-01 | Stop reason: HOSPADM

## 2021-09-30 RX ORDER — MONTELUKAST SODIUM 5 MG/1
5 TABLET, CHEWABLE ORAL NIGHTLY
COMMUNITY

## 2021-09-30 RX ORDER — ALUMINA, MAGNESIA, AND SIMETHICONE 2400; 2400; 240 MG/30ML; MG/30ML; MG/30ML
10 SUSPENSION ORAL EVERY 6 HOURS PRN
COMMUNITY

## 2021-09-30 RX ORDER — BUDESONIDE AND FORMOTEROL FUMARATE DIHYDRATE 160; 4.5 UG/1; UG/1
2 AEROSOL RESPIRATORY (INHALATION) 2 TIMES DAILY
Status: DISCONTINUED | OUTPATIENT
Start: 2021-09-30 | End: 2021-10-01 | Stop reason: HOSPADM

## 2021-09-30 RX ORDER — MONTELUKAST SODIUM 10 MG/1
10 TABLET ORAL DAILY PRN
Status: ON HOLD | COMMUNITY
End: 2021-11-23 | Stop reason: HOSPADM

## 2021-09-30 RX ORDER — RANOLAZINE 500 MG/1
500 TABLET, EXTENDED RELEASE ORAL 2 TIMES DAILY
Status: DISCONTINUED | OUTPATIENT
Start: 2021-10-01 | End: 2021-10-01 | Stop reason: HOSPADM

## 2021-09-30 RX ORDER — CLONIDINE HYDROCHLORIDE 0.1 MG/1
0.1 TABLET ORAL 3 TIMES DAILY
Status: DISCONTINUED | OUTPATIENT
Start: 2021-09-30 | End: 2021-10-01 | Stop reason: HOSPADM

## 2021-09-30 RX ORDER — ACETAMINOPHEN 325 MG/1
650 TABLET ORAL EVERY 6 HOURS PRN
Status: DISCONTINUED | OUTPATIENT
Start: 2021-09-30 | End: 2021-10-01 | Stop reason: HOSPADM

## 2021-09-30 RX ORDER — DEXTROSE MONOHYDRATE 25 G/50ML
12.5 INJECTION, SOLUTION INTRAVENOUS PRN
Status: DISCONTINUED | OUTPATIENT
Start: 2021-09-30 | End: 2021-10-01 | Stop reason: HOSPADM

## 2021-09-30 RX ORDER — FENOFIBRATE 200 MG/1
200 CAPSULE ORAL DAILY
COMMUNITY

## 2021-09-30 RX ORDER — ALBUTEROL SULFATE 2.5 MG/.5ML
2.5 SOLUTION RESPIRATORY (INHALATION)
COMMUNITY

## 2021-09-30 RX ORDER — DOCUSATE SODIUM 100 MG/1
100 CAPSULE, LIQUID FILLED ORAL 2 TIMES DAILY
Status: DISCONTINUED | OUTPATIENT
Start: 2021-09-30 | End: 2021-10-01 | Stop reason: HOSPADM

## 2021-09-30 RX ORDER — CLONIDINE HYDROCHLORIDE 0.1 MG/1
0.1 TABLET ORAL 2 TIMES DAILY
Status: DISCONTINUED | OUTPATIENT
Start: 2021-10-01 | End: 2021-09-30 | Stop reason: ALTCHOICE

## 2021-09-30 RX ORDER — MULTIVITAMIN/IRON/FOLIC ACID 18MG-0.4MG
400 TABLET ORAL 2 TIMES DAILY
Status: DISCONTINUED | OUTPATIENT
Start: 2021-10-01 | End: 2021-09-30 | Stop reason: ALTCHOICE

## 2021-09-30 RX ORDER — METOPROLOL SUCCINATE 50 MG/1
50 TABLET, EXTENDED RELEASE ORAL DAILY
Status: DISCONTINUED | OUTPATIENT
Start: 2021-09-30 | End: 2021-09-30

## 2021-09-30 RX ORDER — ALBUTEROL SULFATE 90 UG/1
2 AEROSOL, METERED RESPIRATORY (INHALATION) EVERY 6 HOURS PRN
Status: DISCONTINUED | OUTPATIENT
Start: 2021-09-30 | End: 2021-10-01 | Stop reason: HOSPADM

## 2021-09-30 RX ADMIN — HYDROXYCHLOROQUINE SULFATE 200 MG: 200 TABLET ORAL at 09:27

## 2021-09-30 RX ADMIN — DOCUSATE SODIUM 100 MG: 100 CAPSULE ORAL at 09:26

## 2021-09-30 RX ADMIN — HEPARIN SODIUM 5000 UNITS: 5000 INJECTION INTRAVENOUS; SUBCUTANEOUS at 14:24

## 2021-09-30 RX ADMIN — SODIUM CHLORIDE: 9 INJECTION, SOLUTION INTRAVENOUS at 18:15

## 2021-09-30 RX ADMIN — ATORVASTATIN CALCIUM 80 MG: 80 TABLET, FILM COATED ORAL at 20:28

## 2021-09-30 RX ADMIN — INSULIN LISPRO 4 UNITS: 100 INJECTION, SOLUTION INTRAVENOUS; SUBCUTANEOUS at 12:21

## 2021-09-30 RX ADMIN — IPRATROPIUM BROMIDE AND ALBUTEROL SULFATE 1 AMPULE: .5; 3 SOLUTION RESPIRATORY (INHALATION) at 10:56

## 2021-09-30 RX ADMIN — IPRATROPIUM BROMIDE AND ALBUTEROL SULFATE 1 AMPULE: .5; 3 SOLUTION RESPIRATORY (INHALATION) at 16:22

## 2021-09-30 RX ADMIN — AZATHIOPRINE 100 MG: 50 TABLET ORAL at 09:27

## 2021-09-30 RX ADMIN — CLONIDINE HYDROCHLORIDE 0.1 MG: 0.1 TABLET ORAL at 09:27

## 2021-09-30 RX ADMIN — BUDESONIDE AND FORMOTEROL FUMARATE DIHYDRATE 2 PUFF: 160; 4.5 AEROSOL RESPIRATORY (INHALATION) at 18:17

## 2021-09-30 RX ADMIN — GABAPENTIN 100 MG: 100 CAPSULE ORAL at 09:26

## 2021-09-30 RX ADMIN — GUAIFENESIN 600 MG: 600 TABLET, EXTENDED RELEASE ORAL at 12:21

## 2021-09-30 RX ADMIN — HEPARIN SODIUM 5000 UNITS: 5000 INJECTION INTRAVENOUS; SUBCUTANEOUS at 20:30

## 2021-09-30 RX ADMIN — CLONIDINE HYDROCHLORIDE 0.1 MG: 0.1 TABLET ORAL at 14:24

## 2021-09-30 RX ADMIN — GUAIFENESIN 600 MG: 600 TABLET, EXTENDED RELEASE ORAL at 20:28

## 2021-09-30 RX ADMIN — GABAPENTIN 100 MG: 100 CAPSULE ORAL at 20:28

## 2021-09-30 RX ADMIN — INSULIN LISPRO 4 UNITS: 100 INJECTION, SOLUTION INTRAVENOUS; SUBCUTANEOUS at 17:13

## 2021-09-30 RX ADMIN — DEXAMETHASONE SODIUM PHOSPHATE 6 MG: 4 INJECTION, SOLUTION INTRAMUSCULAR; INTRAVENOUS at 12:21

## 2021-09-30 RX ADMIN — Medication 400 MG: at 20:28

## 2021-09-30 RX ADMIN — BUDESONIDE AND FORMOTEROL FUMARATE DIHYDRATE 2 PUFF: 160; 4.5 AEROSOL RESPIRATORY (INHALATION) at 08:15

## 2021-09-30 RX ADMIN — HEPARIN SODIUM 5000 UNITS: 5000 INJECTION INTRAVENOUS; SUBCUTANEOUS at 05:21

## 2021-09-30 RX ADMIN — INSULIN GLARGINE 8 UNITS: 100 INJECTION, SOLUTION SUBCUTANEOUS at 21:09

## 2021-09-30 RX ADMIN — SODIUM CHLORIDE: 9 INJECTION, SOLUTION INTRAVENOUS at 09:28

## 2021-09-30 RX ADMIN — Medication 400 MG: at 09:26

## 2021-09-30 RX ADMIN — GABAPENTIN 100 MG: 100 CAPSULE ORAL at 14:24

## 2021-09-30 RX ADMIN — TIOTROPIUM BROMIDE INHALATION SPRAY 2 PUFF: 3.12 SPRAY, METERED RESPIRATORY (INHALATION) at 08:15

## 2021-09-30 RX ADMIN — CLONIDINE HYDROCHLORIDE 0.1 MG: 0.1 TABLET ORAL at 20:28

## 2021-09-30 RX ADMIN — SODIUM CHLORIDE, PRESERVATIVE FREE 10 ML: 5 INJECTION INTRAVENOUS at 09:27

## 2021-09-30 RX ADMIN — DOCUSATE SODIUM 100 MG: 100 CAPSULE ORAL at 20:28

## 2021-09-30 RX ADMIN — IPRATROPIUM BROMIDE AND ALBUTEROL SULFATE 1 AMPULE: .5; 3 SOLUTION RESPIRATORY (INHALATION) at 22:10

## 2021-09-30 RX ADMIN — SODIUM CHLORIDE 500 ML: 9 INJECTION, SOLUTION INTRAVENOUS at 05:15

## 2021-09-30 RX ADMIN — ASPIRIN 162 MG: 81 TABLET, COATED ORAL at 09:26

## 2021-09-30 RX ADMIN — METOPROLOL SUCCINATE 50 MG: 50 TABLET, FILM COATED, EXTENDED RELEASE ORAL at 09:26

## 2021-09-30 RX ADMIN — PANTOPRAZOLE SODIUM 40 MG: 40 TABLET, DELAYED RELEASE ORAL at 05:16

## 2021-09-30 RX ADMIN — CEFTRIAXONE SODIUM 1000 MG: 1 INJECTION, POWDER, FOR SOLUTION INTRAMUSCULAR; INTRAVENOUS at 09:27

## 2021-09-30 ASSESSMENT — ENCOUNTER SYMPTOMS
NAUSEA: 0
COUGH: 1
DIARRHEA: 1
WHEEZING: 0
BLOOD IN STOOL: 1
SHORTNESS OF BREATH: 1
VOMITING: 0

## 2021-09-30 ASSESSMENT — PAIN SCALES - GENERAL
PAINLEVEL_OUTOF10: 0
PAINLEVEL_OUTOF10: 0

## 2021-09-30 NOTE — PLAN OF CARE
78years old female presented to the hospital due to fatigue. Patient was seen and evaluated this morning on rounds. 1.  EMMA. BUN over creatinine 51/23. Patient creatinine baseline to 1.4, BNP 4000. Plan  Started  mL/hrs * 6 hrs  2. HFpEF: Grade 2 diastolic dysfunction. Last echo done 8/2020: LVEF 50 to 55%, no regional left ventricular wall motion abnormality, left ventricle wall thickness within normal limited. Plan: Patient is euvolemic, will restart BUMEX after the EMMA resolve. 3.  Troponin trending down: Upon visit patient troponin elevated 0.019 --> repeat troponin <0.01  4. Abnormal UA, asymptomatic, positive for leuko.  Stop Abx

## 2021-09-30 NOTE — ED TRIAGE NOTES
Pt to rm 19 per EMS- states she was sent in by her assisted living facility to get checked out for generalized fatigue. States she was seen yesterday and had a 'surgery; done- pt records indicate she had a GI scope yesterday and d/c home. Today pt states she's just tired all the time and just wants to sleep. Denies pain at this time- appears in no acute distress, VSS.

## 2021-09-30 NOTE — ED PROVIDER NOTES
[address] ENCOUNTER      PATIENT NAME: Krissy Bush  MRN: [id number]  : 1940  CHOWDARY: 2021  PROVIDER: Janet Miller MD      CHIEF COMPLAINT       Chief Complaint   Patient presents with    Fatigue       Patient is seen and evaluated in a timely fashion. Nurses Notes are reviewed and I agree except as noted in the HPI. HISTORY OF PRESENT ILLNESS    Krissy Bush is a 78 y.o. female who presents to Emergency Department with Fatigue     Patient is from assisted living. She was sent to ED because of fatigue over last week. She is not in pain. No fever. Patient has no chills. No cough. No abdominal pain. No nausea and no vomiting. She denies urinary symptoms. She is a poor historian and has no clue why she was sent to ED. This HPI was provided by patient. REVIEW OF SYSTEMS   Review of Systems   Constitutional: Positive for fatigue. Negative for activity change, appetite change, chills, fever and unexpected weight change. HENT: Negative for congestion, ear discharge, ear pain, hearing loss, nosebleeds, rhinorrhea and sore throat. Eyes: Negative for photophobia, pain, discharge, redness and itching. Respiratory: Negative for cough, chest tightness, shortness of breath, wheezing and stridor. Cardiovascular: Negative for chest pain, palpitations and leg swelling. Gastrointestinal: Negative for abdominal distention, abdominal pain, constipation, diarrhea, nausea and vomiting. Endocrine: Negative for cold intolerance, heat intolerance, polydipsia and polyphagia. Genitourinary: Negative for dysuria, flank pain, frequency and hematuria. Musculoskeletal: Negative for arthralgias, back pain, gait problem, myalgias, neck pain and neck stiffness. Skin: Negative for pallor, rash and wound. Allergic/Immunologic: Negative for environmental allergies and food allergies.    Neurological: Negative for dizziness, tremors, syncope, weakness and headaches. Psychiatric/Behavioral: Negative for agitation, behavioral problems, confusion, self-injury, sleep disturbance and suicidal ideas. PAST MEDICAL HISTORY     Past Medical History:   Diagnosis Date    Arthritis     Asthma 1978    [facility] fibrillation ([facility]     Blood circulation, collateral     CAD (coronary artery disease)     CHF (congestive heart failure) ([facility]) 1999    Chronic kidney disease, stage III (moderate) ([facility])     COPD (chronic obstructive pulmonary disease) ([facility]     Depression     [facility]     Diabetic hypertension-nephrosis syndrome ([facility])     Dysuria     GERD (gastroesophageal reflux disease)     Hyperlipidemia     Hypertension     Kidney disease     stage 3 dr Clarita Caraballo Unspecified cerebral artery occlusion with cerebral infarction 1999    Dr Luca Hoffman informed patient after Heart Attack       SURGICAL HISTORY       Past Surgical History:   Procedure Laterality Date    APPENDECTOMY      BACK SURGERY      lumbar spine    BLADDER SUSPENSION      CARDIAC CATHETERIZATION  2009    with stent  [facility]    CARPAL TUNNEL RELEASE      COLONOSCOPY  2013    ENDOSCOPY, COLON, DIAGNOSTIC      ESOPHAGEAL DILATATION      HYSTERECTOMY  1974    JOINT REPLACEMENT Left 1992    Left Knee    UPPER GASTROINTESTINAL ENDOSCOPY N/A 9/28/2021    EGD [facility] Left 9/28/2021    EGD BIOPSY performed by Robbi Ruiz MD at [facility]       CURRENT MEDICATIONS       Previous Medications    ACETAMINOPHEN (TYLENOL) 500 MG TABLET    Take 500 mg by mouth every 6 hours as needed for Pain    ALBUTEROL (PROVENTIL HFA;VENTOLIN HFA) 108 (90 BASE) MCG/ACT INHALER    Inhale 2 puffs into the lungs every 6 hours as needed.     ASPIRIN 81 MG EC TABLET    Take 162 mg by mouth daily    ATORVASTATIN (LIPITOR) 40 MG TABLET    Take 2 tablets by mouth daily    AZATHIOPRINE (IMURAN) 50 MG TABLET    Take 2 tablets by mouth daily    BUMETANIDE (BUMEX) 1 MG TABLET    Take 1 mg by mouth daily    CALCIUM CARBONATE 600 MG TABS TABLET    Take 1 tablet by mouth daily    CARBOXYMETHYLCELLULOSE (ARTIFICIAL TEARS) 1 % OPHTHALMIC SOLUTION    1 drop 3 times daily    CLONIDINE (CATAPRES) 0.1 MG TABLET    Take 1 tablet by mouth 2 times daily    DEXLANSOPRAZOLE (DEXILANT) 60 MG CPDR DELAYED RELEASE CAPSULE    Take 60 mg by mouth daily    DOCUSATE SODIUM (COLACE, DULCOLAX) 100 MG CAPS    Take 100 mg by mouth 2 times daily    DULOXETINE (CYMBALTA) 30 MG EXTENDED RELEASE CAPSULE    Take 30 mg by mouth 2 times daily    EPINEPHRINE (EPIPEN 2-LORAINE) 0.3 MG/0.3ML SOAJ INJECTION    Inject 0.3 mLs into the muscle once for 1 dose Use as directed for allergic reaction    FAMOTIDINE (PEPCID) 40 MG TABLET    Take 40 mg by mouth daily    FENOFIBRATE (TRICOR) 145 MG TABLET    Take 145 mg by mouth daily    FLUTICASONE-UMECLIDIN-VILANT (TRELEGY ELLIPTA) 100-62.5-25 MCG/INH AEPB    Inhale 1 puff into the lungs daily    GABAPENTIN (NEURONTIN) 100 MG CAPSULE    Take 100 mg by mouth 3 times daily. HYDROXYCHLOROQUINE (PLAQUENIL) 200 MG TABLET    TAKE 1 TABLET BY MOUTH EVERY DAY    INSULIN GLARGINE (LANTUS) 100 UNIT/ML INJECTION PEN    Inject 8 Units into the skin nightly     LOPERAMIDE (IMODIUM) 2 MG CAPSULE    Take 2 mg by mouth 4 times daily as needed for Diarrhea    LOSARTAN (COZAAR) 50 MG TABLET    Take 50 mg by mouth daily    MAGNESIUM OXIDE 250 MG TABS    Take 400 mg by mouth 2 times daily     METOPROLOL TARTRATE (LOPRESSOR) 25 MG TABLET    Take 1 tablet by mouth 2 times daily    NITROGLYCERIN (NITROSTAT) 0.4 MG SL TABLET    Place 0.4 mg under the tongue every 5 minutes as needed. OXYCODONE-ACETAMINOPHEN (PERCOCET) 5-325 MG PER TABLET    Take 1 tablet by mouth every 4 hours as needed for Pain.     PREDNISONE (DELTASONE) 5 MG TABLET    TAKE 1 TABLET BY MOUTH EVERY DAY    RANOLAZINE (RANEXA) 500 MG SR TABLET    Take 1 tablet by mouth 2 times daily.    SPIRONOLACTONE (ALDACTONE) 50 MG TABLET    Take 50 mg by mouth daily    TIOTROPIUM BROMIDE-OLODATEROL 2.5-2.5 MCG/ACT AERS    Inhale 2 puffs into the lungs daily    TRAZODONE (DESYREL) 50 MG TABLET    Take 100 mg by mouth nightly        ALLERGIES     Renflexis [infliximab], Strawberry (diagnostic), Nuts [peanut-containing drug products], Sulfa antibiotics, Tape [adhesive tape], Bactrim [sulfamethoxazole-trimethoprim], and Chocolate    FAMILY HISTORY     She indicated that her mother is . She indicated that her father is . She indicated that both of her sisters are . She indicated that all of her three brothers are . family history includes Cancer in her sister and sister; Diabetes in her father and mother; Heart Disease in her father and mother; Kidney Disease in her mother; Stroke in her brother. SOCIAL HISTORY      reports that she has quit smoking. Her smoking use included cigarettes. She started smoking about 7 years ago. She has a 1.50 pack-year smoking history. She has never used smokeless tobacco. She reports that she does not drink alcohol and does not use drugs. PHYSICAL EXAM      height is 5' 6\" (1.676 m) and weight is 182 lb (82.6 kg). Her oral temperature is 97.9 °F (36.6 °C). Her blood pressure is 124/50 (abnormal) and her pulse is 60. Her respiration is 16 and oxygen saturation is 100%. Physical Exam  Vitals and nursing note reviewed. Constitutional:       Appearance: She is well-developed. She is not diaphoretic. HENT:      Head: Normocephalic and atraumatic. Nose: Nose normal.   Eyes:      General: No scleral icterus. Right eye: No discharge. Left eye: No discharge. Conjunctiva/sclera: Conjunctivae normal.      Pupils: Pupils are equal, round, and reactive to light. Neck:      Vascular: No JVD. Trachea: No tracheal deviation. Cardiovascular:      Rate and Rhythm: Normal rate and regular rhythm.       Heart 81.0 - 99.0 fL    MCH 31.4 26.0 - 33.0 pg    MCHC 30.3 (L) 32.2 - 35.5 gm/dl    RDW-CV 15.3 (H) 11.5 - 14.5 %    RDW-SD 57.1 (H) 35.0 - 45.0 fL    Platelets 966 (H) 650 - 400 thou/mm3    MPV 9.3 (L) 9.4 - 12.4 fL    Seg Neutrophils 91.1 %    Lymphocytes 2.4 %    Monocytes 5.6 %    Eosinophils 0.1 %    Basophils 0.1 %    Immature Granulocytes 0.7 %    Segs Absolute 12.4 (H) 1 - 7 thou/mm3    Lymphocytes Absolute 0.3 (L) 1.0 - 4.8 thou/mm3    Monocytes Absolute 0.8 0.4 - 1.3 thou/mm3    Eosinophils Absolute 0.0 0.0 - 0.4 thou/mm3    Basophils Absolute 0.0 0.0 - 0.1 thou/mm3    Immature Grans (Abs) 0.09 (H) 0.00 - 0.07 thou/mm3    nRBC 0 /100 wbc   Comprehensive Metabolic Panel   Result Value Ref Range    Glucose 177 (H) 70 - 108 mg/dL    CREATININE 2.3 (H) 0.4 - 1.2 mg/dL    BUN 51 (H) 7 - 22 mg/dL    Sodium 138 135 - 145 meq/L    Potassium 4.9 3.5 - 5.2 meq/L    Chloride 99 98 - 111 meq/L    CO2 26 23 - 33 meq/L    Calcium 9.6 8.5 - 10.5 mg/dL    AST 36 5 - 40 U/L    Alkaline Phosphatase 46 38 - 126 U/L    Total Protein 6.0 (L) 6.1 - 8.0 g/dL    Albumin 3.0 (L) 3.5 - 5.1 g/dL    Total Bilirubin 0.2 (L) 0.3 - 1.2 mg/dL    ALT 17 11 - 66 U/L   Brain Natriuretic Peptide   Result Value Ref Range    Pro-BNP 4379.0 (H) 0.0 - 1800.0 pg/mL   Troponin   Result Value Ref Range    Troponin T 0.019 (A) ng/ml   TSH with Reflex   Result Value Ref Range    TSH 3.560 0.400 - 4.200 uIU/mL   Procalcitonin   Result Value Ref Range    Procalcitonin 3.12 (H) 0.01 - 0.09 ng/mL   Anion Gap   Result Value Ref Range    Anion Gap 13.0 8.0 - 16.0 meq/L   Glomerular Filtration Rate, Estimated   Result Value Ref Range    Est, Glom Filt Rate 20 (A) ml/min/1.73m2   Osmolality   Result Value Ref Range    Osmolality Calc 293.7 275.0 - 300.0 mOsmol/kg   Urine with Reflexed Micro   Result Value Ref Range    Glucose, Ur NEGATIVE NEGATIVE mg/dl    Bilirubin Urine SMALL (A) NEGATIVE    Ketones, Urine TRACE (A) NEGATIVE    Specific Gravity, Urine 1.020 1.002 - 1.030    Blood, Urine NEGATIVE NEGATIVE    pH, UA 5.0 5.0 - 9.0    Protein, UA 30 (A) NEGATIVE    Urobilinogen, Urine 0.2 0.0 - 1.0 eu/dl    Nitrite, Urine POSITIVE (A) NEGATIVE    Leukocyte Esterase, Urine SMALL (A) NEGATIVE    Color, UA DARK YELLOW (A) STRAW-YELLOW    Character, Urine CLEAR CLEAR-SL CLOUD    RBC, UA 3-5 0-2/hpf /hpf    WBC, UA 5-9 0-4/hpf /hpf    Epithelial Cells, UA 3-5 3-5/hpf /hpf    Bacteria, UA NONE SEEN FEW/NONE SEEN /hpf    Casts UA 4-8 HYALINE NONE SEEN /lpf    Crystals, UA NONE SEEN NONE SEEN    Renal Epithelial, UA NONE SEEN NONE SEEN    Yeast, UA NONE SEEN NONE SEEN    CASTS 2 NONE SEEN NONE SEEN /lpf    MISCELLANEOUS 2 NONE SEEN    Bile Acids, Total   Result Value Ref Range    Ictotest NEGATIVE NEGATIVE   Lactic acid, plasma   Result Value Ref Range    Lactic Acid 1.2 0.5 - 2.0 mmol/L   EKG Emergency   Result Value Ref Range    Ventricular Rate 67 BPM    Atrial Rate 67 BPM    P-R Interval 216 ms    QRS Duration 158 ms    Q-T Interval 494 ms    QTc Calculation (Bazett) 521 ms    R Axis -8 degrees    T Axis -18 degrees       RADIOLOGY REPORTS  XR CHEST PORTABLE   Final Result   Cardiomegaly with mild pulmonary vascular congestion. **This report has been created using voice recognition software. It may contain minor errors which are inherent in voice recognition technology. **      Final report electronically signed by Dr Pranav Orr on 9/29/2021 9:09 PM          [facility] ED COURSE     ED Vitals:  Vitals:    09/29/21 1957 09/29/21 2146 09/29/21 2307 09/29/21 2351   BP: 114/65 (!) 115/46  (!) 124/50   Pulse: 63 65 70 60   Resp: 17   16   Temp: 97.9 °F (36.6 °C)      TempSrc: Oral      SpO2: 100%  94% 100%   Weight: 182 lb (82.6 kg)      Height: 5' 6\" (1.676 m)          Actions:   Large-bore IV  Normal sitting fusion  Labs  Chest x-ray  ECG  Telemetry monitor    MDM:    ED work-ups revealed patient has urinary tract infection (positive nitrite, small leukocyte), leukocytosis (WBC 13.6), EMMA on CKD (creatinine 2.3, baseline creatinine 1.7), elevated BNP now at 4379, troponin elevation 0.019 (baseline normal troponin), and procalcitonin 3.12. GFR 20 (baseline GFR 30-40). Patient has EMMA, UTI, mild CHF, troponin elevation, and significant procalcitonin elevation. Chest x-ray shows cardiomegaly with mild pulmonary vascular congestion. Empirical Zosyn was given on arrival.  Patient was given saline infusion on arrival.    Admission is warranted. Case is discussed and the patient is admitted to hospitalist service. CRITICAL CARE   None    CONSULTS   Hospitalist    PROCEDURES   None    FINAL IMPRESSION AND DISPOSITION      1. Urinary tract infection in female    2. Other fatigue    3. EMMA (acute kidney injury) ([facility]        Admit    PATIENT REFERRED TO:  No follow-up provider specified.     DISCHARGE MEDICATIONS:  New Prescriptions    No medications on file       (Please note that portions of this note were completed with a voice recognition program.  Efforts were made to edit the dictations but occasionally words aremis-transcribed.)    MD Clement Potts MD  09/30/21 0002

## 2021-09-30 NOTE — CARE COORDINATION
9/30/21, 1:37 PM EDT  Discharge Planning Evaluation  Social work consult received, patient from [facility]. Patient/Family preference is to return to [facility]. The patient's current payor source at the facility is Medicare. Medicare skilled days available: Yes  Insurance precert:  No  Left voicemail for [facility] admissions at the facility. Patient bed hold: Yes  Anticipated transport plan: Ambulance  Do they require COVID 19 test to return to ECF: Yes  Is there a required time frame which which COVID test needs done: 24 hours    SW spoke with patient about discharge plan and she reported that she plans to return to [facility]). SW received a call from Megan Dooley at [facility] and she reported that they will accept patient back at discharge and she will not be a precert.

## 2021-09-30 NOTE — PROGRESS NOTES
Pharmacy Medication History Note      List of current medications patient is taking is complete. Source of information: [facility], dispense report    Changes made to medication list:  Medications removed (include reason, ex. therapy complete or physician discontinued):  Calcium wafers (changed to Tums tablets)  Fenofibrate po (changed to reflect correct dose/salt)    Medications added/doses adjusted:  Magnesium clarified to 400 mg BID from 250 mg  Montelukast 10mg adjusted to daily prn from q12h prn    Other notes (ex. Recent course of antibiotics, Coumadin dosing):  Dispense report lists metronidazole and amox/clav as recently dispense ABX. Metronidazole 500mg TID x7 days dispensed on 9/24. Amox/Clav 400/57 per 5ml (100ml) dispense on 9/23. Denies use of other OTC or herbal medications.     Allergies reviewed    Electronically signed by Jessica Pereyra on 9/30/2021 at 11:16 AM

## 2021-09-30 NOTE — PROGRESS NOTES
Pt admitted to  4K22 via cart/stretcher. Complaints: None. IV none infusing into the hand left, condition patent and no redness. IV site free of s/s of infection or infiltration. Vital signs obtained. Assessment and data collection initiated. Two nurse skin assessment performed by Gisella Eugene and [facility]. Oriented to room. Policies and procedures for 4 explained. All questions answered with no further questions at this time. Fall prevention and safety brochure discussed with patient. Bed alarm on. Call light in reach. Would you like your Primary Care Physician notified?  no   The best day to schedule a follow up Dr appointment is:  Monday a.m. Patient refused MRSA/VRE swabs at this time.

## 2021-09-30 NOTE — PROGRESS NOTES
Educated patient regarding heart failure management by explaining the importance of obtaining daily weights at the same time every day with approximately the same amount of clothing, how to recognize symptoms, low sodium diet and taking prescribed medications as ordered. Patient was given our heart failure booklet, a weight chart and a low sodium diet sheet. Patient was receptive to the education given and verbalized understanding. Patient follows with Dr. Roro Quigley as OP for CHF and will discuss CHF clinic at her follow up appointment.  No CHF clinic appointment scheduled at this time

## 2021-09-30 NOTE — DISCHARGE INSTR - COC
Continuity of Care Form    Patient Name: Nury Sanchez   :  1940  MRN:  [id number]    Admit date:  2021  Discharge date:  10/01/2021    Code Status Order: Full Code   Advance Directives:      Admitting Physician:  Og Sky MD  PCP: Estee Mcknight DO    Discharging Nurse: [facility] Unit/Room#: 4K-21/80-A  Discharging Unit Phone Number: [phone]    Emergency Contact:   Extended Emergency Contact Information  Primary Emergency Contact: Jennifer Babcock of [address] [place] [address] Phone: [phone]  Mobile Phone: [phone]  Relation: Child  Secondary Emergency Contact: Blair Jones [address] Phone: [phone]  Mobile Phone: [phone]  Relation: Child    Past Surgical History:  Past Surgical History:   Procedure Laterality Date    APPENDECTOMY      BACK SURGERY      lumbar spine    BLADDER SUSPENSION      CARDIAC CATHETERIZATION      with stent  [facility]    CARPAL TUNNEL RELEASE      COLONOSCOPY  2013    ENDOSCOPY, COLON, DIAGNOSTIC      ESOPHAGEAL DILATATION      HYSTERECTOMY  1974    JOINT REPLACEMENT Left     Left Knee    UPPER GASTROINTESTINAL ENDOSCOPY N/A 2021    EGD DILATION performed by Carter Aguilar MD at [facility] END[facility] Left 2021    EGD BIOPSY performed [facility]       Immunization History:   Immunization History   Administered Date(s) Administered    COVID-19, [place] Peter, PF, 30mcg/0.3mL 2021, 2021    Influenza Virus Vaccine 10/04/2012       Active Problems:  Patient Active Problem List   Diagnosis Code    Syncope R55    Diabetes mellitus ([facility] E11.9    CAD (coronary artery disease) I25.10    Hypertension I10    Atrial fibrillation (HCC) I48.91    GERD (gastroesophageal reflux disease) K21.9    Chronic kidney disease, stage III (moderate) (HCC) N18.30    COPD (chronic obstructive pulmonary disease) ([facility] J44.9  Dysuria R30.0    Diabetic hypertension-nephrosis syndrome (HCC) E11.21    Hypokalemia E87.6    Abnormal nuclear cardiac imaging test R93.1    Back pain, chronic M54.9, G89.29    Obesity (BMI 35.0-39.9 without comorbidity) E66.9    H/O class III angina pectoris Z86.79    HTN, goal below 130/80 I10    Non-rheumatic mitral regurgitation I34.0    Abnormal nuclear stress test R94.39    Osteopenia of multiple sites M85.89    Seronegative rheumatoid arthritis ([facility] M06.00    Acute respiratory failure (HCC) J96.00    Diverticulitis K57.92    Diverticulitis of colon K57.32    Multiple falls R29.6    Acute kidney injury ([facility] N17.9       Isolation/Infection:   Isolation            No Isolation          Patient Infection Status       Infection Onset Added Last Indicated Last Indicated By Review Planned Expiration Resolved Resolved By    None active    Resolved    COVID-19 Rule Out 11/11/20 11/11/20 11/12/20 COVID-19 (Ordered)   11/12/20 Rule-Out Test Resulted    COVID-19 Rule Out 11/05/20 11/05/20 11/06/20 COVID-19 (Ordered)   11/06/20 Rule-Out Test Resulted    COVID-19 Rule Out 09/28/20 09/28/20 09/28/20 COVID-19 (Ordered)   09/29/20 Rule-Out Test Resulted    COVID-19 Rule Out 08/08/20 08/08/20 08/08/20 COVID-19 (Ordered)   08/08/20 Rule-Out Test Resulted            Nurse Assessment:  Last Vital Signs: BP (!) 131/50   Pulse 75   Temp 97.7 °F (36.5 °C) (Oral)   Resp 18   Ht 5' 6\" (1.676 m)   Wt 198 lb 6.6 oz (90 kg)   SpO2 95%   BMI 32.02 kg/m²     Last documented pain score (0-10 scale):    Last Weight:   Wt Readings from Last 1 Encounters:   09/30/21 198 lb 6.6 oz (90 kg)     Mental Status:  oriented, alert    IV Access:  - None    Nursing Mobility/ADLs:  Walking   Independent  Transfer  Assisted  Bathing  Assisted  Dressing  Assisted  Toileting  Assisted  Feeding  Independent  Med Admin  Assisted  Med Delivery   whole    Wound Care Documentation and Therapy:  Wound 11/06/20 Buttocks Right stage I, non blanchable redness (Active)   Number of days: 327        Elimination:  Continence:   · Bowel: Yes  · Bladder: Yes  Urinary Catheter: None   Colostomy/Ileostomy/Ileal Conduit: No       Date of Last BM: 10/01/2021    Intake/Output Summary (Last 24 hours) at 9/30/2021 1344  Last data filed at 9/30/2021 1125  Gross per 24 hour   Intake 2321.98 ml   Output 0 ml   Net 2321.98 ml     I/O last 3 completed shifts: In: 600 [P.O.:600]  Out: 0     Safety Concerns: At Risk for Falls    Impairments/Disabilities:      Vision    Nutrition Therapy:  Current Nutrition Therapy:   - Oral Diet:  General    Routes of Feeding: Oral  Liquids: Thin Liquids  Daily Fluid Restriction: yes - amount 2000  Last Modified Barium Swallow with Video (Video Swallowing Test): done on 09/28/21    Treatments at the Time of Hospital Discharge:   Respiratory Treatments:   Oxygen Therapy:  is on oxygen at 1-2 L/min per nasal cannula. for comfort  Ventilator:    - No ventilator support    Rehab Therapies: Physical Therapy, Occupational Therapy and Speech/Language Therapy  Weight Bearing Status/Restrictions: No weight bearing restirctions  Other Medical Equipment (for information only, NOT a DME order):   Walker  Other Treatments:     Patient's personal belongings (please select all that are sent with patient):  Claudia    RN SIGNATURE:  Electronically signed by Juan Manuel Meza RN on 10/1/21 at 2:40 PM EDT    CASE MANAGEMENT/SOCIAL WORK SECTION    Inpatient Status Date: 9/30/2021    Readmission Risk Assessment Score:  Readmission Risk              Risk of Unplanned Readmission:  27           Discharging to Facility/ Agency   · Name:  [facility]  · Address: [address], [address] [place] [address]  · Phone: [phone] ext [phone]  · Fax: [phone]    Dialysis Facility (if applicable)   · Name:  · Address:  · Dialysis Schedule:  · Phone:  · Fax:    / signature: Electronically signed by CORY Carver on 9/30/21 at 1:46 PM EDT    PHYSICIAN SECTION    Prognosis: Fair    Condition at Discharge: Stable    Rehab Potential (if transferring to Rehab): Fair    Recommended Labs or Other Treatments After Discharge: None    Physician Certification: I certify the above information and transfer of Haley Dutton  is necessary for the continuing treatment of the diagnosis listed and that she requires Nasim Gonzales for greater 30 days.      Update Admission H&P: No change in H&P    PHYSICIAN SIGNATURE:  Electronically signed by Stanford Navarrete DO on 10/1/21 at 10:52 AM EDT

## 2021-09-30 NOTE — ED NOTES
Pt continues restful on cart, VSS, resp easy and non labored     Octavio Zepeda, LEXII  09/29/21 7844

## 2021-09-30 NOTE — PROGRESS NOTES
HOSPITALIST PROGRESS NOTE      Patient:  Gurjit Lyon    Unit/Bed:4K-22/022-A  YOB: 1940  MRN: [id number]   PCP: Asia Yeboah DO  Date of Admission: 9/29/2021  Chief Complaint:- Fatigue    Assessment and Plan:    1. Acute kidney injury on CKD stage III:  Likely prerenal, secondary to overdiuresis/hypotension with widened BUN to creatinine ratio. Creatinine trending down to 2.0 since presentation (baseline 1.0-1.3). Plan is to continue to hold Bumex / Cozaar / Aldactone and resume when creatinine is back to baseline. Discontinue maintenance fluids for now due to history of combined diastolic and systolic heart failure and worsening lung sounds. Avoid nephrotoxic agents if possible and renally dose medications as needed. Continue to monitor renal function via daily BMP. 2. Asymptomatic urinary tract infection:  Denies symptoms of dysuria / increase in frequency / burning on urination. UA+ for leukocyte esterase / nitrite. S/p empiric Zosyn x1 in ED. Urine culture still pending. Continue Rocephin 1g q 24h. 3. 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 Patient had diffuse rales on exam. The plan is to hold diuretics 2/2 to EMMA on CKD, but resume when renal function improves. Hold maitenance fluid for now due to history of CHF and worsening lung sounds on exam. Continue strict I&O and daily weight to monitor for fluid overload. Low-sodium diet/fluid restriction. Continue Ranexa and Lopressor with holding parameters. Patient does not appear to be following with a cardiologist as an outpatient, but will need a scheduled follow-up with outpatient cardiology on discharge. 4. Elevated troponin:  Suspected secondary to demand ischemia. Troponin 0.019 (previously negative). EKG does not reveal ST segment elevation or depression and is unchanged from 6/8/21.  Patient denies chest pain or shortness of breath. She does have history of obstructive CAD, no stent placement. Repeat EKG in the AM and trend troponin. 5. Chronic macrocytic anemia:  Hemoglobin decreased, but within her previous baseline (hg 9-11). Patient has had some recent bright red bowel movements secondary to straining and passing hard stool. Continue to monitor CBC closely and transfuse if Hg < 7. Fecal Occult Blood Test was negative. 6. Hx of obstructive CAD:  10/1/20 cath reveals total occlusion of the RCA proximally with collaterals. Non-obstructive disease of the proximal LAD and the mid LAD. Continue patient's aspirin and Lipitor. 7. IDDM 2 - controlled:  Hg A1C 6.6. Resume home dose Lantus. Initiate medium ISS. Hypoglycemia protocol. ACCU-CHECKs. 8. Moderate COPD:  Resume patient's home Symbicort / Spiriva. Albuterol PRN. Pulmonary hygiene. Pt follows with Dr. Chelsea Alicea as outpatient. 9. Seronegative Rheumatoid Arthritis:  Resume patient's home azathioprine 100 mg daily / Plaquenil 200 mg daily. Subjective (Past 24 hour events):  78 y.o. female who presented to [facility] for evaluation of fatigue. Patient is a resident of assisted living facility and was sent to the ED for further evaluation of her fatigue. Unable to obtain history from patient, poor historian, alerted to self only. Per chart review, patient underwent EGD with dilation on 9/28/21 with Dr. Radha Olson due to history of dysphagia. Patient denies chest pain, shortness of breath, abdominal pain. Patient reports no to every question, unsure how accurate her history is.      ED course, on presentation initial vital signs reveal temp 97.9, respiratory rate 17, heart rate 63, blood pressure 114/65, SPO2 100% on 3 L nasal cannula. Initial lab work reveals sodium 138, potassium 4.9, chloride 99, bicarb 26, BUN 51, creatinine 2.3, EGFR 20, lactic acid 1.2, glucose 177, calcium 9.6, procalcitonin 3.12, proBNP 4379.0, troponin 0.019.   Albumin 3.0, alk phos 46, ALT 17, AST 36. WBC 13.6, hemoglobin 9.7, hematocrit 32.0, .6, platelet count 961.      Chest x-ray reveals cardiomegaly with mild pulmonary vascular congestion.      Patient was treated with empirically with Zosyn for UTI.     HPI:  Mihai Taylor is a 78 y.o. female with PMH of significant for CKD stage III, IDDM 2, COPD/asthma, depression, seronegative rheumatoid arthritis, combined CHF. Summarized ED Course  On presentation, initial vital signs reveal temp 97.9, respiratory rate 17, heart rate 63, blood pressure 114/65, SPO2 100% on 3 L nasal cannula. Initial lab work reveals sodium 138, potassium 4.9, chloride 99, bicarb 26, BUN 51, creatinine 2.3, EGFR 20, lactic acid 1.2, glucose 177, calcium 9.6, procalcitonin 3.12, proBNP 4379.0, troponin 0.019. Albumin 3.0, alk phos 46, ALT 17, AST 36. WBC 13.6, hemoglobin 9.7, hematocrit 32.0, . 6. Chest x-ray reveals cardiomegaly with mild pulmonary vascular congestion. Patient was treated with empirically with Zosyn for UTI. See Assessment and Plan for further details on hospital course. ROS  Review of Systems   Constitutional: Positive for fatigue. Negative for chills, diaphoresis and fever. Respiratory: Positive for cough and shortness of breath. Negative for wheezing. Cardiovascular: Negative for chest pain and leg swelling. Gastrointestinal: Positive for blood in stool (bright red prior to coming to the hospital after straining and having multiple hard bowel movements) and diarrhea (one episode this morning). Negative for nausea and vomiting. Genitourinary: Negative for dysuria, flank pain, frequency and hematuria. Skin: Negative for rash and wound. Neurological: Negative for syncope and headaches. Psychiatric/Behavioral: Negative for agitation.        Scheduled Meds:   sodium chloride flush  5-40 mL IntraVENous 2 times per day    heparin (porcine)  5,000 Units SubCUTAneous 3 times per day    atorvastatin  80 mg Oral Daily    azaTHIOprine  100 mg Oral Daily    pantoprazole  40 mg Oral QAM AC    docusate sodium  100 mg Oral BID    gabapentin  100 mg Oral TID    hydroxychloroquine  200 mg Oral Daily    insulin glargine  8 Units SubCUTAneous Nightly    [START ON 10/1/2021] ranolazine  500 mg Oral BID    aspirin  162 mg Oral Daily    insulin lispro  0-12 Units SubCUTAneous TID WC    insulin lispro  0-6 Units SubCUTAneous Nightly    cefTRIAXone (ROCEPHIN) IV  1,000 mg IntraVENous Q24H    budesonide-formoterol  2 puff Inhalation BID    And    tiotropium  2 puff Inhalation Daily    cloNIDine  0.1 mg Oral TID    magnesium oxide  400 mg Oral BID    metoprolol succinate  50 mg Oral Daily    guaiFENesin  600 mg Oral BID    ipratropium-albuterol  1 ampule Inhalation 4x daily    polyethylene glycol  17 g Oral Daily     Continuous Infusions:   sodium chloride      dextrose         PHYSICAL EXAMINATION:  Vitals:    09/30/21 1405   BP: (!) 154/74   Pulse: 71   Resp: 18   Temp: 98.1 °F (36.7 °C)   SpO2: 97%   On 2 L O2 NC. Body mass index is 32.02 kg/m². General: No acute distress. Patient is alert and oriented to person, place, and time. Integumentary: No rashes present. Skin is warm and dry. Head: Normocephalic, atraumatic. EENT:  PERRL. EOMI. Conjunctivae moist with no erythema or pallor. No scleral icterus. Nasal mucosa moist with a midline nasal septum. Moist mucous membranes. Tonsils without erythema or exudate. Neck: Neck supple. Normal ROM. Trachea midline. No cervical lymphadenopathy. No Thyromegaly. Lungs: Clear to auscultation bilaterally. No wheezes, rhonchi, or rales. Normal respiratory effort, not in respiratory distress. No retractions. No stridor. Cardiac: HRRR. No murmurs, rubs, or gallops. No JVD. Abdomen: Soft, nontender. Bowel sounds present x 4. No hepatosplenomegaly. Extremities:  No clubbing, cyanosis, or edema of the upper extremities bilaterally.  No cyanosis or edema of the lower extremities bilaterally. Vasculature: Capillary refill < 3 seconds. Dorsalis pedis pulses +3/4 bilaterally. Radial pulses +3/4 bilaterally  Psych:  Alert and oriented x3. Affect and mood appropriate. Neurologic:  CN II-XII intact. No focal neurological deficits. Upper extremity strength 5/5 bilaterally. Lower extremity strength 5/5 bilaterally. Sensation intact and comparable in all 4 extremities. No seizures. Pertinent Labs   CMP: Na 137, K 4.3, Cl 100, BUN 49, Cr 2.0, AG 11.0, eGFR 24, Glu 170   Mg 2.7   Total    Pro-BNP 4379   CBC: Hb 9.7, Hct 32.0, WBC 13.6, Plat 425   Fe 24, TIBC 159   UA:     Pertinent Imaging  · Chest x-ray reveals cardiomegaly with mild pulmonary vascular congestion.       Electronically signed by *** on 9/30/2021 at 2:01 PM  Internal Medicine Resident    Electronically signed by DAVID Pandey on 9/30/2021 at 2:01 PM  Fourth-Year Medical Student

## 2021-09-30 NOTE — H&P
History & Physical       Patient: Hilario Cogan  YOB: 1940    MRN: [id number]     Acct: [de-identified]    PCP: Andrei Yeboah DO    Date of Admission: 9/29/2021    Date of Service: Patient seen / examined on 09/30/21 and admitted to Inpatient with expected LOS greater than two midnights due to medical therapy. ASSESSMENT / PLAN:    Acute kidney injury on CKD stage III   Likely prerenal, secondary to overdiuresis / hypotension with widened BUN to creatinine ratio. Creatinine 2.3 on presentation (baseline 1.0-1.3). S/p 0.5L NS on presentation. Plan - hold Bumex / Cozaar / Aldactone. Resume when creatinine is back to baseline. Continue maintenance fluids at 125 cc/h. Avoid nephrotoxic agents. Renally dose medications. Consider nephrology consult if renal function does not improve in the next 12 hours. Monitor renal function with BMP. Asymptomatic urinary tract infection  Denies symptoms of dysuria / increase in frequency / burning on urination. UA+ for leukocyte esterase / nitrite. S/p empiric Zosyn x1  Plan - urine culture->pending. Initiate Rocephin 1g q24h. 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 Pro-BNP elevated 4379. 0 (previously elevated). No pulmonary edema, mild pulmonary vascular congestion. Plan - hold diuretics 2/2 to #1 but resume when renal function improves. Strict intake and output. Daily weight. Low-sodium diet/fluid restriction. Resume Ranexa /  Lopressor with holding parameters. Per chart review, patient has not followed with cardiology as outpatient for continuity. Patient will need to follow. Elevated troponin  Suspected secondary to demand ischemia. Troponin 0.019 (previously negative). EKG does not reveal ST segment elevation / depression, unchanged from 6/8/2. Patient denies chest pain / shortness of breath. She does have history of obstructive CAD, no stent placement.    Repeat EKG in the AM.    Chronic macrocytic anemia   Hg 9.7 (baseline 9-11). No acute sign of bleeding. Plan - check iron studies. Continue to monitor CBC closely and transfuse if Hg < 7. Hx of obstructive CAD  10/1/20 cath reveals total occlusion of the RCA proximally with collaterals. Non-obstructive disease of the proximal LAD and the mid LAD. Plan - continue Aspirin / Lipitor     IDDM 2 - controlled  Hg A1C 6.6. Resume home dose Lantus. Initiate medium ISS. Hypoglycemia protocol. ACCU-CHECKs    Moderate COPD  Resume Symbicort / Spiriva. Albuterol PRN. Pulmonary hygiene. Pt follows with Dr. Shabbir Johnson as outpatient    Seronegative Rheumatoid Arthritis   Resume azathioprine 100 mg daily / Plaquenil 200 mg daily. Chief Complaint:  Fatigue     History of Present Illness:  78 y.o. female who presented to [address] [place] [address] for evaluation of fatigue. Patient is a resident of assisted living facility and was sent to the ED for further evaluation of her fatigue. Unable to obtain history from patient, poor historian, alerted to self only. PMHx significant for CKD stage III, IDDM 2, COPD/asthma, depression, seronegative rheumatoid arthritis, combined CHF. Per chart review, patient underwent EGD with dilation on 9/28/21 with Dr. Janice Britt due to history of dysphagia. Patient denies chest pain, shortness of breath, abdominal pain. Patient reports no to every question, unsure how accurate her history is. ED course, on presentation initial vital signs reveal temp 97.9, respiratory rate 17, heart rate 63, blood pressure 114/65, SPO2 100% on 3 L nasal cannula. Initial lab work reveals sodium 138, potassium 4.9, chloride 99, bicarb 26, BUN 51, creatinine 2.3, EGFR 20, lactic acid 1.2, glucose 177, calcium 9.6, procalcitonin 3.12, proBNP 4379.0, troponin 0.019. Albumin 3.0, alk phos 46, ALT 17, AST 36. WBC 13.6, hemoglobin 9.7, hematocrit 32.0, .6, platelet count 284.      Chest x-ray reveals cardiomegaly with mild pulmonary vascular congestion. Patient was treated with empirically with Zosyn for UTI. Past Medical History:    Past Medical History:   Diagnosis Date    Arthritis     Asthma 1978    Atrial fibrillation ([facility]     Blood circulation, collateral     CAD (coronary artery disease)     CHF (congestive heart failure) ([facility]) 1999    Chronic kidney disease, stage III (moderate) ([facility])     COPD (chronic obstructive pulmonary disease) ([facility])     Depression     Diabetes mellitus ([facility]     Diabetic hypertension-nephrosis syndrome ([facility]     Dysuria     GERD (gastroesophageal reflux disease)     Hyperlipidemia     Hypertension     Kidney disease     stage 3 dr Isatu Manzanares    Unspecified cerebral artery occlusion with cerebral infarction 1999    Dr Marin Sample informed patient after Heart Attack     Past Surgical History:    Past Surgical History:   Procedure Laterality Date    APPENDECTOMY      BACK SURGERY      lumbar spine    BLADDER SUSPENSION      CARDIAC CATHETERIZATION  2009    with stent  [facility]    CARPAL TUNNEL RELEASE      COLONOSCOPY  2013    ENDOSCOPY, COLON, DIAGNOSTIC      ESOPHAGEAL DILATATION      HYSTERECTOMY  1974    JOINT REPLACEMENT Left 1992    Left Knee    UPPER GASTROINTESTINAL ENDOSCOPY N/A 9/28/2021    EGD DILATION performed by Lyndsay Barboza MD at [address] ENDOSCOPY Left 9/28/2021    EGD BIOPSY performed by Lyndsay Barboza MD at [facility]      Medications Prior to Admission:   No current facility-administered medications on file prior to encounter.      Current Outpatient Medications on File Prior to Encounter   Medication Sig Dispense Refill    bumetanide (BUMEX) 1 MG tablet Take 1 mg by mouth daily      acetaminophen (TYLENOL) 500 MG tablet Take 500 mg by mouth every 6 hours as needed for Pain      carboxymethylcellulose (ARTIFICIAL TEARS) 1 % ophthalmic solution 1 drop 3 times daily      losartan (COZAAR) 50 MG tablet Take 50 mg by mouth daily      loperamide (IMODIUM) 2 MG capsule Take 2 mg by mouth 4 times daily as needed for Diarrhea      oxyCODONE-acetaminophen (PERCOCET) 5-325 MG per tablet Take 1 tablet by mouth every 4 hours as needed for Pain.      hydroxychloroquine (PLAQUENIL) 200 MG tablet TAKE 1 TABLET BY MOUTH EVERY DAY 90 tablet 1    azaTHIOprine (IMURAN) 50 MG tablet Take 2 tablets by mouth daily 60 tablet 0    fluticasone-umeclidin-vilant (TRELEGY ELLIPTA) 100-62.5-25 MCG/INH AEPB Inhale 1 puff into the lungs daily 30 each 11    EPINEPHrine (EPIPEN 2-LORAINE) 0.3 MG/0.3ML SOAJ injection Inject 0.3 mLs into the muscle once for 1 dose Use as directed for allergic reaction 0.3 mL 0    Tiotropium Bromide-Olodaterol 2.5-2.5 MCG/ACT AERS Inhale 2 puffs into the lungs daily 1 Inhaler 11    cloNIDine (CATAPRES) 0.1 MG tablet Take 1 tablet by mouth 2 times daily 60 tablet 3    metoprolol tartrate (LOPRESSOR) 25 MG tablet Take 1 tablet by mouth 2 times daily 60 tablet 3    predniSONE (DELTASONE) 5 MG tablet TAKE 1 TABLET BY MOUTH EVERY DAY 30 tablet 3    calcium carbonate 600 MG TABS tablet Take 1 tablet by mouth daily      DULoxetine (CYMBALTA) 30 MG extended release capsule Take 30 mg by mouth 2 times daily      famotidine (PEPCID) 40 MG tablet Take 40 mg by mouth daily      gabapentin (NEURONTIN) 100 MG capsule Take 100 mg by mouth 3 times daily. spironolactone (ALDACTONE) 50 MG tablet Take 50 mg by mouth daily      atorvastatin (LIPITOR) 40 MG tablet Take 2 tablets by mouth daily 60 tablet 2    docusate sodium (COLACE, DULCOLAX) 100 MG CAPS Take 100 mg by mouth 2 times daily 60 capsule 1    insulin glargine (LANTUS) 100 UNIT/ML injection pen Inject 8 Units into the skin nightly       dexlansoprazole (DEXILANT) 60 MG CPDR delayed release capsule Take 60 mg by mouth daily      fenofibrate (TRICOR) 145 MG tablet Take 145 mg by mouth daily      aspirin 81 MG EC tablet Take 162 mg by mouth daily      ranolazine (RANEXA) 500 MG SR tablet Take 1 tablet by mouth 2 times daily.  61 tablet 3    Magnesium Oxide 250 MG TABS Take 400 mg by mouth 2 times daily       albuterol (PROVENTIL HFA;VENTOLIN HFA) 108 (90 BASE) MCG/ACT inhaler Inhale 2 puffs into the lungs every 6 hours as needed. traZODone (DESYREL) 50 MG tablet Take 100 mg by mouth nightly       nitroGLYCERIN (NITROSTAT) 0.4 MG SL tablet Place 0.4 mg under the tongue every 5 minutes as needed. Allergies:   Renflexis [infliximab], Strawberry (diagnostic), Nuts [peanut-containing drug products], Sulfa antibiotics, Tape [adhesive tape], Bactrim [sulfamethoxazole-trimethoprim], and Chocolate    Social History:   Social History     Socioeconomic History    Marital status:      Spouse name: Not on file    Number of children: 2    Years of education: 10    Highest education level: Not on file   Occupational History    Not on file   Tobacco Use    Smoking status: Former Smoker     Packs/day: 0.15     Years: 10.00     Pack years: 1.50     Types: Cigarettes     Start date: 10/21/2013    Smokeless tobacco: Never Used   Vaping Use    Vaping Use: Never used   Substance and Sexual Activity    Alcohol use: No    Drug use: No    Sexual activity: Never   Other Topics Concern    Not on file   Social History Narrative    Not on file     Social Determinants of Health     Financial Resource Strain:     Difficulty of Paying Living Expenses:    Food Insecurity:     Worried About [facility] in the Last Year:     Ran Out of Food in the Last Year:    Transportation Needs:     Lack of Transportation (Medical):     Lack of Transportation (Non-Medical):    Physical Activity:     Days of Exercise per Week:     Minutes of Exercise per Session:    Stress:     Feeling of Stress :    Social Connections:     Frequency of Communication with Friends and Family:     Frequency of Social Gatherings with Friends and Family:     Attends Jainism Services:      Active Member of Clubs or Organizations:     Attends Club or Organization Meetings: STRAW-YELLOW    Character, Urine CLEAR CLEAR-SL CLOUD    RBC, UA 3-5 0-2/hpf /hpf    WBC, UA 5-9 0-4/hpf /hpf    Epithelial Cells, UA 3-5 3-5/hpf /hpf    Bacteria, UA NONE SEEN FEW/NONE SEEN /hpf    Casts UA 4-8 HYALINE NONE SEEN /lpf    Crystals, UA NONE SEEN NONE SEEN    Renal Epithelial, UA NONE SEEN NONE SEEN    Yeast, UA NONE SEEN NONE SEEN    CASTS 2 NONE SEEN NONE SEEN /lpf    MISCELLANEOUS 2 NONE SEEN    Bile Acids, Total   Result Value Ref Range    Ictotest NEGATIVE NEGATIVE   Lactic acid, plasma   Result Value Ref Range    Lactic Acid 1.2 0.5 - 2.0 mmol/L   EKG Emergency   Result Value Ref Range    Ventricular Rate 67 BPM    Atrial Rate 67 BPM    P-R Interval 216 ms    QRS Duration 158 ms    Q-T Interval 494 ms    QTc Calculation (Bazett) 521 ms    R Axis -8 degrees    T Axis -18 degrees       EKG / Radiology:     EKG:  Reviewed by me --    CXR:   Reviewed by me --    CT ABDOMEN PELVIS WO CONTRAST Additional Contrast? Oral    Result Date: 9/14/2021  CT ABDOMEN AND PELVIS WITHOUT CONTRAST ENHANCEMENT: CLINICAL INFORMATION: Generalized abdominal pain. COMPARISON: CT abdomen and pelvis December 2, 2013. CT pelvis November 6, 2020. TECHNIQUE: Multiple axial 5 mm images of the abdomen, pelvis, and lung bases are obtained without the administration of intravenous contrast material. Oral contrast is administered. Coronal and sagittal reformats are generated from the axial data set. All CT scans at this facility use dose modulation, iterative reconstruction, and/or weight-based dosing when appropriate to reduce radiation dose to as low as reasonably achievable. FINDINGS: Please note that evaluation of the solid organs and vascular structures is limited due to lack of intravenous contrast administration. Lung bases: Patchy areas of atelectasis within the visualized lung bases bilaterally. No pleural effusion. Partially visualized coronary artery calcifications.  Liver: Noncontrast enhanced liver is grossly normal. There are again small layering calculi within the gallbladder. No obvious pericholecystic inflammatory change within the limits of CT. No significant biliary dilation is seen. Pancreas, spleen and adrenal glands: Noncontrast enhanced pancreas, spleen, and adrenal glands are grossly normal. Kidneys: There are no renal or ureteral calculi identified bilaterally. There is no hydronephrosis bilaterally. There is a 12 mm exophytic water attenuation cyst in the upper pole of the left kidney. There is otherwise limited evaluation of the renal parenchyma on this noncontrast enhanced examination. Kidneys are somewhat atrophic in appearance bilaterally. There is nonspecific perinephric stranding bilaterally. Bowel/Peritoneum: There is no small bowel or colonic dilation. The appendix is not seen, which may relate to prior appendectomy. There are no inflammatory changes in the right lower quadrant. There is submucosal fat deposition within the right colon, which may relate to chronic sequelae of inflammation. There are again multiple diverticula throughout the distal descending colon and sigmoid colon. There is persistent circumferential wall thickening involving the majority the sigmoid colon. There is mild stranding noted within the fat adjacent to the mid to distal sigmoid colon, new from prior examination. No encapsulated fluid collection or free air is identified. There is a trace amount of free fluid within the pelvis. There is no abdominal or pelvic lymphadenopathy. There is a small fat-containing umbilical hernia. Vascular Structures: Abdominal aorta is normal in caliber. There is diffuse calcified atherosclerotic plaque throughout the abdominal aorta and iliac arteries. Pelvis: Surgical absence of the uterus. Urinary bladder is grossly normal. There are multiple small calcified pelvic phleboliths. Bones: There are no destructive osseous lesions identified.  There are postsurgical changes of posterior instrumented fusion with bilateral pedicle screw and lissette placement at the L3, L4, and L5 levels. Postsurgical changes of posterior decompression laminectomies at the L2-L4 levels. Vertebral body heights are maintained. There is grade 1 retrolisthesis of L1 on L2 and L2 on L3 and grade 1 anterolisthesis of L4 on L5. There are diffuse degenerative changes throughout the visualized spine. There are degenerative changes of bilateral hip joints. 1. Cholelithiasis. 2. Sigmoid diverticulosis. Persistent wall thickening involving the sigmoid colon, which could relate to hypertrophy from chronic diverticular disease. Underlying mass cannot be entirely excluded. 3. Mild perisigmoid fat stranding, new from prior examination, suggestive of acute diverticulitis. Trace amount of free fluid within the pelvis. No encapsulated fluid collection or free air identified. 4. Additional findings as above. **This report has been created using voice recognition software. It may contain minor errors which are inherent in voice recognition technology. ** Final report electronically signed by Dr Desirae Sanchez on 9/14/2021 3:15 PM    XR CHEST PORTABLE    Result Date: 9/29/2021  PROCEDURE: XR CHEST PORTABLE CLINICAL INFORMATION: 58-year-old female with fatigue. COMPARISON: Radiograph 6/8/2021. TECHNIQUE: AP upright view of the chest was obtained. FINDINGS: There is cardiomegaly and pulmonary vascular congestion. There is no significant pleural effusion or pneumothorax. Visualized portions of the upper abdomen are within normal limits. The osseous structures are intact. Degenerative changes are at the shoulders. No acute fractures or suspicious osseous lesions. Cardiomegaly with mild pulmonary vascular congestion. **This report has been created using voice recognition software. It may contain minor errors which are inherent in voice recognition technology. ** Final report electronically signed by Dr Imer Gee on 9/29/2021 9:09 PM    FEN/GI/DVT:  IVF: NS @ 100 cc/hr  Electrolytes: Monitor and replace per protocols  Diet: Cardiac-Diabetic  GI PPX: No  DVT Prophylaxis: Lovenox    CODE STATUS:  Full    Thank you Rigo Yeboah DO for the opportunity to be involved in this patient's care. Electronically signed by Kodi Lilly MD PGY-2 on 9/30/2021 at 1:17 AM        Agree with history and physical exam findings as well as assessment and plan detailed by the internal medicine resident.

## 2021-09-30 NOTE — CARE COORDINATION
9/30/21, 3:53 PM EDT  DISCHARGE PLANNING EVALUATION:    Antonio Ríos       Admitted: 9/29/2021/ [facility] day: 0   Location: -22/022-A Reason for admit: EMMA (acute kidney injury) ([address] [N17.9]  Acute kidney injury ([address] [N17.9]  Other fatigue [R53.83]  Urinary tract infection in female [N39.0]   PMH:  has a past medical history of Arthritis, Asthma, Atrial fibrillation ([address] Blood circulation, collateral, CAD (coronary artery disease), CHF (congestive heart failure) ([address] Chronic kidney disease, stage III (moderate) ([address] COPD (chronic obstructive pulmonary disease) ([address] Depression, Diabetes mellitus ([address] Diabetic hypertension-nephrosis syndrome ([address] Dysuria, GERD (gastroesophageal reflux disease), Hyperlipidemia, Hypertension, Kidney disease, and Unspecified cerebral artery occlusion with cerebral infarction. Procedure:   9/29 CXR: Pulmonary Vascular Congestion  Barriers to Discharge:    EMMA/UTI/CHF. Creatinine 2 (baseline 1.7), elevated WBC, BNP; monitor. Oxyen 2L (baseline), IV AB continued. PCP: Maverick Yeboah DO  Readmission Risk Score: 27%    Patient Goals/Plan/Treatment Preferences: plans return [facility]; has oxygen 2-4L; f/u w Dr. Darrin Clark for CHF; collaborated w CESARIO Shah  Transportation/Food Security/Housekeeping Addressed:  No issues identified.

## 2021-10-01 VITALS
RESPIRATION RATE: 20 BRPM | BODY MASS INDEX: 33.38 KG/M2 | SYSTOLIC BLOOD PRESSURE: 149 MMHG | OXYGEN SATURATION: 96 % | HEIGHT: 66 IN | DIASTOLIC BLOOD PRESSURE: 66 MMHG | TEMPERATURE: 97.8 F | WEIGHT: 207.67 LBS | HEART RATE: 76 BPM

## 2021-10-01 LAB
ANION GAP SERPL CALCULATED.3IONS-SCNC: 6 MEQ/L (ref 8–16)
BUN BLDV-MCNC: 28 MG/DL (ref 7–22)
CALCIUM IONIZED: 1.22 MMOL/L (ref 1.12–1.32)
CALCIUM SERPL-MCNC: 8.1 MG/DL (ref 8.5–10.5)
CHLORIDE BLD-SCNC: 110 MEQ/L (ref 98–111)
CHOLESTEROL, TOTAL: 94 MG/DL (ref 100–199)
CO2: 26 MEQ/L (ref 23–33)
CREAT SERPL-MCNC: 1.1 MG/DL (ref 0.4–1.2)
ERYTHROCYTE [DISTWIDTH] IN BLOOD BY AUTOMATED COUNT: 15.7 % (ref 11.5–14.5)
ERYTHROCYTE [DISTWIDTH] IN BLOOD BY AUTOMATED COUNT: 60.5 FL (ref 35–45)
GFR SERPL CREATININE-BSD FRML MDRD: 47 ML/MIN/1.73M2
GLUCOSE BLD-MCNC: 125 MG/DL (ref 70–108)
GLUCOSE BLD-MCNC: 133 MG/DL (ref 70–108)
GLUCOSE BLD-MCNC: 137 MG/DL (ref 70–108)
GLUCOSE BLD-MCNC: 213 MG/DL (ref 70–108)
HCT VFR BLD CALC: 32 % (ref 37–47)
HDLC SERPL-MCNC: 21 MG/DL
HEMOGLOBIN: 9.3 GM/DL (ref 12–16)
LDL CHOLESTEROL CALCULATED: 30 MG/DL
LEGIONELLA PNEUMOPHILIA AG, URINE: NEGATIVE
MAGNESIUM: 2.4 MG/DL (ref 1.6–2.4)
MCH RBC QN AUTO: 30.9 PG (ref 26–33)
MCHC RBC AUTO-ENTMCNC: 29.1 GM/DL (ref 32.2–35.5)
MCV RBC AUTO: 106.3 FL (ref 81–99)
PLATELET # BLD: 406 THOU/MM3 (ref 130–400)
PMV BLD AUTO: 9.3 FL (ref 9.4–12.4)
POTASSIUM SERPL-SCNC: 4.4 MEQ/L (ref 3.5–5.2)
RBC # BLD: 3.01 MILL/MM3 (ref 4.2–5.4)
REASON FOR REJECTION: NORMAL
REJECTED TEST: NORMAL
SARS-COV-2, NAAT: NOT  DETECTED
SODIUM BLD-SCNC: 142 MEQ/L (ref 135–145)
STREP PNEUMO AG, UR: NEGATIVE
TRIGL SERPL-MCNC: 216 MG/DL (ref 0–199)
WBC # BLD: 8.6 THOU/MM3 (ref 4.8–10.8)

## 2021-10-01 PROCEDURE — 6360000002 HC RX W HCPCS: Performed by: STUDENT IN AN ORGANIZED HEALTH CARE EDUCATION/TRAINING PROGRAM

## 2021-10-01 PROCEDURE — 6370000000 HC RX 637 (ALT 250 FOR IP): Performed by: STUDENT IN AN ORGANIZED HEALTH CARE EDUCATION/TRAINING PROGRAM

## 2021-10-01 PROCEDURE — 94640 AIRWAY INHALATION TREATMENT: CPT

## 2021-10-01 PROCEDURE — 83735 ASSAY OF MAGNESIUM: CPT

## 2021-10-01 PROCEDURE — 2700000000 HC OXYGEN THERAPY PER DAY

## 2021-10-01 PROCEDURE — 82948 REAGENT STRIP/BLOOD GLUCOSE: CPT

## 2021-10-01 PROCEDURE — 80061 LIPID PANEL: CPT

## 2021-10-01 PROCEDURE — 2580000003 HC RX 258: Performed by: STUDENT IN AN ORGANIZED HEALTH CARE EDUCATION/TRAINING PROGRAM

## 2021-10-01 PROCEDURE — 85027 COMPLETE CBC AUTOMATED: CPT

## 2021-10-01 PROCEDURE — 80048 BASIC METABOLIC PNL TOTAL CA: CPT

## 2021-10-01 PROCEDURE — 94760 N-INVAS EAR/PLS OXIMETRY 1: CPT

## 2021-10-01 PROCEDURE — 87635 SARS-COV-2 COVID-19 AMP PRB: CPT

## 2021-10-01 PROCEDURE — 99238 HOSP IP/OBS DSCHRG MGMT 30/<: CPT | Performed by: HOSPITALIST

## 2021-10-01 PROCEDURE — 36415 COLL VENOUS BLD VENIPUNCTURE: CPT

## 2021-10-01 PROCEDURE — 82330 ASSAY OF CALCIUM: CPT

## 2021-10-01 PROCEDURE — 94667 MNPJ CHEST WALL 1ST: CPT

## 2021-10-01 RX ORDER — BUMETANIDE 1 MG/1
1 TABLET ORAL DAILY
Status: DISCONTINUED | OUTPATIENT
Start: 2021-10-01 | End: 2021-10-01 | Stop reason: HOSPADM

## 2021-10-01 RX ORDER — BUMETANIDE 1 MG/1
0.5 TABLET ORAL DAILY
Qty: 30 TABLET | Refills: 3 | Status: ON HOLD | OUTPATIENT
Start: 2021-10-01 | End: 2021-11-23 | Stop reason: SDUPTHER

## 2021-10-01 RX ORDER — LOSARTAN POTASSIUM 50 MG/1
50 TABLET ORAL DAILY
Status: DISCONTINUED | OUTPATIENT
Start: 2021-10-01 | End: 2021-10-01 | Stop reason: HOSPADM

## 2021-10-01 RX ORDER — SPIRONOLACTONE 50 MG/1
50 TABLET, FILM COATED ORAL DAILY
Qty: 30 TABLET | Refills: 3 | Status: ON HOLD | OUTPATIENT
Start: 2021-10-01 | End: 2022-04-26 | Stop reason: HOSPADM

## 2021-10-01 RX ADMIN — HEPARIN SODIUM 5000 UNITS: 5000 INJECTION INTRAVENOUS; SUBCUTANEOUS at 05:42

## 2021-10-01 RX ADMIN — LOSARTAN POTASSIUM 50 MG: 50 TABLET, FILM COATED ORAL at 12:01

## 2021-10-01 RX ADMIN — GABAPENTIN 100 MG: 100 CAPSULE ORAL at 09:18

## 2021-10-01 RX ADMIN — ASPIRIN 162 MG: 81 TABLET, COATED ORAL at 09:18

## 2021-10-01 RX ADMIN — BUDESONIDE AND FORMOTEROL FUMARATE DIHYDRATE 2 PUFF: 160; 4.5 AEROSOL RESPIRATORY (INHALATION) at 08:48

## 2021-10-01 RX ADMIN — CLONIDINE HYDROCHLORIDE 0.1 MG: 0.1 TABLET ORAL at 09:18

## 2021-10-01 RX ADMIN — Medication 400 MG: at 09:19

## 2021-10-01 RX ADMIN — SODIUM CHLORIDE, PRESERVATIVE FREE 10 ML: 5 INJECTION INTRAVENOUS at 09:17

## 2021-10-01 RX ADMIN — PANTOPRAZOLE SODIUM 40 MG: 40 TABLET, DELAYED RELEASE ORAL at 05:42

## 2021-10-01 RX ADMIN — IPRATROPIUM BROMIDE AND ALBUTEROL SULFATE 1 AMPULE: .5; 3 SOLUTION RESPIRATORY (INHALATION) at 08:48

## 2021-10-01 RX ADMIN — AZATHIOPRINE 100 MG: 50 TABLET ORAL at 09:19

## 2021-10-01 RX ADMIN — BUMETANIDE 1 MG: 1 TABLET ORAL at 12:01

## 2021-10-01 RX ADMIN — HYDROXYCHLOROQUINE SULFATE 200 MG: 200 TABLET ORAL at 09:18

## 2021-10-01 RX ADMIN — CLONIDINE HYDROCHLORIDE 0.1 MG: 0.1 TABLET ORAL at 14:57

## 2021-10-01 RX ADMIN — TIOTROPIUM BROMIDE INHALATION SPRAY 2 PUFF: 3.12 SPRAY, METERED RESPIRATORY (INHALATION) at 08:48

## 2021-10-01 RX ADMIN — HEPARIN SODIUM 5000 UNITS: 5000 INJECTION INTRAVENOUS; SUBCUTANEOUS at 14:58

## 2021-10-01 RX ADMIN — RANOLAZINE 500 MG: 500 TABLET, FILM COATED, EXTENDED RELEASE ORAL at 09:18

## 2021-10-01 RX ADMIN — METOPROLOL SUCCINATE 50 MG: 50 TABLET, FILM COATED, EXTENDED RELEASE ORAL at 09:19

## 2021-10-01 RX ADMIN — GUAIFENESIN 600 MG: 600 TABLET, EXTENDED RELEASE ORAL at 09:18

## 2021-10-01 RX ADMIN — INSULIN LISPRO 4 UNITS: 100 INJECTION, SOLUTION INTRAVENOUS; SUBCUTANEOUS at 12:33

## 2021-10-01 RX ADMIN — IPRATROPIUM BROMIDE AND ALBUTEROL SULFATE 1 AMPULE: .5; 3 SOLUTION RESPIRATORY (INHALATION) at 12:33

## 2021-10-01 ASSESSMENT — PAIN SCALES - GENERAL: PAINLEVEL_OUTOF10: 0

## 2021-10-01 NOTE — PROGRESS NOTES
This RN called report to [facility], there were no further questions at this time. I faxed over the AVS and last MAR sheet. I gave a copy of negative COVID test to [facility] staff to give to the facility. Patient's IV was removed, catheter intact. I closed out careplans and education. Patient was dressed and her belongings were packed and taken with her.  Electronically signed by Nica Baxter RN on 10/1/2021 at 7:54 PM

## 2021-10-01 NOTE — DISCHARGE SUMMARY
Hospital Medicine Discharge Summary      Patient Identification:  Name: Justin Yates  : 1940  MRN: [id number]  Account: [de-identified]    Patient's PCP: Mera Johnson DO    Admit Date: 2021    Discharge Date:   10/1/21    Admitting Physician: Nathen Garcia MD    Discharge Physician: Jaswinder Denson DO        HPI:  Justin Yates is a 78 y.o. female with PMHx as below who was admitted to [address] [place] [address] on 2021 for evaluation of fatigue from SNF. See hospital course for further information. Past Medical History:   Diagnosis Date    Arthritis     Asthma     Atrial fibrillation ([facility] [address]     Blood circulation, collateral     CAD (coronary artery disease)     CHF (congestive heart failure) (HCC)     Chronic kidney disease, stage III (moderate) (HCC)     COPD (chronic obstructive pulmonary disease) (HCC)     Depression     Diabetes mellitus ([address]     Diabetic hypertension-nephrosis syndrome (HCC)     Dysuria     GERD (gastroesophageal reflux disease)     Hyperlipidemia     Hypertension     Kidney disease     stage 3 dr Yaz Jose Unspecified cerebral artery occlusion with cerebral infarction     Dr Lory Lehman informed patient after Heart Attack       Please see chart for more details regarding HPI. Hospital Course:   78 y. o. female who presented to [address] [place] [address] for evaluation of fatigue.  Patient is a resident of assisted living facility and was sent to the ED for further evaluation of her fatigue.  Unable to obtain history from patient, poor historian, alerted to self only.  PMHx significant for CKD stage III, IDDM 2, COPD/asthma, depression, seronegative rheumatoid arthritis, combined CHF.  Per chart review, patient underwent EGD with dilation on 21 with Dr. Bart Leigh due to history of dysphagia.  Patient denies chest pain, shortness of breath, abdominal pain.  Patient reports no to every question, unsure how accurate her history is.      ED course, on presentation initial vital signs reveal temp 97.9, respiratory rate 17, heart rate 63, blood pressure 114/65, SPO2 100% on 3 L nasal cannula.  Initial lab work reveals sodium 138, potassium 4.9, chloride 99, bicarb 26, BUN 51, creatinine 2.3, EGFR 20, lactic acid 1.2, glucose 177, calcium 9.6, procalcitonin 3.12, proBNP 4379.0, troponin 0.019.  Albumin 3.0, alk phos 46, ALT 17, AST 36.  WBC 13.6, hemoglobin 9.7, hematocrit 32.0, .6, platelet count 362.      Chest x-ray reveals cardiomegaly with mild pulmonary vascular congestion.      Patient was initially treated  empirically with Zosyn for UTI then transitioned to ceftriaxone. D/c abx - asymptomatic bacteruria    She has no complaints upon evaluation today. States she is feeling much better. Denies chest pain, sob, cough, fever, chills. States she feels ready to be discharged. Minor rales on physical exam. Restarted bumex and cozaar. Change bumex dose to 0.5mg upon discharge. Patient to return to SNF. The patient was stable for discharge - all consultants were contacted and in agreement with plan for discharge. Appropriate follow up appointment was arranged prior to discharge. Please see below or view chart for more details from hospital course. Discharge Diagnoses:    Acute kidney injury stage 2 on CKD stage III   Likely prerenal, secondary to overdiuresis / hypotension with widened BUN to creatinine ratio.  Creatinine 2.3 on presentation (baseline 1.0-1.3). Back to baseline of 1.1 this AM.  Willl reduce patients home Bumex to 0.5mg daily and will restart aldactone upon discharge.     Asymptomatic urinary tract infection  Denies symptoms of dysuria / increase in frequency / burning on urination. UA+ for leukocyte esterase / nitrite. Combined CHF with EF 70-64%, severe diastolic dysfunction per cath 10/1/2020  Compensated. Pro-BNP elevated 4379. 0 (previously elevated). No pulmonary edema, mild pulmonary vascular congestion.   Plan - Low-sodium diet/fluid restriction. Resume Ranexa /  Lopressor with holding parameters. Patient to follow up with cardiology as OP.      Chronic macrocytic anemia   Hg 9.7 (baseline 9-11). No signs of acute bleeding. FOBT negative. Plan - Iron and TIBC decreased. Likely anemia of chronic disease. Patient to follow with PCP.     Hx of obstructive CAD  10/1/20 cath reveals total occlusion of the RCA proximally with collaterals. Non-obstructive disease of the proximal LAD and the mid LAD. Plan - continue Aspirin / Lipitor upon discharge     IDDM 2 - controlled  Hg A1C 6.6. Resume home dose Lantus     Moderate COPD  Resume home inhalers upon discharge. Patient can follow up with pulmonology as an OP as needed.      Seronegative Rheumatoid Arthritis   Resume azathioprine 100 mg daily / Plaquenil 200 mg daily.      Elevated troponin - resolved  Suspected secondary to demand ischemia. Troponin 0.019 (previously negative). EKG does not reveal ST segment elevation / depression, unchanged from 6/8/2. Patient denies chest pain / shortness of breath. She does have history of obstructive CAD, no stent placement. Repeat troponin <0.010. The patient was seen and examined on day of discharge and this discharge summary is in conjunction with any daily progress note from day of discharge. The patient understood, was in agreement, and verbalized the plan of care at time of discharge.     Exam:    Vitals:   Vitals:    10/01/21 0850 10/01/21 0910 10/01/21 0914 10/01/21 1117   BP:  (!) 185/85  (!) 168/73   Pulse:  76  73   Resp:  18  20   Temp:  98.6 °F (37 °C)  97.8 °F (36.6 °C)   TempSrc:  Oral  Oral   SpO2: 96% 100% 100% 95%   Weight:       Height:         Weight: Weight: 207 lb 10.8 oz (94.2 kg)    24 hour intake/output:    Intake/Output Summary (Last 24 hours) at 10/1/2021 1124  Last data filed at 10/1/2021 0337  Gross per 24 hour   Intake 3965.08 ml   Output 0 ml   Net 3965.08 ml         Physical Exam  Vitals and nursing 142 10/01/2021    K 4.4 10/01/2021    K 3.6 11/06/2020     10/01/2021    CO2 26 10/01/2021    BUN 28 10/01/2021    CREATININE 1.1 10/01/2021    CALCIUM 8.1 10/01/2021    PHOS 2.6 09/27/2012         Significant Diagnostic Studies    Radiology:   XR CHEST PORTABLE   Final Result   Cardiomegaly with mild pulmonary vascular congestion. **This report has been created using voice recognition software. It may contain minor errors which are inherent in voice recognition technology. **      Final report electronically signed by Dr Laurence Shen on 9/29/2021 9:09 PM            Consults:     IP CONSULT TO HEART FAILURE NURSE/COORDINATOR  IP CONSULT TO DIETITIAN  IP CONSULT TO SOCIAL WORK    Disposition:    [] Home        [] TCU       [] Rehab       [] Psych       [x] SNF       [] [place]       [] Other-    Condition at Discharge: fair    Code Status:  Full Code     Patient Instructions:  Discharge lab work: Activity: activity as tolerated  Diet: ADULT DIET; Regular; 3 carb choices (45 gm/meal); Low Sodium (2 gm); 2000 ml    Follow-up visits:    [address]  [phone]        Discharge Medications:        Medication List      CHANGE how you take these medications    azaTHIOprine 50 MG tablet  Commonly known as: IMURAN  Take 2 tablets by mouth daily  What changed:   · how much to take  · when to take this     bumetanide 1 MG tablet  Commonly known as: BUMEX  Take 0.5 tablets by mouth daily  What changed: how much to take     cloNIDine 0.1 MG tablet  Commonly known as: CATAPRES  Take 1 tablet by mouth 2 times daily  What changed: when to take this     docusate 100 MG Caps  Commonly known as: COLACE, DULCOLAX  Take 100 mg by mouth 2 times daily  What changed:   · when to take this  · reasons to take this        CONTINUE taking these medications    acetaminophen 500 MG tablet  Commonly known as: TYLENOL     * albuterol 2.5 MG/0.5ML Nebu nebulizer solution  Commonly known as: PROVENTIL     * ALBUTEROL SULFATE IN     * albuterol sulfate  (90 Base) MCG/ACT inhaler     aluminum & magnesium hydroxide-simethicone 400-400-40 MG/5ML Susp  Commonly known as: MYLANTA     Artificial Tears 1 % ophthalmic solution  Generic drug: carboxymethylcellulose     aspirin 81 MG EC tablet     atorvastatin 40 MG tablet  Commonly known as: LIPITOR  Take 2 tablets by mouth daily     biotene dry mouth moisturizing Soln liquid     calcium carbonate 500 MG chewable tablet  Commonly known as: TUMS     Dexilant 60 MG Cpdr delayed release capsule  Generic drug: dexlansoprazole     DULoxetine 30 MG extended release capsule  Commonly known as: CYMBALTA     EPINEPHrine 0.3 MG/0.3ML Soaj injection  Commonly known as: EpiPen 2-Luther  Inject 0.3 mLs into the muscle once for 1 dose Use as directed for allergic reaction     famotidine 40 MG tablet  Commonly known as: PEPCID     fenofibrate micronized 200 MG capsule  Commonly known as: LOFIBRA     fluticasone-umeclidin-vilant 100-62.5-25 MCG/INH Aepb  Commonly known as: TRELEGY ELLIPTA     gabapentin 100 MG capsule  Commonly known as: NEURONTIN     Heat Therapy Misc     hydroxychloroquine 200 MG tablet  Commonly known as: PLAQUENIL  TAKE 1 TABLET BY MOUTH EVERY DAY     insulin glargine 100 UNIT/ML injection pen  Commonly known as: LANTUS;BASAGLAR     loperamide 2 MG capsule  Commonly known as: IMODIUM     losartan 50 MG tablet  Commonly known as: COZAAR     magnesium oxide 400 MG tablet  Commonly known as: MAG-OX     metoprolol succinate 50 MG extended release tablet  Commonly known as: TOPROL XL     * montelukast 5 MG chewable tablet  Commonly known as: SINGULAIR     * montelukast 10 MG tablet  Commonly known as: SINGULAIR     nitroGLYCERIN 0.4 MG SL tablet  Commonly known as: NITROSTAT     oxyCODONE-acetaminophen 5-325 MG per tablet  Commonly known as: PERCOCET     OXYGEN     predniSONE 5 MG tablet  Commonly known as: DELTASONE  TAKE 1 TABLET BY MOUTH EVERY DAY     ranolazine 500 MG extended release tablet  Commonly known as: Ranexa  Take 1 tablet by mouth 2 times daily. spironolactone 50 MG tablet  Commonly known as: ALDACTONE  Take 1 tablet by mouth daily     T.E.D. Anti-Embolism Stockings Misc     traZODone 50 MG tablet  Commonly known as: DESYREL         * This list has 5 medication(s) that are the same as other medications prescribed for you. Read the directions carefully, and ask your doctor or other care provider to review them with you. Where to Get Your Medications      These medications were sent to [facility], [address]  [facility], [address] [place] [address] [place]    Phone: [phone]   · bumetanide 1 MG tablet  · spironolactone 50 MG tablet           Discharge Time:  Time spent on discharge is >45 minutes in the examination, evaluation, counseling, and review of medications and discharge plan. The hospital course was discussed with the patient and all questions and concerns were addressed at that time. The patient was in agreement with and verbalized understanding of the plan of care and had no additional questions or complaints. The patient was instructed to follow-up with any scheduled outpatient appointments or to report to the nearest Emergency Department if new or worsening symptoms should arise. Thank you Rigo Yeboah DO for the opportunity to be involved in this patient's care.     Signed:    Electronically signed by Giuseppe MedicineDO on 10/1/2021 at 11:24 AM

## 2021-10-01 NOTE — CARE COORDINATION
10/1/21, 10:49 AM EDT    Patient goals/plan/ treatment preferences discussed by  and . Patient goals/plan/ treatment preferences reviewed with patient/ family. Patient/ family verbalize understanding of discharge plan and are in agreement with goal/plan/treatment preferences. Understanding was demonstrated using the teach back method. AVS provided by RN at time of discharge, which includes all necessary medical information pertaining to the patients current course of illness, treatment, post-discharge goals of care, and treatment preferences. Services After Discharge  Services At/After Discharge: [place], Nursing Services, Housekeeping, In ambulance, OT, PT ([facility]))   [place] Letter  IMM Letter date given[de-identified] 09/30/21       Patient to return to [facility], via ambulance. Patient is aware and agreeable to discharge plan. CESARIO faxed transportation forms. CESARIO called and spoke with Gonzaloclementeantonio Stewartcatherine at [facility] and notified her of discharge today. RN made aware and discharge instructions placed on chart.

## 2021-10-03 NOTE — OP NOTE
[address] [place] [address]                                OPERATIVE REPORT    PATIENT NAME: Bella Cárdenas                    :        1940  MED REC NO:   [id number]                           ROOM:  ACCOUNT NO:   [de-identified]                           ADMIT DATE: 2021  PROVIDER:     SHELBY Castellanos OF PROCEDURE:  2021    ADDENDUM    ESTIMATED BLOOD LOSS:  None.         Jared Larson M.D.    D: 10/02/2021 10:20:03       T: 10/02/2021 13:57:40     [email or web address]  Job#: [id number]     Doc#: [id number]    CC:

## 2021-10-05 LAB
BLOOD CULTURE, ROUTINE: NORMAL
BLOOD CULTURE, ROUTINE: NORMAL

## 2021-10-26 LAB
ALBUMIN SERPL-MCNC: NORMAL G/DL
ALP BLD-CCNC: NORMAL U/L
ALT SERPL-CCNC: NORMAL U/L
ANION GAP SERPL CALCULATED.3IONS-SCNC: 10 MMOL/L
AST SERPL-CCNC: NORMAL U/L
BASOPHILS ABSOLUTE: 0 /ΜL
BASOPHILS RELATIVE PERCENT: 0.9 %
BILIRUB SERPL-MCNC: NORMAL MG/DL
BUN BLDV-MCNC: 24 MG/DL
C-REACTIVE PROTEIN: 4.93
CALCIUM SERPL-MCNC: 10 MG/DL
CHLORIDE BLD-SCNC: 101 MMOL/L
CO2: 31 MMOL/L
CREAT SERPL-MCNC: 1.3 MG/DL
EOSINOPHILS ABSOLUTE: 0.2 /ΜL
EOSINOPHILS RELATIVE PERCENT: 3.3 %
GFR CALCULATED: 38
GLUCOSE BLD-MCNC: 85 MG/DL
HCT VFR BLD CALC: 30.9 % (ref 36–46)
HEMOGLOBIN: 10 G/DL (ref 12–16)
LYMPHOCYTES ABSOLUTE: 0.6 /ΜL
LYMPHOCYTES RELATIVE PERCENT: 10.4 %
MCH RBC QN AUTO: 31.3 PG
MCHC RBC AUTO-ENTMCNC: 32.6 G/DL
MCV RBC AUTO: 96 FL
MONOCYTES ABSOLUTE: 0.5 /ΜL
MONOCYTES RELATIVE PERCENT: 9.2 %
NEUTROPHILS ABSOLUTE: 4.1 /ΜL
NEUTROPHILS RELATIVE PERCENT: 76.2 %
PDW BLD-RTO: 18 %
PLATELET # BLD: 531 K/ΜL
PMV BLD AUTO: 7.5 FL
POTASSIUM SERPL-SCNC: 4.1 MMOL/L
RBC # BLD: 3.21 10^6/ΜL
SEDIMENTATION RATE, ERYTHROCYTE: 28
SODIUM BLD-SCNC: 138 MMOL/L
TOTAL PROTEIN: NORMAL
WBC # BLD: 5.3 10^3/ML

## 2021-11-01 DIAGNOSIS — M19.90 INFLAMMATORY ARTHRITIS: ICD-10-CM

## 2021-11-01 RX ORDER — AZATHIOPRINE 50 MG/1
100 TABLET ORAL DAILY
Qty: 60 TABLET | Refills: 0 | Status: SHIPPED | OUTPATIENT
Start: 2021-11-01

## 2021-11-01 NOTE — PROGRESS NOTES
Diagnosis Orders   1. Inflammatory arthritis  azaTHIOprine (IMURAN) 50 MG tablet     - cont. Plaquenil  & prednisone. 2. Repeat labs in 8 weeks. 3.  Lymphopenia: Active    4. Anemia improved compared to last evaluation.

## 2021-11-02 ENCOUNTER — TELEPHONE (OUTPATIENT)
Dept: RHEUMATOLOGY | Age: 81
End: 2021-11-02

## 2021-11-02 NOTE — TELEPHONE ENCOUNTER
----- Message from Jessica Coker DO sent at 11/1/2021 12:49 PM EDT -----  Lab testing revealing active anemia that has improved since the last evaluation 1 month ago. The lymphocytes remain low but are mildly improved. Lab testing for systemic inflammation revealed cell abnormalities in the sed rate and C-reactive protein levels.

## 2021-11-06 ENCOUNTER — APPOINTMENT (OUTPATIENT)
Dept: CT IMAGING | Age: 81
DRG: 391 | End: 2021-11-06
Payer: MEDICARE

## 2021-11-06 ENCOUNTER — APPOINTMENT (OUTPATIENT)
Dept: INTERVENTIONAL RADIOLOGY/VASCULAR | Age: 81
DRG: 391 | End: 2021-11-06
Payer: MEDICARE

## 2021-11-06 ENCOUNTER — HOSPITAL ENCOUNTER (INPATIENT)
Age: 81
LOS: 2 days | Discharge: NURSING, REHAB OR LONG-TERM CARE | DRG: 391 | End: 2021-11-08
Attending: HOSPITALIST
Payer: MEDICARE

## 2021-11-06 ENCOUNTER — APPOINTMENT (OUTPATIENT)
Dept: GENERAL RADIOLOGY | Age: 81
DRG: 391 | End: 2021-11-06
Payer: MEDICARE

## 2021-11-06 DIAGNOSIS — E86.0 DEHYDRATION: ICD-10-CM

## 2021-11-06 DIAGNOSIS — I26.94 MULTIPLE SUBSEGMENTAL PULMONARY EMBOLI WITHOUT ACUTE COR PULMONALE (HCC): ICD-10-CM

## 2021-11-06 DIAGNOSIS — R53.1 GENERAL WEAKNESS: ICD-10-CM

## 2021-11-06 DIAGNOSIS — I82.409 DEEP VEIN THROMBOSIS (DVT) OF LOWER EXTREMITY, UNSPECIFIED CHRONICITY, UNSPECIFIED LATERALITY, UNSPECIFIED VEIN (HCC): ICD-10-CM

## 2021-11-06 DIAGNOSIS — R77.8 ELEVATED TROPONIN: Primary | ICD-10-CM

## 2021-11-06 PROBLEM — K57.92 ACUTE DIVERTICULITIS: Status: ACTIVE | Noted: 2021-11-06

## 2021-11-06 LAB
ABSOLUTE RETIC #: 78 THOU/MM3 (ref 20–115)
ALBUMIN SERPL-MCNC: 3.1 G/DL (ref 3.5–5.1)
ALP BLD-CCNC: 44 U/L (ref 38–126)
ALT SERPL-CCNC: 28 U/L (ref 11–66)
AMORPHOUS: ABNORMAL
ANION GAP SERPL CALCULATED.3IONS-SCNC: 8 MEQ/L (ref 8–16)
ANISOCYTOSIS: PRESENT
AST SERPL-CCNC: 58 U/L (ref 5–40)
BACTERIA: ABNORMAL /HPF
BASOPHILS # BLD: 0.5 %
BASOPHILS ABSOLUTE: 0 THOU/MM3 (ref 0–0.1)
BILIRUB SERPL-MCNC: 0.3 MG/DL (ref 0.3–1.2)
BILIRUBIN DIRECT: < 0.2 MG/DL (ref 0–0.3)
BILIRUBIN URINE: NEGATIVE
BLOOD, URINE: NEGATIVE
BUN BLDV-MCNC: 21 MG/DL (ref 7–22)
CALCIUM SERPL-MCNC: 8.6 MG/DL (ref 8.5–10.5)
CASTS 2: ABNORMAL /LPF
CASTS UA: ABNORMAL /LPF
CHARACTER, URINE: CLEAR
CHLORIDE BLD-SCNC: 102 MEQ/L (ref 98–111)
CO2: 30 MEQ/L (ref 23–33)
COLOR: ABNORMAL
CREAT SERPL-MCNC: 1.1 MG/DL (ref 0.4–1.2)
CRYSTALS, UA: ABNORMAL
EKG Q-T INTERVAL: 492 MS
EKG QRS DURATION: 134 MS
EKG QTC CALCULATION (BAZETT): 538 MS
EKG R AXIS: 15 DEGREES
EKG T AXIS: -55 DEGREES
EKG VENTRICULAR RATE: 72 BPM
EOSINOPHIL # BLD: 3.5 %
EOSINOPHILS ABSOLUTE: 0.2 THOU/MM3 (ref 0–0.4)
EPITHELIAL CELLS, UA: ABNORMAL /HPF
ERYTHROCYTE [DISTWIDTH] IN BLOOD BY AUTOMATED COUNT: 17.5 % (ref 11.5–14.5)
ERYTHROCYTE [DISTWIDTH] IN BLOOD BY AUTOMATED COUNT: 66.3 FL (ref 35–45)
FOLATE: 8.4 NG/ML (ref 4.8–24.2)
GFR SERPL CREATININE-BSD FRML MDRD: 47 ML/MIN/1.73M2
GLUCOSE BLD-MCNC: 121 MG/DL (ref 70–108)
GLUCOSE BLD-MCNC: 89 MG/DL (ref 70–108)
GLUCOSE BLD-MCNC: 92 MG/DL (ref 70–108)
GLUCOSE URINE: NEGATIVE MG/DL
HCT VFR BLD CALC: 30.7 % (ref 37–47)
HEMOGLOBIN: 9.7 GM/DL (ref 12–16)
IMMATURE GRANS (ABS): 0.04 THOU/MM3 (ref 0–0.07)
IMMATURE GRANULOCYTES: 0.6 %
IMMATURE RETIC FRACT: 19 % (ref 3–15.9)
IRON SATURATION: 20 % (ref 20–50)
IRON: 49 UG/DL (ref 50–170)
KETONES, URINE: ABNORMAL
LACTIC ACID: 1.5 MMOL/L (ref 0.5–2)
LEUKOCYTE ESTERASE, URINE: ABNORMAL
LIPASE: 20.8 U/L (ref 5.6–51.3)
LYMPHOCYTES # BLD: 7.3 %
LYMPHOCYTES ABSOLUTE: 0.5 THOU/MM3 (ref 1–4.8)
MAGNESIUM: 2 MG/DL (ref 1.6–2.4)
MCH RBC QN AUTO: 32.2 PG (ref 26–33)
MCHC RBC AUTO-ENTMCNC: 31.6 GM/DL (ref 32.2–35.5)
MCV RBC AUTO: 102 FL (ref 81–99)
MISCELLANEOUS 2: ABNORMAL
MONOCYTES # BLD: 11.5 %
MONOCYTES ABSOLUTE: 0.7 THOU/MM3 (ref 0.4–1.3)
NITRITE, URINE: NEGATIVE
NUCLEATED RED BLOOD CELLS: 0 /100 WBC
OSMOLALITY CALCULATION: 283.6 MOSMOL/KG (ref 275–300)
PH UA: 6 (ref 5–9)
PLATELET # BLD: 326 THOU/MM3 (ref 130–400)
PLATELET ESTIMATE: ADEQUATE
PMV BLD AUTO: 8.9 FL (ref 9.4–12.4)
POTASSIUM SERPL-SCNC: 4.3 MEQ/L (ref 3.5–5.2)
PROTEIN UA: 30
RBC # BLD: 3.01 MILL/MM3 (ref 4.2–5.4)
RBC URINE: ABNORMAL /HPF
RENAL EPITHELIAL, UA: ABNORMAL
RETIC HEMOGLOBIN: 35.2 PG (ref 28.2–35.7)
RETICULOCYTE ABSOLUTE COUNT: 2.6 % (ref 0.5–2)
SARS-COV-2, NAAT: NOT  DETECTED
SCAN OF BLOOD SMEAR: NORMAL
SEG NEUTROPHILS: 76.6 %
SEGMENTED NEUTROPHILS ABSOLUTE COUNT: 4.8 THOU/MM3 (ref 1.8–7.7)
SODIUM BLD-SCNC: 140 MEQ/L (ref 135–145)
SPECIFIC GRAVITY, URINE: 1.02 (ref 1–1.03)
TOTAL IRON BINDING CAPACITY: 242 UG/DL (ref 171–450)
TOTAL PROTEIN: 5.8 G/DL (ref 6.1–8)
TROPONIN T: 0.03 NG/ML
TROPONIN T: 0.03 NG/ML
TROPONIN T: 0.04 NG/ML
UROBILINOGEN, URINE: 1 EU/DL (ref 0–1)
VITAMIN B-12: 547 PG/ML (ref 211–911)
WBC # BLD: 6.3 THOU/MM3 (ref 4.8–10.8)
WBC UA: ABNORMAL /HPF
YEAST: ABNORMAL

## 2021-11-06 PROCEDURE — 2580000003 HC RX 258: Performed by: HOSPITALIST

## 2021-11-06 PROCEDURE — 94640 AIRWAY INHALATION TREATMENT: CPT

## 2021-11-06 PROCEDURE — 74177 CT ABD & PELVIS W/CONTRAST: CPT

## 2021-11-06 PROCEDURE — 83690 ASSAY OF LIPASE: CPT

## 2021-11-06 PROCEDURE — 99223 1ST HOSP IP/OBS HIGH 75: CPT | Performed by: HOSPITALIST

## 2021-11-06 PROCEDURE — 93005 ELECTROCARDIOGRAM TRACING: CPT | Performed by: EMERGENCY MEDICINE

## 2021-11-06 PROCEDURE — 93970 EXTREMITY STUDY: CPT

## 2021-11-06 PROCEDURE — 6360000002 HC RX W HCPCS: Performed by: HOSPITALIST

## 2021-11-06 PROCEDURE — 2500000003 HC RX 250 WO HCPCS: Performed by: HOSPITALIST

## 2021-11-06 PROCEDURE — 83735 ASSAY OF MAGNESIUM: CPT

## 2021-11-06 PROCEDURE — 84484 ASSAY OF TROPONIN QUANT: CPT

## 2021-11-06 PROCEDURE — 6360000004 HC RX CONTRAST MEDICATION: Performed by: HOSPITALIST

## 2021-11-06 PROCEDURE — 71045 X-RAY EXAM CHEST 1 VIEW: CPT

## 2021-11-06 PROCEDURE — 83540 ASSAY OF IRON: CPT

## 2021-11-06 PROCEDURE — 36415 COLL VENOUS BLD VENIPUNCTURE: CPT

## 2021-11-06 PROCEDURE — 83550 IRON BINDING TEST: CPT

## 2021-11-06 PROCEDURE — 2700000000 HC OXYGEN THERAPY PER DAY

## 2021-11-06 PROCEDURE — 6360000002 HC RX W HCPCS: Performed by: NURSE PRACTITIONER

## 2021-11-06 PROCEDURE — 1200000003 HC TELEMETRY R&B

## 2021-11-06 PROCEDURE — 82248 BILIRUBIN DIRECT: CPT

## 2021-11-06 PROCEDURE — 2580000003 HC RX 258: Performed by: NURSE PRACTITIONER

## 2021-11-06 PROCEDURE — 99284 EMERGENCY DEPT VISIT MOD MDM: CPT

## 2021-11-06 PROCEDURE — 82948 REAGENT STRIP/BLOOD GLUCOSE: CPT

## 2021-11-06 PROCEDURE — 85046 RETICYTE/HGB CONCENTRATE: CPT

## 2021-11-06 PROCEDURE — 87635 SARS-COV-2 COVID-19 AMP PRB: CPT

## 2021-11-06 PROCEDURE — 6370000000 HC RX 637 (ALT 250 FOR IP): Performed by: HOSPITALIST

## 2021-11-06 PROCEDURE — 96374 THER/PROPH/DIAG INJ IV PUSH: CPT

## 2021-11-06 PROCEDURE — 80053 COMPREHEN METABOLIC PANEL: CPT

## 2021-11-06 PROCEDURE — 82746 ASSAY OF FOLIC ACID SERUM: CPT

## 2021-11-06 PROCEDURE — 71275 CT ANGIOGRAPHY CHEST: CPT

## 2021-11-06 PROCEDURE — 6360000004 HC RX CONTRAST MEDICATION: Performed by: NURSE PRACTITIONER

## 2021-11-06 PROCEDURE — 81001 URINALYSIS AUTO W/SCOPE: CPT

## 2021-11-06 PROCEDURE — 83605 ASSAY OF LACTIC ACID: CPT

## 2021-11-06 PROCEDURE — 82607 VITAMIN B-12: CPT

## 2021-11-06 PROCEDURE — 85025 COMPLETE CBC W/AUTO DIFF WBC: CPT

## 2021-11-06 RX ORDER — LOSARTAN POTASSIUM 50 MG/1
50 TABLET ORAL DAILY
Status: DISCONTINUED | OUTPATIENT
Start: 2021-11-06 | End: 2021-11-08 | Stop reason: HOSPADM

## 2021-11-06 RX ORDER — SPIRONOLACTONE 25 MG/1
50 TABLET ORAL DAILY
Status: DISCONTINUED | OUTPATIENT
Start: 2021-11-06 | End: 2021-11-08 | Stop reason: HOSPADM

## 2021-11-06 RX ORDER — HYDROXYCHLOROQUINE SULFATE 200 MG/1
200 TABLET, FILM COATED ORAL DAILY
Status: DISCONTINUED | OUTPATIENT
Start: 2021-11-06 | End: 2021-11-08 | Stop reason: HOSPADM

## 2021-11-06 RX ORDER — CLONIDINE HYDROCHLORIDE 0.1 MG/1
0.1 TABLET ORAL 2 TIMES DAILY
Status: DISCONTINUED | OUTPATIENT
Start: 2021-11-06 | End: 2021-11-08 | Stop reason: HOSPADM

## 2021-11-06 RX ORDER — SODIUM CHLORIDE 0.9 % (FLUSH) 0.9 %
10 SYRINGE (ML) INJECTION EVERY 12 HOURS SCHEDULED
Status: DISCONTINUED | OUTPATIENT
Start: 2021-11-06 | End: 2021-11-08 | Stop reason: HOSPADM

## 2021-11-06 RX ORDER — ACETAMINOPHEN 650 MG/1
650 SUPPOSITORY RECTAL EVERY 6 HOURS PRN
Status: DISCONTINUED | OUTPATIENT
Start: 2021-11-06 | End: 2021-11-08 | Stop reason: HOSPADM

## 2021-11-06 RX ORDER — ATORVASTATIN CALCIUM 80 MG/1
80 TABLET, FILM COATED ORAL DAILY
Status: DISCONTINUED | OUTPATIENT
Start: 2021-11-06 | End: 2021-11-08 | Stop reason: HOSPADM

## 2021-11-06 RX ORDER — METOPROLOL SUCCINATE 50 MG/1
50 TABLET, EXTENDED RELEASE ORAL DAILY
Status: DISCONTINUED | OUTPATIENT
Start: 2021-11-06 | End: 2021-11-08 | Stop reason: HOSPADM

## 2021-11-06 RX ORDER — PANTOPRAZOLE SODIUM 40 MG/1
40 TABLET, DELAYED RELEASE ORAL
Status: DISCONTINUED | OUTPATIENT
Start: 2021-11-07 | End: 2021-11-08 | Stop reason: HOSPADM

## 2021-11-06 RX ORDER — ACETAMINOPHEN 325 MG/1
650 TABLET ORAL EVERY 6 HOURS PRN
Status: DISCONTINUED | OUTPATIENT
Start: 2021-11-06 | End: 2021-11-08 | Stop reason: HOSPADM

## 2021-11-06 RX ORDER — TRAZODONE HYDROCHLORIDE 100 MG/1
100 TABLET ORAL NIGHTLY
Status: DISCONTINUED | OUTPATIENT
Start: 2021-11-06 | End: 2021-11-08 | Stop reason: HOSPADM

## 2021-11-06 RX ORDER — POLYETHYLENE GLYCOL 3350 17 G/17G
17 POWDER, FOR SOLUTION ORAL DAILY PRN
Status: DISCONTINUED | OUTPATIENT
Start: 2021-11-06 | End: 2021-11-08 | Stop reason: HOSPADM

## 2021-11-06 RX ORDER — ONDANSETRON 2 MG/ML
4 INJECTION INTRAMUSCULAR; INTRAVENOUS EVERY 6 HOURS PRN
Status: DISCONTINUED | OUTPATIENT
Start: 2021-11-06 | End: 2021-11-08 | Stop reason: HOSPADM

## 2021-11-06 RX ORDER — DEXTROSE MONOHYDRATE 50 MG/ML
100 INJECTION, SOLUTION INTRAVENOUS PRN
Status: DISCONTINUED | OUTPATIENT
Start: 2021-11-06 | End: 2021-11-08 | Stop reason: HOSPADM

## 2021-11-06 RX ORDER — BUDESONIDE AND FORMOTEROL FUMARATE DIHYDRATE 160; 4.5 UG/1; UG/1
2 AEROSOL RESPIRATORY (INHALATION) 2 TIMES DAILY
Status: DISCONTINUED | OUTPATIENT
Start: 2021-11-06 | End: 2021-11-08 | Stop reason: HOSPADM

## 2021-11-06 RX ORDER — CIPROFLOXACIN 2 MG/ML
400 INJECTION, SOLUTION INTRAVENOUS EVERY 12 HOURS
Status: DISCONTINUED | OUTPATIENT
Start: 2021-11-06 | End: 2021-11-07

## 2021-11-06 RX ORDER — MONTELUKAST SODIUM 5 MG/1
5 TABLET, CHEWABLE ORAL NIGHTLY
Status: DISCONTINUED | OUTPATIENT
Start: 2021-11-06 | End: 2021-11-08 | Stop reason: HOSPADM

## 2021-11-06 RX ORDER — FENOFIBRATE 54 MG/1
54 TABLET ORAL DAILY
Status: DISCONTINUED | OUTPATIENT
Start: 2021-11-06 | End: 2021-11-08 | Stop reason: HOSPADM

## 2021-11-06 RX ORDER — RANOLAZINE 500 MG/1
500 TABLET, EXTENDED RELEASE ORAL 2 TIMES DAILY
Status: DISCONTINUED | OUTPATIENT
Start: 2021-11-06 | End: 2021-11-08 | Stop reason: HOSPADM

## 2021-11-06 RX ORDER — 0.9 % SODIUM CHLORIDE 0.9 %
500 INTRAVENOUS SOLUTION INTRAVENOUS ONCE
Status: COMPLETED | OUTPATIENT
Start: 2021-11-06 | End: 2021-11-06

## 2021-11-06 RX ORDER — DULOXETIN HYDROCHLORIDE 30 MG/1
30 CAPSULE, DELAYED RELEASE ORAL 2 TIMES DAILY
Status: DISCONTINUED | OUTPATIENT
Start: 2021-11-06 | End: 2021-11-08 | Stop reason: HOSPADM

## 2021-11-06 RX ORDER — ASPIRIN 81 MG/1
162 TABLET ORAL DAILY
Status: DISCONTINUED | OUTPATIENT
Start: 2021-11-06 | End: 2021-11-08 | Stop reason: HOSPADM

## 2021-11-06 RX ORDER — DEXTROSE MONOHYDRATE 25 G/50ML
12.5 INJECTION, SOLUTION INTRAVENOUS PRN
Status: DISCONTINUED | OUTPATIENT
Start: 2021-11-06 | End: 2021-11-08 | Stop reason: HOSPADM

## 2021-11-06 RX ORDER — SODIUM CHLORIDE 0.9 % (FLUSH) 0.9 %
10 SYRINGE (ML) INJECTION PRN
Status: DISCONTINUED | OUTPATIENT
Start: 2021-11-06 | End: 2021-11-08 | Stop reason: HOSPADM

## 2021-11-06 RX ORDER — GABAPENTIN 100 MG/1
100 CAPSULE ORAL 3 TIMES DAILY
Status: DISCONTINUED | OUTPATIENT
Start: 2021-11-06 | End: 2021-11-08 | Stop reason: HOSPADM

## 2021-11-06 RX ORDER — SODIUM CHLORIDE 9 MG/ML
INJECTION, SOLUTION INTRAVENOUS CONTINUOUS
Status: DISCONTINUED | OUTPATIENT
Start: 2021-11-06 | End: 2021-11-07

## 2021-11-06 RX ORDER — ONDANSETRON 4 MG/1
4 TABLET, ORALLY DISINTEGRATING ORAL EVERY 8 HOURS PRN
Status: DISCONTINUED | OUTPATIENT
Start: 2021-11-06 | End: 2021-11-08 | Stop reason: HOSPADM

## 2021-11-06 RX ORDER — ONDANSETRON 2 MG/ML
4 INJECTION INTRAMUSCULAR; INTRAVENOUS ONCE
Status: COMPLETED | OUTPATIENT
Start: 2021-11-06 | End: 2021-11-06

## 2021-11-06 RX ORDER — SODIUM CHLORIDE 9 MG/ML
25 INJECTION, SOLUTION INTRAVENOUS PRN
Status: DISCONTINUED | OUTPATIENT
Start: 2021-11-06 | End: 2021-11-08 | Stop reason: HOSPADM

## 2021-11-06 RX ORDER — NICOTINE POLACRILEX 4 MG
15 LOZENGE BUCCAL PRN
Status: DISCONTINUED | OUTPATIENT
Start: 2021-11-06 | End: 2021-11-08 | Stop reason: HOSPADM

## 2021-11-06 RX ADMIN — SODIUM CHLORIDE 500 ML: 9 INJECTION, SOLUTION INTRAVENOUS at 10:28

## 2021-11-06 RX ADMIN — ENOXAPARIN SODIUM 80 MG: 80 INJECTION SUBCUTANEOUS at 16:05

## 2021-11-06 RX ADMIN — SODIUM CHLORIDE, PRESERVATIVE FREE 10 ML: 5 INJECTION INTRAVENOUS at 21:58

## 2021-11-06 RX ADMIN — FENOFIBRATE 54 MG: 54 TABLET ORAL at 16:06

## 2021-11-06 RX ADMIN — METOPROLOL SUCCINATE 50 MG: 50 TABLET, EXTENDED RELEASE ORAL at 21:54

## 2021-11-06 RX ADMIN — GABAPENTIN 100 MG: 100 CAPSULE ORAL at 16:06

## 2021-11-06 RX ADMIN — SPIRONOLACTONE 50 MG: 25 TABLET ORAL at 16:06

## 2021-11-06 RX ADMIN — TRAZODONE HYDROCHLORIDE 100 MG: 100 TABLET ORAL at 21:54

## 2021-11-06 RX ADMIN — LOSARTAN POTASSIUM 50 MG: 50 TABLET, FILM COATED ORAL at 16:06

## 2021-11-06 RX ADMIN — SODIUM CHLORIDE: 9 INJECTION, SOLUTION INTRAVENOUS at 14:09

## 2021-11-06 RX ADMIN — ASPIRIN 162 MG: 81 TABLET, FILM COATED ORAL at 16:06

## 2021-11-06 RX ADMIN — METRONIDAZOLE 500 MG: 500 INJECTION, SOLUTION INTRAVENOUS at 21:54

## 2021-11-06 RX ADMIN — ONDANSETRON 4 MG: 2 INJECTION INTRAMUSCULAR; INTRAVENOUS at 10:28

## 2021-11-06 RX ADMIN — HYDROXYCHLOROQUINE SULFATE 200 MG: 200 TABLET ORAL at 16:06

## 2021-11-06 RX ADMIN — CIPROFLOXACIN 400 MG: 2 INJECTION, SOLUTION INTRAVENOUS at 14:09

## 2021-11-06 RX ADMIN — IOPAMIDOL 80 ML: 755 INJECTION, SOLUTION INTRAVENOUS at 18:11

## 2021-11-06 RX ADMIN — DULOXETINE HYDROCHLORIDE 30 MG: 30 CAPSULE, DELAYED RELEASE ORAL at 21:54

## 2021-11-06 RX ADMIN — METRONIDAZOLE 500 MG: 500 INJECTION, SOLUTION INTRAVENOUS at 14:16

## 2021-11-06 RX ADMIN — CLONIDINE HYDROCHLORIDE 0.1 MG: 0.1 TABLET ORAL at 16:06

## 2021-11-06 RX ADMIN — IOPAMIDOL 80 ML: 755 INJECTION, SOLUTION INTRAVENOUS at 10:42

## 2021-11-06 RX ADMIN — Medication 400 MG: at 21:54

## 2021-11-06 RX ADMIN — GABAPENTIN 100 MG: 100 CAPSULE ORAL at 21:54

## 2021-11-06 RX ADMIN — BUDESONIDE AND FORMOTEROL FUMARATE DIHYDRATE 2 PUFF: 160; 4.5 AEROSOL RESPIRATORY (INHALATION) at 16:25

## 2021-11-06 RX ADMIN — ATORVASTATIN CALCIUM 80 MG: 80 TABLET, FILM COATED ORAL at 16:06

## 2021-11-06 ASSESSMENT — ENCOUNTER SYMPTOMS
RESPIRATORY NEGATIVE: 1
EYES NEGATIVE: 1
BLOOD IN STOOL: 0
RHINORRHEA: 0
COUGH: 0
ABDOMINAL PAIN: 1
NAUSEA: 1
CONSTIPATION: 0
VOMITING: 1
WHEEZING: 0
DIARRHEA: 1
EYE PAIN: 0
DIARRHEA: 0
SHORTNESS OF BREATH: 1

## 2021-11-06 ASSESSMENT — PAIN DESCRIPTION - PAIN TYPE: TYPE: CHRONIC PAIN

## 2021-11-06 ASSESSMENT — PAIN SCALES - GENERAL
PAINLEVEL_OUTOF10: 5
PAINLEVEL_OUTOF10: 5

## 2021-11-06 ASSESSMENT — PAIN DESCRIPTION - LOCATION: LOCATION: BACK

## 2021-11-06 ASSESSMENT — PAIN DESCRIPTION - DESCRIPTORS: DESCRIPTORS: ACHING

## 2021-11-06 NOTE — ED PROVIDER NOTES
[facility]  EMERGENCY MEDICINE     Pt Name: Binu Tamayo  MRN: [id number]  [place] 1940  Date of evaluation: 11/6/2021  PCP:    Yanet Roblero DO  Provider: Miracle Noel APRN - CNP    CHIEF COMPLAINT       Chief Complaint   Patient presents with    Fatigue    Emesis       Location/Symptom: generalized abdominal pain and tenderness  Timing/Onset: one week  Context/Setting: Patient was recently hospitalized for UTI. She states she did finish her antibiotics. She states she is had nausea vomiting and abdominal pain for the last week. She also complains of intermittent left-sided chest pain for the same amount of time. Quality: Generalized ache  Duration: Abdominal pain is constant, but chest pain is intermittent. Modifying Factors: None  Severity: 5 out of 10    HISTORY OF PRESENT ILLNESS    Humberto Real is an 78-year-old female patient that presents to ER with a 1 week history of nausea, vomiting and abdominal pain. Pertinent past medical history is a recent admission to the hospital for urinary tract infection for which she was treated with IV Rocephin. Patient has a history of heart failure and stage II chronic kidney disease. Triage notes and Nursing notes were reviewed by myself. Any discrepancies are addressed above. Last reported ejection fraction was between 45 and 55%. She states that she has been trying to continue to eat and drink, but has not really kept any fluids down. She states she is still making urine but she has not voided in several hours. This is unusual for her. Upon assessment patient looks visibly ill, mucous membranes are dry and lung sounds are clear. She does admit to generalized abdominal tenderness, but when her left upper quadrant is palpated she states that that is the most tender place. She complains of an intermittent left chest pain that is self-limiting and a pressure and ache feeling.   She states that all of the symptoms have been present for the last month either constantly or intermittently. She denies fever but admits to chills.     PAST MEDICAL HISTORY     Past Medical History:   Diagnosis Date    Arthritis     Asthma 1978    [facility]rial fibrillation [facility]     Blood circulation, collateral     CAD (coronary artery disease)     CHF (congestive heart failure) ([facility]) 1999    Chronic kidney disease, stage III (moderate) ([facility])     COPD (chronic obstructive pulmonary disease) [facility]     Depression     Diabetes mellitus ([facility]     Diabetic hypertension-nephrosis syndrome ([facility])     Dysuria     GERD (gastroesophageal reflux disease)     Hyperlipidemia     Hypertension     Kidney disease     stage 3 dr Lori Jaffe cerebral artery occlusion with cerebral infarction 1999    Dr Roro Quigley informed patient after Heart Attack       SURGICAL HISTORY       Past Surgical History:   Procedure Laterality Date    APPENDECTOMY      BACK SURGERY      lumbar spine    BLADDER SUSPENSION      CARDIAC CATHETERIZATION  2009    with stent  [facility]    CARPAL TUNNEL RELEASE      COLONOSCOPY  2013    ENDOSCOPY, COLON, DIAGNOSTIC      ESOPHAGEAL DILATATION      HYSTERECTOMY  1974    JOINT REPLACEMENT Left 1992    Left Knee    UPPER GASTROINTESTINAL ENDOSCOPY N/A 9/28/2021    EGD DILATION performed by Liberty Breaux MD at [address] [facility] [address] Left 9/28/2021    EGD BIOPSY performed by Liberty Breaux MD at [facility]       [address]       Current Discharge Medication List      CONTINUE these medications which have NOT CHANGED    Details   azaTHIOprine (IMURAN) 50 MG tablet Take 2 tablets by mouth daily  Qty: 60 tablet, Refills: 0    Associated Diagnoses: Inflammatory arthritis      bumetanide (BUMEX) 1 MG tablet Take 0.5 tablets by mouth daily  Qty: 30 tablet, Refills: 3      spironolactone (ALDACTONE) 50 MG tablet Take 1 tablet by mouth daily  Qty: 30 tablet, Refills: 3      !! albuterol (PROVENTIL) 2.5 EVERY DAY  Qty: 90 tablet, Refills: 1    Associated Diagnoses: Inflammatory arthritis      EPINEPHrine (EPIPEN 2-LORAINE) 0.3 MG/0.3ML SOAJ injection Inject 0.3 mLs into the muscle once for 1 dose Use as directed for allergic reaction  Qty: 0.3 mL, Refills: 0      cloNIDine (CATAPRES) 0.1 MG tablet Take 1 tablet by mouth 2 times daily  Qty: 60 tablet, Refills: 3      predniSONE (DELTASONE) 5 MG tablet TAKE 1 TABLET BY MOUTH EVERY DAY  Qty: 30 tablet, Refills: 3    Associated Diagnoses: Seronegative rheumatoid arthritis (HCC)      DULoxetine (CYMBALTA) 30 MG extended release capsule Take 30 mg by mouth 2 times daily      famotidine (PEPCID) 40 MG tablet Take 40 mg by mouth daily      gabapentin (NEURONTIN) 100 MG capsule Take 100 mg by mouth 3 times daily. atorvastatin (LIPITOR) 40 MG tablet Take 2 tablets by mouth daily  Qty: 60 tablet, Refills: 2      docusate sodium (COLACE, DULCOLAX) 100 MG CAPS Take 100 mg by mouth 2 times daily  Qty: 60 capsule, Refills: 1      insulin glargine (LANTUS) 100 UNIT/ML injection pen Inject 8 Units into the skin nightly       dexlansoprazole (DEXILANT) 60 MG CPDR delayed release capsule Take 60 mg by mouth daily      aspirin 81 MG EC tablet Take 162 mg by mouth daily      ranolazine (RANEXA) 500 MG SR tablet Take 1 tablet by mouth 2 times daily. Qty: 60 tablet, Refills: 3      magnesium oxide (MAG-OX) 400 MG tablet Take 400 mg by mouth 2 times daily       albuterol (PROVENTIL HFA;VENTOLIN HFA) 108 (90 BASE) MCG/ACT inhaler Inhale 2 puffs into the lungs every 6 hours as needed. traZODone (DESYREL) 50 MG tablet Take 100 mg by mouth nightly       nitroGLYCERIN (NITROSTAT) 0.4 MG SL tablet Place 0.4 mg under the tongue every 5 minutes as needed. !! - Potential duplicate medications found. Please discuss with provider.           ALLERGIES       Allergies   Allergen Reactions    Renflexis [Infliximab] Shortness Of Breath and Palpitations    Strawberry (Diagnostic) Anaphylaxis     Diverticulitis    Nuts [Peanut-Containing Drug Products]      Pt suffers from diverticulitis and nut products irritate her stomach.  Sulfa Antibiotics     Tape Wing  Tape] Other (See Comments)     tears skin off    Bactrim [Sulfamethoxazole-Trimethoprim] Rash     Little red rash    Chocolate Rash       FAMILY HISTORY       Family History   Problem Relation Age of Onset    Heart Disease Mother     Diabetes Mother     Kidney Disease Mother     Heart Disease Father     Diabetes Father     Cancer Sister     Stroke Brother     Cancer Sister         SOCIAL HISTORY       Social History     Socioeconomic History    Marital status:      Spouse name: None    Number of children: 2    Years of education: 8    Highest education level: None   Occupational History    None   Tobacco Use    Smoking status: Former Smoker     Packs/day: 0.15     Years: 10.00     Pack years: 1.50     Types: Cigarettes     Start date: 10/21/2013    Smokeless tobacco: Never Used   Vaping Use    Vaping Use: Never used   Substance and Sexual Activity    Alcohol use: No    Drug use: No    Sexual activity: Never   Other Topics Concern    None   Social History Narrative    None     Social Determinants of Health     Financial Resource Strain:     Difficulty of Paying Living Expenses: Not on file   Food Insecurity:     Worried About Running Out of Food in the Last Year: Not on file    Oz of Food in the Last Year: Not on file   Transportation Needs:     Lack of Transportation (Medical): Not on file    Lack of Transportation (Non-Medical):  Not on file   Physical Activity:     Days of Exercise per Week: Not on file    Minutes of Exercise per Session: Not on file   Stress:     Feeling of Stress : Not on file   Social Connections:     Frequency of Communication with Friends and Family: Not on file    Frequency of Social Gatherings with Friends and Family: Not on file    Attends Yarsanism Services: Not on file    Active Member of Clubs or Organizations: Not on file    Attends Club or Organization Meetings: Not on file    Marital Status: Not on file   Intimate Partner Violence:     Fear of Current or Ex-Partner: Not on file    Emotionally Abused: Not on file    Physically Abused: Not on file    Sexually Abused: Not on file   Housing Stability:     Unable to Pay for Housing in the Last Year: Not on file    Number of [place] in the Last Year: Not on file    Unstable Housing in the Last Year: Not on file       REVIEW OF SYSTEMS     Review of Systems   Constitutional: Positive for chills. Negative for appetite change, fatigue, fever and unexpected weight change. HENT: Negative for ear pain and rhinorrhea. Eyes: Negative for pain and visual disturbance. Respiratory: Positive for shortness of breath. Negative for cough and wheezing. Cardiovascular: Positive for chest pain. Negative for palpitations and leg swelling. Gastrointestinal: Positive for abdominal pain, nausea and vomiting. Negative for blood in stool, constipation and diarrhea. Genitourinary: Negative for dysuria, flank pain, frequency and hematuria. Musculoskeletal: Negative for arthralgias, joint swelling and neck stiffness. Skin: Negative for rash. Neurological: Negative for dizziness, syncope, weakness, light-headedness and headaches. Hematological: Does not bruise/bleed easily. Except as noted above the remainder of the review of systems was reviewed and is negative.   SCREENINGS        Amanda Coma Scale  Eye Opening: Spontaneous  Best Verbal Response: Oriented  Best Motor Response: Obeys commands  [place] Coma Scale Score: 15               PHYSICAL EXAM    (up to 7 for level 4, 8 or more for level 5)     ED Triage Vitals [11/06/21 0910]   BP Temp Temp Source Pulse Resp SpO2 Height Weight   125/66 97.2 °F (36.2 °C) Oral 59 20 96 % 5' 5\" (1.651 m) 172 lb (78 kg)       Physical Exam  Vitals and nursing note reviewed. Constitutional:       Appearance: She is not diaphoretic. HENT:      Head: Normocephalic and atraumatic. Right Ear: External ear normal.      Left Ear: External ear normal.   Eyes:      Conjunctiva/sclera: Conjunctivae normal.   Cardiovascular:      Rate and Rhythm: Normal rate and regular rhythm. Heart sounds: Normal heart sounds. Pulmonary:      Effort: Pulmonary effort is normal. No respiratory distress. Breath sounds: Normal breath sounds. Abdominal:      General: Bowel sounds are decreased. There is no distension. Palpations: Abdomen is soft. There is no mass. Tenderness: There is generalized abdominal tenderness (Worse in LUQ) and tenderness in the left upper quadrant. There is guarding. There is no right CVA tenderness or rebound. Hernia: No hernia is present. Musculoskeletal:      Cervical back: Normal range of motion. Skin:     General: Skin is warm and dry. Neurological:      Mental Status: She is alert. DIAGNOSTIC RESULTS     EKG:(none if blank)  All EKGs are interpreted by the Emergency Department Physician who either signs or Co-signs this chart in the absence of a cardiologist.        RADIOLOGY: (none if blank)   I directly visualized the following images and reviewed the radiologist interpretations. Interpretation per the Radiologist below, if available at the time of this note:  CT ABDOMEN PELVIS W IV CONTRAST Additional Contrast? None   Final Result       1. Acute diverticulitis of the sigmoid colon. There is no associated abscess or perforation. Underlying tumor is not excluded. 2. Bilateral lower lobe pulmonary emboli. 3. Deep venous thrombosis in the left common femoral vein and femoral vein. These findings were telephoned to Myke Whitfield CNP in the ED at 11:09 AM on 11/6/2021 . **This report has been created using voice recognition software.  It may contain minor errors which are inherent in voice components:    Retic Ct Abs 2.6 (*)     Immature Retic Fract 19.0 (*)     All other components within normal limits   COVID-19, RAPID   LIPASE   ANION GAP   OSMOLALITY   SCAN OF BLOOD SMEAR   MAGNESIUM   IRON BINDING CAPACITY   IRON SATURATION   VITAMIN B12 & FOLATE   LACTIC ACID, PLASMA   POCT GLUCOSE   POCT GLUCOSE       All other labs were within normal range or not returned as of this dictation. Please note, any cultures that may have been sent were not resulted at the time of this patient visit. EMERGENCY DEPARTMENT COURSE and Medical Decision Making:     Vitals:    Vitals:    11/06/21 0910 11/06/21 1032 11/06/21 1245   BP: 125/66 (!) 168/78 (!) 189/100   Pulse: 59 60 77   Resp: 20 18 20   Temp: 97.2 °F (36.2 °C)  98.2 °F (36.8 °C)   TempSrc: Oral  Oral   SpO2: 96% 98% 97%   Weight: 172 lb (78 kg)     Height: 5' 5\" (1.651 m)         PROCEDURES: (None if blank)  Procedures    ED Course as of 11/06/21 1531   Sat Nov 06, 2021   1024 Assessment complete and history obtained. Plan workup to r/o pyelonephritis, appendicitis and cholecystitis. [NW]   [address] from radiology contactd provider. CT abd and pelvis + for diverticulitis, bilateral LL PE and left femoral DVT [NW]   1115 Discussed results with pt and plan for admission. Questions answered. Pt states abdomen feels better after zofran. Agreeable with plan for admit. [NW]   1120 Contacted hospitalist for admission and they have accepted. Dr Jadyn Garcia to decide about further testing of PE and DVT.  [NW]      ED Course User Index  [NW] Colin Gonzalez, APRN - CNP     MDM  Number of Diagnoses or Management Options     Amount and/or Complexity of Data Reviewed  Clinical lab tests: ordered  Tests in the radiology section of CPT®: ordered  Review and summarize past medical records: yes  Discuss the patient with other providers: yes  Independent visualization of images, tracings, or specimens: yes    Risk of Complications, Morbidity, and/or Mortality  Presenting problems: low  Diagnostic procedures: low  Management options: moderate    Patient Progress  Patient progress: stable      Strict return precautions and follow up instructions were discussed with the patient with which the patient agrees    ED Medications administered this visit:    Medications   aspirin EC tablet 162 mg (has no administration in time range)   atorvastatin (LIPITOR) tablet 80 mg (has no administration in time range)   cloNIDine (CATAPRES) tablet 0.1 mg (has no administration in time range)   pantoprazole (PROTONIX) tablet 40 mg (has no administration in time range)   DULoxetine (CYMBALTA) extended release capsule 30 mg (has no administration in time range)   fenofibrate (TRICOR) tablet 54 mg (has no administration in time range)   gabapentin (NEURONTIN) capsule 100 mg (has no administration in time range)   hydroxychloroquine (PLAQUENIL) tablet 200 mg (has no administration in time range)   losartan (COZAAR) tablet 50 mg (has no administration in time range)   magnesium oxide (MAG-OX) tablet 400 mg (has no administration in time range)   metoprolol succinate (TOPROL XL) extended release tablet 50 mg (has no administration in time range)   montelukast (SINGULAIR) chewable tablet 5 mg (has no administration in time range)   ranolazine (RANEXA) extended release tablet 500 mg (has no administration in time range)   spironolactone (ALDACTONE) tablet 50 mg (has no administration in time range)   traZODone (DESYREL) tablet 100 mg (has no administration in time range)   sodium chloride flush 0.9 % injection 10 mL (has no administration in time range)   sodium chloride flush 0.9 % injection 10 mL (has no administration in time range)   0.9 % sodium chloride infusion (has no administration in time range)   ondansetron (ZOFRAN-ODT) disintegrating tablet 4 mg (has no administration in time range)     Or   ondansetron (ZOFRAN) injection 4 mg (has no administration in time range)   polyethylene glycol (GLYCOLAX) packet 17 g (has no administration in time range)   acetaminophen (TYLENOL) tablet 650 mg (has no administration in time range)     Or   acetaminophen (TYLENOL) suppository 650 mg (has no administration in time range)   0.9 % sodium chloride infusion ( IntraVENous New Bag 11/6/21 1409)   ciprofloxacin (CIPRO) IVPB 400 mg (400 mg IntraVENous New Bag 11/6/21 1409)   metronidazole (FLAGYL) 500 mg in NaCl 100 mL IVPB premix (500 mg IntraVENous New Bag 11/6/21 1416)   insulin lispro (HUMALOG) injection vial 0-6 Units (has no administration in time range)   insulin lispro (HUMALOG) injection vial 0-3 Units (has no administration in time range)   glucose (GLUTOSE) 40 % oral gel 15 g (has no administration in time range)   dextrose 50 % IV solution (has no administration in time range)   glucagon (rDNA) injection 1 mg (has no administration in time range)   dextrose 5 % solution (has no administration in time range)   enoxaparin (LOVENOX) injection 80 mg (has no administration in time range)   budesonide-formoterol (SYMBICORT) 160-4.5 MCG/ACT inhaler 2 puff (has no administration in time range)   tiotropium (SPIRIVA RESPIMAT) 2.5 MCG/ACT inhaler 2 puff (has no administration in time range)   0.9 % sodium chloride bolus (0 mLs IntraVENous Stopped 11/6/21 1228)   ondansetron (ZOFRAN) injection 4 mg (4 mg IntraVENous Given 11/6/21 1028)   iopamidol (ISOVUE-370) 76 % injection 80 mL (80 mLs IntraVENous Given 11/6/21 1042)         FINAL IMPRESSION      1. Elevated troponin    2. General weakness    3. Dehydration    4. Multiple subsegmental pulmonary emboli without acute cor pulmonale (HCC)    5. Deep vein thrombosis (DVT) of lower extremity, unspecified chronicity, unspecified laterality, unspecified vein (HCC)          DISPOSITION/PLAN   DISPOSITION        PATIENT REFERRED TO:  No follow-up provider specified.     DISCHARGE MEDICATIONS:  Current Discharge Medication List                 Suma Cespedes, APRN - CNP (electronically signed)           LONDON Avina - CNP  11/06/21 3102

## 2021-11-06 NOTE — ED NOTES
ED to inpatient nurses report    Chief Complaint   Patient presents with    Fatigue    Emesis      Present to ED from home  LOC: alert and orientated to name, place, date  Vital signs   Vitals:    11/06/21 0910 11/06/21 1032   BP: 125/66 (!) 168/78   Pulse: 59 60   Resp: 20 18   Temp: 97.2 °F (36.2 °C)    TempSrc: Oral    SpO2: 96% 98%   Weight: 172 lb (78 kg)    Height: 5' 5\" (1.651 m)       Oxygen Baseline RA    Current needs required RA Bipap/Cpap No  LDAs:   Peripheral IV 11/06/21 Right Antecubital (Active)   Site Assessment Clean;  Intact; Dry 11/06/21 5711     Mobility: Requires assistance * 1  Pending ED orders: NA  Present condition: STABLE  Electronically signed by Mike Pride RN on 11/6/2021 at [address] [place] [address]  11/06/21 1222

## 2021-11-06 NOTE — ACP (ADVANCE CARE PLANNING)
Advance Care Planning     Advance Care Planning Activator (Inpatient)  Conversation Note      Date of ACP Conversation: 11/6/2021     Conversation Conducted with: Patient with Decision Making Capacity    ACP Activator: Koko Kaplan RN      Health Care Decision Maker:     Current Designated Health Care Decision Maker:     Primary Decision Maker: Alicia Ryan - [phone]    Secondary Decision Maker: Tone Mojica - Child - [phone]      Care Preferences    Ventilation: \"If you were in your present state of health and suddenly became very ill and were unable to breathe on your own, what would your preference be about the use of a ventilator (breathing machine) if it were available to you? \"      Would the patient desire the use of ventilator (breathing machine)?: no    \"If your health worsens and it becomes clear that your chance of recovery is unlikely, what would your preference be about the use of a ventilator (breathing machine) if it were available to you? \"     Would the patient desire the use of ventilator (breathing machine)?: No      Resuscitation  \"CPR works best to restart the heart when there is a sudden event, like a heart attack, in someone who is otherwise healthy. Unfortunately, CPR does not typically restart the heart for people who have serious health conditions or who are very sick. \"    \"In the event your heart stopped as a result of an underlying serious health condition, would you want attempts to be made to restart your heart (answer \"yes\" for attempt to resuscitate) or would you prefer a natural death (answer \"no\" for do not attempt to resuscitate)? \" yes       [] Yes   [x] No   Educated Patient / Hector Cons regarding differences between Advance Directives and portable DNR orders.     Length of ACP Conversation in minutes:  15  Conversation Outcomes:  [x] ACP discussion completed  [] Existing advance directive reviewed with patient; no changes to patient's previously recorded wishes  [] New Advance Directive completed  [] Portable Do Not Rescitate prepared for Provider review and signature  [] POLST/POST/MOLST/MOST prepared for Provider review and signature      Follow-up plan:    [] Schedule follow-up conversation to continue planning  [x] Referred individual to Provider for additional questions/concerns   [] Advised patient/agent/surrogate to review completed ACP document and update if needed with changes in condition, patient preferences or care setting    [x] This note routed to one or more involved healthcare providers         Pt came into ED due to non-responsive when found off C-Pap had fallen onto floor. Also, complaints of fatigue and emesis. Currently A&O. Per SNF paperwork Daughter Maricel Brice is POA. Discussed with pt she states that is correct and she wants her son Varsha Fountain" as backup. Discussed ACP. She does not vent - spoke with Anna Padron CNP ok to write limited x1 order. States spiritually well.

## 2021-11-06 NOTE — PROGRESS NOTES
Two nurse skin assessment completed by Jagruti Mcfarland RN and Delmi Arzola RN. L-out hip has small skin tear present. No drainage noted and open to air. Heels vandana and no redness noted. No open areas on buttocks. No other skin issues noted.

## 2021-11-06 NOTE — ED NOTES
Bed: 004A  Expected date:   Expected time:   Means of arrival: [facility]  Comments:     Laith Culp RN  11/06/21 6623

## 2021-11-06 NOTE — ED NOTES
Patient to ED via EMS from [facility] for fatigue and emesis. Staff at the nursing home reports her c-pap machine fell off during the night. Patient did not wake up by verbal stimuli however did respond to sternal rub. On arrival patient is alert and oriented. Patient reports vomiting intermittently over the week, mostly at night. Patient feels nauseated.  patient denies fever or chills      Gerard León RN  11/06/21 9251

## 2021-11-06 NOTE — H&P
[place] [address] MEDICINE    HISTORY AND PHYSICAL EXAM    PATIENT NAME:  Lauri Whitley    MRN:  [id number]  SERVICE DATE:  11/6/2021   SERVICE TIME:  3:59 PM    Primary Care Physician: Agustín Yeboah DO     SUBJECTIVE  CHIEF COMPLAINT:      HPI:  Lauri Whitley is a 78 y.o.  female PMH atrial fibrillation not currently on blood thinners, rheumatoid arthritis, CKD stage III, CHF with last echo 45-55%, type 2 diabetes mellitus, and hyperlipidemia, who presented with feelings of an overall sick stomach and feeling weak. A few days ago, the patient was sitting watching television when she suddenly felt a sharp pain in her RLQ that went towards the middle of her stomach. She notes that the pain comes and goes and says that it lasts about 5-10 minutes before going away. Per the patient she did not take any specific medications or try any remedies to relieve the pain. She has been having nausea and has vomiting green phlegm a couple of times but otherwise denies any fever, chills, hematuria, hematochezia, or constipation, but has been having intermittent episodes of diarrhea. The patient also says that since having the symptoms, she has lost her appetite and has mostly been drinking water. The patient rates the pain at 3/10 and says that it has been as bad as 7/10. She mostly feels lethargic and fatigued. In the ED, the patient's vitals were significant for temperature 97.2, respiratory rate 20, pulse 59, blood pressure 125/66, and SpO2 96%. The patient's labs were significant for hemoglobin 9.7, , iron 49, GFR 47, glucose 121, troponin 0.032 and albumin 3.1. The patient had a CT abdomen done that showed acute diverticulitis of the sigmoid colon with no associated abscess or perforation with bilateral lower lobe pulmonary emboli and deep venous thrombosis in the left common femoral vein.  The patient was given a fluid bolus and zofran, and decision was made to admit based on the patient's CT findings and PE/DVT. Patient's current code status is limitedX1 but will get palliative care involved in order to evaluate why patient does not want intubation. PAST MEDICAL HISTORY:    Past Medical History:   Diagnosis Date    Arthritis     Asthma 1978    Atrial fibrillation ([facility] [address]     Blood circulation, collateral     CAD (coronary artery disease)     CHF (congestive heart failure) ([facility]) 1999    Chronic kidney disease, stage III (moderate) (HCC)     COPD (chronic obstructive pulmonary disease) (HCC)     Depression     Diabetes mellitus (HCC)     Diabetic hypertension-nephrosis syndrome (HCC)     Dysuria     GERD (gastroesophageal reflux disease)     Hyperlipidemia     Hypertension     Kidney disease     stage 3 dr Emilie Escalera Unspecified cerebral artery occlusion with cerebral infarction 1999    Dr Katelyn Ramirez informed patient after Heart Attack     PAST SURGICAL HISTORY:    Past Surgical History:   Procedure Laterality Date    APPENDECTOMY      BACK SURGERY      lumbar spine    BLADDER SUSPENSION      CARDIAC CATHETERIZATION  2009    with stent  [facility]    CARPAL TUNNEL RELEASE      COLONOSCOPY  2013    ENDOSCOPY, COLON, DIAGNOSTIC      ESOPHAGEAL DILATATION      HYSTERECTOMY  1974    JOINT REPLACEMENT Left 1992    Left Knee    UPPER GASTROINTESTINAL ENDOSCOPY N/A 9/28/2021    EGD DILATION performed by Ghada King MD [facility]    UPPER GASTROINTESTINAL ENDOSCOPY Left 9/28/2021    EGD BIOPSY performed by Ghada King MD at [facility]     FAMILY HISTORY:    Family History   Problem Relation Age of Onset    Heart Disease Mother     Diabetes Mother     Kidney Disease Mother     Heart Disease Father     Diabetes Father     Cancer Sister     Stroke Brother     Cancer Sister      SOCIAL HISTORY:    Social History     Socioeconomic History    Marital status:       Spouse name: Not on file    Number of children: 2    Years of education: 10    Highest education level: Not on file   Occupational History    Not on file   Tobacco Use    Smoking status: Former Smoker     Packs/day: 0.15     Years: 10.00     Pack years: 1.50     Types: Cigarettes     Start date: 10/21/2013    Smokeless tobacco: Never Used   Vaping Use    Vaping Use: Never used   Substance and Sexual Activity    Alcohol use: No    Drug use: No    Sexual activity: Never   Other Topics Concern    Not on file   Social History Narrative    Not on file     Social Determinants of Health     Financial Resource Strain:     Difficulty of Paying Living Expenses: Not on file   Food Insecurity:     Worried About Running Out of Food in the Last Year: Not on file    Oz of Food in the Last Year: Not on file   Transportation Needs:     Lack of Transportation (Medical): Not on file    Lack of Transportation (Non-Medical): Not on file   Physical Activity:     Days of Exercise per Week: Not on file    Minutes of Exercise per Session: Not on file   Stress:     Feeling of Stress : Not on file   Social Connections:     Frequency of Communication with Friends and Family: Not on file    Frequency of Social Gatherings with Friends and Family: Not on file    Attends Gnosticism Services: Not on file    Active Member of [address] [place] [address] [employer] or Organizations: Not on file    Attends Club or Organization Meetings: Not on file    Marital Status: Not on file   Intimate Partner Violence:     Fear of Current or Ex-Partner: Not on file    Emotionally Abused: Not on file    Physically Abused: Not on file    Sexually Abused: Not on file   Housing Stability:     Unable to Pay for Housing in the Last Year: Not on file    Number of [place] in the Last Year: Not on file    Unstable Housing in the Last Year: Not on file     MEDICATIONS:   Prior to Admission medications    Medication Sig Start Date End Date Taking?  Authorizing Provider   azaTHIOprine (IMURAN) 50 MG tablet Take 2 tablets by mouth daily 11/1/21   Shanta Shi DO MG tablet Take 500 mg by mouth every 8 hours as needed for Pain     Historical Provider, MD   carboxymethylcellulose (ARTIFICIAL TEARS) 1 % ophthalmic solution Place 1 drop into both eyes every 24 hours PRN    Historical Provider, MD   losartan (COZAAR) 50 MG tablet Take 50 mg by mouth daily    Historical Provider, MD   loperamide (IMODIUM) 2 MG capsule Take 2 mg by mouth every 12 hours as needed for Diarrhea For diarrhea    Historical Provider, MD   oxyCODONE-acetaminophen (PERCOCET) 5-325 MG per tablet Take 1 tablet by mouth 3 times daily. For pain    Historical Provider, MD   hydroxychloroquine (PLAQUENIL) 200 MG tablet TAKE 1 TABLET BY MOUTH EVERY DAY 9/8/21   Pedro Pizarro DO   EPINEPHrine (EPIPEN 2-LORAINE) 0.3 MG/0.3ML SOAJ injection Inject 0.3 mLs into the muscle once for 1 dose Use as directed for allergic reaction 6/8/21 6/8/21  Enid Sr MD   cloNIDine (CATAPRES) 0.1 MG tablet Take 1 tablet by mouth 2 times daily  Patient taking differently: Take 0.1 mg by mouth 3 times daily  11/11/20   Azul Frank MD   predniSONE (DELTASONE) 5 MG tablet TAKE 1 TABLET BY MOUTH EVERY DAY 11/6/20   Valeria Gallo APRN - CNP   DULoxetine (CYMBALTA) 30 MG extended release capsule Take 30 mg by mouth 2 times daily    Historical Provider, MD   famotidine (PEPCID) 40 MG tablet Take 40 mg by mouth daily    Historical Provider, MD   gabapentin (NEURONTIN) 100 MG capsule Take 100 mg by mouth 3 times daily.     Historical Provider, MD   atorvastatin (LIPITOR) 40 MG tablet Take 2 tablets by mouth daily 8/10/20   Adithya Renner DO   docusate sodium (COLACE, DULCOLAX) 100 MG CAPS Take 100 mg by mouth 2 times daily  Patient taking differently: Take 100 mg by mouth 2 times daily as needed  8/10/20   Adithya Renner DO   insulin glargine (LANTUS) 100 UNIT/ML injection pen Inject 8 Units into the skin nightly     Historical Provider, MD   dexlansoprazole (DEXILANT) 60 MG CPDR delayed release capsule Take 60 mg by mouth daily    Historical Provider, MD   aspirin 81 MG EC tablet Take 162 mg by mouth daily    Historical Provider, MD   ranolazine (RANEXA) 500 MG SR tablet Take 1 tablet by mouth 2 times daily. 9/27/14   Isabel Sanchez MD   magnesium oxide (MAG-OX) 400 MG tablet Take 400 mg by mouth 2 times daily     Historical Provider, MD   albuterol (PROVENTIL HFA;VENTOLIN HFA) 108 (90 BASE) MCG/ACT inhaler Inhale 2 puffs into the lungs every 6 hours as needed. Historical Provider, MD   traZODone (DESYREL) 50 MG tablet Take 100 mg by mouth nightly     Historical Provider, MD   nitroGLYCERIN (NITROSTAT) 0.4 MG SL tablet Place 0.4 mg under the tongue every 5 minutes as needed. Historical Provider, MD       ALLERGIES: Renflexis [infliximab], Strawberry (diagnostic), Nuts [peanut-containing drug products], Sulfa antibiotics, Tape [adhesive tape], Bactrim [sulfamethoxazole-trimethoprim], and Chocolate    REVIEW OF SYSTEM:   Review of Systems   Constitutional: Negative. HENT: Negative. Eyes: Negative. Respiratory: Negative. Cardiovascular: Negative. Gastrointestinal: Positive for abdominal pain, diarrhea, nausea and vomiting. Genitourinary: Negative. Musculoskeletal: Negative. Skin: Negative. Neurological: Negative. OBJECTIVE  PHYSICAL EXAM:   Physical Exam  Constitutional:       Appearance: She is ill-appearing. Comments: Lethargic and fatigued in bed   HENT:      Head: Normocephalic and atraumatic. Nose: Nose normal.      Mouth/Throat:      Pharynx: Oropharynx is clear. Eyes:      Extraocular Movements: Extraocular movements intact. Cardiovascular:      Rate and Rhythm: Normal rate and regular rhythm. Pulses: Normal pulses. Heart sounds: Normal heart sounds. Pulmonary:      Effort: Pulmonary effort is normal.      Breath sounds: Normal breath sounds. Comments: Mild crackles appreciated in the lungs  Abdominal:      General: Abdomen is flat. Tenderness:  There is abdominal tenderness. There is guarding and rebound. Comments: No excoriations or lesions noted on inspection  Hyperactive bowel sounds noted on auscultation  Tenderness, rebound, and guarding noted in the RLQ and middle of abdomen, none in the LLQ     Musculoskeletal:         General: Normal range of motion. Cervical back: Normal range of motion. Skin:     General: Skin is dry. Neurological:      General: No focal deficit present. Mental Status: She is alert. BP (!) 189/100   Pulse 77   Temp 98.2 °F (36.8 °C) (Oral)   Resp 20   Ht 5' 5\" (1.651 m)   Wt 172 lb (78 kg)   SpO2 97%   BMI 28.62 kg/m²     DATA:     Diagnostic tests reviewed for today's visit:    Most recent labs and imaging results reviewed.      LABS:    Recent Results (from the past 24 hour(s))   EKG SOB    Collection Time: 11/06/21  9:10 AM   Result Value Ref Range    Ventricular Rate 72 BPM    QRS Duration 134 ms    Q-T Interval 492 ms    QTc Calculation (Bazett) 538 ms    R Axis 15 degrees    T Axis -55 degrees   Basic Metabolic Panel    Collection Time: 11/06/21  9:20 AM   Result Value Ref Range    Sodium 140 135 - 145 meq/L    Potassium 4.3 3.5 - 5.2 meq/L    Chloride 102 98 - 111 meq/L    CO2 30 23 - 33 meq/L    Glucose 121 (H) 70 - 108 mg/dL    BUN 21 7 - 22 mg/dL    CREATININE 1.1 0.4 - 1.2 mg/dL    Calcium 8.6 8.5 - 10.5 mg/dL   CBC auto differential    Collection Time: 11/06/21  9:20 AM   Result Value Ref Range    WBC 6.3 4.8 - 10.8 thou/mm3    RBC 3.01 (L) 4.20 - 5.40 mill/mm3    Hemoglobin 9.7 (L) 12.0 - 16.0 gm/dl    Hematocrit 30.7 (L) 37.0 - 47.0 %    .0 (H) 81.0 - 99.0 fL    MCH 32.2 26.0 - 33.0 pg    MCHC 31.6 (L) 32.2 - 35.5 gm/dl    RDW-CV 17.5 (H) 11.5 - 14.5 %    RDW-SD 66.3 (H) 35.0 - 45.0 fL    Platelets 029 210 - 711 thou/mm3    MPV 8.9 (L) 9.4 - 12.4 fL    Seg Neutrophils 76.6 %    Lymphocytes 7.3 %    Monocytes 11.5 %    Eosinophils 3.5 %    Basophils 0.5 %    Immature Granulocytes 0.5 - 2.0 %    Absolute Retic # 78.0 20.0 - 115.0 thou/mm3    Immature Retic Fract 19.0 (H) 3.0 - 15.9 %    Retic Hemoglobin 35.2 28.2 - 35.7 pg   Vitamin B12 & Folate    Collection Time: 11/06/21  9:20 AM   Result Value Ref Range    Vitamin B-12 547 211 - 911 pg/mL    Folate 8.4 4.8 - 24.2 ng/mL   COVID-19, Rapid    Collection Time: 11/06/21 10:20 AM    Specimen: Nasopharyngeal Swab   Result Value Ref Range    SARS-CoV-2, NAAT NOT  DETECTED NOT DETECTED   Urine with Reflexed Micro    Collection Time: 11/06/21 10:30 AM   Result Value Ref Range    Glucose, Ur NEGATIVE NEGATIVE mg/dl    Bilirubin Urine NEGATIVE NEGATIVE    Ketones, Urine TRACE (A) NEGATIVE    Specific Gravity, Urine 1.022 1.002 - 1.030    Blood, Urine NEGATIVE NEGATIVE    pH, UA 6.0 5.0 - 9.0    Protein, UA 30 (A) NEGATIVE    Urobilinogen, Urine 1.0 0.0 - 1.0 eu/dl    Nitrite, Urine NEGATIVE NEGATIVE    Leukocyte Esterase, Urine SMALL (A) NEGATIVE    Color, UA DK YELLOW (A) STRAW-YELLOW    Character, Urine CLEAR CLEAR-SL CLOUD    RBC, UA 0-2 0-2/hpf /hpf    WBC, UA 2-4 0-4/hpf /hpf    Epithelial Cells, UA 3-5 3-5/hpf /hpf    Amorphous, UA URATES NONE SEEN    Bacteria, UA FEW FEW/NONE SEEN /hpf    Casts UA NONE SEEN NONE SEEN /lpf    Crystals, UA NONE SEEN NONE SEEN    Renal Epithelial, UA NONE SEEN NONE SEEN    Yeast, UA NONE SEEN NONE SEEN    CASTS 2 NONE SEEN NONE SEEN /lpf    MISCELLANEOUS 2 NONE SEEN        IMAGING:  CT ABDOMEN PELVIS W IV CONTRAST Additional Contrast? None    Result Date: 11/6/2021  PROCEDURE: CT ABDOMEN PELVIS W IV CONTRAST CLINICAL INFORMATION: vomiting x 1 week . Abdominal pain. COMPARISON: CT abdomen pelvis 9/14/2021. TECHNIQUE: Axial 5 mm CT images were obtained through the abdomen and pelvis after the administration of intravenous contrast. Coronal and sagittal reconstructions were obtained.  All CT scans at this facility use dose modulation, iterative reconstruction, and/or weight-based dosing when appropriate to reduce radiation dose to as low as reasonably achievable. FINDINGS: There are bilateral pulmonary emboli in lower lobe arterial branches. There is some mild left basilar atelectasis. There is cardiomegaly. 1 The spleen is normal. The adrenals and pancreas are normal. The gallbladder is normal.   There is no hydronephrosis of either kidney. There is bilateral cortical thinning. There are no stones. No abnormalities of the small bowel loops are noted. The IVC and aorta are of normal caliber. There is atherosclerosis of the aorta. There is no adenopathy. The urinary bladder is normal. There is no pelvic free fluid. There is diverticulosis of the sigmoid colon. There is a focal inflamed segment deep in the pelvis. There is adjacent fat stranding. There are adjacent inflamed diverticuli this is consistent with acute diverticulitis. No other sites of colonic inflammation are present. There is no adenopathy. There is osseous demineralization. There are degenerative changes in the spine and both hips. The patient has had prior lumbar fusion. There is demineralization. There is thrombus in the left common femoral vein and proximal femoral vein. 1. Acute diverticulitis of the sigmoid colon. There is no associated abscess or perforation. Underlying tumor is not excluded. 2. Bilateral lower lobe pulmonary emboli. 3. Deep venous thrombosis in the left common femoral vein and femoral vein. These findings were telephoned to Miroslava Daley CNP in the ED at 11:09 AM on 11/6/2021 . **This report has been created using voice recognition software. It may contain minor errors which are inherent in voice recognition technology. ** Final report electronically signed by Dr. Aurelia Hansen on 11/6/2021 11:12 AM    XR CHEST PORTABLE    Result Date: 11/6/2021  PROCEDURE: XR CHEST PORTABLE CLINICAL INFORMATION: sob. Shortness of breath. COMPARISON: Chest x-ray 9/29/2021, 6/8/2021 and 11/11/2020.  TECHNIQUE: AP upright view of the chest. FINDINGS: The heart size is normal.The mediastinum is not widened. There is blunting of the left costophrenic angle. This could represent a left lateral infiltrate versus a pleural effusion. The right lung appears clear. The pulmonary vascularity is normal.No suspicious osseous lesions are present. Pulmonary opacity in the left lung base laterally. This could represent pleural fluid versus a pulmonary opacity. **This report has been created using voice recognition software. It may contain minor errors which are inherent in voice recognition technology. ** Final report electronically signed by Dr. Angela Springer on 11/6/2021 10:43 AM      VTE Prophylaxis: already on heparin drip    ASSESSMENT AND PLAN  Active Problems:  1. Sigmoid acute non-complicated diverticulitis: CT abdomen showed acute diverticulitis of the sigmoid colon with no associated abscess or perforation; patient was afebrile and did not endorse chills  -NPO at this time  -Bowel rest  -IV ciprofloxacin and IV metronidazole  -IV fluids 100 cc/hr.   -Will continue to monitor symptoms for 24-48 hours and possibly transition to PO antibiotics   -Monitoring lactic acids at this time    2. Left sided unprovoked DVT with bilateral small pulmonary emboli: Incidental findings on the CT abdomen also showed bilateral lower lobe pulmonary emboli with a deep venous thrombosis in the left common femoral vein; per patient patient has had a previous history of DVT's and PE's in the past before  -CTA ordered and showed small segment of branch emboli in the right lower lobe and 2-3 left lower lobe segmental branch emboli with a small clot in the distal left pulmonary artery in the left upper lobe branch with overall low clot burden and no evidence of right heart strain  -Started the patient on IV lovenox injections  -Bilateral venous doppler ultrasound ordered and pending    3.  Elevated troponinX2 likely secondary to supply demand ischemia:   -Will obtain a 3rd History of CVA: The patient was informed by Dr. Roro Quigley that she had a CVA after having her MI  -Noted and stable    15. Iron deficiency anemia: iron studies were drawn and showed iron of 49  -Transfuse if hemoglobin<7  -Otherwise patient is hemodynamically stable at this time with no signs of bleeding    14. Hyperlipidemia  -Continue atorvastatin  -Continue fenofibrate    15. Neuropathy likely secondary to type 2 diabetes:   -Continue gabapentin  -Continue duloxetine    16. GERD  -Continue pantoprazole    17.  Chronic insomnia  -Continue trazodone        Plan of care discussed with: patient    SIGNATURE: Smita Estrada DO  DATE: November 6, 2021  TIME: 3:59 PM   Lc Springer MD  - supervising physician

## 2021-11-07 LAB
ANION GAP SERPL CALCULATED.3IONS-SCNC: 12 MEQ/L (ref 8–16)
ANISOCYTOSIS: PRESENT
BASOPHILS # BLD: 0.6 %
BASOPHILS ABSOLUTE: 0 THOU/MM3 (ref 0–0.1)
BUN BLDV-MCNC: 13 MG/DL (ref 7–22)
CALCIUM SERPL-MCNC: 8 MG/DL (ref 8.5–10.5)
CHLORIDE BLD-SCNC: 102 MEQ/L (ref 98–111)
CO2: 23 MEQ/L (ref 23–33)
CREAT SERPL-MCNC: 0.9 MG/DL (ref 0.4–1.2)
EOSINOPHIL # BLD: 4.1 %
EOSINOPHILS ABSOLUTE: 0.2 THOU/MM3 (ref 0–0.4)
ERYTHROCYTE [DISTWIDTH] IN BLOOD BY AUTOMATED COUNT: 17.9 % (ref 11.5–14.5)
ERYTHROCYTE [DISTWIDTH] IN BLOOD BY AUTOMATED COUNT: 67.8 FL (ref 35–45)
GFR SERPL CREATININE-BSD FRML MDRD: 60 ML/MIN/1.73M2
GLUCOSE BLD-MCNC: 103 MG/DL (ref 70–108)
GLUCOSE BLD-MCNC: 108 MG/DL (ref 70–108)
GLUCOSE BLD-MCNC: 109 MG/DL (ref 70–108)
GLUCOSE BLD-MCNC: 85 MG/DL (ref 70–108)
GLUCOSE BLD-MCNC: 90 MG/DL (ref 70–108)
HCT VFR BLD CALC: 29.5 % (ref 37–47)
HEMOGLOBIN: 9.1 GM/DL (ref 12–16)
IMMATURE GRANS (ABS): 0.03 THOU/MM3 (ref 0–0.07)
IMMATURE GRANULOCYTES: 0.6 %
LYMPHOCYTES # BLD: 7.2 %
LYMPHOCYTES ABSOLUTE: 0.4 THOU/MM3 (ref 1–4.8)
MCH RBC QN AUTO: 31.5 PG (ref 26–33)
MCHC RBC AUTO-ENTMCNC: 30.8 GM/DL (ref 32.2–35.5)
MCV RBC AUTO: 102.1 FL (ref 81–99)
MONOCYTES # BLD: 11.3 %
MONOCYTES ABSOLUTE: 0.6 THOU/MM3 (ref 0.4–1.3)
NUCLEATED RED BLOOD CELLS: 0 /100 WBC
PLATELET # BLD: 308 THOU/MM3 (ref 130–400)
PMV BLD AUTO: 9.2 FL (ref 9.4–12.4)
POTASSIUM SERPL-SCNC: 4.2 MEQ/L (ref 3.5–5.2)
RBC # BLD: 2.89 MILL/MM3 (ref 4.2–5.4)
SEG NEUTROPHILS: 76.2 %
SEGMENTED NEUTROPHILS ABSOLUTE COUNT: 4.1 THOU/MM3 (ref 1.8–7.7)
SODIUM BLD-SCNC: 137 MEQ/L (ref 135–145)
WBC # BLD: 5.4 THOU/MM3 (ref 4.8–10.8)

## 2021-11-07 PROCEDURE — 2500000003 HC RX 250 WO HCPCS: Performed by: HOSPITALIST

## 2021-11-07 PROCEDURE — 82948 REAGENT STRIP/BLOOD GLUCOSE: CPT

## 2021-11-07 PROCEDURE — 80048 BASIC METABOLIC PNL TOTAL CA: CPT

## 2021-11-07 PROCEDURE — 94760 N-INVAS EAR/PLS OXIMETRY 1: CPT

## 2021-11-07 PROCEDURE — 6370000000 HC RX 637 (ALT 250 FOR IP): Performed by: HOSPITALIST

## 2021-11-07 PROCEDURE — 99233 SBSQ HOSP IP/OBS HIGH 50: CPT | Performed by: HOSPITALIST

## 2021-11-07 PROCEDURE — 94640 AIRWAY INHALATION TREATMENT: CPT

## 2021-11-07 PROCEDURE — 85025 COMPLETE CBC W/AUTO DIFF WBC: CPT

## 2021-11-07 PROCEDURE — 2580000003 HC RX 258: Performed by: HOSPITALIST

## 2021-11-07 PROCEDURE — 36415 COLL VENOUS BLD VENIPUNCTURE: CPT

## 2021-11-07 PROCEDURE — 2700000000 HC OXYGEN THERAPY PER DAY

## 2021-11-07 PROCEDURE — 1200000003 HC TELEMETRY R&B

## 2021-11-07 PROCEDURE — 6360000002 HC RX W HCPCS: Performed by: HOSPITALIST

## 2021-11-07 RX ORDER — METRONIDAZOLE 500 MG/1
500 TABLET ORAL EVERY 8 HOURS
Status: DISCONTINUED | OUTPATIENT
Start: 2021-11-07 | End: 2021-11-08 | Stop reason: HOSPADM

## 2021-11-07 RX ORDER — ISOSORBIDE MONONITRATE 60 MG/1
60 TABLET, EXTENDED RELEASE ORAL DAILY
Status: DISCONTINUED | OUTPATIENT
Start: 2021-11-07 | End: 2021-11-08 | Stop reason: HOSPADM

## 2021-11-07 RX ORDER — CIPROFLOXACIN 500 MG/1
500 TABLET, FILM COATED ORAL EVERY 12 HOURS SCHEDULED
Status: DISCONTINUED | OUTPATIENT
Start: 2021-11-07 | End: 2021-11-08 | Stop reason: HOSPADM

## 2021-11-07 RX ADMIN — CIPROFLOXACIN 500 MG: 500 TABLET, FILM COATED ORAL at 21:41

## 2021-11-07 RX ADMIN — ENOXAPARIN SODIUM 80 MG: 80 INJECTION SUBCUTANEOUS at 23:36

## 2021-11-07 RX ADMIN — FENOFIBRATE 54 MG: 54 TABLET ORAL at 09:02

## 2021-11-07 RX ADMIN — RANOLAZINE 500 MG: 500 TABLET, EXTENDED RELEASE ORAL at 09:02

## 2021-11-07 RX ADMIN — DULOXETINE HYDROCHLORIDE 30 MG: 30 CAPSULE, DELAYED RELEASE ORAL at 21:41

## 2021-11-07 RX ADMIN — MONTELUKAST SODIUM 5 MG: 5 TABLET, CHEWABLE ORAL at 21:41

## 2021-11-07 RX ADMIN — ISOSORBIDE MONONITRATE 60 MG: 60 TABLET, EXTENDED RELEASE ORAL at 12:13

## 2021-11-07 RX ADMIN — SODIUM CHLORIDE, PRESERVATIVE FREE 10 ML: 5 INJECTION INTRAVENOUS at 21:45

## 2021-11-07 RX ADMIN — HYDROXYCHLOROQUINE SULFATE 200 MG: 200 TABLET ORAL at 09:02

## 2021-11-07 RX ADMIN — METRONIDAZOLE 500 MG: 500 TABLET ORAL at 13:48

## 2021-11-07 RX ADMIN — SODIUM CHLORIDE: 9 INJECTION, SOLUTION INTRAVENOUS at 09:18

## 2021-11-07 RX ADMIN — SODIUM CHLORIDE, PRESERVATIVE FREE 10 ML: 5 INJECTION INTRAVENOUS at 09:02

## 2021-11-07 RX ADMIN — CIPROFLOXACIN 400 MG: 2 INJECTION, SOLUTION INTRAVENOUS at 00:47

## 2021-11-07 RX ADMIN — ENOXAPARIN SODIUM 80 MG: 80 INJECTION SUBCUTANEOUS at 00:48

## 2021-11-07 RX ADMIN — CIPROFLOXACIN 500 MG: 500 TABLET, FILM COATED ORAL at 12:13

## 2021-11-07 RX ADMIN — CLONIDINE HYDROCHLORIDE 0.1 MG: 0.1 TABLET ORAL at 21:41

## 2021-11-07 RX ADMIN — METRONIDAZOLE 500 MG: 500 TABLET ORAL at 21:41

## 2021-11-07 RX ADMIN — GABAPENTIN 100 MG: 100 CAPSULE ORAL at 09:02

## 2021-11-07 RX ADMIN — ONDANSETRON 4 MG: 2 INJECTION INTRAMUSCULAR; INTRAVENOUS at 09:18

## 2021-11-07 RX ADMIN — METOPROLOL SUCCINATE 50 MG: 50 TABLET, EXTENDED RELEASE ORAL at 09:02

## 2021-11-07 RX ADMIN — BUDESONIDE AND FORMOTEROL FUMARATE DIHYDRATE 2 PUFF: 160; 4.5 AEROSOL RESPIRATORY (INHALATION) at 18:01

## 2021-11-07 RX ADMIN — METRONIDAZOLE 500 MG: 500 INJECTION, SOLUTION INTRAVENOUS at 05:18

## 2021-11-07 RX ADMIN — GABAPENTIN 100 MG: 100 CAPSULE ORAL at 21:41

## 2021-11-07 RX ADMIN — ACETAMINOPHEN 650 MG: 325 TABLET ORAL at 21:41

## 2021-11-07 RX ADMIN — GABAPENTIN 100 MG: 100 CAPSULE ORAL at 13:48

## 2021-11-07 RX ADMIN — Medication 400 MG: at 21:41

## 2021-11-07 RX ADMIN — LOSARTAN POTASSIUM 50 MG: 50 TABLET, FILM COATED ORAL at 09:02

## 2021-11-07 RX ADMIN — CLONIDINE HYDROCHLORIDE 0.1 MG: 0.1 TABLET ORAL at 09:02

## 2021-11-07 RX ADMIN — ASPIRIN 162 MG: 81 TABLET, FILM COATED ORAL at 09:05

## 2021-11-07 RX ADMIN — ENOXAPARIN SODIUM 80 MG: 80 INJECTION SUBCUTANEOUS at 12:13

## 2021-11-07 RX ADMIN — TIOTROPIUM BROMIDE INHALATION SPRAY 2 PUFF: 3.12 SPRAY, METERED RESPIRATORY (INHALATION) at 08:24

## 2021-11-07 RX ADMIN — TRAZODONE HYDROCHLORIDE 100 MG: 100 TABLET ORAL at 21:45

## 2021-11-07 RX ADMIN — Medication 400 MG: at 09:02

## 2021-11-07 RX ADMIN — RANOLAZINE 500 MG: 500 TABLET, EXTENDED RELEASE ORAL at 21:41

## 2021-11-07 RX ADMIN — BUDESONIDE AND FORMOTEROL FUMARATE DIHYDRATE 2 PUFF: 160; 4.5 AEROSOL RESPIRATORY (INHALATION) at 08:25

## 2021-11-07 RX ADMIN — ATORVASTATIN CALCIUM 80 MG: 80 TABLET, FILM COATED ORAL at 09:02

## 2021-11-07 RX ADMIN — DULOXETINE HYDROCHLORIDE 30 MG: 30 CAPSULE, DELAYED RELEASE ORAL at 09:02

## 2021-11-07 RX ADMIN — SPIRONOLACTONE 50 MG: 25 TABLET ORAL at 09:02

## 2021-11-07 ASSESSMENT — PAIN DESCRIPTION - LOCATION: LOCATION: HEAD

## 2021-11-07 ASSESSMENT — PAIN DESCRIPTION - PROGRESSION
CLINICAL_PROGRESSION: NOT CHANGED

## 2021-11-07 ASSESSMENT — PAIN SCALES - GENERAL
PAINLEVEL_OUTOF10: 9
PAINLEVEL_OUTOF10: 0
PAINLEVEL_OUTOF10: 10
PAINLEVEL_OUTOF10: 10

## 2021-11-07 ASSESSMENT — PAIN DESCRIPTION - PAIN TYPE: TYPE: ACUTE PAIN

## 2021-11-07 ASSESSMENT — PAIN DESCRIPTION - ORIENTATION: ORIENTATION: OTHER (COMMENT)

## 2021-11-07 ASSESSMENT — PAIN - FUNCTIONAL ASSESSMENT: PAIN_FUNCTIONAL_ASSESSMENT: ACTIVITIES ARE NOT PREVENTED

## 2021-11-07 ASSESSMENT — PAIN DESCRIPTION - DESCRIPTORS: DESCRIPTORS: ACHING

## 2021-11-07 ASSESSMENT — PAIN DESCRIPTION - ONSET: ONSET: SUDDEN

## 2021-11-07 ASSESSMENT — PAIN DESCRIPTION - FREQUENCY: FREQUENCY: INTERMITTENT

## 2021-11-07 NOTE — PROGRESS NOTES
Hospitalist Progress Note      Patient:  Nando Parrishy    Unit/Bed:6K-11/011-A  YOB: 1940  MRN: [id number]   Acct: [de-identified]   PCP: Michael Yeboah DO  Date of Admission: 11/6/2021    Assessment/Plan:    1. Acute uncomplicated Sigmoid diverticulitis: CT abdomen showed acute diverticulitis of the sigmoid colon with no associated abscess or perforation; patient was afebrile and did not endorse chills  AVSS. No abdo pain; start CL and advance gradually as tolerated; stepped down to PO ciprofloxacin/metronidazole from IV  Will d/c IVF if tolerated PO     2. Left sided unprovoked DVT with bilateral small pulmonary emboli: Incidental findings on the CT abdomen also showed bilateral lower lobe pulmonary emboli with a deep venous thrombosis in the left common femoral vein; per patient patient has had a previous history of DVT's and PE's in the past before  -CTA ordered and showed small segment of branch emboli in the right lower lobe and 2-3 left lower lobe segmental branch emboli with a small clot in the distal left pulmonary artery in the left upper lobe branch with overall low clot burden and no evidence of right heart strain  -Started on LMWH w/ plan to step down to NOAC after discussion of R/B/C/A of AC and various OAC options  -Bilateral venous doppler ultrasound completed pending results; pt already on therapeutic dose of LMWH; CTA A/P negative for any mass or AV anomaly      3. Elevated troponinX2 likely secondary to supply demand ischemia:     -Troponins downtrended; denies CP. Monitor     4. Atrial fibrillation with no RVR s/p junctional pacemaker placement: CHADVASC2 score of 6 and HAS-BLED score of 5; patient is not currently on any form of anticoagulation at this time but has been on anticoagulation in the past; patient follows with Dr. Lorene Dickerson  Well-controlled on BB.  On LMWH w/ plan to switch to NOAC     5. Essential hypertension:   On home clonidine, losartan and spironolactone; BP still suboptimally controlled despite no pain; added Isosorbide nitrate given CAD hx; will reassess response and further modify regimen as needed      6. Rheumatoid arthritis: No flare-ups at this time  -Holding azathioprine and prednisone  -Continue hydroxychloroquine     7. Insulin dependent type 2 diabetes mellitus: well-controlled w/ A1C from Sep 2021 at 6.6%. on MDI at home which held on admission d/t NPO status; and placed on SSI only; BS well-controlled; will monitor BS as diet being advanced and resumed home MDI regimen accordingly  --Hypoglycemia protocol in place     8. CKD Stage III:Cr 0.9. at baseline  Will d/c IVF later as diet tolerated  -Avoid nephrotoxins     9. Coronary artery disease: the patient follows with Dr. Bart Navarrete and previously had an MI  -Continue OMT; added nitrate as discussed above     10. History of COPD: follows with  pulmonology  -Last spirometry done in February 2021  -Continue standing home inhalers     11. Combined CHF: The patient's last echocardiogram showed ejection fraction of 45-55%   -The patient is not symptomatic at this time and we will hold home Bumex     12. History of CVA: The patient was informed by Dr. Bart Navarrete that she had a CVA after having her MI  -Noted and stable     13. Chronic macrocytic anemia: Hb stable. anemia W/U c/w Iron deficiency anemia; will start on iron replacement at discharge; PCP to consider GI referral for screening C-scope +/- EGD as warranted OP    14. Hyperlipidemia  -Continue atorvastatin  -Continue fenofibrate     15. Neuropathy likely secondary to type 2 diabetes:   -Continue gabapentin  -Continue duloxetine     16. GERD  -Continue pantoprazole     17. Chronic insomnia  -Continue trazodone       Chief Complaint: Abdo pain/N/V    Initial H and P:-    Pt admitted for management of acute sigmoid diverticulitis. Subjective (past 24 hours):   Pt feeling much improved.  Denies any abdo pain/N/V/diarrhea. Feeling hungry. Also denies fever/chills/CP/SOB/palpitation/presyncope/ symptoms      Past medical history, family history, social history and allergies reviewed again and is unchanged since admission. ROS (All review of systems completed. Pertinent positives noted.  Otherwise All other systems reviewed and negative.)     Medications:  Reviewed    Infusion Medications    sodium chloride      sodium chloride 100 mL/hr at 11/07/21 2231    dextrose       Scheduled Medications    aspirin  162 mg Oral Daily    atorvastatin  80 mg Oral Daily    cloNIDine  0.1 mg Oral BID    pantoprazole  40 mg Oral QAM AC    DULoxetine  30 mg Oral BID    fenofibrate  54 mg Oral Daily    gabapentin  100 mg Oral TID    hydroxychloroquine  200 mg Oral Daily    losartan  50 mg Oral Daily    magnesium oxide  400 mg Oral BID    metoprolol succinate  50 mg Oral Daily    montelukast  5 mg Oral Nightly    ranolazine  500 mg Oral BID    spironolactone  50 mg Oral Daily    traZODone  100 mg Oral Nightly    sodium chloride flush  10 mL IntraVENous 2 times per day    ciprofloxacin  400 mg IntraVENous Q12H    metroNIDAZOLE  500 mg IntraVENous Q8H    insulin lispro  0-6 Units SubCUTAneous TID WC    insulin lispro  0-3 Units SubCUTAneous Nightly    enoxaparin  1 mg/kg SubCUTAneous Q12H    budesonide-formoterol  2 puff Inhalation BID    tiotropium  2 puff Inhalation Daily     PRN Meds: sodium chloride flush, sodium chloride, ondansetron **OR** ondansetron, polyethylene glycol, acetaminophen **OR** acetaminophen, glucose, dextrose, glucagon (rDNA), dextrose      Intake/Output Summary (Last 24 hours) at 11/7/2021 0934  Last data filed at 11/7/2021 0310  Gross per 24 hour   Intake 1446.74 ml   Output    Net 1446.74 ml       Diet:  Diet NPO    Exam:  BP (!) 178/74   Pulse 78   Temp 97.9 °F (36.6 °C) (Oral)   Resp 20   Ht 5' 5\" (1.651 m)   Wt 161 lb 14.4 oz (73.4 kg)   SpO2 93%   BMI 26.94 kg/m² General appearance: No apparent distress, appears stated age and cooperative. HEENT: Pupils equal, round, and reactive to light. Conjunctivae/corneas clear. Neck: Supple, with full range of motion. No jugular venous distention. Trachea midline. Respiratory:  Normal respiratory effort. Clear to auscultation, bilaterally without Rales/Wheezes/Rhonchi. Cardiovascular: Regular rate and rhythm with normal S1/S2 without murmurs, rubs or gallops. Abdomen: Soft, round, obese, non-tender, non-distended with normal bowel sounds. Musculoskeletal: passive and active ROM x 4 extremities. Skin: Skin color, texture, turgor normal.  No rashes or lesions. Neurologic:  Neurovascularly intact without any focal sensory/motor deficits. Cranial nerves: II-XII intact, grossly non-focal.  Psychiatric: Alert and oriented, thought content appropriate, normal insight  Capillary Refill: Brisk,< 3 seconds   Peripheral Pulses: +2 palpable, equal bilaterally     Labs:   Recent Labs     11/06/21 0920 11/07/21  0452   WBC 6.3 5.4   HGB 9.7* 9.1*   HCT 30.7* 29.5*    308     Recent Labs     11/06/21 0920 11/07/21  0452    137   K 4.3 4.2    102   CO2 30 23   BUN 21 13   CREATININE 1.1 0.9   CALCIUM 8.6 8.0*     Recent Labs     11/06/21 0920   AST 58*   ALT 28   BILIDIR <0.2   BILITOT 0.3   ALKPHOS 44     No results for input(s): INR in the last 72 hours. No results for input(s): Kaleen Hope in the last 72 hours.     Microbiology:    Blood culture #1:   Lab Results   Component Value Date    BC No growth-preliminary No growth  09/29/2021       Blood culture #2:No results found for: Linda Griffith    Organism:No results found for: ORG    No results found for: LABGRAM    MRSA culture only:No results found for: [address] [place] [address]    Urine culture: No results found for: LABURIN    Respiratory culture: No results found for: CULTRESP    Aerobic and Anaerobic :  No results found for: LABAERO  No results found for: LABANAE    Urinalysis: Lab Results   Component Value Date    NITRU NEGATIVE 11/06/2021    WBCUA 2-4 11/06/2021    BACTERIA FEW 11/06/2021    RBCUA 0-2 11/06/2021    BLOODU NEGATIVE 11/06/2021    SPECGRAV 1.019 11/08/2020    GLUCOSEU NEGATIVE 11/06/2021       Radiology:  CTA CHEST W WO CONTRAST   Final Result   Small segment of branch emboli in the right lower lobe 2-3 left lower lobe segmental branch emboli small clot in the distal left pulmonary artery in the left upper lobe branch. Overall clot burden is low. There is no evidence of right heart strain. This was called to Dr. Lily Perera at [phone]. Mild haziness in the lungs may represent areas of air trapping or possibly acute pneumonitis. **This report has been created using voice recognition software. It may contain minor errors which are inherent in voice recognition technology. **      Final report electronically signed by Dr. Melba Shirley on 11/6/2021 6:25 PM      CT ABDOMEN PELVIS W IV CONTRAST Additional Contrast? None   Final Result       1. Acute diverticulitis of the sigmoid colon. There is no associated abscess or perforation. Underlying tumor is not excluded. 2. Bilateral lower lobe pulmonary emboli. 3. Deep venous thrombosis in the left common femoral vein and femoral vein. These findings were telephoned to Gisselle Farrell CNP in the ED at 11:09 AM on 11/6/2021 . **This report has been created using voice recognition software. It may contain minor errors which are inherent in voice recognition technology. **      Final report electronically signed by Dr. Wali Flynn on 11/6/2021 11:12 AM      XR CHEST PORTABLE   Final Result   Pulmonary opacity in the left lung base laterally. This could represent pleural fluid versus a pulmonary opacity. **This report has been created using voice recognition software. It may contain minor errors which are inherent in voice recognition technology. **      Final report electronically signed by Dr. Katelyn Lewis on 11/6/2021 10:43 AM      VL DUP LOWER EXTREMITY VENOUS BILATERAL    (Results Pending)     CTA CHEST W WO CONTRAST    Result Date: 11/6/2021  PROCEDURE: CTA CHEST W WO CONTRAST CLINICAL INFORMATION: query PE based on CT A/P findings . TECHNIQUE: 2-D multiplanar postcontrast images of the chest with the 3-D MIPS. [id number]. All CT scans at this facility use dose modulation, iterative reconstruction, and/or weight-based dosing when appropriate to reduce radiation dose to as low as reasonably achievable. COMPARISON: 8/16/2021 FINDINGS: There are multiple intraluminal filling defects in the right lower lobe segmental branches. A small filling defect in the distal left main pulmonary artery and left upper lobe branch. There are 2 side branch lower lobe filling defects. There is no evidence of right heart strain. Heart is enlarged in size. That appearance is stable. Haziness of the bilateral lungs may represent air trapping or possibly pneumonitis. No confluent infiltrate. Lingular atelectasis. No effusions. Upper abdomen Please see the same-day dedicated exam.     Small segment of branch emboli in the right lower lobe 2-3 left lower lobe segmental branch emboli small clot in the distal left pulmonary artery in the left upper lobe branch. Overall clot burden is low. There is no evidence of right heart strain. This was called to Dr. Mary Ann Mclean at [phone]. Mild haziness in the lungs may represent areas of air trapping or possibly acute pneumonitis. **This report has been created using voice recognition software. It may contain minor errors which are inherent in voice recognition technology. ** Final report electronically signed by Dr. Bryce Javier on 11/6/2021 6:25 PM    CT ABDOMEN PELVIS W IV CONTRAST Additional Contrast? None    Result Date: 11/6/2021  PROCEDURE: CT ABDOMEN PELVIS W IV CONTRAST CLINICAL INFORMATION: vomiting x 1 week . Abdominal pain. COMPARISON: CT abdomen pelvis 9/14/2021.  TECHNIQUE: report electronically signed by Dr. hSaq Ramirez on 11/6/2021 11:12 AM    XR CHEST PORTABLE    Result Date: 11/6/2021  PROCEDURE: XR CHEST PORTABLE CLINICAL INFORMATION: sob. Shortness of breath. COMPARISON: Chest x-ray 9/29/2021, 6/8/2021 and 11/11/2020. TECHNIQUE: AP upright view of the chest. FINDINGS: The heart size is normal.The mediastinum is not widened. There is blunting of the left costophrenic angle. This could represent a left lateral infiltrate versus a pleural effusion. The right lung appears clear. The pulmonary vascularity is normal.No suspicious osseous lesions are present. Pulmonary opacity in the left lung base laterally. This could represent pleural fluid versus a pulmonary opacity. **This report has been created using voice recognition software. It may contain minor errors which are inherent in voice recognition technology. ** Final report electronically signed by Dr. Shaq Ramirez on 11/6/2021 10:43 AM    With RN in room, patient was updated about and agreed upon the treatment plan, all the questions and concerns were addressed.     Electronically signed by Adrián Sood MD on 11/7/2021 at 9:34 AM

## 2021-11-08 VITALS
DIASTOLIC BLOOD PRESSURE: 65 MMHG | TEMPERATURE: 98.2 F | HEART RATE: 80 BPM | SYSTOLIC BLOOD PRESSURE: 150 MMHG | RESPIRATION RATE: 20 BRPM | WEIGHT: 161.9 LBS | BODY MASS INDEX: 26.98 KG/M2 | OXYGEN SATURATION: 98 % | HEIGHT: 65 IN

## 2021-11-08 LAB
ANION GAP SERPL CALCULATED.3IONS-SCNC: 10 MEQ/L (ref 8–16)
ANISOCYTOSIS: PRESENT
BASOPHILS # BLD: 0.6 %
BASOPHILS ABSOLUTE: 0 THOU/MM3 (ref 0–0.1)
BUN BLDV-MCNC: 12 MG/DL (ref 7–22)
CALCIUM SERPL-MCNC: 7.9 MG/DL (ref 8.5–10.5)
CHLORIDE BLD-SCNC: 103 MEQ/L (ref 98–111)
CO2: 22 MEQ/L (ref 23–33)
CREAT SERPL-MCNC: 1 MG/DL (ref 0.4–1.2)
EOSINOPHIL # BLD: 7.1 %
EOSINOPHILS ABSOLUTE: 0.4 THOU/MM3 (ref 0–0.4)
ERYTHROCYTE [DISTWIDTH] IN BLOOD BY AUTOMATED COUNT: 18 % (ref 11.5–14.5)
ERYTHROCYTE [DISTWIDTH] IN BLOOD BY AUTOMATED COUNT: 70.9 FL (ref 35–45)
GFR SERPL CREATININE-BSD FRML MDRD: 53 ML/MIN/1.73M2
GLUCOSE BLD-MCNC: 110 MG/DL (ref 70–108)
GLUCOSE BLD-MCNC: 127 MG/DL (ref 70–108)
GLUCOSE BLD-MCNC: 83 MG/DL (ref 70–108)
HCT VFR BLD CALC: 29.4 % (ref 37–47)
HEMOGLOBIN: 8.8 GM/DL (ref 12–16)
IMMATURE GRANS (ABS): 0.02 THOU/MM3 (ref 0–0.07)
IMMATURE GRANULOCYTES: 0.4 %
LYMPHOCYTES # BLD: 9.4 %
LYMPHOCYTES ABSOLUTE: 0.5 THOU/MM3 (ref 1–4.8)
MCH RBC QN AUTO: 31.7 PG (ref 26–33)
MCHC RBC AUTO-ENTMCNC: 29.9 GM/DL (ref 32.2–35.5)
MCV RBC AUTO: 105.8 FL (ref 81–99)
MONOCYTES # BLD: 13.4 %
MONOCYTES ABSOLUTE: 0.7 THOU/MM3 (ref 0.4–1.3)
NUCLEATED RED BLOOD CELLS: 0 /100 WBC
PLATELET # BLD: 280 THOU/MM3 (ref 130–400)
PMV BLD AUTO: 9.2 FL (ref 9.4–12.4)
POTASSIUM SERPL-SCNC: 4.1 MEQ/L (ref 3.5–5.2)
RBC # BLD: 2.78 MILL/MM3 (ref 4.2–5.4)
SEG NEUTROPHILS: 69.1 %
SEGMENTED NEUTROPHILS ABSOLUTE COUNT: 3.6 THOU/MM3 (ref 1.8–7.7)
SODIUM BLD-SCNC: 135 MEQ/L (ref 135–145)
WBC # BLD: 5.2 THOU/MM3 (ref 4.8–10.8)

## 2021-11-08 PROCEDURE — 2580000003 HC RX 258: Performed by: HOSPITALIST

## 2021-11-08 PROCEDURE — 6370000000 HC RX 637 (ALT 250 FOR IP): Performed by: HOSPITALIST

## 2021-11-08 PROCEDURE — 85025 COMPLETE CBC W/AUTO DIFF WBC: CPT

## 2021-11-08 PROCEDURE — 82948 REAGENT STRIP/BLOOD GLUCOSE: CPT

## 2021-11-08 PROCEDURE — 36415 COLL VENOUS BLD VENIPUNCTURE: CPT

## 2021-11-08 PROCEDURE — 80048 BASIC METABOLIC PNL TOTAL CA: CPT

## 2021-11-08 PROCEDURE — 94760 N-INVAS EAR/PLS OXIMETRY 1: CPT

## 2021-11-08 PROCEDURE — 2700000000 HC OXYGEN THERAPY PER DAY

## 2021-11-08 PROCEDURE — 99239 HOSP IP/OBS DSCHRG MGMT >30: CPT | Performed by: HOSPITALIST

## 2021-11-08 PROCEDURE — 94640 AIRWAY INHALATION TREATMENT: CPT

## 2021-11-08 RX ORDER — ISOSORBIDE MONONITRATE 60 MG/1
60 TABLET, EXTENDED RELEASE ORAL DAILY
Qty: 30 TABLET | Refills: 0 | Status: SHIPPED | OUTPATIENT
Start: 2021-11-09 | End: 2021-12-09

## 2021-11-08 RX ORDER — CIPROFLOXACIN 500 MG/1
500 TABLET, FILM COATED ORAL EVERY 12 HOURS SCHEDULED
Qty: 20 TABLET | Refills: 0 | Status: SHIPPED | OUTPATIENT
Start: 2021-11-08 | End: 2021-11-19

## 2021-11-08 RX ORDER — METRONIDAZOLE 500 MG/1
500 TABLET ORAL EVERY 8 HOURS
Qty: 30 TABLET | Refills: 0 | Status: SHIPPED | OUTPATIENT
Start: 2021-11-08 | End: 2021-11-19

## 2021-11-08 RX ADMIN — METOPROLOL SUCCINATE 50 MG: 50 TABLET, EXTENDED RELEASE ORAL at 10:12

## 2021-11-08 RX ADMIN — HYDROXYCHLOROQUINE SULFATE 200 MG: 200 TABLET ORAL at 10:12

## 2021-11-08 RX ADMIN — GABAPENTIN 100 MG: 100 CAPSULE ORAL at 10:13

## 2021-11-08 RX ADMIN — CIPROFLOXACIN 500 MG: 500 TABLET, FILM COATED ORAL at 10:13

## 2021-11-08 RX ADMIN — SODIUM CHLORIDE, PRESERVATIVE FREE 10 ML: 5 INJECTION INTRAVENOUS at 10:13

## 2021-11-08 RX ADMIN — RANOLAZINE 500 MG: 500 TABLET, EXTENDED RELEASE ORAL at 10:13

## 2021-11-08 RX ADMIN — METRONIDAZOLE 500 MG: 500 TABLET ORAL at 13:30

## 2021-11-08 RX ADMIN — TIOTROPIUM BROMIDE INHALATION SPRAY 2 PUFF: 3.12 SPRAY, METERED RESPIRATORY (INHALATION) at 07:49

## 2021-11-08 RX ADMIN — CLONIDINE HYDROCHLORIDE 0.1 MG: 0.1 TABLET ORAL at 10:13

## 2021-11-08 RX ADMIN — GABAPENTIN 100 MG: 100 CAPSULE ORAL at 13:30

## 2021-11-08 RX ADMIN — ASPIRIN 162 MG: 81 TABLET, FILM COATED ORAL at 10:13

## 2021-11-08 RX ADMIN — FENOFIBRATE 54 MG: 54 TABLET ORAL at 10:12

## 2021-11-08 RX ADMIN — ATORVASTATIN CALCIUM 80 MG: 80 TABLET, FILM COATED ORAL at 10:13

## 2021-11-08 RX ADMIN — APIXABAN 10 MG: 5 TABLET, FILM COATED ORAL at 13:30

## 2021-11-08 RX ADMIN — ISOSORBIDE MONONITRATE 60 MG: 60 TABLET, EXTENDED RELEASE ORAL at 10:12

## 2021-11-08 RX ADMIN — METRONIDAZOLE 500 MG: 500 TABLET ORAL at 05:29

## 2021-11-08 RX ADMIN — BUDESONIDE AND FORMOTEROL FUMARATE DIHYDRATE 2 PUFF: 160; 4.5 AEROSOL RESPIRATORY (INHALATION) at 07:50

## 2021-11-08 RX ADMIN — BUDESONIDE AND FORMOTEROL FUMARATE DIHYDRATE 2 PUFF: 160; 4.5 AEROSOL RESPIRATORY (INHALATION) at 18:15

## 2021-11-08 RX ADMIN — SPIRONOLACTONE 50 MG: 25 TABLET ORAL at 10:12

## 2021-11-08 RX ADMIN — Medication 400 MG: at 10:13

## 2021-11-08 RX ADMIN — PANTOPRAZOLE SODIUM 40 MG: 40 TABLET, DELAYED RELEASE ORAL at 05:29

## 2021-11-08 RX ADMIN — LOSARTAN POTASSIUM 50 MG: 50 TABLET, FILM COATED ORAL at 10:12

## 2021-11-08 RX ADMIN — DULOXETINE HYDROCHLORIDE 30 MG: 30 CAPSULE, DELAYED RELEASE ORAL at 10:13

## 2021-11-08 ASSESSMENT — PAIN DESCRIPTION - PROGRESSION

## 2021-11-08 NOTE — CARE COORDINATION
11/8/21, 10:16 AM EST  DISCHARGE PLANNING EVALUATION:    Jessica Salter       Admitted: 11/6/2021/ Alicia day: 2   Location: -11/011-A Reason for admit: Dehydration [E86.0]  General weakness [R53.1]  Elevated troponin [R77.8]  Acute diverticulitis [K57.92]  Deep vein thrombosis (DVT) of lower extremity, unspecified chronicity, unspecified laterality, unspecified vein (HCC) [I82.409]  Multiple subsegmental pulmonary emboli without acute cor pulmonale (HCC) [I26.94]   PMH:  has a past medical history of Arthritis, Asthma, Atrial fibrillation ([address] Blood circulation, collateral, CAD (coronary artery disease), CHF (congestive heart failure) ([address] Chronic kidney disease, stage III (moderate) ([address] COPD (chronic obstructive pulmonary disease) ([address] Depression, Diabetes mellitus ([address] Diabetic hypertension-nephrosis syndrome ([address] Dysuria, GERD (gastroesophageal reflux disease), Hyperlipidemia, Hypertension, Kidney disease, and Unspecified cerebral artery occlusion with cerebral infarction. Procedure: none  Barriers to Discharge:  Hgb 8.8, trops elev. 98% on 1L O2. Lovenox initiated for findings  bilat PEs and bilat DVTs. Palliative care eval.   PCP: Rigo Yeboah DO  Readmission Risk Score: 23.3 ( )%    Patient Goals/Plan/Treatment Preferences: [place] [facility] [place]. Transportation/Food Security/Housekeeping Addressed:  No issues identified.

## 2021-11-08 NOTE — PROGRESS NOTES
Pharmacy Medication History Note      List of current medications patient is taking is complete. Source of information: Epic fill history, [facility] home med list    Changes made to medication list:  Medications removed (include reason, ex. therapy complete or physician discontinued): Albuterol sulfate inhalation - Duplicate / List cleanup    Medications added/doses adjusted:  Adjusted clonidine 0.1mg from three times daily to twice daily  Added magnesium hydroxide 400mg/5mL, taking 30mL daily as needed for constipation    Other notes (ex. Recent course of antibiotics, Coumadin dosing):  Denies use of other OTC or herbal medications.       Allergies reviewed      Electronically signed by Andrés Bridges on 11/8/2021 at 10:58 AM

## 2021-11-08 NOTE — PROGRESS NOTES
Report called to [facility] [place] and last dose [facility] faxed and given to transport.

## 2021-11-08 NOTE — CARE COORDINATION
11/8/21, 12:08 PM EST  Discharge Planning Evaluation  Social work consult received, patient from [facility]. Patient/Family preference is to return to [facility], [address] [place] [address], confirmed with daughter, Rupa Soto. The patient's current payor source at the facility is Medicaid. Medicare skilled days available: yes  Insurance precert:  no  Spoke with staff at the facility.   Patient bed hold: yes  Anticipated transport plan: ambulette  Do they require COVID 19 test to return to [facility]  Is there a required time frame which which COVID test needs done:prior to discharge

## 2021-11-08 NOTE — ACP (ADVANCE CARE PLANNING)
Advance Care Planning     Advance Care Planning Inpatient Note  [facility] Department    Today's Date: 2021  Unit: [facility] RENAL TELEMETRY 6K    Received request from IDT Member. Upon review of chart and communication with care team, patient's decision making abilities are not in question. . Patient was/were present in the room during visit. Goals of ACP Conversation:  Discuss advance care planning documents    Health Care Decision Makers:       Primary Decision Maker: Abril Trejo - Child - [phone]    Secondary Decision Maker: Eliu Little - Child - [phone]    Summary:  Verified Healthcare Decision Maker    Advance Care Planning Documents (Patient Wishes):  None     Assessment:   held conversation with pt about AD's. Pt believes she has completed forms at home.  left AD pamphlet as a remind for pt to  Speak with her daughter about AD's. Pt shared that she prays regularly and believes God does hear her prayers. Pt shared how hard it was raising her children alone after her   in 63. Interventions:  Requested patient/family to submit existing document for our records: Healthcare Power of /Advance Directive [address] Preferences Communicated:   No    Outcomes/Plan:  Left AD pamphlet as a reminder for pt to speak with her daughter about bringing in AD forms.     Electronically signed by Chaplain Lester on 2021 at 9:44 PM

## 2021-11-08 NOTE — PROGRESS NOTES
Discharge order written for this patient, patient still on clear liquid diet. Adv as tolerated order in, transport set up for 3:30pm. Patient has been tolerating pills crushing in applesauce. Message placed to Dr. Corey Spence, ok'd advancing from clear liquid diet to general diet prior to discharge.  Orders also placed for echocardiogram, messaged Dr. Corey Spence, orders to cancel echocardiogram.

## 2021-11-08 NOTE — DISCHARGE INSTR - COC
Continuity of Care Form    Patient Name: Crystal Ross   :  1940  MRN:  [id number]    Admit date:  2021  Discharge date:  2021    Code Status Order: Limited   Advance Directives:      Admitting Physician:  Lc Springer MD  PCP: Madalyn Bowers DO    Discharging Nurse: [facility] Unit/Room#: 6K-11/011-A  Discharging Unit Phone Number: [phone]    Emergency Contact:   Extended Emergency Contact Information  Primary Emergency Contact: Raudel Singh [address] Phone: [phone]  Mobile Phone: [phone]  Relation: Child  Secondary Emergency Contact:  [address] Phone: [phone]  Mobile Phone: [phone]  Relation: Child    Past Surgical History:  Past Surgical History:   Procedure Laterality Date    APPENDECTOMY      BACK SURGERY      lumbar spine    BLADDER SUSPENSION      CARDIAC CATHETERIZATION      with stent  [facility]    CARPAL TUNNEL RELEASE      COLONOSCOPY      ENDOSCOPY, COLON, DIAGNOSTIC      ESOPHAGEAL DILATATION      HYSTERECTOMY  1974    JOINT REPLACEMENT Left 1992    Left Knee    UPPER GASTROINTESTINAL ENDOSCOPY N/A 2021    EGD DILATION performed by Liberty Breaux MD at [facility] ENDOSCOPY Left 2021    EGD BIOPSY performed by Liberty Breaux MD at [facility]       Immunization History:   Immunization History   Administered Date(s) Administered    COVID-19, JEFFY Bailey, 30mcg/0.3mL 2021, 2021    Influenza Virus Vaccine 10/04/2012       Active Problems:  Patient Active Problem List   Diagnosis Code    Syncope R55    Diabetes mellitus ([facility] E11.9    CAD (coronary artery disease) I25.10    Hypertension I10    Atrial fibrillation (HCC) I48.91    GERD (gastroesophageal reflux disease) K21.9    Chronic kidney disease, stage III (moderate) (HCC) N18.30    COPD (chronic obstructive pulmonary disease) ([facility] J44.9    Dysuria R30.0    Diabetic hypertension-nephrosis syndrome (HCC) E11.21    Hypokalemia E87.6    Abnormal nuclear cardiac imaging test R93.1    Back pain, chronic M54.9, G89.29    Obesity (BMI 35.0-39.9 without comorbidity) E66.9    H/O class III angina pectoris Z86.79    HTN, goal below 130/80 I10    Non-rheumatic mitral regurgitation I34.0    Abnormal nuclear stress test R94.39    Osteopenia of multiple sites M85.89    Seronegative rheumatoid arthritis ([facility] M06.00    Acute respiratory failure (HCC) J96.00    Diverticulitis K57.92    Diverticulitis of colon K57.32    Multiple falls R29.6    EMMA (acute kidney injury) ([facility] N17.9    Urinary tract infection in female N39.0    Acute diverticulitis K57.92       Isolation/Infection:   Isolation            No Isolation          Patient Infection Status       Infection Onset Added Last Indicated Last Indicated By Review Planned Expiration Resolved Resolved By    None active    Resolved    COVID-19 Rule Out 11/06/21 11/06/21 11/06/21 COVID-19, Rapid (Ordered)   11/06/21 Rule-Out Test Resulted    COVID-19 Rule Out 11/11/20 11/11/20 11/12/20 COVID-19 (Ordered)   11/12/20 Rule-Out Test Resulted    COVID-19 Rule Out 11/05/20 11/05/20 11/06/20 COVID-19 (Ordered)   11/06/20 Rule-Out Test Resulted    COVID-19 Rule Out 09/28/20 09/28/20 09/28/20 COVID-19 (Ordered)   09/29/20 Rule-Out Test Resulted    COVID-19 Rule Out 08/08/20 08/08/20 08/08/20 COVID-19 (Ordered)   08/08/20 Rule-Out Test Resulted            Nurse Assessment:  Last Vital Signs: BP (!) 150/65   Pulse 80   Temp 98.2 °F (36.8 °C) (Oral)   Resp 20   Ht 5' 5\" (1.651 m)   Wt 161 lb 14.4 oz (73.4 kg)   SpO2 98%   BMI 26.94 kg/m²     Last documented pain score (0-10 scale): Pain Level: 9  Last Weight:   Wt Readings from Last 1 Encounters:   11/07/21 161 lb 14.4 oz (73.4 kg)     Mental Status:  oriented, alert, and coherent    IV Access:  - None    Nursing Mobility/ADLs:  Walking   Dependent  Transfer  Dependent  Bathing  Dependent  Dressing Dependent  Toileting  Dependent  Feeding  Assisted  Med Admin  Assisted  Med Delivery   crushed and prefers mixed with applesauce    Wound Care Documentation and Therapy:  Wound 11/06/20 Hip Left; Outer (Active)   Wound Etiology Skin Tear 11/08/21 0331   Dressing/Treatment Open to air 11/08/21 0331   Number of days: 366        Elimination:  Continence: Bowel: Yes and sometimes incontinent  Bladder: Yes  Urinary Catheter: None   Colostomy/Ileostomy/Ileal Conduit: No       Date of Last BM: 11/8/2021    Intake/Output Summary (Last 24 hours) at 11/8/2021 1207  Last data filed at 11/7/2021 1420  Gross per 24 hour   Intake 500.52 ml   Output    Net 500.52 ml     I/O last 3 completed shifts: In: 500.5 [I.V.:500.5]  Out: -     Safety Concerns: At Risk for Falls    Impairments/Disabilities:      None    Nutrition Therapy:  Current Nutrition Therapy:   Oral diet;  Low Carb, Low sodium diet    Routes of Feeding: Oral  Liquids: No Restrictions  Daily Fluid Restriction: no  Last Modified Barium Swallow with Video (Video Swallowing Test): not done    Treatments at the Time of Hospital Discharge:   Respiratory Treatments: none  Oxygen Therapy:  1L O2   Ventilator:    CPAP at night    Rehab Therapies: Physical Therapy and Occupational Therapy  Weight Bearing Status/Restrictions: No weight bearing restirctions  Other Medical Equipment (for information only, NOT a DME order):  hospital bed and bed pan  Other Treatments: none    Patient's personal belongings (please select all that are sent with patient):  Erick Duke RN SIGNATURE:  Electronically signed by Main Hodges RN on 11/8/21 at 1:04 PM EST    CASE MANAGEMENT/SOCIAL WORK SECTION    Inpatient Status Date: 11/6/21    Readmission Risk Assessment Score:  Readmission Risk              Risk of Unplanned Readmission:  33           Discharging to Facility/ Agency   Name: [facility]  Address: [address] [place] [address]

## 2021-11-09 NOTE — DISCHARGE SUMMARY
Hospital Medicine Discharge Summary      Patient Identification:   Jessi Charles   : 1940  MRN: [id number]   Account: [de-identified]      Patient's PCP: Thang Green DO    Admit Date: 2021     Discharge Date: 2021      Admitting Physician: Pedro Espinal MD     Discharge Physician: Pedro Espinal MD     Discharge Diagnoses: Active Hospital Problems    Diagnosis Date Noted    Acute diverticulitis [K57.92] 2021       The patient was seen and examined on day of discharge and this discharge summary is in conjunction with any daily progress note from day of discharge. Hospital Course:   Jessi Charles is a 78 y.o. female admitted to [address] [place] [address] on 2021 for management of acute diverticulitis. Pt responded well to medical management, remained clinically stable and was discharged in stable conditions. With RN in room, patient was updated about the treatment plan, all the questions and concerns were addressed. Alarming signs and symptoms to return to ED were explained in length. .          Assessment/Plan:    1. Acute uncomplicated Sigmoid diverticulitis: CT abdomen showed acute diverticulitis of the sigmoid colon with no associated abscess or perforation; patient was afebrile and did not endorse chills  AVSS. No abdo pain; start CL and advance gradually as tolerated; stepped down to PO ciprofloxacin/metronidazole from IV  Will d/c IVF if tolerated PO   : AVSS. Tolerated advanced full diet. Cirpo/flagyl x10 days.     2. Left sided unprovoked DVT with bilateral small pulmonary emboli: Incidental findings on the CT abdomen also showed bilateral lower lobe pulmonary emboli with a deep venous thrombosis in the left common femoral vein; per patient patient has had a previous history of DVT's and PE's in the past before  -CTA ordered and showed small segment of branch emboli in the right lower lobe and 2-3 left lower lobe segmental branch emboli with a small clot in the distal left pulmonary artery in the left upper lobe branch with overall low clot burden and no evidence of right heart strain  -Started on LMWH w/ plan to step down to NOAC after discussion of R/B/C/A of AC and various OAC options  -Bilateral venous doppler ultrasound completed pending results; pt already on therapeutic dose of LMWH; CTA A/P negative for any mass or AV anomaly     11/8: placed on NOAC. PCP to monitor clot burden; pt will stay on NOAC permanently given HX fo A Fib w/ high CHADSVASC2 score. 3. Elevated troponinX2 likely secondary to supply demand ischemia:      -Troponins downtrended; denies CP. Monitor     4. Atrial fibrillation with no RVR s/p junctional pacemaker placement: CHADVASC2 score of 6 and HAS-BLED score of 5; patient is not currently on any form of anticoagulation at this time but has been on anticoagulation in the past; patient follows with Dr. Katelyn Ramirez  Well-controlled on BB. On LMWH which was switched to NOAC     5. Essential hypertension:   On home clonidine, losartan and spironolactone; BP still suboptimally controlled despite no pain; added Isosorbide nitrate given CAD hx; will reassess response and further modify regimen as needed      11/8: much improved w/ added isosorbide. CCM at discharge. Pt and PCP need to monitor BP with antihypertensive regimen adjustment as needed      6. Rheumatoid arthritis: No flare-ups at this time  -Holding azathioprine and prednisone which were resumed at discharge  -Continue hydroxychloroquine     7. Insulin dependent type 2 diabetes mellitus: well-controlled w/ A1C from Sep 2021 at 6.6%. on MDI at home which held on admission d/t NPO status; and placed on SSI only; BS well-controlled; will monitor BS as diet being advanced and resumed home MDI regimen accordingly  --Hypoglycemia protocol in place    11/8:home regimen resumed at discharge.  Pt and PCP to monitor BS and adjust regimen accordingly.     8. CKD Stage III: stable at baseline; repeat BMP in 3 days   9. Coronary artery disease: the patient follows with Dr. Merline Seitz and previously had an MI  -Continue OMT; added nitrate as discussed above     10. History of COPD: follows with  pulmonology  -Last spirometry done in February 2021  -Continue standing home inhalers     11. Combined CHF: The patient's last echocardiogram showed ejection fraction of 45-55%   -euvolemic; home diuretic regimen resumed at discharge w/ routine PCP/cardio F/U    12. History of CVA: The patient was informed by Dr. Merline Seitz that she had a CVA after having her MI  -Noted and stable     13. Chronic macrocytic anemia: Hb stable. anemia W/U c/w Iron deficiency anemia; will start on iron replacement at discharge; PCP to consider GI referral for screening C-scope +/- EGD as warranted OP     14. Hyperlipidemia  -Continue atorvastatin  -Continue fenofibrate     15. Neuropathy likely secondary to type 2 diabetes:   -Continue gabapentin  -Continue duloxetine     16. GERD  -Continue pantoprazole     17. Chronic insomnia  -Continue trazodone        Exam:     Vitals:  Vitals:    11/07/21 2334 11/08/21 0331 11/08/21 0749 11/08/21 0930   BP: (!) 114/57 119/61  (!) 150/65   Pulse: 69 64  80   Resp: 16 16  20   Temp: 98.6 °F (37 °C) 98.6 °F (37 °C)  98.2 °F (36.8 °C)   TempSrc: Oral Oral  Oral   SpO2: 95% 95% 98%    Weight:       Height:         Weight: Weight: 161 lb 14.4 oz (73.4 kg)     24 hour intake/output:No intake or output data in the 24 hours ending 11/08/21 2156        General appearance: A&O x3, Not ill or toxic, in no apparent distress  HEENT:  VERONA  EOM intact. Neck: Supple, with full range of motion. No jugular venous distention. Trachea midline. Respiratory:   NL A/E bilat with no adventitious sounds   Cardiovascular:  normal S1/S2 with no murmurs/gallops  Abdomen: Soft, non-tender, non-distended, no rigidity or peritoneal signs  Musculoskeletal: NL symmetrical A/PROM bilat U/L extremities   Skin: No rashes.  No voice recognition software. It may contain minor errors which are inherent in voice recognition technology. **      Final report electronically signed by Dr. Noe Ritter on 11/6/2021 6:25 PM      CT ABDOMEN PELVIS W IV CONTRAST Additional Contrast? None   Final Result       1. Acute diverticulitis of the sigmoid colon. There is no associated abscess or perforation. Underlying tumor is not excluded. 2. Bilateral lower lobe pulmonary emboli. 3. Deep venous thrombosis in the left common femoral vein and femoral vein. These findings were telephoned to Myke Whitfield CNP in the ED at 11:09 AM on 11/6/2021 . **This report has been created using voice recognition software. It may contain minor errors which are inherent in voice recognition technology. **      Final report electronically signed by Dr. Dayana Bran on 11/6/2021 11:12 AM      XR CHEST PORTABLE   Final Result   Pulmonary opacity in the left lung base laterally. This could represent pleural fluid versus a pulmonary opacity. **This report has been created using voice recognition software. It may contain minor errors which are inherent in voice recognition technology. **      Final report electronically signed by Dr. Dayana Bran on 11/6/2021 10:43 AM             Consults:     [address] [facility]  IP CONSULT TO SOCIAL WORK    Disposition:    [x] Home       [] TCU       [] Rehab       [] Psych       [] SNF       [] [place]       [] Other-    Condition at Discharge: Stable    Code Status:  Full code     Patient Instructions:    Discharge lab work: CBC, BMP in 3 days  Activity: activity as tolerated  Diet: carb control Full diet      Follow-up visits:   Cheri Patient, DO  [address]  [phone]    Schedule an appointment as soon as possible for a visit in 1 week  [address] [facility], [address] [place] [address] [facility] on 11/22/2021  For [address]; @ 2PM         Discharge Medications:     @DISCHMEDS(<NOROUTINE> error)@    Time Spent on discharge is more than 45 minutes in the examination, evaluation, counseling and review of medications and discharge plan. With RN in room, patient was updated about the treatment plan, all the questions and concerns were addressed. Alarming signs and symptoms to return to ED were explained in length. Signed: Thank you Rigo Yeboah DO for the opportunity to be involved in this patient's care.     Electronically signed by Renetta Gonzalez MD on 11/8/2021 at 9:56 PM

## 2021-11-09 NOTE — DISCHARGE SUMMARY
Hospital Medicine Discharge Summary      Patient Identification:   Krissy Bush   : 1940  MRN: [id number]   Account: [de-identified]      Patient's PCP: Frank Jeong DO    Admit Date: 2021     Discharge Date: 2021      Admitting Physician: Asaf Vargas MD     Discharge Physician: Ardis Bernheim, DO     Discharge Diagnoses:  Acute uncomplicated Sigmoid diverticulitis-improved  BIlateral acute unprovoked proximal DVT of femoral/popliteal/peroneal/an-post tibial veins  Bilateral small pulmonary emboli without right side heart strain  Atrial fibrillation with no RVR s/p junctional pacemaker placement  CHF HFrEF  Primary HTN  CKDIII  Rheumatoid arthritis  IDDM type 2, complicated with neuropathy  CAD  GERD  Hx of COPD  Insomnia    The patient was seen and examined on day of discharge and this discharge summary is in conjunction with any daily progress note from day of discharge. Hospital Course:   Krissy Bush is a 78 y.o. female admitted to [facility] on 2021 for RLQ pain 3/10, and fatigue. Her PMHx is known for a-fib not on anticoagulants, rheumatoid arthritis, CKD stage III, CHF with last echo 45-55%, type 2 diabetes mellitus, and hyperlipidemia. In the ED, the patient's vitals were significant for temperature 97.2, respiratory rate 20, pulse 59, blood pressure 125/66, and SpO2 96%. The patient's labs were significant for hemoglobin 9.7, , iron 49, GFR 47, glucose 121, troponin 0.032 and albumin 3.1. The patient had a CT abdomen done that showed acute diverticulitis of the sigmoid colon with no associated abscess or perforation with bilateral lower lobe pulmonary emboli and deep venous thrombosis in the left common femoral vein. The patient was given a fluid bolus and zofran, and decision was made to admit based on the patient's CT findings and PE/DVT.   During the hospital course, patient strted receiving antibiotics for diverticulitis IV ciprofloxacin/metronidazole, then as patient became able to take oral, antibiotics switched to oral version. On November 7, suspected DVT confirmed by VL DOP lower exts. That was positive for acute DVT of left femoral vein, poplitieal vein, peroneal veins anterior/posterior veins. Patient treated with Lovenox and switched to oral NOAC. Given patient's long standing a-fib, recommended to take oral NOAC indefinitely. The patient is being discharged, stable. All discharge instructions given. She is informed and educated about healthy diet. Also recommended to follow up with PCP after discharge. Exam:     Vitals:  Vitals:    11/07/21 2334 11/08/21 0331 11/08/21 0749 11/08/21 0930   BP: (!) 114/57 119/61  (!) 150/65   Pulse: 69 64  80   Resp: 16 16  20   Temp: 98.6 °F (37 °C) 98.6 °F (37 °C)  98.2 °F (36.8 °C)   TempSrc: Oral Oral  Oral   SpO2: 95% 95% 98%    Weight:       Height:         Weight: Weight: 161 lb 14.4 oz (73.4 kg)     24 hour intake/output:No intake or output data in the 24 hours ending 11/08/21 2044      General appearance:  No apparent distress, appears stated age and cooperative. HEENT:  Normal cephalic, atraumatic without obvious deformity. Pupils equal, round, and reactive to light. Extra ocular muscles intact. Conjunctivae/corneas clear. Neck: Supple, with full range of motion. No jugular venous distention. Trachea midline. Respiratory:  Normal respiratory effort. Clear to auscultation, bilaterally without Rales/Wheezes/Rhonchi. Cardiovascular:  Regular rate and rhythm with normal S1/S2 without murmurs, rubs or gallops. Abdomen: Soft, with normal bowel sounds. Slightly tender on deep palpation, no rebound pain. No CVA tenderness noted. Musculoskeletal:  No clubbing, cyanosis or edema bilaterally. Full range of motion without deformity. Skin: Skin color, texture, turgor normal.  No rashes or lesions. Neurologic:  Neurovascularly intact without any focal sensory/motor deficits.  Cranial nerves: II-XII intact, grossly non-focal.  Psychiatric:  Alert and oriented, thought content appropriate, normal insight  Capillary Refill: Brisk,< 3 seconds   Peripheral Pulses: +2 palpable, equal bilaterally       Labs: For convenience and continuity at follow-up the following most recent labs are provided:      CBC:    Lab Results   Component Value Date    WBC 5.2 11/08/2021    HGB 8.8 11/08/2021    HCT 29.4 11/08/2021     11/08/2021       Renal:    Lab Results   Component Value Date     11/08/2021    K 4.1 11/08/2021    K 3.6 11/06/2020     11/08/2021    CO2 22 11/08/2021    BUN 12 11/08/2021    CREATININE 1.0 11/08/2021    CALCIUM 7.9 11/08/2021    PHOS 2.6 09/27/2012         Significant Diagnostic Studies    Radiology:   VL DUP LOWER EXTREMITY VENOUS BILATERAL   Final Result      1. Acute appearing deep venous thrombosis in the left femoral vein, popliteal vein, peroneal veins, anterior tibial veins and posterior tibial veins. 2. Acute appearing deep venous thrombosis in the right peroneal veins, anterior tibial veins and posterior tibial veins. Results called to Axel Baumann RN on 11/7/2021 at 10:35 AM by the ultrasound technologist      **This report has been created using voice recognition software. It may contain minor errors which are inherent in voice recognition technology. **      Final report electronically signed by Dr. Isabel Colorado on 11/7/2021 11:03 AM      CTA CHEST W WO CONTRAST   Final Result   Small segment of branch emboli in the right lower lobe 2-3 left lower lobe segmental branch emboli small clot in the distal left pulmonary artery in the left upper lobe branch. Overall clot burden is low. There is no evidence of right heart strain. This was called to Dr. Bakari Mcgraw at [phone]. Mild haziness in the lungs may represent areas of air trapping or possibly acute pneumonitis. **This report has been created using voice recognition software.   It may contain minor errors which are inherent in voice recognition technology. **      Final report electronically signed by Dr. Tristan Ball on 11/6/2021 6:25 PM      CT ABDOMEN PELVIS W IV CONTRAST Additional Contrast? None   Final Result       1. Acute diverticulitis of the sigmoid colon. There is no associated abscess or perforation. Underlying tumor is not excluded. 2. Bilateral lower lobe pulmonary emboli. 3. Deep venous thrombosis in the left common femoral vein and femoral vein. These findings were telephoned to Silvino Goel CNP in the ED at 11:09 AM on 11/6/2021 . **This report has been created using voice recognition software. It may contain minor errors which are inherent in voice recognition technology. **      Final report electronically signed by Dr. Isabel Colorado on 11/6/2021 11:12 AM      XR CHEST PORTABLE   Final Result   Pulmonary opacity in the left lung base laterally. This could represent pleural fluid versus a pulmonary opacity. **This report has been created using voice recognition software. It may contain minor errors which are inherent in voice recognition technology. **      Final report electronically signed by Dr. Isabel Colorado on 11/6/2021 10:43 AM             Consults:     IP CONSULT TO SPIRITUAL SERVICES  PALLIATIVE CARE EVAL  IP CONSULT TO SOCIAL WORK    Disposition: SNF  Condition at Discharge: Stable    Code Status:  Limited     Patient Instructions:    Discharge lab work: Activity: activity as tolerated  Diet: ADULT DIET;  Regular  Low carb diet    Follow-up visits:   Tk Kim DO  [address]  [phone]    Schedule an appointment as soon as possible for a visit in 1 week  [address], [place]  [place]  [address] [place] [address]    Go on 11/22/2021  For [address] [place]; @ 2PM         Discharge Medications:     @DISCHMEDS(<NOROUTINE> error)@    Time Spent on discharge is more than 30 minutes in the examination, evaluation, counseling and review of medications and discharge plan. Signed: Thank you Rigo Yeboah DO for the opportunity to be involved in this patient's care.     Electronically signed by Blayne Ruiz DO on 11/8/2021 at 8:44 PM

## 2021-11-18 ENCOUNTER — HOSPITAL ENCOUNTER (INPATIENT)
Age: 81
LOS: 3 days | Discharge: NURSING, REHAB OR LONG-TERM CARE | DRG: 377 | End: 2021-11-23
Attending: EMERGENCY MEDICINE | Admitting: FAMILY MEDICINE
Payer: MEDICARE

## 2021-11-18 DIAGNOSIS — D64.9 ANEMIA, UNSPECIFIED TYPE: ICD-10-CM

## 2021-11-18 DIAGNOSIS — R19.5 POSITIVE FECAL OCCULT BLOOD TEST: Primary | ICD-10-CM

## 2021-11-18 DIAGNOSIS — N17.9 AKI (ACUTE KIDNEY INJURY) (HCC): ICD-10-CM

## 2021-11-18 PROBLEM — K62.5 RECTAL BLEEDING: Status: ACTIVE | Noted: 2021-11-18

## 2021-11-18 LAB
ABO: NORMAL
ALBUMIN SERPL-MCNC: 3 G/DL (ref 3.5–5.1)
ALP BLD-CCNC: 42 U/L (ref 38–126)
ALT SERPL-CCNC: 39 U/L (ref 11–66)
ANION GAP SERPL CALCULATED.3IONS-SCNC: 9 MEQ/L (ref 8–16)
ANISOCYTOSIS: PRESENT
ANTIBODY SCREEN: NORMAL
APTT: 33.5 SECONDS (ref 22–38)
AST SERPL-CCNC: 58 U/L (ref 5–40)
BASOPHILS # BLD: 0.2 %
BASOPHILS ABSOLUTE: 0 THOU/MM3 (ref 0–0.1)
BILIRUB SERPL-MCNC: 0.3 MG/DL (ref 0.3–1.2)
BUN BLDV-MCNC: 63 MG/DL (ref 7–22)
CALCIUM SERPL-MCNC: 10.6 MG/DL (ref 8.5–10.5)
CHLORIDE BLD-SCNC: 98 MEQ/L (ref 98–111)
CO2: 29 MEQ/L (ref 23–33)
CREAT SERPL-MCNC: 1.5 MG/DL (ref 0.4–1.2)
EOSINOPHIL # BLD: 0.6 %
EOSINOPHILS ABSOLUTE: 0 THOU/MM3 (ref 0–0.4)
ERYTHROCYTE [DISTWIDTH] IN BLOOD BY AUTOMATED COUNT: 17.6 % (ref 11.5–14.5)
ERYTHROCYTE [DISTWIDTH] IN BLOOD BY AUTOMATED COUNT: 67.9 FL (ref 35–45)
GFR SERPL CREATININE-BSD FRML MDRD: 33 ML/MIN/1.73M2
GLUCOSE BLD-MCNC: 117 MG/DL (ref 70–108)
HCT VFR BLD CALC: 26.4 % (ref 37–47)
HEMOCCULT STL QL: POSITIVE
HEMOGLOBIN: 8.1 GM/DL (ref 12–16)
IMMATURE GRANS (ABS): 0.02 THOU/MM3 (ref 0–0.07)
IMMATURE GRANULOCYTES: 0.4 %
INR BLD: 1.8 (ref 0.85–1.13)
LYMPHOCYTES # BLD: 9.2 %
LYMPHOCYTES ABSOLUTE: 0.4 THOU/MM3 (ref 1–4.8)
MCH RBC QN AUTO: 31.8 PG (ref 26–33)
MCHC RBC AUTO-ENTMCNC: 30.7 GM/DL (ref 32.2–35.5)
MCV RBC AUTO: 103.5 FL (ref 81–99)
MONOCYTES # BLD: 9.9 %
MONOCYTES ABSOLUTE: 0.5 THOU/MM3 (ref 0.4–1.3)
NUCLEATED RED BLOOD CELLS: 0 /100 WBC
OSMOLALITY CALCULATION: 291 MOSMOL/KG (ref 275–300)
PLATELET # BLD: 364 THOU/MM3 (ref 130–400)
PLATELET ESTIMATE: ADEQUATE
PMV BLD AUTO: 9.6 FL (ref 9.4–12.4)
POTASSIUM SERPL-SCNC: 5.3 MEQ/L (ref 3.5–5.2)
RBC # BLD: 2.55 MILL/MM3 (ref 4.2–5.4)
RH FACTOR: NORMAL
SARS-COV-2, NAAT: NOT  DETECTED
SCAN OF BLOOD SMEAR: NORMAL
SEG NEUTROPHILS: 79.7 %
SEGMENTED NEUTROPHILS ABSOLUTE COUNT: 3.8 THOU/MM3 (ref 1.8–7.7)
SODIUM BLD-SCNC: 136 MEQ/L (ref 135–145)
TOTAL PROTEIN: 6 G/DL (ref 6.1–8)
WBC # BLD: 4.8 THOU/MM3 (ref 4.8–10.8)

## 2021-11-18 PROCEDURE — 85610 PROTHROMBIN TIME: CPT

## 2021-11-18 PROCEDURE — G0378 HOSPITAL OBSERVATION PER HR: HCPCS

## 2021-11-18 PROCEDURE — P9016 RBC LEUKOCYTES REDUCED: HCPCS

## 2021-11-18 PROCEDURE — 6360000002 HC RX W HCPCS: Performed by: STUDENT IN AN ORGANIZED HEALTH CARE EDUCATION/TRAINING PROGRAM

## 2021-11-18 PROCEDURE — 99223 1ST HOSP IP/OBS HIGH 75: CPT | Performed by: FAMILY MEDICINE

## 2021-11-18 PROCEDURE — 86900 BLOOD TYPING SEROLOGIC ABO: CPT

## 2021-11-18 PROCEDURE — 85730 THROMBOPLASTIN TIME PARTIAL: CPT

## 2021-11-18 PROCEDURE — 93005 ELECTROCARDIOGRAM TRACING: CPT | Performed by: STUDENT IN AN ORGANIZED HEALTH CARE EDUCATION/TRAINING PROGRAM

## 2021-11-18 PROCEDURE — 87635 SARS-COV-2 COVID-19 AMP PRB: CPT

## 2021-11-18 PROCEDURE — 86850 RBC ANTIBODY SCREEN: CPT

## 2021-11-18 PROCEDURE — 80053 COMPREHEN METABOLIC PANEL: CPT

## 2021-11-18 PROCEDURE — 86923 COMPATIBILITY TEST ELECTRIC: CPT

## 2021-11-18 PROCEDURE — 99285 EMERGENCY DEPT VISIT HI MDM: CPT

## 2021-11-18 PROCEDURE — 85025 COMPLETE CBC W/AUTO DIFF WBC: CPT

## 2021-11-18 PROCEDURE — 86901 BLOOD TYPING SEROLOGIC RH(D): CPT

## 2021-11-18 PROCEDURE — 82272 OCCULT BLD FECES 1-3 TESTS: CPT

## 2021-11-18 PROCEDURE — 2580000003 HC RX 258: Performed by: STUDENT IN AN ORGANIZED HEALTH CARE EDUCATION/TRAINING PROGRAM

## 2021-11-18 PROCEDURE — 36415 COLL VENOUS BLD VENIPUNCTURE: CPT

## 2021-11-18 PROCEDURE — C9113 INJ PANTOPRAZOLE SODIUM, VIA: HCPCS | Performed by: STUDENT IN AN ORGANIZED HEALTH CARE EDUCATION/TRAINING PROGRAM

## 2021-11-18 RX ORDER — PANTOPRAZOLE SODIUM 40 MG/10ML
40 INJECTION, POWDER, LYOPHILIZED, FOR SOLUTION INTRAVENOUS ONCE
Status: DISCONTINUED | OUTPATIENT
Start: 2021-11-18 | End: 2021-11-18

## 2021-11-18 RX ORDER — 0.9 % SODIUM CHLORIDE 0.9 %
500 INTRAVENOUS SOLUTION INTRAVENOUS ONCE
Status: COMPLETED | OUTPATIENT
Start: 2021-11-18 | End: 2021-11-19

## 2021-11-18 RX ORDER — PANTOPRAZOLE SODIUM 40 MG/10ML
80 INJECTION, POWDER, LYOPHILIZED, FOR SOLUTION INTRAVENOUS ONCE
Status: COMPLETED | OUTPATIENT
Start: 2021-11-18 | End: 2021-11-18

## 2021-11-18 RX ADMIN — PANTOPRAZOLE SODIUM 80 MG: 40 INJECTION, POWDER, FOR SOLUTION INTRAVENOUS at 23:10

## 2021-11-18 RX ADMIN — SODIUM CHLORIDE 500 ML: 9 INJECTION, SOLUTION INTRAVENOUS at 23:04

## 2021-11-18 ASSESSMENT — ENCOUNTER SYMPTOMS
VOMITING: 0
DIARRHEA: 0
ABDOMINAL PAIN: 0
TROUBLE SWALLOWING: 0
BACK PAIN: 0
SORE THROAT: 0
BLOOD IN STOOL: 0
NAUSEA: 0
SHORTNESS OF BREATH: 0
COUGH: 0

## 2021-11-19 ENCOUNTER — APPOINTMENT (OUTPATIENT)
Dept: CT IMAGING | Age: 81
DRG: 377 | End: 2021-11-19
Payer: MEDICARE

## 2021-11-19 LAB
ANION GAP SERPL CALCULATED.3IONS-SCNC: 11 MEQ/L (ref 8–16)
BUN BLDV-MCNC: 42 MG/DL (ref 7–22)
CALCIUM SERPL-MCNC: 9.9 MG/DL (ref 8.5–10.5)
CHLORIDE BLD-SCNC: 100 MEQ/L (ref 98–111)
CO2: 24 MEQ/L (ref 23–33)
CREAT SERPL-MCNC: 0.9 MG/DL (ref 0.4–1.2)
EKG Q-T INTERVAL: 518 MS
EKG QRS DURATION: 138 MS
EKG QTC CALCULATION (BAZETT): 491 MS
EKG R AXIS: -3 DEGREES
EKG T AXIS: 51 DEGREES
EKG VENTRICULAR RATE: 54 BPM
FOLATE: 9 NG/ML (ref 4.8–24.2)
GFR SERPL CREATININE-BSD FRML MDRD: 60 ML/MIN/1.73M2
GLUCOSE BLD-MCNC: 102 MG/DL (ref 70–108)
GLUCOSE BLD-MCNC: 102 MG/DL (ref 70–108)
GLUCOSE BLD-MCNC: 106 MG/DL (ref 70–108)
GLUCOSE BLD-MCNC: 93 MG/DL (ref 70–108)
HCT VFR BLD CALC: 24.8 % (ref 37–47)
HCT VFR BLD CALC: 25.6 % (ref 37–47)
HCT VFR BLD CALC: 25.6 % (ref 37–47)
HEMOGLOBIN: 7.5 GM/DL (ref 12–16)
HEMOGLOBIN: 8 GM/DL (ref 12–16)
HEMOGLOBIN: 8.4 GM/DL (ref 12–16)
POTASSIUM SERPL-SCNC: 5.5 MEQ/L (ref 3.5–5.2)
PREALBUMIN: 22.3 MG/DL (ref 20–40)
SODIUM BLD-SCNC: 135 MEQ/L (ref 135–145)
VITAMIN B-12: 486 PG/ML (ref 211–911)

## 2021-11-19 PROCEDURE — 2580000003 HC RX 258: Performed by: FAMILY MEDICINE

## 2021-11-19 PROCEDURE — 6360000004 HC RX CONTRAST MEDICATION: Performed by: PHYSICIAN ASSISTANT

## 2021-11-19 PROCEDURE — 82948 REAGENT STRIP/BLOOD GLUCOSE: CPT

## 2021-11-19 PROCEDURE — 74177 CT ABD & PELVIS W/CONTRAST: CPT

## 2021-11-19 PROCEDURE — G0378 HOSPITAL OBSERVATION PER HR: HCPCS

## 2021-11-19 PROCEDURE — 6370000000 HC RX 637 (ALT 250 FOR IP): Performed by: FAMILY MEDICINE

## 2021-11-19 PROCEDURE — 96365 THER/PROPH/DIAG IV INF INIT: CPT

## 2021-11-19 PROCEDURE — 84134 ASSAY OF PREALBUMIN: CPT

## 2021-11-19 PROCEDURE — 96366 THER/PROPH/DIAG IV INF ADDON: CPT

## 2021-11-19 PROCEDURE — 82746 ASSAY OF FOLIC ACID SERUM: CPT

## 2021-11-19 PROCEDURE — 36415 COLL VENOUS BLD VENIPUNCTURE: CPT

## 2021-11-19 PROCEDURE — 96375 TX/PRO/DX INJ NEW DRUG ADDON: CPT

## 2021-11-19 PROCEDURE — 85018 HEMOGLOBIN: CPT

## 2021-11-19 PROCEDURE — 96376 TX/PRO/DX INJ SAME DRUG ADON: CPT

## 2021-11-19 PROCEDURE — 82607 VITAMIN B-12: CPT

## 2021-11-19 PROCEDURE — 6360000002 HC RX W HCPCS: Performed by: FAMILY MEDICINE

## 2021-11-19 PROCEDURE — 99232 SBSQ HOSP IP/OBS MODERATE 35: CPT | Performed by: PHYSICIAN ASSISTANT

## 2021-11-19 PROCEDURE — 80048 BASIC METABOLIC PNL TOTAL CA: CPT

## 2021-11-19 PROCEDURE — 93010 ELECTROCARDIOGRAM REPORT: CPT | Performed by: NUCLEAR MEDICINE

## 2021-11-19 PROCEDURE — C9113 INJ PANTOPRAZOLE SODIUM, VIA: HCPCS | Performed by: FAMILY MEDICINE

## 2021-11-19 PROCEDURE — 85014 HEMATOCRIT: CPT

## 2021-11-19 RX ORDER — SODIUM CHLORIDE 0.9 % (FLUSH) 0.9 %
10 SYRINGE (ML) INJECTION PRN
Status: DISCONTINUED | OUTPATIENT
Start: 2021-11-19 | End: 2021-11-23 | Stop reason: HOSPADM

## 2021-11-19 RX ORDER — ISOSORBIDE MONONITRATE 60 MG/1
60 TABLET, EXTENDED RELEASE ORAL DAILY
Status: DISCONTINUED | OUTPATIENT
Start: 2021-11-19 | End: 2021-11-23 | Stop reason: HOSPADM

## 2021-11-19 RX ORDER — CLONIDINE HYDROCHLORIDE 0.1 MG/1
0.1 TABLET ORAL 2 TIMES DAILY
Status: DISCONTINUED | OUTPATIENT
Start: 2021-11-19 | End: 2021-11-23 | Stop reason: HOSPADM

## 2021-11-19 RX ORDER — SODIUM CHLORIDE 9 MG/ML
25 INJECTION, SOLUTION INTRAVENOUS PRN
Status: DISCONTINUED | OUTPATIENT
Start: 2021-11-19 | End: 2021-11-23 | Stop reason: HOSPADM

## 2021-11-19 RX ORDER — GABAPENTIN 100 MG/1
100 CAPSULE ORAL 3 TIMES DAILY
Status: DISCONTINUED | OUTPATIENT
Start: 2021-11-19 | End: 2021-11-23 | Stop reason: HOSPADM

## 2021-11-19 RX ORDER — ATORVASTATIN CALCIUM 80 MG/1
80 TABLET, FILM COATED ORAL DAILY
Status: DISCONTINUED | OUTPATIENT
Start: 2021-11-19 | End: 2021-11-23 | Stop reason: HOSPADM

## 2021-11-19 RX ORDER — BUDESONIDE AND FORMOTEROL FUMARATE DIHYDRATE 160; 4.5 UG/1; UG/1
2 AEROSOL RESPIRATORY (INHALATION) 2 TIMES DAILY
Status: DISCONTINUED | OUTPATIENT
Start: 2021-11-19 | End: 2021-11-23 | Stop reason: HOSPADM

## 2021-11-19 RX ORDER — SODIUM CHLORIDE 9 MG/ML
INJECTION, SOLUTION INTRAVENOUS CONTINUOUS
Status: DISCONTINUED | OUTPATIENT
Start: 2021-11-19 | End: 2021-11-19

## 2021-11-19 RX ORDER — ACETAMINOPHEN 650 MG/1
650 SUPPOSITORY RECTAL EVERY 6 HOURS PRN
Status: DISCONTINUED | OUTPATIENT
Start: 2021-11-19 | End: 2021-11-23 | Stop reason: HOSPADM

## 2021-11-19 RX ORDER — ACETAMINOPHEN 325 MG/1
650 TABLET ORAL EVERY 6 HOURS PRN
Status: DISCONTINUED | OUTPATIENT
Start: 2021-11-19 | End: 2021-11-23 | Stop reason: HOSPADM

## 2021-11-19 RX ORDER — AMOXICILLIN AND CLAVULANATE POTASSIUM 875; 125 MG/1; MG/1
1 TABLET, FILM COATED ORAL 2 TIMES DAILY
Status: ON HOLD | COMMUNITY
Start: 2021-11-09 | End: 2021-11-23 | Stop reason: HOSPADM

## 2021-11-19 RX ORDER — TRAZODONE HYDROCHLORIDE 100 MG/1
100 TABLET ORAL NIGHTLY
Status: DISCONTINUED | OUTPATIENT
Start: 2021-11-19 | End: 2021-11-23 | Stop reason: HOSPADM

## 2021-11-19 RX ORDER — PANTOPRAZOLE SODIUM 40 MG/10ML
40 INJECTION, POWDER, LYOPHILIZED, FOR SOLUTION INTRAVENOUS 2 TIMES DAILY
Status: DISCONTINUED | OUTPATIENT
Start: 2021-11-19 | End: 2021-11-22

## 2021-11-19 RX ORDER — DEXTROSE MONOHYDRATE 25 G/50ML
12.5 INJECTION, SOLUTION INTRAVENOUS PRN
Status: DISCONTINUED | OUTPATIENT
Start: 2021-11-19 | End: 2021-11-23 | Stop reason: HOSPADM

## 2021-11-19 RX ORDER — AZATHIOPRINE 50 MG/1
100 TABLET ORAL DAILY
Status: DISCONTINUED | OUTPATIENT
Start: 2021-11-19 | End: 2021-11-23 | Stop reason: HOSPADM

## 2021-11-19 RX ORDER — ASPIRIN 81 MG/1
162 TABLET ORAL DAILY
Status: DISCONTINUED | OUTPATIENT
Start: 2021-11-19 | End: 2021-11-23 | Stop reason: HOSPADM

## 2021-11-19 RX ORDER — PANTOPRAZOLE SODIUM 40 MG/1
40 TABLET, DELAYED RELEASE ORAL
Status: DISCONTINUED | OUTPATIENT
Start: 2021-11-19 | End: 2021-11-19

## 2021-11-19 RX ORDER — BUMETANIDE 0.5 MG/1
0.5 TABLET ORAL DAILY
Status: DISCONTINUED | OUTPATIENT
Start: 2021-11-19 | End: 2021-11-23 | Stop reason: HOSPADM

## 2021-11-19 RX ORDER — HYDROXYCHLOROQUINE SULFATE 200 MG/1
200 TABLET, FILM COATED ORAL DAILY
Status: DISCONTINUED | OUTPATIENT
Start: 2021-11-19 | End: 2021-11-23 | Stop reason: HOSPADM

## 2021-11-19 RX ORDER — METOPROLOL SUCCINATE 50 MG/1
50 TABLET, EXTENDED RELEASE ORAL DAILY
Status: DISCONTINUED | OUTPATIENT
Start: 2021-11-19 | End: 2021-11-23 | Stop reason: HOSPADM

## 2021-11-19 RX ORDER — SODIUM CHLORIDE 9 MG/ML
INJECTION, SOLUTION INTRAVENOUS CONTINUOUS
Status: DISCONTINUED | OUTPATIENT
Start: 2021-11-19 | End: 2021-11-23 | Stop reason: HOSPADM

## 2021-11-19 RX ORDER — SODIUM CHLORIDE 0.9 % (FLUSH) 0.9 %
10 SYRINGE (ML) INJECTION EVERY 12 HOURS SCHEDULED
Status: DISCONTINUED | OUTPATIENT
Start: 2021-11-19 | End: 2021-11-23 | Stop reason: HOSPADM

## 2021-11-19 RX ORDER — LOSARTAN POTASSIUM 50 MG/1
50 TABLET ORAL DAILY
Status: DISCONTINUED | OUTPATIENT
Start: 2021-11-19 | End: 2021-11-23 | Stop reason: HOSPADM

## 2021-11-19 RX ORDER — FENOFIBRATE 160 MG/1
160 TABLET ORAL
Status: DISCONTINUED | OUTPATIENT
Start: 2021-11-19 | End: 2021-11-23 | Stop reason: HOSPADM

## 2021-11-19 RX ORDER — ALBUTEROL SULFATE 90 UG/1
2 AEROSOL, METERED RESPIRATORY (INHALATION) EVERY 6 HOURS PRN
Status: DISCONTINUED | OUTPATIENT
Start: 2021-11-19 | End: 2021-11-23 | Stop reason: HOSPADM

## 2021-11-19 RX ORDER — RANOLAZINE 500 MG/1
500 TABLET, EXTENDED RELEASE ORAL 2 TIMES DAILY
Status: DISCONTINUED | OUTPATIENT
Start: 2021-11-19 | End: 2021-11-23 | Stop reason: HOSPADM

## 2021-11-19 RX ORDER — DULOXETIN HYDROCHLORIDE 30 MG/1
30 CAPSULE, DELAYED RELEASE ORAL 2 TIMES DAILY
Status: DISCONTINUED | OUTPATIENT
Start: 2021-11-19 | End: 2021-11-23 | Stop reason: HOSPADM

## 2021-11-19 RX ORDER — DEXTROSE MONOHYDRATE 50 MG/ML
100 INJECTION, SOLUTION INTRAVENOUS PRN
Status: DISCONTINUED | OUTPATIENT
Start: 2021-11-19 | End: 2021-11-23 | Stop reason: HOSPADM

## 2021-11-19 RX ORDER — MONTELUKAST SODIUM 5 MG/1
5 TABLET, CHEWABLE ORAL NIGHTLY
Status: DISCONTINUED | OUTPATIENT
Start: 2021-11-19 | End: 2021-11-23 | Stop reason: HOSPADM

## 2021-11-19 RX ORDER — SPIRONOLACTONE 25 MG/1
50 TABLET ORAL DAILY
Status: DISCONTINUED | OUTPATIENT
Start: 2021-11-19 | End: 2021-11-23 | Stop reason: HOSPADM

## 2021-11-19 RX ORDER — NICOTINE POLACRILEX 4 MG
15 LOZENGE BUCCAL PRN
Status: DISCONTINUED | OUTPATIENT
Start: 2021-11-19 | End: 2021-11-23 | Stop reason: HOSPADM

## 2021-11-19 RX ORDER — PREDNISONE 1 MG/1
5 TABLET ORAL DAILY
Status: DISCONTINUED | OUTPATIENT
Start: 2021-11-19 | End: 2021-11-23 | Stop reason: HOSPADM

## 2021-11-19 RX ADMIN — TRAZODONE HYDROCHLORIDE 100 MG: 100 TABLET ORAL at 22:26

## 2021-11-19 RX ADMIN — HYDROXYCHLOROQUINE SULFATE 200 MG: 200 TABLET ORAL at 14:15

## 2021-11-19 RX ADMIN — GABAPENTIN 100 MG: 100 CAPSULE ORAL at 14:16

## 2021-11-19 RX ADMIN — PREDNISONE 5 MG: 5 TABLET ORAL at 14:16

## 2021-11-19 RX ADMIN — DULOXETINE HYDROCHLORIDE 30 MG: 30 CAPSULE, DELAYED RELEASE ORAL at 22:26

## 2021-11-19 RX ADMIN — GABAPENTIN 100 MG: 100 CAPSULE ORAL at 22:26

## 2021-11-19 RX ADMIN — PIPERACILLIN AND TAZOBACTAM 3375 MG: 3; .375 INJECTION, POWDER, LYOPHILIZED, FOR SOLUTION INTRAVENOUS at 14:25

## 2021-11-19 RX ADMIN — RANOLAZINE 500 MG: 500 TABLET, FILM COATED, EXTENDED RELEASE ORAL at 22:26

## 2021-11-19 RX ADMIN — PIPERACILLIN AND TAZOBACTAM 3375 MG: 3; .375 INJECTION, POWDER, LYOPHILIZED, FOR SOLUTION INTRAVENOUS at 22:39

## 2021-11-19 RX ADMIN — Medication 400 MG: at 22:26

## 2021-11-19 RX ADMIN — MONTELUKAST SODIUM 5 MG: 5 TABLET, CHEWABLE ORAL at 22:27

## 2021-11-19 RX ADMIN — ATORVASTATIN CALCIUM 80 MG: 80 TABLET, FILM COATED ORAL at 14:15

## 2021-11-19 RX ADMIN — DULOXETINE HYDROCHLORIDE 30 MG: 30 CAPSULE, DELAYED RELEASE ORAL at 14:15

## 2021-11-19 RX ADMIN — RANOLAZINE 500 MG: 500 TABLET, FILM COATED, EXTENDED RELEASE ORAL at 14:15

## 2021-11-19 RX ADMIN — AZATHIOPRINE 100 MG: 50 TABLET ORAL at 14:15

## 2021-11-19 RX ADMIN — IOPAMIDOL 80 ML: 755 INJECTION, SOLUTION INTRAVENOUS at 08:07

## 2021-11-19 RX ADMIN — CLONIDINE HYDROCHLORIDE 0.1 MG: 0.1 TABLET ORAL at 22:27

## 2021-11-19 RX ADMIN — Medication 400 MG: at 14:15

## 2021-11-19 RX ADMIN — SODIUM CHLORIDE: 9 INJECTION, SOLUTION INTRAVENOUS at 12:51

## 2021-11-19 RX ADMIN — PANTOPRAZOLE SODIUM 40 MG: 40 INJECTION, POWDER, FOR SOLUTION INTRAVENOUS at 22:25

## 2021-11-19 RX ADMIN — SODIUM CHLORIDE: 9 INJECTION, SOLUTION INTRAVENOUS at 05:41

## 2021-11-19 ASSESSMENT — PAIN SCALES - GENERAL
PAINLEVEL_OUTOF10: 0

## 2021-11-19 NOTE — ED PROVIDER NOTES

## 2021-11-19 NOTE — ED NOTES
Bed: 036A  Expected date: 11/18/21  Expected time: 8:42 PM  Means of arrival: [facility]  Comments:     Gen Mcclain  11/18/21 2052

## 2021-11-19 NOTE — CONSULTS
ring, irregular GE junction but no esophagitis. Over the wire dilation, #54 Fr.     -Last Colonoscopy 6/13/16 with Dr. Curt Ovalles with pediatric colonoscopy with findings of prominent left sided diverticulosis, small internal hemorrhoids, small diminutive polyp removed from splenic flexure found to be tubular adenomatous. No further screening screening colonoscopies 2/2 age. Past Medical History:    Past Medical History:   Diagnosis Date    Arthritis     Asthma 1978    [facility] [address]     Blood circulation, collateral     CAD (coronary artery disease)     CHF (congestive heart failure) ([facility]) 1999    Chronic kidney disease, stage III (moderate) ([facility])     COPD (chronic obstructive pulmonary disease) ([facility])     Depression     Diabetes mellitus ([facility] [address]     Diabetic hypertension-nephrosis syndrome ([facility])     Dysuria     GERD (gastroesophageal reflux disease)     Hyperlipidemia     Hypertension     Kidney disease     stage 3 dr Meliza Staples Unspecified cerebral artery occlusion with cerebral infarction 1999    Dr Abraham Section informed patient after Heart Attack       Home Medications:  Prior to Admission medications    Medication Sig Start Date End Date Taking?  Authorizing Provider   amoxicillin-clavulanate (AUGMENTIN) 875-125 MG per tablet Take 1 tablet by mouth 2 times daily 11/9/21 11/19/21 Yes Historical Provider, MD   magnesium hydroxide (MILK OF MAGNESIA) 400 MG/5ML suspension Take 30 mLs by mouth daily as needed for Constipation   Yes Historical Provider, MD   isosorbide mononitrate (IMDUR) 60 MG extended release tablet Take 1 tablet by mouth daily 11/9/21 12/9/21 Yes Portia Loera MD   apixaban (ELIQUIS) 5 MG TABS tablet Take 2 tablets by mouth 2 times daily Continue w/ 10 mf BID x 7 days followed by 5 BID indefinitely  Patient taking differently: Take 5 mg by mouth 2 times daily  11/8/21 12/8/21 Yes Portia Loera MD   azaTHIOprine (IMURAN) 50 MG tablet Take 2 tablets by mouth daily 11/1/21 Yes Traalexander Pizarro, DO   bumetanide (BUMEX) 1 MG tablet Take 0.5 tablets by mouth daily  Patient taking differently: Take 1 mg by mouth daily  10/1/21  Yes Klaus Varner DO   albuterol (PROVENTIL) 2.5 MG/0.5ML NEBU nebulizer solution Take 2.5 mg by nebulization every 4-6 hours as needed for Wheezing or Shortness of Breath Do not take inhaler and nebulizer at the same time per Dr. Mary Anne Rodriguez    Yes Historical Provider, MD   aluminum & magnesium hydroxide-simethicone (MYLANTA) 400-400-40 MG/5ML SUSP Take 10 mLs by mouth every 6 hours as needed For acid reflux   Yes Historical Provider, MD   Artificial Saliva (BIOTENE DRY MOUTH MOISTURIZING) SOLN liquid Take by mouth as needed Dry mouth   Yes Historical Provider, MD   montelukast (SINGULAIR) 5 MG chewable tablet Take 5 mg by mouth nightly   Yes Historical Provider, MD   montelukast (SINGULAIR) 10 MG tablet Take 10 mg by mouth daily as needed allergies    Yes Historical Provider, MD   metoprolol succinate (TOPROL XL) 50 MG extended release tablet Take 50 mg by mouth daily   Yes Historical Provider, MD   fluticasone-umeclidin-vilant (TRELEGY ELLIPTA) 100-62.5-25 MCG/INH AEPB Inhale 1 puff into the lungs daily SOB   Yes Historical Provider, MD   calcium carbonate (TUMS) 500 MG chewable tablet Take 1 tablet by mouth 2 times daily   Yes Historical Provider, MD   fenofibrate micronized (LOFIBRA) 200 MG capsule Take 200 mg by mouth daily    Yes Historical Provider, MD   acetaminophen (TYLENOL) 500 MG tablet Take 500 mg by mouth every 8 hours as needed for Pain    Yes Historical Provider, MD   carboxymethylcellulose (ARTIFICIAL TEARS) 1 % ophthalmic solution Place 1 drop into both eyes daily as needed for Dry Eyes    Yes Historical Provider, MD   losartan (COZAAR) 50 MG tablet Take 50 mg by mouth daily   Yes Historical Provider, MD   loperamide (IMODIUM) 2 MG capsule Take 2 mg by mouth every 12 hours as needed for Diarrhea    Yes Historical Provider, MD oxyCODONE-acetaminophen (PERCOCET) 5-325 MG per tablet Take 1 tablet by mouth 3 times daily. For pain   Yes Historical Provider, MD   hydroxychloroquine (PLAQUENIL) 200 MG tablet TAKE 1 TABLET BY MOUTH EVERY DAY 9/8/21  Yes Pedro Pizarro DO   EPINEPHrine (EPIPEN 2-LORAINE) 0.3 MG/0.3ML SOAJ injection Inject 0.3 mLs into the muscle once for 1 dose Use as directed for allergic reaction 6/8/21 11/19/21 Yes Makenzie Giles MD   cloNIDine (CATAPRES) 0.1 MG tablet Take 1 tablet by mouth 2 times daily 11/11/20  Yes Ashli Burton MD   predniSONE (DELTASONE) 5 MG tablet TAKE 1 TABLET BY MOUTH EVERY DAY 11/6/20  Yes LONDON Dill - CNP   DULoxetine (CYMBALTA) 30 MG extended release capsule Take 30 mg by mouth 2 times daily   Yes Historical Provider, MD   gabapentin (NEURONTIN) 100 MG capsule Take 100 mg by mouth 3 times daily. Yes Historical Provider, MD   atorvastatin (LIPITOR) 40 MG tablet Take 2 tablets by mouth daily 8/10/20  Yes Mitchel Duffy DO   docusate sodium (COLACE, DULCOLAX) 100 MG CAPS Take 100 mg by mouth 2 times daily 8/10/20  Yes Mitchel Duffy DO   insulin glargine (LANTUS) 100 UNIT/ML injection pen Inject 8 Units into the skin nightly    Yes Historical Provider, MD   dexlansoprazole (DEXILANT) 60 MG CPDR delayed release capsule Take 60 mg by mouth daily   Yes Historical Provider, MD   aspirin 81 MG EC tablet Take 162 mg by mouth daily   Yes Historical Provider, MD   ranolazine (RANEXA) 500 MG SR tablet Take 1 tablet by mouth 2 times daily.  9/27/14  Yes Zac Mccord MD   magnesium oxide (MAG-OX) 400 MG tablet Take 400 mg by mouth 2 times daily    Yes Historical Provider, MD   albuterol (PROVENTIL HFA;VENTOLIN HFA) 108 (90 BASE) MCG/ACT inhaler Inhale 2 puffs into the lungs every 6 hours as needed for Shortness of Breath    Yes Historical Provider, MD   traZODone (DESYREL) 50 MG tablet Take 100 mg by mouth nightly    Yes Historical Provider, MD   nitroGLYCERIN (NITROSTAT) 0.4 MG SL tablet Place 0.4 mg under the tongue every 5 minutes as needed for Chest pain    Yes Historical Provider, MD   spironolactone (ALDACTONE) 50 MG tablet Take 1 tablet by mouth daily 10/1/21   Klaus Varner, DO   Misc. Devices (HEAT THERAPY) MISC by Does not apply route every 8 hours as needed For pain. To Left shoulder    Historical Provider, MD   Elastic Bandages & Supports (T.E.D. ANTI-EMBOLISM STOCKINGS) MISC by Does not apply route On in AM; off at [facility]    Historical Provider, MD   OXYGEN Inhale into the lungs 2-4L to keep SpO2 above 92%.  PRN for SOB    Historical Provider, MD       Surgical History:  Past Surgical History:   Procedure Laterality Date    APPENDECTOMY      BACK SURGERY      lumbar spine    BLADDER SUSPENSION      CARDIAC CATHETERIZATION  2009    with stent  [facility]    CARPAL TUNNEL RELEASE      COLONOSCOPY  2013    ENDOSCOPY, COLON, DIAGNOSTIC      ESOPHAGEAL DILATATION      HYSTERECTOMY  1974    JOINT REPLACEMENT Left 1992    Left Knee    UPPER GASTROINTESTINAL ENDOSCOPY N/A 9/28/2021    EGD DILATION performed by Liberty Breaux MD at [facility]    UPPER GASTROINTESTINAL ENDOSCOPY Left 9/28/2021    EGD BIOPSY performed by Liberty Breaux MD at [facility]       Family History:  Family History   Problem Relation Age of Onset    Heart Disease Mother     Diabetes Mother     Kidney Disease Mother     Heart Disease Father     Diabetes Father     Cancer Sister     Stroke Brother     Cancer Sister      Family hx of GI cancer: not that she is aware of    Past GI History:  Geraldine Segura CNP/Dr. Venessa Nascimento patient  EGD/Dil 9/28/21   Colonoscopy 6/13/16   Presbyesophagus, fundic gland polyps, lower esophageal incompetence, hiatal hernia, gastritis, Shatzki's ring, irregular GEJ, left sided diverticulosis, small internal hemorrhoids, small tubular adenomatous polyp, GERD, Chronic constipation, diverticulitis    Allergies:  Renflexis [infliximab], Strawberry (diagnostic), Nuts [peanut-containing drug products], Sulfa antibiotics, Tape [adhesive tape], Bactrim [sulfamethoxazole-trimethoprim], and Chocolate    Social History:   TOBACCO:   reports that she has quit smoking. Her smoking use included cigarettes. She started smoking about 8 years ago. She has a 1.50 pack-year smoking history. She has never used smokeless tobacco.  ETOH:   reports no history of alcohol use. Review Of Systems  GENERAL: No fever, chills or weight loss. EYES:  No  blurred vision, double vision   CARDIOVASCULAR: No chest pain or palpitations. RESPIRATORY:  No dyspnea or cough. GI:  See HPI  MUSCULOSKELETAL: No new painful or swollen joints or myalgias. :   No dysuria or hematuria. SKIN:  No rashes or jaundice. NEUROLOGIC:  No headaches or seizures, numbness or tingling of arms, or legs. PSYCH:  No anxiety or depression. ENDOCRINE:  No polyuria or polydipsia. BLOOD:  +Chronic anemia, blood clots. No bleeding disorder, blood or blood product transfusion. PHYSICAL EXAM:  BP (!) 168/70   Pulse 60   Temp 98 °F (36.7 °C) (Axillary)   Resp 18   Ht 5' 5\" (1.651 m)   Wt 190 lb 7.6 oz (86.4 kg)   SpO2 100%   BMI 31.70 kg/m²     General appearance: Chronically ill appearing female in in acute distress. Cooperative. Poor to fair historian. HEENT: Normal cephalic, atraumatic without obvious deformity. Pupils equal, round, and reactive to light. Neck: Supple, with full range of motion. No jugular venous distention. Trachea midline. Respiratory:  Normal respiratory effort. Rhonchi, diminished bases. Cardiovascular: Regular rate and rhythm without murmurs, rubs or gallops. Abdomen: Soft, non-tender, non-distended with active bowel sounds. Musculoskeletal: No clubbing, cyanosis or edema bilaterally. Skin: Pink, warm, dry. No rashes or lesions.   Psychiatric: Alert and oriented, thought content appropriate, normal insight    Labs:   Recent Labs     11/18/21  2100 11/19/21  7051 11/19/21  0851   WBC 4.8  --   --    HGB 8.1*   < > 8.0*   HCT 26.4*   < > 25.6*     --   --     < > = values in this interval not displayed. Recent Labs     11/18/21  2100      K 5.3*   CL 98   CO2 29   BUN 63*   CREATININE 1.5*   CALCIUM 10.6*     Recent Labs     11/18/21  2100   AST 58*   ALT 39   BILITOT 0.3   ALKPHOS 42     Recent Labs     11/18/21  2216   INR 1.80*       Radiology:     CT Abd/Pelvis 11/19/21  Impression   Persistent appearance of acute sigmoid diverticulitis. Minimal increase in the pericolonic fluid but no drainable fluid collection or abscess is identified. Code Status: Full Code    ASSESSMENT:    1. Rectal bleeding- reported by nursing staff at SNF, none since admission, patient unable to describe if bright red, black, etc.   2. Persistent (likely) vs. Recurrent Acute sigmoid diverticulitis- with failure of outpatient courses of Augmentin then Cipro/Flagyl  3. Acute on chronic anemia  4. Unable to rule out mass on prior CT  5. ?New Fecal Incontinence, 2/2 diarrhea- describes stool \"just gushes out\", \"no warning\"   6. New unprovoked DVT, bilateral PE- dx 11/6- treated with Eliquis, now on hold  7. Prominent Left sided diverticulosis on prior colonoscopy  8. Internal Hemorrhoids on prior colonoscopy  9. Coagulopathy- INR 1.8 on admission  10. Hx of Afib  11. COPD with Chronic respiratory failure- baseline 2-4L O2 via NC  12. Hypoalbuminemia  13. Hx of GERD  14. HTN  15. CAD- on ASA at home  16. Hx of CHF  17. T2DM with insulin Dependence  18. RA on Imuran, plaquenil and prednisone at home  19. Hyperkalemia    PLAN:     Monitor H&H, transfuse to keep Hgb >7   Nursing to monitor stools and document    Stool studies if diarrhea occurs   Continue Zosyn   Hold anticoagulants   IVF   OK for Clear liquids from GI standpoint   Follow anemia labs   PPI BID   No plan for emergent endoscopy at this time.  Would consider for continued drop in H/H, signs of active GI bleeding, etc..     Will follow     Case reviewed and impression/plan reviewed in collaboration with Dr. Janeth Linn  Electronically signed by LONDON Harris CNP on 11/19/2021 at 2:01 PM    [facility]  Thank you for the consultation.

## 2021-11-19 NOTE — ED NOTES
Report to Enrique Mabry RN. Pt resting in bed a this time, denies any needs.      Austin Lainez RN  11/19/21 0945

## 2021-11-19 NOTE — PROGRESS NOTES
Pharmacy Medication History Note      List of current medications patient is taking is complete. Source of information: medication list from [facility]    Changes made to medication list:  Medications removed (include reason, ex. therapy complete or physician discontinued):  Ciprofloxacin - therapy complete  Metronidazole - therapy complete    Medications added/doses adjusted:  Amoxicillin-clavulanate 875-125 mg - take 1 tablet by mouth twice daily (11/9-11/19)  Apixaban 5 mg - take 1 tablet by mouth twice daily  Bumetanide 1 mg - take 1 mg by mouth once daily  Patient is not taking spironolactone at [facility]. Other notes (ex. Recent course of antibiotics, Coumadin dosing):  Denies use of other OTC or herbal medications.       Allergies reviewed      Electronically signed by VARSHA Sosa [facility] on 11/19/2021 at 12:00 PM

## 2021-11-19 NOTE — ED NOTES
ED to inpatient nurses report    Chief Complaint   Patient presents with    Rectal Bleeding     x 2 days      Present to ED from home  LOC: alert and orientated to name and place  Vital signs   Vitals:    11/19/21 0202 11/19/21 0331 11/19/21 0454 11/19/21 0540   BP: (!) 121/48 (!) 136/52 (!) 136/49 (!) 145/56   Pulse: 55 50 54 59   Resp: 18 19 18 15   Temp:       TempSrc:       SpO2: 100% 100% 100% 99%      Oxygen Baseline 98%    Current needs required 3L NC Bipap/Cpap No  LDAs:   Peripheral IV 11/06/21 Right Antecubital (Active)       Peripheral IV 11/18/21 Left; Anterior Forearm (Active)   Site Assessment Clean; Dry; Intact 11/18/21 2107   Line Status Blood return noted; Flushed; Normal saline locked 11/18/21 2107   Dressing Status Clean;  Intact; Dry 11/18/21 2107   Dressing Intervention New 11/18/21 2107     Mobility: Requires assistance * 1  Pending ED orders: NA  Present condition: stable      Electronically signed by Snow Martinez RN on 11/19/2021 at 9:30 AM       Snow Martinez RN  11/19/21 8138

## 2021-11-19 NOTE — H&P
History & Physical       Patient: Binu Tamayo  YOB: 1940    MRN: [id number]     Acct: [de-identified]    PCP: Roverto Yeboah DO    Date of Admission: 11/18/2021    Date of Service: Patient seen / examined on 11/19/21 and admitted to inpatient with expected LOS greater than two midnights due to medical therapy. ASSESSMENT / PLAN:    Acute upper vs lower GIB (on eliquis)  Limited history available. Recent bloody bowel movements reported at [facility] -- unclear dark or bright -- ?possibly diarrhea. Negative rectal exam and no recurrence since arrival. S/p recent diagnosis of diverticulitis (completing outpatient course of cipro/flagyl) with persistent LLQ tenderness on exam. Known history of GERD. 80 mg IV protonix bolus administered in the ED. Remains hemodynamically stable. Admit to medical floor. Eliquis held. NPO / I/O / supportive care. Continue protonix 40 mg IV BID pending GI consultation. Repeat CT A/P to reassess for persistence of diverticulitis. *FFP administration deferred despite INR of 1.8 due to hemodynamic stability and recently diagnosed proximal BLE DVT and PE -- may require transfusion pending ongoing clinical course. Maintain telemetry. Recently diagnosed diverticulitis  Dx 11/6/21 with question of underlying mass. ?Failed outpatient cipro/flagyl. Zosyn initiated. Workup as above. Acute on chronic macrocytic (blood loss) anemia  + History anemia of chronic disease. Hgb 8.1 on arrival. Monitor H&H every 6 hours. Plan to transfuse for Hgb < 8.0 (history of CAD) and/or signs of hemodynamic instability. Reticulocyte count, B12/folate levels pending. 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 Outpatient ARB and diuretic use likely contributing. Trial IVF. Avoid nephrotoxins. Renally dose medications. BMP daily to monitor for improvement. Hyperkalemia  Mild.  Secondary to EMMA. Repeat / intervene as needed. On telemetry. Hypercalcemia  Likely secondary to dehydration. Receiving IVF. Repeat iCa in the AM. Will check PTH if Ca remains elevated despite IVF hydration. Paroxysmal Afib (anticoagulated on eliquis)  Eliquis held for acute GIB/blood loss anemia. Resume as indicated. Recently diagnosed proximal BLE DVT and PE  ? Underlying GI malignancy. Eliquis held with plan to resume as indicated. May require IVC filter placement if patient remains a poor candidate to resume therapeutic anticoagulation. Isolated AST elevation  Noted. Etiology unclear. Will trend. Hypoalbuminemia  Mild. Prealbumin level pending. GERD  Receiving PPI therapy as above. Chronic hypoxic respiratory failure (on baseline 2-4L NC)  Stable. Maintain SpO2 > 90%. Primary HTN  Controlled. Outpatient antihypertensives held for active GIB. Resume as indicated. Monitor BP. Hyperlipidemia  Statin resumed (AST elevation < 3x ULN). Multi-vessel CAD  ASA held for GIB. Statin, ranexa resumed. Chronic diastolic CHF (EF 24% / J4VR per TTE 8/2020) / hypertrophic cardiomyopathy  Compensated. Bumex held for EMMA/to prioritize IVF hydration. Monitor I/O, daily weights. IDDM2   on arrival. Currently NPO status. Lantus held. Initiate SSI as needed (target IP -180). POCT checks. Hypoglycemia protocol in place. COPD  Without exacerbation. Outpatient inhalers resumed. Rheumatoid arthritis   Imuran, plaquenil and prednisone resumed. Depression  Cymbalta, trazodone resumed. Former tobacco use  Noted. Encourage ongoing abstention. Prolonged QT/QTc  In the setting of RBBB.       Chief Complaint: rectal bleeding    History of Present Illness:  77 y/o F extensive PMHx including: GERD, diverticulosis with recently diagnosed diverticulitis (on cipro/flagyl), paroxysmal Afib (anticoagulated on eliquis), recently diagnosed BLE DVT/PE, chronic hypoxic respiratory failure (on 2-4L NC), HTN, HLD, IDDM2, CKD3, former tobacco use and other PMHx as indicated below -- presents to [facility] with a chief complaint of rectal bleeding. History obtained from the patient (limited historian) and EMR. Patient currently resides at a nursing facility. Per report, she has been having bloody stools for the past couple of days. Nurses reportedly informed her that her diapers had been bloody, but unsure whether black or bright red in color. The patient provides a limited history, but describes loose bowel movements that \"just squirt out\" and \"come out all at once in clumps. \" She does endorse recent fatigue, but denies nausea/emesis, abdominal pain, fevers/chills, chest pain, shortness of breath or urinary symptoms. Of note, the patient was recently admitted to [facility] (11/6-11/8/21) for sigmoid diverticulitis / treated with cipro/flagyl (still completing course of therapy) / and was found to have new, unprovoked bilateral proximal DVTs and bilateral PE without R heart strain. She was initiated on therapeutic lovenox, and discharged on eliquis, which she takes twice daily, as prescribed. ED provider reported negative rectal exam. RN states no recurrence of bloody bowel movements or BRBPR since arrival to [facility]. ED course: afebrile, /56, HR 60, RR 18 with SpO2 100% on baseline 3L NC. Workup was remarkable for: Hgb 8.1 (.5), WBC/Plt nml. K 5.3, Ca 10.1, Cr 1.5 (BUN 63, eGFR 33), AST 58. FOBT positive. INR 1.8. COVID negative. EKG showed wide QRS rhythm, chronic RBBB. No imaging obtained. Received 500 cc IVF bolus, 80 mg IV protonix. Hospitalist service contacted for admission. Please refer to the A&P for additional details.     Past Medical History:  Diagnosis Date    Arthritis     Atrial fibrillation ([facility] [address]     CAD (coronary artery disease)     CHF (congestive heart failure) 1999    Chronic kidney disease, stage III     COPD (chronic obstructive pulmonary disease)     Depression     IDDM type 2     GERD (gastroesophageal reflux disease)     Hyperlipidemia     Hypertension     Rheumatoid arthritis     Unspecified CVA 1999   Bilateral proximal DVT of femoral/popliteal/peroneal/an-post tibial veins  Bilateral pulmonary emboli without R heart strain  Diverticulosis  Diverticulitis    Past Surgical History:    Procedure Laterality Date    APPENDECTOMY      BACK SURGERY      lumbar spine    BLADDER SUSPENSION      CARDIAC CATHETERIZATION  2009    with stent [facility]    CARPAL TUNNEL RELEASE      COLONOSCOPY  2013    ENDOSCOPY, COLON, DIAGNOSTIC      ESOPHAGEAL DILATATION      HYSTERECTOMY  1974    JOINT REPLACEMENT Left 1992    Left Knee    UPPER GASTROINTESTINAL ENDOSCOPY N/A 9/28/2021    EGD DILATION performed by Jeff Workman MD at [facility]   [facility] ENDOSCOPY Left 9/28/2021    EGD BIOPSY performed by Jeff Workman MD at [facility]      Medications Prior to Admission:   No current facility-administered medications on file prior to encounter.      Medication Sig    magnesium hydroxide (MILK OF MAGNESIA) 400 MG/5ML suspension Take 30 mLs by mouth daily as needed for Constipation    isosorbide mononitrate (IMDUR) 60 MG extended release tablet Take 1 tablet by mouth daily    apixaban (ELIQUIS) 5 MG TABS tablet Take 2 tablets by mouth 2 times daily Continue w/ 10 mf BID x 7 days followed by 5 BID indefinitely    azaTHIOprine (IMURAN) 50 MG tablet Take 2 tablets by mouth daily    bumetanide (BUMEX) 1 MG tablet Take 0.5 tablets by mouth daily    spironolactone (ALDACTONE) 50 MG tablet Take 1 tablet by mouth daily    albuterol (PROVENTIL) 2.5 MG/0.5ML NEBU nebulizer solution Take 2.5 mg by nebulization every 6 hours as needed for Wheezing Do not take inhaler and nebulizer at the same time per Dr. Michelle Maki aluminum & magnesium hydroxide-simethicone (MYLANTA) 400-400-40 MG/5ML SUSP Take 10 mLs by mouth every 6 hours as needed For acid reflux    Artificial Saliva (BIOTENE DRY MOUTH MOISTURIZING) SOLN liquid Take by mouth as needed Dry mouth    montelukast (SINGULAIR) 5 MG chewable tablet Take 5 mg by mouth nightly    montelukast (SINGULAIR) 10 MG tablet Take 10 mg by mouth daily as needed allergies     metoprolol succinate (TOPROL XL) 50 MG extended release tablet Take 50 mg by mouth daily    fluticasone-umeclidin-vilant (TRELEGY ELLIPTA) 100-62.5-25 MCG/INH AEPB Inhale 1 puff into the lungs daily SOB    Misc. Devices (HEAT THERAPY) MISC by Does not apply route every 8 hours as needed For pain. To Left shoulder    Elastic Bandages & Supports (T.E.D. ANTI-EMBOLISM STOCKINGS) MISC by Does not apply route On in AM; off at HS    OXYGEN Inhale into the lungs 2-4L to keep SpO2 above 92%. PRN for SOB    calcium carbonate (TUMS) 500 MG chewable tablet Take 1 tablet by mouth 2 times daily    fenofibrate micronized (LOFIBRA) 200 MG capsule Take 200 mg by mouth every morning (before breakfast)    acetaminophen (TYLENOL) 500 MG tablet Take 500 mg by mouth every 8 hours as needed for Pain     carboxymethylcellulose (ARTIFICIAL TEARS) 1 % ophthalmic solution Place 1 drop into both eyes every 24 hours PRN    losartan (COZAAR) 50 MG tablet Take 50 mg by mouth daily    loperamide (IMODIUM) 2 MG capsule Take 2 mg by mouth every 12 hours as needed for Diarrhea For diarrhea    oxyCODONE-acetaminophen (PERCOCET) 5-325 MG per tablet Take 1 tablet by mouth 3 times daily.  For pain    hydroxychloroquine (PLAQUENIL) 200 MG tablet TAKE 1 TABLET BY MOUTH EVERY DAY    EPINEPHrine (EPIPEN 2-LORAINE) 0.3 MG/0.3ML SOAJ injection Inject 0.3 mLs into the muscle once for 1 dose Use as directed for allergic reaction    cloNIDine (CATAPRES) 0.1 MG tablet Take 1 tablet by mouth 2 times daily    predniSONE (DELTASONE) 5 MG tablet TAKE 1 TABLET BY MOUTH EVERY DAY    DULoxetine (CYMBALTA) 30 MG extended release capsule Take 30 mg by mouth 2 times daily    gabapentin (NEURONTIN) 100 MG capsule Take 100 mg by mouth 3 times daily.  atorvastatin (LIPITOR) 40 MG tablet Take 2 tablets by mouth daily    docusate sodium (COLACE, DULCOLAX) 100 MG CAPS Take 100 mg by mouth 2 times daily (Patient taking differently: Take 100 mg by mouth 2 times daily as needed )    insulin glargine (LANTUS) 100 UNIT/ML injection pen Inject 8 Units into the skin nightly     dexlansoprazole (DEXILANT) 60 MG CPDR delayed release capsule Take 60 mg by mouth daily    aspirin 81 MG EC tablet Take 162 mg by mouth daily    ranolazine (RANEXA) 500 MG SR tablet Take 1 tablet by mouth 2 times daily.  magnesium oxide (MAG-OX) 400 MG tablet Take 400 mg by mouth 2 times daily     albuterol (PROVENTIL HFA;VENTOLIN HFA) 108 (90 BASE) MCG/ACT inhaler Inhale 2 puffs into the lungs every 6 hours as needed.  traZODone (DESYREL) 50 MG tablet Take 100 mg by mouth nightly     nitroGLYCERIN (NITROSTAT) 0.4 MG SL tablet Place 0.4 mg under the tongue every 5 minutes as needed. Allergies:   Renflexis [infliximab], Strawberry (diagnostic), Nuts [peanut-containing drug products], Sulfa antibiotics, Tape [adhesive tape], Bactrim [sulfamethoxazole-trimethoprim], and Chocolate    Social History:  Socioeconomic History    Marital status:    Tobacco Use    Smoking status: Former Smoker     Packs/day: 0.15     Years: 10.00     Pack years: 1.50     Types: Cigarettes     Start date: 10/21/2013   Substance and Sexual Activity    Alcohol use: No    Drug use: No     Family History:  Problem Relation Age of Onset    Heart Disease Mother     Diabetes Mother     Kidney Disease Mother     Heart Disease Father     Diabetes Father     Cancer Sister     Stroke Brother     Cancer Sister      REVIEW OF SYSTEMS:  A 14-point ROS was obtained and negative, with the exception of pertinent positives as stated in the HPI.     PHYSICAL EXAM:  Vitals:    11/18/21 2150 11/18/21 2308 11/19/21 0043 11/19/21 0202   BP: (!) 152/56 125/74 (!) 126/56 (!) 121/48   Pulse: 56 52 50 55   Resp: 17 19 16 18   Temp:       TempSrc:       SpO2: 100% 100% 100% 100%     General appearance: Alert / non-toxic-appearing  female. Pleasant. Cooperative. NAD. HEENT: Normocephalic / atraumatic. Conjunctivae appear normal.  Neck: Supple. No JVD. Respiratory: Unlabored on 3L NC. Lung sounds CTA with moderate air movement bilaterally. No wheezes / rales / rhonchi. Cardiovascular: RRR (HR 60s). Normal S1/S2. No obvious murmurs / rubs / gallops. Abdomen: Soft / non-distended. TTP present to LLQ without rebound or guarding. BS present x 4 quadrants. Musculoskeletal: No cyanosis. No LE erythema or tenderness to palpation. Skin: Warm / dry. No pallor. Neurologic: A/O x 3. Speech normal. Answers questions appropriately. No obvious focal neurologic deficits. Psychiatric: Thought content / judgment / insight appear appropriate. Peripheral pulses: Normal bilaterally.     Labs:   Results for orders placed or performed during the hospital encounter of 11/18/21   COVID-19, Rapid    Specimen: Nasopharyngeal Swab   Result Value Ref Range    SARS-CoV-2, NAAT NOT  DETECTED NOT DETECTED   APTT   Result Value Ref Range    aPTT 33.5 22.0 - 38.0 seconds   Blood Occult Stool Screen #1   Result Value Ref Range    OCCULT BLOOD FECAL Positive    CBC Auto Differential   Result Value Ref Range    WBC 4.8 4.8 - 10.8 thou/mm3    RBC 2.55 (L) 4.20 - 5.40 mill/mm3    Hemoglobin 8.1 (L) 12.0 - 16.0 gm/dl    Hematocrit 26.4 (L) 37.0 - 47.0 %    .5 (H) 81.0 - 99.0 fL    MCH 31.8 26.0 - 33.0 pg    MCHC 30.7 (L) 32.2 - 35.5 gm/dl    RDW-CV 17.6 (H) 11.5 - 14.5 %    RDW-SD 67.9 (H) 35.0 - 45.0 fL    Platelets 260 246 - 883 thou/mm3    MPV 9.6 9.4 - 12.4 fL    Seg Neutrophils 79.7 %    Lymphocytes 9.2 %    Monocytes 9.9 %    Eosinophils 0.6 %    Basophils 0.2 %    Immature Granulocytes 0.4 %    Platelet Estimate ADEQUATE Adequate    Segs Absolute 3.8 1.8 - 7.7 thou/mm3    Lymphocytes Absolute 0.4 (L) 1.0 - 4.8 thou/mm3    Monocytes Absolute 0.5 0.4 - 1.3 thou/mm3    Eosinophils Absolute 0.0 0.0 - 0.4 thou/mm3    Basophils Absolute 0.0 0.0 - 0.1 thou/mm3    Immature Grans (Abs) 0.02 0.00 - 0.07 thou/mm3    nRBC 0 /100 wbc    Anisocytosis PRESENT Absent   Comprehensive Metabolic Panel   Result Value Ref Range    Glucose 117 (H) 70 - 108 mg/dL    CREATININE 1.5 (H) 0.4 - 1.2 mg/dL    BUN 63 (H) 7 - 22 mg/dL    Sodium 136 135 - 145 meq/L    Potassium 5.3 (H) 3.5 - 5.2 meq/L    Chloride 98 98 - 111 meq/L    CO2 29 23 - 33 meq/L    Calcium 10.6 (H) 8.5 - 10.5 mg/dL    AST 58 (H) 5 - 40 U/L    Alkaline Phosphatase 42 38 - 126 U/L    Total Protein 6.0 (L) 6.1 - 8.0 g/dL    Albumin 3.0 (L) 3.5 - 5.1 g/dL    Total Bilirubin 0.3 0.3 - 1.2 mg/dL    ALT 39 11 - 66 U/L   Protime-INR   Result Value Ref Range    INR 1.80 (H) 0.85 - 1.13   Anion Gap   Result Value Ref Range    Anion Gap 9.0 8.0 - 16.0 meq/L   Glomerular Filtration Rate, Estimated   Result Value Ref Range    Est, Glom Filt Rate 33 (A) ml/min/1.73m2   Osmolality   Result Value Ref Range    Osmolality Calc 291.0 275.0 - 300.0 mOsmol/kg   Scan of Blood Smear   Result Value Ref Range    SCAN OF BLOOD SMEAR see below    EKG 12 Lead   Result Value Ref Range    Ventricular Rate 54 BPM    QRS Duration 138 ms    Q-T Interval 518 ms    QTc Calculation (Bazett) 491 ms    R Axis -3 degrees    T Axis 51 degrees   TYPE AND SCREEN   Result Value Ref Range    ABO O     Rh Factor NEG     Antibody Screen NEG      EKG: Narrative & Impression  Wide QRS rhythm  Right bundle branch block  Abnormal ECG  When compared with ECG of 06-NOV-2021 09:10  Wide QRS rhythm has replaced Atrial fibrillation     EKG: reviewed / chronic RBBB / no ischemic changes    Radiology: none obtained    CODE STATUS: Limited x 1 (no intubation)    Thank you Rigo Yeboah DO for the opportunity to be involved in this patient's care.     Electronically signed by Michael Yuan MD on 11/19/2021 at 2:28 AM

## 2021-11-19 NOTE — ED NOTES
Pt transported to [facility] by cart in stable condition. Called [facility] and informed the charge nurse that the patient was on their way to the unit.       Lucien Olmstead, ANGUS  24/42/83 [id number]

## 2021-11-19 NOTE — ED NOTES
ED to inpatient nurses report    Chief Complaint   Patient presents with    Rectal Bleeding     x 2 days      Pt arrives to the ED via EMS from [facility]. Pt has had 2 days of bloody stools per ems. Nursing staff at the facility wanted patient to be checked out. Pt endorses fatigue but denies any other symptoms or pain aside from chronic hip pain she states. Bruising noted throughout arms. Skin intact, color WDL. labwork shows EMMA  + fecal occult  Pt set up with purwik  LOC: A+Ox3, disoriented to time  Vital signs   Vitals:    11/18/21 2058 11/18/21 2150   BP: (!) 152/56 (!) 152/56   Pulse: 60 56   Resp: 18 17   Temp: 97.4 °F (36.3 °C)    TempSrc: Oral    SpO2: 100% 100%      Oxygen Baseline 3L o2 NC    Current needs required 3L o2 NC Bipap/Cpap No  LDAs:   Peripheral IV 11/06/21 Right Antecubital (Active)       Peripheral IV 11/18/21 Left; Anterior Forearm (Active)   Site Assessment Clean; Dry; Intact 11/18/21 2107   Line Status Blood return noted; Flushed; Normal saline locked 11/18/21 2107   Dressing Status Clean; Intact; Dry 11/18/21 2107   Dressing Intervention New 11/18/21 2107     Mobility: walker, but ems states pt could not use it.  Pt was max assist.  Pending ED orders: n/a  Present condition: stable    Electronically signed by Meagn Pina RN on 11/18/2021 at 10:44 PM     Megan Pina RN  11/18/21 92 Brandan Wilkes RN  11/19/21 0110

## 2021-11-19 NOTE — PROGRESS NOTES
Hospitalist Progress Note    Patient:  Justin Yates    Unit/Bed:6K-04/004-A  YOB: 1940  MRN: [id number]   Acct: [de-identified]   PCP: Ailyn Yeboah DO  Code Status: Full Code  Date of Admission: 11/18/2021    Expected Discharge: 2-3 days  Disposition: From SNF    Assessment/Plan:    1. Rectal bleeding:   Limited history available. Recent bloody bowel movements reported at SNF -- unclear dark or bright. None since admission. GI consulted - clear liquid diet, hold Eliquis. Monitor H&H. Continue PPI twice daily    2. Persistent vs recurrent acute sigmoid diverticulitis: CT A/P reported persistent appearance of acute sigmoid diverticulitis. ?Diverticular bleed. GI is following. Pt has been treated OP with Augmentin then Cipro/Flagyl. Continue IV Zosyn. 3. Acute on chronic macrocytic anemia: + hx anemia of chronic disease. Monitor H&H every 6 hours. Transfuse for Hgb <8 or hemodynamic instability. 4. EMMA on CKD stage 3:  Prerenal due to anemia, dehydration, compounded by ARB and diuretic use. Hold nephrotoxins. IVF. Repeat BMP. 5. Hyperkalemia: Mild, secondary to EMMA. Repeat BMP. 6. Hypercalcemia:  Mild, likely due to dehydration. IV fluids and repeat in a.m.    7. PAF: Anticoagulated on Eliquis, currently held for blood loss anemia. Rate controlled on metoprolol    8. Recently diagnosed proximal BLE DVT and PE: ?Underlying GI malignancy. Eliquis held with plan to resume as indicated. May require IVC filter placement if patient remains a poor candidate to resume therapeutic anticoagulation. 9. Coagulopathy: INR 1.8. FFP administration deferred despite INR of 1.8 due to hemodynamic stability and recently diagnosed proximal BLE DVT and PE -- may require transfusion pending ongoing clinical course. Maintain telemetry. 10. Chronic hypoxic respiratory failure / COPD: Patient wears 2 to 4 L nasal cannula at baseline. Resume home inhalers    11.  Essential hypertension: Stable. Resume home antihypertensives, continue to hold losartan and diuretics today EMMA. 12. Hx CAD: ASA held for GIB. Statin, ranexa resumed. 13. Chronic diastolic CHF (EF 82% / R4MX per TTE 8/2020) / hypertrophic cardiomyopathy: Compensated. Bumex held for EMMA/to prioritize IVF hydration. Monitor I/O, daily weights. 14. IDDMII: Lantus held while NPO, glucose is borderline low. Monitor and resume as indicated. SSI. 15. Rheumatoid arthritis: Imuran, plaquenil and prednisone resumed. 16. Depression: Home meds resumed    17. Prolonged QT/QTc: In the setting of RBBB. Chief Complaint: rectal bleeding     HPI / Hospital Course: Per HPI: \"77 y/o F extensive PMHx including: GERD, diverticulosis with recently diagnosed diverticulitis (on cipro/flagyl), paroxysmal Afib (anticoagulated on eliquis), recently diagnosed BLE DVT/PE, chronic hypoxic respiratory failure (on 2-4L NC), HTN, HLD, IDDM2, CKD3, former tobacco use and other PMHx as indicated below -- presents to [address] [place] [address] with a chief complaint of rectal bleeding. History obtained from the patient (limited historian) and EMR.     Patient currently resides at a nursing facility. Per report, she has been having bloody stools for the past couple of days. Nurses reportedly informed her that her diapers had been bloody, but unsure whether black or bright red in color. The patient provides a limited history, but describes loose bowel movements that \"just squirt out\" and \"come out all at once in clumps. \" She does endorse recent fatigue, but denies nausea/emesis, abdominal pain, fevers/chills, chest pain, shortness of breath or urinary symptoms.      Of note, the patient was recently admitted to [address] [place] [address] (11/6-11/8/21) for sigmoid diverticulitis / treated with cipro/flagyl (still completing course of therapy) / and was found to have new, unprovoked bilateral proximal DVTs and bilateral PE without R heart strain.  She was initiated on therapeutic lovenox, and discharged on eliquis, which she takes twice daily, as prescribed.     ED provider reported negative rectal exam. RN states no recurrence of bloody bowel movements or BRBPR since arrival to [facility].     ED course: afebrile, /56, HR 60, RR 18 with SpO2 100% on baseline 3L NC. Workup was remarkable for: Hgb 8.1 (.5), WBC/Plt nml. K 5.3, Ca 10.1, Cr 1.5 (BUN 63, eGFR 33), AST 58. FOBT positive. INR 1.8. COVID negative. EKG showed wide QRS rhythm, chronic RBBB. No imaging obtained. Received 500 cc IVF bolus, 80 mg IV protonix. Hospitalist service contacted for admission. Please refer to the A&P for additional details. \"     Subjective (past 24 hours): Patient has not yet had a bowel movement. She currently denies fever/chills, shortness of breath, chest pain, abdominal pain, N/V, lightheadedness. Reports diarrhea PTA, none at this time. ROS: Pertinent positives as noted in HPI. All other systems reviewed and negative. Past medical history, family history, social history and allergies reviewed again and is unchanged since admission. Medications:  Reviewed.   Infusion Medications    sodium chloride      sodium chloride 50 mL/hr at 11/19/21 1251    dextrose       Scheduled Medications    pantoprazole  40 mg IntraVENous BID    [Held by provider] aspirin  162 mg Oral Daily    atorvastatin  80 mg Oral Daily    azaTHIOprine  100 mg Oral Daily    [Held by provider] bumetanide  0.5 mg Oral Daily    cloNIDine  0.1 mg Oral BID    DULoxetine  30 mg Oral BID    gabapentin  100 mg Oral TID    [Held by provider] fenofibrate  160 mg Oral QAM AC    hydroxychloroquine  200 mg Oral Daily    isosorbide mononitrate  60 mg Oral Daily    [Held by provider] losartan  50 mg Oral Daily    metoprolol succinate  50 mg Oral Daily    piperacillin-tazobactam  3,375 mg IntraVENous Q8H    predniSONE  5 mg Oral Daily    ranolazine  500 mg Oral BID    [Held by provider] spironolactone  50 mg Oral Daily    traZODone  100 mg Oral Nightly    montelukast  5 mg Oral Nightly    magnesium oxide  400 mg Oral BID    sodium chloride flush  10 mL IntraVENous 2 times per day    budesonide-formoterol  2 puff Inhalation BID    tiotropium  2 puff Inhalation Daily    insulin lispro  0-12 Units SubCUTAneous TID     insulin lispro  0-6 Units SubCUTAneous Nightly     PRN Meds: albuterol sulfate HFA, sodium chloride flush, sodium chloride, acetaminophen **OR** acetaminophen, glucose, dextrose, glucagon (rDNA), dextrose    I/O:     Intake/Output Summary (Last 24 hours) at 11/19/2021 1559  Last data filed at 11/19/2021 1000  Gross per 24 hour   Intake 536.67 ml   Output 0 ml   Net 536.67 ml       Diet:  Diet NPO    Exam:  BP (!) 168/70   Pulse 60   Temp 98 °F (36.7 °C) (Axillary)   Resp 18   Ht 5' 5\" (1.651 m)   Wt 190 lb 7.6 oz (86.4 kg)   SpO2 100%   BMI 31.70 kg/m²   General:   Pleasant female. NAD. HEENT:  normocephalic and atraumatic. No scleral icterus. PERR. Neck: supple. No JVD. No thyromegaly. Lungs: clear to auscultation. No retractions  Cardiac: RRR without murmur. Abdomen: soft. LLQ tenderness. Bowel sounds positive. Extremities:  No clubbing, cyanosis, or edema x 4. Vasculature: capillary refill < 3 seconds. Palpable LE pulses bilaterally. Skin:  warm and dry. Psych:  Alert and oriented x3. Affect appropriate  Lymph:  No supraclavicular adenopathy. Neurologic:  No focal deficit. No seizures.       Data: (All radiographs, tracings, PFTs, and imaging are personally viewed and interpreted unless otherwise noted)  Labs:   Recent Labs     11/18/21  2100 11/19/21  0550 11/19/21  0851   WBC 4.8  --   --    HGB 8.1* 7.5* 8.0*   HCT 26.4* 24.8* 25.6*     --   --      Recent Labs     11/18/21  2100      K 5.3*   CL 98   CO2 29   BUN 63*   CREATININE 1.5*   CALCIUM 10.6*     Recent Labs     11/18/21  2100   AST 58*   ALT 39   BILITOT 0.3   ALKPHOS 42     Recent Labs     11/18/21  2216   INR 1.80*     No results for input(s): Angeline Robledo in the last 72 hours. Urinalysis:   Lab Results   Component Value Date    NITRU NEGATIVE 11/06/2021    WBCUA 2-4 11/06/2021    BACTERIA FEW 11/06/2021    RBCUA 0-2 11/06/2021    BLOODU NEGATIVE 11/06/2021    SPECGRAV 1.019 11/08/2020    GLUCOSEU NEGATIVE 11/06/2021     Urine culture: No results found for: LABURIN  Micro:   Blood culture #1:   Lab Results   Component Value Date    BC No growth-preliminary No growth  09/29/2021     Blood culture #2:No results found for: Bethanne Dubin  Organism:No results found for: ORG    No results found for: LABGRAM  MRSA culture only:No results found for: [facility]  Respiratory culture: No results found for: CULTRESP  Aerobic and Anaerobic :  No results found for: LABAERO  No results found for: [facility]    Radiology Reports:  CT ABDOMEN PELVIS W IV CONTRAST Additional Contrast? None   Final Result   Persistent appearance of acute sigmoid diverticulitis. Minimal increase in the pericolonic fluid but no drainable fluid collection or abscess is identified. **This report has been created using voice recognition software. It may contain minor errors which are inherent in voice recognition technology. **      Final report electronically signed by Dr. Lizette Enriquez on 11/19/2021 8:36 AM        CT ABDOMEN PELVIS W IV CONTRAST Additional Contrast? None    Result Date: 11/19/2021  PROCEDURE: CT ABDOMEN PELVIS W IV CONTRAST CLINICAL INFORMATION: Evaluate for persistence of acute diverticulitis . TECHNIQUE: 2-D multiplanar postcontrast images of the abdomen and pelvis in 5 mm intervals. Isovue-370 IV contrast. All CT scans at this facility use dose modulation, iterative reconstruction, and/or weight-based dosing when appropriate to reduce radiation dose to as low as reasonably achievable. COMPARISON: 11/6/2021 FINDINGS: Abdomen and pelvis: Pelvis Extensive diverticulosis.  The sigmoid colon there is pericolonic fluid and thickened bowel area of diverticula. This appearance is nearly identical to the prior exam there is minimal increase in the pericolonic fluid. There still no drainable fluid collection. Urinary bladder is unremarkable but mildly distended. ABDOMEN: Liver, and spleen are normal. Gallbladder is mildly distended and there are stones in the neck. The pancreas is normal. Kidneys enhance symmetrically. Small exophytic lesion upper pole left kidney statistically a cyst. Aorta is atherosclerotic it is not aneurysmal. Bones Scoliosis degenerative changes and postsurgical changes lumbar spine. Marked degenerative change left hip. No suspicious bone lesions. Lung bases Left basilar atelectasis no pleural effusions. Cardiomegaly. Persistent appearance of acute sigmoid diverticulitis. Minimal increase in the pericolonic fluid but no drainable fluid collection or abscess is identified. **This report has been created using voice recognition software. It may contain minor errors which are inherent in voice recognition technology. ** Final report electronically signed by Dr. Isac Tay on 11/19/2021 8:36 AM      Tele:   [x] yes             [] no      Active Hospital Problems    Diagnosis Date Noted    Rectal bleeding [K62.5] 11/18/2021       Electronically signed by Danica Osman PA-C on 11/19/2021 at 4:00 PM

## 2021-11-19 NOTE — ED TRIAGE NOTES
Pt arrives to the ED via EMS from [facility]. Pt has had 2 days of bloody stools per ems. Nursing staff at the facility wanted patient to be checked out. Pt endorses fatigue but denies any other symptoms or pain aside from chronic hip pain she states. Bruising noted throughout arms. Skin intact, color WDL.

## 2021-11-19 NOTE — PROGRESS NOTES
Pharmacy Renal Adjustment    Chayo Webb is a 78 y.o. female. Pharmacy renally adjust all medications per P&T approved policy. Recent Labs     11/18/21  2100   BUN 63*   CREATININE 1.5*     Estimated Creatinine Clearance: 31 mL/min (A) (based on SCr of 1.5 mg/dL (H)). Height:   Ht Readings from Last 1 Encounters:   11/19/21 5' 5\" (1.651 m)     Weight:  Wt Readings from Last 1 Encounters:   11/19/21 190 lb 7.6 oz (86.4 kg)     Baseline SCr: ~ 1    Plan: No medications need to be adjusted per renal function at this time.     Hussain Lopez PharmD, BCPS  11/19/2021  11:57 AM

## 2021-11-20 LAB
ALBUMIN SERPL-MCNC: 2.8 G/DL (ref 3.5–5.1)
ALP BLD-CCNC: 31 U/L (ref 38–126)
ALT SERPL-CCNC: 37 U/L (ref 11–66)
ANION GAP SERPL CALCULATED.3IONS-SCNC: 10 MEQ/L (ref 8–16)
ANION GAP SERPL CALCULATED.3IONS-SCNC: 15 MEQ/L (ref 8–16)
ANISOCYTOSIS: PRESENT
AST SERPL-CCNC: 59 U/L (ref 5–40)
BASOPHILS # BLD: 0.5 %
BASOPHILS ABSOLUTE: 0 THOU/MM3 (ref 0–0.1)
BILIRUB SERPL-MCNC: 0.3 MG/DL (ref 0.3–1.2)
BUN BLDV-MCNC: 31 MG/DL (ref 7–22)
BUN BLDV-MCNC: 38 MG/DL (ref 7–22)
CALCIUM SERPL-MCNC: 10 MG/DL (ref 8.5–10.5)
CALCIUM SERPL-MCNC: 10.3 MG/DL (ref 8.5–10.5)
CHLORIDE BLD-SCNC: 100 MEQ/L (ref 98–111)
CHLORIDE BLD-SCNC: 102 MEQ/L (ref 98–111)
CO2: 25 MEQ/L (ref 23–33)
CO2: 28 MEQ/L (ref 23–33)
CREAT SERPL-MCNC: 0.9 MG/DL (ref 0.4–1.2)
CREAT SERPL-MCNC: 0.9 MG/DL (ref 0.4–1.2)
EOSINOPHIL # BLD: 7.2 %
EOSINOPHILS ABSOLUTE: 0.3 THOU/MM3 (ref 0–0.4)
ERYTHROCYTE [DISTWIDTH] IN BLOOD BY AUTOMATED COUNT: 17.6 % (ref 11.5–14.5)
ERYTHROCYTE [DISTWIDTH] IN BLOOD BY AUTOMATED COUNT: 65.7 FL (ref 35–45)
GFR SERPL CREATININE-BSD FRML MDRD: 60 ML/MIN/1.73M2
GFR SERPL CREATININE-BSD FRML MDRD: 60 ML/MIN/1.73M2
GLUCOSE BLD-MCNC: 103 MG/DL (ref 70–108)
GLUCOSE BLD-MCNC: 123 MG/DL (ref 70–108)
GLUCOSE BLD-MCNC: 158 MG/DL (ref 70–108)
GLUCOSE BLD-MCNC: 82 MG/DL (ref 70–108)
GLUCOSE BLD-MCNC: 89 MG/DL (ref 70–108)
GLUCOSE BLD-MCNC: 96 MG/DL (ref 70–108)
HCT VFR BLD CALC: 21.8 % (ref 37–47)
HCT VFR BLD CALC: 22.5 % (ref 37–47)
HCT VFR BLD CALC: 23 % (ref 37–47)
HCT VFR BLD CALC: 24.7 % (ref 37–47)
HCT VFR BLD CALC: 25.5 % (ref 37–47)
HEMOGLOBIN: 6.7 GM/DL (ref 12–16)
HEMOGLOBIN: 7.2 GM/DL (ref 12–16)
HEMOGLOBIN: 7.3 GM/DL (ref 12–16)
HEMOGLOBIN: 7.7 GM/DL (ref 12–16)
HEMOGLOBIN: 7.8 GM/DL (ref 12–16)
IMMATURE GRANS (ABS): 0.02 THOU/MM3 (ref 0–0.07)
IMMATURE GRANULOCYTES: 0.5 %
INR BLD: 1.25 (ref 0.85–1.13)
LYMPHOCYTES # BLD: 13.3 %
LYMPHOCYTES ABSOLUTE: 0.5 THOU/MM3 (ref 1–4.8)
MAGNESIUM: 2.1 MG/DL (ref 1.6–2.4)
MCH RBC QN AUTO: 32 PG (ref 26–33)
MCHC RBC AUTO-ENTMCNC: 31.2 GM/DL (ref 32.2–35.5)
MCV RBC AUTO: 102.5 FL (ref 81–99)
MONOCYTES # BLD: 13.6 %
MONOCYTES ABSOLUTE: 0.5 THOU/MM3 (ref 0.4–1.3)
NUCLEATED RED BLOOD CELLS: 0 /100 WBC
PHOSPHORUS: 2.4 MG/DL (ref 2.4–4.7)
PLATELET # BLD: 379 THOU/MM3 (ref 130–400)
PMV BLD AUTO: 9.6 FL (ref 9.4–12.4)
POTASSIUM SERPL-SCNC: 4.6 MEQ/L (ref 3.5–5.2)
POTASSIUM SERPL-SCNC: 4.8 MEQ/L (ref 3.5–5.2)
RBC # BLD: 2.41 MILL/MM3 (ref 4.2–5.4)
SEG NEUTROPHILS: 64.9 %
SEGMENTED NEUTROPHILS ABSOLUTE COUNT: 2.5 THOU/MM3 (ref 1.8–7.7)
SODIUM BLD-SCNC: 140 MEQ/L (ref 135–145)
SODIUM BLD-SCNC: 140 MEQ/L (ref 135–145)
TOTAL PROTEIN: 5.3 G/DL (ref 6.1–8)
WBC # BLD: 3.9 THOU/MM3 (ref 4.8–10.8)

## 2021-11-20 PROCEDURE — 84100 ASSAY OF PHOSPHORUS: CPT

## 2021-11-20 PROCEDURE — 96376 TX/PRO/DX INJ SAME DRUG ADON: CPT

## 2021-11-20 PROCEDURE — 96366 THER/PROPH/DIAG IV INF ADDON: CPT

## 2021-11-20 PROCEDURE — 85610 PROTHROMBIN TIME: CPT

## 2021-11-20 PROCEDURE — 94640 AIRWAY INHALATION TREATMENT: CPT

## 2021-11-20 PROCEDURE — 83735 ASSAY OF MAGNESIUM: CPT

## 2021-11-20 PROCEDURE — 99232 SBSQ HOSP IP/OBS MODERATE 35: CPT | Performed by: PHYSICIAN ASSISTANT

## 2021-11-20 PROCEDURE — 6360000002 HC RX W HCPCS: Performed by: FAMILY MEDICINE

## 2021-11-20 PROCEDURE — 85014 HEMATOCRIT: CPT

## 2021-11-20 PROCEDURE — 80053 COMPREHEN METABOLIC PANEL: CPT

## 2021-11-20 PROCEDURE — 85025 COMPLETE CBC W/AUTO DIFF WBC: CPT

## 2021-11-20 PROCEDURE — 85018 HEMOGLOBIN: CPT

## 2021-11-20 PROCEDURE — C9113 INJ PANTOPRAZOLE SODIUM, VIA: HCPCS | Performed by: FAMILY MEDICINE

## 2021-11-20 PROCEDURE — 1200000003 HC TELEMETRY R&B

## 2021-11-20 PROCEDURE — 82948 REAGENT STRIP/BLOOD GLUCOSE: CPT

## 2021-11-20 PROCEDURE — 36415 COLL VENOUS BLD VENIPUNCTURE: CPT

## 2021-11-20 PROCEDURE — 2580000003 HC RX 258: Performed by: FAMILY MEDICINE

## 2021-11-20 PROCEDURE — 6370000000 HC RX 637 (ALT 250 FOR IP): Performed by: FAMILY MEDICINE

## 2021-11-20 RX ORDER — SODIUM CHLORIDE 9 MG/ML
INJECTION, SOLUTION INTRAVENOUS PRN
Status: DISCONTINUED | OUTPATIENT
Start: 2021-11-20 | End: 2021-11-23 | Stop reason: HOSPADM

## 2021-11-20 RX ADMIN — GABAPENTIN 100 MG: 100 CAPSULE ORAL at 15:41

## 2021-11-20 RX ADMIN — Medication 400 MG: at 10:00

## 2021-11-20 RX ADMIN — BUDESONIDE AND FORMOTEROL FUMARATE DIHYDRATE 2 PUFF: 160; 4.5 AEROSOL RESPIRATORY (INHALATION) at 18:08

## 2021-11-20 RX ADMIN — SODIUM CHLORIDE, PRESERVATIVE FREE 10 ML: 5 INJECTION INTRAVENOUS at 20:29

## 2021-11-20 RX ADMIN — SODIUM CHLORIDE, PRESERVATIVE FREE 10 ML: 5 INJECTION INTRAVENOUS at 10:04

## 2021-11-20 RX ADMIN — GABAPENTIN 100 MG: 100 CAPSULE ORAL at 11:10

## 2021-11-20 RX ADMIN — GABAPENTIN 100 MG: 100 CAPSULE ORAL at 20:29

## 2021-11-20 RX ADMIN — METOPROLOL SUCCINATE 50 MG: 50 TABLET, FILM COATED, EXTENDED RELEASE ORAL at 10:03

## 2021-11-20 RX ADMIN — PIPERACILLIN AND TAZOBACTAM 3375 MG: 3; .375 INJECTION, POWDER, LYOPHILIZED, FOR SOLUTION INTRAVENOUS at 04:31

## 2021-11-20 RX ADMIN — RANOLAZINE 500 MG: 500 TABLET, FILM COATED, EXTENDED RELEASE ORAL at 20:30

## 2021-11-20 RX ADMIN — PREDNISONE 5 MG: 5 TABLET ORAL at 10:03

## 2021-11-20 RX ADMIN — AZATHIOPRINE 100 MG: 50 TABLET ORAL at 11:10

## 2021-11-20 RX ADMIN — TIOTROPIUM BROMIDE INHALATION SPRAY 2 PUFF: 3.12 SPRAY, METERED RESPIRATORY (INHALATION) at 07:39

## 2021-11-20 RX ADMIN — DULOXETINE HYDROCHLORIDE 30 MG: 30 CAPSULE, DELAYED RELEASE ORAL at 10:00

## 2021-11-20 RX ADMIN — BUDESONIDE AND FORMOTEROL FUMARATE DIHYDRATE 2 PUFF: 160; 4.5 AEROSOL RESPIRATORY (INHALATION) at 07:40

## 2021-11-20 RX ADMIN — DULOXETINE HYDROCHLORIDE 30 MG: 30 CAPSULE, DELAYED RELEASE ORAL at 20:29

## 2021-11-20 RX ADMIN — PIPERACILLIN AND TAZOBACTAM 3375 MG: 3; .375 INJECTION, POWDER, LYOPHILIZED, FOR SOLUTION INTRAVENOUS at 13:58

## 2021-11-20 RX ADMIN — PANTOPRAZOLE SODIUM 40 MG: 40 INJECTION, POWDER, FOR SOLUTION INTRAVENOUS at 20:29

## 2021-11-20 RX ADMIN — MONTELUKAST SODIUM 5 MG: 5 TABLET, CHEWABLE ORAL at 20:29

## 2021-11-20 RX ADMIN — PANTOPRAZOLE SODIUM 40 MG: 40 INJECTION, POWDER, FOR SOLUTION INTRAVENOUS at 10:00

## 2021-11-20 RX ADMIN — TRAZODONE HYDROCHLORIDE 100 MG: 100 TABLET ORAL at 20:31

## 2021-11-20 RX ADMIN — RANOLAZINE 500 MG: 500 TABLET, FILM COATED, EXTENDED RELEASE ORAL at 10:00

## 2021-11-20 RX ADMIN — CLONIDINE HYDROCHLORIDE 0.1 MG: 0.1 TABLET ORAL at 20:29

## 2021-11-20 RX ADMIN — HYDROXYCHLOROQUINE SULFATE 200 MG: 200 TABLET ORAL at 10:00

## 2021-11-20 RX ADMIN — CLONIDINE HYDROCHLORIDE 0.1 MG: 0.1 TABLET ORAL at 10:00

## 2021-11-20 RX ADMIN — PIPERACILLIN AND TAZOBACTAM 3375 MG: 3; .375 INJECTION, POWDER, LYOPHILIZED, FOR SOLUTION INTRAVENOUS at 20:44

## 2021-11-20 RX ADMIN — ISOSORBIDE MONONITRATE 60 MG: 60 TABLET, EXTENDED RELEASE ORAL at 10:00

## 2021-11-20 RX ADMIN — ATORVASTATIN CALCIUM 80 MG: 80 TABLET, FILM COATED ORAL at 10:00

## 2021-11-20 RX ADMIN — SODIUM CHLORIDE: 9 INJECTION, SOLUTION INTRAVENOUS at 20:00

## 2021-11-20 RX ADMIN — Medication 400 MG: at 20:29

## 2021-11-20 ASSESSMENT — PAIN SCALES - GENERAL
PAINLEVEL_OUTOF10: 0

## 2021-11-20 NOTE — PROGRESS NOTES
Gastroenterology Progress Note:     Patient Name:  Jessi Charles   MRN: [id number]  [id number]  YOB: 1940  Admit Date: 11/18/2021  8:52 PM  Primary Care Physician: Brianne Garrido [address], DO   6K-04/004-A     Patient seen and examined. 24 hours events and chart reviewed. Subjective: Pt reports she is feeling fine. Denies abdominal pain, diarrhea. No nausea vomiting. No black stools reported overnight per nursing and only one stool overnight. On IVF at 50 mL/hr. Objective:  BP (!) 140/76 Comment: manual  Pulse 70   Temp 98.3 °F (36.8 °C) (Oral)   Resp 16   Ht 5' 5\" (1.651 m)   Wt 190 lb 7.6 oz (86.4 kg)   SpO2 97%   BMI 31.70 kg/m²     Physical Exam:    General:  Nourished in no distress  HEENT: Atraumatic, normocephalic. Moist oral mucous membranes. Neck: Supple without adenopathy, JVD, thyromegaly or masses. Trachea midline. CV: Heart RRR, no murmurs, rubs, gallops. Resp: Even, easy without cough or accessory use. Lungs clear to ascultation bilaterally. Abd: Round, soft, nontender. No hepatosplenomegaly or mass present. Active bowel sounds heard. No distention noted. Ext:  Without cyanosis, clubbing, edema. Skin: Pink, warm, dry  Neuro:  Alert, oriented x 3 with no obvious deficits.        Rectal: deferred    Labs:   CBC:   Lab Results   Component Value Date    WBC 3.9 11/20/2021    HGB 7.7 11/20/2021    HCT 24.7 11/20/2021    .5 11/20/2021     11/20/2021     BMP:   Lab Results   Component Value Date     11/20/2021    K 4.8 11/20/2021    K 3.6 11/06/2020     11/20/2021    CO2 28 11/20/2021    PHOS 2.4 11/20/2021    BUN 38 11/20/2021    CREATININE 0.9 11/20/2021    CALCIUM 10.0 11/20/2021     PT/INR:   Lab Results   Component Value Date    INR 1.80 11/18/2021     Lipids:   Lab Results   Component Value Date    ALKPHOS 42 11/18/2021    ALT 39 11/18/2021    AST 58 11/18/2021    BILITOT 0.3 11/18/2021    BILIDIR <0.2 11/06/2021    LABALBU 3.0 11/18/2021    LIPASE 20.8 11/06/2021       GI history:   9/14/21 CT abd/pelvix with persistent wall thickening sigmoid colon, hypertrophy from chronic diverticular disease could not r/o mass, perisigmoid fat stranding suggestive of actue diverticulitis, no encapsulated fluid. Antibiotics and barium enema recommended. Admitted 11/6 for acute diverticulitis on CT A/P along with bilateral lower lobe PE/DVT left common femoral vein. Last EGD/dilation 9/28/21 with Dr. Stephanie Montes, multiple fundic gland polyps, LE incompetaance with hiatal hernia, minimal antral gastritis, Shatzkis ring, irregular GE junction without esophagitis. Dilation to 54 fr. Last colonoscopy 2016 Dr. Fuentes Shafer requiring pediatric colonoscope, prominent left sided diverticulosis, small internal hemorrhoids, small diminutive polyp that was tubular adenoma, no repeat 2/2 age. Significant Diagnostic Studies:    11/19/2021 CT A/P  ABDOMEN:   Liver, and spleen are normal.   Gallbladder is mildly distended and there are stones in the neck. The pancreas is normal.   Kidneys enhance symmetrically. Small exophytic lesion upper pole left kidney statistically a cyst.   Aorta is atherosclerotic it is not aneurysmal.   Bones   Scoliosis degenerative changes and postsurgical changes lumbar spine. Marked degenerative change left hip. No suspicious bone lesions. Lung bases   Left basilar atelectasis no pleural effusions. Cardiomegaly.           Impression   Persistent appearance of acute sigmoid diverticulitis. Minimal increase in the pericolonic fluid but no drainable fluid collection or abscess is identified.          Current Meds:  Scheduled Meds:   pantoprazole  40 mg IntraVENous BID    [Held by provider] aspirin  162 mg Oral Daily    atorvastatin  80 mg Oral Daily    azaTHIOprine  100 mg Oral Daily    [Held by provider] bumetanide  0.5 mg Oral Daily    cloNIDine  0.1 mg Oral BID    DULoxetine  30 mg Oral BID    gabapentin  100 mg Oral TID  [Held by provider] fenofibrate  160 mg Oral QAM AC    hydroxychloroquine  200 mg Oral Daily    isosorbide mononitrate  60 mg Oral Daily    [Held by provider] losartan  50 mg Oral Daily    metoprolol succinate  50 mg Oral Daily    piperacillin-tazobactam  3,375 mg IntraVENous Q8H    predniSONE  5 mg Oral Daily    ranolazine  500 mg Oral BID    [Held by provider] spironolactone  50 mg Oral Daily    traZODone  100 mg Oral Nightly    montelukast  5 mg Oral Nightly    magnesium oxide  400 mg Oral BID    sodium chloride flush  10 mL IntraVENous 2 times per day    budesonide-formoterol  2 puff Inhalation BID    tiotropium  2 puff Inhalation Daily    insulin lispro  0-12 Units SubCUTAneous TID WC    insulin lispro  0-6 Units SubCUTAneous Nightly     Continuous Infusions:   sodium chloride      sodium chloride 50 mL/hr at 11/1940    dextrose         Assessment:  79 y/o female with a PMH of afib on Eliquis, BLE DVT/PT, CAD, CHF, CKD, COPD, DM, known to our practice as a pt of Marcie Boone/Dr. Db Baltazar, who we follow routinely for chronic GERD, presbyesophagus, hiatal hernia, chronic constipation, HARSHAD, colonic diverticulosis who presented from [facility] with new fecal incontinence, diarrhea x 3 days and rectal bleeding reported by nursing staff at [facility]. CT A/P 11/19/21 with persistent appearance of acute sigmoid diverticulitis, minimal increase in pericolonic fluid but no drainable collection or abscess identified. She has failed O/P courses of Augmentin then Cipro/Flagyl. Her Eliquis has been held. INR was 1.8 on admission. Since admission she has been on Zosyn. Her Hg was 8.1 and has been stable between 7.3-8.4 this admission. Today Hg 7.7. 1. Rectal bleeding, reported by [facility] staff, none sine admission, and pt unable to report on this. 2. Likely persistent acute sigmoid diverticulitis s/p failure of O/P therapy with Augmentin, Cipro/Flagyl, on Zosyn  3.  Coagulopathy, with INR 1.8 on admission  3. Unable to R/O mass on prior CT  4. Reported fecal incontinence, none since admission  5. New unprovoked DVT/PE, new diagnoses 11/6, Eliquis on hold due to suspected GIB  6. Prominent left sided diveritculosis on prior colonoscopy  7. Internal hemorrhoids on colonoscopy  8. History of afib, maintained on Eliquis  9. History of chronic GERD, denies at this time    Plan:     Monitor H&H, tranfuse for Hg <7.0   Nursing to monitor all stools and document color, quantity and consistency   Stool studies if return of diarrheal stools   Continue Zosyn for diverticulitis   Hold anticoagulant due to suspected GIB   Ok for clear liquids diet at this point   CBC in am   Continue twice daily PPI   Prep for colonoscopy on Monday.  Will continue to follow. Case reviewed and impression/plan reviewed in collaboration with Dr. Isreal Mahoney .     Electronically signed by LONDON Gardner CNP on 11/20/2021 at 10:26 AM    [facility]

## 2021-11-20 NOTE — PROGRESS NOTES
Hospitalist Progress Note    Patient:  Demetrio Clement    Unit/Bed:6K-04/004-A  YOB: 1940  MRN: [id number]   Acct: [de-identified]   PCP: Maksim Yeboah DO  Code Status: Full Code  Date of Admission: 11/18/2021    Expected Discharge: 2-3 days  Disposition: From SNF    Assessment/Plan:    1. Rectal bleeding:   Limited history available. Recent bloody bowel movements reported at SNF, unclear dark or bright. None since admission. GI consulted - clear liquid diet, hold Eliquis. Monitor H&H. Continue PPI twice daily   - Hgb has been stable. Pt has not yet had a BM. Will resume Eliquis when okay with GI.     2. Persistent vs recurrent acute sigmoid diverticulitis: CT A/P reported persistent appearance of acute sigmoid diverticulitis. ?Diverticular bleed. GI is following. Pt has been treated OP with Augmentin then Cipro/Flagyl. More likely to be persistent diverticulitis than acute flare given patient denies abd pain and diarrhea. Continue IV Zosyn per GI. 3. Acute on chronic macrocytic anemia: + hx anemia of chronic disease. Monitor H&H every 6 hours. Transfuse for Hgb <7 or hemodynamic instability. 4. EMMA on CKD stage 3:  Prerenal due to anemia, dehydration, compounded by ARB and diuretic use. Hold nephrotoxins. IVF. Repeat BMP. 5. Hyperkalemia: Mild, secondary to EMMA. Repeat BMP. 6. Hypercalcemia:  Mild, likely due to dehydration. IV fluids and repeat in a.m.    7. PAF: Anticoagulated on Eliquis, currently held for blood loss anemia. Rate controlled on metoprolol    8. Recently diagnosed proximal BLE DVT and PE: ?Underlying GI malignancy. Eliquis held with plan to resume as indicated. May require IVC filter placement if patient remains a poor candidate to resume therapeutic anticoagulation.  - Resume Eliquis when okay with GI.     9. Coagulopathy: INR 1.8.  FFP administration deferred despite INR of 1.8 due to hemodynamic stability and recently diagnosed proximal BLE DVT and PE -- may require transfusion pending ongoing clinical course. Maintain telemetry. 10. Chronic hypoxic respiratory failure / COPD: Patient wears 2 to 4 L nasal cannula at baseline. Resume home inhalers    11. Essential hypertension: Stable. Resume home antihypertensives, continue to hold losartan and diuretics today EMMA. 12. Hx CAD: ASA held for GIB. Statin, ranexa resumed. 13. Chronic diastolic CHF (EF 34% / R9QI per TTE 8/2020) / hypertrophic cardiomyopathy: Compensated. Bumex held for EMMA/to prioritize IVF hydration. Monitor I/O, daily weights. 14. IDDMII: Lantus held while NPO, glucose is borderline low. Monitor and resume as indicated. SSI. 15. Rheumatoid arthritis: Imuran, plaquenil and prednisone resumed. 16. Depression: Home meds resumed    17. Prolonged QT/QTc: In the setting of RBBB. Chief Complaint: rectal bleeding     HPI / Hospital Course: Per HPI: \"77 y/o F extensive PMHx including: GERD, diverticulosis with recently diagnosed diverticulitis (on cipro/flagyl), paroxysmal Afib (anticoagulated on eliquis), recently diagnosed BLE DVT/PE, chronic hypoxic respiratory failure (on 2-4L NC), HTN, HLD, IDDM2, CKD3, former tobacco use and other PMHx as indicated below -- presents to [facility] with a chief complaint of rectal bleeding. History obtained from the patient (limited historian) and EMR.     Patient currently resides at a nursing facility. Per report, she has been having bloody stools for the past couple of days. Nurses reportedly informed her that her diapers had been bloody, but unsure whether black or bright red in color. The patient provides a limited history, but describes loose bowel movements that \"just squirt out\" and \"come out all at once in clumps. \" She does endorse recent fatigue, but denies nausea/emesis, abdominal pain, fevers/chills, chest pain, shortness of breath or urinary symptoms.      Of note, the patient was recently admitted to [facility] (11/6-11/8/21) for sigmoid diverticulitis / treated with cipro/flagyl (still completing course of therapy) / and was found to have new, unprovoked bilateral proximal DVTs and bilateral PE without R heart strain. She was initiated on therapeutic lovenox, and discharged on eliquis, which she takes twice daily, as prescribed.     ED provider reported negative rectal exam. RN states no recurrence of bloody bowel movements or BRBPR since arrival to [facility].     ED course: afebrile, /56, HR 60, RR 18 with SpO2 100% on baseline 3L NC. Workup was remarkable for: Hgb 8.1 (.5), WBC/Plt nml. K 5.3, Ca 10.1, Cr 1.5 (BUN 63, eGFR 33), AST 58. FOBT positive. INR 1.8. COVID negative. EKG showed wide QRS rhythm, chronic RBBB. No imaging obtained. Received 500 cc IVF bolus, 80 mg IV protonix. Hospitalist service contacted for admission. Please refer to the A&P for additional details. \"     11/19- Patient has not yet had a bowel movement. She currently denies fever/chills, shortness of breath, chest pain, abdominal pain, N/V, lightheadedness. Reports diarrhea PTA, none at this time. Subjective (past 24 hours): Pt states she still has not had a BM. She denies lightheadedness, SOB, CP, abd pain, n/v/d. Hgb stable. Clear liquid diet per GI.       ROS: Pertinent positives as noted in HPI. All other systems reviewed and negative. Past medical history, family history, social history and allergies reviewed again and is unchanged since admission. Medications:  Reviewed.   Infusion Medications    sodium chloride      sodium chloride 50 mL/hr at 11/1940    dextrose       Scheduled Medications    pantoprazole  40 mg IntraVENous BID    [Held by provider] aspirin  162 mg Oral Daily    atorvastatin  80 mg Oral Daily    azaTHIOprine  100 mg Oral Daily    [Held by provider] bumetanide  0.5 mg Oral Daily    cloNIDine  0.1 mg Oral BID    DULoxetine  30 mg Oral BID    gabapentin  100 mg Oral TID    [Held by provider] fenofibrate  160 mg Oral QAM AC    hydroxychloroquine  200 mg Oral Daily    isosorbide mononitrate  60 mg Oral Daily    [Held by provider] losartan  50 mg Oral Daily    metoprolol succinate  50 mg Oral Daily    piperacillin-tazobactam  3,375 mg IntraVENous Q8H    predniSONE  5 mg Oral Daily    ranolazine  500 mg Oral BID    [Held by provider] spironolactone  50 mg Oral Daily    traZODone  100 mg Oral Nightly    montelukast  5 mg Oral Nightly    magnesium oxide  400 mg Oral BID    sodium chloride flush  10 mL IntraVENous 2 times per day    budesonide-formoterol  2 puff Inhalation BID    tiotropium  2 puff Inhalation Daily    insulin lispro  0-12 Units SubCUTAneous TID WC    insulin lispro  0-6 Units SubCUTAneous Nightly     PRN Meds: albuterol sulfate HFA, sodium chloride flush, sodium chloride, acetaminophen **OR** acetaminophen, glucose, dextrose, glucagon (rDNA), dextrose    I/O:     Intake/Output Summary (Last 24 hours) at 11/20/2021 1432  Last data filed at 11/20/2021 0719  Gross per 24 hour   Intake 471.98 ml   Output 2300 ml   Net -1828.02 ml       Diet:  ADULT DIET; Clear Liquid; No red dye    Exam:  /65   Pulse 75   Temp 98.5 °F (36.9 °C) (Oral)   Resp 18   Ht 5' 5\" (1.651 m)   Wt 190 lb 7.6 oz (86.4 kg)   SpO2 98%   BMI 31.70 kg/m²   General:   Pleasant female. NAD. HEENT:  normocephalic and atraumatic. No scleral icterus. PERR. Neck: supple. No JVD. No thyromegaly. Lungs: clear to auscultation. No retractions  Cardiac: RRR without murmur. Abdomen: soft. LLQ tenderness. Bowel sounds positive. Extremities:  No clubbing, cyanosis, or edema x 4. Vasculature: capillary refill < 3 seconds. Palpable LE pulses bilaterally. Skin:  warm and dry. Psych:  Alert and oriented x3. Affect appropriate  Lymph:  No supraclavicular adenopathy. Neurologic:  No focal deficit. No seizures.       Data: (All radiographs, tracings, PFTs, and imaging are personally viewed and interpreted unless otherwise 11/19/2021 8:36 AM        CT ABDOMEN PELVIS W IV CONTRAST Additional Contrast? None    Result Date: 11/19/2021  PROCEDURE: CT ABDOMEN PELVIS W IV CONTRAST CLINICAL INFORMATION: Evaluate for persistence of acute diverticulitis . TECHNIQUE: 2-D multiplanar postcontrast images of the abdomen and pelvis in 5 mm intervals. Isovue-370 IV contrast. All CT scans at this facility use dose modulation, iterative reconstruction, and/or weight-based dosing when appropriate to reduce radiation dose to as low as reasonably achievable. COMPARISON: 11/6/2021 FINDINGS: Abdomen and pelvis: Pelvis Extensive diverticulosis. The sigmoid colon there is pericolonic fluid and thickened bowel area of diverticula. This appearance is nearly identical to the prior exam there is minimal increase in the pericolonic fluid. There still no drainable fluid collection. Urinary bladder is unremarkable but mildly distended. ABDOMEN: Liver, and spleen are normal. Gallbladder is mildly distended and there are stones in the neck. The pancreas is normal. Kidneys enhance symmetrically. Small exophytic lesion upper pole left kidney statistically a cyst. Aorta is atherosclerotic it is not aneurysmal. Bones Scoliosis degenerative changes and postsurgical changes lumbar spine. Marked degenerative change left hip. No suspicious bone lesions. Lung bases Left basilar atelectasis no pleural effusions. Cardiomegaly. Persistent appearance of acute sigmoid diverticulitis. Minimal increase in the pericolonic fluid but no drainable fluid collection or abscess is identified. **This report has been created using voice recognition software. It may contain minor errors which are inherent in voice recognition technology. ** Final report electronically signed by Dr. Mathew Urrutia on 11/19/2021 8:36 AM      Tele:   [x] yes             [] no      Active Hospital Problems    Diagnosis Date Noted    Rectal bleeding [K62.5] 11/18/2021       Electronically signed by Edouard Trinh Griffin Inman PA-C on 11/20/2021 at 2:32 PM

## 2021-11-21 LAB
ALBUMIN SERPL-MCNC: 2.7 G/DL (ref 3.5–5.1)
ALP BLD-CCNC: 29 U/L (ref 38–126)
ALT SERPL-CCNC: 34 U/L (ref 11–66)
ANION GAP SERPL CALCULATED.3IONS-SCNC: 8 MEQ/L (ref 8–16)
ANISOCYTOSIS: PRESENT
AST SERPL-CCNC: 53 U/L (ref 5–40)
BASOPHILS # BLD: 0.5 %
BASOPHILS ABSOLUTE: 0 THOU/MM3 (ref 0–0.1)
BILIRUB SERPL-MCNC: 0.6 MG/DL (ref 0.3–1.2)
BUN BLDV-MCNC: 24 MG/DL (ref 7–22)
CALCIUM SERPL-MCNC: 9.7 MG/DL (ref 8.5–10.5)
CHLORIDE BLD-SCNC: 100 MEQ/L (ref 98–111)
CO2: 29 MEQ/L (ref 23–33)
CREAT SERPL-MCNC: 0.9 MG/DL (ref 0.4–1.2)
EOSINOPHIL # BLD: 7.7 %
EOSINOPHILS ABSOLUTE: 0.3 THOU/MM3 (ref 0–0.4)
ERYTHROCYTE [DISTWIDTH] IN BLOOD BY AUTOMATED COUNT: 20.4 % (ref 11.5–14.5)
ERYTHROCYTE [DISTWIDTH] IN BLOOD BY AUTOMATED COUNT: 76 FL (ref 35–45)
GFR SERPL CREATININE-BSD FRML MDRD: 60 ML/MIN/1.73M2
GLUCOSE BLD-MCNC: 107 MG/DL (ref 70–108)
GLUCOSE BLD-MCNC: 149 MG/DL (ref 70–108)
GLUCOSE BLD-MCNC: 156 MG/DL (ref 70–108)
GLUCOSE BLD-MCNC: 256 MG/DL (ref 70–108)
GLUCOSE BLD-MCNC: 94 MG/DL (ref 70–108)
HCT VFR BLD CALC: 25.5 % (ref 37–47)
HCT VFR BLD CALC: 26.9 % (ref 37–47)
HCT VFR BLD CALC: 27.7 % (ref 37–47)
HCT VFR BLD CALC: 28.6 % (ref 37–47)
HEMOGLOBIN: 8.1 GM/DL (ref 12–16)
HEMOGLOBIN: 8.2 GM/DL (ref 12–16)
HEMOGLOBIN: 8.2 GM/DL (ref 12–16)
HEMOGLOBIN: 8.5 GM/DL (ref 12–16)
IMMATURE GRANS (ABS): 0.02 THOU/MM3 (ref 0–0.07)
IMMATURE GRANULOCYTES: 0.5 %
INR BLD: 1.22 (ref 0.85–1.13)
LYMPHOCYTES # BLD: 9.4 %
LYMPHOCYTES ABSOLUTE: 0.4 THOU/MM3 (ref 1–4.8)
MAGNESIUM: 2 MG/DL (ref 1.6–2.4)
MCH RBC QN AUTO: 30.5 PG (ref 26–33)
MCHC RBC AUTO-ENTMCNC: 30.1 GM/DL (ref 32.2–35.5)
MCV RBC AUTO: 101.1 FL (ref 81–99)
MONOCYTES # BLD: 15.3 %
MONOCYTES ABSOLUTE: 0.7 THOU/MM3 (ref 0.4–1.3)
NUCLEATED RED BLOOD CELLS: 0 /100 WBC
PHOSPHORUS: 2.5 MG/DL (ref 2.4–4.7)
PLATELET # BLD: 359 THOU/MM3 (ref 130–400)
PMV BLD AUTO: 9.6 FL (ref 9.4–12.4)
POTASSIUM SERPL-SCNC: 4.6 MEQ/L (ref 3.5–5.2)
RBC # BLD: 2.66 MILL/MM3 (ref 4.2–5.4)
SEG NEUTROPHILS: 66.6 %
SEGMENTED NEUTROPHILS ABSOLUTE COUNT: 2.9 THOU/MM3 (ref 1.8–7.7)
SODIUM BLD-SCNC: 137 MEQ/L (ref 135–145)
TOTAL PROTEIN: 4.8 G/DL (ref 6.1–8)
WBC # BLD: 4.3 THOU/MM3 (ref 4.8–10.8)

## 2021-11-21 PROCEDURE — 2580000003 HC RX 258: Performed by: FAMILY MEDICINE

## 2021-11-21 PROCEDURE — 6370000000 HC RX 637 (ALT 250 FOR IP): Performed by: FAMILY MEDICINE

## 2021-11-21 PROCEDURE — 99232 SBSQ HOSP IP/OBS MODERATE 35: CPT | Performed by: PHYSICIAN ASSISTANT

## 2021-11-21 PROCEDURE — 84100 ASSAY OF PHOSPHORUS: CPT

## 2021-11-21 PROCEDURE — 85025 COMPLETE CBC W/AUTO DIFF WBC: CPT

## 2021-11-21 PROCEDURE — 85610 PROTHROMBIN TIME: CPT

## 2021-11-21 PROCEDURE — 94640 AIRWAY INHALATION TREATMENT: CPT

## 2021-11-21 PROCEDURE — 85014 HEMATOCRIT: CPT

## 2021-11-21 PROCEDURE — 6360000002 HC RX W HCPCS: Performed by: FAMILY MEDICINE

## 2021-11-21 PROCEDURE — 6370000000 HC RX 637 (ALT 250 FOR IP): Performed by: NURSE PRACTITIONER

## 2021-11-21 PROCEDURE — P9016 RBC LEUKOCYTES REDUCED: HCPCS

## 2021-11-21 PROCEDURE — 2700000000 HC OXYGEN THERAPY PER DAY

## 2021-11-21 PROCEDURE — 6370000000 HC RX 637 (ALT 250 FOR IP): Performed by: PHYSICIAN ASSISTANT

## 2021-11-21 PROCEDURE — 36430 TRANSFUSION BLD/BLD COMPNT: CPT

## 2021-11-21 PROCEDURE — C9113 INJ PANTOPRAZOLE SODIUM, VIA: HCPCS | Performed by: FAMILY MEDICINE

## 2021-11-21 PROCEDURE — 36415 COLL VENOUS BLD VENIPUNCTURE: CPT

## 2021-11-21 PROCEDURE — 85018 HEMOGLOBIN: CPT

## 2021-11-21 PROCEDURE — 80053 COMPREHEN METABOLIC PANEL: CPT

## 2021-11-21 PROCEDURE — 82948 REAGENT STRIP/BLOOD GLUCOSE: CPT

## 2021-11-21 PROCEDURE — 1200000003 HC TELEMETRY R&B

## 2021-11-21 PROCEDURE — 83735 ASSAY OF MAGNESIUM: CPT

## 2021-11-21 RX ORDER — SODIUM CHLORIDE 0.9 % (FLUSH) 0.9 %
5-40 SYRINGE (ML) INJECTION PRN
Status: CANCELLED | OUTPATIENT
Start: 2021-11-21

## 2021-11-21 RX ORDER — POLYETHYLENE GLYCOL 3350 17 G/17G
238 POWDER, FOR SOLUTION ORAL ONCE
Status: COMPLETED | OUTPATIENT
Start: 2021-11-21 | End: 2021-11-21

## 2021-11-21 RX ORDER — DOCUSATE SODIUM 100 MG/1
100 CAPSULE, LIQUID FILLED ORAL DAILY
Status: DISCONTINUED | OUTPATIENT
Start: 2021-11-21 | End: 2021-11-23 | Stop reason: HOSPADM

## 2021-11-21 RX ORDER — POLYETHYLENE GLYCOL 3350 17 G/17G
17 POWDER, FOR SOLUTION ORAL DAILY
Status: DISCONTINUED | OUTPATIENT
Start: 2021-11-21 | End: 2021-11-23 | Stop reason: HOSPADM

## 2021-11-21 RX ADMIN — INSULIN LISPRO 2 UNITS: 100 INJECTION, SOLUTION INTRAVENOUS; SUBCUTANEOUS at 16:52

## 2021-11-21 RX ADMIN — TRAZODONE HYDROCHLORIDE 100 MG: 100 TABLET ORAL at 20:52

## 2021-11-21 RX ADMIN — Medication 400 MG: at 20:52

## 2021-11-21 RX ADMIN — RANOLAZINE 500 MG: 500 TABLET, FILM COATED, EXTENDED RELEASE ORAL at 21:03

## 2021-11-21 RX ADMIN — GABAPENTIN 100 MG: 100 CAPSULE ORAL at 15:42

## 2021-11-21 RX ADMIN — POLYETHYLENE GLYCOL 3350 238 G: 17 POWDER, FOR SOLUTION ORAL at 17:11

## 2021-11-21 RX ADMIN — PANTOPRAZOLE SODIUM 40 MG: 40 INJECTION, POWDER, FOR SOLUTION INTRAVENOUS at 20:52

## 2021-11-21 RX ADMIN — DULOXETINE HYDROCHLORIDE 30 MG: 30 CAPSULE, DELAYED RELEASE ORAL at 20:52

## 2021-11-21 RX ADMIN — SODIUM CHLORIDE, PRESERVATIVE FREE 10 ML: 5 INJECTION INTRAVENOUS at 21:01

## 2021-11-21 RX ADMIN — TIOTROPIUM BROMIDE INHALATION SPRAY 2 PUFF: 3.12 SPRAY, METERED RESPIRATORY (INHALATION) at 07:45

## 2021-11-21 RX ADMIN — Medication 400 MG: at 08:38

## 2021-11-21 RX ADMIN — METOPROLOL SUCCINATE 50 MG: 50 TABLET, FILM COATED, EXTENDED RELEASE ORAL at 08:38

## 2021-11-21 RX ADMIN — BUDESONIDE AND FORMOTEROL FUMARATE DIHYDRATE 2 PUFF: 160; 4.5 AEROSOL RESPIRATORY (INHALATION) at 07:45

## 2021-11-21 RX ADMIN — PREDNISONE 5 MG: 5 TABLET ORAL at 08:38

## 2021-11-21 RX ADMIN — PIPERACILLIN AND TAZOBACTAM 3375 MG: 3; .375 INJECTION, POWDER, LYOPHILIZED, FOR SOLUTION INTRAVENOUS at 20:54

## 2021-11-21 RX ADMIN — PIPERACILLIN AND TAZOBACTAM 3375 MG: 3; .375 INJECTION, POWDER, LYOPHILIZED, FOR SOLUTION INTRAVENOUS at 15:41

## 2021-11-21 RX ADMIN — PANTOPRAZOLE SODIUM 40 MG: 40 INJECTION, POWDER, FOR SOLUTION INTRAVENOUS at 08:38

## 2021-11-21 RX ADMIN — RANOLAZINE 500 MG: 500 TABLET, FILM COATED, EXTENDED RELEASE ORAL at 08:38

## 2021-11-21 RX ADMIN — HYDROXYCHLOROQUINE SULFATE 200 MG: 200 TABLET ORAL at 08:38

## 2021-11-21 RX ADMIN — AZATHIOPRINE 100 MG: 50 TABLET ORAL at 08:38

## 2021-11-21 RX ADMIN — GABAPENTIN 100 MG: 100 CAPSULE ORAL at 20:52

## 2021-11-21 RX ADMIN — CLONIDINE HYDROCHLORIDE 0.1 MG: 0.1 TABLET ORAL at 20:52

## 2021-11-21 RX ADMIN — BUDESONIDE AND FORMOTEROL FUMARATE DIHYDRATE 2 PUFF: 160; 4.5 AEROSOL RESPIRATORY (INHALATION) at 17:43

## 2021-11-21 RX ADMIN — PIPERACILLIN AND TAZOBACTAM 3375 MG: 3; .375 INJECTION, POWDER, LYOPHILIZED, FOR SOLUTION INTRAVENOUS at 04:36

## 2021-11-21 RX ADMIN — GABAPENTIN 100 MG: 100 CAPSULE ORAL at 08:38

## 2021-11-21 RX ADMIN — ATORVASTATIN CALCIUM 80 MG: 80 TABLET, FILM COATED ORAL at 08:38

## 2021-11-21 RX ADMIN — SODIUM CHLORIDE: 9 INJECTION, SOLUTION INTRAVENOUS at 19:48

## 2021-11-21 RX ADMIN — ISOSORBIDE MONONITRATE 60 MG: 60 TABLET, EXTENDED RELEASE ORAL at 08:38

## 2021-11-21 RX ADMIN — CLONIDINE HYDROCHLORIDE 0.1 MG: 0.1 TABLET ORAL at 08:38

## 2021-11-21 RX ADMIN — INSULIN LISPRO 2 UNITS: 100 INJECTION, SOLUTION INTRAVENOUS; SUBCUTANEOUS at 12:00

## 2021-11-21 RX ADMIN — MONTELUKAST SODIUM 5 MG: 5 TABLET, CHEWABLE ORAL at 20:52

## 2021-11-21 RX ADMIN — DULOXETINE HYDROCHLORIDE 30 MG: 30 CAPSULE, DELAYED RELEASE ORAL at 08:38

## 2021-11-21 ASSESSMENT — PAIN SCALES - GENERAL: PAINLEVEL_OUTOF10: 0

## 2021-11-21 NOTE — PROGRESS NOTES
Hospitalist Progress Note    Patient:  Maurizio Do    Unit/Bed:6K-04/004-A  YOB: 1940  MRN: [id number]   Acct: [de-identified]   PCP: Vicky Yeboah DO  Code Status: Full Code  Date of Admission: 11/18/2021    Expected Discharge: 2-3 days  Disposition: From SNF    Assessment/Plan:    1. Rectal bleeding:   Limited history available. Recent bloody bowel movements reported at SNF, unclear dark or bright. None since admission. GI consulted - clear liquid diet, hold Eliquis. Monitor H&H. Continue PPI twice daily   - Hgb dropped to 6.7 overnight. Pt received 1U PRBC. Pt with black, tarry BM overnight. Discussed case with GI, pt had an EGD 6 wks ago which was unremarkable, considering colonoscopy. 2. Persistent vs recurrent acute sigmoid diverticulitis: CT A/P reported persistent appearance of acute sigmoid diverticulitis. ?Diverticular bleed. GI is following. Pt has been treated OP with Augmentin then Cipro/Flagyl. More likely to be persistent diverticulitis than acute flare given patient denies abd pain and diarrhea. Discussed with GI - Continue with IV Zosyn for now. 3. Acute on chronic macrocytic anemia: + hx anemia of chronic disease. Monitor H&H every 6 hours. Transfuse for Hgb <7 or hemodynamic instability. 4. EMMA on CKD stage 3:  Prerenal due to anemia, dehydration, compounded by ARB and diuretic use. Hold nephrotoxins. IVF. Resolved. 5. Hyperkalemia: Mild, secondary to EMMA. Resolved. 6. Hypercalcemia:  Mild, likely due to dehydration. IV fluids. Resolved. 7. PAF: Anticoagulated on Eliquis, currently held for blood loss anemia. Rate controlled on metoprolol    8. Recently diagnosed proximal BLE DVT and PE: ?Underlying GI malignancy. Eliquis held with plan to resume as indicated. May require IVC filter placement if patient remains a poor candidate to resume therapeutic anticoagulation.  - Resume Eliquis when okay with GI.     9. Coagulopathy: INR 1.8.  FFP administration deferred despite INR of 1.8 due to hemodynamic stability and recently diagnosed proximal BLE DVT and PE -- may require transfusion pending ongoing clinical course. Maintain telemetry. 10. Chronic hypoxic respiratory failure / COPD: Patient wears 2 to 4 L nasal cannula at baseline. Resume home inhalers    11. Essential hypertension: Stable. Resume home antihypertensives, continue to hold losartan and diuretics today EMMA. 12. Hx CAD: ASA held for GIB. Statin, ranexa resumed. 13. Chronic diastolic CHF (EF 70% / Y6CZ per TTE 8/2020) / hypertrophic cardiomyopathy: Compensated. Bumex held for EMMA/to prioritize IVF hydration. Monitor I/O, daily weights. 14. IDDMII: Lantus held while NPO, glucose is borderline low. Monitor and resume as indicated. SSI. 15. Rheumatoid arthritis: Imuran, plaquenil and prednisone resumed. 16. Depression: Home meds resumed    17. Prolonged QT/QTc: In the setting of RBBB. Chief Complaint: rectal bleeding     HPI / Hospital Course: Per HPI: \"79 y/o F extensive PMHx including: GERD, diverticulosis with recently diagnosed diverticulitis (on cipro/flagyl), paroxysmal Afib (anticoagulated on eliquis), recently diagnosed BLE DVT/PE, chronic hypoxic respiratory failure (on 2-4L NC), HTN, HLD, IDDM2, CKD3, former tobacco use and other PMHx as indicated below -- presents to [facility] with a chief complaint of rectal bleeding. History obtained from the patient (limited historian) and EMR.     Patient currently resides at a nursing facility. Per report, she has been having bloody stools for the past couple of days. Nurses reportedly informed her that her diapers had been bloody, but unsure whether black or bright red in color. The patient provides a limited history, but describes loose bowel movements that \"just squirt out\" and \"come out all at once in clumps. \" She does endorse recent fatigue, but denies nausea/emesis, abdominal pain, fevers/chills, chest pain, shortness of breath or urinary symptoms.      Of note, the patient was recently admitted to [facility] (11/6-11/8/21) for sigmoid diverticulitis / treated with cipro/flagyl (still completing course of therapy) / and was found to have new, unprovoked bilateral proximal DVTs and bilateral PE without R heart strain. She was initiated on therapeutic lovenox, and discharged on eliquis, which she takes twice daily, as prescribed.     ED provider reported negative rectal exam. RN states no recurrence of bloody bowel movements or BRBPR since arrival to [facility].     ED course: afebrile, /56, HR 60, RR 18 with SpO2 100% on baseline 3L NC. Workup was remarkable for: Hgb 8.1 (.5), WBC/Plt nml. K 5.3, Ca 10.1, Cr 1.5 (BUN 63, eGFR 33), AST 58. FOBT positive. INR 1.8. COVID negative. EKG showed wide QRS rhythm, chronic RBBB. No imaging obtained. Received 500 cc IVF bolus, 80 mg IV protonix. Hospitalist service contacted for admission. Please refer to the A&P for additional details. \"     11/19- Patient has not yet had a bowel movement. She currently denies fever/chills, shortness of breath, chest pain, abdominal pain, N/V, lightheadedness. Reports diarrhea PTA, none at this time. 11/20- Pt states she still has not had a BM. She denies lightheadedness, SOB, CP, abd pain, n/v/d. Hgb stable. Clear liquid diet per GI. Subjective (past 24 hours): Hgb 6.7 overnight. Pt received 1U PRBC. Pt reports 2 BMs, black and tarry per nursing. No fever/chills, lightheadedness, abd pain, n/v/d.       ROS: Pertinent positives as noted in HPI. All other systems reviewed and negative. Past medical history, family history, social history and allergies reviewed again and is unchanged since admission. Medications:  Reviewed.   Infusion Medications    sodium chloride      sodium chloride      sodium chloride 50 mL/hr at 11/21/21 0405    dextrose       Scheduled Medications    pantoprazole  40 mg IntraVENous BID    [Held by provider] aspirin  162 mg Oral Daily    atorvastatin  80 mg Oral Daily    azaTHIOprine  100 mg Oral Daily    [Held by provider] bumetanide  0.5 mg Oral Daily    cloNIDine  0.1 mg Oral BID    DULoxetine  30 mg Oral BID    gabapentin  100 mg Oral TID    [Held by provider] fenofibrate  160 mg Oral QAM AC    hydroxychloroquine  200 mg Oral Daily    isosorbide mononitrate  60 mg Oral Daily    [Held by provider] losartan  50 mg Oral Daily    metoprolol succinate  50 mg Oral Daily    piperacillin-tazobactam  3,375 mg IntraVENous Q8H    predniSONE  5 mg Oral Daily    ranolazine  500 mg Oral BID    [Held by provider] spironolactone  50 mg Oral Daily    traZODone  100 mg Oral Nightly    montelukast  5 mg Oral Nightly    magnesium oxide  400 mg Oral BID    sodium chloride flush  10 mL IntraVENous 2 times per day    budesonide-formoterol  2 puff Inhalation BID    tiotropium  2 puff Inhalation Daily    insulin lispro  0-12 Units SubCUTAneous TID WC    insulin lispro  0-6 Units SubCUTAneous Nightly     PRN Meds: sodium chloride, albuterol sulfate HFA, sodium chloride flush, sodium chloride, acetaminophen **OR** acetaminophen, glucose, dextrose, glucagon (rDNA), dextrose    I/O:     Intake/Output Summary (Last 24 hours) at 11/21/2021 0856  Last data filed at 11/21/2021 0709  Gross per 24 hour   Intake 1317.92 ml   Output 1100 ml   Net 217.92 ml       Diet:  ADULT DIET; Clear Liquid; No red dye    Exam:  BP (!) 167/57   Pulse 65   Temp 98.2 °F (36.8 °C) (Oral)   Resp 18   Ht 5' 5\" (1.651 m)   Wt 190 lb 7.6 oz (86.4 kg)   SpO2 96%   BMI 31.70 kg/m²   General:   Pleasant female. NAD. HEENT:  normocephalic and atraumatic. No scleral icterus. PERR. Neck: supple. No JVD. No thyromegaly. Lungs: clear to auscultation. No retractions  Cardiac: RRR without murmur. Abdomen: soft. LLQ tenderness. Bowel sounds positive. Extremities:  No clubbing, cyanosis, or edema x 4. Vasculature: capillary refill < 3 seconds. Palpable LE pulses bilaterally. Skin:  warm and dry. Psych:  Alert and oriented x3. Affect appropriate  Lymph:  No supraclavicular adenopathy. Neurologic:  No focal deficit. No seizures. Data: (All radiographs, tracings, PFTs, and imaging are personally viewed and interpreted unless otherwise noted)  Labs:   Recent Labs     11/18/21  2100 11/19/21  0550 11/20/21  0507 11/20/21  1057 11/20/21  1516 11/20/21  2252 11/21/21  0455   WBC 4.8  --  3.9*  --   --   --  4.3*   HGB 8.1*   < > 7.7*   < > 7.2* 6.7* 8.1*   HCT 26.4*   < > 24.7*   < > 22.5* 21.8* 26.9*     --  379  --   --   --  359    < > = values in this interval not displayed. Recent Labs     11/20/21  0507 11/20/21  1057 11/21/21  0455    140 137   K 4.8 4.6 4.6    100 100   CO2 28 25 29   BUN 38* 31* 24*   CREATININE 0.9 0.9 0.9   CALCIUM 10.0 10.3 9.7   PHOS 2.4  --  2.5     Recent Labs     11/18/21  2100 11/20/21  1057 11/21/21  0455   AST 58* 59* 53*   ALT 39 37 34   BILITOT 0.3 0.3 0.6   ALKPHOS 42 31* 29*     Recent Labs     11/18/21  2216 11/20/21  1057 11/21/21  0455   INR 1.80* 1.25* 1.22*     No results for input(s): Millie Parekhs in the last 72 hours.   Urinalysis:   Lab Results   Component Value Date    NITRU NEGATIVE 11/06/2021    WBCUA 2-4 11/06/2021    BACTERIA FEW 11/06/2021    RBCUA 0-2 11/06/2021    BLOODU NEGATIVE 11/06/2021    SPECGRAV 1.019 11/08/2020    GLUCOSEU NEGATIVE 11/06/2021     Urine culture: No results found for: LABURIN  Micro:   Blood culture #1:   Lab Results   Component Value Date    BC No growth-preliminary No growth  09/29/2021     Blood culture #2:No results found for: [facility]  Organism:No results found for: ORG    No results found for: LABGRAM  MRSA culture only:No results found for: [address] [place] [address]  Respiratory culture: No results found for: CULTRESP  Aerobic and Anaerobic :  No results found for: LABAERO  No results found for: [facility]    Radiology Reports:  CT ABDOMEN PELVIS W IV CONTRAST Additional Contrast? None   Final Result   Persistent appearance of acute sigmoid diverticulitis. Minimal increase in the pericolonic fluid but no drainable fluid collection or abscess is identified. **This report has been created using voice recognition software. It may contain minor errors which are inherent in voice recognition technology. **      Final report electronically signed by Dr. Terri Sellers on 11/19/2021 8:36 AM        CT ABDOMEN PELVIS W IV CONTRAST Additional Contrast? None    Result Date: 11/19/2021  PROCEDURE: CT ABDOMEN PELVIS W IV CONTRAST CLINICAL INFORMATION: Evaluate for persistence of acute diverticulitis . TECHNIQUE: 2-D multiplanar postcontrast images of the abdomen and pelvis in 5 mm intervals. Isovue-370 IV contrast. All CT scans at this facility use dose modulation, iterative reconstruction, and/or weight-based dosing when appropriate to reduce radiation dose to as low as reasonably achievable. COMPARISON: 11/6/2021 FINDINGS: Abdomen and pelvis: Pelvis Extensive diverticulosis. The sigmoid colon there is pericolonic fluid and thickened bowel area of diverticula. This appearance is nearly identical to the prior exam there is minimal increase in the pericolonic fluid. There still no drainable fluid collection. Urinary bladder is unremarkable but mildly distended. ABDOMEN: Liver, and spleen are normal. Gallbladder is mildly distended and there are stones in the neck. The pancreas is normal. Kidneys enhance symmetrically. Small exophytic lesion upper pole left kidney statistically a cyst. Aorta is atherosclerotic it is not aneurysmal. Bones Scoliosis degenerative changes and postsurgical changes lumbar spine. Marked degenerative change left hip. No suspicious bone lesions. Lung bases Left basilar atelectasis no pleural effusions. Cardiomegaly. Persistent appearance of acute sigmoid diverticulitis.  Minimal increase in the pericolonic fluid but no drainable fluid collection or abscess is identified. **This report has been created using voice recognition software. It may contain minor errors which are inherent in voice recognition technology. ** Final report electronically signed by Dr. Andry Duran on 11/19/2021 8:36 AM      Tele:   [x] yes             [] no      Active Hospital Problems    Diagnosis Date Noted    Rectal bleeding [K62.5] 11/18/2021       Electronically signed by Maribel Duncan PA-C on 11/21/2021 at 8:56 AM

## 2021-11-21 NOTE — PROGRESS NOTES
Gastroenterology Progress Note:     Patient Name:  Goldy Bonner   MRN: [id number]  [id number]  YOB: 1940  Admit Date: 11/18/2021  8:52 PM  Primary Care Physician: Brianne Garrido Rd, DO   6K-04/004-A     Patient seen and examined. 24 hours events and chart reviewed. Subjective: Pt reports she is feeling fine. Denies abdominal pain, diarrhea. No nausea vomiting. Dark tarry stool this am, Hg dropped and required PRC last night. On IVF at 50 mL/hr. Continues to be asymptomatic. Objective:  BP (!) 167/57   Pulse 65   Temp 98.2 °F (36.8 °C) (Oral)   Resp 18   Ht 5' 5\" (1.651 m)   Wt 190 lb 7.6 oz (86.4 kg)   SpO2 96%   BMI 31.70 kg/m²     Physical Exam:    General:  Nourished in no distress  HEENT: Atraumatic, normocephalic. Moist oral mucous membranes. Neck: Supple without adenopathy, JVD, thyromegaly or masses. Trachea midline. CV: Heart RRR, no murmurs, rubs, gallops. Resp: Even, easy without cough or accessory use. Lungs clear to ascultation bilaterally. Abd: Round, soft, nontender. No hepatosplenomegaly or mass present. Active bowel sounds heard. No distention noted. Ext:  Without cyanosis, clubbing, edema. Skin: Pink, warm, dry  Neuro:  Alert, oriented x 3 with no obvious deficits.        Rectal: deferred    Labs:   CBC:   Lab Results   Component Value Date    WBC 4.3 11/21/2021    HGB 8.1 11/21/2021    HCT 26.9 11/21/2021    .1 11/21/2021     11/21/2021     BMP:   Lab Results   Component Value Date     11/21/2021    K 4.6 11/21/2021    K 3.6 11/06/2020     11/21/2021    CO2 29 11/21/2021    PHOS 2.5 11/21/2021    BUN 24 11/21/2021    CREATININE 0.9 11/21/2021    CALCIUM 9.7 11/21/2021     PT/INR:   Lab Results   Component Value Date    INR 1.22 11/21/2021     Lipids:   Lab Results   Component Value Date    ALKPHOS 29 11/21/2021    ALT 34 11/21/2021    AST 53 11/21/2021    BILITOT 0.6 11/21/2021    BILIDIR <0.2 11/06/2021    LABALBU 2.7 11/21/2021 LIPASE 20.8 11/06/2021       GI history:   9/14/21 CT abd/pelvix with persistent wall thickening sigmoid colon, hypertrophy from chronic diverticular disease could not r/o mass, perisigmoid fat stranding suggestive of actue diverticulitis, no encapsulated fluid. Antibiotics and barium enema recommended. Admitted 11/6 for acute diverticulitis on CT A/P along with bilateral lower lobe PE/DVT left common femoral vein. Last EGD/dilation 9/28/21 with Dr. Kell Somers, multiple fundic gland polyps, LE incompetaance with hiatal hernia, minimal antral gastritis, Shatzkis ring, irregular GE junction without esophagitis. Dilation to 54 fr. Last colonoscopy 2016 Dr. Curt Ovalles requiring pediatric colonoscope, prominent left sided diverticulosis, small internal hemorrhoids, small diminutive polyp that was tubular adenoma, no repeat 2/2 age. Significant Diagnostic Studies:    11/19/2021 CT A/P  ABDOMEN:   Liver, and spleen are normal.   Gallbladder is mildly distended and there are stones in the neck. The pancreas is normal.   Kidneys enhance symmetrically. Small exophytic lesion upper pole left kidney statistically a cyst.   Aorta is atherosclerotic it is not aneurysmal.   Bones   Scoliosis degenerative changes and postsurgical changes lumbar spine. Marked degenerative change left hip. No suspicious bone lesions. Lung bases   Left basilar atelectasis no pleural effusions. Cardiomegaly.           Impression   Persistent appearance of acute sigmoid diverticulitis. Minimal increase in the pericolonic fluid but no drainable fluid collection or abscess is identified.          Current Meds:  Scheduled Meds:   polyethylene glycol  17 g Oral Daily    docusate sodium  100 mg Oral Daily    pantoprazole  40 mg IntraVENous BID    [Held by provider] aspirin  162 mg Oral Daily    atorvastatin  80 mg Oral Daily    azaTHIOprine  100 mg Oral Daily    [Held by provider] bumetanide  0.5 mg Oral Daily    cloNIDine  0.1 mg Oral BID    DULoxetine  30 mg Oral BID    gabapentin  100 mg Oral TID    [Held by provider] fenofibrate  160 mg Oral QAM AC    hydroxychloroquine  200 mg Oral Daily    isosorbide mononitrate  60 mg Oral Daily    [Held by provider] losartan  50 mg Oral Daily    metoprolol succinate  50 mg Oral Daily    piperacillin-tazobactam  3,375 mg IntraVENous Q8H    predniSONE  5 mg Oral Daily    ranolazine  500 mg Oral BID    [Held by provider] spironolactone  50 mg Oral Daily    traZODone  100 mg Oral Nightly    montelukast  5 mg Oral Nightly    magnesium oxide  400 mg Oral BID    sodium chloride flush  10 mL IntraVENous 2 times per day    budesonide-formoterol  2 puff Inhalation BID    tiotropium  2 puff Inhalation Daily    insulin lispro  0-12 Units SubCUTAneous TID WC    insulin lispro  0-6 Units SubCUTAneous Nightly     Continuous Infusions:   sodium chloride      sodium chloride      sodium chloride 50 mL/hr at 11/21/21 0405    dextrose         Assessment:  79 y/o female with a PMH of afib on Eliquis, BLE DVT/PT, CAD, CHF, CKD, COPD, DM, known to our practice as a pt of Marcie Boone/Dr. Carlos Bejarano, who we follow routinely for chronic GERD, presbyesophagus, hiatal hernia, chronic constipation, HARSHAD, colonic diverticulosis who presented from [facility] with new fecal incontinence, diarrhea x 3 days and rectal bleeding reported by nursing staff at [facility]. CT A/P 11/19/21 with persistent appearance of acute sigmoid diverticulitis, minimal increase in pericolonic fluid but no drainable collection or abscess identified. She has failed O/P courses of Augmentin then Cipro/Flagyl. Her Eliquis and aspirin has been held since 11/19. INR was 1.8 on admission. Since admission she has been on Zosyn. Her Hg was 8.1 and has been stable between 7.3-8.4 this admission. Last night Hg 6.7, up to 8.1/8.2 this am after 1 u PRC. 1. Rectal bleeding, reported by [facility] staff, with a dark tarry stool last night  2. Likely persistent acute sigmoid diverticulitis s/p failure of O/P therapy with Augmentin, Cipro/Flagyl, on Zosyn  3. Coagulopathy, with INR 1.8 on admission, no reversal given due to recent DVT/PE, today INR 1.22  3. Unable to R/O mass on prior CT  4. Reported fecal incontinence, none since admission  5. New unprovoked DVT/PE, new diagnoses 11/6, Eliquis on hold due to suspected GIB  6. Prominent left sided diveritculosis on prior colonoscopy  7. Internal hemorrhoids on colonoscopy  8. History of afib, maintained on Eliquis  9. History of chronic GERD, denies at this time    Plan:     Monitor H&H, tranfuse for Hg <7.0   Nursing to monitor all stools and document color, quantity and consistency   Stool studies if return of diarrheal stools   Continue Zosyn for diverticulitis   Hold anticoagulant due to suspected GIB   Ok for clear liquids diet at this point   CBC in am   Continue twice daily PPI   Prep for colonoscopy on Monday. Time TBD, awaiting return call from endo.  Will continue to follow. Case reviewed and impression/plan reviewed in collaboration with Dr. Db aBltazar .     Electronically signed by LONDON Burr CNP on 11/21/2021 at 10:13 AM    [facility]

## 2021-11-21 NOTE — PROGRESS NOTES
Received phone call with critical lab results at 23:27 of Hgb 6.7 and Hct of 21.8. Called [employer]	[facility] and relayed results of Hgb 6.7 and Hct 21.8 at 23:31. Orders placed for blood transfusion by [employer]	[place].

## 2021-11-22 ENCOUNTER — APPOINTMENT (OUTPATIENT)
Dept: GENERAL RADIOLOGY | Age: 81
DRG: 377 | End: 2021-11-22
Payer: MEDICARE

## 2021-11-22 LAB
ALBUMIN SERPL-MCNC: 2.5 G/DL (ref 3.5–5.1)
ALP BLD-CCNC: 29 U/L (ref 38–126)
ALT SERPL-CCNC: 34 U/L (ref 11–66)
ANION GAP SERPL CALCULATED.3IONS-SCNC: 9 MEQ/L (ref 8–16)
ANISOCYTOSIS: PRESENT
AST SERPL-CCNC: 50 U/L (ref 5–40)
BASOPHILS # BLD: 0.7 %
BASOPHILS ABSOLUTE: 0 THOU/MM3 (ref 0–0.1)
BILIRUB SERPL-MCNC: 0.3 MG/DL (ref 0.3–1.2)
BUN BLDV-MCNC: 12 MG/DL (ref 7–22)
CALCIUM SERPL-MCNC: 9.2 MG/DL (ref 8.5–10.5)
CHLORIDE BLD-SCNC: 103 MEQ/L (ref 98–111)
CO2: 26 MEQ/L (ref 23–33)
CREAT SERPL-MCNC: 0.8 MG/DL (ref 0.4–1.2)
EOSINOPHIL # BLD: 10.5 %
EOSINOPHILS ABSOLUTE: 0.5 THOU/MM3 (ref 0–0.4)
ERYTHROCYTE [DISTWIDTH] IN BLOOD BY AUTOMATED COUNT: 20.4 % (ref 11.5–14.5)
ERYTHROCYTE [DISTWIDTH] IN BLOOD BY AUTOMATED COUNT: 73 FL (ref 35–45)
GFR SERPL CREATININE-BSD FRML MDRD: 68 ML/MIN/1.73M2
GLUCOSE BLD-MCNC: 120 MG/DL (ref 70–108)
GLUCOSE BLD-MCNC: 126 MG/DL (ref 70–108)
GLUCOSE BLD-MCNC: 159 MG/DL (ref 70–108)
GLUCOSE BLD-MCNC: 169 MG/DL (ref 70–108)
GLUCOSE BLD-MCNC: 187 MG/DL (ref 70–108)
HCT VFR BLD CALC: 25.4 % (ref 37–47)
HCT VFR BLD CALC: 26.1 % (ref 37–47)
HCT VFR BLD CALC: 28.9 % (ref 37–47)
HEMOGLOBIN: 7.7 GM/DL (ref 12–16)
HEMOGLOBIN: 8.1 GM/DL (ref 12–16)
HEMOGLOBIN: 9 GM/DL (ref 12–16)
IMMATURE GRANS (ABS): 0.06 THOU/MM3 (ref 0–0.07)
IMMATURE GRANULOCYTES: 1.4 %
LYMPHOCYTES # BLD: 10.5 %
LYMPHOCYTES ABSOLUTE: 0.5 THOU/MM3 (ref 1–4.8)
MCH RBC QN AUTO: 30.5 PG (ref 26–33)
MCHC RBC AUTO-ENTMCNC: 31 GM/DL (ref 32.2–35.5)
MCV RBC AUTO: 98.1 FL (ref 81–99)
MONOCYTES # BLD: 13.8 %
MONOCYTES ABSOLUTE: 0.6 THOU/MM3 (ref 0.4–1.3)
NUCLEATED RED BLOOD CELLS: 0 /100 WBC
PLATELET # BLD: 336 THOU/MM3 (ref 130–400)
PMV BLD AUTO: 9.2 FL (ref 9.4–12.4)
POTASSIUM SERPL-SCNC: 3.6 MEQ/L (ref 3.5–5.2)
RBC # BLD: 2.66 MILL/MM3 (ref 4.2–5.4)
SEG NEUTROPHILS: 63.1 %
SEGMENTED NEUTROPHILS ABSOLUTE COUNT: 2.7 THOU/MM3 (ref 1.8–7.7)
SODIUM BLD-SCNC: 138 MEQ/L (ref 135–145)
TOTAL PROTEIN: 5.1 G/DL (ref 6.1–8)
WBC # BLD: 4.3 THOU/MM3 (ref 4.8–10.8)

## 2021-11-22 PROCEDURE — 82948 REAGENT STRIP/BLOOD GLUCOSE: CPT

## 2021-11-22 PROCEDURE — 74270 X-RAY XM COLON 1CNTRST STD: CPT

## 2021-11-22 PROCEDURE — 80053 COMPREHEN METABOLIC PANEL: CPT

## 2021-11-22 PROCEDURE — 1200000003 HC TELEMETRY R&B

## 2021-11-22 PROCEDURE — 6360000004 HC RX CONTRAST MEDICATION: Performed by: INTERNAL MEDICINE

## 2021-11-22 PROCEDURE — 2580000003 HC RX 258: Performed by: FAMILY MEDICINE

## 2021-11-22 PROCEDURE — 6360000002 HC RX W HCPCS: Performed by: FAMILY MEDICINE

## 2021-11-22 PROCEDURE — 85018 HEMOGLOBIN: CPT

## 2021-11-22 PROCEDURE — 85025 COMPLETE CBC W/AUTO DIFF WBC: CPT

## 2021-11-22 PROCEDURE — 85014 HEMATOCRIT: CPT

## 2021-11-22 PROCEDURE — 2700000000 HC OXYGEN THERAPY PER DAY

## 2021-11-22 PROCEDURE — 6370000000 HC RX 637 (ALT 250 FOR IP): Performed by: FAMILY MEDICINE

## 2021-11-22 PROCEDURE — 94640 AIRWAY INHALATION TREATMENT: CPT

## 2021-11-22 PROCEDURE — C9113 INJ PANTOPRAZOLE SODIUM, VIA: HCPCS | Performed by: FAMILY MEDICINE

## 2021-11-22 PROCEDURE — 6370000000 HC RX 637 (ALT 250 FOR IP): Performed by: PHYSICIAN ASSISTANT

## 2021-11-22 PROCEDURE — 99232 SBSQ HOSP IP/OBS MODERATE 35: CPT | Performed by: PHYSICIAN ASSISTANT

## 2021-11-22 PROCEDURE — 36415 COLL VENOUS BLD VENIPUNCTURE: CPT

## 2021-11-22 RX ORDER — PANTOPRAZOLE SODIUM 40 MG/1
40 TABLET, DELAYED RELEASE ORAL
Status: DISCONTINUED | OUTPATIENT
Start: 2021-11-23 | End: 2021-11-23 | Stop reason: HOSPADM

## 2021-11-22 RX ADMIN — TIOTROPIUM BROMIDE INHALATION SPRAY 2 PUFF: 3.12 SPRAY, METERED RESPIRATORY (INHALATION) at 08:25

## 2021-11-22 RX ADMIN — PREDNISONE 5 MG: 5 TABLET ORAL at 11:00

## 2021-11-22 RX ADMIN — PIPERACILLIN AND TAZOBACTAM 3375 MG: 3; .375 INJECTION, POWDER, LYOPHILIZED, FOR SOLUTION INTRAVENOUS at 15:24

## 2021-11-22 RX ADMIN — GABAPENTIN 100 MG: 100 CAPSULE ORAL at 21:56

## 2021-11-22 RX ADMIN — CLONIDINE HYDROCHLORIDE 0.1 MG: 0.1 TABLET ORAL at 21:55

## 2021-11-22 RX ADMIN — MONTELUKAST SODIUM 5 MG: 5 TABLET, CHEWABLE ORAL at 21:55

## 2021-11-22 RX ADMIN — Medication 400 MG: at 10:59

## 2021-11-22 RX ADMIN — APIXABAN 5 MG: 5 TABLET, FILM COATED ORAL at 22:03

## 2021-11-22 RX ADMIN — INSULIN LISPRO 2 UNITS: 100 INJECTION, SOLUTION INTRAVENOUS; SUBCUTANEOUS at 16:54

## 2021-11-22 RX ADMIN — METOPROLOL SUCCINATE 50 MG: 50 TABLET, FILM COATED, EXTENDED RELEASE ORAL at 11:00

## 2021-11-22 RX ADMIN — PIPERACILLIN AND TAZOBACTAM 3375 MG: 3; .375 INJECTION, POWDER, LYOPHILIZED, FOR SOLUTION INTRAVENOUS at 21:57

## 2021-11-22 RX ADMIN — RANOLAZINE 500 MG: 500 TABLET, FILM COATED, EXTENDED RELEASE ORAL at 10:59

## 2021-11-22 RX ADMIN — TRAZODONE HYDROCHLORIDE 100 MG: 100 TABLET ORAL at 21:55

## 2021-11-22 RX ADMIN — BUDESONIDE AND FORMOTEROL FUMARATE DIHYDRATE 2 PUFF: 160; 4.5 AEROSOL RESPIRATORY (INHALATION) at 08:25

## 2021-11-22 RX ADMIN — PANTOPRAZOLE SODIUM 40 MG: 40 INJECTION, POWDER, FOR SOLUTION INTRAVENOUS at 11:02

## 2021-11-22 RX ADMIN — HYDROXYCHLOROQUINE SULFATE 200 MG: 200 TABLET ORAL at 11:01

## 2021-11-22 RX ADMIN — GABAPENTIN 100 MG: 100 CAPSULE ORAL at 11:01

## 2021-11-22 RX ADMIN — ISOSORBIDE MONONITRATE 60 MG: 60 TABLET, EXTENDED RELEASE ORAL at 11:01

## 2021-11-22 RX ADMIN — DIATRIZOATE MEGLUMINE AND DIATRIZOATE SODIUM 240 ML: 600; 100 SOLUTION ORAL; RECTAL at 10:02

## 2021-11-22 RX ADMIN — GABAPENTIN 100 MG: 100 CAPSULE ORAL at 15:26

## 2021-11-22 RX ADMIN — AZATHIOPRINE 100 MG: 50 TABLET ORAL at 11:01

## 2021-11-22 RX ADMIN — ATORVASTATIN CALCIUM 80 MG: 80 TABLET, FILM COATED ORAL at 11:02

## 2021-11-22 RX ADMIN — DULOXETINE HYDROCHLORIDE 30 MG: 30 CAPSULE, DELAYED RELEASE ORAL at 11:01

## 2021-11-22 RX ADMIN — SODIUM CHLORIDE: 9 INJECTION, SOLUTION INTRAVENOUS at 21:59

## 2021-11-22 RX ADMIN — BUDESONIDE AND FORMOTEROL FUMARATE DIHYDRATE 2 PUFF: 160; 4.5 AEROSOL RESPIRATORY (INHALATION) at 17:52

## 2021-11-22 RX ADMIN — CLONIDINE HYDROCHLORIDE 0.1 MG: 0.1 TABLET ORAL at 11:01

## 2021-11-22 RX ADMIN — DULOXETINE HYDROCHLORIDE 30 MG: 30 CAPSULE, DELAYED RELEASE ORAL at 21:55

## 2021-11-22 RX ADMIN — PIPERACILLIN AND TAZOBACTAM 3375 MG: 3; .375 INJECTION, POWDER, LYOPHILIZED, FOR SOLUTION INTRAVENOUS at 05:06

## 2021-11-22 RX ADMIN — SODIUM CHLORIDE: 9 INJECTION, SOLUTION INTRAVENOUS at 05:29

## 2021-11-22 RX ADMIN — RANOLAZINE 500 MG: 500 TABLET, FILM COATED, EXTENDED RELEASE ORAL at 21:55

## 2021-11-22 RX ADMIN — Medication 400 MG: at 21:56

## 2021-11-22 ASSESSMENT — PAIN SCALES - GENERAL
PAINLEVEL_OUTOF10: 0

## 2021-11-22 NOTE — PROGRESS NOTES
11/06/2021     Significant Diagnostic Studies:   Barium enema 11/22/21  FINDINGS:  image shows nonspecific nonobstructive gas pattern. Postsurgical changes lumbar spine. Limitations: Mucosal detail evaluation for polyps is limited Gastrografin. There is retrograde filling of the entire colon to the cecum. Patient is status post appendectomy. There is extensive sigmoid diverticulosis. No distinct extravasation is identified to indicate diverticulitis. No ulcers or constricting lesion is seen. No masses are identified.       Impression   Marked sigmoid diverticulosis.      Current Meds:  Scheduled Meds:   polyethylene glycol  17 g Oral Daily    docusate sodium  100 mg Oral Daily    pantoprazole  40 mg IntraVENous BID    [Held by provider] aspirin  162 mg Oral Daily    atorvastatin  80 mg Oral Daily    azaTHIOprine  100 mg Oral Daily    [Held by provider] bumetanide  0.5 mg Oral Daily    cloNIDine  0.1 mg Oral BID    DULoxetine  30 mg Oral BID    gabapentin  100 mg Oral TID    [Held by provider] fenofibrate  160 mg Oral QAM AC    hydroxychloroquine  200 mg Oral Daily    isosorbide mononitrate  60 mg Oral Daily    [Held by provider] losartan  50 mg Oral Daily    metoprolol succinate  50 mg Oral Daily    piperacillin-tazobactam  3,375 mg IntraVENous Q8H    predniSONE  5 mg Oral Daily    ranolazine  500 mg Oral BID    [Held by provider] spironolactone  50 mg Oral Daily    traZODone  100 mg Oral Nightly    montelukast  5 mg Oral Nightly    magnesium oxide  400 mg Oral BID    sodium chloride flush  10 mL IntraVENous 2 times per day    budesonide-formoterol  2 puff Inhalation BID    tiotropium  2 puff Inhalation Daily    insulin lispro  0-12 Units SubCUTAneous TID     insulin lispro  0-6 Units SubCUTAneous Nightly     Continuous Infusions:   sodium chloride      sodium chloride      sodium chloride 50 mL/hr at 11/22/21 0530    dextrose         Assessment:  77 yo F admitted 11/[facility] from [facility] for bloody stools. Endorses fecal incontinence with possible diarrhea. Recently admitted 11/06/21  Treated for acute diverticulitis. H/O recent PE/DVT 11/06/21, on Eliquis. H/O afib, on Eliquis. H/O CAD, on aspirin. CT A/P demonstrated persistent appearance of acute sigmoid diverticulitis, minimal increase in the pericolonic fluid but no drainable fluid collection or abscess identified. Barium enema demonstrated marked sigmoid diverticulosis, no distinct extravasation is identified to indicate diverticulitis. 1. Bloody stools- unclear if melena or red bloody stools, black stool since admission  2. Persistent diverticulitis- failed OP Augmentin  3. Acute on chronic macrocytic anemia- s/p 1 unit PRBC  4. EMMA on CKD- resolved  5. Afib- on Eliquis  6. Chronic hypoxic respiratory failure  7. COPD, not exacerbated  8. Recent bilateral lower extremity DVT & PE- on Eliquis  9. CAD- on aspirin & statin  10. H/O HTN  11. CHF, not exacerbated  12. DM  13. Leukopenia  14. Hypoalbuminemia  15. Elevated AST  16.  GERD- on PPI, recent EGD 09/2021    Plan:     Monitor H & H, transfuse prn   Nursing to monitor stool output & document   Change PPI to PO daily, home dose   Advance to carb control diet   Okay to resume Eliquis from GI standpoint, would recommend continuing to hold aspirin   RN updated   Case discussed at length with primary Wilbert STERLING   Supportive care per primary team  Will follow    Case reviewed and impression/plan reviewed in collaboration with Dr. Valentin Vang  Electronically signed by LONDON Hightower CNP on 11/22/2021 at 2:14 PM    [facility]

## 2021-11-22 NOTE — DISCHARGE INSTR - COC
Continuity of Care Form    Patient Name: Nando Foster   :  1940  MRN:  [id number]    Admit date:  2021  Discharge date:  21    Code Status Order: Full Code   Advance Directives:      Admitting Physician:  Domingo Talley MD  PCP: Héctor Beaver DO    Discharging Nurse: [address] [facility] Unit/Room#: 6K-04/004-A  Discharging Unit Phone Number: [phone]    Emergency Contact:   Extended Emergency Contact Information  Primary Emergency Contact: Donna Jovon [place] of [address] Phone: [phone]  Mobile Phone: [phone]  Relation: Child  Secondary Emergency Contact:  [address] of [address] [facility] Phone: [phone]  Mobile Phone: [phone]  Relation: Child    Past Surgical History:  Past Surgical History:   Procedure Laterality Date    APPENDECTOMY      BACK SURGERY      lumbar spine    BLADDER SUSPENSION      CARDIAC CATHETERIZATION      with stent  srm    CARPAL TUNNEL RELEASE      COLONOSCOPY      ENDOSCOPY, COLON, DIAGNOSTIC      ESOPHAGEAL DILATATION      HYSTERECTOMY  1974    JOINT REPLACEMENT Left 1992    Left Knee    UPPER GASTROINTESTINAL ENDOSCOPY N/A 2021    [facility]    EGD BIOPSY performed by Miguel Espinal MD at [facility]       Immunization History:   Immunization History   Administered Date(s) Administered    COVID-19, Pfizer, PF, 30mcg/0.3mL 2021, 2021    Influenza Virus Vaccine 10/04/2012       Active Problems:  Patient Active Problem List   Diagnosis Code    Syncope R55    Diabetes mellitus ([facility] E11.9    CAD (coronary artery disease) I25.10    Hypertension I10    Atrial fibrillation (HCC) I48.91    GERD (gastroesophageal reflux disease) K21.9    Chronic kidney disease, stage III (moderate) (HCC) N18.30    COPD (chronic obstructive pulmonary disease) ([facility] J44.9    Dysuria R30.0 Diabetic hypertension-nephrosis syndrome (HCC) E11.21    Hypokalemia E87.6    Abnormal nuclear cardiac imaging test R93.1    Back pain, chronic M54.9, G89.29    Obesity (BMI 35.0-39.9 without comorbidity) E66.9    H/O class III angina pectoris Z86.79    HTN, goal below 130/80 I10    Non-rheumatic mitral regurgitation I34.0    Abnormal nuclear stress test R94.39    Osteopenia of multiple sites M85.89    Seronegative rheumatoid arthritis ([facility] M06.00    Acute respiratory failure (HCC) J96.00    Diverticulitis K57.92    Diverticulitis of colon K57.32    Multiple falls R29.6    EMMA (acute kidney injury) ([facility] N17.9    Urinary tract infection in female N39.0    Acute diverticulitis K57.92    Rectal bleeding K62.5       Isolation/Infection:   Isolation            No Isolation          Patient Infection Status       Infection Onset Added Last Indicated Last Indicated By Review Planned Expiration Resolved Resolved By    None active    Resolved    COVID-19 (Rule Out) 11/18/21 11/18/21 11/18/21 COVID-19, Rapid (Ordered)   11/18/21 Rule-Out Test Resulted    COVID-19 (Rule Out) 11/06/21 11/06/21 11/06/21 COVID-19, Rapid (Ordered)   11/06/21 Rule-Out Test Resulted    COVID-19 (Rule Out) 11/11/20 11/11/20 11/12/20 COVID-19 (Ordered)   11/12/20 Rule-Out Test Resulted    COVID-19 (Rule Out) 11/05/20 11/05/20 11/06/20 COVID-19 (Ordered)   11/06/20 Rule-Out Test Resulted    COVID-19 (Rule Out) 09/28/20 09/28/20 09/28/20 COVID-19 (Ordered)   09/29/20 Rule-Out Test Resulted    COVID-19 (Rule Out) 08/08/20 08/08/20 08/08/20 COVID-19 (Ordered)   08/08/20 Rule-Out Test Resulted            Nurse Assessment:  Last Vital Signs: BP (!) 155/69   Pulse 79   Temp 98.8 °F (37.1 °C) (Oral)   Resp 18   Ht 5' 5\" (1.651 m)   Wt 192 lb 7.4 oz (87.3 kg)   SpO2 98%   BMI 32.03 kg/m²     Last documented pain score (0-10 scale): Pain Level: 0  Last Weight:   Wt Readings from Last 1 Encounters:   11/23/21 192 lb 7.4 oz (87.3 kg)     Mental Status:  oriented and alert    IV Access:  - None    Nursing Mobility/ADLs:  Walking   Dependent  Transfer  Dependent  Bathing  [place]  Assisted  Med Delivery   whole    Wound Care Documentation and Therapy:        Elimination:  Continence: Bowel: Yes  Bladder: Yes  Urinary Catheter: None   Colostomy/Ileostomy/Ileal Conduit: No       Date of Last BM: 11/23/21    Intake/Output Summary (Last 24 hours) at 11/23/2021 1255  Last data filed at 11/23/2021 0541  Gross per 24 hour   Intake 1914.76 ml   Output 350 ml   Net 1564.76 ml     I/O last 3 completed shifts: In: 1914.8 [P.O.:560; I.V.:1209.1; IV Piggyback:145.7]  Out: 350 [Urine:350]    Safety Concerns: At Risk for Falls    Impairments/Disabilities:      None    Nutrition Therapy:  Current Nutrition Therapy:   - Oral Diet:  General and Carb Control 3 carbs/meal (1500kcals/day)    Routes of Feeding: Oral  Liquids: Thin Liquids  Daily Fluid Restriction: no  Last Modified Barium Swallow with Video (Video Swallowing Test): not done    Treatments at the Time of Hospital Discharge:   Respiratory Treatments: NA  Oxygen Therapy:  is on oxygen at 2 L/min per nasal cannula.   Ventilator:    - No ventilator support    Rehab Therapies: na  Weight Bearing Status/Restrictions: No weight bearing restirctions  Other Medical Equipment (for information only, NOT a DME order):  hospital bed  Other Treatments: na    Patient's personal belongings (please select all that are sent with patient):  Claudia    RN SIGNATURE:  Electronically signed by Anjel Richard RN on 11/23/21 at 12:59 PM EST    CASE MANAGEMENT/SOCIAL WORK SECTION    Inpatient Status Date: 11/2/021    Readmission Risk Assessment Score:  Readmission Risk              Risk of Unplanned Readmission:  41           Discharging to Facility/ Agency   Name:  [address] [place] [address]  Address: [address] [place] [address],[address] [facility], [address] [place] [phone]  Fax:  [phone]    Dialysis Facility (if applicable)   Name:  Address:  Dialysis Schedule:  Phone:  Fax:    / signature: Electronically signed by KASEY Sheets LSW on 11/22/21 at 11:20 AM EST    PHYSICIAN SECTION    Prognosis: Good    Condition at Discharge: Stable    Rehab Potential (if transferring to Rehab): Good    Recommended Labs or Other Treatments After Discharge: BMP, CBC    Physician Certification: I certify the above information and transfer of Tanisha Moyer  is necessary for the continuing treatment of the diagnosis listed and that she requires [facility] for greater 30 days.      Update Admission H&P: No change in H&P    PHYSICIAN SIGNATURE:  Electronically signed by Wilbert Torres PA-C on 11/23/21 at 1:13 PM EST

## 2021-11-22 NOTE — PROGRESS NOTES
Hospitalist Progress Note    Patient:  Jennifer Vilalfana    Unit/Bed:6K-04/004-A  YOB: 1940  MRN: [id number]   Acct: [de-identified]   PCP: Cash Yeboah DO  Code Status: Full Code  Date of Admission: 11/18/2021    Expected Discharge: 11/23? Disposition: From SNF. Assessment/Plan:    1. Rectal bleeding:   Limited history available. Recent bloody bowel movements reported at SNF, unclear dark or bright. None since admission. GI consulted - clear liquid diet, hold Eliquis. Monitor H&H. Continue PPI twice daily   - Hgb dropped to 6.7 overnight on 11/21 and pt received 1U PRBC. Pt with black, tarry BM overnight. Discussed case with GI, pt had an EGD 6 wks ago which was unremarkable. Barium enema today which showed diverticulosis. Pts Hgb has been stable, no further blood noted in stool. Discussed with GI, plan to resume Eliquis today and monitor for bleeding. 2. Persistent vs recurrent acute sigmoid diverticulitis: CT A/P reported persistent appearance of acute sigmoid diverticulitis. ?Diverticular bleed. GI is following. Pt has been treated OP with Augmentin then Cipro/Flagyl. More likely to be persistent diverticulitis than acute flare given patient denies abd pain and diarrhea. Discussed with GI - Continue with IV Zosyn, (Day 4/5). 3. Acute on chronic macrocytic anemia: + hx anemia of chronic disease. Monitor H&H every 6 hours. Transfuse for Hgb <7 or hemodynamic instability. Improving, will change H&H to Q12h. Resume Eliquis 11/22. 4. EMMA on CKD stage 3:  Prerenal due to anemia, dehydration, compounded by ARB and diuretic use. Hold nephrotoxins. IVF. Resolved. 5. Hyperkalemia: Mild, secondary to EMMA. Resolved. 6. Hypercalcemia:  Mild, likely due to dehydration. IV fluids. Resolved. 7. PAF: Anticoagulated on Eliquis, currently held for blood loss anemia. Rate controlled on metoprolol    8.  Recently diagnosed proximal BLE DVT and PE: ?Underlying GI malignancy. Eliquis held with plan to resume as indicated. May require IVC filter placement if patient remains a poor candidate to resume therapeutic anticoagulation.  - Resume Eliquis when okay with GI.     9. Coagulopathy: INR 1.8. FFP administration deferred despite INR of 1.8 due to hemodynamic stability and recently diagnosed proximal BLE DVT and PE -- may require transfusion pending ongoing clinical course. Maintain telemetry. 10. Chronic hypoxic respiratory failure / COPD: Patient wears 2 to 4 L nasal cannula at baseline. Resume home inhalers    11. Essential hypertension: Stable. Resume home antihypertensives, continue to hold losartan and diuretics today EMMA. 12. Hx CAD: ASA held for GIB. Statin, ranexa resumed. 13. Chronic diastolic CHF (EF 94% / M7QS per TTE 8/2020) / hypertrophic cardiomyopathy: Compensated. Bumex held for EMMA/to prioritize IVF hydration. Monitor I/O, daily weights. 14. IDDMII: Lantus held while NPO, glucose is borderline low. Monitor and resume as indicated. SSI. 15. Rheumatoid arthritis: Imuran, plaquenil and prednisone resumed. 16. Depression: Home meds resumed    17. Prolonged QT/QTc: In the setting of RBBB. Chief Complaint: rectal bleeding     HPI / Hospital Course: Per HPI: \"79 y/o F extensive PMHx including: GERD, diverticulosis with recently diagnosed diverticulitis (on cipro/flagyl), paroxysmal Afib (anticoagulated on eliquis), recently diagnosed BLE DVT/PE, chronic hypoxic respiratory failure (on 2-4L NC), HTN, HLD, IDDM2, CKD3, former tobacco use and other PMHx as indicated below -- presents to [facility] with a chief complaint of rectal bleeding. History obtained from the patient (limited historian) and EMR.     Patient currently resides at a nursing facility. Per report, she has been having bloody stools for the past couple of days. Nurses reportedly informed her that her diapers had been bloody, but unsure whether black or bright red in color.  The patient provides a limited history, but describes loose bowel movements that \"just squirt out\" and \"come out all at once in clumps. \" She does endorse recent fatigue, but denies nausea/emesis, abdominal pain, fevers/chills, chest pain, shortness of breath or urinary symptoms.      Of note, the patient was recently admitted to [address] [place] [address] (11/6-11/8/21) for sigmoid diverticulitis / treated with cipro/flagyl (still completing course of therapy) / and was found to have new, unprovoked bilateral proximal DVTs and bilateral PE without R heart strain. She was initiated on therapeutic lovenox, and discharged on eliquis, which she takes twice daily, as prescribed.     ED provider reported negative rectal exam. RN states no recurrence of bloody bowel movements or BRBPR since arrival to [address] [place] [address].     ED course: afebrile, /56, HR 60, RR 18 with SpO2 100% on baseline 3L NC. Workup was remarkable for: Hgb 8.1 (.5), WBC/Plt nml. K 5.3, Ca 10.1, Cr 1.5 (BUN 63, eGFR 33), AST 58. FOBT positive. INR 1.8. COVID negative. EKG showed wide QRS rhythm, chronic RBBB. No imaging obtained. Received 500 cc IVF bolus, 80 mg IV protonix. Hospitalist service contacted for admission. Please refer to the A&P for additional details. \"     11/19- Patient has not yet had a bowel movement. She currently denies fever/chills, shortness of breath, chest pain, abdominal pain, N/V, lightheadedness. Reports diarrhea PTA, none at this time. 11/20- Pt states she still has not had a BM. She denies lightheadedness, SOB, CP, abd pain, n/v/d. Hgb stable. Clear liquid diet per GI.     11/21- Hgb 6.7 overnight. Pt received 1U PRBC. Pt reports 2 BMs, black and tarry per nursing. No fever/chills, lightheadedness, abd pain, n/v/d. Subjective (past 24 hours):  Pt is feeling well. She denies fever/chills, SOB, CP, abd pain, n/v/d. Pt had multiple BMs yesterday following bowel prep and has had brown stools. Hgb improved.  Barium enema showed sigmoid diverticulosis. No mass. ROS: Pertinent positives as noted in HPI. All other systems reviewed and negative. Past medical history, family history, social history and allergies reviewed again and is unchanged since admission. Medications:  Reviewed.   Infusion Medications    sodium chloride      sodium chloride      sodium chloride 50 mL/hr at 11/22/21 0530    dextrose       Scheduled Medications    polyethylene glycol  17 g Oral Daily    docusate sodium  100 mg Oral Daily    pantoprazole  40 mg IntraVENous BID    [Held by provider] aspirin  162 mg Oral Daily    atorvastatin  80 mg Oral Daily    azaTHIOprine  100 mg Oral Daily    [Held by provider] bumetanide  0.5 mg Oral Daily    cloNIDine  0.1 mg Oral BID    DULoxetine  30 mg Oral BID    gabapentin  100 mg Oral TID    [Held by provider] fenofibrate  160 mg Oral QAM AC    hydroxychloroquine  200 mg Oral Daily    isosorbide mononitrate  60 mg Oral Daily    [Held by provider] losartan  50 mg Oral Daily    metoprolol succinate  50 mg Oral Daily    piperacillin-tazobactam  3,375 mg IntraVENous Q8H    predniSONE  5 mg Oral Daily    ranolazine  500 mg Oral BID    [Held by provider] spironolactone  50 mg Oral Daily    traZODone  100 mg Oral Nightly    montelukast  5 mg Oral Nightly    magnesium oxide  400 mg Oral BID    sodium chloride flush  10 mL IntraVENous 2 times per day    budesonide-formoterol  2 puff Inhalation BID    tiotropium  2 puff Inhalation Daily    insulin lispro  0-12 Units SubCUTAneous TID WC    insulin lispro  0-6 Units SubCUTAneous Nightly     PRN Meds: sodium chloride, albuterol sulfate HFA, sodium chloride flush, sodium chloride, acetaminophen **OR** acetaminophen, glucose, dextrose, glucagon (rDNA), dextrose    I/O:     Intake/Output Summary (Last 24 hours) at 11/22/2021 0901  Last data filed at 11/22/2021 0530  Gross per 24 hour   Intake 3094.76 ml   Output 900 ml   Net 2194.76 ml       Diet:  Diet NPO Exceptions are: Sips of Water with Meds    Exam:  BP (!) 145/62   Pulse 84   Temp 98.2 °F (36.8 °C)   Resp 16   Ht 5' 5\" (1.651 m)   Wt 190 lb 14.7 oz (86.6 kg)   SpO2 99%   BMI 31.77 kg/m²   General:   Pleasant female. NAD. HEENT:  normocephalic and atraumatic. No scleral icterus. PERR. Neck: supple. No JVD. No thyromegaly. Lungs: clear to auscultation. No retractions  Cardiac: RRR without murmur. Abdomen: soft. LLQ tenderness. Bowel sounds positive. Extremities:  No clubbing, cyanosis, or edema x 4. Vasculature: capillary refill < 3 seconds. Palpable LE pulses bilaterally. Skin:  warm and dry. Psych:  Alert and oriented x3. Affect appropriate  Lymph:  No supraclavicular adenopathy. Neurologic:  No focal deficit. No seizures. Data: (All radiographs, tracings, PFTs, and imaging are personally viewed and interpreted unless otherwise noted)  Labs:   Recent Labs     11/20/21  0507 11/20/21  1057 11/21/21  0455 11/21/21  1115 11/21/21  1700 11/21/21  2315 11/22/21  0434   WBC 3.9*  --  4.3*  --   --   --  4.3*   HGB 7.7*   < > 8.1*   < > 8.5* 8.2* 8.1*   HCT 24.7*   < > 26.9*   < > 28.6* 25.5* 26.1*     --  359  --   --   --  336    < > = values in this interval not displayed. Recent Labs     11/20/21  0507 11/20/21  0507 11/20/21  1057 11/21/21  0455 11/22/21  0434      < > 140 137 138   K 4.8   < > 4.6 4.6 3.6      < > 100 100 103   CO2 28   < > 25 29 26   BUN 38*   < > 31* 24* 12   CREATININE 0.9   < > 0.9 0.9 0.8   CALCIUM 10.0   < > 10.3 9.7 9.2   PHOS 2.4  --   --  2.5  --     < > = values in this interval not displayed. Recent Labs     11/20/21  1057 11/21/21  0455 11/22/21  0434   AST 59* 53* 50*   ALT 37 34 34   BILITOT 0.3 0.6 0.3   ALKPHOS 31* 29* 29*     Recent Labs     11/20/21  1057 11/21/21  0455   INR 1.25* 1.22*     No results for input(s): CKTOTAL, TROPONINI in the last 72 hours.   Urinalysis:   Lab Results   Component Value Date    NITRU NEGATIVE 11/06/2021    WBCUA 2-4 11/06/2021    BACTERIA FEW 11/06/2021    RBCUA 0-2 11/06/2021    BLOODU NEGATIVE 11/06/2021    SPECGRAV 1.019 11/08/2020    GLUCOSEU NEGATIVE 11/06/2021     Urine culture: No results found for: LABURIN  Micro:   Blood culture #1:   Lab Results   Component Value Date    BC No growth-preliminary No growth  09/29/2021     Blood culture #2:No results found for: Gerhardt Hones  Organism:No results found for: ORG    No results found for: LABGRAM  MRSA culture only:No results found for: [facility]  Respiratory culture: No results found for: CULTRESP  Aerobic and Anaerobic :  No results found for: LABAERO  No results found for: [facility]    Radiology Reports:  CT ABDOMEN PELVIS W IV CONTRAST Additional Contrast? None   Final Result   Persistent appearance of acute sigmoid diverticulitis. Minimal increase in the pericolonic fluid but no drainable fluid collection or abscess is identified. **This report has been created using voice recognition software. It may contain minor errors which are inherent in voice recognition technology. **      Final report electronically signed by Dr. Melba Shirley on 11/19/2021 8:36 AM      FL BARIUM ENEMA    (Results Pending)     CT ABDOMEN PELVIS W IV CONTRAST Additional Contrast? None    Result Date: 11/19/2021  PROCEDURE: CT ABDOMEN PELVIS W IV CONTRAST CLINICAL INFORMATION: Evaluate for persistence of acute diverticulitis . TECHNIQUE: 2-D multiplanar postcontrast images of the abdomen and pelvis in 5 mm intervals. Isovue-370 IV contrast. All CT scans at this facility use dose modulation, iterative reconstruction, and/or weight-based dosing when appropriate to reduce radiation dose to as low as reasonably achievable. COMPARISON: 11/6/2021 FINDINGS: Abdomen and pelvis: Pelvis Extensive diverticulosis. The sigmoid colon there is pericolonic fluid and thickened bowel area of diverticula.  This appearance is nearly identical to the prior exam there is minimal increase in the pericolonic fluid. There still no drainable fluid collection. Urinary bladder is unremarkable but mildly distended. ABDOMEN: Liver, and spleen are normal. Gallbladder is mildly distended and there are stones in the neck. The pancreas is normal. Kidneys enhance symmetrically. Small exophytic lesion upper pole left kidney statistically a cyst. Aorta is atherosclerotic it is not aneurysmal. Bones Scoliosis degenerative changes and postsurgical changes lumbar spine. Marked degenerative change left hip. No suspicious bone lesions. Lung bases Left basilar atelectasis no pleural effusions. Cardiomegaly. Persistent appearance of acute sigmoid diverticulitis. Minimal increase in the pericolonic fluid but no drainable fluid collection or abscess is identified. **This report has been created using voice recognition software. It may contain minor errors which are inherent in voice recognition technology. ** Final report electronically signed by Dr. Colin Norton on 11/19/2021 8:36 AM      Tele:   [x] yes             [] no      Active Hospital Problems    Diagnosis Date Noted    Rectal bleeding [K62.5] 11/18/2021       Electronically signed by Claudetta Piano, PA-C on 11/22/2021 at 9:01 AM

## 2021-11-22 NOTE — CARE COORDINATION
11/22/21, 7:45 AM EST  DISCHARGE PLANNING EVALUATION:    Mihai Taylor       Admitted: 11/18/2021/ 2052   Hospital day: 2   Location: -04/004-A Reason for admit: Rectal bleeding [K62.5]  EMMA (acute kidney injury) ([address] [N17.9]  Positive fecal occult blood test [R19.5]  Anemia, unspecified type [D64.9]   PMH:  has a past medical history of Arthritis, Asthma, Atrial fibrillation ([address] Blood circulation, collateral, CAD (coronary artery disease), CHF (congestive heart failure) ([address] Chronic kidney disease, stage III (moderate) ([address] COPD (chronic obstructive pulmonary disease) ([address] Depression, Diabetes mellitus ([address] Diabetic hypertension-nephrosis syndrome ([address] Dysuria, GERD (gastroesophageal reflux disease), Hyperlipidemia, Hypertension, Kidney disease, and Unspecified cerebral artery occlusion with cerebral infarction. Procedure: none  Barriers to Discharge:  Hgb 8.1 after receiving 1 unit PRBC. 98% on 2L O2. IVF, IV zosyn, IV protonix. Barium enema ordered. GI following, plan colonoscopy today. PCP: Bakari Yeboah DO  Readmission Risk Score: 24.5 ( )%    Patient Goals/Plan/Treatment Preferences: Return to [facility]. Transportation/Food Security/Housekeeping Addressed:  No issues identified.

## 2021-11-22 NOTE — PLAN OF CARE
Problem: DISCHARGE BARRIERS  Goal: Patient's continuum of care needs are met  Outcome: Ongoing  Note: Patient return to HOSPITAL [address]. See SW notes 11/22/21.

## 2021-11-22 NOTE — CARE COORDINATION
11/22/21, 11:21 AM EST  Discharge Planning Evaluation  Social work consult received, patient from [facility]. Patient/Family preference is to return to HOSPITAL [address]. The patient's current payor source at the facility is medicaid. Medicare skilled days available: yes  Insurance precert:   no  Spoke with Tone Miramontes at the facility. Patient bed hold: yes medicaid  Anticipated transport plan: ambulance, Patient has not been ambulating since last hospital admission. Do they require COVID 19 test to return to ECF: yes  Is there a required time frame which which COVID test needs done: needed within 72 hours of return.

## 2021-11-23 VITALS
BODY MASS INDEX: 32.07 KG/M2 | HEART RATE: 79 BPM | WEIGHT: 192.46 LBS | DIASTOLIC BLOOD PRESSURE: 69 MMHG | TEMPERATURE: 98.8 F | SYSTOLIC BLOOD PRESSURE: 155 MMHG | RESPIRATION RATE: 18 BRPM | HEIGHT: 65 IN | OXYGEN SATURATION: 98 %

## 2021-11-23 LAB
ALBUMIN SERPL-MCNC: 2.2 G/DL (ref 3.5–5.1)
ALP BLD-CCNC: 36 U/L (ref 38–126)
ALT SERPL-CCNC: 31 U/L (ref 11–66)
ANION GAP SERPL CALCULATED.3IONS-SCNC: 11 MEQ/L (ref 8–16)
ANISOCYTOSIS: PRESENT
AST SERPL-CCNC: 42 U/L (ref 5–40)
BASOPHILS # BLD: 0.7 %
BASOPHILS ABSOLUTE: 0 THOU/MM3 (ref 0–0.1)
BILIRUB SERPL-MCNC: 0.2 MG/DL (ref 0.3–1.2)
BUN BLDV-MCNC: 10 MG/DL (ref 7–22)
CALCIUM SERPL-MCNC: 8.8 MG/DL (ref 8.5–10.5)
CHLORIDE BLD-SCNC: 106 MEQ/L (ref 98–111)
CO2: 22 MEQ/L (ref 23–33)
CREAT SERPL-MCNC: 0.7 MG/DL (ref 0.4–1.2)
EOSINOPHIL # BLD: 7.6 %
EOSINOPHILS ABSOLUTE: 0.3 THOU/MM3 (ref 0–0.4)
ERYTHROCYTE [DISTWIDTH] IN BLOOD BY AUTOMATED COUNT: 19.9 % (ref 11.5–14.5)
ERYTHROCYTE [DISTWIDTH] IN BLOOD BY AUTOMATED COUNT: 72.7 FL (ref 35–45)
GFR SERPL CREATININE-BSD FRML MDRD: 80 ML/MIN/1.73M2
GLUCOSE BLD-MCNC: 113 MG/DL (ref 70–108)
GLUCOSE BLD-MCNC: 114 MG/DL (ref 70–108)
GLUCOSE BLD-MCNC: 153 MG/DL (ref 70–108)
GLUCOSE BLD-MCNC: 179 MG/DL (ref 70–108)
HCT VFR BLD CALC: 24.3 % (ref 37–47)
HCT VFR BLD CALC: 25.3 % (ref 37–47)
HEMOGLOBIN: 7.6 GM/DL (ref 12–16)
HEMOGLOBIN: 7.7 GM/DL (ref 12–16)
IMMATURE GRANS (ABS): 0.04 THOU/MM3 (ref 0–0.07)
IMMATURE GRANULOCYTES: 1 %
LYMPHOCYTES # BLD: 11.5 %
LYMPHOCYTES ABSOLUTE: 0.5 THOU/MM3 (ref 1–4.8)
MCH RBC QN AUTO: 30.6 PG (ref 26–33)
MCHC RBC AUTO-ENTMCNC: 30.4 GM/DL (ref 32.2–35.5)
MCV RBC AUTO: 100.4 FL (ref 81–99)
MONOCYTES # BLD: 11.7 %
MONOCYTES ABSOLUTE: 0.5 THOU/MM3 (ref 0.4–1.3)
NUCLEATED RED BLOOD CELLS: 0 /100 WBC
PLATELET # BLD: 334 THOU/MM3 (ref 130–400)
PMV BLD AUTO: 9.4 FL (ref 9.4–12.4)
POTASSIUM SERPL-SCNC: 3.6 MEQ/L (ref 3.5–5.2)
RBC # BLD: 2.52 MILL/MM3 (ref 4.2–5.4)
SARS-COV-2, NAAT: NOT DETECTED
SEG NEUTROPHILS: 67.5 %
SEGMENTED NEUTROPHILS ABSOLUTE COUNT: 2.8 THOU/MM3 (ref 1.8–7.7)
SODIUM BLD-SCNC: 139 MEQ/L (ref 135–145)
TOTAL PROTEIN: 4.8 G/DL (ref 6.1–8)
WBC # BLD: 4.2 THOU/MM3 (ref 4.8–10.8)

## 2021-11-23 PROCEDURE — 94640 AIRWAY INHALATION TREATMENT: CPT

## 2021-11-23 PROCEDURE — 6370000000 HC RX 637 (ALT 250 FOR IP): Performed by: NURSE PRACTITIONER

## 2021-11-23 PROCEDURE — 85025 COMPLETE CBC W/AUTO DIFF WBC: CPT

## 2021-11-23 PROCEDURE — 6360000002 HC RX W HCPCS: Performed by: FAMILY MEDICINE

## 2021-11-23 PROCEDURE — 82948 REAGENT STRIP/BLOOD GLUCOSE: CPT

## 2021-11-23 PROCEDURE — 87635 SARS-COV-2 COVID-19 AMP PRB: CPT

## 2021-11-23 PROCEDURE — 99239 HOSP IP/OBS DSCHRG MGMT >30: CPT | Performed by: PHYSICIAN ASSISTANT

## 2021-11-23 PROCEDURE — 36415 COLL VENOUS BLD VENIPUNCTURE: CPT

## 2021-11-23 PROCEDURE — 6370000000 HC RX 637 (ALT 250 FOR IP): Performed by: PHYSICIAN ASSISTANT

## 2021-11-23 PROCEDURE — 85014 HEMATOCRIT: CPT

## 2021-11-23 PROCEDURE — 2580000003 HC RX 258: Performed by: FAMILY MEDICINE

## 2021-11-23 PROCEDURE — 80053 COMPREHEN METABOLIC PANEL: CPT

## 2021-11-23 PROCEDURE — 85018 HEMOGLOBIN: CPT

## 2021-11-23 PROCEDURE — 6370000000 HC RX 637 (ALT 250 FOR IP): Performed by: FAMILY MEDICINE

## 2021-11-23 RX ORDER — BUMETANIDE 1 MG/1
0.5 TABLET ORAL DAILY PRN
Qty: 30 TABLET | Refills: 0 | Status: ON HOLD
Start: 2021-11-23 | End: 2021-12-27 | Stop reason: SDUPTHER

## 2021-11-23 RX ADMIN — AZATHIOPRINE 100 MG: 50 TABLET ORAL at 09:09

## 2021-11-23 RX ADMIN — APIXABAN 5 MG: 5 TABLET, FILM COATED ORAL at 09:09

## 2021-11-23 RX ADMIN — GABAPENTIN 100 MG: 100 CAPSULE ORAL at 09:09

## 2021-11-23 RX ADMIN — PREDNISONE 5 MG: 5 TABLET ORAL at 09:09

## 2021-11-23 RX ADMIN — RANOLAZINE 500 MG: 500 TABLET, FILM COATED, EXTENDED RELEASE ORAL at 09:09

## 2021-11-23 RX ADMIN — HYDROXYCHLOROQUINE SULFATE 200 MG: 200 TABLET ORAL at 09:09

## 2021-11-23 RX ADMIN — METOPROLOL SUCCINATE 50 MG: 50 TABLET, FILM COATED, EXTENDED RELEASE ORAL at 09:09

## 2021-11-23 RX ADMIN — GABAPENTIN 100 MG: 100 CAPSULE ORAL at 13:32

## 2021-11-23 RX ADMIN — PIPERACILLIN AND TAZOBACTAM 3375 MG: 3; .375 INJECTION, POWDER, LYOPHILIZED, FOR SOLUTION INTRAVENOUS at 05:44

## 2021-11-23 RX ADMIN — BUDESONIDE AND FORMOTEROL FUMARATE DIHYDRATE 2 PUFF: 160; 4.5 AEROSOL RESPIRATORY (INHALATION) at 07:35

## 2021-11-23 RX ADMIN — TIOTROPIUM BROMIDE INHALATION SPRAY 2 PUFF: 3.12 SPRAY, METERED RESPIRATORY (INHALATION) at 07:35

## 2021-11-23 RX ADMIN — Medication 400 MG: at 09:09

## 2021-11-23 RX ADMIN — ISOSORBIDE MONONITRATE 60 MG: 60 TABLET, EXTENDED RELEASE ORAL at 09:09

## 2021-11-23 RX ADMIN — PANTOPRAZOLE SODIUM 40 MG: 40 TABLET, DELAYED RELEASE ORAL at 06:00

## 2021-11-23 RX ADMIN — DULOXETINE HYDROCHLORIDE 30 MG: 30 CAPSULE, DELAYED RELEASE ORAL at 09:09

## 2021-11-23 RX ADMIN — INSULIN LISPRO 2 UNITS: 100 INJECTION, SOLUTION INTRAVENOUS; SUBCUTANEOUS at 11:45

## 2021-11-23 RX ADMIN — ATORVASTATIN CALCIUM 80 MG: 80 TABLET, FILM COATED ORAL at 09:09

## 2021-11-23 RX ADMIN — CLONIDINE HYDROCHLORIDE 0.1 MG: 0.1 TABLET ORAL at 09:09

## 2021-11-23 ASSESSMENT — PAIN SCALES - GENERAL
PAINLEVEL_OUTOF10: 0

## 2021-11-23 NOTE — CARE COORDINATION
Discharge Planning Update:    Hgb decreased from 9.0 to 7.7. 98% on 2L. 1 documented non-bloody stool yesterday. GI has approved resuming eliquis and continuing to hold ASA. Return to [facility] at discharge.   Electronically signed by Tammy Hernandez RN on 11/23/2021 at 8:16 AM

## 2021-11-23 NOTE — FLOWSHEET NOTE
RN given report to Nurse [facility] @ [facility] in [address]. No questions or concerns. Pickup time for [facility] @ [phone].   Lily Bonilla RN

## 2021-11-23 NOTE — PROGRESS NOTES
Gastroenterology Progress Note:     Patient Name:  Lauri Whitley   MRN: [id number]  [id number]  YOB: 1940  Admit Date: 11/18/2021  8:52 PM  Primary Care Physician: Brianne [address], DO   6K-04/004-A     Patient seen and examined. 24 hours events and chart reviewed. Subjective: Patient resting in bed. Denies abdominal pain, nausea, & vomiting. She is wanting to go home. She had 2 bowel movements today with no blood noted per RN. Hgb 7.6 Eliquis resumed yesterday. Objective:  BP (!) 155/69   Pulse 79   Temp 98.8 °F (37.1 °C) (Oral)   Resp 18   Ht 5' 5\" (1.651 m)   Wt 192 lb 7.4 oz (87.3 kg)   SpO2 98%   BMI 32.03 kg/m²     Physical Exam:    General:  Chronically ill appearing female  HEENT: Atraumatic, normocephalic. Moist oral mucous membranes. Neck: Supple without adenopathy, JVD, thyromegaly or masses. Trachea midline. CV: Heart RRR, no murmurs, rubs, gallops. Resp: Even, easy without cough or accessory use. Lungs clear to ascultation bilaterally. Abd: Round, soft, obese, nontender. No hepatosplenomegaly or mass present. Active bowel sounds heard. No distention noted. Ext:  Without cyanosis, clubbing, edema. Skin: Pink, warm, dry  Neuro:  Alert, oriented x 3 with no obvious deficits.        Rectal: deferred    Labs:   CBC:   Lab Results   Component Value Date    WBC 4.2 11/23/2021    HGB 7.6 11/23/2021    HCT 24.3 11/23/2021    .4 11/23/2021     11/23/2021     BMP:   Lab Results   Component Value Date     11/23/2021    K 3.6 11/23/2021    K 3.6 11/06/2020     11/23/2021    CO2 22 11/23/2021    PHOS 2.5 11/21/2021    BUN 10 11/23/2021    CREATININE 0.7 11/23/2021    CALCIUM 8.8 11/23/2021     PT/INR:   Lab Results   Component Value Date    INR 1.22 11/21/2021     Lipids:   Lab Results   Component Value Date    ALKPHOS 36 11/23/2021    ALT 31 11/23/2021    AST 42 11/23/2021    BILITOT 0.2 11/23/2021    BILIDIR <0.2 11/06/2021    LABALBU 2.2 11/23/2021 LIPASE 20.8 11/06/2021     Current Meds:  Scheduled Meds:   pantoprazole  40 mg Oral QAM AC    apixaban  5 mg Oral BID    polyethylene glycol  17 g Oral Daily    docusate sodium  100 mg Oral Daily    [Held by provider] aspirin  162 mg Oral Daily    atorvastatin  80 mg Oral Daily    azaTHIOprine  100 mg Oral Daily    [Held by provider] bumetanide  0.5 mg Oral Daily    cloNIDine  0.1 mg Oral BID    DULoxetine  30 mg Oral BID    gabapentin  100 mg Oral TID    [Held by provider] fenofibrate  160 mg Oral QAM AC    hydroxychloroquine  200 mg Oral Daily    isosorbide mononitrate  60 mg Oral Daily    [Held by provider] losartan  50 mg Oral Daily    metoprolol succinate  50 mg Oral Daily    piperacillin-tazobactam  3,375 mg IntraVENous Q8H    predniSONE  5 mg Oral Daily    ranolazine  500 mg Oral BID    [Held by provider] spironolactone  50 mg Oral Daily    traZODone  100 mg Oral Nightly    montelukast  5 mg Oral Nightly    magnesium oxide  400 mg Oral BID    sodium chloride flush  10 mL IntraVENous 2 times per day    budesonide-formoterol  2 puff Inhalation BID    tiotropium  2 puff Inhalation Daily    insulin lispro  0-12 Units SubCUTAneous TID WC    insulin lispro  0-6 Units SubCUTAneous Nightly     Continuous Infusions:   sodium chloride      sodium chloride      sodium chloride 50 mL/hr at 11/22/21 2159    dextrose         Assessment:  79 yo F admitted 11/1821 from [facility] for bloody stools. Endorses fecal incontinence with possible diarrhea. Recently admitted 11/06/21  Treated for acute diverticulitis. H/O recent PE/DVT 11/06/21, on Eliquis. H/O afib, on Eliquis. H/O CAD, on aspirin. CT A/P demonstrated persistent appearance of acute sigmoid diverticulitis, minimal increase in the pericolonic fluid but no drainable fluid collection or abscess identified. Barium enema demonstrated marked sigmoid diverticulosis, no distinct extravasation is identified to indicate diverticulitis.      1.  Bloody stools- unclear if melena or red bloody stools, black stool since admission  2. Persistent diverticulitis- failed OP Augmentin  3. Acute on chronic macrocytic anemia- s/p 1 unit PRBC  4. EMMA on CKD- resolved  5. Afib- on Eliquis  6. Chronic hypoxic respiratory failure  7. COPD, not exacerbated  8. Recent bilateral lower extremity DVT & PE- on Eliquis  9. CAD- on aspirin & statin  10. H/O HTN  11. CHF, not exacerbated  12. DM  13. Leukopenia  14. Hypoalbuminemia  15. Elevated AST  16.  GERD- on PPI, recent EGD 09/2021     Plan:    · Monitor H & H, transfuse prn  · Nursing to monitor stool output & document  · Continue PPI daily, home dose  · Diet as tolerated  · RN updated  · May need colonoscopy outpatient   · Supportive care per primary team  · Okay to discharge from GI standpoint  · Will need a 3-4 week follow-up with Ruth CALLAHAN  GI signing off    Case reviewed and impression/plan reviewed in collaboration with Dr. Valentin Vang  Electronically signed by LONDON Hightower - CNP on 11/23/2021 at 12:06 PM    [facility]

## 2021-11-23 NOTE — DISCHARGE SUMMARY
Hospitalist Discharge Summary    Patient: Jessica Salter  YOB: 1940  MRN: [id number]   Acct: [de-identified]    Primary Care Physician: Raymond Lieberman DO    Admit date  11/18/2021    Discharge date:      Discharge Assessment and Plan:    1. Rectal bleeding:   Limited history available. Recent bloody bowel movements reported at SNF, unclear dark or bright. None since admission. GI consulted - clear liquid diet, hold Eliquis. Monitor H&H. Continue PPI twice daily       - Hgb dropped to 6.7 overnight on 11/21 and pt received 1U PRBC. Pt with black, tarry BM overnight. Discussed case with GI, pt had an EGD 6 wks ago which was unremarkable. Barium enema 11/22 which showed diverticulosis. Pts Hgb has been stable, no further blood noted in stool. Pt likely had diverticular bleed. Eliquis resumed 11/22. Hgb has remained stable, multiple BMs reported with no gross blood identified. Okay for discharge from GI standpoint. Will repeat CBC in 1 wk and follow up with PCP. Pt to follow up with GI in 3-4 wks.      2. Persistent vs recurrent acute sigmoid diverticulitis: CT A/P reported persistent appearance of acute sigmoid diverticulitis. ?Diverticular bleed. GI is following. Pt has been treated OP with Augmentin then Cipro/Flagyl. Treated with IV Zosyn x 5 days. More likely to be persistent diverticulitis than acute flare given patient denies abd pain and diarrhea. No need to continue abx. GI will follow up in 3-4 weeks as OP.      3. Acute on chronic macrocytic anemia: + hx anemia of chronic disease. See #1. Hgb stable.       4. EMMA on CKD stage 3:  Prerenal due to anemia, dehydration, compounded by ARB and diuretic use. Resolved. Resume losartan. Change Bumex to daily prn. Repeat BMP in 1 wk and follow up with PCP.       5. Hyperkalemia: Resolved.      6. Hypercalcemia:  Mild, likely due to dehydration. Resolved w/ IVF.      7. PAF: Anticoagulated on Eliquis. Rate controlled on metoprolol     8.  Recently diagnosed proximal BLE DVT and PE: Eliquis resumed. May require IVC filter placement if patient remains a poor candidate to resume therapeutic anticoagulation     9. Coagulopathy: INR 1.8. FFP administration deferred despite INR of 1.8 due to hemodynamic stability and recently diagnosed proximal BLE DVT and PE. Improved, INR 1.22. Repeat as OP and follow up with PCP.      10. Chronic hypoxic respiratory failure / COPD: Patient wears 2 to 4 L nasal cannula at baseline. Resume home inhalers     11. Essential hypertension: Stable. Cont home meds.     12. Hx CAD: ASA held for GIB. Statin, ranexa resumed.     13. Chronic diastolic CHF (EF 30% / K0DW per TTE 8/2020) / hypertrophic cardiomyopathy: Compensated. Bumex held for EMMA/to prioritize IVF hydration. Resume Bumex at [facility] daily prn and repeat BMP.      14. IDDMII: Resume home insulin regimen.      15. Rheumatoid arthritis: Imuran, plaquenil and prednisone resumed.     16. Depression: Home meds resumed     17. Prolonged QT/QTc: In the setting of RBBB. Chief Complaint on presentation: rectal bleeding    Initial H&P / Hospital Course: Per HPI: \"79 y/o F extensive PMHx including: GERD, diverticulosis with recently diagnosed diverticulitis (on cipro/flagyl), paroxysmal Afib (anticoagulated on eliquis), recently diagnosed BLE DVT/PE, chronic hypoxic respiratory failure (on 2-4L NC), HTN, HLD, IDDM2, CKD3, former tobacco use and other PMHx as indicated below -- presents to [facility] with a chief complaint of rectal bleeding. History obtained from the patient (limited historian) and EMR.     Patient currently resides at a nursing facility. Per report, she has been having bloody stools for the past couple of days. Nurses reportedly informed her that her diapers had been bloody, but unsure whether black or bright red in color. The patient provides a limited history, but describes loose bowel movements that \"just squirt out\" and \"come out all at once in clumps. \" She does endorse recent fatigue, but denies nausea/emesis, abdominal pain, fevers/chills, chest pain, shortness of breath or urinary symptoms.      Of note, the patient was recently admitted to [facility] (11/6-11/8/21) for sigmoid diverticulitis / treated with cipro/flagyl (still completing course of therapy) / and was found to have new, unprovoked bilateral proximal DVTs and bilateral PE without R heart strain. She was initiated on therapeutic lovenox, and discharged on eliquis, which she takes twice daily, as prescribed.     ED provider reported negative rectal exam. RN states no recurrence of bloody bowel movements or BRBPR since arrival to [facility].     ED course: afebrile, /56, HR 60, RR 18 with SpO2 100% on baseline 3L NC. Workup was remarkable for: Hgb 8.1 (.5), WBC/Plt nml. K 5.3, Ca 10.1, Cr 1.5 (BUN 63, eGFR 33), AST 58. FOBT positive. INR 1.8. COVID negative. EKG showed wide QRS rhythm, chronic RBBB. No imaging obtained. Received 500 cc IVF bolus, 80 mg IV protonix. Hospitalist service contacted for admission. Please refer to the A&P for additional details. \"      11/19- Patient has not yet had a bowel movement. She currently denies fever/chills, shortness of breath, chest pain, abdominal pain, N/V, lightheadedness. Reports diarrhea PTA, none at this time.     11/20- Pt states she still has not had a BM. She denies lightheadedness, SOB, CP, abd pain, n/v/d. Hgb stable. Clear liquid diet per GI.      11/21- Hgb 6.7 overnight. Pt received 1U PRBC. Pt reports 2 BMs, black and tarry per nursing. No fever/chills, lightheadedness, abd pain, n/v/d.     11/22- Pt is feeling well. She denies fever/chills, SOB, CP, abd pain, n/v/d. Pt had multiple BMs yesterday following bowel prep and has had brown stools. Hgb improved. Barium enema showed sigmoid diverticulosis. No mass. Subjective (day of discharge): Pt is feeling well. Has had multiple BMs which have been brown. No futher signs of acute bleeding.  jose fever/chills, sob, cp, abd pain, n/v/d. Patient responded well to medical management. Consultants had signed off or were contacted and agree with discharge plan. The patient was discharged in stable condition with appropriate outpatient follow up arranged. Physical Exam:-  Vitals: Patient Vitals for the past 24 hrs:   BP Temp Temp src Pulse Resp SpO2 Weight   11/23/21 1112 (!) 155/69 98.8 °F (37.1 °C) Oral 79 18 98 % --   11/23/21 0845 (!) 142/62 98 °F (36.7 °C) Oral 79 16 97 % --   11/23/21 0530 -- -- -- -- -- -- 192 lb 7.4 oz (87.3 kg)   11/23/21 0315 102/84 98.3 °F (36.8 °C) Oral 82 14 98 % --   11/22/21 2300 (!) 145/55 98 °F (36.7 °C) Oral 86 16 96 % --   11/22/21 2015 123/79 98.1 °F (36.7 °C) Oral 84 16 98 % --     Weight: Weight: 192 lb 7.4 oz (87.3 kg)   24 hour intake/output:     Intake/Output Summary (Last 24 hours) at 11/23/2021 1536  Last data filed at 11/23/2021 0541  Gross per 24 hour   Intake 1714.76 ml   Output 350 ml   Net 1364.76 ml       General appearance: No apparent distress, appears stated age and cooperative. HEENT: Normal cephalic, atraumatic without obvious deformity. Pupils equal, round, and reactive to light. Extra ocular muscles intact. Conjunctivae/corneas clear. Neck: Supple, with full range of motion. No jugular venous distention. Trachea midline. Respiratory:  Normal respiratory effort. Clear to auscultation, bilaterally without Rales/Wheezes/Rhonchi. Cardiovascular: Regular rate and rhythm with normal S1/S2 without murmurs, rubs or gallops. Abdomen: Soft, non-tender, non-distended with normal bowel sounds. Musculoskeletal:  No clubbing, cyanosis or edema bilaterally. Skin: Skin color, texture, turgor normal.  No rashes or lesions. Neurologic:  Neurovascularly intact without any focal sensory/motor deficits.  Cranial nerves: II-XII intact, grossly non-focal.  Psychiatric: Alert and oriented, thought content appropriate, normal insight  Capillary Refill: Brisk,< 3 seconds   Peripheral Pulses: +2 palpable, equal bilaterally     Labs :  Recent Results (from the past 72 hour(s))   POCT glucose    Collection Time: 11/20/21  4:22 PM   Result Value Ref Range    POC Glucose 123 (H) 70 - 108 mg/dl   POCT glucose    Collection Time: 11/20/21  7:56 PM   Result Value Ref Range    POC Glucose 158 (H) 70 - 108 mg/dl   Hemoglobin and hematocrit, blood    Collection Time: 11/20/21 10:52 PM   Result Value Ref Range    Hemoglobin 6.7 (LL) 12.0 - 16.0 gm/dl    Hematocrit 21.8 (L) 37.0 - 47.0 %   Magnesium    Collection Time: 11/21/21  4:55 AM   Result Value Ref Range    Magnesium 2.0 1.6 - 2.4 mg/dL   Phosphorus    Collection Time: 11/21/21  4:55 AM   Result Value Ref Range    Phosphorus 2.5 2.4 - 4.7 mg/dL   CBC auto differential    Collection Time: 11/21/21  4:55 AM   Result Value Ref Range    WBC 4.3 (L) 4.8 - 10.8 thou/mm3    RBC 2.66 (L) 4.20 - 5.40 mill/mm3    Hemoglobin 8.1 (L) 12.0 - 16.0 gm/dl    Hematocrit 26.9 (L) 37.0 - 47.0 %    .1 (H) 81.0 - 99.0 fL    MCH 30.5 26.0 - 33.0 pg    MCHC 30.1 (L) 32.2 - 35.5 gm/dl    RDW-CV 20.4 (H) 11.5 - 14.5 %    RDW-SD 76.0 (H) 35.0 - 45.0 fL    Platelets 597 352 - 515 thou/mm3    MPV 9.6 9.4 - 12.4 fL    Seg Neutrophils 66.6 %    Lymphocytes 9.4 %    Monocytes 15.3 %    Eosinophils 7.7 %    Basophils 0.5 %    Immature Granulocytes 0.5 %    Segs Absolute 2.9 1.8 - 7.7 thou/mm3    Lymphocytes Absolute 0.4 (L) 1.0 - 4.8 thou/mm3    Monocytes Absolute 0.7 0.4 - 1.3 thou/mm3    Eosinophils Absolute 0.3 0.0 - 0.4 thou/mm3    Basophils Absolute 0.0 0.0 - 0.1 thou/mm3    Immature Grans (Abs) 0.02 0.00 - 0.07 thou/mm3    nRBC 0 /100 wbc    Anisocytosis PRESENT Absent   Comprehensive metabolic panel    Collection Time: 11/21/21  4:55 AM   Result Value Ref Range    Glucose 94 70 - 108 mg/dL    CREATININE 0.9 0.4 - 1.2 mg/dL    BUN 24 (H) 7 - 22 mg/dL    Sodium 137 135 - 145 meq/L    Potassium 4.6 3.5 - 5.2 meq/L    Chloride 100 98 - 111 meq/L    CO2 29 23 - 33 meq/L    Calcium 9.7 8.5 - 10.5 mg/dL    AST 53 (H) 5 - 40 U/L    Alkaline Phosphatase 29 (L) 38 - 126 U/L    Total Protein 4.8 (L) 6.1 - 8.0 g/dL    Albumin 2.7 (L) 3.5 - 5.1 g/dL    Total Bilirubin 0.6 0.3 - 1.2 mg/dL    ALT 34 11 - 66 U/L   Protime-INR    Collection Time: 11/21/21  4:55 AM   Result Value Ref Range    INR 1.22 (H) 0.85 - 1.13   Anion Gap    Collection Time: 11/21/21  4:55 AM   Result Value Ref Range    Anion Gap 8.0 8.0 - 16.0 meq/L   Glomerular Filtration Rate, Estimated    Collection Time: 11/21/21  4:55 AM   Result Value Ref Range    Est, Glom Filt Rate 60 (A) ml/min/1.73m2   POCT glucose    Collection Time: 11/21/21  6:17 AM   Result Value Ref Range    POC Glucose 107 70 - 108 mg/dl   POCT glucose    Collection Time: 11/21/21 11:00 AM   Result Value Ref Range    POC Glucose 149 (H) 70 - 108 mg/dl   Hemoglobin and hematocrit, blood    Collection Time: 11/21/21 11:15 AM   Result Value Ref Range    Hemoglobin 8.2 (L) 12.0 - 16.0 gm/dl    Hematocrit 27.7 (L) 37.0 - 47.0 %   POCT glucose    Collection Time: 11/21/21  3:56 PM   Result Value Ref Range    POC Glucose 156 (H) 70 - 108 mg/dl   Hemoglobin and hematocrit, blood    Collection Time: 11/21/21  5:00 PM   Result Value Ref Range    Hemoglobin 8.5 (L) 12.0 - 16.0 gm/dl    Hematocrit 28.6 (L) 37.0 - 47.0 %   POCT glucose    Collection Time: 11/21/21  7:43 PM   Result Value Ref Range    POC Glucose 256 (H) 70 - 108 mg/dl   Hemoglobin and hematocrit, blood    Collection Time: 11/21/21 11:15 PM   Result Value Ref Range    Hemoglobin 8.2 (L) 12.0 - 16.0 gm/dl    Hematocrit 25.5 (L) 37.0 - 47.0 %   Comprehensive metabolic panel    Collection Time: 11/22/21  4:34 AM   Result Value Ref Range    Glucose 120 (H) 70 - 108 mg/dL    CREATININE 0.8 0.4 - 1.2 mg/dL    BUN 12 7 - 22 mg/dL    Sodium 138 135 - 145 meq/L    Potassium 3.6 3.5 - 5.2 meq/L    Chloride 103 98 - 111 meq/L    CO2 26 23 - 33 meq/L    Calcium 9.2 8.5 - 10.5 mg/dL    AST 50 (H) 5 - 40 U/L    Alkaline Phosphatase 29 (L) 38 - 126 U/L    Total Protein 5.1 (L) 6.1 - 8.0 g/dL    Albumin 2.5 (L) 3.5 - 5.1 g/dL    Total Bilirubin 0.3 0.3 - 1.2 mg/dL    ALT 34 11 - 66 U/L   CBC Auto Differential    Collection Time: 11/22/21  4:34 AM   Result Value Ref Range    WBC 4.3 (L) 4.8 - 10.8 thou/mm3    RBC 2.66 (L) 4.20 - 5.40 mill/mm3    Hemoglobin 8.1 (L) 12.0 - 16.0 gm/dl    Hematocrit 26.1 (L) 37.0 - 47.0 %    MCV 98.1 81.0 - 99.0 fL    MCH 30.5 26.0 - 33.0 pg    MCHC 31.0 (L) 32.2 - 35.5 gm/dl    RDW-CV 20.4 (H) 11.5 - 14.5 %    RDW-SD 73.0 (H) 35.0 - 45.0 fL    Platelets 485 351 - 790 thou/mm3    MPV 9.2 (L) 9.4 - 12.4 fL    Seg Neutrophils 63.1 %    Lymphocytes 10.5 %    Monocytes 13.8 %    Eosinophils 10.5 %    Basophils 0.7 %    Immature Granulocytes 1.4 %    Segs Absolute 2.7 1.8 - 7.7 thou/mm3    Lymphocytes Absolute 0.5 (L) 1.0 - 4.8 thou/mm3    Monocytes Absolute 0.6 0.4 - 1.3 thou/mm3    Eosinophils Absolute 0.5 (H) 0.0 - 0.4 thou/mm3    Basophils Absolute 0.0 0.0 - 0.1 thou/mm3    Immature Grans (Abs) 0.06 0.00 - 0.07 thou/mm3    nRBC 0 /100 wbc    Anisocytosis PRESENT Absent   Anion Gap    Collection Time: 11/22/21  4:34 AM   Result Value Ref Range    Anion Gap 9.0 8.0 - 16.0 meq/L   Glomerular Filtration Rate, Estimated    Collection Time: 11/22/21  4:34 AM   Result Value Ref Range    Est, Glom Filt Rate 68 (A) ml/min/1.73m2   POCT glucose    Collection Time: 11/22/21  6:47 AM   Result Value Ref Range    POC Glucose 187 (H) 70 - 108 mg/dl   Hemoglobin and hematocrit, blood    Collection Time: 11/22/21 10:13 AM   Result Value Ref Range    Hemoglobin 9.0 (L) 12.0 - 16.0 gm/dl    Hematocrit 28.9 (L) 37.0 - 47.0 %   POCT glucose    Collection Time: 11/22/21 11:07 AM   Result Value Ref Range    POC Glucose 126 (H) 70 - 108 mg/dl   POCT glucose    Collection Time: 11/22/21  3:28 PM   Result Value Ref Range    POC Glucose 169 (H) 70 - 108 mg/dl   POCT glucose    Collection Time: 11/22/21  7:46 PM   Result Value Ref Range    POC Glucose 159 (H) 70 - 108 mg/dl   Hemoglobin and hematocrit, blood    Collection Time: 11/22/21 10:48 PM   Result Value Ref Range    Hemoglobin 7.7 (L) 12.0 - 16.0 gm/dl    Hematocrit 25.4 (L) 37.0 - 47.0 %   CBC auto differential    Collection Time: 11/23/21  4:43 AM   Result Value Ref Range    WBC 4.2 (L) 4.8 - 10.8 thou/mm3    RBC 2.52 (L) 4.20 - 5.40 mill/mm3    Hemoglobin 7.7 (L) 12.0 - 16.0 gm/dl    Hematocrit 25.3 (L) 37.0 - 47.0 %    .4 (H) 81.0 - 99.0 fL    MCH 30.6 26.0 - 33.0 pg    MCHC 30.4 (L) 32.2 - 35.5 gm/dl    RDW-CV 19.9 (H) 11.5 - 14.5 %    RDW-SD 72.7 (H) 35.0 - 45.0 fL    Platelets 776 633 - 617 thou/mm3    MPV 9.4 9.4 - 12.4 fL    Seg Neutrophils 67.5 %    Lymphocytes 11.5 %    Monocytes 11.7 %    Eosinophils 7.6 %    Basophils 0.7 %    Immature Granulocytes 1.0 %    Segs Absolute 2.8 1.8 - 7.7 thou/mm3    Lymphocytes Absolute 0.5 (L) 1.0 - 4.8 thou/mm3    Monocytes Absolute 0.5 0.4 - 1.3 thou/mm3    Eosinophils Absolute 0.3 0.0 - 0.4 thou/mm3    Basophils Absolute 0.0 0.0 - 0.1 thou/mm3    Immature Grans (Abs) 0.04 0.00 - 0.07 thou/mm3    nRBC 0 /100 wbc    Anisocytosis PRESENT Absent   Comprehensive metabolic panel    Collection Time: 11/23/21  4:43 AM   Result Value Ref Range    Glucose 113 (H) 70 - 108 mg/dL    CREATININE 0.7 0.4 - 1.2 mg/dL    BUN 10 7 - 22 mg/dL    Sodium 139 135 - 145 meq/L    Potassium 3.6 3.5 - 5.2 meq/L    Chloride 106 98 - 111 meq/L    CO2 22 (L) 23 - 33 meq/L    Calcium 8.8 8.5 - 10.5 mg/dL    AST 42 (H) 5 - 40 U/L    Alkaline Phosphatase 36 (L) 38 - 126 U/L    Total Protein 4.8 (L) 6.1 - 8.0 g/dL    Albumin 2.2 (L) 3.5 - 5.1 g/dL    Total Bilirubin 0.2 (L) 0.3 - 1.2 mg/dL    ALT 31 11 - 66 U/L   Anion Gap    Collection Time: 11/23/21  4:43 AM   Result Value Ref Range    Anion Gap 11.0 8.0 - 16.0 meq/L   Glomerular Filtration Rate, Estimated    Collection Time: 11/23/21  4:43 AM   Result Value Ref Range    Est, Glom Filt Rate is pericolonic fluid and thickened bowel area of diverticula. This appearance is nearly identical to the prior exam there is minimal increase in the pericolonic fluid. There still no drainable fluid collection. Urinary bladder is unremarkable but mildly distended. ABDOMEN: Liver, and spleen are normal. Gallbladder is mildly distended and there are stones in the neck. The pancreas is normal. Kidneys enhance symmetrically. Small exophytic lesion upper pole left kidney statistically a cyst. Aorta is atherosclerotic it is not aneurysmal. Bones Scoliosis degenerative changes and postsurgical changes lumbar spine. Marked degenerative change left hip. No suspicious bone lesions. Lung bases Left basilar atelectasis no pleural effusions. Cardiomegaly. Persistent appearance of acute sigmoid diverticulitis. Minimal increase in the pericolonic fluid but no drainable fluid collection or abscess is identified. **This report has been created using voice recognition software. It may contain minor errors which are inherent in voice recognition technology. ** Final report electronically signed by Dr. Eva Prakash on 11/19/2021 8:36 AM       Consults:   IP CONSULT TO GI  PHARMACY CONSULT FOR RENAL DOSING  IP CONSULT TO SOCIAL WORK    Discharge Medications:      Medication List      CHANGE how you take these medications    apixaban 5 MG Tabs tablet  Commonly known as: ELIQUIS  Take 2 tablets by mouth 2 times daily Continue w/ 10 mf BID x 7 days followed by 5 BID indefinitely  What changed:   · how much to take  · additional instructions     bumetanide 1 MG tablet  Commonly known as: BUMEX  Take 0.5 tablets by mouth daily as needed (leg swelling, increase in daily weight > 3lbs, SOB)  What changed:   · when to take this  · reasons to take this     montelukast 5 MG chewable tablet  Commonly known as: WOLFGANGIR  What changed: Another medication with the same name was removed.  Continue taking this medication, and follow the directions you see here.         CONTINUE taking these medications    acetaminophen 500 MG tablet  Commonly known as: TYLENOL     * albuterol 2.5 MG/0.5ML Nebu nebulizer solution  Commonly known as: PROVENTIL     * albuterol sulfate  (90 Base) MCG/ACT inhaler     aluminum & magnesium hydroxide-simethicone 400-400-40 MG/5ML Susp  Commonly known as: MYLANTA     Artificial Tears 1 % ophthalmic solution  Generic drug: carboxymethylcellulose     atorvastatin 40 MG tablet  Commonly known as: LIPITOR  Take 2 tablets by mouth daily     azaTHIOprine 50 MG tablet  Commonly known as: IMURAN  Take 2 tablets by mouth daily     biotene dry mouth moisturizing Soln liquid     calcium carbonate 500 MG chewable tablet  Commonly known as: TUMS     cloNIDine 0.1 MG tablet  Commonly known as: CATAPRES  Take 1 tablet by mouth 2 times daily     Dexilant 60 MG Cpdr delayed release capsule  Generic drug: dexlansoprazole     docusate 100 MG Caps  Commonly known as: COLACE, DULCOLAX  Take 100 mg by mouth 2 times daily     DULoxetine 30 MG extended release capsule  Commonly known as: CYMBALTA     EPINEPHrine 0.3 MG/0.3ML Soaj injection  Commonly known as: EpiPen 2-Luther  Inject 0.3 mLs into the muscle once for 1 dose Use as directed for allergic reaction     fenofibrate micronized 200 MG capsule  Commonly known as: LOFIBRA     fluticasone-umeclidin-vilant 100-62.5-25 MCG/INH Aepb  Commonly known as: TRELEGY ELLIPTA     gabapentin 100 MG capsule  Commonly known as: NEURONTIN     Heat Therapy Misc     hydroxychloroquine 200 MG tablet  Commonly known as: PLAQUENIL  TAKE 1 TABLET BY MOUTH EVERY DAY     insulin glargine 100 UNIT/ML injection pen  Commonly known as: LANTUS;BASAGLAR     isosorbide mononitrate 60 MG extended release tablet  Commonly known as: IMDUR  Take 1 tablet by mouth daily     loperamide 2 MG capsule  Commonly known as: IMODIUM     losartan 50 MG tablet  Commonly known as: COZAAR     magnesium hydroxide 400 MG/5ML suspension  Commonly known as: MILK OF MAGNESIA     magnesium oxide 400 MG tablet  Commonly known as: MAG-OX     metoprolol succinate 50 MG extended release tablet  Commonly known as: TOPROL XL     nitroGLYCERIN 0.4 MG SL tablet  Commonly known as: NITROSTAT     oxyCODONE-acetaminophen 5-325 MG per tablet  Commonly known as: PERCOCET     OXYGEN     predniSONE 5 MG tablet  Commonly known as: DELTASONE  TAKE 1 TABLET BY MOUTH EVERY DAY     ranolazine 500 MG extended release tablet  Commonly known as: Ranexa  Take 1 tablet by mouth 2 times daily. spironolactone 50 MG tablet  Commonly known as: ALDACTONE  Take 1 tablet by mouth daily     T.E.D. Anti-Embolism Stockings Misc     traZODone 50 MG tablet  Commonly known as: DESYREL         * This list has 2 medication(s) that are the same as other medications prescribed for you. Read the directions carefully, and ask your doctor or other care provider to review them with you. STOP taking these medications    amoxicillin-clavulanate 875-125 MG per tablet  Commonly known as: AUGMENTIN     aspirin 81 MG EC tablet           Where to Get Your Medications      Information about where to get these medications is not yet available    Ask your nurse or doctor about these medications  · bumetanide 1 MG tablet          Patient Instructions:    Discharge lab work: bmp, cbc. inr  Activity: activity as tolerated  Diet: ADULT DIET; Regular; 3 carb choices (45 gm/meal)      Follow-up visits: [phone] [phone]  [address] [place] [address]), [address] [place] [address]  [phone]    Schedule an appointment as soon as possible for a visit on 1/13/2022  3-4 week follow-up; 1100 AM         Disposition: SNF  Condition at Discharge: Stable    Time Spent: 50 minutes    Signed: Thank you Rigo Yeboah DO for the opportunity to be involved in this patient's care.     Electronically signed by Ese Haji PA-C on 11/23/2021 at 3:36 PM  Discharging Hospitalist

## 2021-11-23 NOTE — CARE COORDINATION
11/23/21, 1:53 PM EST    Patient goals/plan/ treatment preferences discussed by  and . Patient goals/plan/ treatment preferences reviewed with patient/ family. Patient/ family verbalize understanding of discharge plan and are in agreement with goal/plan/treatment preferences. Understanding was demonstrated using the teach back method. AVS provided by RN at time of discharge, which includes all necessary medical information pertaining to the patients current course of illness, treatment, post-discharge goals of care, and treatment preferences. Services After Discharge  Services At/After Discharge: In ambulance, Nursing Services, Skilled Therapy, Aide Services ([facility])   [place] Letter  IMM Letter given to Patient/Family/Significant other/Guardian/POA/by[de-identified] Josie Lion CM<  IMM Letter date given[de-identified] 11/23/21  IMM Letter time given[de-identified] 1214     Pt is being discharged back to [facility] under her Medicare benefit. SW faxed AVS, left message with pts daughter, Samm Degroot for transportation at 5:30 pm.  RN [employer] is aware.

## 2021-12-03 NOTE — PROGRESS NOTES
Physician Progress Note      Latonya Medina  CSN #:                  [id number]  :                       1940  ADMIT DATE:       2021 8:52 PM  [address] DATE:        2021 6:50 PM  RESPONDING  PROVIDER #:        Cristhian Christianson PA-C          QUERY TEXT:    Pt admitted with Rectal Bleeding  and was diagnosed with bilateral DVT and   pulmonary embolism on . If possible, please document in progress notes and   discharge summary if you are evaluating and/or treating any of the following: The medical record reflects the following:  Risk Factors: Elderly  Clinical Indicators: diagnosed with bilateral DVT and pulmonary embolism on     Treatment: Eliquis    Thank You! Rosendo Ramirez RN, [facility]  RN Clinical   (B) [phone] (E) [phone]  Options provided:  -- Acute pulmonary embolism  -- Subacute acute pulmonary embolism  -- Other - I will add my own diagnosis  -- Disagree - Not applicable / Not valid  -- Disagree - Clinically unable to determine / Unknown  -- Refer to Clinical Documentation Reviewer    PROVIDER RESPONSE TEXT:    This patient has a Subacute pulmonary embolism.     Query created by: Mayra Hernadez on 2021 7:02 AM      Electronically signed by:  Cristhian Christianson PA-C 12/3/2021 5:11 PM

## 2021-12-13 LAB
ALBUMIN SERPL-MCNC: 3 G/DL
ALP BLD-CCNC: 44 U/L
ALT SERPL-CCNC: 10 U/L
ANION GAP SERPL CALCULATED.3IONS-SCNC: 13 MMOL/L
AST SERPL-CCNC: 20 U/L
BASOPHILS ABSOLUTE: 0 /ΜL
BASOPHILS RELATIVE PERCENT: 0.8 %
BILIRUB SERPL-MCNC: 0.3 MG/DL (ref 0.1–1.4)
BUN BLDV-MCNC: 14 MG/DL
C-REACTIVE PROTEIN: 2.66
CALCIUM SERPL-MCNC: 9.3 MG/DL
CHLORIDE BLD-SCNC: 104 MMOL/L
CO2: 28 MMOL/L
CREAT SERPL-MCNC: 0.8 MG/DL
EOSINOPHILS ABSOLUTE: 0.2 /ΜL
EOSINOPHILS RELATIVE PERCENT: 4.7 %
GFR CALCULATED: 60
GLUCOSE BLD-MCNC: 130 MG/DL
HCT VFR BLD CALC: 25.9 % (ref 36–46)
HEMOGLOBIN: 8.6 G/DL (ref 12–16)
LYMPHOCYTES ABSOLUTE: 0.4 /ΜL
LYMPHOCYTES RELATIVE PERCENT: 9 %
MCH RBC QN AUTO: 31.8 PG
MCHC RBC AUTO-ENTMCNC: 33.3 G/DL
MCV RBC AUTO: 95.5 FL
MONOCYTES ABSOLUTE: 0.6 /ΜL
MONOCYTES RELATIVE PERCENT: 13.6 %
NEUTROPHILS ABSOLUTE: 3.2 /ΜL
NEUTROPHILS RELATIVE PERCENT: 71.9 %
PDW BLD-RTO: 19.8 %
PLATELET # BLD: 448 K/ΜL
PMV BLD AUTO: 7.3 FL
POTASSIUM SERPL-SCNC: 3.4 MMOL/L
RBC # BLD: 2.72 10^6/ΜL
SEDIMENTATION RATE, ERYTHROCYTE: 33
SODIUM BLD-SCNC: 142 MMOL/L
TOTAL PROTEIN: 5.7
WBC # BLD: 4.5 10^3/ML

## 2021-12-14 ENCOUNTER — OFFICE VISIT (OUTPATIENT)
Dept: RHEUMATOLOGY | Age: 81
End: 2021-12-14
Payer: MEDICARE

## 2021-12-14 VITALS
SYSTOLIC BLOOD PRESSURE: 122 MMHG | WEIGHT: 192.46 LBS | DIASTOLIC BLOOD PRESSURE: 82 MMHG | BODY MASS INDEX: 32.07 KG/M2 | HEIGHT: 65 IN | OXYGEN SATURATION: 98 % | HEART RATE: 75 BPM

## 2021-12-14 DIAGNOSIS — Z51.81 MEDICATION MONITORING ENCOUNTER: ICD-10-CM

## 2021-12-14 DIAGNOSIS — D72.810 LYMPHOPENIA: ICD-10-CM

## 2021-12-14 DIAGNOSIS — D64.9 NORMOCYTIC ANEMIA: ICD-10-CM

## 2021-12-14 DIAGNOSIS — M85.89 OSTEOPENIA OF MULTIPLE SITES: ICD-10-CM

## 2021-12-14 DIAGNOSIS — G89.29 CHRONIC MIDLINE LOW BACK PAIN WITH LEFT-SIDED SCIATICA: ICD-10-CM

## 2021-12-14 DIAGNOSIS — M06.00 SERONEGATIVE RHEUMATOID ARTHRITIS (HCC): Primary | ICD-10-CM

## 2021-12-14 DIAGNOSIS — R20.2 PARESTHESIA OF BOTH HANDS: ICD-10-CM

## 2021-12-14 DIAGNOSIS — M15.9 OSTEOARTHRITIS OF MULTIPLE JOINTS, UNSPECIFIED OSTEOARTHRITIS TYPE: ICD-10-CM

## 2021-12-14 DIAGNOSIS — M54.42 CHRONIC MIDLINE LOW BACK PAIN WITH LEFT-SIDED SCIATICA: ICD-10-CM

## 2021-12-14 PROCEDURE — 1123F ACP DISCUSS/DSCN MKR DOCD: CPT | Performed by: INTERNAL MEDICINE

## 2021-12-14 PROCEDURE — 1036F TOBACCO NON-USER: CPT | Performed by: INTERNAL MEDICINE

## 2021-12-14 PROCEDURE — 1111F DSCHRG MED/CURRENT MED MERGE: CPT | Performed by: INTERNAL MEDICINE

## 2021-12-14 PROCEDURE — G8417 CALC BMI ABV UP PARAM F/U: HCPCS | Performed by: INTERNAL MEDICINE

## 2021-12-14 PROCEDURE — G8427 DOCREV CUR MEDS BY ELIG CLIN: HCPCS | Performed by: INTERNAL MEDICINE

## 2021-12-14 PROCEDURE — G8484 FLU IMMUNIZE NO ADMIN: HCPCS | Performed by: INTERNAL MEDICINE

## 2021-12-14 PROCEDURE — 1090F PRES/ABSN URINE INCON ASSESS: CPT | Performed by: INTERNAL MEDICINE

## 2021-12-14 PROCEDURE — 4040F PNEUMOC VAC/ADMIN/RCVD: CPT | Performed by: INTERNAL MEDICINE

## 2021-12-14 PROCEDURE — 99214 OFFICE O/P EST MOD 30 MIN: CPT | Performed by: INTERNAL MEDICINE

## 2021-12-14 PROCEDURE — G8399 PT W/DXA RESULTS DOCUMENT: HCPCS | Performed by: INTERNAL MEDICINE

## 2021-12-14 ASSESSMENT — ENCOUNTER SYMPTOMS
TROUBLE SWALLOWING: 0
VOICE CHANGE: 0
NAUSEA: 0
CONSTIPATION: 1
COUGH: 0
EYE PAIN: 0
BACK PAIN: 1
DIARRHEA: 0
ABDOMINAL PAIN: 0
WHEEZING: 1
EYE ITCHING: 0
SHORTNESS OF BREATH: 1

## 2021-12-14 NOTE — PROGRESS NOTES
[facility] RHEUMATOLOGY FOLLOW UP NOTE       Date Of Service: 12/14/2021  Provider: Korin Galvez DO    Name: Gurjit Lyon   MRN: [id number]    Chief Complaint(s)     Chief Complaint   Patient presents with    Follow-up     4 months Rheumatoid Arthritis         History of Present Illness (HPI)     Gurjit Lyon  is a(n)81 y.o. female with a hx of obesity, angina, CAD, T2DM, non-rheumatic mitral regurg, HTN, GERD, CKD stage III, COPD, depression here for the f/u evaluation of osteoarthritis multiple joints, inflammatory arthritis, chondrocalcinosis bilateral wrists (? CPPD arthropathy), hypercalcemia. Diverticulitis - requiring hospitalization x 4  W/ the last hospitalization 1 month ago - required transfusion. Reports that her joint pains are terrible for the past 3 months. Worsening mobility and difficulty getting otu of bed. pain affecting the fingers, wrists, elbows, shoulders, hips, knees, ankles, toes, neck  Most severee pain in the lower back w/ assocaited shooting pain down the left leg. + numbness bilat hands. Constant pain in the left leg, lower back and hands. Timing: n/a  Aggravating factors: weather changes, hands: use, knees: standing, back: walking, standing, bending, prolonged sitting. Neck: turning head to left Alleviating factors: percocet - short term relief. Swelling of the fingers and left lower leg. REVIEW OF SYSTEMS: (ROS)    Review of Systems   Constitutional: Negative for fatigue, fever and unexpected weight change. HENT: Negative for congestion, trouble swallowing and voice change. Dry mouth   Eyes: Negative for pain and itching. Dry eyes   Respiratory: Positive for shortness of breath and wheezing. Negative for cough. Cardiovascular: Negative for chest pain and leg swelling. Gastrointestinal: Positive for constipation. Negative for abdominal pain, diarrhea and nausea. Endocrine: Negative for cold intolerance and heat intolerance. Genitourinary: Negative for difficulty urinating, frequency and urgency. Musculoskeletal: Positive for arthralgias, back pain, joint swelling and neck pain. Negative for myalgias. Skin: Negative for rash. Neurological: Negative for dizziness, weakness, numbness and headaches. Hematological: Does not bruise/bleed easily. Psychiatric/Behavioral: Positive for sleep disturbance. The patient is not nervous/anxious.         PAST MEDICAL HISTORY      Past Medical History:   Diagnosis Date    Arthritis     Asthma 1978    [facility] [address]     Blood circulation, collateral     CAD (coronary artery disease)     [facility]congest[facility] failure) ([facility] 1999    Chronic kidney disease, stage III (moderate) ([facility])     COPD (chronic obstructive pulmonary disease) ([facility] [address]     Depression     Diabetes mellitus ([facility])     Diabetic hypertension-nephrosis syndrome ([facility])     Dysuria     GERD (gastroesophageal reflux disease)     Hyperlipidemia     Hypertension     Kidney disease     stage 3 dr Tamica Muñiz Unspecified cerebral artery occlusion with cerebral infarction 1999    Dr Megan Ross informed patient after Heart Attack       PAST SURGICAL HISTORY      Past Surgical History:   Procedure Laterality Date    APPENDECTOMY      BACK SURGERY      lumbar spine    BLADDER SUSPENSION      CARDIAC CATHETERIZATION  2009    with stent  [facility]    CARPAL TUNNEL RELEASE      COLONOSCOPY  2013    ENDOSCOPY, COLON, DIAGNOSTIC      ESOPHAGEAL DILATATION      HYSTERECTOMY  1974    JOINT REPLACEMENT Left 1992    Left Knee    UPPER GASTROINTESTINAL ENDOSCOPY N/A 9/28/2021    EGD DILATION performed by Giuseppe Moreland MD [facility]    UPPER GASTROINTESTINAL ENDOSCOPY Left 9/28/2021    EGD BIOPSY performed by [facility]useppe Moreland MD at [facility]       FAMILY HISTORY      Family History   Problem Relation Age of Onset    Heart Disease Mother     Diabetes Mother     Kidney Disease Mother     Heart Disease Father  Diabetes Father     Cancer Sister     Stroke Brother     Cancer Sister        SOCIAL HISTORY      Social History     Tobacco History     Smoking Status  Light Tobacco Smoker Smoking Start Date  10/21/2013 Smoking Frequency  0.15 packs/day for 10 years (1.5 pk yrs) Smoking Tobacco Type  Cigarettes    Smokeless Tobacco Use  Never Used          Alcohol History     Alcohol Use Status  No          Drug Use     Drug Use Status  No          Sexual Activity     Sexually Active  Never                ALLERGIES     Allergies   Allergen Reactions    Renflexis [Infliximab] Shortness Of Breath and Palpitations    Strawberry (Diagnostic) Anaphylaxis     Diverticulitis    Nuts [Peanut-Containing Drug Products]      Pt suffers from diverticulitis and nut products irritate her stomach.     Sulfa Antibiotics     Tape Levell [place] Tape] Other (See Comments)     tears skin off    Bactrim [Sulfamethoxazole-Trimethoprim] Rash     Little red rash    Chocolate Rash       CURRENT MEDICATIONS      Current Outpatient Medications   Medication Sig Dispense Refill    bumetanide (BUMEX) 1 MG tablet Take 0.5 tablets by mouth daily as needed (leg swelling, increase in daily weight > 3lbs, SOB) 30 tablet 0    magnesium hydroxide (MILK OF MAGNESIA) 400 MG/5ML suspension Take 30 mLs by mouth daily as needed for Constipation      azaTHIOprine (IMURAN) 50 MG tablet Take 2 tablets by mouth daily 60 tablet 0    spironolactone (ALDACTONE) 50 MG tablet Take 1 tablet by mouth daily 30 tablet 3    albuterol (PROVENTIL) 2.5 MG/0.5ML NEBU nebulizer solution Take 2.5 mg by nebulization every 4-6 hours as needed for Wheezing or Shortness of Breath Do not take inhaler and nebulizer at the same time per Dr. Leatha Hernández aluminum & magnesium hydroxide-simethicone (MYLANTA) 400-400-40 MG/5ML SUSP Take 10 mLs by mouth every 6 hours as needed For acid reflux      Artificial Saliva (BIOTENE DRY MOUTH MOISTURIZING) SOLN liquid Take by mouth as needed Dry mouth      montelukast (SINGULAIR) 5 MG chewable tablet Take 5 mg by mouth nightly      metoprolol succinate (TOPROL XL) 50 MG extended release tablet Take 50 mg by mouth daily      fluticasone-umeclidin-vilant (TRELEGY ELLIPTA) 100-62.5-25 MCG/INH AEPB Inhale 1 puff into the lungs daily SOB      Misc. Devices (HEAT THERAPY) MISC by Does not apply route every 8 hours as needed For pain. To Left shoulder      Elastic Bandages & Supports (T.E.D. ANTI-EMBOLISM STOCKINGS) MISC by Does not apply route On in AM; off at HS      OXYGEN Inhale into the lungs 2-4L to keep SpO2 above 92%. PRN for SOB      calcium carbonate (TUMS) 500 MG chewable tablet Take 1 tablet by mouth 2 times daily      fenofibrate micronized (LOFIBRA) 200 MG capsule Take 200 mg by mouth daily       acetaminophen (TYLENOL) 500 MG tablet Take 500 mg by mouth every 8 hours as needed for Pain       carboxymethylcellulose (ARTIFICIAL TEARS) 1 % ophthalmic solution Place 1 drop into both eyes daily as needed for Dry Eyes       losartan (COZAAR) 50 MG tablet Take 50 mg by mouth daily      loperamide (IMODIUM) 2 MG capsule Take 2 mg by mouth every 12 hours as needed for Diarrhea       oxyCODONE-acetaminophen (PERCOCET) 5-325 MG per tablet Take 1 tablet by mouth 3 times daily. For pain      hydroxychloroquine (PLAQUENIL) 200 MG tablet TAKE 1 TABLET BY MOUTH EVERY DAY 90 tablet 1    cloNIDine (CATAPRES) 0.1 MG tablet Take 1 tablet by mouth 2 times daily 60 tablet 3    predniSONE (DELTASONE) 5 MG tablet TAKE 1 TABLET BY MOUTH EVERY DAY 30 tablet 3    DULoxetine (CYMBALTA) 30 MG extended release capsule Take 30 mg by mouth 2 times daily      gabapentin (NEURONTIN) 100 MG capsule Take 100 mg by mouth 3 times daily.       atorvastatin (LIPITOR) 40 MG tablet Take 2 tablets by mouth daily 60 tablet 2    docusate sodium (COLACE, DULCOLAX) 100 MG CAPS Take 100 mg by mouth 2 times daily 60 capsule 1    insulin glargine (LANTUS) 100 UNIT/ML Musculoskeletal:     Normal gait. Strength 5/5 in biceps, triceps, hips, knees,      Upper extremities:   Shoulders:  + tender bilat, no swelling,   Elbows:      + tender bilat, no swelling,  Wrists        + bilateral - tender , mild swelling along the lateral wrist bilat. Painful ROm   Hands:     + tender bilat mcps, pips, dip.      Lower extremities:  Hips:      + tender bilat outer hips  Knees :   Tender bilateral   Ankles:   Non-tender   Feet:       + MTP compression           LABS    CBC  Lab Results   Component Value Date    WBC 4.2 11/23/2021    RBC 2.52 11/23/2021    HGB 7.6 11/23/2021    HCT 24.3 11/23/2021    .4 11/23/2021    MCH 30.6 11/23/2021    MCHC 30.4 11/23/2021    RDW 18.0 10/26/2021     11/23/2021       CMP  Lab Results   Component Value Date    CALCIUM 8.8 11/23/2021    LABALBU 2.2 11/23/2021    PROT 4.8 11/23/2021     11/23/2021    K 3.6 11/23/2021    K 3.6 11/06/2020    CO2 22 11/23/2021     11/23/2021    BUN 10 11/23/2021    CREATININE 0.7 11/23/2021    ALKPHOS 36 11/23/2021    ALT 31 11/23/2021    AST 42 11/23/2021     Lab Results   Component Value Date    VITD25 34 12/28/2017       Lab Results   Component Value Date    RF <15 11/09/2017       Lab Results   Component Value Date    SEDRATE 28 10/26/2021     Lab Results   Component Value Date    CRP 4.93 10/26/2021       RADIOLOGY:         ASSESSMENT/PLAN    Assessment   Plan     Seronegative rheumatoid arthritis  - negative JAMAL, RF, CCP, SSA, SSB, normal uric acid and negative secondary evaluation for CPPD arthropathy  - prior tx: lumbar epidural injections, trigger point injections, PT, tramadol, gabapentin, prednisone, arava (diarrhea), azathioprine (nausea, vomiting), remicaide 3mg/kg (CHF exacerbation)  - avoiding IL6 & GUILLERMO- diverticulitis, T cell inhibition- COPD    - holding azathioprine 100 mg daily to evaluate for improvementof lymphopenia   - prednisone 5 mg daily  - plaquenil 200 mg BID  - redose rituxan 1000 mg q 6 months (6/8/2021) -- if lymphopenia improved after holding Azathioprine. lymphopenia- hold Azathioprine - re-evaluation in 2 weeks. COPD  Pulmonary HTN- RVSP 40 mmhg - echo 2020  - CT chest w/ changes consistent with air trapping.   - PFTs w/ mild to mod obstructive lung disease  - on 2 L NC    Low back pain w/ radiculopathy - continued shooting pain down the left leg w/ reported aggravating w/ standing, wt beraing.  - on percocent and cymbalta. Microcytic anemia: Worsening since last evaluation.-  Lower iron studies. --     Bilateral hip osteoarthritis   - prior tx: bursal injections. Hip replacement on hold due to cardiac concerns    Chronic pain of right knee  - h/o osteoarthritis, knee pain worse with wt bearing, improved w/ non wt bearing  - XR right knee (11/2017)- no abnormalities,  avoiding oral NSAIDs given CKD and CAD   - cymbalta 30 mg daily   - percocet from pain management    Osteopenia w/ elevated FRAX  - 13%  Major and 4.5% hip   - Prolia 60 mg subcu q 6 months (Oct 2019) - last in aug 2021. CRP elevation -continued active synovitis and suspected be related to underlying inflammatory arthritis. Medication monitoring   - CBC, CMP, sed rate, CRP q 8 weeks x 1         Return in about 2 months (around 2/14/2022). Electronically signed by Hans Moya DO on 12/14/2021 at 4:33 PM    New Prescriptions    No medications on file       Thank you for allowing me to participate in the care of this patient. Please call if there are any questions.

## 2021-12-16 NOTE — RESULT ENCOUNTER NOTE
Labs reviewed at last evaluation on 12/15/2021. Please see note for medication changes that were recommended.

## 2021-12-19 ENCOUNTER — APPOINTMENT (OUTPATIENT)
Dept: CT IMAGING | Age: 81
DRG: 177 | End: 2021-12-19
Payer: MEDICARE

## 2021-12-19 ENCOUNTER — APPOINTMENT (OUTPATIENT)
Dept: GENERAL RADIOLOGY | Age: 81
DRG: 177 | End: 2021-12-19
Payer: MEDICARE

## 2021-12-19 ENCOUNTER — HOSPITAL ENCOUNTER (INPATIENT)
Age: 81
LOS: 9 days | Discharge: NURSING, REHAB OR LONG-TERM CARE | DRG: 177 | End: 2021-12-28
Attending: EMERGENCY MEDICINE | Admitting: INTERNAL MEDICINE
Payer: MEDICARE

## 2021-12-19 DIAGNOSIS — R77.8 TROPONIN LEVEL ELEVATED: ICD-10-CM

## 2021-12-19 DIAGNOSIS — J18.9 PNEUMONIA DUE TO INFECTIOUS ORGANISM, UNSPECIFIED LATERALITY, UNSPECIFIED PART OF LUNG: Primary | ICD-10-CM

## 2021-12-19 PROBLEM — I50.23 ACUTE ON CHRONIC CLINICAL SYSTOLIC HEART FAILURE (HCC): Status: ACTIVE | Noted: 2021-12-19

## 2021-12-19 LAB
ALLEN TEST: POSITIVE
ANION GAP SERPL CALCULATED.3IONS-SCNC: 14 MEQ/L (ref 8–16)
ANISOCYTOSIS: PRESENT
BASE EXCESS (CALCULATED): 3.6 MMOL/L (ref -2.5–2.5)
BASOPHILS # BLD: 0.3 %
BASOPHILS ABSOLUTE: 0.1 THOU/MM3 (ref 0–0.1)
BUN BLDV-MCNC: 21 MG/DL (ref 7–22)
CALCIUM SERPL-MCNC: 9.9 MG/DL (ref 8.5–10.5)
CHLORIDE BLD-SCNC: 104 MEQ/L (ref 98–111)
CO2: 24 MEQ/L (ref 23–33)
COLLECTED BY:: ABNORMAL
CREAT SERPL-MCNC: 1.1 MG/DL (ref 0.4–1.2)
DEVICE: ABNORMAL
EKG ATRIAL RATE: 91 BPM
EKG P-R INTERVAL: 166 MS
EKG Q-T INTERVAL: 438 MS
EKG QRS DURATION: 130 MS
EKG QTC CALCULATION (BAZETT): 538 MS
EKG R AXIS: -47 DEGREES
EKG T AXIS: 78 DEGREES
EKG VENTRICULAR RATE: 91 BPM
EOSINOPHIL # BLD: 0 %
EOSINOPHILS ABSOLUTE: 0 THOU/MM3 (ref 0–0.4)
ERYTHROCYTE [DISTWIDTH] IN BLOOD BY AUTOMATED COUNT: 17.9 % (ref 11.5–14.5)
ERYTHROCYTE [DISTWIDTH] IN BLOOD BY AUTOMATED COUNT: 66.6 FL (ref 35–45)
GFR SERPL CREATININE-BSD FRML MDRD: 47 ML/MIN/1.73M2
GLUCOSE BLD-MCNC: 165 MG/DL (ref 70–108)
HCO3: 29 MMOL/L (ref 23–28)
HCT VFR BLD CALC: 29.8 % (ref 37–47)
HEMOGLOBIN: 9.4 GM/DL (ref 12–16)
IFIO2: 45
IMMATURE GRANS (ABS): 0.19 THOU/MM3 (ref 0–0.07)
IMMATURE GRANULOCYTES: 0.9 %
LYMPHOCYTES # BLD: 2.1 %
LYMPHOCYTES ABSOLUTE: 0.4 THOU/MM3 (ref 1–4.8)
MCH RBC QN AUTO: 31.9 PG (ref 26–33)
MCHC RBC AUTO-ENTMCNC: 31.5 GM/DL (ref 32.2–35.5)
MCV RBC AUTO: 101 FL (ref 81–99)
MONOCYTES # BLD: 9.6 %
MONOCYTES ABSOLUTE: 2 THOU/MM3 (ref 0.4–1.3)
NUCLEATED RED BLOOD CELLS: 0 /100 WBC
O2 SATURATION: 98 %
OSMOLALITY CALCULATION: 289.8 MOSMOL/KG (ref 275–300)
PCO2: 48 MMHG (ref 35–45)
PH BLOOD GAS: 7.39 (ref 7.35–7.45)
PIP: 12 CMH2O
PLATELET # BLD: 389 THOU/MM3 (ref 130–400)
PMV BLD AUTO: 9.4 FL (ref 9.4–12.4)
PO2: 105 MMHG (ref 71–104)
POTASSIUM REFLEX MAGNESIUM: 4.8 MEQ/L (ref 3.5–5.2)
PRO-BNP: ABNORMAL PG/ML (ref 0–1800)
PROCALCITONIN: 10.74 NG/ML (ref 0.01–0.09)
RBC # BLD: 2.95 MILL/MM3 (ref 4.2–5.4)
SARS-COV-2, NAAT: NOT  DETECTED
SCAN OF BLOOD SMEAR: NORMAL
SEG NEUTROPHILS: 87.1 %
SEGMENTED NEUTROPHILS ABSOLUTE COUNT: 17.9 THOU/MM3 (ref 1.8–7.7)
SET PEEP: 6 MMHG
SET PRESS SUPP: 6 CMH2O
SODIUM BLD-SCNC: 142 MEQ/L (ref 135–145)
SOURCE, BLOOD GAS: ABNORMAL
TROPONIN T: 0.18 NG/ML
TROPONIN T: 0.2 NG/ML
TROPONIN T: 0.28 NG/ML
WBC # BLD: 20.5 THOU/MM3 (ref 4.8–10.8)

## 2021-12-19 PROCEDURE — 6370000000 HC RX 637 (ALT 250 FOR IP): Performed by: INTERNAL MEDICINE

## 2021-12-19 PROCEDURE — 82803 BLOOD GASES ANY COMBINATION: CPT

## 2021-12-19 PROCEDURE — 6360000002 HC RX W HCPCS: Performed by: EMERGENCY MEDICINE

## 2021-12-19 PROCEDURE — 96365 THER/PROPH/DIAG IV INF INIT: CPT

## 2021-12-19 PROCEDURE — 87801 DETECT AGNT MULT DNA AMPLI: CPT

## 2021-12-19 PROCEDURE — 87635 SARS-COV-2 COVID-19 AMP PRB: CPT

## 2021-12-19 PROCEDURE — 87040 BLOOD CULTURE FOR BACTERIA: CPT

## 2021-12-19 PROCEDURE — 84484 ASSAY OF TROPONIN QUANT: CPT

## 2021-12-19 PROCEDURE — 6360000002 HC RX W HCPCS

## 2021-12-19 PROCEDURE — 87186 SC STD MICRODIL/AGAR DIL: CPT

## 2021-12-19 PROCEDURE — 87077 CULTURE AEROBIC IDENTIFY: CPT

## 2021-12-19 PROCEDURE — 71275 CT ANGIOGRAPHY CHEST: CPT

## 2021-12-19 PROCEDURE — 36415 COLL VENOUS BLD VENIPUNCTURE: CPT

## 2021-12-19 PROCEDURE — 1200000003 HC TELEMETRY R&B

## 2021-12-19 PROCEDURE — 85025 COMPLETE CBC W/AUTO DIFF WBC: CPT

## 2021-12-19 PROCEDURE — 83880 ASSAY OF NATRIURETIC PEPTIDE: CPT

## 2021-12-19 PROCEDURE — 6370000000 HC RX 637 (ALT 250 FOR IP): Performed by: EMERGENCY MEDICINE

## 2021-12-19 PROCEDURE — 6360000004 HC RX CONTRAST MEDICATION: Performed by: EMERGENCY MEDICINE

## 2021-12-19 PROCEDURE — 84145 PROCALCITONIN (PCT): CPT

## 2021-12-19 PROCEDURE — 80048 BASIC METABOLIC PNL TOTAL CA: CPT

## 2021-12-19 PROCEDURE — 36600 WITHDRAWAL OF ARTERIAL BLOOD: CPT

## 2021-12-19 PROCEDURE — 94660 CPAP INITIATION&MGMT: CPT

## 2021-12-19 PROCEDURE — 96367 TX/PROPH/DG ADDL SEQ IV INF: CPT

## 2021-12-19 PROCEDURE — 6360000002 HC RX W HCPCS: Performed by: INTERNAL MEDICINE

## 2021-12-19 PROCEDURE — 71045 X-RAY EXAM CHEST 1 VIEW: CPT

## 2021-12-19 PROCEDURE — 96375 TX/PRO/DX INJ NEW DRUG ADDON: CPT

## 2021-12-19 PROCEDURE — 87147 CULTURE TYPE IMMUNOLOGIC: CPT

## 2021-12-19 PROCEDURE — 2580000003 HC RX 258: Performed by: INTERNAL MEDICINE

## 2021-12-19 PROCEDURE — 99285 EMERGENCY DEPT VISIT HI MDM: CPT

## 2021-12-19 PROCEDURE — 93005 ELECTROCARDIOGRAM TRACING: CPT | Performed by: EMERGENCY MEDICINE

## 2021-12-19 PROCEDURE — 2580000003 HC RX 258: Performed by: EMERGENCY MEDICINE

## 2021-12-19 RX ORDER — ALBUTEROL SULFATE 2.5 MG/3ML
SOLUTION RESPIRATORY (INHALATION)
Status: COMPLETED
Start: 2021-12-19 | End: 2021-12-19

## 2021-12-19 RX ORDER — ATORVASTATIN CALCIUM 80 MG/1
80 TABLET, FILM COATED ORAL NIGHTLY
Status: DISCONTINUED | OUTPATIENT
Start: 2021-12-19 | End: 2021-12-28 | Stop reason: HOSPADM

## 2021-12-19 RX ORDER — POTASSIUM CHLORIDE 7.45 MG/ML
10 INJECTION INTRAVENOUS PRN
Status: DISCONTINUED | OUTPATIENT
Start: 2021-12-19 | End: 2021-12-28 | Stop reason: HOSPADM

## 2021-12-19 RX ORDER — LOSARTAN POTASSIUM 50 MG/1
50 TABLET ORAL DAILY
Status: DISCONTINUED | OUTPATIENT
Start: 2021-12-19 | End: 2021-12-28 | Stop reason: HOSPADM

## 2021-12-19 RX ORDER — CLONIDINE HYDROCHLORIDE 0.1 MG/1
0.1 TABLET ORAL 2 TIMES DAILY
Status: DISCONTINUED | OUTPATIENT
Start: 2021-12-19 | End: 2021-12-28 | Stop reason: HOSPADM

## 2021-12-19 RX ORDER — ALBUTEROL SULFATE 90 UG/1
2 AEROSOL, METERED RESPIRATORY (INHALATION) EVERY 6 HOURS PRN
Status: DISCONTINUED | OUTPATIENT
Start: 2021-12-19 | End: 2021-12-20

## 2021-12-19 RX ORDER — SALIVA STIMULANT COMB. NO.3
SPRAY, NON-AEROSOL (ML) MUCOUS MEMBRANE PRN
Status: DISCONTINUED | OUTPATIENT
Start: 2021-12-19 | End: 2021-12-19 | Stop reason: RX

## 2021-12-19 RX ORDER — HYDROXYCHLOROQUINE SULFATE 200 MG/1
200 TABLET, FILM COATED ORAL DAILY
Status: DISCONTINUED | OUTPATIENT
Start: 2021-12-19 | End: 2021-12-28 | Stop reason: HOSPADM

## 2021-12-19 RX ORDER — ACETAMINOPHEN 325 MG/1
650 TABLET ORAL EVERY 6 HOURS PRN
Status: DISCONTINUED | OUTPATIENT
Start: 2021-12-19 | End: 2021-12-28 | Stop reason: HOSPADM

## 2021-12-19 RX ORDER — POLYETHYLENE GLYCOL 3350 17 G/17G
17 POWDER, FOR SOLUTION ORAL DAILY PRN
Status: DISCONTINUED | OUTPATIENT
Start: 2021-12-19 | End: 2021-12-28 | Stop reason: HOSPADM

## 2021-12-19 RX ORDER — RANOLAZINE 500 MG/1
500 TABLET, EXTENDED RELEASE ORAL 2 TIMES DAILY
Status: DISCONTINUED | OUTPATIENT
Start: 2021-12-19 | End: 2021-12-28 | Stop reason: HOSPADM

## 2021-12-19 RX ORDER — SODIUM CHLORIDE 0.9 % (FLUSH) 0.9 %
5-40 SYRINGE (ML) INJECTION PRN
Status: DISCONTINUED | OUTPATIENT
Start: 2021-12-19 | End: 2021-12-28 | Stop reason: HOSPADM

## 2021-12-19 RX ORDER — SODIUM CHLORIDE 0.9 % (FLUSH) 0.9 %
5-40 SYRINGE (ML) INJECTION EVERY 12 HOURS SCHEDULED
Status: DISCONTINUED | OUTPATIENT
Start: 2021-12-19 | End: 2021-12-28 | Stop reason: HOSPADM

## 2021-12-19 RX ORDER — PREDNISONE 10 MG/1
5 TABLET ORAL DAILY
Status: DISCONTINUED | OUTPATIENT
Start: 2021-12-19 | End: 2021-12-28 | Stop reason: HOSPADM

## 2021-12-19 RX ORDER — FENOFIBRATE 160 MG/1
160 TABLET ORAL DAILY
Status: DISCONTINUED | OUTPATIENT
Start: 2021-12-19 | End: 2021-12-28 | Stop reason: HOSPADM

## 2021-12-19 RX ORDER — SODIUM CHLORIDE 9 MG/ML
25 INJECTION, SOLUTION INTRAVENOUS PRN
Status: DISCONTINUED | OUTPATIENT
Start: 2021-12-19 | End: 2021-12-28 | Stop reason: HOSPADM

## 2021-12-19 RX ORDER — TRAZODONE HYDROCHLORIDE 100 MG/1
100 TABLET ORAL NIGHTLY
Status: DISCONTINUED | OUTPATIENT
Start: 2021-12-19 | End: 2021-12-28 | Stop reason: HOSPADM

## 2021-12-19 RX ORDER — POTASSIUM CHLORIDE 20 MEQ/1
40 TABLET, EXTENDED RELEASE ORAL PRN
Status: DISCONTINUED | OUTPATIENT
Start: 2021-12-19 | End: 2021-12-28 | Stop reason: HOSPADM

## 2021-12-19 RX ORDER — METOPROLOL SUCCINATE 50 MG/1
50 TABLET, EXTENDED RELEASE ORAL DAILY
Status: DISCONTINUED | OUTPATIENT
Start: 2021-12-19 | End: 2021-12-28 | Stop reason: HOSPADM

## 2021-12-19 RX ORDER — AZATHIOPRINE 50 MG/1
100 TABLET ORAL DAILY
Status: DISCONTINUED | OUTPATIENT
Start: 2021-12-19 | End: 2021-12-28 | Stop reason: HOSPADM

## 2021-12-19 RX ORDER — ONDANSETRON 4 MG/1
4 TABLET, ORALLY DISINTEGRATING ORAL EVERY 8 HOURS PRN
Status: DISCONTINUED | OUTPATIENT
Start: 2021-12-19 | End: 2021-12-28 | Stop reason: HOSPADM

## 2021-12-19 RX ORDER — FUROSEMIDE 10 MG/ML
60 INJECTION INTRAMUSCULAR; INTRAVENOUS ONCE
Status: COMPLETED | OUTPATIENT
Start: 2021-12-19 | End: 2021-12-19

## 2021-12-19 RX ORDER — CALCIUM CARBONATE 200(500)MG
1 TABLET,CHEWABLE ORAL 2 TIMES DAILY
Status: DISCONTINUED | OUTPATIENT
Start: 2021-12-19 | End: 2021-12-28 | Stop reason: HOSPADM

## 2021-12-19 RX ORDER — DULOXETIN HYDROCHLORIDE 30 MG/1
30 CAPSULE, DELAYED RELEASE ORAL 2 TIMES DAILY
Status: DISCONTINUED | OUTPATIENT
Start: 2021-12-19 | End: 2021-12-28 | Stop reason: HOSPADM

## 2021-12-19 RX ORDER — OXYCODONE HYDROCHLORIDE AND ACETAMINOPHEN 5; 325 MG/1; MG/1
1 TABLET ORAL 3 TIMES DAILY
Status: DISCONTINUED | OUTPATIENT
Start: 2021-12-19 | End: 2021-12-28 | Stop reason: HOSPADM

## 2021-12-19 RX ORDER — ACETAMINOPHEN 650 MG/1
650 SUPPOSITORY RECTAL EVERY 6 HOURS PRN
Status: DISCONTINUED | OUTPATIENT
Start: 2021-12-19 | End: 2021-12-28 | Stop reason: HOSPADM

## 2021-12-19 RX ORDER — MONTELUKAST SODIUM 5 MG/1
5 TABLET, CHEWABLE ORAL NIGHTLY
Status: DISCONTINUED | OUTPATIENT
Start: 2021-12-19 | End: 2021-12-28 | Stop reason: HOSPADM

## 2021-12-19 RX ORDER — ISOSORBIDE MONONITRATE 60 MG/1
60 TABLET, EXTENDED RELEASE ORAL DAILY
Status: DISCONTINUED | OUTPATIENT
Start: 2021-12-19 | End: 2021-12-28 | Stop reason: HOSPADM

## 2021-12-19 RX ORDER — INSULIN GLARGINE 100 [IU]/ML
8 INJECTION, SOLUTION SUBCUTANEOUS NIGHTLY
Status: DISCONTINUED | OUTPATIENT
Start: 2021-12-19 | End: 2021-12-28 | Stop reason: HOSPADM

## 2021-12-19 RX ORDER — SODIUM CHLORIDE 9 MG/ML
INJECTION, SOLUTION INTRAVENOUS CONTINUOUS
Status: DISCONTINUED | OUTPATIENT
Start: 2021-12-19 | End: 2021-12-28 | Stop reason: HOSPADM

## 2021-12-19 RX ORDER — MAGNESIUM HYDROXIDE/ALUMINUM HYDROXICE/SIMETHICONE 120; 1200; 1200 MG/30ML; MG/30ML; MG/30ML
10 SUSPENSION ORAL EVERY 6 HOURS PRN
Status: DISCONTINUED | OUTPATIENT
Start: 2021-12-19 | End: 2021-12-28 | Stop reason: HOSPADM

## 2021-12-19 RX ORDER — BUMETANIDE 0.5 MG/1
0.5 TABLET ORAL DAILY PRN
Status: DISCONTINUED | OUTPATIENT
Start: 2021-12-19 | End: 2021-12-20

## 2021-12-19 RX ORDER — POLYVINYL ALCOHOL 14 MG/ML
1 SOLUTION/ DROPS OPHTHALMIC DAILY PRN
Status: DISCONTINUED | OUTPATIENT
Start: 2021-12-19 | End: 2021-12-28 | Stop reason: HOSPADM

## 2021-12-19 RX ORDER — DOCUSATE SODIUM 100 MG/1
100 CAPSULE, LIQUID FILLED ORAL 2 TIMES DAILY
Status: DISCONTINUED | OUTPATIENT
Start: 2021-12-19 | End: 2021-12-28 | Stop reason: HOSPADM

## 2021-12-19 RX ORDER — ASPIRIN 81 MG/1
81 TABLET, CHEWABLE ORAL DAILY
Status: DISCONTINUED | OUTPATIENT
Start: 2021-12-20 | End: 2021-12-28 | Stop reason: HOSPADM

## 2021-12-19 RX ORDER — NITROGLYCERIN 0.4 MG/1
0.4 TABLET SUBLINGUAL EVERY 5 MIN PRN
Status: DISCONTINUED | OUTPATIENT
Start: 2021-12-19 | End: 2021-12-28 | Stop reason: HOSPADM

## 2021-12-19 RX ORDER — LOPERAMIDE HYDROCHLORIDE 2 MG/1
2 CAPSULE ORAL EVERY 12 HOURS PRN
Status: DISCONTINUED | OUTPATIENT
Start: 2021-12-19 | End: 2021-12-28 | Stop reason: HOSPADM

## 2021-12-19 RX ORDER — ONDANSETRON 2 MG/ML
4 INJECTION INTRAMUSCULAR; INTRAVENOUS EVERY 6 HOURS PRN
Status: DISCONTINUED | OUTPATIENT
Start: 2021-12-19 | End: 2021-12-28 | Stop reason: HOSPADM

## 2021-12-19 RX ORDER — BUDESONIDE AND FORMOTEROL FUMARATE DIHYDRATE 160; 4.5 UG/1; UG/1
2 AEROSOL RESPIRATORY (INHALATION) 2 TIMES DAILY
Status: DISCONTINUED | OUTPATIENT
Start: 2021-12-19 | End: 2021-12-28 | Stop reason: HOSPADM

## 2021-12-19 RX ORDER — GABAPENTIN 100 MG/1
100 CAPSULE ORAL 3 TIMES DAILY
Status: DISCONTINUED | OUTPATIENT
Start: 2021-12-19 | End: 2021-12-28 | Stop reason: HOSPADM

## 2021-12-19 RX ORDER — PANTOPRAZOLE SODIUM 40 MG/1
40 TABLET, DELAYED RELEASE ORAL
Status: DISCONTINUED | OUTPATIENT
Start: 2021-12-20 | End: 2021-12-28 | Stop reason: HOSPADM

## 2021-12-19 RX ADMIN — SODIUM CHLORIDE: 9 INJECTION, SOLUTION INTRAVENOUS at 20:48

## 2021-12-19 RX ADMIN — INSULIN GLARGINE 8 UNITS: 100 INJECTION, SOLUTION SUBCUTANEOUS at 21:05

## 2021-12-19 RX ADMIN — TIOTROPIUM BROMIDE INHALATION SPRAY 2 PUFF: 3.12 SPRAY, METERED RESPIRATORY (INHALATION) at 20:49

## 2021-12-19 RX ADMIN — APIXABAN 5 MG: 5 TABLET, FILM COATED ORAL at 21:00

## 2021-12-19 RX ADMIN — TRAZODONE HYDROCHLORIDE 100 MG: 100 TABLET ORAL at 21:03

## 2021-12-19 RX ADMIN — HYDROXYCHLOROQUINE SULFATE 200 MG: 200 TABLET ORAL at 20:55

## 2021-12-19 RX ADMIN — CEFTRIAXONE SODIUM 1000 MG: 1 INJECTION, POWDER, FOR SOLUTION INTRAMUSCULAR; INTRAVENOUS at 08:03

## 2021-12-19 RX ADMIN — OXYCODONE AND ACETAMINOPHEN 1 TABLET: 5; 325 TABLET ORAL at 21:03

## 2021-12-19 RX ADMIN — RANOLAZINE 500 MG: 500 TABLET, FILM COATED, EXTENDED RELEASE ORAL at 21:03

## 2021-12-19 RX ADMIN — Medication 400 MG: at 21:04

## 2021-12-19 RX ADMIN — MONTELUKAST SODIUM 5 MG: 5 TABLET, CHEWABLE ORAL at 21:04

## 2021-12-19 RX ADMIN — ISOSORBIDE MONONITRATE 60 MG: 60 TABLET, EXTENDED RELEASE ORAL at 21:29

## 2021-12-19 RX ADMIN — AZITHROMYCIN DIHYDRATE 500 MG: 500 INJECTION, POWDER, LYOPHILIZED, FOR SOLUTION INTRAVENOUS at 08:48

## 2021-12-19 RX ADMIN — CLONIDINE HYDROCHLORIDE 0.1 MG: 0.1 TABLET ORAL at 21:00

## 2021-12-19 RX ADMIN — PREDNISONE 5 MG: 10 TABLET ORAL at 20:55

## 2021-12-19 RX ADMIN — FUROSEMIDE 60 MG: 10 INJECTION, SOLUTION INTRAMUSCULAR; INTRAVENOUS at 11:04

## 2021-12-19 RX ADMIN — AZATHIOPRINE 100 MG: 50 TABLET ORAL at 21:29

## 2021-12-19 RX ADMIN — ATORVASTATIN CALCIUM 80 MG: 80 TABLET, FILM COATED ORAL at 21:29

## 2021-12-19 RX ADMIN — GABAPENTIN 100 MG: 100 CAPSULE ORAL at 21:02

## 2021-12-19 RX ADMIN — ALBUTEROL SULFATE 2.5 MG: 2.5 SOLUTION RESPIRATORY (INHALATION) at 18:52

## 2021-12-19 RX ADMIN — ALBUTEROL SULFATE 2.5 MG: 2.5 SOLUTION RESPIRATORY (INHALATION) at 18:54

## 2021-12-19 RX ADMIN — METOPROLOL SUCCINATE 50 MG: 50 TABLET, FILM COATED, EXTENDED RELEASE ORAL at 20:56

## 2021-12-19 RX ADMIN — DULOXETINE HYDROCHLORIDE 30 MG: 30 CAPSULE, DELAYED RELEASE ORAL at 21:01

## 2021-12-19 RX ADMIN — FENOFIBRATE 160 MG: 160 TABLET ORAL at 20:55

## 2021-12-19 RX ADMIN — LOSARTAN POTASSIUM 50 MG: 50 TABLET, FILM COATED ORAL at 20:56

## 2021-12-19 RX ADMIN — IOPAMIDOL 80 ML: 755 INJECTION, SOLUTION INTRAVENOUS at 08:38

## 2021-12-19 RX ADMIN — DOCUSATE SODIUM 100 MG: 100 CAPSULE ORAL at 21:01

## 2021-12-19 RX ADMIN — ANTACID TABLETS 500 MG: 500 TABLET, CHEWABLE ORAL at 21:00

## 2021-12-19 ASSESSMENT — ENCOUNTER SYMPTOMS
CONSTIPATION: 0
SINUS PAIN: 0
SHORTNESS OF BREATH: 0
CHEST TIGHTNESS: 0
NAUSEA: 0
COUGH: 0
SINUS PRESSURE: 0
EYE PAIN: 0
ABDOMINAL PAIN: 0
BACK PAIN: 0
DIARRHEA: 0

## 2021-12-19 ASSESSMENT — PAIN SCALES - GENERAL: PAINLEVEL_OUTOF10: 0

## 2021-12-19 NOTE — ED TRIAGE NOTES
Pt brought in from [facility] with shortness of breath. Pt was given breathing tx pta with improvement. Pt 87% on RA upon arrival. Pt usually wears 2L NC at nursing home. Pt placed on 4L NC and increased to 95% upon arrival. Audible crackles.

## 2021-12-19 NOTE — H&P
Patient was seen and examined face-to-face by me (Dr. Delmi Olivarez MD). The chart, progress notes, labs and radiographs were reviewed. Case discussed with patient's primary nurse. Questions and concerns were addressed.   I agree with the note as created with additional comments as noted    Dr. Delmi Olivarez MD   Department of Cardiology

## 2021-12-19 NOTE — ED PROVIDER NOTES
[place] ENCOUNTER          Pt Name: Justin Yates  MRN: [id number]  [place] 1940  Date of evaluation: 12/19/2021  Physician: Sheryl Duane, MD, [place], [address] [facility]       Chief Complaint   Patient presents with    Shortness of Breath     History obtained from chart review and the patient. HISTORY OF PRESENT ILLNESS    HPI  Justin Yates is a 78 y.o. female who presents to the emergency department for evaluation of shortness of breath and new oxygen requirement. Patient transferred from nursing home for nursing shortness of breath and low oxygen saturation requiring additional oxygen. There is limited information available and patient is not aware of her symptoms, states he feels well. Reviewing her EMR chart, patient was admitted last month, found to have a pulmonary embolism at that point. The patient has no other acute complaints at this time. REVIEW OF SYSTEMS   Review of Systems   Constitutional: Negative for chills, fever and unexpected weight change. HENT: Negative for congestion, ear pain, nosebleeds, sinus pressure and sinus pain. Eyes: Negative for pain. Respiratory: Negative for cough, chest tightness and shortness of breath. Cardiovascular: Negative for chest pain. Gastrointestinal: Negative for abdominal pain, constipation, diarrhea and nausea. Endocrine: Negative for polyuria. Genitourinary: Negative for dysuria and flank pain. Musculoskeletal: Negative for arthralgias, back pain, myalgias and neck pain. Skin: Negative for rash. Neurological: Negative for weakness and headaches. All other systems reviewed and are negative.         PAST MEDICAL AND SURGICAL HISTORY     Past Medical History:   Diagnosis Date    Arthritis     Asthma 1978    [facility] [address]     Blood circulation, collateral     CAD (coronary artery disease)     CHF (congestive heart failure) ([facility] [address] 1999    Chronic kidney disease, stage III (moderate) ([facility] [address]     COPD (chronic obstructive pulmonary disease) ([facility] [address]     Depression     Diabetes mellitus ([facility] [address]     Diabetic hypertension-nephrosis syndrome (HCC)     Dysuria     GERD (gastroesophageal reflux disease)     Hyperlipidemia     Hypertension     Kidney disease     stage 3 dr Chandana Bhandari cerebral artery occlusion with cerebral infarction 1999    Dr Yvrose Olsen informed patient after Heart Attack     Past Surgical History:   Procedure Laterality Date    APPENDECTOMY      BACK SURGERY      lumbar spine    BLADDER SUSPENSION      [facility]    CARPAL TUNNEL RELEASE      COLONOSCOPY  2013    ENDOSCOPY, COLON, DIAGNOSTIC      ESOPHAGEAL DILATATION      HYSTERECTOMY  1974    JOINT REPLACEMENT Left 1992    Left Knee    UPPER GASTROINTESTINAL ENDOSCOPY N/A 9/28/2021    EGD DILATION performed by Jose M Valle MD at [address] [facility] ENDOSCOPY Left 9/28/2021    EGD BIOPSY performed by Jose M Valle MD at [facility] End[facility]         MEDICATIONS     Current Facility-Administered Medications:     cefTRIAXone (ROCEPHIN) 1000 mg IVPB in 50 mL D5W minibag, 1,000 mg, IntraVENous, Once **AND** azithromycin (ZITHROMAX) 500 mg in D5W 250ml addavial, 500 mg, IntraVENous, Once, Mayi Gutiérrez MD    Current Outpatient Medications:     bumetanide (BUMEX) 1 MG tablet, Take 0.5 tablets by mouth daily as needed (leg swelling, increase in daily weight > 3lbs, SOB), Disp: 30 tablet, Rfl: 0    magnesium hydroxide (MILK OF MAGNESIA) 400 MG/5ML suspension, Take 30 mLs by mouth daily as needed for Constipation, Disp: , Rfl:     isosorbide mononitrate (IMDUR) 60 MG extended release tablet, Take 1 tablet by mouth daily, Disp: 30 tablet, Rfl: 0    azaTHIOprine (IMURAN) 50 MG tablet, Take 2 tablets by mouth daily, Disp: 60 tablet, Rfl: 0    spironolactone (ALDACTONE) 50 MG tablet, Take 1 Take 1 tablet by mouth 3 times daily. For pain, Disp: , Rfl:     hydroxychloroquine (PLAQUENIL) 200 MG tablet, TAKE 1 TABLET BY MOUTH EVERY DAY, Disp: 90 tablet, Rfl: 1    EPINEPHrine (EPIPEN 2-LORAINE) 0.3 MG/0.3ML SOAJ injection, Inject 0.3 mLs into the muscle once for 1 dose Use as directed for allergic reaction, Disp: 0.3 mL, Rfl: 0    cloNIDine (CATAPRES) 0.1 MG tablet, Take 1 tablet by mouth 2 times daily, Disp: 60 tablet, Rfl: 3    predniSONE (DELTASONE) 5 MG tablet, TAKE 1 TABLET BY MOUTH EVERY DAY, Disp: 30 tablet, Rfl: 3    DULoxetine (CYMBALTA) 30 MG extended release capsule, Take 30 mg by mouth 2 times daily, Disp: , Rfl:     gabapentin (NEURONTIN) 100 MG capsule, Take 100 mg by mouth 3 times daily. , Disp: , Rfl:     atorvastatin (LIPITOR) 40 MG tablet, Take 2 tablets by mouth daily, Disp: 60 tablet, Rfl: 2    docusate sodium (COLACE, DULCOLAX) 100 MG CAPS, Take 100 mg by mouth 2 times daily, Disp: 60 capsule, Rfl: 1    insulin glargine (LANTUS) 100 UNIT/ML injection pen, Inject 8 Units into the skin nightly , Disp: , Rfl:     dexlansoprazole (DEXILANT) 60 MG CPDR delayed release capsule, Take 60 mg by mouth daily, Disp: , Rfl:     ranolazine (RANEXA) 500 MG SR tablet, Take 1 tablet by mouth 2 times daily. , Disp: 60 tablet, Rfl: 3    magnesium oxide (MAG-OX) 400 MG tablet, Take 400 mg by mouth 2 times daily , Disp: , Rfl:     albuterol (PROVENTIL HFA;VENTOLIN HFA) 108 (90 BASE) MCG/ACT inhaler, Inhale 2 puffs into the lungs every 6 hours as needed for Shortness of Breath , Disp: , Rfl:     traZODone (DESYREL) 50 MG tablet, Take 100 mg by mouth nightly , Disp: , Rfl:     nitroGLYCERIN (NITROSTAT) 0.4 MG SL tablet, Place 0.4 mg under the tongue every 5 minutes as needed for Chest pain , Disp: , Rfl:       SOCIAL HISTORY     Social History     Social History Narrative    Not on file     Social History     Tobacco Use    Smoking status: Former Smoker     Packs/day: 0.15     Years: 10.00     Pack years: 1.50     Types: Cigarettes     Start date: 10/21/2013    Smokeless tobacco: Never Used   Vaping Use    Vaping Use: Never used   Substance Use Topics    Alcohol use: No    Drug use: No         ALLERGIES     Allergies   Allergen Reactions    Renflexis [Infliximab] Shortness Of Breath and Palpitations    Strawberry (Diagnostic) Anaphylaxis     Diverticulitis    Nuts [Peanut-Containing Drug Products]      Pt suffers from diverticulitis and nut products irritate her stomach.  Sulfa Antibiotics     Tape Levell [place] Tape] Other (See Comments)     tears skin off    Bactrim [Sulfamethoxazole-Trimethoprim] Rash     Little red rash    Chocolate Rash         FAMILY HISTORY     Family History   Problem Relation Age of Onset    Heart Disease Mother     Diabetes Mother     Kidney Disease Mother     Heart Disease Father     Diabetes Father     Cancer Sister     Stroke Brother     Cancer Sister          PREVIOUS RECORDS   Previous records reviewed:   Patient admitted for elevated troponin and weakness on November 6, 2021, found to have pulmonary embolism. PHYSICAL EXAM     ED Triage Vitals [12/19/21 0545]   BP Temp Temp Source Pulse Resp SpO2 Height Weight   (!) 148/80 99.2 °F (37.3 °C) Oral 91 (!) 32 (!) 87 % 5' 5\" (1.651 m) 194 lb (88 kg)     Initial vital signs and nursing assessment reviewed and abnormal from Tachypnea, borderline high temperature, hypertension. Body mass index is 32.28 kg/m². Pulsoximetry is normal per my interpretation. Additional Vital Signs:  Vitals:    12/19/21 0545   BP: (!) 148/80   Pulse: 91   Resp: (!) 32   Temp: 99.2 °F (37.3 °C)   SpO2: (!) 87%       Physical Exam  Vitals and nursing note reviewed. Constitutional:       General: She is not in acute distress. Appearance: Normal appearance. She is well-developed. HENT:      Head: Normocephalic and atraumatic.       Right Ear: External ear normal.      Left Ear: External ear normal.      Nose: Nose normal. Eyes:      Conjunctiva/sclera: Conjunctivae normal.      Pupils: Pupils are equal, round, and reactive to light. Neck:      Vascular: No JVD. Cardiovascular:      Rate and Rhythm: Normal rate and regular rhythm. Heart sounds: Normal heart sounds. No murmur heard. No friction rub. No gallop. Pulmonary:      Effort: Pulmonary effort is normal.      Breath sounds: No stridor. Examination of the right-lower field reveals rhonchi. Examination of the left-lower field reveals rhonchi. Rhonchi present. No decreased breath sounds, wheezing or rales. Musculoskeletal:      Cervical back: Neck supple. Skin:     General: Skin is warm and dry. Neurological:      Mental Status: She is alert. MEDICAL DECISION MAKING   Initial Assessment:   1. Undifferentiated shortness of breath with new oxygen requirement  2. Recent pulmonary embolism, possible extension of the pulmonary embolism  3. Possible pneumonia  4. Rule out COVID-19  Plan:    I V line, labs   Imaging   EKG   Medication   Observation   Patient will need to be admitted        ED RESULTS   Laboratory results:  Labs Reviewed   CBC WITH AUTO DIFFERENTIAL - Abnormal; Notable for the following components:       Result Value    WBC 20.5 (*)     RBC 2.95 (*)     Hemoglobin 9.4 (*)     Hematocrit 29.8 (*)     .0 (*)     MCHC 31.5 (*)     RDW-CV 17.9 (*)     RDW-SD 66.6 (*)     All other components within normal limits   TROPONIN - Abnormal; Notable for the following components:    Troponin T 0.275 (*)     All other components within normal limits   BRAIN NATRIURETIC PEPTIDE - Abnormal; Notable for the following components:    Pro-BNP 38220.0 (*)     All other components within normal limits   COVID-19, RAPID   CULTURE, BLOOD 1   CULTURE, BLOOD 2   BASIC METABOLIC PANEL W/ REFLEX TO MG FOR LOW K   PROCALCITONIN       Radiologic studies results:  XR CHEST PORTABLE   Final Result   Cardiomegaly. No congestive heart failure.    Mild patchy segmental atelectasis and/or interstitial opacities in the    lungs bilaterally suggesting atypical pneumonia. This document has been electronically signed by: Angeline Mckeon. Peterson Ragland on    12/19/2021 06:30 AM      CTA Chest W WO Contrast    (Results Pending)             ED COURSE   ED Medications administered this visit:   Medications   cefTRIAXone (ROCEPHIN) 1000 mg IVPB in 50 mL D5W minibag (has no administration in time range)     And   azithromycin (ZITHROMAX) 500 mg in D5W 250ml addavial (has no administration in time range)              MEDICATION CHANGES     New Prescriptions    No medications on file         FINAL DISPOSITION     Final diagnoses:   Pneumonia due to infectious organism, unspecified laterality, unspecified part of lung   Troponin level elevated     Condition: condition: fair  Dispo: Transfer of care to Dr. Efraín Ramos, pending lab and radiology results for admission. This transcription was electronically signed. It was dictated by use of voice recognition software and electronically transcribed. The transcription may contain errors not detected in proofreading.        Timbo Epperson MD  12/19/21 9535

## 2021-12-19 NOTE — ED NOTES
Pr eating dinner at this time. Pt respirations even and unlabored.      Taty Douglass RN  12/19/21 8615

## 2021-12-19 NOTE — ED NOTES
Pt taken to ED room 30. Report given to [place]. Pt taken off Bi-PAP at this time. Placed on 4LNC. O2 sat sustained justice 97%. Pt states she is breathing comfortably. No signs of distress noted.       Merari Rosado RN  12/19/21 2914

## 2021-12-19 NOTE — ED NOTES
Pt resting on cot with eyes closed. Pt respirations even and unlabored.      Benito Mays RN  12/19/21 9002

## 2021-12-20 ENCOUNTER — APPOINTMENT (OUTPATIENT)
Dept: GENERAL RADIOLOGY | Age: 81
DRG: 177 | End: 2021-12-20
Payer: MEDICARE

## 2021-12-20 LAB
ACINETOBACTER BAUMANNII FILM ARRAY: NOT DETECTED
ALBUMIN SERPL-MCNC: 2.8 G/DL (ref 3.5–5.1)
ALP BLD-CCNC: 50 U/L (ref 38–126)
ALT SERPL-CCNC: 25 U/L (ref 11–66)
AST SERPL-CCNC: 42 U/L (ref 5–40)
BILIRUB SERPL-MCNC: 0.2 MG/DL (ref 0.3–1.2)
BILIRUBIN DIRECT: < 0.2 MG/DL (ref 0–0.3)
BOTTLE TYPE: ABNORMAL
CANDIDA ALBICANS FILM ARRAY: NOT DETECTED
CANDIDA GLABRATA FILM ARRAY: NOT DETECTED
CANDIDA KRUSEI FILM ARRAY: NOT DETECTED
CANDIDA PARAPSILOSIS FILM ARRAY: NOT DETECTED
CANDIDA TROPICALIS FILM ARRAY: NOT DETECTED
CARBAPENEM RESITANT FILM ARRAY: ABNORMAL
CHOLESTEROL, TOTAL: 119 MG/DL (ref 100–199)
ENTERBACTER CLOACAE FILM ARRAY: NOT DETECTED
ENTERBACTERIACEAE FILM ARRAY: NOT DETECTED
ENTEROCOCCUS FILM ARRAY: NOT DETECTED
ERYTHROCYTE [DISTWIDTH] IN BLOOD BY AUTOMATED COUNT: 17.7 % (ref 11.5–14.5)
ERYTHROCYTE [DISTWIDTH] IN BLOOD BY AUTOMATED COUNT: 17.8 % (ref 11.5–14.5)
ERYTHROCYTE [DISTWIDTH] IN BLOOD BY AUTOMATED COUNT: 66.2 FL (ref 35–45)
ERYTHROCYTE [DISTWIDTH] IN BLOOD BY AUTOMATED COUNT: 66.8 FL (ref 35–45)
ESCHERICHIA COLI FILM ARRAY: NOT DETECTED
HAEMOPHILUS INFLUENZA FILM ARRAY: NOT DETECTED
HCT VFR BLD CALC: 25.2 % (ref 37–47)
HCT VFR BLD CALC: 25.3 % (ref 37–47)
HDLC SERPL-MCNC: 49 MG/DL
HEMOGLOBIN: 7.9 GM/DL (ref 12–16)
HEMOGLOBIN: 7.9 GM/DL (ref 12–16)
KLEBSIELLA OXYTOCA FILM ARRAY: NOT DETECTED
KLEBSIELLA PNEUMONIAE FILM ARRAY: NOT DETECTED
LDL CHOLESTEROL CALCULATED: 46 MG/DL
LISTERIA MONOCYTOGENES FILM ARRAY: NOT DETECTED
MCH RBC QN AUTO: 31.5 PG (ref 26–33)
MCH RBC QN AUTO: 31.6 PG (ref 26–33)
MCHC RBC AUTO-ENTMCNC: 31.2 GM/DL (ref 32.2–35.5)
MCHC RBC AUTO-ENTMCNC: 31.3 GM/DL (ref 32.2–35.5)
MCV RBC AUTO: 100.4 FL (ref 81–99)
MCV RBC AUTO: 101.2 FL (ref 81–99)
METHICILLIN RESISTANT FILM ARRAY: NOT DETECTED
NEISSERIA MENIGITIDIS FILM ARRAY: NOT DETECTED
PLATELET # BLD: 331 THOU/MM3 (ref 130–400)
PLATELET # BLD: 341 THOU/MM3 (ref 130–400)
PMV BLD AUTO: 9.5 FL (ref 9.4–12.4)
PMV BLD AUTO: 9.6 FL (ref 9.4–12.4)
PROTEUS FILM ARRAY: NOT DETECTED
PSEUDOMONAS AERUGINOSA FILM ARRAY: NOT DETECTED
RBC # BLD: 2.5 MILL/MM3 (ref 4.2–5.4)
RBC # BLD: 2.51 MILL/MM3 (ref 4.2–5.4)
SERRATIA MARCESCENS FILM ARRAY: NOT DETECTED
SOURCE OF BLOOD CULTURE: ABNORMAL
STAPH AUREUS FILM ARRAY: DETECTED
STAPHYLOCOCCUS FILM ARRAY: DETECTED
STREP AGALACTIAE FILM ARRAY: NOT DETECTED
STREP PNEUMONIAE FILM ARRAY: NOT DETECTED
STREP PYOCGENES FILM ARRAY: NOT DETECTED
STREPTOCOCCUS FILM ARRAY: NOT DETECTED
TOTAL PROTEIN: 5.3 G/DL (ref 6.1–8)
TRIGL SERPL-MCNC: 122 MG/DL (ref 0–199)
VANCOMYCIN RESISTANT FILM ARRAY: ABNORMAL
WBC # BLD: 11.6 THOU/MM3 (ref 4.8–10.8)
WBC # BLD: 12.1 THOU/MM3 (ref 4.8–10.8)

## 2021-12-20 PROCEDURE — 94669 MECHANICAL CHEST WALL OSCILL: CPT

## 2021-12-20 PROCEDURE — 2700000000 HC OXYGEN THERAPY PER DAY

## 2021-12-20 PROCEDURE — 6360000002 HC RX W HCPCS

## 2021-12-20 PROCEDURE — 94660 CPAP INITIATION&MGMT: CPT

## 2021-12-20 PROCEDURE — 6360000002 HC RX W HCPCS: Performed by: INTERNAL MEDICINE

## 2021-12-20 PROCEDURE — 99223 1ST HOSP IP/OBS HIGH 75: CPT | Performed by: INTERNAL MEDICINE

## 2021-12-20 PROCEDURE — 2580000003 HC RX 258: Performed by: INTERNAL MEDICINE

## 2021-12-20 PROCEDURE — 87541 LEGION PNEUMO DNA AMP PROB: CPT

## 2021-12-20 PROCEDURE — 71045 X-RAY EXAM CHEST 1 VIEW: CPT

## 2021-12-20 PROCEDURE — 80061 LIPID PANEL: CPT

## 2021-12-20 PROCEDURE — 6370000000 HC RX 637 (ALT 250 FOR IP): Performed by: INTERNAL MEDICINE

## 2021-12-20 PROCEDURE — 6370000000 HC RX 637 (ALT 250 FOR IP): Performed by: EMERGENCY MEDICINE

## 2021-12-20 PROCEDURE — 85027 COMPLETE CBC AUTOMATED: CPT

## 2021-12-20 PROCEDURE — 87631 RESP VIRUS 3-5 TARGETS: CPT

## 2021-12-20 PROCEDURE — 2500000003 HC RX 250 WO HCPCS: Performed by: INTERNAL MEDICINE

## 2021-12-20 PROCEDURE — 94640 AIRWAY INHALATION TREATMENT: CPT

## 2021-12-20 PROCEDURE — 87486 CHLMYD PNEUM DNA AMP PROBE: CPT

## 2021-12-20 PROCEDURE — 94761 N-INVAS EAR/PLS OXIMETRY MLT: CPT

## 2021-12-20 PROCEDURE — 80076 HEPATIC FUNCTION PANEL: CPT

## 2021-12-20 PROCEDURE — 1200000003 HC TELEMETRY R&B

## 2021-12-20 PROCEDURE — 93005 ELECTROCARDIOGRAM TRACING: CPT | Performed by: INTERNAL MEDICINE

## 2021-12-20 PROCEDURE — 87798 DETECT AGENT NOS DNA AMP: CPT

## 2021-12-20 PROCEDURE — 87581 M.PNEUMON DNA AMP PROBE: CPT

## 2021-12-20 PROCEDURE — 36415 COLL VENOUS BLD VENIPUNCTURE: CPT

## 2021-12-20 PROCEDURE — 2580000003 HC RX 258

## 2021-12-20 RX ORDER — BUMETANIDE 0.25 MG/ML
1 INJECTION, SOLUTION INTRAMUSCULAR; INTRAVENOUS 2 TIMES DAILY
Status: DISCONTINUED | OUTPATIENT
Start: 2021-12-20 | End: 2021-12-28 | Stop reason: HOSPADM

## 2021-12-20 RX ADMIN — DULOXETINE HYDROCHLORIDE 30 MG: 30 CAPSULE, DELAYED RELEASE ORAL at 08:31

## 2021-12-20 RX ADMIN — Medication 400 MG: at 20:03

## 2021-12-20 RX ADMIN — GABAPENTIN 100 MG: 100 CAPSULE ORAL at 08:30

## 2021-12-20 RX ADMIN — MONTELUKAST SODIUM 5 MG: 5 TABLET, CHEWABLE ORAL at 20:04

## 2021-12-20 RX ADMIN — Medication 400 MG: at 08:31

## 2021-12-20 RX ADMIN — GABAPENTIN 100 MG: 100 CAPSULE ORAL at 20:06

## 2021-12-20 RX ADMIN — OXYCODONE AND ACETAMINOPHEN 1 TABLET: 5; 325 TABLET ORAL at 20:02

## 2021-12-20 RX ADMIN — PANTOPRAZOLE SODIUM 40 MG: 40 TABLET, DELAYED RELEASE ORAL at 06:16

## 2021-12-20 RX ADMIN — ALBUTEROL SULFATE 2.5 MG: 2.5 SOLUTION RESPIRATORY (INHALATION) at 17:29

## 2021-12-20 RX ADMIN — OXYCODONE AND ACETAMINOPHEN 1 TABLET: 5; 325 TABLET ORAL at 08:30

## 2021-12-20 RX ADMIN — BUDESONIDE AND FORMOTEROL FUMARATE DIHYDRATE 2 PUFF: 160; 4.5 AEROSOL RESPIRATORY (INHALATION) at 17:39

## 2021-12-20 RX ADMIN — RANOLAZINE 500 MG: 500 TABLET, FILM COATED, EXTENDED RELEASE ORAL at 20:04

## 2021-12-20 RX ADMIN — FENOFIBRATE 160 MG: 160 TABLET ORAL at 08:31

## 2021-12-20 RX ADMIN — DOCUSATE SODIUM 100 MG: 100 CAPSULE ORAL at 08:31

## 2021-12-20 RX ADMIN — PIPERACILLIN AND TAZOBACTAM 3375 MG: 3; .375 INJECTION, POWDER, LYOPHILIZED, FOR SOLUTION INTRAVENOUS at 19:59

## 2021-12-20 RX ADMIN — ASPIRIN 81 MG CHEWABLE TABLET 81 MG: 81 TABLET CHEWABLE at 08:31

## 2021-12-20 RX ADMIN — APIXABAN 5 MG: 5 TABLET, FILM COATED ORAL at 20:06

## 2021-12-20 RX ADMIN — ALBUTEROL SULFATE 2.5 MG: 2.5 SOLUTION RESPIRATORY (INHALATION) at 07:37

## 2021-12-20 RX ADMIN — GABAPENTIN 100 MG: 100 CAPSULE ORAL at 14:32

## 2021-12-20 RX ADMIN — BUMETANIDE 1 MG: 0.25 INJECTION, SOLUTION INTRAMUSCULAR; INTRAVENOUS at 18:35

## 2021-12-20 RX ADMIN — AZITHROMYCIN DIHYDRATE 500 MG: 500 INJECTION, POWDER, LYOPHILIZED, FOR SOLUTION INTRAVENOUS at 08:46

## 2021-12-20 RX ADMIN — DOCUSATE SODIUM 100 MG: 100 CAPSULE ORAL at 20:05

## 2021-12-20 RX ADMIN — AZATHIOPRINE 100 MG: 50 TABLET ORAL at 08:31

## 2021-12-20 RX ADMIN — TRAZODONE HYDROCHLORIDE 100 MG: 100 TABLET ORAL at 20:03

## 2021-12-20 RX ADMIN — BUDESONIDE AND FORMOTEROL FUMARATE DIHYDRATE 2 PUFF: 160; 4.5 AEROSOL RESPIRATORY (INHALATION) at 07:45

## 2021-12-20 RX ADMIN — ATORVASTATIN CALCIUM 80 MG: 80 TABLET, FILM COATED ORAL at 20:07

## 2021-12-20 RX ADMIN — HYDROXYCHLOROQUINE SULFATE 200 MG: 200 TABLET ORAL at 08:31

## 2021-12-20 RX ADMIN — PREDNISONE 5 MG: 10 TABLET ORAL at 08:30

## 2021-12-20 RX ADMIN — ISOSORBIDE MONONITRATE 60 MG: 60 TABLET, EXTENDED RELEASE ORAL at 08:31

## 2021-12-20 RX ADMIN — CLONIDINE HYDROCHLORIDE 0.1 MG: 0.1 TABLET ORAL at 20:07

## 2021-12-20 RX ADMIN — CEFTRIAXONE SODIUM 1000 MG: 1 INJECTION, POWDER, FOR SOLUTION INTRAMUSCULAR; INTRAVENOUS at 11:02

## 2021-12-20 RX ADMIN — INSULIN GLARGINE 8 UNITS: 100 INJECTION, SOLUTION SUBCUTANEOUS at 20:28

## 2021-12-20 RX ADMIN — DULOXETINE HYDROCHLORIDE 30 MG: 30 CAPSULE, DELAYED RELEASE ORAL at 20:05

## 2021-12-20 RX ADMIN — APIXABAN 5 MG: 5 TABLET, FILM COATED ORAL at 08:31

## 2021-12-20 RX ADMIN — RANOLAZINE 500 MG: 500 TABLET, FILM COATED, EXTENDED RELEASE ORAL at 08:30

## 2021-12-20 ASSESSMENT — PAIN DESCRIPTION - LOCATION
LOCATION: FACE
LOCATION: EAR

## 2021-12-20 ASSESSMENT — PAIN DESCRIPTION - ORIENTATION
ORIENTATION: LEFT
ORIENTATION: LEFT

## 2021-12-20 ASSESSMENT — PAIN SCALES - GENERAL
PAINLEVEL_OUTOF10: 8

## 2021-12-20 NOTE — CONSULTS
[facility]      Patient - Gurjit Lyon   MRN -  [id number]   Acct # - [de-identified]   - 1940      Date of Admission -  2021  5:40 AM  Date of evaluation -  2021  [place] - Pal Diego MD Primary Care Physician - Rigo Yeboah DO     Problem List      Active Hospital Problems    Diagnosis Date Noted    Acute on chronic clinical systolic heart failure [facility]) [I50.23] 2021     Reason for Consult    \"respiratory failure\"  HPI   History Obtained From: Patient and electronic medical record. Gurjit Lyon is a 78 y.o. female who presents for undifferentiated shortness of breath and a new oxygen requirement from nursing home. H/o COPD GOLD stage 2 FEV1 64ppv 2021 on symbicort / spiriva / albuterol, Systolic + G2 Diastolic HF EF 04-50% 2/2 hypertrophic cardiomyopathy, CAD w/ total RCA occlusion + L to R collateralizations, Unspecified HARRIS , GERD, PAF on eliquis, Well-controlled IDDMT2 A1c 6.6 c/b neuropathy on gabapentin, CKD Stage III, MAGDI on CPAP, HTN, HLD on fenofibrate, Rheumatoid Arthritis on plaquenil / prednisone / imuran, and Depression. Pt is an unreliable historian but this is her baseline.      She is having shortness of breath: No, unreliable    She is having cough: No, unreliable    She is having chest pain:No, unreliable      PMHx   Past Medical History      Diagnosis Date    Arthritis     Asthma     Atrial fibrillation ([address]     Blood circulation, collateral     CAD (coronary artery disease)     [facility]est[facility]     Chronic kidney disease, stage III (moderate) ([facility])     COPD (chronic obstructive pulmonary disease) ([facility])     Depression     Diabetes mellitus ([address]     Diabetic hypertension-nephrosis syndrome ([facility])     Dysuria     GERD (gastroesophageal reflux disease)     Hyperlipidemia     Hypertension     Kidney disease     stage 3 potassium chloride, ondansetron **OR** ondansetron, acetaminophen **OR** acetaminophen, polyethylene glycol, nitroGLYCERIN, albuterol sulfate HFA, albuterol, aluminum & magnesium hydroxide-simethicone, bumetanide, polyvinyl alcohol, magnesium hydroxide, loperamide  IV Drips/Infusions   sodium chloride 20 mL/hr at 12/19/21 2048    sodium chloride       Home Medications  Medications Prior to Admission: apixaban (ELIQUIS) 5 MG TABS tablet, Take 5 mg by mouth 2 times daily  bumetanide (BUMEX) 1 MG tablet, Take 0.5 tablets by mouth daily as needed (leg swelling, increase in daily weight > 3lbs, SOB)  magnesium hydroxide (MILK OF MAGNESIA) 400 MG/5ML suspension, Take 30 mLs by mouth daily as needed for Constipation  albuterol (PROVENTIL) 2.5 MG/0.5ML NEBU nebulizer solution, Take 2.5 mg by nebulization every 4-6 hours as needed for Wheezing or Shortness of Breath Do not take inhaler and nebulizer at the same time per Dr. Mary Anne Rodriguez   aluminum & magnesium hydroxide-simethicone (MYLANTA) 400-400-40 MG/5ML SUSP, Take 10 mLs by mouth every 6 hours as needed For acid reflux  Artificial Saliva (BIOTENE DRY MOUTH MOISTURIZING) SOLN liquid, Take by mouth as needed Dry mouth  montelukast (SINGULAIR) 5 MG chewable tablet, Take 5 mg by mouth nightly  metoprolol succinate (TOPROL XL) 50 MG extended release tablet, Take 50 mg by mouth daily  fluticasone-umeclidin-vilant (TRELEGY ELLIPTA) 100-62.5-25 MCG/INH AEPB, Inhale 1 puff into the lungs daily SOB  OXYGEN, Inhale into the lungs 2-4L to keep SpO2 above 92%.  PRN for SOB  calcium carbonate (TUMS) 500 MG chewable tablet, Take 1 tablet by mouth 2 times daily  fenofibrate micronized (LOFIBRA) 200 MG capsule, Take 200 mg by mouth daily   acetaminophen (TYLENOL) 500 MG tablet, Take 500 mg by mouth every 8 hours as needed for Pain   losartan (COZAAR) 50 MG tablet, Take 50 mg by mouth daily  loperamide (IMODIUM) 2 MG capsule, Take 2 mg by mouth every 12 hours as needed for Diarrhea oxyCODONE-acetaminophen (PERCOCET) 5-325 MG per tablet, Take 1 tablet by mouth 3 times daily. For pain  hydroxychloroquine (PLAQUENIL) 200 MG tablet, TAKE 1 TABLET BY MOUTH EVERY DAY  cloNIDine (CATAPRES) 0.1 MG tablet, Take 1 tablet by mouth 2 times daily  predniSONE (DELTASONE) 5 MG tablet, TAKE 1 TABLET BY MOUTH EVERY DAY  DULoxetine (CYMBALTA) 30 MG extended release capsule, Take 30 mg by mouth 2 times daily  gabapentin (NEURONTIN) 100 MG capsule, Take 100 mg by mouth 3 times daily. atorvastatin (LIPITOR) 40 MG tablet, Take 2 tablets by mouth daily  docusate sodium (COLACE, DULCOLAX) 100 MG CAPS, Take 100 mg by mouth 2 times daily  insulin glargine (LANTUS) 100 UNIT/ML injection pen, Inject 8 Units into the skin nightly   dexlansoprazole (DEXILANT) 60 MG CPDR delayed release capsule, Take 60 mg by mouth daily  ranolazine (RANEXA) 500 MG SR tablet, Take 1 tablet by mouth 2 times daily. magnesium oxide (MAG-OX) 400 MG tablet, Take 400 mg by mouth 2 times daily   albuterol (PROVENTIL HFA;VENTOLIN HFA) 108 (90 BASE) MCG/ACT inhaler, Inhale 2 puffs into the lungs every 6 hours as needed for Shortness of Breath   traZODone (DESYREL) 50 MG tablet, Take 100 mg by mouth nightly   isosorbide mononitrate (IMDUR) 60 MG extended release tablet, Take 1 tablet by mouth daily  azaTHIOprine (IMURAN) 50 MG tablet, Take 2 tablets by mouth daily  spironolactone (ALDACTONE) 50 MG tablet, Take 1 tablet by mouth daily  Misc. Devices (HEAT THERAPY) MISC, by Does not apply route every 8 hours as needed For pain. To Left shoulder  Elastic Bandages & Supports (T.E.D.  ANTI-EMBOLISM STOCKINGS) MISC, by Does not apply route On in AM; off at HS  carboxymethylcellulose (ARTIFICIAL TEARS) 1 % ophthalmic solution, Place 1 drop into both eyes daily as needed for Dry Eyes   EPINEPHrine (EPIPEN 2-LORAINE) 0.3 MG/0.3ML SOAJ injection, Inject 0.3 mLs into the muscle once for 1 dose Use as directed for allergic reaction  nitroGLYCERIN (NITROSTAT) 0.4 MG SL tablet, Place 0.4 mg under the tongue every 5 minutes as needed for Chest pain   Diet    ADULT DIET; Regular; No Caffeine  Allergies    Renflexis [infliximab], Strawberry (diagnostic), Nuts [peanut-containing drug products], Sulfa antibiotics, Tape [adhesive tape], Bactrim [sulfamethoxazole-trimethoprim], and Chocolate  Social History     Social History     Socioeconomic History    Marital status:      Spouse name: Not on file    Number of children: 2    Years of education: 8    Highest education level: Not on file   Occupational History    Not on file   Tobacco Use    Smoking status: Former Smoker     Packs/day: 0.15     Years: 10.00     Pack years: 1.50     Types: Cigarettes     Start date: 10/21/2013    Smokeless tobacco: Never Used   Vaping Use    Vaping Use: Never used   Substance and Sexual Activity    Alcohol use: No    Drug use: No    Sexual activity: Never   Other Topics Concern    Not on file   Social History Narrative    Not on file     Social Determinants of Health     Financial Resource Strain:     Difficulty of Paying Living Expenses: Not on file   Food Insecurity:     Worried About Running Out of Food in the Last Year: Not on file    Oz of Food in the Last Year: Not on file   Transportation Needs:     Lack of Transportation (Medical): Not on file    Lack of Transportation (Non-Medical):  Not on file   Physical Activity:     Days of Exercise per Week: Not on file    Minutes of Exercise per Session: Not on file   Stress:     Feeling of Stress : Not on file   Social Connections:     Frequency of Communication with Friends and Family: Not on file    Frequency of Social Gatherings with Friends and Family: Not on file    Attends Jain Services: Not on file    Active Member of Clubs or Organizations: Not on file    Attends Club or Organization Meetings: Not on file    Marital Status: Not on file   Intimate Partner Violence:     Fear of Current or Ex-Partner: Not on file    Emotionally Abused: Not on file    Physically Abused: Not on file    Sexually Abused: Not on file   Housing Stability:     Unable to Pay for Housing in the Last Year: Not on file    Number of Places Lived in the Last Year: Not on file    Unstable Housing in the Last Year: Not on file     Family History          Problem Relation Age of Onset    Heart Disease Mother     Diabetes Mother     Kidney Disease Mother     Heart Disease Father     Diabetes Father    Deandre Reading Sister     Stroke Brother     Cancer Sister      Sleep History    Never diagnosed with sleep apnea in the past.  Occupational history   Occupation:  She is current working: No  Type of profession: retired. History of tobacco smoking:Yes  Amount of tobacco smokin.15 PPD. Years of tobacco smoking: 15.                                    Quit smoking: Yes. Quit [id number]   Current smoker: No.       Amount of current tobacco smokin PPD  . History of recreational or IV drug use in the past:NO     History of exposure to coal mines/coal dust: NO  History of exposure to foundry dust/welding: NO  History of exposure to quarry/silica/sandblasting: NO  History of exposure to asbestos/working with breaks/ships: NO  History of exposure to farm dust: NO  History of recent travel to long distances: NO  History of exposure to birds, pigeons, or chickens in the past:NO  Pet animals at home:No  Dogs: 0  Cats: 0    History of pulmonary embolism in the past: Yes            History of DVT in the past:Yes        [x] Right lower extremity   [x] Left lower extremity. [place] of systems   Review of Systems   Unable to perform ROS: Other   Pt is an unreliable historian    Vitals     height is 5' 5\" (1.651 m) and weight is 195 lb 12.3 oz (88.8 kg). Her oral temperature is 97.2 °F (36.2 °C). Her blood pressure is 141/60 (abnormal) and her pulse is 88. Her respiration is 16 and oxygen saturation is 91%. wheezing and rales. Wheezing and rales (R>L) present. No rhonchi. Abdominal:      General: Bowel sounds are normal.      Palpations: Abdomen is soft. Tenderness: There is no abdominal tenderness. There is no guarding or rebound. Musculoskeletal:      Cervical back: Normal range of motion and neck supple. Right lower leg: No edema. Left lower leg: No edema. Skin:     General: Skin is warm and dry. Neurological:      General: No focal deficit present. Mental Status: She is alert and oriented to person, place, and time. Mental status is at baseline. GCS: GCS eye subscore is 4. GCS verbal subscore is 5. GCS motor subscore is 6. Psychiatric:         Attention and Perception: Attention and perception normal.         Mood and Affect: Mood and affect normal.         Speech: Speech normal.         Behavior: Behavior normal. Behavior is cooperative. Thought Content:  Thought content normal.         Cognition and Memory: Cognition and memory normal.         Judgment: Judgment normal.         Labs  - [facility]   ABG  Lab Results   Component Value Date    PH 7.39 12/19/2021    PO2 105 12/19/2021    PCO2 48 12/19/2021    HCO3 29 12/19/2021    O2SAT 98 12/19/2021     Lab Results   Component Value Date    IFIO2 45 12/19/2021    SETPEEP 6.0 12/19/2021     CBC  Recent Labs     12/19/21  0600 12/20/21  0701 12/20/21  0832   WBC 20.5* 11.6* 12.1*   RBC 2.95* 2.50* 2.51*   HGB 9.4* 7.9* 7.9*   HCT 29.8* 25.3* 25.2*   .0* 101.2* 100.4*   MCH 31.9 31.6 31.5   MCHC 31.5* 31.2* 31.3*    331 341   MPV 9.4 9.5 9.6      BMP  Recent Labs     12/19/21  0600      K 4.8      CO2 24   BUN 21   CREATININE 1.1   GLUCOSE 165*   CALCIUM 9.9     LFT  Recent Labs     12/20/21  0701   AST 42*   ALT 25   BILITOT 0.2*   ALKPHOS 50     TROP  Lab Results   Component Value Date    TROPONINT 0.177 12/19/2021    TROPONINT 0.198 12/19/2021    TROPONINT 0.275 12/19/2021     BNP  No results for input(s): BNP in the last 72 hours. Lactic Acid  No results for input(s): LACTA in the last 72 hours. INR  No results for input(s): INR, PROTIME in the last 72 hours. PTT  No results for input(s): APTT in the last 72 hours. Glucose  No results for input(s): POCGLU in the last 72 hours. UA No results for input(s): SPECGRAV, PHUR, COLORU, CLARITYU, MUCUS, PROTEINU, BLOODU, RBCUA, WBCUA, BACTERIA, NITRU, GLUCOSEU, BILIRUBINUR, UROBILINOGEN, KETUA, LABCAST, LABCASTTY, AMORPHOS in the last 72 hours. Invalid input(s): CRYSTALS. PFTs           Sleep studies   None in record    Cultures        EKG     Chronic RBBB  Echocardiogram   08/05/2020   Findings      Mitral Valve   The mitral valve structure was normal with normal leaflet separation. DOPPLER: The transmitral velocity was within the normal range with no   evidence for mitral stenosis. There was moderate central jetf mitral   regurgitation. Aortic Valve   The aortic valve was trileaflet with normal thickness and cuspal   separation. DOPPLER: Transaortic velocity was within the normal range with   no evidence of aortic stenosis. There was no evidence of aortic   regurgitation. Tricuspid Valve   The tricuspid valve structure was normal with normal leaflet separation. DOPPLER: There was no evidence of tricuspid stenosis. There was mild   tricuspid regurgitation. Pulmonic Valve   The pulmonic valve leaflets exhibited normal thickness, no calcification,   and normal cuspal separation. DOPPLER: The transpulmonic velocity was   within the normal range with no evidence for regurgitation. mild pulmonary   htn      Left Atrium   Left atrial size was dilated      Left Ventricle   Normal left ventricle size and systolic function. Ejection fraction was   estimated at 50-55%. There were no regional left ventricular wall motion   abnormalities and wall thickness was within normal limits.    Features were consistent with a pseudonormal left ventricular filling   pattern, with concomitant abnormal relaxation and increased filling   pressure (grade 2 diastolic dysfunction). Right Atrium   Right atrial size was normal.      Right Ventricle   The right ventricular size was normal with normal systolic function and   wall thickness. Right ventricular systolic pressure of 40 mm Hg consistent with mild   pulmonary hypertension. Pericardial Effusion   The pericardium was normal in appearance with no evidence of a pericardial   effusion. Pleural Effusion   No evidence of pleural effusion. Aorta / Great Vessels   -Aortic root dimension within normal limits.   -The Pulmonary artery is within normal limits. -IVC size is within normal limits with normal respiratory phasic changes. Radiology    CXR    Patchy opacities in the RUL and LLL. Infiltrates? CT Scans    1. Filling defects in the R & L pulmonary arteries and in the artery supplying the RLL consistent with pulmonary embolus, smaller than on previous CTA dated 11/06/2021. No new filling defects noted  2. Areas of abnormal density in the RU/M/LL lung fields new since previous study 11/06/2021 consistent with inflammatory process. 3. Right pleural effusion  (See actual reports for details)    Assessment   - Exacerbation of COPD GOLD 2 FEV1 64ppv 02/23/2021: Stable  Trigger: bacterial PNA. Initial sxs: wheezing. CTA on admit: R lung opacities in upper, middle, and lower lobe. Procal on admit 10.74. Eosinophil count: <100. Exacerbation Hx: >/=2x. Smoker no, 1.5 pyh, quit in 2013. A1AT: never screened. Pneumonia vaccine: no. Home meds: symbicort and spiriva maintenance + albuterol rescue. Baseline O2 room air.  - Community Acquired Pneumonia vs Aspiration Pneumonia - cannot exclude atypical or aspiration etiologies given isolated R-lung radiographic opacities. Not MRSA as blood cultures x1 growing MSSA.    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 Likely more L heart failure given pt has no appreciable pedal edema with lower extremity wrinkling of the skin. - MAGDI on CPAP, no sleep study in records    - Systolic + G2 Diastolic HF EF 55-24% 2/2 hypertrophic cardiomyopathy and ICM  - CAD w/ total RCA occlusion + L to R collateralizations  - Unspecified HARRIS 1999  - GERD  - PAF on eliquis  - Well-controlled IDDMT2 A1c 6.6 c/b neuropathy on gabapentin  - CKD Stage III  - HTN  - HLD - on fenofibrate  - Osteopenia - on denosumab  - Rheumatoid Arthritis - on plaquenil / prednisone / imuran / rituximab, and Depression  - H/o bilateral LE DVT    Plan   -Switch to Zosyn d/t concern for aspiration, Discontinue ceftriaxone and azithromycin, de-escalate / escalate pending results of cultures and pcr; Defer to ID for further management. -Molecular pneumonia panel  -Aspiration precautions  -Continue Symbicort and Spiriva; if wheezing worsens, switch to DuoNebs x4 WA. -Continue BiPAP  -Acapella  -Optimize heart failure regimen per cardiology  -IS    \"Thank you for asking us to see this patient\"    Questions and concerns addressed.     Electronically signed by   Laura Baldwin MD on 12/20/2021 at 4:08 PM

## 2021-12-20 NOTE — PROGRESS NOTES
Admit Date: 12/19/2021  Hospital day 1    Subjective:     Patient has sob . Medication side effects: none    Scheduled Meds:   piperacillin-tazobactam  3,375 mg IntraVENous Q8H    bumetanide  1 mg IntraVENous BID    sodium chloride flush  5-40 mL IntraVENous 2 times per day    aspirin  81 mg Oral Daily    atorvastatin  80 mg Oral Nightly    apixaban  5 mg Oral BID    azaTHIOprine  100 mg Oral Daily    calcium carbonate  1 tablet Oral BID    cloNIDine  0.1 mg Oral BID    pantoprazole  40 mg Oral QAM AC    DULoxetine  30 mg Oral BID    docusate sodium  100 mg Oral BID    fenofibrate  160 mg Oral Daily    gabapentin  100 mg Oral TID    hydroxychloroquine  200 mg Oral Daily    insulin glargine  8 Units SubCUTAneous Nightly    traZODone  100 mg Oral Nightly    ranolazine  500 mg Oral BID    predniSONE  5 mg Oral Daily    oxyCODONE-acetaminophen  1 tablet Oral TID    montelukast  5 mg Oral Nightly    metoprolol succinate  50 mg Oral Daily    magnesium oxide  400 mg Oral BID    losartan  50 mg Oral Daily    isosorbide mononitrate  60 mg Oral Daily    budesonide-formoterol  2 puff Inhalation BID    tiotropium  2 puff Inhalation Daily     Continuous Infusions:   sodium chloride 20 mL/hr at 12/19/21 2048    sodium chloride       PRN Meds:sodium chloride flush, sodium chloride, potassium chloride **OR** potassium alternative oral replacement **OR** potassium chloride, ondansetron **OR** ondansetron, acetaminophen **OR** acetaminophen, polyethylene glycol, nitroGLYCERIN, albuterol sulfate HFA, albuterol, aluminum & magnesium hydroxide-simethicone, polyvinyl alcohol, magnesium hydroxide, loperamide    Review of Systems  Pertinent items are noted in HPI.     Objective:     Patient Vitals for the past 8 hrs:   BP Temp Temp src Pulse Resp SpO2   12/20/21 1545 (!) 141/60 97.2 °F (36.2 °C) Oral 88 16 91 %   12/20/21 1429 (!) 120/58 -- -- -- -- --   12/20/21 1136 121/64 97.4 °F (36.3 °C) Oral 80 22 98 % 12/20/21 1031 (!) 105/54 -- -- -- -- --     I/O last 3 completed shifts: In: 360 [P.O.:360]  Out: 500 [Urine:500]    No chamge     ECG: af.   Data Review:   CBC:  Lab Results   Component Value Date    WBC 12.1 12/20/2021    RBC 2.51 12/20/2021    HGB 7.9 12/20/2021    HCT 25.2 12/20/2021    .4 12/20/2021    MCH 31.5 12/20/2021    MCHC 31.3 12/20/2021    RDW 19.8 12/13/2021     12/20/2021    MPV 9.6 12/20/2021     BMP  Lab Results   Component Value Date     12/19/2021    K 4.8 12/19/2021     12/19/2021    CO2 24 12/19/2021    BUN 21 12/19/2021    CREATININE 1.1 12/19/2021    CALCIUM 9.9 12/19/2021      COAG PROFILE:  Lab Results   Component Value Date    APTT 33.5 11/18/2021    INR 1.22 11/21/2021       Assessment:     Active Problems:    Acute on chronic clinical systolic heart failure (HCC)  Resolved Problems:    * No resolved hospital problems.  *      Plan:   Patient with recent PE    acute exacerbation of chronic systolic and diastolic chf    chronic anaemia    cad    copd    leep apnea    anaemia    dm    will consult pulmonary service    id service    continue home meds    close monitoring of anaemia and renal function

## 2021-12-20 NOTE — ED NOTES
ED nurse-to-nurse bedside report    Chief Complaint   Patient presents with    Shortness of Breath      LOC: alert and orientated to name, place, date  Vital signs   Vitals:    12/19/21 1539 12/19/21 1702 12/19/21 1755 12/19/21 1830   BP: (!) 148/90  128/70 128/70   Pulse: 102 107 97 97   Resp: 24 24 24 24   Temp:    98.4 °F (36.9 °C)   TempSrc:    Oral   SpO2: 100% 99% 98% 96%   Weight:       Height:          Pain:    Pain Interventions: n/a  Pain Goal: 0  Oxygen: Yes    Current needs required 4 L NC   BASELINE 2 L NC  Telemetry: Yes  LDAs:   Peripheral IV 12/19/21 Right Antecubital (Active)   Site Assessment Clean;Dry; Intact 12/19/21 1539   Line Status Normal saline locked 12/19/21 1152   Dressing Status Clean;Dry; Intact 12/19/21 1539   Dressing Intervention New 12/19/21 0620     Continuous Infusions:    sodium chloride      sodium chloride       Mobility: Requires assistance * 2  Bagley Fall Risk Score: No flowsheet data found.   Fall Interventions: call light in reach  Report given to: Fermin Hodges RN  12/19/21 3156

## 2021-12-20 NOTE — ED NOTES
ED to inpatient nurses report    Chief Complaint   Patient presents with    Shortness of Breath      Present to ED from nursing home - [facility]  LOC: alert and orientated to name, place, date  Vital signs   Vitals:    12/19/21 1755 12/19/21 1830 12/19/21 2056 12/19/21 2109   BP: 128/70 128/70 134/76 (!) 142/65   Pulse: 97 97  108   Resp: 24 24  30   Temp:  98.4 °F (36.9 °C)     TempSrc:  Oral     SpO2: 98% 96%  99%   Weight:       Height:          Oxygen Baseline 2L    Current needs required 4L NC   LDAs:   Peripheral IV 12/19/21 Right Antecubital (Active)   Site Assessment Clean;Dry; Intact 12/19/21 2131   Line Status Infusing 12/19/21 2131   Dressing Status Clean;Dry; Intact 12/19/21 2131   Dressing Intervention New 12/19/21 0620     Mobility: 2 assist  Pending ED orders: none  Present condition: stable    Electronically signed by Muriel Link RN on 12/19/2021 at 9:32 PM       Muriel Link RN  12/19/21 2940       Muriel Link RN  12/19/21 5538

## 2021-12-20 NOTE — PLAN OF CARE
Problem: DISCHARGE BARRIERS  Goal: Patient's continuum of care needs are met  Outcome: Ongoing  Note: Patient return to [facility]. See  notes 12/20/21.

## 2021-12-20 NOTE — ED NOTES
Pt medicated per MAR. Pt assited to use bedpan. Medium size, solid bowel movement. Pt resting on cot. VSS. No distress noted. Pt denies needs.       Rosario John RN  12/19/21 2454

## 2021-12-20 NOTE — PROGRESS NOTES
Pharmacy Note  [employer] Result    Crystal Ross is a 78 y.o. female, with a positive blood culture result    [employer] message received from Elia, laboratory employee on 12/20/2021 at 7:57 AM    First Gram stain result: gram positive cocci    BioFire BCID result: Staphylococcus aureus (mecA not detected)    Bio[employer] BCID and gram stain congruent? Yes    Suspected source? unknown    Patient chart reviewed for signs/symptoms of infection: Yes  BP (!) 167/76   Pulse 87   Temp 98.3 °F (36.8 °C) (Oral)   Resp 22   Ht 5' 5\" (1.651 m)   Wt 195 lb 12.3 oz (88.8 kg)   SpO2 96%   BMI 32.58 kg/m²   Lab Results   Component Value Date    WBC 11.6 (H) 12/20/2021       Allergies reviewed  Renflexis [infliximab], Strawberry (diagnostic), Nuts [peanut-containing drug products], Sulfa antibiotics, Tape [adhesive tape], Bactrim [sulfamethoxazole-trimethoprim], and Chocolate    Renal function reviewed  Estimated Creatinine Clearance: 43 mL/min (based on SCr of 1.1 mg/dL). Current antibiotic regimen: azithromycin and ceftriaxone    Intervention needed: No    Individual contacted: Nurse New    Recommendations: N/A - treating pneumonia and ceftriaxone will cover MSSA    Recommendations accepted?  N/A    Sridhar Martinez, [facility]  12/20/2021 7:57 AM

## 2021-12-20 NOTE — PROGRESS NOTES
Pt admitted to  5K26 via stretcher from ED  Complaints: SOB  IV infusing into the R AC by gravity with about 900 mls in the bag still. IV site free of s/s of infection or infiltration. Vital signs obtained. Assessment and data collection initiated. Two nurse skin assessment performed by Ana SHULTZ and Jonathan Burton. Oriented to room. Policies and procedures for 5K explained. All questions answered with no further questions at this time. Fall prevention and safety brochure discussed with patient. Bed alarm on. Call light in reach. Oriented to room.    Addison Perez, RN, RN 12/20/2021 1:50 AM

## 2021-12-20 NOTE — CARE COORDINATION
12/20/21, 1:23 PM EST  DISCHARGE PLANNING EVALUATION:    Binu Tamayo       Admitted: 12/19/2021/ 830 Lesly Herring day: 1   Location: [address] [place], [place] [address] Reason for admit: Troponin level elevated [R77.8]  Acute on chronic clinical systolic heart failure ([address] [I50.23]  Pneumonia due to infectious organism, unspecified laterality, unspecified part of lung [J18.9]   PMH:  has a past medical history of Arthritis, Asthma, Atrial fibrillation ([address] Blood circulation, collateral, CAD (coronary artery disease), CHF (congestive heart failure) ([address] Chronic kidney disease, stage III (moderate) ([address] COPD (chronic obstructive pulmonary disease) ([address] Depression, Diabetes mellitus ([address] Diabetic hypertension-nephrosis syndrome ([address] Dysuria, GERD (gastroesophageal reflux disease), Hyperlipidemia, Hypertension, Kidney disease, and Unspecified cerebral artery occlusion with cerebral infarction. Procedure: N/A  Barriers to Discharge:  Patient presents from SNF due to shortness of breath. She wears oxygen at 2L baseline. WBC 12.1, H/H 7.9/25.2. Troponin (1) 0.198, (2) 0.177, procalcitonin 5.3. Pulmonology and ID consults, Eliquis, IV Zithromax, Rocephin, Lantus, prn medications, Potassium replacement protocol, no caffeine diet, bedrest, continuous pulse-oximetry, telemetry, daily weights. PCP: Roverto Yeboah DO  Readmission Risk Score: 26.7 ( )%    Patient Goals/Plan/Treatment Preferences: Humberto Real is from [employer]. She will return at discharge per . Transportation/Food Security/Housekeeping Addressed:  No issues identified.

## 2021-12-20 NOTE — DISCHARGE INSTR - COC
Continuity of Care Form    Patient Name: Goldy Bonner   :  1940  MRN:  [id number]    Admit date:  2021  Discharge date:  2021    Code Status Order: Full Code   Advance Directives:      Admitting Physician:  Dari Gilbert MD  PCP: Brianne Garrido Rd DO    Discharging Nurse: SHELL LEE JR. [facility] Unit/Room#: 72929 [facility]  Discharging Unit Phone Number: [phone]    Emergency Contact:   Extended Emergency Contact Information  Primary Emergency Contact: Peng Stewart  Address: [address] [place] [address], [address] [place] [address] [address]  Phone: [phone]  Mobile Phone: [phone]  Relation: Child   needed? No  Secondary Emergency Contact: JUAN RAMON Hawkins  Address: [address] [place] [address] [address]  Phone: [phone]  Mobile Phone: [phone]  Relation: Child   needed?  No    Past Surgical History:  Past Surgical History:   Procedure Laterality Date    APPENDECTOMY      BACK SURGERY      lumbar spine    BLADDER SUSPENSION      CARDIAC CATHETERIZATION  2009    with stent  [facility]    CARPAL TUNNEL RELEASE      COLONOSCOPY  2013    ENDOSCOPY, COLON, DIAGNOSTIC      ESOPHAGEAL DILATATION      HYSTERECTOMY  1974    JOINT REPLACEMENT Left 1992    Left Knee    UPPER GASTROINTESTINAL ENDOSCOPY N/A 2021    EGD DILATION performed by Umesh Baldwin MD at [facility] Left 2021    EGD BIOPSY performed by Umesh Baldwin MD at [address] [facility]       Immunization History:   Immunization History   Administered Date(s) Administered    COVID-19, Pfizer, PF, 30mcg/0.3mL 2021, 2021    Influenza Virus Vaccine 10/04/2012       Active Problems:  Patient Active Problem List   Diagnosis Code    Syncope R55    Diabetes mellitus ([facility] E11.9    CAD (coronary artery disease) I25.10    Hypertension I10    Atrial fibrillation (HCC) I48.91    GERD (gastroesophageal reflux disease) K21.9    Chronic kidney disease, stage III (moderate) ([facility]) N18.30    COPD (chronic obstructive pulmonary disease) ([facility]) J44.9    Dysuria R30.0    Diabetic hypertension-nephrosis syndrome ([facility]) E11.21    Hypokalemia E87.6    Abnormal nuclear cardiac imaging test R93.1    Back pain, chronic M54.9, G89.29    Obesity (BMI 35.0-39.9 without comorbidity) E66.9    H/O class III angina pectoris Z86.79    HTN, goal below 130/80 I10    Non-rheumatic mitral regurgitation I34.0    Abnormal nuclear stress test R94.39    Osteopenia of multiple sites M85.89    Seronegative rheumatoid arthritis ([facility] M06.00    Acute respiratory failure ([facility]) J96.00    Diverticulitis K57.92    Diverticulitis of colon K57.32    Multiple falls R29.6    EMMA (acute kidney injury) ([facility] N17.9    Urinary tract infection in female N39.0    Acute diverticulitis K57.92    Rectal bleeding K62.5    Acute on chronic clinical systolic heart failure ([facility]) I50.23       Isolation/Infection:   Isolation            No Isolation          Patient Infection Status       Infection Onset Added Last Indicated Last Indicated By Review Planned Expiration Resolved Resolved By    None active    Resolved    COVID-19 (Rule Out) 12/19/21 12/19/21 12/19/21 SARS-CoV-2 NAAT (Rapid) (Ordered)   12/19/21 Rule-Out Test Resulted    COVID-19 (Rule Out) 11/18/21 11/18/21 11/18/21 COVID-19, Rapid (Ordered)   11/18/21 Rule-Out Test Resulted    COVID-19 (Rule Out) 11/06/21 11/06/21 11/06/21 COVID-19, Rapid (Ordered)   11/06/21 Rule-Out Test Resulted    COVID-19 (Rule Out) 11/11/20 11/11/20 11/12/20 COVID-19 (Ordered)   11/12/20 Rule-Out Test Resulted    COVID-19 (Rule Out) 11/05/20 11/05/20 11/06/20 COVID-19 (Ordered)   11/06/20 Rule-Out Test Resulted    COVID-19 (Rule Out) 09/28/20 09/28/20 09/28/20 COVID-19 (Ordered)   09/29/20 Rule-Out Test Resulted    COVID-19 (Rule Out) 08/08/20 08/08/20 08/08/20 COVID-19 (Ordered)   08/08/20 Rule-Out Test Resulted            Nurse Assessment:  Last Vital Signs: /64   Pulse 80   Temp 97.4 °F (36.3 °C) (Oral)   Resp 22   Ht 5' 5\" (1.651 m)   Wt 195 lb 12.3 oz (88.8 kg)   SpO2 98%   BMI 32.58 kg/m²     Last documented pain score (0-10 scale): Pain Level: 8  Last Weight:   Wt Readings from Last 1 Encounters:   12/20/21 195 lb 12.3 oz (88.8 kg)     Mental Status:  oriented, alert, coherent, thought processes intact, and able to concentrate and follow conversation    IV Access:  - None    Nursing Mobility/ADLs:  Walking   Dependent  Transfer  Dependent  Bathing  Assisted  Dressing  Assisted  Toileting  Dependent  Feeding  Independent  Med Admin  Assisted  Med Delivery   whole in applesauce    Wound Care Documentation and Therapy:        Elimination:  Continence: Bowel: No  Bladder: No  Urinary Catheter: None   Colostomy/Ileostomy/Ileal Conduit: No       Date of Last BM: 12/28/2021    Intake/Output Summary (Last 24 hours) at 12/20/2021 1202  Last data filed at 12/19/2021 2230  Gross per 24 hour   Intake --   Output 500 ml   Net -500 ml     I/O last 3 completed shifts: In: 47.4 [IV Piggyback:47.4]  Out: 500 [Urine:500]    Safety Concerns:     Aspiration Risk    Impairments/Disabilities:      None    Nutrition Therapy:  Current Nutrition Therapy:   - Oral Diet:  Dysphagia 2 mechanically altered - soft and bite size    Routes of Feeding: Oral  Liquids: Thin Liquids  Daily Fluid Restriction: no  Last Modified Barium Swallow with Video (Video Swallowing Test): done on 12/21    Treatments at the Time of Hospital Discharge:   Respiratory Treatments: ***  Oxygen Therapy:  is on oxygen at 2-3 L/min per nasal cannula.   Ventilator:    - No ventilator support    Rehab Therapies: Physical Therapy and Occupational Therapy  Weight Bearing Status/Restrictions: No weight bearing restirctions  Other Medical Equipment (for information only, NOT a DME order):  wheelchair  Other Treatments: N/A    Patient's personal belongings (please select all that are sent with patient):  Clothing    RN SIGNATURE:  Electronically signed by Gamaliel Sanchez RN on 21 at 9:55 AM EST    CASE MANAGEMENT/SOCIAL WORK SECTION    Inpatient Status Date: 21    Readmission Risk Assessment Score:  Readmission Risk              Risk of Unplanned Readmission:  37           Discharging to Facility/ Agency   Name:  [id number] [facility]  Address:  [address] [place] [address],[address] [facility], [facility]  Phone:  [phone]  Fax:    Dialysis Facility (if applicable)   Name:   Address:  Dialysis Schedule:  Phone:  Fax:    / signature: Electronically signed by KASEY Leary, JENNIFERW on 21 at 12:03 PM EST    PHYSICIAN SECTION    Prognosis: {Prognosis:3908503856}    Condition at Discharge: [facility] Patient Condition:[id number]}    Rehab Potential (if transferring to Rehab): {Prognosis:4662819930}    Recommended Labs or Other Treatments After Discharge: ***    Physician Certification: I certify the above information and transfer of Crystal Ross  is necessary for the continuing treatment of the diagnosis listed and that she requires {Admit to Appropriate Level of Care:94519} for {GREATER/LESS:093344489} 30 days.      Update Admission H&P: {CHP DME Changes in HPEW}    PHYSICIAN SIGNATURE:  {Esignature:[id number]}Isabel Garcia MD

## 2021-12-20 NOTE — CARE COORDINATION
12/20/21 1344   Readmission Assessment   Number of Days since last admission? 8-30 days   Previous Disposition SNF   Who is being Interviewed Patient   What was the patient's/caregiver's perception as to why they think they needed to return back to the hospital?   (shortness of breath)   Did you visit your Primary Care Physician after you left the hospital, before you returned this time? Yes   Did you see a specialist, such as Cardiac, Pulmonary, Orthopedic Physician, etc. after you left the hospital? No   Who advised the patient to return to the hospital? Skilled Unit   Does the patient report anything that got in the way of taking their medications? No   In our efforts to provide the best possible care to you and others like you, can you think of anything that we could have done to help you after you left the hospital the first time, so that you might not have needed to return so soon?  Other (Comment)  (Patient will return to SNF)

## 2021-12-21 ENCOUNTER — APPOINTMENT (OUTPATIENT)
Dept: GENERAL RADIOLOGY | Age: 81
DRG: 177 | End: 2021-12-21
Payer: MEDICARE

## 2021-12-21 LAB
BLOOD CULTURE, ROUTINE: ABNORMAL
BLOOD CULTURE, ROUTINE: ABNORMAL
EKG ATRIAL RATE: 87 BPM
EKG P AXIS: 72 DEGREES
EKG P-R INTERVAL: 188 MS
EKG Q-T INTERVAL: 492 MS
EKG QRS DURATION: 132 MS
EKG QTC CALCULATION (BAZETT): 592 MS
EKG R AXIS: -17 DEGREES
EKG T AXIS: -169 DEGREES
EKG VENTRICULAR RATE: 87 BPM
GLUCOSE BLD-MCNC: 140 MG/DL (ref 70–108)
LV EF: 43 %
LVEF MODALITY: NORMAL
ORGANISM: ABNORMAL

## 2021-12-21 PROCEDURE — 2500000003 HC RX 250 WO HCPCS: Performed by: INTERNAL MEDICINE

## 2021-12-21 PROCEDURE — 99233 SBSQ HOSP IP/OBS HIGH 50: CPT | Performed by: INTERNAL MEDICINE

## 2021-12-21 PROCEDURE — 74230 X-RAY XM SWLNG FUNCJ C+: CPT

## 2021-12-21 PROCEDURE — 92610 EVALUATE SWALLOWING FUNCTION: CPT

## 2021-12-21 PROCEDURE — 94640 AIRWAY INHALATION TREATMENT: CPT

## 2021-12-21 PROCEDURE — APPSS30 APP SPLIT SHARED TIME 16-30 MINUTES: Performed by: NURSE PRACTITIONER

## 2021-12-21 PROCEDURE — 6370000000 HC RX 637 (ALT 250 FOR IP): Performed by: INTERNAL MEDICINE

## 2021-12-21 PROCEDURE — 6360000002 HC RX W HCPCS: Performed by: INTERNAL MEDICINE

## 2021-12-21 PROCEDURE — 92526 ORAL FUNCTION THERAPY: CPT

## 2021-12-21 PROCEDURE — 1200000003 HC TELEMETRY R&B

## 2021-12-21 PROCEDURE — 2580000003 HC RX 258

## 2021-12-21 PROCEDURE — 94761 N-INVAS EAR/PLS OXIMETRY MLT: CPT

## 2021-12-21 PROCEDURE — 82948 REAGENT STRIP/BLOOD GLUCOSE: CPT

## 2021-12-21 PROCEDURE — 6370000000 HC RX 637 (ALT 250 FOR IP): Performed by: EMERGENCY MEDICINE

## 2021-12-21 PROCEDURE — 92611 MOTION FLUOROSCOPY/SWALLOW: CPT

## 2021-12-21 PROCEDURE — 6360000002 HC RX W HCPCS

## 2021-12-21 PROCEDURE — 2580000003 HC RX 258: Performed by: INTERNAL MEDICINE

## 2021-12-21 PROCEDURE — 93306 TTE W/DOPPLER COMPLETE: CPT

## 2021-12-21 RX ADMIN — ANTACID TABLETS 500 MG: 500 TABLET, CHEWABLE ORAL at 22:06

## 2021-12-21 RX ADMIN — FENOFIBRATE 160 MG: 160 TABLET ORAL at 08:33

## 2021-12-21 RX ADMIN — BUDESONIDE AND FORMOTEROL FUMARATE DIHYDRATE 2 PUFF: 160; 4.5 AEROSOL RESPIRATORY (INHALATION) at 17:45

## 2021-12-21 RX ADMIN — RANOLAZINE 500 MG: 500 TABLET, FILM COATED, EXTENDED RELEASE ORAL at 22:06

## 2021-12-21 RX ADMIN — ALBUTEROL SULFATE 2.5 MG: 2.5 SOLUTION RESPIRATORY (INHALATION) at 16:26

## 2021-12-21 RX ADMIN — ISOSORBIDE MONONITRATE 60 MG: 60 TABLET, EXTENDED RELEASE ORAL at 08:34

## 2021-12-21 RX ADMIN — BUMETANIDE 1 MG: 0.25 INJECTION, SOLUTION INTRAMUSCULAR; INTRAVENOUS at 22:06

## 2021-12-21 RX ADMIN — GABAPENTIN 100 MG: 100 CAPSULE ORAL at 17:50

## 2021-12-21 RX ADMIN — DULOXETINE HYDROCHLORIDE 30 MG: 30 CAPSULE, DELAYED RELEASE ORAL at 08:34

## 2021-12-21 RX ADMIN — CLONIDINE HYDROCHLORIDE 0.1 MG: 0.1 TABLET ORAL at 22:06

## 2021-12-21 RX ADMIN — INSULIN GLARGINE 8 UNITS: 100 INJECTION, SOLUTION SUBCUTANEOUS at 21:46

## 2021-12-21 RX ADMIN — TRAZODONE HYDROCHLORIDE 100 MG: 100 TABLET ORAL at 22:05

## 2021-12-21 RX ADMIN — ANTACID TABLETS 500 MG: 500 TABLET, CHEWABLE ORAL at 08:34

## 2021-12-21 RX ADMIN — RANOLAZINE 500 MG: 500 TABLET, FILM COATED, EXTENDED RELEASE ORAL at 08:34

## 2021-12-21 RX ADMIN — DOCUSATE SODIUM 100 MG: 100 CAPSULE ORAL at 08:38

## 2021-12-21 RX ADMIN — DULOXETINE HYDROCHLORIDE 30 MG: 30 CAPSULE, DELAYED RELEASE ORAL at 22:06

## 2021-12-21 RX ADMIN — AZITHROMYCIN DIHYDRATE 500 MG: 500 INJECTION, POWDER, LYOPHILIZED, FOR SOLUTION INTRAVENOUS at 09:06

## 2021-12-21 RX ADMIN — HYDROXYCHLOROQUINE SULFATE 200 MG: 200 TABLET ORAL at 08:34

## 2021-12-21 RX ADMIN — ASPIRIN 81 MG CHEWABLE TABLET 81 MG: 81 TABLET CHEWABLE at 08:35

## 2021-12-21 RX ADMIN — ALBUTEROL SULFATE 2.5 MG: 2.5 SOLUTION RESPIRATORY (INHALATION) at 12:58

## 2021-12-21 RX ADMIN — AZATHIOPRINE 100 MG: 50 TABLET ORAL at 08:34

## 2021-12-21 RX ADMIN — Medication 400 MG: at 08:34

## 2021-12-21 RX ADMIN — BUDESONIDE AND FORMOTEROL FUMARATE DIHYDRATE 2 PUFF: 160; 4.5 AEROSOL RESPIRATORY (INHALATION) at 08:10

## 2021-12-21 RX ADMIN — PIPERACILLIN AND TAZOBACTAM 3375 MG: 3; .375 INJECTION, POWDER, LYOPHILIZED, FOR SOLUTION INTRAVENOUS at 17:52

## 2021-12-21 RX ADMIN — GABAPENTIN 100 MG: 100 CAPSULE ORAL at 22:07

## 2021-12-21 RX ADMIN — BARIUM SULFATE 50 ML: 0.81 POWDER, FOR SUSPENSION ORAL at 11:14

## 2021-12-21 RX ADMIN — CLONIDINE HYDROCHLORIDE 0.1 MG: 0.1 TABLET ORAL at 08:34

## 2021-12-21 RX ADMIN — BUMETANIDE 1 MG: 0.25 INJECTION, SOLUTION INTRAMUSCULAR; INTRAVENOUS at 08:35

## 2021-12-21 RX ADMIN — BARIUM SULFATE 20 ML: 400 PASTE ORAL at 11:14

## 2021-12-21 RX ADMIN — ALBUTEROL SULFATE 2.5 MG: 2.5 SOLUTION RESPIRATORY (INHALATION) at 22:02

## 2021-12-21 RX ADMIN — MONTELUKAST SODIUM 5 MG: 5 TABLET, CHEWABLE ORAL at 22:05

## 2021-12-21 RX ADMIN — GABAPENTIN 100 MG: 100 CAPSULE ORAL at 08:34

## 2021-12-21 RX ADMIN — OXYCODONE AND ACETAMINOPHEN 1 TABLET: 5; 325 TABLET ORAL at 08:33

## 2021-12-21 RX ADMIN — PREDNISONE 5 MG: 10 TABLET ORAL at 08:33

## 2021-12-21 RX ADMIN — OXYCODONE AND ACETAMINOPHEN 1 TABLET: 5; 325 TABLET ORAL at 17:50

## 2021-12-21 RX ADMIN — ATORVASTATIN CALCIUM 80 MG: 80 TABLET, FILM COATED ORAL at 22:06

## 2021-12-21 RX ADMIN — APIXABAN 5 MG: 5 TABLET, FILM COATED ORAL at 08:34

## 2021-12-21 RX ADMIN — DOCUSATE SODIUM 100 MG: 100 CAPSULE ORAL at 22:05

## 2021-12-21 RX ADMIN — PANTOPRAZOLE SODIUM 40 MG: 40 TABLET, DELAYED RELEASE ORAL at 04:42

## 2021-12-21 RX ADMIN — TIOTROPIUM BROMIDE INHALATION SPRAY 2 PUFF: 3.12 SPRAY, METERED RESPIRATORY (INHALATION) at 08:10

## 2021-12-21 RX ADMIN — Medication 400 MG: at 22:05

## 2021-12-21 RX ADMIN — LOSARTAN POTASSIUM 50 MG: 50 TABLET, FILM COATED ORAL at 08:34

## 2021-12-21 RX ADMIN — APIXABAN 5 MG: 5 TABLET, FILM COATED ORAL at 22:05

## 2021-12-21 RX ADMIN — ALBUTEROL SULFATE 2.5 MG: 2.5 SOLUTION RESPIRATORY (INHALATION) at 08:10

## 2021-12-21 RX ADMIN — METOPROLOL SUCCINATE 50 MG: 50 TABLET, FILM COATED, EXTENDED RELEASE ORAL at 08:34

## 2021-12-21 RX ADMIN — PIPERACILLIN AND TAZOBACTAM 3375 MG: 3; .375 INJECTION, POWDER, LYOPHILIZED, FOR SOLUTION INTRAVENOUS at 01:56

## 2021-12-21 RX ADMIN — OXYCODONE AND ACETAMINOPHEN 1 TABLET: 5; 325 TABLET ORAL at 22:05

## 2021-12-21 ASSESSMENT — PAIN SCALES - GENERAL
PAINLEVEL_OUTOF10: 1
PAINLEVEL_OUTOF10: 5
PAINLEVEL_OUTOF10: 5
PAINLEVEL_OUTOF10: 0

## 2021-12-21 NOTE — H&P
[address] [place] [address]                              HISTORY AND PHYSICAL    PATIENT NAME: Di Gaytan                    :        1940  MED REC NO:   [id number]                           ROOM:       1760  ACCOUNT NO:   [de-identified]                           ADMIT DATE: 2021  PROVIDER:     Chavo Chu. Jalil Early M.D. REASON FOR ADMISSION:  Shortness of breath. HISTORY OF PRESENT ILLNESS:  This is an 78-year-old lady admitted to the  hospital through the emergency room. The patient came to the emergency  room with a shortness of breath. She had history of PE over a month  ago. She has been on the Eliquis. She also has possible bilateral  pneumonia, possible aspiration pneumonia. She seems also to have an  acute exacerbation, chronic diastolic dysfunction of the left ventricle. The patient was seen by the ER physician, was placed on the CPAP. The  patient's procalcitonin was elevated around 10. The patient also did receive IV Lasix by the ER physician. She has  history of coronary artery disease. She had history of total occlusion  of the RCA with the left to right collaterals. The patient denied PND, orthopnea. REVIEW OF SYSTEMS:  The patient had history of coronary artery disease,  prior MI as mentioned. The patient does have history of atrial  fibrillation, history of PE. The patient had history of diastolic  congestive heart failure. Does have history of COPD, history of  diabetes mellitus. The patient has history of depression, history of  back pain, and history of CVA in the past.  The patient has history of  chronic anemia; she was seen by TRESSA De La Cruz. She has a history of  arthritis. She is somewhat overweight. PAST SURGICAL HISTORY:  She had history of back surgery, bladder  suspension surgery.   She had a history of heart catheterization, history  of endoscopies, and appendectomy. SOCIAL HISTORY:  She denied smoking. She is a former smoker. Denied  alcohol abuse. ALLERGIES:  THE PATIENT HAS INTOLERANCE TO MULTIPLE MEDICATIONS  INCLUDING SULFA AND TAPE. CODE STATUS:  She is a full code. PHYSICAL EXAMINATION:  VITAL SIGNS:  Showed her blood pressure is 120/80. She was in sinus  rhythm. The patient was afebrile. Respiratory rate was 24. NEUROLOGIC:  The patient has no focal neurological deficits. NECK:  She had a positive JVD. She had no carotid bruit. There were no  focal neurological deficits. She had no thyromegaly. HEART:  No chest wall tenderness. She has diffuse crepitations. The  patient has 2/6 systolic murmur. ABDOMEN:  Soft. EXTREMITIES:  Lower limbs, trace of the ankle edema. LABORATORY DATA:  The patient's lab work showed her BUN 21, creatinine  1.1, potassium was 4.8. Her blood sugar is 165, the patient's BNP is  over 19,000. Troponins are chronically elevated. The cholesterol was  119. Hemoglobin 9.4, hematocrit 29. Her white blood count was 20,000. Her procalcitonin was 10.74. The patient's EKG showed sinus rhythm, right bundle-branch block, T-wave  inversion, and _____ of possible pulmonary hypertension. IMPRESSION:  1. The patient with respiratory failure, and she had a placement of  CPAP. 2.  Possible bilateral pneumonia. 3.  Recent PE.  4.  History of coronary artery disease, total occlusion in the RCA. 5.  History of paroxysmal atrial fib. 6.  History of diabetes mellitus. 7.  History of diastolic congestive heart failure. 8.  History of pulmonary hypertension. 9.  History of back surgery and chronic back pain syndrome. PLAN:  The patient admitted to the medical floor. The patient was given  a small dose of diuretics. She was placed on IV antibiotics. Consultation with ID service was requested. Consultation with Pulmonary  service was requested.   We will obtain an echo,  place the patient on a  sliding scale, and continue home medication. Further recommendations  pending the results of the above tests, hospitalization course.         Mary Anne Rdz M.D.    D: 12/21/2021 7:29:46       T: 12/21/2021 10:22:31     [email or web address]  Job#: [id number]     Doc#: [id number]    CC:

## 2021-12-21 NOTE — FLOWSHEET NOTE
12/21/21 1223   Encounter Summary   Services provided to: Patient   Referral/Consult From: Lydia   Continue Visiting Yes  (12/21 )   Complexity of Encounter Low   Length of Encounter 15 minutes   Routine   Type Initial   Assessment Coping   Intervention Nurtured hope   During my contact with the 78 yr old patient, she had been admitted due to chronic heart failure and shortness of breath. I offered prayer and words of hope. No family members were present. Plan: Continued spiritual support would be helpful to the pt and her family.

## 2021-12-21 NOTE — PROGRESS NOTES
[facility] Palliative Care           Progress Note    Patient Name:  Krissy Bush  Medical Record Number:  [id number]  YOB: 1940    Date:  12/21/2021  Time:  1:56 PM  Completed By:  Johnny Lerma RN, RN    Reason for Palliative Care Evaluation:  Code status    Current Issues:  []  Pain  []  Fatigue  []  Nausea  []  Anxiety  []  Depression  [x]  Shortness of Breath  []  Constipation  []  Appetite  []  Other:    Advance Directives  [] [facility] DNR Form  [] Living Will  [] Medical POA    Current Code Status  [x] Full Resuscitation  [] DNR-Comfort Care-Arrest  [] DNR-Comfort Care  [] Limited   [] No CPR   [] No shock   [] No ET intubation/reintubation   [] No resuscitative medications   [] Other limitation:    Performance Status:  Palliative Performance Scale:  ___70%  Ambulation reduced; Some disease; Can't do normal job or work; intake normal or reduced; can do full self care; LOC full  _X_60%  Ambulation reduced; Significant disease; Can't do hobbies/housework; intake normal or reduced; occasional assist; LOC full/confusion  ___50%  Mainly sit/lie; Extensive disease; Can't do any work; Considerable assist; intake normal or reduced; LOC full/confusion  ___40%  Mainly in bed; Extensive disease; Mainly assist; intake normal or reduced; LOC full/confusion   ___30%  Bed Bound; Extensive disease; Total care; intake reduced; LOC full/confusion  ___20%  Bed Bound; Extensive disease; Total care; intake minimal; Drowsy/coma  ___10%  Bed Bound; Extensive disease; Total care; Mouth care only; Drowsy/coma  ___0       Death      Family/Patient Discussion:  Received consult for code status conversation. In room to speak with patient. Patient resting in bed, eating lunch. Asked patient about current admission. Patient stated she was not feeling well and her son made her come to the hospital. Asked patient about code status.  Educated patient on FULL code including chest compressions with possible rib fx and organ damage, intubation, defibrillation, and resuscitative medications. Educated patient on possible outcomes of situation including possible decreased quality of life. Patient acknowledged this, stated \"well only try if you think I would have a change\". Informed patient that it would be unknown what the outcome would be. Patient stated, \"well try I guess\". Informed family that it would be very likely she would not be able to make her own decisions if she were on the ventilator. Asked patient if she had any ACP documents. Patient stated yes, and she had conversations with her son regarding her wishes. Encouraged patient to continue conversations with family to ensure they would know her wishes, patient agreed. Again asked patient if she would want to remain a FULL code at this time, patient agreed. Patient denied further questions at this time. Plan/Follow-Up:  Remain FULL code. Will continue to follow PRN, please do not hesitate to call if needs arise.      Lucio Canales, RN, RN  12/21/2021,  1:56 PM

## 2021-12-21 NOTE — PROGRESS NOTES
[facility]  SPEECH THERAPY  STRZ ONC MED 5K  Modified Barium Swallow & Treatment     SLP Individual Minutes  Time In:   Time Out:   Minutes: 17  Timed Code Treatment Minutes: 0 Minutes      MBS: 9 minutes  Sw Tx: 8 minutes     Date: 2021  Patient Name: Gale Marin      CSN: [id number]   : 1940  (78 y.o.)  Gender: female   Referring Physician:  Dennis Mojica MD  Diagnosis: troponin level elevated   Secondary Diagnosis: dysphagia   Precautions: aspiration precautions   History of Present Illness/Injury: Per chart review; Mehreen Vela a 78 y. o. female who presents to the emergency department for evaluation of shortness of breath and new oxygen requirement.  Patient transferred from nursing home for nursing shortness of breath and low oxygen saturation requiring additional oxygen. Shay Ha is limited information available and patient is not aware of her symptoms, states he feels well. Pt referred for MBS following CSE d/t overt s/s of aspiration/penetration during CSE.     has a past medical history of Arthritis, Asthma, Atrial fibrillation (HCC), Blood circulation, collateral, CAD (coronary artery disease), CHF (congestive heart failure) ([address] Chronic kidney disease, stage III (moderate) ([address] COPD (chronic obstructive pulmonary disease) ([address] Depression, Diabetes mellitus ([address] Diabetic hypertension-nephrosis syndrome ([address] Dysuria, GERD (gastroesophageal reflux disease), Hyperlipidemia, Hypertension, Kidney disease, and Unspecified cerebral artery occlusion with cerebral infarction. Current Diet: Npo until MBS     Pain: No pain reported. SUBJECTIVE:  Patient seen upright in bed for MBS. Pt is alert, oriented, and pleasantly agreeable for MBS    OBJECTIVE:.  No family present     Respiratory Status:  Independent    Behavioral Observation:  Alert and Oriented    PATIENT WAS EVALUATED USING:  BARIUM: thin via tsp, cup,s traw, consecutive, puree, minced, mixed, and advanced solids    ORAL PREPARATION PHASE:  Impaired:  Slow Mastication    ORAL PHASE: Slow AP Movement     ORAL PHASE SHIN SCORE: (Dysphagia outcome and severity scale)  6 = WFL/Modified Independent - Normal Diet - May have mild oral delay    PHARYNGEAL PHASE:  Impaired: Decreased Epiglottic Inversion, Decreased Hyolaryngeal Elevation and Decreased Tongue Based Retraction     PHARYNGEAL PHASE SHIN SCORE: (Dysphagia outcome and severity scale)  5 = Mild Dysphagia - may need one consistency restricted - May have one or more of the following: Aspiration with thin - cough to clear, Airway penetration midway to the vocal cords with one or more consistency or to the vocal folds with one consistency, but clears spontaneously - Residue in the pharynx clears spontaneously    EVIDENCE FOR LARYNGEAL PENETRATION AND/OR ASPIRATION:  No evidence of aspiration  Laryngeal penetration evident with flash penetration with thin liquids     PENETRATION-ASPIRATION SCALE (PAS): Thin Liquids: 2 = Material enters the airway, remains above vocal folds, and is ejected from the airway  Puree:  1 = Material does not enter the airway  Soft Solid:  1 = Material does not enter the airway  Hard Solid: 1 = Material does not enter the airway    ESOPHAGEAL PHASE:   No significant findings    ATTEMPTED TECHNIQUES:  Small Bolus Size Effective    Straw Effective    Cup Effective    Chin Tuck Not Attempted    Head Turn Not Attempted    Spoon Presentations Effective    Volitional Cough Not Attempted    Spontaneous Cough Ineffective *spontaneous persistent coughing            DIAGNOSTIC IMPRESSIONS:  Completed MBS to further assess swallow function. Pt presented with Mild oral dysphagia / Very mild pharyngeal dysphagia as outlined above. The pt presented with slow bolus formation, mastication and textural breakdown of hard dry solids, requiring additional time and liquids for AP transit to clear advanced textures.  Once bolus reaches the BOT the pt demonstrated consistent diminished BOT retraction, reduced hyoid excursion // resulting in slow diminished epiglottic inversion and consistent FLASH laryngeal penetration of thin liquids during the swallow. No tracheal aspiration documented during MBS. Recommended diet in initiation of soft & bite size solids, thin liquids, with close SUP and pulmonary monitoring as baseline cough is unrelated to airway invasion, therefore NOT a good way to assess/monitor swallow function at the bedside. **Education: Following completion of Modified Barium Swallow evaluation, transitioned to skilled service provision for duration of 8 minutes with direct verbal education and use of ERNESTINA unit provided re: anatomy/physiology of swallow mechanism, rationale for soft diet, thin liquids, and NO consecutive drinks recommendation d/t evidence of worsening laryngeal penetration and decline in O2 when pt is holding her breath during consecutive swallows. ST reviewed potential implications (pneumonia, recurrent/prolonged hospitalizations, medical decline) if further PO intake was desired to be consumed and/or non-compliance with recommendations/diet modifications. Patient would also benefit from further education re: needs to engage in comprehensive oral care in attempts to reduce potential bacteria colonization and to maximize oral integrity as well as to engage in ambulation with staffing (as able) to assist with mobility and overall pulmonary health. *Verbal receptiveness noted. *Will continue dysphagia education and skilled dysphagia tx within future sessions.        Diet Recommendations:  Soft & bite size solids, thin liquids   Strategies:  Full Upright Position, Small Bite/Sip, Medications Whole with Thin, Medications Whole with Puree, Alternate Solids and Liquids, Limit Distractions and Monitor for Fatigue   Rehabilitation Potential: good    EDUCATION:  Learner: Patient  Education:  Reviewed results and recommendations of this evaluation, Reviewed diet and strategies, Reviewed ST goals and Plan of Care and Reviewed recommendations for follow-up  Evaluation of Education: Verbalizes understanding    PLAN:  Skilled SLP intervention on acute care 3-5 x per week or until goals met and/or pt plateaus in function. Specific interventions for next session may include: dietary analysis . PATIENT GOAL:    Return to least restrictive diet. SHORT TERM GOALS:  Short-term Goals  Timeframe for Short-term Goals: 2 weeks  Goal 1: Patient will safely consume soft & bite size solids, thin liquids with no overt s/s of aspiration/penetration to demosntrate ability to meet nutritional/hydrational needs  Goal 2: Patient will complete advanced PO trials with ST only with timely oral phase and/or no overt s/s of aspiration/penetration to demosntrate readiness for a diet upgrade  Goal 3: PT will demosntrate understanding and teachback of safe swallowings trategies/precautions with 80% accuracy givne MIN A to improve awareness of swallow function and improve airway protection. LONG TERM GOALS:  No LTM Goals recommended, due to anticipated short ELOS. Miya Ramsey MA., Anette Crowell / [id number]

## 2021-12-21 NOTE — PROGRESS NOTES
[facility]      Patient - Nando Foster   MRN -  [id number]   Acct # - [de-identified]   - 1940      Date of Admission -  2021  5:40 AM  Date of evaluation -  2021  Room - Lucien Cheney MD Primary Care Physician - Rigo Yeboah DO     Problem List      Active Hospital Problems    Diagnosis Date Noted    Pneumonia due to infectious organism [J18.9]     Acute on chronic clinical systolic heart failure [facility]) [I50.23] 2021     Reason for Consult    Acute hypoxic respiratory failure   HPI   History Obtained From: Patient and electronic medical record. Nando Foster is a 78 y.o. female who presents for undifferentiated shortness of breath and a new oxygen requirement from nursing home. 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 Pt is an unreliable historian but this is her baseline.      She is having shortness of breath: No, unreliable    She is having cough: No, unreliable    She is having chest pain:No, unreliable      Past 24 hrs   -on RA  -afebrile   -reports mild cough  All other systems reviewed      PMHx   Past Medical History      Diagnosis Date    Arthritis     Asthma     Atrial fibrillation ([address]     Blood circulation, collateral     CAD (coronary artery disease)     CHF (congestive heart failure) (HCC)     Chronic kidney disease, stage III (moderate) ([facility])     COPD (chronic obstructive pulmonary disease) ([address]     Depression     Diabetes mellitus [facility]     Diabetic hypertension-nephrosis syndrome (HCC)     Dysuria     GERD (gastroesophageal reflux disease)     Hyperlipidemia     Hypertension     Kidney disease     stage 3 dr Mckeon [facility] Unspecified cerebral artery occlusion with cerebral infarction 1999    Dr Audrey Garza informed patient after Heart Attack      Past Surgical History        Procedure Laterality Date    APPENDECTOMY      BACK SURGERY      lumbar spine    BLADDER SUSPENSION      CARDIAC CATHETERIZATION  2009    with stent  [facility]    CARPAL TUNNEL RELEASE      COLONOSCOPY  2013    ENDOSCOPY, COLON, DIAGNOSTIC      ESOPHAGEAL DILATATION      HYSTERECTOMY  1974    JOINT REPLACEMENT Left 1992    Left Knee    UPPER GASTROINTESTINAL ENDOSCOPY N/A 9/28/2021    EGD DILATION performed by Ferdie Gilford, MD [facility]    UPPER GASTROINTESTINAL ENDOSCOPY Left 9/28/2021    EGD BIOPSY performed by Ferdie Gilford, MD at [facility]     Meds    Current Medications    piperacillin-tazobactam  3,375 mg IntraVENous Q8H    bumetanide  1 mg IntraVENous BID    albuterol  2.5 mg Nebulization 4x daily    sodium chloride flush  5-40 mL IntraVENous 2 times per day    aspirin  81 mg Oral Daily    atorvastatin  80 mg Oral Nightly    apixaban  5 mg Oral BID    azaTHIOprine  100 mg Oral Daily    calcium carbonate  1 tablet Oral BID    cloNIDine  0.1 mg Oral BID    pantoprazole  40 mg Oral QAM AC    DULoxetine  30 mg Oral BID    docusate sodium  100 mg Oral BID    fenofibrate  160 mg Oral Daily    gabapentin  100 mg Oral TID    hydroxychloroquine  200 mg Oral Daily    insulin glargine  8 Units SubCUTAneous Nightly    traZODone  100 mg Oral Nightly    ranolazine  500 mg Oral BID    predniSONE  5 mg Oral Daily    oxyCODONE-acetaminophen  1 tablet Oral TID    montelukast  5 mg Oral Nightly    metoprolol succinate  50 mg Oral Daily    magnesium oxide  400 mg Oral BID    losartan  50 mg Oral Daily    isosorbide mononitrate  60 mg Oral Daily    budesonide-formoterol  2 puff Inhalation BID    tiotropium  2 puff Inhalation Daily     sodium chloride flush, sodium chloride, potassium chloride **OR** potassium alternative oral replacement **OR** potassium chloride, ondansetron **OR** ondansetron, acetaminophen **OR** acetaminophen, polyethylene glycol, nitroGLYCERIN, aluminum & magnesium hydroxide-simethicone, polyvinyl alcohol, magnesium hydroxide, loperamide  IV Drips/Infusions   sodium chloride 20 mL/hr at 12/19/21 2048    sodium chloride       Home Medications  Medications Prior to Admission: apixaban (ELIQUIS) 5 MG TABS tablet, Take 5 mg by mouth 2 times daily  bumetanide (BUMEX) 1 MG tablet, Take 0.5 tablets by mouth daily as needed (leg swelling, increase in daily weight > 3lbs, SOB)  magnesium hydroxide (MILK OF MAGNESIA) 400 MG/5ML suspension, Take 30 mLs by mouth daily as needed for Constipation  albuterol (PROVENTIL) 2.5 MG/0.5ML NEBU nebulizer solution, Take 2.5 mg by nebulization every 4-6 hours as needed for Wheezing or Shortness of Breath Do not take inhaler and nebulizer at the same time per Dr. Sarah Boggs   aluminum & magnesium hydroxide-simethicone (MYLANTA) 400-400-40 MG/5ML SUSP, Take 10 mLs by mouth every 6 hours as needed For acid reflux  Artificial Saliva (BIOTENE DRY MOUTH MOISTURIZING) SOLN liquid, Take by mouth as needed Dry mouth  montelukast (SINGULAIR) 5 MG chewable tablet, Take 5 mg by mouth nightly  metoprolol succinate (TOPROL XL) 50 MG extended release tablet, Take 50 mg by mouth daily  fluticasone-umeclidin-vilant (TRELEGY ELLIPTA) 100-62.5-25 MCG/INH AEPB, Inhale 1 puff into the lungs daily SOB  OXYGEN, Inhale into the lungs 2-4L to keep SpO2 above 92%.  PRN for SOB  calcium carbonate (TUMS) 500 MG chewable tablet, Take 1 tablet by mouth 2 times daily  fenofibrate micronized (LOFIBRA) 200 MG capsule, Take 200 mg by mouth daily   acetaminophen (TYLENOL) 500 MG tablet, Take 500 mg by mouth every 8 hours as needed for Pain 2-LORAINE) 0.3 MG/0.3ML SOAJ injection, Inject 0.3 mLs into the muscle once for 1 dose Use as directed for allergic reaction  nitroGLYCERIN (NITROSTAT) 0.4 MG SL tablet, Place 0.4 mg under the tongue every 5 minutes as needed for Chest pain   Diet    ADULT DIET; Dysphagia - Soft and Bite Sized  Allergies    Renflexis [infliximab], Strawberry (diagnostic), Nuts [peanut-containing drug products], Sulfa antibiotics, Tape [adhesive tape], Bactrim [sulfamethoxazole-trimethoprim], and Chocolate  Social History     Social History     Socioeconomic History    Marital status:      Spouse name: Not on file    Number of children: 2    Years of education: 8    Highest education level: Not on file   Occupational History    Not on file   Tobacco Use    Smoking status: Former Smoker     Packs/day: 0.15     Years: 10.00     Pack years: 1.50     Types: Cigarettes     Start date: 10/21/2013    Smokeless tobacco: Never Used   Vaping Use    Vaping Use: Never used   Substance and Sexual Activity    Alcohol use: No    Drug use: No    Sexual activity: Never   Other Topics Concern    Not on file   Social History Narrative    Not on file     Social Determinants of Health     Financial Resource Strain:     Difficulty of Paying Living Expenses: Not on file   Food Insecurity:     Worried About Running Out of Food in the Last Year: Not on file    Oz of Food in the Last Year: Not on file   Transportation Needs:     Lack of Transportation (Medical): Not on file    Lack of Transportation (Non-Medical):  Not on file   Physical Activity:     Days of Exercise per Week: Not on file    Minutes of Exercise per Session: Not on file   Stress:     Feeling of Stress : Not on file   Social Connections:     Frequency of Communication with Friends and Family: Not on file    Frequency of Social Gatherings with Friends and Family: Not on file    Attends [employer]ism Services: Not on file    Active Member of Clubs or Organizations: Not on file    Attends Club or Organization Meetings: Not on file    Marital Status: Not on file   Intimate Partner Violence:     Fear of Current or Ex-Partner: Not on file    Emotionally Abused: Not on file    Physically Abused: Not on file    Sexually Abused: Not on file   Housing Stability:     Unable to Pay for Housing in the Last Year: Not on file    Number of [place] in the Last Year: Not on file    Unstable Housing in the Last Year: Not on file     Family History          Problem Relation Age of Onset    Heart Disease Mother     Diabetes Mother     Kidney Disease Mother     Heart Disease Father     Diabetes Father     Cancer Sister     Stroke Brother     Cancer Sister      Sleep History    Never diagnosed with sleep apnea in the past.  Occupational history   Occupation:  She is current working: No  Type of profession: retired. History of tobacco smoking:Yes  Amount of tobacco smokin.15 PPD. Years of tobacco smoking: 15.                                    Quit smoking: Yes. Quit BGDP:5266   Current smoker: No.       Amount of current tobacco smokin PPD  . History of recreational or IV drug use in the past:NO     History of exposure to coal mines/coal dust: NO  History of exposure to foundry dust/welding: NO  History of exposure to quarry/silica/sandblasting: NO  History of exposure to asbestos/working with breaks/ships: NO  History of exposure to farm dust: NO  History of recent travel to long distances: NO  History of exposure to birds, pigeons, or chickens in the past:NO  Pet animals at home:No  Dogs: 0  Cats: 0    History of pulmonary embolism in the past: Yes            History of DVT in the past:Yes        [x] Right lower extremity   [x] Left lower extremity. Vitals     height is 5' 5\" (1.651 m) and weight is 194 lb 3.6 oz (88.1 kg). Her oral temperature is 98.1 °F (36.7 °C).  Her blood pressure is 167/79 (abnormal) and her pulse is 106. Her respiration is 20 and oxygen saturation is 88% (abnormal). Body mass index is 32.32 kg/m². SUPPLEMENTAL O2: O2 Flow Rate (L/min): 3 L/min     I/O        Intake/Output Summary (Last 24 hours) at 12/21/2021 1449  Last data filed at 12/21/2021 1330  Gross per 24 hour   Intake 1235.97 ml   Output 100 ml   Net 1135.97 ml     I/O last 3 completed shifts: In: 1058.7 [P.O.:480; I.V.:578.7]  Out: -    Patient Vitals for the past 96 hrs (Last 3 readings):   Weight   12/21/21 0315 194 lb 3.6 oz (88.1 kg)   12/20/21 0530 195 lb 12.3 oz (88.8 kg)   12/19/21 0545 194 lb (88 kg)       Exam   Physical Exam   Constitutional: No distress on RA. Patient appears moderately built. Head: Normocephalic and atraumatic. Mouth/Throat: Oropharynx is clear and moist.  No oral thrush. Eyes: Conjunctivae are normal. Pupils are equal, round. No scleral icterus. Neck: Neck supple. No tracheal deviation present. Cardiovascular: S1 and S2 with no murmur. No peripheral edema  Pulmonary/Chest: Normal effort with bilateral air entry, faint bibasilar rales. No stridor. No respiratory distress. Patient exhibits no tenderness. Abdominal: Soft. Bowel sounds audible. No distension or tenderness to palp.    Musculoskeletal: Moves all extremities  Neurological: Patient is alert and follows simple commands     Labs  - Old records and notes have been reviewed in [facility]   ABG  Lab Results   Component Value Date    PH 7.39 12/19/2021    PO2 105 12/19/2021    PCO2 48 12/19/2021    HCO3 29 12/19/2021    O2SAT 98 12/19/2021     Lab Results   Component Value Date    IFIO2 45 12/19/2021    SETPEEP 6.0 12/19/2021     CBC  Recent Labs     12/19/21  0600 12/20/21  0701 12/20/21  0832   WBC 20.5* 11.6* 12.1*   RBC 2.95* 2.50* 2.51*   HGB 9.4* 7.9* 7.9*   HCT 29.8* 25.3* 25.2*   .0* 101.2* 100.4*   MCH 31.9 31.6 31.5   MCHC 31.5* 31.2* 31.3*    331 341   MPV 9.4 9.5 9.6      BMP  Recent Labs     12/19/21  0600      K 4.8    CO2 24   BUN 21   CREATININE 1.1   GLUCOSE 165*   CALCIUM 9.9     LFT  Recent Labs     12/20/21  0701   AST 42*   ALT 25   BILITOT 0.2*   ALKPHOS 50     TROP  Lab Results   Component Value Date    TROPONINT 0.177 12/19/2021    TROPONINT 0.198 12/19/2021    TROPONINT 0.275 12/19/2021     BNP  No results for input(s): BNP in the last 72 hours. Lactic Acid  No results for input(s): LACTA in the last 72 hours. INR  No results for input(s): INR, PROTIME in the last 72 hours. PTT  No results for input(s): APTT in the last 72 hours. Glucose  No results for input(s): POCGLU in the last 72 hours. UA No results for input(s): SPECGRAV, PHUR, COLORU, CLARITYU, MUCUS, PROTEINU, BLOODU, RBCUA, WBCUA, BACTERIA, NITRU, GLUCOSEU, BILIRUBINUR, UROBILINOGEN, KETUA, LABCAST, LABCASTTY, AMORPHOS in the last 72 hours. Invalid input(s): CRYSTALS. PFTs           Sleep studies   None in record    Cultures        EKG     Chronic RBBB  Echocardiogram   08/05/2020   Findings      Mitral Valve   The mitral valve structure was normal with normal leaflet separation. DOPPLER: The transmitral velocity was within the normal range with no   evidence for mitral stenosis. There was moderate central jetf mitral   regurgitation. Aortic Valve   The aortic valve was trileaflet with normal thickness and cuspal   separation. DOPPLER: Transaortic velocity was within the normal range with   no evidence of aortic stenosis. There was no evidence of aortic   regurgitation. Tricuspid Valve   The tricuspid valve structure was normal with normal leaflet separation. DOPPLER: There was no evidence of tricuspid stenosis. There was mild   tricuspid regurgitation. Pulmonic Valve   The pulmonic valve leaflets exhibited normal thickness, no calcification,   and normal cuspal separation. DOPPLER: The transpulmonic velocity was   within the normal range with no evidence for regurgitation. mild pulmonary   htn      Left Atrium   Left atrial size was dilated      Left Ventricle   Normal left ventricle size and systolic function. Ejection fraction was   estimated at 50-55%. There were no regional left ventricular wall motion   abnormalities and wall thickness was within normal limits. Features were consistent with a pseudonormal left ventricular filling   pattern, with concomitant abnormal relaxation and increased filling   pressure (grade 2 diastolic dysfunction). Right Atrium   Right atrial size was normal.      Right Ventricle   The right ventricular size was normal with normal systolic function and   wall thickness. Right ventricular systolic pressure of 40 mm Hg consistent with mild   pulmonary hypertension. Pericardial Effusion   The pericardium was normal in appearance with no evidence of a pericardial   effusion. Pleural Effusion   No evidence of pleural effusion. Aorta / Great Vessels   -Aortic root dimension within normal limits.   -The Pulmonary artery is within normal limits. -IVC size is within normal limits with normal respiratory phasic changes. Radiology    CXR    XR CHEST PORTABLE   12/20/2021   FINDINGS:  Patchy opacities seen in the right upper lobe and left lower lobe. Cardiac silhouette is mildly enlarged. Aortic arch calcifications. No pleural effusion. No pneumothorax. No acute bony abnormality. The bones appear demineralized. Degenerative changes of the thoracic spine   Impression:  1. Patchy opacities seen in the right upper lobe and left lower lobe. Findings likely relate to infiltrates. CT Scans    1. Filling defects in the R & L pulmonary arteries and in the artery supplying the RLL consistent with pulmonary embolus, smaller than on previous CTA dated 11/06/2021. No new filling defects noted  2. Areas of abnormal density in the RU/M/LL lung fields new since previous study 11/06/2021 consistent with inflammatory process.    3. Right pleural effusion  (See actual reports for details)    FL MODIFIED BARIUM SWALLOW W VIDEO   12/21/2021   1. Laryngeal penetration of thin barium without evidence of aspiration. 2. Additional recommendations from the speech therapist will follow. Assessment   - Exacerbation of COPD GOLD 2 FEV1 64ppv 02/23/2021: Stable  Trigger: bacterial PNA. Initial sxs: wheezing. CTA on admit: R lung opacities in upper, middle, and lower lobe. Procal on admit 10.74. 1.5 pyh, quit in 2013. A1AT: never screened. Pneumonia vaccine: no. Home meds: symbicort and spiriva maintenance + albuterol rescue. Baseline O2 room air.  - Community Acquired Pneumonia vs Aspiration Pneumonia - cannot exclude atypical or aspiration etiologies given isolated R-lung radiographic opacities. Not MRSA as blood cultures x1 growing MSSA. 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 Likely more L heart failure given pt has no appreciable pedal edema with lower extremity wrinkling of the skin. - MAGDI on CPAP, no sleep study in records  - Systolic + G2 Diastolic HF EF 00-21% 2/2 hypertrophic cardiomyopathy and ICM  - CAD w/ total RCA occlusion + L to R collateralizations  - GERD  - PAF on eliquis  - Well-controlled IDDMT2 A1c 6.6 c/b neuropathy on gabapentin  - CKD Stage III  - H/o bilateral LE DVT    Plan   -Monitor SpO2 wean supplemental O2 to maintain SpO2 >90%  -Switch to Zosyn d/t concern for aspiration, will defer to ID for further management. -Molecular pneumonia panel pending awaiting sample   -Aspiration precautions  -Continue Symbicort and Spiriva  -Continue BiPAP  -MBS noted penetration of thin liquids SLP following has history of choking episodes has had previous esophageal dilation per pt.  Does not remember name of her Gastroenterologist   -Acapella  -Optimize heart failure regimen per cardiology  -IS  Questions and concerns addressed. Electronically signed by   LONDON Thorne CNP on 12/21/2021 at 2:49 PM     Addendum by Dr. Pamela Koch MD:  I have seen and examined the patient independently. Face to face evaluation and examination was performed. The above evaluation and note has been reviewed. Labs and radiographs were reviewed. I Have discussed with Mr. Eulalia Cordero CNP about this patient in detail. The above assessment and plan has been reviewed. Please see my modifications mentioned below.      My modifications:  She is on BiPAP 12 x 6 cm of water  Improving shortness of breath  Antibiotics per ID service  Wean FiO2 keep saturation more than 90%    Shu Oshea MD 12/21/2021 7:22 PM

## 2021-12-21 NOTE — PROGRESS NOTES
[facility]  SPEECH THERAPY  [facility] ONC MED 5K  Clinical Swallow Evaluation      SLP Individual Minutes  Time In: 5752  Time Out: 1331  Minutes: 10  Timed Code Treatment Minutes: 0 Minutes       Date: 2021  Patient Name: Gurjit Ovalle      CSN: [id number]   : 1940  (78 y.o.)  Gender: female   Referring Physician:  Lauri Roque MD  Diagnosis: troponin level elevated   Secondary Diagnosis: dysphagia     History of Present Illness/Injury: Per chart review; Gurjit vOalle is a 78 y.o. female who presents to the emergency department for evaluation of shortness of breath and new oxygen requirement. Patient transferred from nursing home for nursing shortness of breath and low oxygen saturation requiring additional oxygen. There is limited information available and patient is not aware of her symptoms, states he feels well. Pt referred for CSE d/t concerns for aspiration pneumonia       Past Medical History:   Diagnosis Date    Arthritis     Asthma     [facility]     Blood circulation, collateral     CAD (coronary artery disease)     [facility]     Chronic kidney disease, stage III (moderate) ([facility])     COPD (chronic obstructive pulmonary disease) ([address]     Depression     Diabetes mellitus ([facility]     Diabetic hypertension-nephrosis syndrome ([facility]     Dysuria     GERD (gastroesophageal reflux disease)     Hyperlipidemia     Hypertension     Kidney disease     stage 3 dr Miquel Curiel Unspecified cerebral artery occlusion with cerebral infarction     Dr Mosquera Spotted informed patient after Heart Attack       SUBJECTIVE:  Patient seen upright in bed for CSE. The pt is pleasant, alert, oriented, and agreeable for CSE. No family present     OBJECTIVE:    Pain:  No pain reported.     Current Diet: NPO until CSE     Respiratory Status:  Independent    Behavioral Observation:  Alert and Oriented    Oral Mechanism Evaluation:      Facial / Labial Impaired generalized weakness and decreased ROM    Lingual Impaired generalized weakness and decreased ROM   *xerostomia    Dentition Impaired    Velum Not Tested    Vocal Quality WFL    Sensation Impaired Pt reporting left side feels painful and slightly numb   Cough Impaired Persistent, wet, and congested at baseline and throughout assessment      Patient Evaluated Using: Thin via ice x2, tsp, x2, cup drink/straw drink (x2 each), puree x2    Oral Phase:  WFL    Pharyngeal Phase: WFL:  Pharyngeal phase appears WFL but cannot rule out pharyngeal phase deficits from a bedside swallowing evaluation alone. Signs and Symptoms of Laryngeal Penetration/Aspiration: Delayed Cough    Impresssions: Patient presents with MILD oral dysphagia with inability to fully discern potential presence of pharyngeal phase deficits without formal instrumentation. Oral phase of the swallow remains relatively intact and very functional, however, advanced textured solids were deferred /t increased concerns for airway invasion. Throughout liquid and puree trials the pt presented with frequent intermittent congested coughs, CXR concerning for aspiration pneumonia, and the pt's O2 saturations frequently dropped into he 80's. The pt's RN was notified, Yash Riggs stated this has been happening today, suspect it's d/t pt moving her hand with the finger probe; certainly unable to rule out pharyngeal deficits at the bedside alone. Recommended to remain NPO x ice/sips of water until MBS can be completed later this date.           RECOMMENDATIONS/ASSESSMENT:  Instrumental Evaluation: Modified Barium Swallow (MBS)  Diet Recommendations:  NPO x ice chips and sips of H2O until instrumental assessment   Strategies:  Recommend NPO   Rehabilitation Potential: good    EDUCATION:  Learner: Patient  Education:  Reviewed results and recommendations of this evaluation, Reviewed signs, symptoms and risks of aspiration and Reviewed ST goals and Plan of Care  Evaluation of Education: Verbalizes understanding and Family not present    PLAN:  Recommendations pending MBS    PATIENT GOAL:    Return to least restrictive diet. SHORT TERM GOALS:  Short-term Goals  Timeframe for Short-term Goals: 1-3 days  Goal 1: Pt will Complete MBS prior to diet initiation    LONG TERM GOALS:  No LTM Goals recommended, due to anticipated short ELOS. Miya Adair MA., [id number] [facility] / #[phone]

## 2021-12-22 ENCOUNTER — APPOINTMENT (OUTPATIENT)
Dept: GENERAL RADIOLOGY | Age: 81
DRG: 177 | End: 2021-12-22
Payer: MEDICARE

## 2021-12-22 LAB
ALLEN TEST: POSITIVE
ANION GAP SERPL CALCULATED.3IONS-SCNC: 14 MEQ/L (ref 8–16)
BASE EXCESS (CALCULATED): 2.5 MMOL/L (ref -2.5–2.5)
BUN BLDV-MCNC: 22 MG/DL (ref 7–22)
CALCIUM SERPL-MCNC: 9.3 MG/DL (ref 8.5–10.5)
CHLORIDE BLD-SCNC: 98 MEQ/L (ref 98–111)
CO2: 24 MEQ/L (ref 23–33)
COLLECTED BY:: ABNORMAL
CREAT SERPL-MCNC: 1 MG/DL (ref 0.4–1.2)
DEVICE: ABNORMAL
EKG Q-T INTERVAL: 382 MS
EKG QRS DURATION: 138 MS
EKG QTC CALCULATION (BAZETT): 490 MS
EKG R AXIS: -19 DEGREES
EKG T AXIS: 153 DEGREES
EKG VENTRICULAR RATE: 99 BPM
ERYTHROCYTE [DISTWIDTH] IN BLOOD BY AUTOMATED COUNT: 17.2 % (ref 11.5–14.5)
ERYTHROCYTE [DISTWIDTH] IN BLOOD BY AUTOMATED COUNT: 60.6 FL (ref 35–45)
FERRITIN: 1006 NG/ML (ref 10–291)
GFR SERPL CREATININE-BSD FRML MDRD: 53 ML/MIN/1.73M2
GLUCOSE BLD-MCNC: 143 MG/DL (ref 70–108)
GLUCOSE BLD-MCNC: 175 MG/DL (ref 70–108)
GLUCOSE BLD-MCNC: 209 MG/DL (ref 70–108)
HCO3: 26 MMOL/L (ref 23–28)
HCT VFR BLD CALC: 27.6 % (ref 37–47)
HEMOGLOBIN: 9 GM/DL (ref 12–16)
IFIO2: 30
MCH RBC QN AUTO: 31.5 PG (ref 26–33)
MCHC RBC AUTO-ENTMCNC: 32.6 GM/DL (ref 32.2–35.5)
MCV RBC AUTO: 96.5 FL (ref 81–99)
O2 SATURATION: 94 %
PCO2: 35 MMHG (ref 35–45)
PH BLOOD GAS: 7.48 (ref 7.35–7.45)
PIP: 16 CMH2O
PLATELET # BLD: 337 THOU/MM3 (ref 130–400)
PMV BLD AUTO: 9.8 FL (ref 9.4–12.4)
PO2: 65 MMHG (ref 71–104)
POTASSIUM SERPL-SCNC: 4.8 MEQ/L (ref 3.5–5.2)
PROCALCITONIN: 3.67 NG/ML (ref 0.01–0.09)
RBC # BLD: 2.86 MILL/MM3 (ref 4.2–5.4)
REASON FOR REJECTION: NORMAL
REJECTED TEST: NORMAL
SET PEEP: 6 MMHG
SODIUM BLD-SCNC: 136 MEQ/L (ref 135–145)
SOURCE, BLOOD GAS: ABNORMAL
WBC # BLD: 9.7 THOU/MM3 (ref 4.8–10.8)

## 2021-12-22 PROCEDURE — 36600 WITHDRAWAL OF ARTERIAL BLOOD: CPT

## 2021-12-22 PROCEDURE — 94640 AIRWAY INHALATION TREATMENT: CPT

## 2021-12-22 PROCEDURE — 2580000003 HC RX 258

## 2021-12-22 PROCEDURE — 6370000000 HC RX 637 (ALT 250 FOR IP)

## 2021-12-22 PROCEDURE — 82728 ASSAY OF FERRITIN: CPT

## 2021-12-22 PROCEDURE — 71045 X-RAY EXAM CHEST 1 VIEW: CPT

## 2021-12-22 PROCEDURE — 36415 COLL VENOUS BLD VENIPUNCTURE: CPT

## 2021-12-22 PROCEDURE — 2500000003 HC RX 250 WO HCPCS: Performed by: INTERNAL MEDICINE

## 2021-12-22 PROCEDURE — 6360000002 HC RX W HCPCS

## 2021-12-22 PROCEDURE — 93005 ELECTROCARDIOGRAM TRACING: CPT | Performed by: INTERNAL MEDICINE

## 2021-12-22 PROCEDURE — 84145 PROCALCITONIN (PCT): CPT

## 2021-12-22 PROCEDURE — 94669 MECHANICAL CHEST WALL OSCILL: CPT

## 2021-12-22 PROCEDURE — 6360000002 HC RX W HCPCS: Performed by: INTERNAL MEDICINE

## 2021-12-22 PROCEDURE — 80048 BASIC METABOLIC PNL TOTAL CA: CPT

## 2021-12-22 PROCEDURE — 6370000000 HC RX 637 (ALT 250 FOR IP): Performed by: INTERNAL MEDICINE

## 2021-12-22 PROCEDURE — 2580000003 HC RX 258: Performed by: INTERNAL MEDICINE

## 2021-12-22 PROCEDURE — 1200000003 HC TELEMETRY R&B

## 2021-12-22 PROCEDURE — 85027 COMPLETE CBC AUTOMATED: CPT

## 2021-12-22 PROCEDURE — 99232 SBSQ HOSP IP/OBS MODERATE 35: CPT | Performed by: INTERNAL MEDICINE

## 2021-12-22 PROCEDURE — 82803 BLOOD GASES ANY COMBINATION: CPT

## 2021-12-22 PROCEDURE — 94660 CPAP INITIATION&MGMT: CPT

## 2021-12-22 PROCEDURE — 82948 REAGENT STRIP/BLOOD GLUCOSE: CPT

## 2021-12-22 RX ORDER — METOPROLOL TARTRATE 5 MG/5ML
5 INJECTION INTRAVENOUS ONCE
Status: COMPLETED | OUTPATIENT
Start: 2021-12-22 | End: 2021-12-22

## 2021-12-22 RX ORDER — IPRATROPIUM BROMIDE AND ALBUTEROL SULFATE 2.5; .5 MG/3ML; MG/3ML
1 SOLUTION RESPIRATORY (INHALATION)
Status: DISCONTINUED | OUTPATIENT
Start: 2021-12-22 | End: 2021-12-24

## 2021-12-22 RX ORDER — ACETYLCYSTEINE 200 MG/ML
600 SOLUTION ORAL; RESPIRATORY (INHALATION) 2 TIMES DAILY
Status: DISCONTINUED | OUTPATIENT
Start: 2021-12-22 | End: 2021-12-28 | Stop reason: HOSPADM

## 2021-12-22 RX ADMIN — INSULIN GLARGINE 8 UNITS: 100 INJECTION, SOLUTION SUBCUTANEOUS at 21:24

## 2021-12-22 RX ADMIN — Medication 400 MG: at 21:22

## 2021-12-22 RX ADMIN — SODIUM CHLORIDE: 9 INJECTION, SOLUTION INTRAVENOUS at 06:02

## 2021-12-22 RX ADMIN — CLONIDINE HYDROCHLORIDE 0.1 MG: 0.1 TABLET ORAL at 21:21

## 2021-12-22 RX ADMIN — ATORVASTATIN CALCIUM 80 MG: 80 TABLET, FILM COATED ORAL at 21:19

## 2021-12-22 RX ADMIN — TRAZODONE HYDROCHLORIDE 100 MG: 100 TABLET ORAL at 21:22

## 2021-12-22 RX ADMIN — SODIUM CHLORIDE, PRESERVATIVE FREE 10 ML: 5 INJECTION INTRAVENOUS at 21:21

## 2021-12-22 RX ADMIN — MONTELUKAST SODIUM 5 MG: 5 TABLET, CHEWABLE ORAL at 21:22

## 2021-12-22 RX ADMIN — DOCUSATE SODIUM 100 MG: 100 CAPSULE ORAL at 21:20

## 2021-12-22 RX ADMIN — DULOXETINE HYDROCHLORIDE 30 MG: 30 CAPSULE, DELAYED RELEASE ORAL at 21:21

## 2021-12-22 RX ADMIN — BUDESONIDE AND FORMOTEROL FUMARATE DIHYDRATE 2 PUFF: 160; 4.5 AEROSOL RESPIRATORY (INHALATION) at 08:28

## 2021-12-22 RX ADMIN — TIOTROPIUM BROMIDE INHALATION SPRAY 2 PUFF: 3.12 SPRAY, METERED RESPIRATORY (INHALATION) at 08:25

## 2021-12-22 RX ADMIN — GABAPENTIN 100 MG: 100 CAPSULE ORAL at 21:18

## 2021-12-22 RX ADMIN — ALBUTEROL SULFATE 2.5 MG: 2.5 SOLUTION RESPIRATORY (INHALATION) at 11:51

## 2021-12-22 RX ADMIN — PIPERACILLIN AND TAZOBACTAM 3375 MG: 3; .375 INJECTION, POWDER, LYOPHILIZED, FOR SOLUTION INTRAVENOUS at 02:01

## 2021-12-22 RX ADMIN — METOPROLOL TARTRATE 5 MG: 5 INJECTION INTRAVENOUS at 10:18

## 2021-12-22 RX ADMIN — ACETYLCYSTEINE 600 MG: 200 SOLUTION ORAL; RESPIRATORY (INHALATION) at 16:18

## 2021-12-22 RX ADMIN — APIXABAN 5 MG: 5 TABLET, FILM COATED ORAL at 21:20

## 2021-12-22 RX ADMIN — ALBUTEROL SULFATE 2.5 MG: 2.5 SOLUTION RESPIRATORY (INHALATION) at 08:16

## 2021-12-22 RX ADMIN — IPRATROPIUM BROMIDE AND ALBUTEROL SULFATE 1 AMPULE: .5; 3 SOLUTION RESPIRATORY (INHALATION) at 16:18

## 2021-12-22 RX ADMIN — PIPERACILLIN AND TAZOBACTAM 3375 MG: 3; .375 INJECTION, POWDER, LYOPHILIZED, FOR SOLUTION INTRAVENOUS at 21:03

## 2021-12-22 RX ADMIN — OXYCODONE AND ACETAMINOPHEN 1 TABLET: 5; 325 TABLET ORAL at 21:22

## 2021-12-22 RX ADMIN — BUMETANIDE 1 MG: 0.25 INJECTION, SOLUTION INTRAMUSCULAR; INTRAVENOUS at 21:06

## 2021-12-22 RX ADMIN — RANOLAZINE 500 MG: 500 TABLET, FILM COATED, EXTENDED RELEASE ORAL at 21:20

## 2021-12-22 RX ADMIN — IPRATROPIUM BROMIDE AND ALBUTEROL SULFATE 1 AMPULE: .5; 3 SOLUTION RESPIRATORY (INHALATION) at 22:11

## 2021-12-22 RX ADMIN — BUMETANIDE 1 MG: 0.25 INJECTION, SOLUTION INTRAMUSCULAR; INTRAVENOUS at 09:45

## 2021-12-22 ASSESSMENT — PAIN SCALES - GENERAL
PAINLEVEL_OUTOF10: 0
PAINLEVEL_OUTOF10: 0
PAINLEVEL_OUTOF10: 9

## 2021-12-22 NOTE — PLAN OF CARE
[employer] THERAPY MISSED TREATMENT NOTE  STR ONC MED 5K  5K-26/026-A      Date: 2021  Patient Name: Fabi Lucas        CSN: [id number]   : 1940  (78 y.o.)  Gender: female   Referring Practitioner: Dr. Malissa Escobedo  Diagnosis: elevated troponin level         REASON FOR MISSED TREATMENT: Hold Treatment per Nursing   Att to see pt early AM, pt with respiratory therapy  Att x 2 at 11:00 Pt with nursing , he requested to hold therapy this date. Pt was recently transferred to the recliner per her request and short of breath.  WIll check back as medically able to participate

## 2021-12-22 NOTE — PROGRESS NOTES
Admit Date: 12/19/2021  Hospital day 2    Subjective:     Patient has sob and weakness . Medication side effects: none    Scheduled Meds:   piperacillin-tazobactam  3,375 mg IntraVENous Q8H    bumetanide  1 mg IntraVENous BID    albuterol  2.5 mg Nebulization 4x daily    sodium chloride flush  5-40 mL IntraVENous 2 times per day    aspirin  81 mg Oral Daily    atorvastatin  80 mg Oral Nightly    apixaban  5 mg Oral BID    azaTHIOprine  100 mg Oral Daily    calcium carbonate  1 tablet Oral BID    cloNIDine  0.1 mg Oral BID    pantoprazole  40 mg Oral QAM AC    DULoxetine  30 mg Oral BID    docusate sodium  100 mg Oral BID    fenofibrate  160 mg Oral Daily    gabapentin  100 mg Oral TID    hydroxychloroquine  200 mg Oral Daily    insulin glargine  8 Units SubCUTAneous Nightly    traZODone  100 mg Oral Nightly    ranolazine  500 mg Oral BID    predniSONE  5 mg Oral Daily    oxyCODONE-acetaminophen  1 tablet Oral TID    montelukast  5 mg Oral Nightly    metoprolol succinate  50 mg Oral Daily    magnesium oxide  400 mg Oral BID    losartan  50 mg Oral Daily    isosorbide mononitrate  60 mg Oral Daily    budesonide-formoterol  2 puff Inhalation BID    tiotropium  2 puff Inhalation Daily     Continuous Infusions:   sodium chloride 20 mL/hr at 12/22/21 0602    sodium chloride       PRN Meds:sodium chloride flush, sodium chloride, potassium chloride **OR** potassium alternative oral replacement **OR** potassium chloride, ondansetron **OR** ondansetron, acetaminophen **OR** acetaminophen, polyethylene glycol, nitroGLYCERIN, aluminum & magnesium hydroxide-simethicone, polyvinyl alcohol, magnesium hydroxide, loperamide    Review of Systems  Pertinent items are noted in HPI.     Objective:     Patient Vitals for the past 8 hrs:   BP Temp Temp src Pulse Resp SpO2 Weight   12/22/21 0545 -- -- -- -- -- -- 197 lb 12 oz (89.7 kg)   12/22/21 0345 (!) 175/86 97.6 °F (36.4 °C) Oral 85 16 91 % --     I/O last 3 completed shifts: In: 537.2 [I.V.:537.2]  Out: 850 [Urine:850]    No change     ECG: af.   Data Review:   CBC:  Lab Results   Component Value Date    WBC 12.1 12/20/2021    RBC 2.51 12/20/2021    HGB 7.9 12/20/2021    HCT 25.2 12/20/2021    .4 12/20/2021    MCH 31.5 12/20/2021    MCHC 31.3 12/20/2021    RDW 19.8 12/13/2021     12/20/2021    MPV 9.6 12/20/2021     BMP  Lab Results   Component Value Date     12/19/2021    K 4.8 12/19/2021     12/19/2021    CO2 24 12/19/2021    BUN 21 12/19/2021    CREATININE 1.1 12/19/2021    CALCIUM 9.9 12/19/2021      COAG PROFILE:  Lab Results   Component Value Date    APTT 33.5 11/18/2021    INR 1.22 11/21/2021       Assessment:     Active Problems:    Acute on chronic clinical systolic heart failure (HCC)    Pneumonia due to infectious organism  Resolved Problems:    * No resolved hospital problems.  *      Plan:     *patient still sob    on iv antibiotics for pneumonia    still in atrial fib    h/o recent pe    anaemia    will needs anticoagulation    get gi consult    awaiting ID consult    continue supportive rx    echo

## 2021-12-22 NOTE — PROGRESS NOTES
Progress note: Infectious diseases    Patient - Gurjit Lyon,  Age - 78 y.o.    - 1940      Room Number - 7K-01/454-V   MRN -  [id number]   Acct # - [de-identified]  Date of Admission -  2021  5:40 AM    SUBJECTIVE:   She is on BIPAP    OBJECTIVE   VITALS    height is 5' 5\" (1.651 m) and weight is 197 lb 12 oz (89.7 kg). Her oral temperature is 98 °F (36.7 °C). Her blood pressure is 179/87 (abnormal) and her pulse is 105. Her respiration is 36 (abnormal) and oxygen saturation is 91%. Wt Readings from Last 3 Encounters:   21 197 lb 12 oz (89.7 kg)   21 192 lb 7.4 oz (87.3 kg)   21 192 lb 7.4 oz (87.3 kg)       I/O (24 Hours)    Intake/Output Summary (Last 24 hours) at 2021 1418  Last data filed at 2021 0606  Gross per 24 hour   Intake --   Output 750 ml   Net -750 ml       General Appearance sick looking  HEENT - on BIPAP  Neck - Supple, no mass.   Lungs -  Bilateral   air entry, +rhonchi,   Cardiovascular - Heart sounds are normal.     Abdomen - soft, not distended, nontender,   Neurologic -lethargic  Skin - No bruising or bleeding  Extremities - +edema, no cyanosis, clubbing     MEDICATIONS:      acetylcysteine  600 mg Inhalation BID    ipratropium-albuterol  1 ampule Inhalation Q4H WA    piperacillin-tazobactam  3,375 mg IntraVENous Q8H    bumetanide  1 mg IntraVENous BID    [Held by provider] albuterol  2.5 mg Nebulization 4x daily    sodium chloride flush  5-40 mL IntraVENous 2 times per day    aspirin  81 mg Oral Daily    atorvastatin  80 mg Oral Nightly    apixaban  5 mg Oral BID    azaTHIOprine  100 mg Oral Daily    calcium carbonate  1 tablet Oral BID    cloNIDine  0.1 mg Oral BID    pantoprazole  40 mg Oral QAM AC    DULoxetine  30 mg Oral BID    docusate sodium  100 mg Oral BID    fenofibrate  160 mg Oral Daily    gabapentin  100 mg Oral TID   [place] hydroxychloroquine  200 mg Oral Daily    insulin glargine  8 Units SubCUTAneous Nightly    traZODone  100 mg Oral Nightly    ranolazine  500 mg Oral BID    predniSONE  5 mg Oral Daily    oxyCODONE-acetaminophen  1 tablet Oral TID    montelukast  5 mg Oral Nightly    metoprolol succinate  50 mg Oral Daily    magnesium oxide  400 mg Oral BID    losartan  50 mg Oral Daily    isosorbide mononitrate  60 mg Oral Daily    [Held by provider] budesonide-formoterol  2 puff Inhalation BID    [Held by provider] tiotropium  2 puff Inhalation Daily      dilTIAZem (CARDIZEM) 125 mg in dextrose 5% 125 mL infusion      sodium chloride 20 mL/hr at 12/22/21 0602    sodium chloride       sodium chloride flush, sodium chloride, potassium chloride **OR** potassium alternative oral replacement **OR** potassium chloride, ondansetron **OR** ondansetron, acetaminophen **OR** acetaminophen, polyethylene glycol, nitroGLYCERIN, aluminum & magnesium hydroxide-simethicone, polyvinyl alcohol, magnesium hydroxide, loperamide      LABS:     CBC:   Recent Labs     12/20/21  0701 12/20/21  0832 12/22/21  0737   WBC 11.6* 12.1* 9.7   HGB 7.9* 7.9* 9.0*    341 337     BMP:    Recent Labs     12/22/21  0737      K 4.8   CL 98   CO2 24   BUN 22   CREATININE 1.0   GLUCOSE 175*     Calcium:  Recent Labs     12/22/21  0737   CALCIUM 9.3     Ionized Calcium:No results for input(s): IONCA in the last 72 hours. Magnesium:No results for input(s): MG in the last 72 hours. Phosphorus:No results for input(s): PHOS in the last 72 hours. BNP:No results for input(s): BNP in the last 72 hours. Glucose:  Recent Labs     12/21/21  2144 12/22/21  0941   POCGLU 140* 209*     HgbA1C: No results for input(s): LABA1C in the last 72 hours. INR: No results for input(s): INR in the last 72 hours.   Hepatic:   Recent Labs     12/20/21  0701   ALKPHOS 50   ALT 25   AST 42*   PROT 5.3*   BILITOT 0.2*   BILIDIR <0.2   LABALBU 2.8*     Amylase and Lipase:No results for input(s): LACTA, AMYLASE in the last 72 hours. Lactic Acid: No results for input(s): LACTA in the last 72 hours. Troponin: No results for input(s): CKTOTAL, CKMB, TROPONINI in the last 72 hours. BNP: No results for input(s): BNP in the last 72 hours. CULTURES:   UA: No results for input(s): SPECGRAV, PHUR, COLORU, CLARITYU, MUCUS, PROTEINU, BLOODU, RBCUA, WBCUA, BACTERIA, NITRU, GLUCOSEU, BILIRUBINUR, UROBILINOGEN, KETUA, LABCAST, LABCASTTY, AMORPHOS in the last 72 hours.     Invalid input(s): CRYSTALS  Micro:   Lab Results   Component Value Date    BC No growth-preliminary  12/19/2021         IMAGING:         Problem list of patient:     Patient Active Problem List   Diagnosis Code    Syncope R55    [facility] E11.9    CAD (coronary artery disease) I25.10    Hypertension I10    Atrial fibrillation ([facility]) I48.91    GERD (gastroesophageal reflux disease) K21.9    Chronic kidney disease, stage III (moderate) ([facility]) N18.30    COPD (chronic obstructive pulmonary disease) ([facility]) J44.9    Dysuria R30.0    Diabetic hypertension-nephrosis syndrome ([facility]) E11.21    Hypokalemia E87.6    Abnormal nuclear cardiac imaging test R93.1    Back pain, chronic M54.9, G89.29    Obesity (BMI 35.0-39.9 without comorbidity) E66.9    H/O class III angina pectoris Z86.79    HTN, goal below 130/80 I10    Non-rheumatic mitral regurgitation I34.0    Abnormal nuclear stress test R94.39    Osteopenia of multiple sites M85.89    Seronegative rheumatoid arthritis ([facility] M06.00    Acute respiratory failure (HCC) J96.00    Diverticulitis K57.92    Diverticulitis of colon K57.32    Multiple falls R29.6    EMMA (acute kidney injury) ([facility] N17.9    Urinary tract infection in female N39.0    Acute diverticulitis K57.92    Rectal bleeding K62.5    Acute on chronic clinical systolic heart failure (HCC) I50.23    Pneumonia due to infectious organism J18.9         ASSESSMENT/PLAN

## 2021-12-22 NOTE — PROGRESS NOTES
[facility]      Patient - Haley Dutton   MRN -  [id number]   [facility]   - 1940      Date of Admission -  2021  5:40 AM  Date of evaluation -  2021  Room - Dez Donnelly MD Primary Care Physician - Rigo Yeboah DO     Problem List      Active Hospital Problems    Diagnosis Date Noted    Pneumonia due to infectious organism [J18.9]     Acute on chronic clinical systolic heart failure [facility]) [I50.23] 2021     Reason for Consult    Acute hypoxic respiratory failure   HPI   History Obtained From: Patient and electronic medical record. Haley Dutton is a 78 y.o. female who presents for undifferentiated shortness of breath and a new oxygen requirement from nursing home. H/o COPD GOLD stage 2 FEV1 64ppv 2021 on symbicort / spiriva / albuterol, Systolic + G2 Diastolic HF EF 45-36% 2/2 hypertrophic cardiomyopathy, CAD w/ total RCA occlusion + L to R collateralizations, Unspecified HARRIS , GERD, PAF on eliquis, Well-controlled IDDMT2 A1c 6.6 c/b neuropathy on gabapentin, CKD Stage III, MAGDI on CPAP, HTN, HLD on fenofibrate, Rheumatoid Arthritis on plaquenil / prednisone / imuran, and Depression. Pt is an unreliable historian but this is her baseline. She is having shortness of breath: No, unreliable    She is having cough: No, unreliable    She is having chest pain:No, unreliable      Past 24 hrs   -Oxygenation worsening, now requiring 4L NC  -Yesterday , pt referred to [facility] d/t witnessed aspiration event, found that pt had mild dysphagia and larygneal penetration evident with flash penetration using thin liquids.   -This morning, on my visit, pt began complaining of 10/10 non-radiating, substernal chest pain with worsening wheezes on both sides.    -Afebrile  -Hypertensive  -Cough, Moderate and Dry    PMHx   Past Medical History      Diagnosis Date    Arthritis     Asthma 1978    Atrial fibrillation ([facility] [address]     Blood circulation, collateral     CAD (coronary artery disease)     CHF (congestive heart failure) ([facility]) 1999    Chronic kidney disease, stage III (moderate) (HCC)     COPD (chronic obstructive pulmonary disease) ([facility])     Depression     Diabetes mellitus (HCC)     Diabetic hypertension-nephrosis syndrome (HCC)     Dysuria     GERD (gastroesophageal reflux disease)     Hyperlipidemia     Hypertension     Kidney disease     stage 3 dr Eulalia Jang    Unspecified cerebral artery occlusion with cerebral infarction 1999    Dr Luca Hoffman informed patient after Heart Attack      Past Surgical History        Procedure Laterality Date    APPENDECTOMY      BACK SURGERY      lumbar spine    BLADDER SUSPENSION      CARDIAC CATHETERIZATION  2009    with stent  [facility]    CARPAL TUNNEL RELEASE      COLONOSCOPY  2013    ENDOSCOPY, COLON, DIAGNOSTIC      ESOPHAGEAL DILATATION      HYSTERECTOMY  1974    JOINT REPLACEMENT Left 1992    Left Knee    UPPER GASTROINTESTINAL ENDOSCOPY N/A 9/28/2021    EGD DILATION performed by Robbi Ruiz MD at [address] [place] [address] ENDOSCOPY Left 9/28/2021    EGD BIOPSY performed by Robbi Ruiz MD at [facility]     Meds    Current Medications    piperacillin-tazobactam  3,375 mg IntraVENous Q8H    bumetanide  1 mg IntraVENous BID    albuterol  2.5 mg Nebulization 4x daily    sodium chloride flush  5-40 mL IntraVENous 2 times per day    aspirin  81 mg Oral Daily    atorvastatin  80 mg Oral Nightly    apixaban  5 mg Oral BID    azaTHIOprine  100 mg Oral Daily    calcium carbonate  1 tablet Oral BID    cloNIDine  0.1 mg Oral BID    pantoprazole  40 mg Oral QAM AC    DULoxetine  30 mg Oral BID    docusate sodium  100 mg Oral BID    fenofibrate  160 mg Oral Daily    gabapentin  100 mg Oral TID    hydroxychloroquine  200 mg Oral Daily    insulin glargine  8 Units SubCUTAneous Nightly    traZODone  100 mg Oral Nightly    ranolazine  500 mg Oral BID    predniSONE  5 mg Oral Daily    oxyCODONE-acetaminophen  1 tablet Oral TID    montelukast  5 mg Oral Nightly    metoprolol succinate  50 mg Oral Daily    magnesium oxide  400 mg Oral BID    losartan  50 mg Oral Daily    isosorbide mononitrate  60 mg Oral Daily    budesonide-formoterol  2 puff Inhalation BID    tiotropium  2 puff Inhalation Daily     sodium chloride flush, sodium chloride, potassium chloride **OR** potassium alternative oral replacement **OR** potassium chloride, ondansetron **OR** ondansetron, acetaminophen **OR** acetaminophen, polyethylene glycol, nitroGLYCERIN, aluminum & magnesium hydroxide-simethicone, polyvinyl alcohol, magnesium hydroxide, loperamide  IV Drips/Infusions   sodium chloride 20 mL/hr at 12/22/21 0602    sodium chloride       Home Medications  Medications Prior to Admission: apixaban (ELIQUIS) 5 MG TABS tablet, Take 5 mg by mouth 2 times daily  bumetanide (BUMEX) 1 MG tablet, Take 0.5 tablets by mouth daily as needed (leg swelling, increase in daily weight > 3lbs, SOB)  magnesium hydroxide (MILK OF MAGNESIA) 400 MG/5ML suspension, Take 30 mLs by mouth daily as needed for Constipation  albuterol (PROVENTIL) 2.5 MG/0.5ML NEBU nebulizer solution, Take 2.5 mg by nebulization every 4-6 hours as needed for Wheezing or Shortness of Breath Do not take inhaler and nebulizer at the same time per Dr. Calvin Baxter   aluminum & magnesium hydroxide-simethicone (MYLANTA) 400-400-40 MG/5ML SUSP, Take 10 mLs by mouth every 6 hours as needed For acid reflux  Artificial Saliva (BIOTENE DRY MOUTH MOISTURIZING) SOLN liquid, Take by mouth as needed Dry mouth  montelukast (SINGULAIR) 5 MG chewable tablet, Take 5 mg by mouth nightly  metoprolol succinate (TOPROL XL) 50 MG extended release tablet, Take 50 mg by mouth daily  fluticasone-umeclidin-vilant (TRELEGY ELLIPTA) 100-62.5-25 MCG/INH AEPB, Inhale 1 puff into the lungs daily SOB  OXYGEN, Inhale into the lungs 2-4L to keep SpO2 above 92%. PRN for SOB  calcium carbonate (TUMS) 500 MG chewable tablet, Take 1 tablet by mouth 2 times daily  fenofibrate micronized (LOFIBRA) 200 MG capsule, Take 200 mg by mouth daily   acetaminophen (TYLENOL) 500 MG tablet, Take 500 mg by mouth every 8 hours as needed for Pain   losartan (COZAAR) 50 MG tablet, Take 50 mg by mouth daily  loperamide (IMODIUM) 2 MG capsule, Take 2 mg by mouth every 12 hours as needed for Diarrhea   oxyCODONE-acetaminophen (PERCOCET) 5-325 MG per tablet, Take 1 tablet by mouth 3 times daily. For pain  hydroxychloroquine (PLAQUENIL) 200 MG tablet, TAKE 1 TABLET BY MOUTH EVERY DAY  cloNIDine (CATAPRES) 0.1 MG tablet, Take 1 tablet by mouth 2 times daily  predniSONE (DELTASONE) 5 MG tablet, TAKE 1 TABLET BY MOUTH EVERY DAY  DULoxetine (CYMBALTA) 30 MG extended release capsule, Take 30 mg by mouth 2 times daily  gabapentin (NEURONTIN) 100 MG capsule, Take 100 mg by mouth 3 times daily. atorvastatin (LIPITOR) 40 MG tablet, Take 2 tablets by mouth daily  docusate sodium (COLACE, DULCOLAX) 100 MG CAPS, Take 100 mg by mouth 2 times daily  insulin glargine (LANTUS) 100 UNIT/ML injection pen, Inject 8 Units into the skin nightly   dexlansoprazole (DEXILANT) 60 MG CPDR delayed release capsule, Take 60 mg by mouth daily  ranolazine (RANEXA) 500 MG SR tablet, Take 1 tablet by mouth 2 times daily. magnesium oxide (MAG-OX) 400 MG tablet, Take 400 mg by mouth 2 times daily   albuterol (PROVENTIL HFA;VENTOLIN HFA) 108 (90 BASE) MCG/ACT inhaler, Inhale 2 puffs into the lungs every 6 hours as needed for Shortness of Breath   traZODone (DESYREL) 50 MG tablet, Take 100 mg by mouth nightly   isosorbide mononitrate (IMDUR) 60 MG extended release tablet, Take 1 tablet by mouth daily  azaTHIOprine (IMURAN) 50 MG tablet, Take 2 tablets by mouth daily  spironolactone (ALDACTONE) 50 MG tablet, Take 1 tablet by mouth daily  Misc.  Devices (HEAT THERAPY) MISC, by Does not Session: Not on file   Stress:     Feeling of Stress : Not on file   Social Connections:     Frequency of Communication with Friends and Family: Not on file    Frequency of Social Gatherings with Friends and Family: Not on file    Attends Spiritism Services: Not on file    Active Member of Clubs or Organizations: Not on file    Attends Club or Organization Meetings: Not on file    Marital Status: Not on file   Intimate Partner Violence:     Fear of Current or Ex-Partner: Not on file    Emotionally Abused: Not on file    Physically Abused: Not on file    Sexually Abused: Not on file   Housing Stability:     Unable to Pay for Housing in the Last Year: Not on file    Number of [place] in the Last Year: Not on file    Unstable Housing in the Last Year: Not on file     Family History          Problem Relation Age of Onset    Heart Disease Mother     Diabetes Mother     Kidney Disease Mother     Heart Disease Father     Diabetes Father     Cancer Sister     Stroke Brother     Cancer Sister      Sleep History    Never diagnosed with sleep apnea in the past.  Occupational history   Occupation:  She is current working: No  Type of profession: retired. History of tobacco smoking:Yes  Amount of tobacco smokin.15 PPD. Years of tobacco smoking: 15.                                    Quit smoking: Yes. Quit AVSC:6031   Current smoker: No.       Amount of current tobacco smokin PPD  .   History of recreational or IV drug use in the past:NO     History of exposure to coal mines/coal dust: NO  History of exposure to foundry dust/welding: NO  History of exposure to quarry/silica/sandblasting: NO  History of exposure to asbestos/working with breaks/ships: NO  History of exposure to farm dust: NO  History of recent travel to long distances: NO  History of exposure to birds, pigeons, or chickens in the past:NO  Pet animals at home:No  Dogs: 0  Cats: 0    History of pulmonary embolism in the past: Yes            History of DVT in the past:Yes        [x] Right lower extremity   [x] Left lower extremity. Vitals     height is 5' 5\" (1.651 m) and weight is 197 lb 12 oz (89.7 kg). Her oral temperature is 98 °F (36.7 °C). Her blood pressure is 179/87 (abnormal) and her pulse is 105. Her respiration is 18 and oxygen saturation is 91%. Body mass index is 32.91 kg/m². SUPPLEMENTAL O2: O2 Flow Rate (L/min): 4 L/min     I/O        Intake/Output Summary (Last 24 hours) at 12/22/2021 1134  Last data filed at 12/22/2021 0606  Gross per 24 hour   Intake 537.24 ml   Output 850 ml   Net -312.76 ml     I/O last 3 completed shifts: In: 537.2 [I.V.:537.2]  Out: 850 [Urine:850]   Patient Vitals for the past 96 hrs (Last 3 readings):   Weight   12/22/21 0545 197 lb 12 oz (89.7 kg)   12/21/21 0315 194 lb 3.6 oz (88.1 kg)   12/20/21 0530 195 lb 12.3 oz (88.8 kg)       Exam   Physical Exam  Vitals and nursing note reviewed. Constitutional:       General: She is awake. Appearance: Normal appearance. She is obese. She is not ill-appearing or diaphoretic. HENT:      Head: Normocephalic and atraumatic. Right Ear: External ear normal.      Left Ear: External ear normal.      Nose: Nose normal.      Mouth/Throat:      Mouth: Mucous membranes are moist.      Pharynx: Oropharynx is clear. No oropharyngeal exudate or posterior oropharyngeal erythema. Eyes:      General: Lids are normal. No scleral icterus. Extraocular Movements: Extraocular movements intact. Pupils: Pupils are equal, round, and reactive to light. Cardiovascular:      Rate and Rhythm: Normal rate and regular rhythm. Pulses: Normal pulses. Radial pulses are 2+ on the right side and 2+ on the left side. Posterior tibial pulses are 2+ on the right side and 2+ on the left side. Heart sounds: Normal heart sounds. No murmur heard. No friction rub. No gallop.     Pulmonary: Effort: Pulmonary effort is normal.      Breath sounds: Examination of the right-upper field reveals wheezing. Examination of the left-upper field reveals wheezing. Examination of the right-middle field reveals wheezing and rales. Examination of the left-middle field reveals wheezing and rales. Examination of the right-lower field reveals wheezing and rales. Examination of the left-lower field reveals wheezing and rales. Wheezing and rales present. No rhonchi. Abdominal:      General: Bowel sounds are normal.      Palpations: Abdomen is soft. Tenderness: There is no abdominal tenderness. There is no guarding or rebound. Musculoskeletal:      Cervical back: Normal range of motion and neck supple. Right lower leg: No edema. Left lower leg: No edema. Skin:     General: Skin is warm and dry. Neurological:      General: No focal deficit present. Mental Status: She is alert and oriented to person, place, and time. Mental status is at baseline. GCS: GCS eye subscore is 4. GCS verbal subscore is 5. GCS motor subscore is 6. Psychiatric:         Attention and Perception: Attention and perception normal.         Mood and Affect: Mood and affect normal.         Speech: Speech normal.         Behavior: Behavior normal. Behavior is cooperative. Thought Content:  Thought content normal.         Cognition and Memory: Cognition and memory normal.         Judgment: Judgment normal.         Labs  - Old records and notes have been reviewed in [facility]   ABG  Lab Results   Component Value Date    PH 7.39 12/19/2021    PO2 105 12/19/2021    PCO2 48 12/19/2021    HCO3 29 12/19/2021    O2SAT 98 12/19/2021     Lab Results   Component Value Date    IFIO2 45 12/19/2021    SETPEEP 6.0 12/19/2021     CBC  Recent Labs     12/20/21  0701 12/20/21  0832 12/22/21  0737   WBC 11.6* 12.1* 9.7   RBC 2.50* 2.51* 2.86*   HGB 7.9* 7.9* 9.0*   HCT 25.3* 25.2* 27.6*   .2* 100.4* 96.5   MCH 31.6 31.5 31.5 MCHC 31.2* 31.3* 32.6    341 337   MPV 9.5 9.6 9.8      BMP  Recent Labs     12/22/21  0737      K 4.8   CL 98   CO2 24   BUN 22   CREATININE 1.0   GLUCOSE 175*   CALCIUM 9.3     LFT  Recent Labs     12/20/21  0701   AST 42*   ALT 25   BILITOT 0.2*   ALKPHOS 50     TROP  Lab Results   Component Value Date    TROPONINT 0.177 12/19/2021    TROPONINT 0.198 12/19/2021    TROPONINT 0.275 12/19/2021     BNP  No results for input(s): BNP in the last 72 hours. Lactic Acid  No results for input(s): LACTA in the last 72 hours. INR  No results for input(s): INR, PROTIME in the last 72 hours. PTT  No results for input(s): APTT in the last 72 hours. Glucose  Recent Labs     12/21/21  2144 12/22/21  0941   POCGLU 140* 209*     UA No results for input(s): SPECGRAV, PHUR, COLORU, CLARITYU, MUCUS, PROTEINU, BLOODU, RBCUA, WBCUA, BACTERIA, NITRU, GLUCOSEU, BILIRUBINUR, UROBILINOGEN, KETUA, LABCAST, LABCASTTY, AMORPHOS in the last 72 hours. Invalid input(s): CRYSTALS. PFTs           Sleep studies   None in record    Cultures        EKG     Chronic RBBB  Echocardiogram   08/05/2020   Findings      Mitral Valve   The mitral valve structure was normal with normal leaflet separation. DOPPLER: The transmitral velocity was within the normal range with no   evidence for mitral stenosis. There was moderate central jetf mitral   regurgitation. Aortic Valve   The aortic valve was trileaflet with normal thickness and cuspal   separation. DOPPLER: Transaortic velocity was within the normal range with   no evidence of aortic stenosis. There was no evidence of aortic   regurgitation. Tricuspid Valve   The tricuspid valve structure was normal with normal leaflet separation. DOPPLER: There was no evidence of tricuspid stenosis. There was mild   tricuspid regurgitation. Pulmonic Valve   The pulmonic valve leaflets exhibited normal thickness, no calcification,   and normal cuspal separation.  DOPPLER: The transpulmonic velocity was   within the normal range with no evidence for regurgitation. mild pulmonary   htn      Left Atrium   Left atrial size was dilated      Left Ventricle   Normal left ventricle size and systolic function. Ejection fraction was   estimated at 50-55%. There were no regional left ventricular wall motion   abnormalities and wall thickness was within normal limits. Features were consistent with a pseudonormal left ventricular filling   pattern, with concomitant abnormal relaxation and increased filling   pressure (grade 2 diastolic dysfunction). Right Atrium   Right atrial size was normal.      Right Ventricle   The right ventricular size was normal with normal systolic function and   wall thickness. Right ventricular systolic pressure of 40 mm Hg consistent with mild   pulmonary hypertension. Pericardial Effusion   The pericardium was normal in appearance with no evidence of a pericardial   effusion. Pleural Effusion   No evidence of pleural effusion. Aorta / Great Vessels   -Aortic root dimension within normal limits.   -The Pulmonary artery is within normal limits. -IVC size is within normal limits with normal respiratory phasic changes. Radiology    CXR    12/22/2021 @1058 vs 12/22/2021 @0749 vs 10/20/2021 @0921    1. Poor inflation of lungs. 2. Questionable tiny effusion of L side. Moderate interstitial pneumonia both mid and LL fields. Overall chest appears slightly worse than prior. XR CHEST PORTABLE   12/20/2021   FINDINGS:  Patchy opacities seen in the right upper lobe and left lower lobe. Cardiac silhouette is mildly enlarged. Aortic arch calcifications. No pleural effusion. No pneumothorax. No acute bony abnormality. The bones appear demineralized. Degenerative changes of the thoracic spine   Impression:  1. Patchy opacities seen in the right upper lobe and left lower lobe. Findings likely relate to infiltrates. CT Scans    1.  Filling defects in the R & L pulmonary arteries and in the artery supplying the RLL consistent with pulmonary embolus, smaller than on previous CTA dated 11/06/2021. No new filling defects noted  2. Areas of abnormal density in the RU/M/LL lung fields new since previous study 11/06/2021 consistent with inflammatory process. 3. Right pleural effusion  (See actual reports for details)    FL MODIFIED BARIUM SWALLOW W VIDEO   12/21/2021   1. Laryngeal penetration of thin barium without evidence of aspiration. 2. Additional recommendations from the speech therapist will follow. Assessment   - Acute Hypoxic Respiratory Failure 2/2 CHF vs COPD Exacerbation from Aspiration Pneumonia - 12/22/2021, pt O2 requirement worsened to 4L NC, repeat CXR w/ increasing interstitial infiltrates, and pt actively coughing while in room. Concern for repeat aspiration event vs acute on chronic CHF exacerbation given mild cephalization on CXR with worsening cardiomegaly vs COPD exacerbation given wheezes over bilateral lung fields. - COPD GOLD 2 FEV1 64ppv 02/23/2021  Trigger: bacterial PNA vs CHF exacerbation. Initial sxs: wheezing. CTA on admit: R lung opacities in upper, middle, and lower lobe. Procal on admit 10.74. 1.5 pyh, quit in 2013. A1AT: never screened. Pneumonia vaccine: no. Home meds: symbicort and spiriva maintenance + albuterol rescue. Baseline O2 room air.  - Systolic + G2 Diastolic HF EF 40-63% 2/2 hypertrophic cardiomyopathy and ICM  - Aspiration Pneumonia - cannot exclude atypical or aspiration etiologies given isolated R-lung radiographic opacities. Not MRSA as blood cultures x1 growing MSSA. 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 Likely more L heart failure given pt has no appreciable pedal edema with lower extremity wrinkling of the skin.   - MAGDI on CPAP, no sleep study in records  - CAD w/ total RCA occlusion + L to R collateralizations  - GERD  - PAF on eliquis  - Well-controlled IDDMT2 A1c 6.6 c/b neuropathy on gabapentin  - CKD Stage III  - H/o bilateral LE DVT  - H/o esophageal dilation - pt does not remember when or by whom. - Rheumatoid Arthritis - on Plaquenil / Prednisone / Imuran    Plan   -Continue BiPAP now 16/6; ABG 7.48/35/65/26. Once WOB improves, try to wean back to RA. -Hold Spiriva and Symbicort d/t ICS component  -Hold albuterol  -Start DuoNebs q4h WA  -Mucomyst BID to help expectorate sputum; RT and RN instructed to capture sputum for PNA molecular panel  -Monitor SpO2 wean supplemental O2 to maintain SpO2 >90%  -Continue Zosyn d/t concern for aspiration, will defer to ID for further management. Start: 12/20/2021  -Aspiration precautions  -NPO; advance diet as tolerated  -MBS noted penetration of thin liquids SLP following has history of choking episodes has had previous esophageal dilation per pt.  Does not remember name of her Gastroenterologist   -[address] [place] [address] heart failure regimen per cardiology    Electronically signed by   Cornelius Curry MD on 12/22/2021 at 11:34 AM

## 2021-12-22 NOTE — CONSULTS
Comprehensive Nutrition Assessment    Type and Reason for Visit:  Initial,Consult (General [facility] Management)    Nutrition Recommendations/Plan:   *Recommend a Multivitamin w/minerals daily. *Started Glucerna TID. *Continue current diet as ordered. Nutrition Assessment: Pt. nutritionally compromised AEB pt with poor oral intake since admit consuming 50% or less of most meals. At risk for further nutrition compromise r/t admit d/t SOB, ongoing poor oral intake and underlying medical condition (hx: HTN, CKD, HLD, GERD, DM, COPD, CHF, CAD). Nutrition recommendations/interventions as per above. Malnutrition Assessment:  Malnutrition Status: At risk for malnutrition (Comment)    Context:  Acute Illness     Findings of the 6 clinical characteristics of malnutrition:  Energy Intake:  Mild decrease in energy intake (Comment) (50% or less x3 days per EMR since admit)  Weight Loss:  No significant weight loss     Body Fat Loss:  No significant body fat loss     Muscle Mass Loss:  No significant muscle mass loss    Fluid Accumulation:  No significant fluid accumulation Extremities   Strength:  Not Performed    Estimated Daily Nutrient Needs:  Energy (kcal):  4861-3487 kcal/day (15-18 kcal/kg); Weight Used for Energy Requirements:   (89.7 kg)     Protein (g):  68-85 g/day (1.2-1.5 g/kg); Weight Used for Protein Requirements:   (IBW 56.8 kg)          Nutrition Related Findings: admit d/t SOB; pt seen- on bipap at present; pt reports poor intake since admit and is amenable to consume Glucerna TID. Per RN, pt is consuming very little of meals; 0% documented of most meals per EMR; last BM x1 on 12/22. Rx includes: Lipitor, Bumex, Rocephin, Colace, Lantus, Zosyn, Prednisone, IVF. Wounds:  None       Current Nutrition Therapies:    ADULT DIET;  Dysphagia - Soft and Bite Sized  ADULT ORAL NUTRITION SUPPLEMENT; Breakfast, Lunch, Dinner; Diabetic Oral Supplement    Anthropometric Measures:  · Height: 5' 5\" (165.1 cm)  · Current Body Weight: 197 lb 12 oz (89.7 kg) (12/22; +non-pitting generalized edema)   · Admission Body Weight: 195 lb 12.3 oz (88.8 kg) (12/20; +non-pitting generalized edema)    · Usual Body Weight:  (194 lb per pt report. Per EMR: 192 lb 7.4 oz on 12/14/21; 192 lb 7.4 oz on 11/18/21; 197 lb 4.8 oz on 6/8/21)     · Ideal Body Weight: 125 lbs  · BMI: 32.9    · BMI Categories: Obese Class 1 (BMI 30.0-34. 9)       Nutrition Diagnosis:   · Inadequate oral intake related to inadequate protein-energy intake as evidenced by intake 0-25%    Nutrition Interventions:   Food and/or Nutrient Delivery:  Continue Current Diet,Start Oral Nutrition Supplement,Vitamin Supplement  Nutrition Education/Counseling:  Education initiated (Encouraged po intake of meals and ONS at best effort)   Coordination of Nutrition Care:  Continue to monitor while inpatient    Goals:  Pt will consume 75% or more of meals during LOS       Nutrition Monitoring and Evaluation:   Behavioral-Environmental Outcomes:  None Identified   Food/Nutrient Intake Outcomes:  Diet Advancement/Tolerance,Food and Nutrient Intake,Supplement Intake,Vitamin/Mineral Intake  Physical Signs/Symptoms Outcomes:  Biochemical Data,Weight,Skin,Nutrition Focused Physical Findings,GI Status,Fluid Status or Edema     Discharge Planning:     Too soon to determine     Electronically signed by Mauricio Ryan, MS, RD, LD on 12/22/21 at 3:01 PM EST    Contact: [phone]

## 2021-12-22 NOTE — RT PROTOCOL NOTE
RT Inhaler-Nebulizer Bronchodilator Protocol Note    There is a bronchodilator order in the chart from a provider indicating to follow the RT Bronchodilator Protocol and there is an Initiate RT Inhaler-Nebulizer Bronchodilator Protocol order as well (see protocol at bottom of note). CXR Findings:  XR CHEST PORTABLE    Result Date: 12/20/2021  1. Patchy opacities seen in the right upper lobe and left lower lobe. Findings likely relate to infiltrates. **This report has been created using voice recognition software. It may contain minor errors which are inherent in voice recognition technology. ** Final report electronically signed by Dr Azalia Pedraza on 12/20/2021 9:58 AM      The findings from the last RT Protocol Assessment were as follows:   History Pulmonary Disease: Chronic pulmonary disease  Respiratory Pattern: Use of accessory muscles, prolonged exhalation, or RR 26-30 bpm  Breath Sounds: Intermittent or unilateral wheezes  Cough: Strong, productive  Indication for Bronchodilator Therapy: Wheezing associated with pulm disorder  Bronchodilator Assessment Score: 13    Aerosolized bronchodilator medication orders have been revised according to the RT Inhaler-Nebulizer Bronchodilator Protocol below. Respiratory Therapist to perform RT Therapy Protocol Assessment initially then follow the protocol. Repeat RT Therapy Protocol Assessment PRN for score 0-3 or on second treatment, BID, and PRN for scores above 3. No Indications - adjust the frequency to every 6 hours PRN wheezing or bronchospasm, if no treatments needed after 48 hours then discontinue using Per Protocol order mode. If indication present, adjust the RT bronchodilator orders based on the Bronchodilator Assessment Score as indicated below.   Use Inhaler orders unless patient has one or more of the following: on home nebulizer, not able to hold breath for 10 seconds, is not alert and oriented, cannot activate and use MDI correctly, or

## 2021-12-22 NOTE — PROGRESS NOTES
Admit Date: 12/19/2021  Hospital day 3    Subjective:     Patient sob and weakness . Medication side effects: none    Scheduled Meds:   acetylcysteine  600 mg Inhalation BID    piperacillin-tazobactam  3,375 mg IntraVENous Q8H    bumetanide  1 mg IntraVENous BID    albuterol  2.5 mg Nebulization 4x daily    sodium chloride flush  5-40 mL IntraVENous 2 times per day    aspirin  81 mg Oral Daily    atorvastatin  80 mg Oral Nightly    apixaban  5 mg Oral BID    azaTHIOprine  100 mg Oral Daily    calcium carbonate  1 tablet Oral BID    cloNIDine  0.1 mg Oral BID    pantoprazole  40 mg Oral QAM AC    DULoxetine  30 mg Oral BID    docusate sodium  100 mg Oral BID    fenofibrate  160 mg Oral Daily    gabapentin  100 mg Oral TID    hydroxychloroquine  200 mg Oral Daily    insulin glargine  8 Units SubCUTAneous Nightly    traZODone  100 mg Oral Nightly    ranolazine  500 mg Oral BID    predniSONE  5 mg Oral Daily    oxyCODONE-acetaminophen  1 tablet Oral TID    montelukast  5 mg Oral Nightly    metoprolol succinate  50 mg Oral Daily    magnesium oxide  400 mg Oral BID    losartan  50 mg Oral Daily    isosorbide mononitrate  60 mg Oral Daily    budesonide-formoterol  2 puff Inhalation BID    tiotropium  2 puff Inhalation Daily     Continuous Infusions:   dilTIAZem (CARDIZEM) 125 mg in dextrose 5% 125 mL infusion      sodium chloride 20 mL/hr at 12/22/21 0602    sodium chloride       PRN Meds:sodium chloride flush, sodium chloride, potassium chloride **OR** potassium alternative oral replacement **OR** potassium chloride, ondansetron **OR** ondansetron, acetaminophen **OR** acetaminophen, polyethylene glycol, nitroGLYCERIN, aluminum & magnesium hydroxide-simethicone, polyvinyl alcohol, magnesium hydroxide, loperamide    Review of Systems  Pertinent items are noted in HPI.     Objective:     Patient Vitals for the past 8 hrs:   BP Temp Temp src Pulse Resp SpO2 Weight   12/22/21 1206 -- -- -- -- (!) 36 -- --   12/22/21 1101 (!) 179/87 98 °F (36.7 °C) Oral 105 18 91 % --   12/22/21 0545 -- -- -- -- -- -- 197 lb 12 oz (89.7 kg)     I/O last 3 completed shifts: In: 537.2 [I.V.:537.2]  Out: 850 [Urine:850]    No change     ECG: normal sinus rhythm, no blocks or conduction defects, no ischemic changes. Data Review:   CBC:  Lab Results   Component Value Date    WBC 9.7 12/22/2021    RBC 2.86 12/22/2021    HGB 9.0 12/22/2021    HCT 27.6 12/22/2021    MCV 96.5 12/22/2021    MCH 31.5 12/22/2021    MCHC 32.6 12/22/2021    RDW 19.8 12/13/2021     12/22/2021    MPV 9.8 12/22/2021     BMP  Lab Results   Component Value Date     12/22/2021    K 4.8 12/22/2021    K 4.8 12/19/2021    CL 98 12/22/2021    CO2 24 12/22/2021    BUN 22 12/22/2021    CREATININE 1.0 12/22/2021    CALCIUM 9.3 12/22/2021      COAG PROFILE:  Lab Results   Component Value Date    APTT 33.5 11/18/2021    INR 1.22 11/21/2021       Assessment:     Active Problems:    Acute on chronic clinical systolic heart failure (HCC)    Pneumonia due to infectious organism  Resolved Problems:    * No resolved hospital problems.  *      Plan:   Acute exacerbation of chf   Pneumonia on iv antibiotics    dm    sleep apnea continue current rx

## 2021-12-22 NOTE — CONSULTS
[address] [place] [address]                                  CONSULTATION    PATIENT NAME: Gus Fernandes                    :        1940  MED REC NO:   [id number]                           ROOM:       0026  ACCOUNT NO:   [de-identified]                           ADMIT DATE: 2021  PROVIDER:     Mc Farah M.D.    Yandy Abdoul:  2021    REASON FOR CONSULTATION:  She is an 69-year-old female patient admitted  to the hospital due to cough for the last three weeks. HISTORY OF PRESENT ILLNESS:  She is an 69-year-old female patient who  was admitted to the hospital due to cough and shortness of breath. She  has been sick for the last two to three weeks with persistent cough and  shortness of breath. The patient had a history of COPD and congestive  heart failure. She follows with Dr. Lesvia Chávez. She has a history of  diabetes and atrial fibrillation. The patient reports more shortness of  breath and cough and has also been wheezing. She reports she had a  history of asthma, has history of rheumatoid arthritis and had been on  treatment. She does not have any pacemaker or defibrillator. She does  not have any central line. Her blood culture is growing gram-positive  cocci in clusters, has not been fully identified. Her x-ray shows  infiltrates on the right lung. She denies any hemoptysis. PAST MEDICAL HISTORY:  Significant for osteoarthritis, asthma, atrial  fibrillation, coronary artery disease, congestive heart failure, chronic  kidney disease, COPD, depression, diabetes, obesity, hyperlipidemia, and  history of stroke. PAST SURGICAL HISTORY:  Includes appendectomy, history of back surgery,  bladder suspension, carpal tunnel release, esophageal dilatation,  hysterectomy.     CURRENT MEDICATIONS:  She is on IV Zosyn to treat pneumonia, Tylenol,  albuterol, Eliquis, aspirin, Lipitor, azathioprine,  budesonide/formoterol, bumetanide, Catapres, Colace, fenofibrate,  gabapentin, insulin, isosorbide mononitrate, loperamide, metoprolol,  Singulair, oxycodone, Protonix, polyethylene glycol, prednisone, and  trazodone. REVIEW OF SYSTEMS:  As noted above. PHYSICAL EXAMINATION:  GENERAL:  She is awake and oriented, obese. VITAL SIGNS:  Temperature 98.1, respirations 20, pulse 106, blood  pressure 167/79. Weight was recorded at 194 pounds. HEENT:  She has slightly pale conjunctivae. Anicteric sclerae. CHEST:  Bilateral air entry. She has scattered wheeze. CARDIOVASCULAR SYSTEM:  Regular. ABDOMEN:  Soft. EXTREMITIES:  She has bruising of the skin. No edema was noted. CNS:  She is awake and oriented. DIAGNOSTICS:  WBC 12.1, admission WBC was 20.5. Hemoglobin 7.9,  hematocrit 25.2, platelets of 055. Sodium 142, potassium 4.8, chloride  104, bicarb 24, BUN 21, creatinine 1.1, glucose 165. Her procalcitonin  was 10.7. Pro-BNP was 19,117. Troponin was 0.177. IMPRESSION:  She is an 25-year-old female patient, admitted with cough  and shortness of breath. She has CHF exacerbation, asthma, and possible  pneumonia. She has staph bacteremia, identification is pending, is 1/2. We will continue to follow. We will continue IV Zosyn for now. Clinically, the patient seems to have improved since her admission. She  denies any fever. Her leukocytosis has improved. PLAN:  We will continue to monitor the patient. Further recommendation  will follow based on her progress.         Lalo Carter M.D.    D: 12/21/2021 16:51:11       T: 12/21/2021 19:54:17     HAFSA/GABRIELA  Job#: [id number]     Doc#: [id number]    CC:

## 2021-12-23 LAB
EKG Q-T INTERVAL: 482 MS
EKG QRS DURATION: 142 MS
EKG QTC CALCULATION (BAZETT): 589 MS
EKG R AXIS: -18 DEGREES
EKG T AXIS: -147 DEGREES
EKG VENTRICULAR RATE: 90 BPM
GLUCOSE BLD-MCNC: 158 MG/DL (ref 70–108)
SARS-COV-2, NAAT: NOT  DETECTED
SARS-COV-2, PCR: NOT DETECTED

## 2021-12-23 PROCEDURE — 97530 THERAPEUTIC ACTIVITIES: CPT

## 2021-12-23 PROCEDURE — 99232 SBSQ HOSP IP/OBS MODERATE 35: CPT | Performed by: INTERNAL MEDICINE

## 2021-12-23 PROCEDURE — 93010 ELECTROCARDIOGRAM REPORT: CPT | Performed by: INTERNAL MEDICINE

## 2021-12-23 PROCEDURE — 2700000000 HC OXYGEN THERAPY PER DAY

## 2021-12-23 PROCEDURE — 94640 AIRWAY INHALATION TREATMENT: CPT

## 2021-12-23 PROCEDURE — 2500000003 HC RX 250 WO HCPCS: Performed by: INTERNAL MEDICINE

## 2021-12-23 PROCEDURE — 97166 OT EVAL MOD COMPLEX 45 MIN: CPT

## 2021-12-23 PROCEDURE — 87635 SARS-COV-2 COVID-19 AMP PRB: CPT

## 2021-12-23 PROCEDURE — 94660 CPAP INITIATION&MGMT: CPT

## 2021-12-23 PROCEDURE — 6360000002 HC RX W HCPCS

## 2021-12-23 PROCEDURE — 2580000003 HC RX 258

## 2021-12-23 PROCEDURE — 6360000002 HC RX W HCPCS: Performed by: INTERNAL MEDICINE

## 2021-12-23 PROCEDURE — 82948 REAGENT STRIP/BLOOD GLUCOSE: CPT

## 2021-12-23 PROCEDURE — 94761 N-INVAS EAR/PLS OXIMETRY MLT: CPT

## 2021-12-23 PROCEDURE — 93005 ELECTROCARDIOGRAM TRACING: CPT | Performed by: INTERNAL MEDICINE

## 2021-12-23 PROCEDURE — 1200000003 HC TELEMETRY R&B

## 2021-12-23 PROCEDURE — 6370000000 HC RX 637 (ALT 250 FOR IP): Performed by: INTERNAL MEDICINE

## 2021-12-23 PROCEDURE — 6370000000 HC RX 637 (ALT 250 FOR IP)

## 2021-12-23 RX ADMIN — BUMETANIDE 1 MG: 0.25 INJECTION, SOLUTION INTRAMUSCULAR; INTRAVENOUS at 21:39

## 2021-12-23 RX ADMIN — ISOSORBIDE MONONITRATE 60 MG: 60 TABLET, EXTENDED RELEASE ORAL at 10:23

## 2021-12-23 RX ADMIN — PIPERACILLIN AND TAZOBACTAM 3375 MG: 3; .375 INJECTION, POWDER, LYOPHILIZED, FOR SOLUTION INTRAVENOUS at 04:39

## 2021-12-23 RX ADMIN — IPRATROPIUM BROMIDE AND ALBUTEROL SULFATE 1 AMPULE: .5; 3 SOLUTION RESPIRATORY (INHALATION) at 07:59

## 2021-12-23 RX ADMIN — CLONIDINE HYDROCHLORIDE 0.1 MG: 0.1 TABLET ORAL at 21:50

## 2021-12-23 RX ADMIN — RANOLAZINE 500 MG: 500 TABLET, FILM COATED, EXTENDED RELEASE ORAL at 10:22

## 2021-12-23 RX ADMIN — APIXABAN 5 MG: 5 TABLET, FILM COATED ORAL at 10:24

## 2021-12-23 RX ADMIN — GABAPENTIN 100 MG: 100 CAPSULE ORAL at 10:23

## 2021-12-23 RX ADMIN — OXYCODONE AND ACETAMINOPHEN 1 TABLET: 5; 325 TABLET ORAL at 10:24

## 2021-12-23 RX ADMIN — GABAPENTIN 100 MG: 100 CAPSULE ORAL at 21:48

## 2021-12-23 RX ADMIN — IPRATROPIUM BROMIDE AND ALBUTEROL SULFATE 1 AMPULE: .5; 3 SOLUTION RESPIRATORY (INHALATION) at 16:52

## 2021-12-23 RX ADMIN — HYDROXYCHLOROQUINE SULFATE 200 MG: 200 TABLET ORAL at 10:23

## 2021-12-23 RX ADMIN — OXYCODONE AND ACETAMINOPHEN 1 TABLET: 5; 325 TABLET ORAL at 14:57

## 2021-12-23 RX ADMIN — LOSARTAN POTASSIUM 50 MG: 50 TABLET, FILM COATED ORAL at 10:23

## 2021-12-23 RX ADMIN — IPRATROPIUM BROMIDE AND ALBUTEROL SULFATE 1 AMPULE: .5; 3 SOLUTION RESPIRATORY (INHALATION) at 13:17

## 2021-12-23 RX ADMIN — MONTELUKAST SODIUM 5 MG: 5 TABLET, CHEWABLE ORAL at 21:49

## 2021-12-23 RX ADMIN — INSULIN GLARGINE 8 UNITS: 100 INJECTION, SOLUTION SUBCUTANEOUS at 21:39

## 2021-12-23 RX ADMIN — Medication 400 MG: at 10:22

## 2021-12-23 RX ADMIN — GABAPENTIN 100 MG: 100 CAPSULE ORAL at 14:57

## 2021-12-23 RX ADMIN — PIPERACILLIN AND TAZOBACTAM 3375 MG: 3; .375 INJECTION, POWDER, LYOPHILIZED, FOR SOLUTION INTRAVENOUS at 21:55

## 2021-12-23 RX ADMIN — ASPIRIN 81 MG CHEWABLE TABLET 81 MG: 81 TABLET CHEWABLE at 10:23

## 2021-12-23 RX ADMIN — PANTOPRAZOLE SODIUM 40 MG: 40 TABLET, DELAYED RELEASE ORAL at 10:24

## 2021-12-23 RX ADMIN — APIXABAN 5 MG: 5 TABLET, FILM COATED ORAL at 21:48

## 2021-12-23 RX ADMIN — DULOXETINE HYDROCHLORIDE 30 MG: 30 CAPSULE, DELAYED RELEASE ORAL at 10:23

## 2021-12-23 RX ADMIN — Medication 400 MG: at 21:47

## 2021-12-23 RX ADMIN — PIPERACILLIN AND TAZOBACTAM 3375 MG: 3; .375 INJECTION, POWDER, LYOPHILIZED, FOR SOLUTION INTRAVENOUS at 14:52

## 2021-12-23 RX ADMIN — TRAZODONE HYDROCHLORIDE 100 MG: 100 TABLET ORAL at 21:40

## 2021-12-23 RX ADMIN — RANOLAZINE 500 MG: 500 TABLET, FILM COATED, EXTENDED RELEASE ORAL at 21:48

## 2021-12-23 RX ADMIN — OXYCODONE AND ACETAMINOPHEN 1 TABLET: 5; 325 TABLET ORAL at 21:40

## 2021-12-23 RX ADMIN — CLONIDINE HYDROCHLORIDE 0.1 MG: 0.1 TABLET ORAL at 10:23

## 2021-12-23 RX ADMIN — METOPROLOL SUCCINATE 50 MG: 50 TABLET, FILM COATED, EXTENDED RELEASE ORAL at 10:24

## 2021-12-23 RX ADMIN — DOCUSATE SODIUM 100 MG: 100 CAPSULE ORAL at 21:40

## 2021-12-23 RX ADMIN — ATORVASTATIN CALCIUM 80 MG: 80 TABLET, FILM COATED ORAL at 21:47

## 2021-12-23 RX ADMIN — FENOFIBRATE 160 MG: 160 TABLET ORAL at 10:23

## 2021-12-23 RX ADMIN — DULOXETINE HYDROCHLORIDE 30 MG: 30 CAPSULE, DELAYED RELEASE ORAL at 21:49

## 2021-12-23 RX ADMIN — AZATHIOPRINE 100 MG: 50 TABLET ORAL at 10:24

## 2021-12-23 RX ADMIN — PREDNISONE 5 MG: 10 TABLET ORAL at 10:22

## 2021-12-23 ASSESSMENT — PAIN SCALES - GENERAL
PAINLEVEL_OUTOF10: 0
PAINLEVEL_OUTOF10: 5
PAINLEVEL_OUTOF10: 3
PAINLEVEL_OUTOF10: 5

## 2021-12-23 NOTE — PROGRESS NOTES
[place] [facility]      Patient - Mariann Moody   MRN -  [id number]   [facility]   - 1940      Date of Admission -  2021  5:40 AM  Date of evaluation -  2021  Room - Shobha Barbosa MD Primary Care Physician - Rigo Yeboah DO     Problem List      Active Hospital Problems    Diagnosis Date Noted    Pneumonia due to infectious organism [J18.9]     Acute on chronic clinical systolic heart failure [facility]) [I50.23] 2021     Reason for Consult    Acute hypoxic respiratory failure   HPI   History Obtained From: Patient and electronic medical record. Mariann Moody is a 78 y.o. female who presents for undifferentiated shortness of breath and a new oxygen requirement from nursing home. H/o COPD GOLD stage 2 FEV1 64ppv 2021 on symbicort / spiriva / albuterol, Systolic + G2 Diastolic HF EF 62-58% 2/2 hypertrophic cardiomyopathy, CAD w/ total RCA occlusion + L to R collateralizations, Unspecified HARRIS , GERD, PAF on eliquis, Well-controlled IDDMT2 A1c 6.6 c/b neuropathy on gabapentin, CKD Stage III, MAGDI on CPAP, HTN, HLD on fenofibrate, Rheumatoid Arthritis on plaquenil / prednisone / imuran, and Depression. Pt is an unreliable historian but this is her baseline. She is having shortness of breath: No, unreliable    She is having cough: No, unreliable    She is having chest pain:No, unreliable      Past 24 hrs   -Oxygenation stable and improving; Now on 2L NC w/ O2 saturation of 98%  -Exhibited some bilateral tremors R>L while attempting to eat food this morning.  -Pt was alert and oriented and seemed to remember the events of yesterday quite nicely. She is currently in a lucid interval.   -Substernal chest pain resolved; breathing improved. -Afebrile  -Hypertensive  -Cough no longer present. -Mildly tachypneic w/ RR 20-24 however chest rise more accurately equates to RR of 16-18. PMHx   Past Medical History      Diagnosis Date    Arthritis     Asthma 1978    Atrial fibrillation ([facility] [address]     Blood circulation, collateral     CAD (coronary artery disease)     CHF (congestive heart failure) ([facility] 1999    Chronic kidney disease, stage III (moderate) (HCC)     COPD (chronic obstructive pulmonary disease) (HCC)     Depression     Diabetes mellitus (HCC)     Diabetic hypertension-nephrosis syndrome (HCC)     Dysuria     GERD (gastroesophageal reflux disease)     Hyperlipidemia     Hypertension     Kidney disease     stage 3 dr Elly Keane    Unspecified cerebral artery occlusion with cerebral infarction 1999    Dr Merline Seitz informed patient after Heart Attack      Past Surgical History        Procedure Laterality Date    APPENDECTOMY      BACK SURGERY      lumbar spine    BLADDER SUSPENSION      CARDIAC CATHETERIZATION  2009    with stent  [facility]    CARPAL TUNNEL RELEASE      COLONOSCOPY  2013    ENDOSCOPY, COLON, DIAGNOSTIC      ESOPHAGEAL DILATATION      HYSTERECTOMY  1974    JOINT REPLACEMENT Left 1992    Left Knee    UPPER GASTROINTESTINAL ENDOSCOPY N/A 9/28/2021    EGD DILATION [facility] Left 9/28/2021    EGD BIOPSY performed by Cathy Barbour MD at [facility]     Meds    Current Medications    acetylcysteine  600 mg Inhalation BID    ipratropium-albuterol  1 ampule Inhalation Q4H WA    piperacillin-tazobactam  3,375 mg IntraVENous Q8H    bumetanide  1 mg IntraVENous BID    [Held by provider] albuterol  2.5 mg Nebulization 4x daily    sodium chloride flush  5-40 mL IntraVENous 2 times per day    aspirin  81 mg Oral Daily    atorvastatin  80 mg Oral Nightly    apixaban  5 mg Oral BID    azaTHIOprine  100 mg Oral Daily    calcium carbonate  1 tablet Oral BID    cloNIDine  0.1 mg Oral BID    pantoprazole  40 mg Oral QAM AC    DULoxetine  30 mg Oral BID    docusate sodium  100 mg mouth nightly   isosorbide mononitrate (IMDUR) 60 MG extended release tablet, Take 1 tablet by mouth daily  azaTHIOprine (IMURAN) 50 MG tablet, Take 2 tablets by mouth daily  spironolactone (ALDACTONE) 50 MG tablet, Take 1 tablet by mouth daily  Misc. Devices (HEAT THERAPY) MISC, by Does not apply route every 8 hours as needed For pain. To Left shoulder  Elastic Bandages & Supports (T.E.D. ANTI-EMBOLISM STOCKINGS) MISC, by Does not apply route On in AM; off at HS  carboxymethylcellulose (ARTIFICIAL TEARS) 1 % ophthalmic solution, Place 1 drop into both eyes daily as needed for Dry Eyes   EPINEPHrine (EPIPEN 2-LORAINE) 0.3 MG/0.3ML SOAJ injection, Inject 0.3 mLs into the muscle once for 1 dose Use as directed for allergic reaction  nitroGLYCERIN (NITROSTAT) 0.4 MG SL tablet, Place 0.4 mg under the tongue every 5 minutes as needed for Chest pain   Diet    ADULT DIET; Dysphagia - Soft and Bite Sized  ADULT ORAL NUTRITION SUPPLEMENT; Breakfast, Lunch, Dinner; Diabetic Oral Supplement  Allergies    Renflexis [infliximab], Strawberry (diagnostic), Nuts [peanut-containing drug products], Sulfa antibiotics, Tape [adhesive tape], Bactrim [sulfamethoxazole-trimethoprim], and Chocolate  Social History     Social History     Socioeconomic History    Marital status:       Spouse name: Not on file    Number of children: 2    Years of education: 8    Highest education level: Not on file   Occupational History    Not on file   Tobacco Use    Smoking status: Former Smoker     Packs/day: 0.15     Years: 10.00     Pack years: 1.50     Types: Cigarettes     Start date: 10/21/2013    Smokeless tobacco: Never Used   Vaping Use    Vaping Use: Never used   Substance and Sexual Activity    Alcohol use: No    Drug use: No    Sexual activity: Never   Other Topics Concern    Not on file   Social History Narrative    Not on file     Social Determinants of Health     Financial Resource Strain:     Difficulty of Paying Living NO  History of exposure to foundry dust/welding: NO  History of exposure to quarry/silica/sandblasting: NO  History of exposure to asbestos/working with breaks/ships: NO  History of exposure to farm dust: NO  History of recent travel to long distances: NO  History of exposure to birds, pigeons, or chickens in the past:NO  Pet animals at home:No  Dogs: 0  Cats: 0    History of pulmonary embolism in the past: Yes            History of DVT in the past:Yes        [x] Right lower extremity   [x] Left lower extremity. Vitals     height is 5' 5\" (1.651 m) and weight is 197 lb 12 oz (89.7 kg). Her axillary temperature is 97.7 °F (36.5 °C). Her blood pressure is 173/81 (abnormal) and her pulse is 93. Her respiration is 22 and oxygen saturation is 98%. Body mass index is 32.91 kg/m². SUPPLEMENTAL O2: O2 Flow Rate (L/min): 2 L/min     I/O        Intake/Output Summary (Last 24 hours) at 12/23/2021 1121  Last data filed at 12/22/2021 2121  Gross per 24 hour   Intake 10 ml   Output --   Net 10 ml     I/O last 3 completed shifts: In: 10 [I.V.:10]  Out: -    Patient Vitals for the past 96 hrs (Last 3 readings):   Weight   12/22/21 0545 197 lb 12 oz (89.7 kg)   12/21/21 0315 194 lb 3.6 oz (88.1 kg)   12/20/21 0530 195 lb 12.3 oz (88.8 kg)       Exam   Physical Exam  Vitals and nursing note reviewed. Constitutional:       General: She is awake. Appearance: Normal appearance. She is obese. She is not ill-appearing or diaphoretic. HENT:      Head: Normocephalic and atraumatic. Right Ear: External ear normal.      Left Ear: External ear normal.      Nose: Nose normal.      Mouth/Throat:      Mouth: Mucous membranes are moist.      Pharynx: Oropharynx is clear. No oropharyngeal exudate or posterior oropharyngeal erythema. Eyes:      General: Lids are normal. No scleral icterus. Extraocular Movements: Extraocular movements intact. Pupils: Pupils are equal, round, and reactive to light. Cardiovascular:      Rate and Rhythm: Normal rate and regular rhythm. Pulses: Normal pulses. Radial pulses are 2+ on the right side and 2+ on the left side. Posterior tibial pulses are 2+ on the right side and 2+ on the left side. Heart sounds: Normal heart sounds. No murmur heard. No friction rub. No gallop. Pulmonary:      Effort: Pulmonary effort is normal.      Breath sounds: Transmitted upper airway sounds present. Examination of the right-upper field reveals wheezing. Examination of the left-upper field reveals wheezing. Examination of the right-middle field reveals wheezing. Examination of the left-middle field reveals wheezing. Examination of the right-lower field reveals wheezing and rales. Examination of the left-lower field reveals wheezing and rales. Wheezing (improved significantly) and rales (improving) present. No rhonchi. Abdominal:      General: Bowel sounds are normal.      Palpations: Abdomen is soft. Tenderness: There is no abdominal tenderness. There is no guarding or rebound. Musculoskeletal:      Cervical back: Normal range of motion and neck supple. Right lower leg: No edema. Left lower leg: No edema. Skin:     General: Skin is warm and dry. Neurological:      General: No focal deficit present. Mental Status: She is alert and oriented to person, place, and time. Mental status is at baseline. GCS: GCS eye subscore is 4. GCS verbal subscore is 5. GCS motor subscore is 6. Psychiatric:         Attention and Perception: Attention and perception normal.         Mood and Affect: Mood and affect normal.         Speech: Speech normal.         Behavior: Behavior normal. Behavior is cooperative. Thought Content:  Thought content normal.         Cognition and Memory: Cognition and memory normal.         Judgment: Judgment normal.         Labs  - Old records and notes have been reviewed in [facility]   AB  Lab Results   Component Value Date    PH 7.48 12/22/2021    PO2 65 12/22/2021    PCO2 35 12/22/2021    HCO3 26 12/22/2021    O2SAT 94 12/22/2021     Lab Results   Component Value Date    IFIO2 30 12/22/2021    SETPEEP 6.0 12/22/2021     CBC  Recent Labs     12/22/21  0737   WBC 9.7   RBC 2.86*   HGB 9.0*   HCT 27.6*   MCV 96.5   MCH 31.5   MCHC 32.6      MPV 9.8      BMP  Recent Labs     12/22/21  0737      K 4.8   CL 98   CO2 24   BUN 22   CREATININE 1.0   GLUCOSE 175*   CALCIUM 9.3     LFT  No results for input(s): AST, ALT, ALB, BILITOT, ALKPHOS, LIPASE in the last 72 hours. Invalid input(s): AMYLASE  TROP  Lab Results   Component Value Date    TROPONINT 0.177 12/19/2021    TROPONINT 0.198 12/19/2021    TROPONINT 0.275 12/19/2021     BNP  No results for input(s): BNP in the last 72 hours. Lactic Acid  No results for input(s): LACTA in the last 72 hours. INR  No results for input(s): INR, PROTIME in the last 72 hours. PTT  No results for input(s): APTT in the last 72 hours. Glucose  Recent Labs     12/21/21  2144 12/22/21  0941 12/22/21  2100   POCGLU 140* 209* 143*     UA No results for input(s): SPECGRAV, PHUR, COLORU, CLARITYU, MUCUS, PROTEINU, BLOODU, RBCUA, WBCUA, BACTERIA, NITRU, GLUCOSEU, BILIRUBINUR, UROBILINOGEN, KETUA, LABCAST, LABCASTTY, AMORPHOS in the last 72 hours. Invalid input(s): CRYSTALS. PFTs           Sleep studies   None in record    Cultures        EKG     Chronic RBBB  Echocardiogram   08/05/2020   Findings      Mitral Valve   The mitral valve structure was normal with normal leaflet separation. DOPPLER: The transmitral velocity was within the normal range with no   evidence for mitral stenosis. There was moderate central jetf mitral   regurgitation. Aortic Valve   The aortic valve was trileaflet with normal thickness and cuspal   separation. DOPPLER: Transaortic velocity was within the normal range with   no evidence of aortic stenosis.  There was no evidence of aortic regurgitation. Tricuspid Valve   The tricuspid valve structure was normal with normal leaflet separation. DOPPLER: There was no evidence of tricuspid stenosis. There was mild   tricuspid regurgitation. Pulmonic Valve   The pulmonic valve leaflets exhibited normal thickness, no calcification,   and normal cuspal separation. DOPPLER: The transpulmonic velocity was   within the normal range with no evidence for regurgitation. mild pulmonary   htn      Left Atrium   Left atrial size was dilated      Left Ventricle   Normal left ventricle size and systolic function. Ejection fraction was   estimated at 50-55%. There were no regional left ventricular wall motion   abnormalities and wall thickness was within normal limits. Features were consistent with a pseudonormal left ventricular filling   pattern, with concomitant abnormal relaxation and increased filling   pressure (grade 2 diastolic dysfunction). Right Atrium   Right atrial size was normal.      Right Ventricle   The right ventricular size was normal with normal systolic function and   wall thickness. Right ventricular systolic pressure of 40 mm Hg consistent with mild   pulmonary hypertension. Pericardial Effusion   The pericardium was normal in appearance with no evidence of a pericardial   effusion. Pleural Effusion   No evidence of pleural effusion. Aorta / Great Vessels   -Aortic root dimension within normal limits.   -The Pulmonary artery is within normal limits. -IVC size is within normal limits with normal respiratory phasic changes. Radiology    CXR    12/22/2021 @1058 vs 12/22/2021 @0749 vs 10/20/2021 @0921    1. Poor inflation of lungs. 2. Questionable tiny effusion of L side. Moderate interstitial pneumonia both mid and LL fields. Overall chest appears slightly worse than prior. XR CHEST PORTABLE   12/20/2021   FINDINGS:  Patchy opacities seen in the right upper lobe and left lower lobe.   Cardiac silhouette is mildly enlarged. Aortic arch calcifications. No pleural effusion. No pneumothorax. No acute bony abnormality. The bones appear demineralized. Degenerative changes of the thoracic spine   Impression:  1. Patchy opacities seen in the right upper lobe and left lower lobe. Findings likely relate to infiltrates. CT Scans    1. Filling defects in the R & L pulmonary arteries and in the artery supplying the RLL consistent with pulmonary embolus, smaller than on previous CTA dated 11/06/2021. No new filling defects noted  2. Areas of abnormal density in the RU/M/LL lung fields new since previous study 11/06/2021 consistent with inflammatory process. 3. Right pleural effusion  (See actual reports for details)    FL MODIFIED BARIUM SWALLOW W VIDEO   12/21/2021   1. Laryngeal penetration of thin barium without evidence of aspiration. 2. Additional recommendations from the speech therapist will follow. Assessment   - Acute Hypoxic Respiratory Failure 2/2 CHF vs COPD Exacerbation from Aspiration Pneumonia - 12/22/2021 - oxygenation acutely worsened req 4L NC w/ evidence of cephalization and worsening cardiomegaly on CXR and wheezes on auscultation. 12/23/2021 - oxygenation improved to 2L NC, continues to exhibit wheezes after optimization of COPD regimen which are likely cardiac in nature. - COPD GOLD 2 FEV1 64ppv 02/23/2021  Trigger: bacterial PNA vs CHF exacerbation. Initial sxs: wheezing. CTA on admit: R lung opacities in upper, middle, and lower lobe. Procal on admit 10.74. 1.5 pyh, quit in 2013. A1AT: never screened. Pneumonia vaccine: no. Home meds: symbicort and spiriva maintenance + albuterol rescue. Baseline O2 room air.  - Systolic + G2 Diastolic HF EF 73-67% 2/2 hypertrophic cardiomyopathy and ICM  - Aspiration Pneumonia - cannot exclude atypical or aspiration etiologies given isolated R-lung radiographic opacities. Not MRSA as blood cultures x1 growing MSSA.  12/21/2021 - MBS with noted penetration. 12/22 - Procal 3.67 from 10.74 12/19 (improved). 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 Likely more L heart failure given pt has no appreciable pedal edema with lower extremity wrinkling of the skin. - MAGDI on CPAP - no sleep study in records  - CAD w/ total RCA occlusion + L to R collateralizations  - GERD  - PAF on eliquis  - Well-controlled IDDMT2 A1c 6.6 c/b neuropathy on gabapentin  - CKD Stage III  - H/o bilateral LE DVT  - H/o esophageal dilation - pt does not remember when or by whom. - Rheumatoid Arthritis - on Plaquenil / Prednisone / Imuran    Plan   -Continue BiPAP now 16/6; ABG 7.48/35/65/26. Once WOB improves, try to wean back to RA.   -Continue holding Spiriva and Symbicort d/t ICS component  -Continue holding albuterol  -Continue DuoNebs q4h WA; de-escalate as appropriate  -Continue mucomyst BID to help expectorate sputum  -Molecular PNA panel pending  -Monitor SpO2 wean supplemental O2 to maintain SpO2 >90%  -Continue Zosyn d/t concern for aspiration, will defer to ID for further management. Start: 12/20/2021  -Advance diet as tolerated; appreciate SLP recommendations given MBS noted penetration of thin liquids SLP following has history of choking episodes has had previous esophageal dilation per pt.  Does not remember name of her Gastroenterologist   -[address] [place] [address] heart failure regimen per cardiology    Electronically signed by   Monty Libman, MD on 12/23/2021 at 11:21 AM

## 2021-12-23 NOTE — PROGRESS NOTES
Progress note: Infectious diseases    Patient - Mihai Taylor,  Age - 78 y.o.    - 1940      Room Number - 4Q-76/942-M   MRN -  [id number]   Acct # - [de-identified]  Date of Admission -  2021  5:40 AM    SUBJECTIVE:   She is weak,no fever,no chest pain    OBJECTIVE   VITALS    height is 5' 5\" (1.651 m) and weight is 197 lb 12 oz (89.7 kg). Her axillary temperature is 97.7 °F (36.5 °C). Her blood pressure is 173/81 (abnormal) and her pulse is 93. Her respiration is 22 and oxygen saturation is 98%. Wt Readings from Last 3 Encounters:   21 197 lb 12 oz (89.7 kg)   21 192 lb 7.4 oz (87.3 kg)   21 192 lb 7.4 oz (87.3 kg)       I/O (24 Hours)    Intake/Output Summary (Last 24 hours) at 2021 1224  Last data filed at 2021 2121  Gross per 24 hour   Intake 10 ml   Output --   Net 10 ml       General Appearance sick looking on nasal oxygen  HEENT - slighlty pale   Neck - Supple, no mass. Lungs -  Bilateral   air entry, +rhonchi, crackles at the lung bases.   Cardiovascular - Heart sounds are normal.     Abdomen - soft, not distended, nontender  Neurologic -awake and oriented  Skin - No bruising or bleeding  Extremities - +edema, no cyanosis, clubbing     MEDICATIONS:      acetylcysteine  600 mg Inhalation BID    ipratropium-albuterol  1 ampule Inhalation Q4H WA    piperacillin-tazobactam  3,375 mg IntraVENous Q8H    bumetanide  1 mg IntraVENous BID    [Held by provider] albuterol  2.5 mg Nebulization 4x daily    sodium chloride flush  5-40 mL IntraVENous 2 times per day    aspirin  81 mg Oral Daily    atorvastatin  80 mg Oral Nightly    apixaban  5 mg Oral BID    azaTHIOprine  100 mg Oral Daily    calcium carbonate  1 tablet Oral BID    cloNIDine  0.1 mg Oral BID    pantoprazole  40 mg Oral QAM AC    DULoxetine  30 mg Oral BID    docusate sodium  100 mg Oral BID    disease) I25.10    Hypertension I10    Atrial fibrillation (HCC) I48.91    GERD (gastroesophageal reflux disease) K21.9    Chronic kidney disease, stage III (moderate) ([facility]) N18.30    COPD (chronic obstructive pulmonary disease) ([facility]) J44.9    Dysuria R30.0    Diabetic hypertension-nephrosis syndrome ([facility]) E11.21    Hypokalemia E87.6    Abnormal nuclear cardiac imaging test R93.1    Back pain, chronic M54.9, G89.29    Obesity (BMI 35.0-39.9 without comorbidity) E66.9    H/O class III angina pectoris Z86.79    HTN, goal below 130/80 I10    Non-rheumatic mitral regurgitation I34.0    Abnormal nuclear stress test R94.39    Osteopenia of multiple sites M85.89    Seronegative rheumatoid arthritis ([facility] [address] M06.00    Acute respiratory failure (HCC) J96.00    Diverticulitis K57.92    Diverticulitis of colon K57.32    Multiple falls R29.6    EMMA (acute kidney injury) ([facility] [address] N17.9    Urinary tract infection in female N39.0    Acute diverticulitis K57.92    Rectal bleeding K62.5    Acute on chronic clinical systolic heart failure (HCC) I50.23    Pneumonia due to infectious organism J18.9         ASSESSMENT/PLAN   Pneumonia  CHF   Deconditioning:she is very weak,requires assistance  Continue current treatment.       Antoine Villalobos MD, MD, [address] 12/23/2021 12:24 PM

## 2021-12-23 NOTE — PROGRESS NOTES
Golden Lemon 58  INPATIENT OCCUPATIONAL THERAPY  [facility] ONC MED 5K  EVALUATION    Time:   Time In: 4843  Time Out: 1237  Timed Code Treatment Minutes: 10 Minutes  Minutes: 25          Date: 2021  Patient Name: Lauri Whitley,   Gender: female      MRN: [id number]  : 1940  (78 y.o.)  Referring Practitioner: Dr. Lorraine Mccord  Diagnosis: elevated troponin level  Additional Pertinent Hx: She is an 78-year-old female patient whowas admitted to the hospital due to cough and shortness of breath. John Salvador been sick for the last two to three weeks with persistent cough andshortness of breath. The patient had a history of COPD and congestiveheart failure. She follows with Dr. Parveen Atkinson. She has a history ofdiabetes and atrial fibrillation. The patient reports more shortness ofbreath and cough and has also been wheezing. She reports she had ahistory of asthma, has history of rheumatoid arthritis and had been ontreatment. She does not have any pacemaker or defibrillator. She doesnot have any central line. Her blood culture is growing gram-positivecocci in clusters, has not been fully identified. Her x-ray showsinfiltrates on the right lung. She denies any hemoptysis. Restrictions/Precautions:  Restrictions/Precautions: General Precautions,Fall Risk (aspiration precautions)    Subjective  Chart Reviewed: Yes,Orders,Progress Notes,History and Physical  Patient assessed for rehabilitation services?: Yes  Family / Caregiver Present: No    Subjective: Pt supine in bed upon arrival, agreeable to OT session with minimal encouragement. Pt does report feeling better once up in chair.     Pain:  Pain Assessment  Patient Currently in Pain: Denies    Vitals: Oxygen: >90% throughout while on 2L O2 via NC  Heart Rate: 100s on arrival, increased to 130s with activity    Social/Functional History:  Type of Home: Facility [facility] in [facility]  Home Equipment: [address]           ADL Assistance: Needs assistance  Ambulation Assistance: Needs assistance  Transfer Assistance: Needs assistance          Additional Comments: Pt reported staff assists with ADLs and stand pivot transfers to/from w/c (assist X2 for transfers). Pt reported does not ambulate. VISION:Corrected    HEARING:  WFL    COGNITION: Slow Processing, Decreased Recall and Impaired Memory    RANGE OF MOTION:  Bilateral Upper Extremity:  Impaired - decreased at shoulders, difficulty reaching fully for sera stedy bar    STRENGTH:  Bilateral Upper Extremity:  Impaired - grossly deconditioned    ADL:   No ADL's completed this session. Nava Stands BALANCE:  Sitting Balance:  Contact Guard Assistance. at EOB  Standing Balance: Moderate Assistance, X 2, with verbal cues . sera stedy for support; max cues for upright posture. Pt tolerated standing ~15 seconds. BED MOBILITY:  Supine to Sit: Moderate Assistance, with head of bed raised, with rail ; increased time to complete  Scooting: Minimal Assistance ; advancing hips forward to EOB    TRANSFERS:  Sit to Stand: Moderate Assistance, X 2, with Julius Doom, with increased time for completion, cues for hand placement. ; assist required for lift at hips and achieving fully upright posture  Stand to Sit: Minimal Assistance, X 2, with Julius Doom, with increased time for completion, cues for hand placement. Completed transfers X2 trials. FUNCTIONAL MOBILITY:  N/A, does not complete at baseline. Activity Tolerance:  Patient tolerance of  treatment: fair. Assessment:  Assessment: Pt presents requiring increased assistance for ADLs, transfers, and functional mobility compared to PLOF. Pt will continue to benefit from OT services to improve independence with these tasks, in addition to overall strength/endurance to facilitate return to PLOF.      Performance deficits / Impairments: Decreased functional mobility ,Decreased ADL status,Decreased strength,Decreased endurance,Decreased balance  Prognosis: Fair  REQUIRES OT FOLLOW UP: Yes  Decision Making: High Complexity    Treatment Initiated: Treatment and education initiated within context of evaluation. Evaluation time included review of current medical information, gathering information related to past medical, social and functional history, completion of standardized testing, formal and informal observation of tasks, assessment of data and development of plan of care and goals. Treatment time included skilled education and facilitation of tasks to increase safety and independence with ADL's for improved functional independence and quality of life. Discharge Recommendations:  ECF with OT    Patient Education:  OT Education: Christopher Valdivia of [address] [place] [address],[address] Training (increasing activity)    Equipment Recommendations:  Equipment Needed: No    Plan:  Times per week: 3-5x  Current Treatment Recommendations: Carter Castrejon Education & Training,Patient/Caregiver Education & Training,Self-Care / Ky Liriano Training,ROM. See long-term goal time frame for expected duration of plan of care. If no long-term goals established, a short length of stay is anticipated. Goals:     Short term goals  Time Frame for Short term goals: by discharge  Short term goal 1: Pt will complete sit<>stand transfers with sera stedy and minimal assistance X2 in prep for toileting tasks. Short term goal 2: Pt will tolerate standing X1 minute with minimal assistance X2 in prep for hygiene/clothing mgmt. Short term goal 3: Pt will complete BADL tasks with moderate assistance to increase with self care tasks. Following session, patient left in safe position with all fall risk precautions in place.

## 2021-12-23 NOTE — CARE COORDINATION
DISCHARGE PLAN Mayi Herring       12/22 CXR: Poor inflation the lungs. Cardiomegaly. Moderate degenerative changes left shoulder. 2. Questionable tiny effusion left side. Moderate interstitial pneumonia/edema both mid and lower lung fields. Overall appearance slightly worse than prior. Barriers to Discharge: SLP/ aspiration precautions, BIPAP at HS. IV Zosyn for staph bacteremia, Mucomyst, Bumex IV 2x/day, med neb, O2 4L/min NC sats 99%, wean as able. Palliative care. PT/OT evals. Patient Goals/Plan/Treatment Preferences: plans return to [address] [place] [address]. SW follows.

## 2021-12-23 NOTE — PROGRESS NOTES
Admit Date: 12/19/2021  Hospital day 4    Subjective:     Patient had sob and weakness .    Medication side effects: none    Scheduled Meds:   acetylcysteine  600 mg Inhalation BID    ipratropium-albuterol  1 ampule Inhalation Q4H WA    piperacillin-tazobactam  3,375 mg IntraVENous Q8H    bumetanide  1 mg IntraVENous BID    [Held by provider] albuterol  2.5 mg Nebulization 4x daily    sodium chloride flush  5-40 mL IntraVENous 2 times per day    aspirin  81 mg Oral Daily    atorvastatin  80 mg Oral Nightly    apixaban  5 mg Oral BID    azaTHIOprine  100 mg Oral Daily    calcium carbonate  1 tablet Oral BID    cloNIDine  0.1 mg Oral BID    pantoprazole  40 mg Oral QAM AC    DULoxetine  30 mg Oral BID    docusate sodium  100 mg Oral BID    fenofibrate  160 mg Oral Daily    gabapentin  100 mg Oral TID    hydroxychloroquine  200 mg Oral Daily    insulin glargine  8 Units SubCUTAneous Nightly    traZODone  100 mg Oral Nightly    ranolazine  500 mg Oral BID    predniSONE  5 mg Oral Daily    oxyCODONE-acetaminophen  1 tablet Oral TID    montelukast  5 mg Oral Nightly    metoprolol succinate  50 mg Oral Daily    magnesium oxide  400 mg Oral BID    losartan  50 mg Oral Daily    isosorbide mononitrate  60 mg Oral Daily    [Held by provider] budesonide-formoterol  2 puff Inhalation BID    [Held by provider] tiotropium  2 puff Inhalation Daily     Continuous Infusions:   dilTIAZem (CARDIZEM) 125 mg in dextrose 5% 125 mL infusion      sodium chloride 20 mL/hr at 12/22/21 0602    sodium chloride       PRN Meds:sodium chloride flush, sodium chloride, potassium chloride **OR** potassium alternative oral replacement **OR** potassium chloride, ondansetron **OR** ondansetron, acetaminophen **OR** acetaminophen, polyethylene glycol, nitroGLYCERIN, aluminum & magnesium hydroxide-simethicone, polyvinyl alcohol, magnesium hydroxide, loperamide    Review of Systems  Pertinent items are noted in HPI.    Objective:     Patient Vitals for the past 8 hrs:   BP Pulse Resp SpO2   12/23/21 1317 -- -- 22 96 %   12/23/21 1021 (!) 173/81 93 22 98 %   12/23/21 0759 -- -- 24 98 %     I/O last 3 completed shifts: In: 10 [I.V.:10]  Out: -     No change     ECG: af, rvr . Data Review:   CBC:  Lab Results   Component Value Date    WBC 9.7 12/22/2021    RBC 2.86 12/22/2021    HGB 9.0 12/22/2021    HCT 27.6 12/22/2021    MCV 96.5 12/22/2021    MCH 31.5 12/22/2021    MCHC 32.6 12/22/2021    RDW 19.8 12/13/2021     12/22/2021    MPV 9.8 12/22/2021     BMP  Lab Results   Component Value Date     12/22/2021    K 4.8 12/22/2021    K 4.8 12/19/2021    CL 98 12/22/2021    CO2 24 12/22/2021    BUN 22 12/22/2021    CREATININE 1.0 12/22/2021    CALCIUM 9.3 12/22/2021      COAG PROFILE:  Lab Results   Component Value Date    APTT 33.5 11/18/2021    INR 1.22 11/21/2021       Assessment:     Active Problems:    Acute on chronic clinical systolic heart failure (HCC)    Pneumonia due to infectious organism  Resolved Problems:    * No resolved hospital problems.  *      Plan:   Had an episode of sob and atrial fib with rapid ventricular response    on amiodarone    anaemia stable    acute chf    pneumonia on iv antbiotics    copd and sleep apnea    pulmonary service is managing    continue supportive rx

## 2021-12-24 LAB
BLOOD CULTURE, ROUTINE: NORMAL
GLUCOSE BLD-MCNC: 125 MG/DL (ref 70–108)
GLUCOSE BLD-MCNC: 148 MG/DL (ref 70–108)
GLUCOSE BLD-MCNC: 156 MG/DL (ref 70–108)
GLUCOSE BLD-MCNC: 186 MG/DL (ref 70–108)
REASON FOR REJECTION: NORMAL
REJECTED TEST: NORMAL

## 2021-12-24 PROCEDURE — 6370000000 HC RX 637 (ALT 250 FOR IP)

## 2021-12-24 PROCEDURE — 6360000002 HC RX W HCPCS

## 2021-12-24 PROCEDURE — 6370000000 HC RX 637 (ALT 250 FOR IP): Performed by: INTERNAL MEDICINE

## 2021-12-24 PROCEDURE — 99232 SBSQ HOSP IP/OBS MODERATE 35: CPT | Performed by: INTERNAL MEDICINE

## 2021-12-24 PROCEDURE — 2580000003 HC RX 258

## 2021-12-24 PROCEDURE — 2500000003 HC RX 250 WO HCPCS: Performed by: INTERNAL MEDICINE

## 2021-12-24 PROCEDURE — 2700000000 HC OXYGEN THERAPY PER DAY

## 2021-12-24 PROCEDURE — 1200000003 HC TELEMETRY R&B

## 2021-12-24 PROCEDURE — 6360000002 HC RX W HCPCS: Performed by: INTERNAL MEDICINE

## 2021-12-24 PROCEDURE — 82948 REAGENT STRIP/BLOOD GLUCOSE: CPT

## 2021-12-24 PROCEDURE — 94640 AIRWAY INHALATION TREATMENT: CPT

## 2021-12-24 RX ORDER — IPRATROPIUM BROMIDE AND ALBUTEROL SULFATE 2.5; .5 MG/3ML; MG/3ML
1 SOLUTION RESPIRATORY (INHALATION) 2 TIMES DAILY
Status: DISCONTINUED | OUTPATIENT
Start: 2021-12-24 | End: 2021-12-28 | Stop reason: HOSPADM

## 2021-12-24 RX ADMIN — PREDNISONE 5 MG: 10 TABLET ORAL at 09:47

## 2021-12-24 RX ADMIN — GABAPENTIN 100 MG: 100 CAPSULE ORAL at 09:43

## 2021-12-24 RX ADMIN — IPRATROPIUM BROMIDE AND ALBUTEROL SULFATE 1 AMPULE: .5; 3 SOLUTION RESPIRATORY (INHALATION) at 07:54

## 2021-12-24 RX ADMIN — OXYCODONE AND ACETAMINOPHEN 1 TABLET: 5; 325 TABLET ORAL at 14:24

## 2021-12-24 RX ADMIN — PIPERACILLIN AND TAZOBACTAM 3375 MG: 3; .375 INJECTION, POWDER, LYOPHILIZED, FOR SOLUTION INTRAVENOUS at 06:26

## 2021-12-24 RX ADMIN — DULOXETINE HYDROCHLORIDE 30 MG: 30 CAPSULE, DELAYED RELEASE ORAL at 09:44

## 2021-12-24 RX ADMIN — METOPROLOL SUCCINATE 50 MG: 50 TABLET, FILM COATED, EXTENDED RELEASE ORAL at 09:44

## 2021-12-24 RX ADMIN — AZATHIOPRINE 100 MG: 50 TABLET ORAL at 09:48

## 2021-12-24 RX ADMIN — ANTACID TABLETS 500 MG: 500 TABLET, CHEWABLE ORAL at 21:55

## 2021-12-24 RX ADMIN — ANTACID TABLETS 500 MG: 500 TABLET, CHEWABLE ORAL at 09:48

## 2021-12-24 RX ADMIN — ASPIRIN 81 MG CHEWABLE TABLET 81 MG: 81 TABLET CHEWABLE at 09:42

## 2021-12-24 RX ADMIN — ISOSORBIDE MONONITRATE 60 MG: 60 TABLET, EXTENDED RELEASE ORAL at 09:44

## 2021-12-24 RX ADMIN — Medication 400 MG: at 21:54

## 2021-12-24 RX ADMIN — FENOFIBRATE 160 MG: 160 TABLET ORAL at 09:43

## 2021-12-24 RX ADMIN — CLONIDINE HYDROCHLORIDE 0.1 MG: 0.1 TABLET ORAL at 09:43

## 2021-12-24 RX ADMIN — OXYCODONE AND ACETAMINOPHEN 1 TABLET: 5; 325 TABLET ORAL at 09:42

## 2021-12-24 RX ADMIN — DOCUSATE SODIUM 100 MG: 100 CAPSULE ORAL at 09:47

## 2021-12-24 RX ADMIN — INSULIN GLARGINE 8 UNITS: 100 INJECTION, SOLUTION SUBCUTANEOUS at 21:58

## 2021-12-24 RX ADMIN — BUMETANIDE 1 MG: 0.25 INJECTION, SOLUTION INTRAMUSCULAR; INTRAVENOUS at 09:42

## 2021-12-24 RX ADMIN — GABAPENTIN 100 MG: 100 CAPSULE ORAL at 21:55

## 2021-12-24 RX ADMIN — ACETYLCYSTEINE 600 MG: 200 SOLUTION ORAL; RESPIRATORY (INHALATION) at 07:52

## 2021-12-24 RX ADMIN — APIXABAN 5 MG: 5 TABLET, FILM COATED ORAL at 09:44

## 2021-12-24 RX ADMIN — MONTELUKAST SODIUM 5 MG: 5 TABLET, CHEWABLE ORAL at 21:55

## 2021-12-24 RX ADMIN — ACETYLCYSTEINE 600 MG: 200 SOLUTION ORAL; RESPIRATORY (INHALATION) at 20:36

## 2021-12-24 RX ADMIN — Medication 400 MG: at 09:43

## 2021-12-24 RX ADMIN — RANOLAZINE 500 MG: 500 TABLET, FILM COATED, EXTENDED RELEASE ORAL at 09:44

## 2021-12-24 RX ADMIN — BUMETANIDE 1 MG: 0.25 INJECTION, SOLUTION INTRAMUSCULAR; INTRAVENOUS at 21:52

## 2021-12-24 RX ADMIN — IPRATROPIUM BROMIDE AND ALBUTEROL SULFATE 1 AMPULE: .5; 3 SOLUTION RESPIRATORY (INHALATION) at 20:34

## 2021-12-24 RX ADMIN — PANTOPRAZOLE SODIUM 40 MG: 40 TABLET, DELAYED RELEASE ORAL at 09:42

## 2021-12-24 RX ADMIN — HYDROXYCHLOROQUINE SULFATE 200 MG: 200 TABLET ORAL at 09:44

## 2021-12-24 RX ADMIN — LOSARTAN POTASSIUM 50 MG: 50 TABLET, FILM COATED ORAL at 09:47

## 2021-12-24 RX ADMIN — CLONIDINE HYDROCHLORIDE 0.1 MG: 0.1 TABLET ORAL at 21:55

## 2021-12-24 RX ADMIN — APIXABAN 5 MG: 5 TABLET, FILM COATED ORAL at 21:55

## 2021-12-24 RX ADMIN — TRAZODONE HYDROCHLORIDE 100 MG: 100 TABLET ORAL at 21:58

## 2021-12-24 RX ADMIN — RANOLAZINE 500 MG: 500 TABLET, FILM COATED, EXTENDED RELEASE ORAL at 21:54

## 2021-12-24 RX ADMIN — OXYCODONE AND ACETAMINOPHEN 1 TABLET: 5; 325 TABLET ORAL at 21:54

## 2021-12-24 RX ADMIN — ATORVASTATIN CALCIUM 80 MG: 80 TABLET, FILM COATED ORAL at 21:54

## 2021-12-24 RX ADMIN — DOCUSATE SODIUM 100 MG: 100 CAPSULE ORAL at 21:54

## 2021-12-24 RX ADMIN — GABAPENTIN 100 MG: 100 CAPSULE ORAL at 14:22

## 2021-12-24 RX ADMIN — PIPERACILLIN AND TAZOBACTAM 3375 MG: 3; .375 INJECTION, POWDER, LYOPHILIZED, FOR SOLUTION INTRAVENOUS at 21:50

## 2021-12-24 RX ADMIN — PIPERACILLIN AND TAZOBACTAM 3375 MG: 3; .375 INJECTION, POWDER, LYOPHILIZED, FOR SOLUTION INTRAVENOUS at 14:21

## 2021-12-24 RX ADMIN — DULOXETINE HYDROCHLORIDE 30 MG: 30 CAPSULE, DELAYED RELEASE ORAL at 21:54

## 2021-12-24 ASSESSMENT — PAIN SCALES - GENERAL
PAINLEVEL_OUTOF10: 6
PAINLEVEL_OUTOF10: 0
PAINLEVEL_OUTOF10: 5
PAINLEVEL_OUTOF10: 10

## 2021-12-24 NOTE — PROGRESS NOTES
12/24/21 0758   RT Protocol   History Pulmonary Disease 2   Respiratory pattern 2   Breath sounds 2   Cough 0   Bronchodilator Assessment Score 6

## 2021-12-24 NOTE — PROGRESS NOTES
Progress note: Infectious diseases    Patient - Gale Marin,  Age - 78 y.o.    - 1940      Room Number - 6V-15/400-B   MRN -  [id number]   Acct # - [de-identified]  Date of Admission -  2021  5:40 AM    SUBJECTIVE:   No new issues  Staff concerned of aspiration    OBJECTIVE   VITALS    height is 5' 5\" (1.651 m) and weight is 197 lb 12 oz (89.7 kg). Her oral temperature is 97.1 °F (36.2 °C). Her blood pressure is 171/75 (abnormal) and her pulse is 90. Her respiration is 16 and oxygen saturation is 99%. Wt Readings from Last 3 Encounters:   21 197 lb 12 oz (89.7 kg)   21 192 lb 7.4 oz (87.3 kg)   21 192 lb 7.4 oz (87.3 kg)       I/O (24 Hours)    Intake/Output Summary (Last 24 hours) at 2021 1202  Last data filed at 2021 1330  Gross per 24 hour   Intake 541.48 ml   Output 750 ml   Net -208.52 ml       General Appearance sick looking on nasal oxygen  HEENT - slighlty pale   Neck - Supple, no mass.   Lungs -  Bilateral   air entry, scatterred rhonchi   Cardiovascular - Heart sounds are normal.     Abdomen - soft, not distended, nontender  Neurologic -awake and oriented  Skin - No bruising or bleeding  Extremities - +edema, no cyanosis, clubbing     MEDICATIONS:      ipratropium-albuterol  1 ampule Inhalation BID    acetylcysteine  600 mg Inhalation BID    piperacillin-tazobactam  3,375 mg IntraVENous Q8H    bumetanide  1 mg IntraVENous BID    [Held by provider] albuterol  2.5 mg Nebulization 4x daily    sodium chloride flush  5-40 mL IntraVENous 2 times per day    aspirin  81 mg Oral Daily    atorvastatin  80 mg Oral Nightly    apixaban  5 mg Oral BID    azaTHIOprine  100 mg Oral Daily    calcium carbonate  1 tablet Oral BID    cloNIDine  0.1 mg Oral BID    pantoprazole  40 mg Oral QAM AC    DULoxetine  30 mg Oral BID    docusate sodium  100 mg Oral BID    fenofibrate 160 mg Oral Daily    gabapentin  100 mg Oral TID    hydroxychloroquine  200 mg Oral Daily    insulin glargine  8 Units SubCUTAneous Nightly    traZODone  100 mg Oral Nightly    ranolazine  500 mg Oral BID    predniSONE  5 mg Oral Daily    oxyCODONE-acetaminophen  1 tablet Oral TID    montelukast  5 mg Oral Nightly    metoprolol succinate  50 mg Oral Daily    magnesium oxide  400 mg Oral BID    losartan  50 mg Oral Daily    isosorbide mononitrate  60 mg Oral Daily    [Held by provider] budesonide-formoterol  2 puff Inhalation BID    [Held by provider] tiotropium  2 puff Inhalation Daily      dilTIAZem (CARDIZEM) 125 mg in dextrose 5% 125 mL infusion      sodium chloride 20 mL/hr at 12/22/21 0602    sodium chloride       sodium chloride flush, sodium chloride, potassium chloride **OR** potassium alternative oral replacement **OR** potassium chloride, ondansetron **OR** ondansetron, acetaminophen **OR** acetaminophen, polyethylene glycol, nitroGLYCERIN, aluminum & magnesium hydroxide-simethicone, polyvinyl alcohol, magnesium hydroxide, loperamide      LABS:     CBC:   Recent Labs     12/22/21  0737   WBC 9.7   HGB 9.0*        BMP:    Recent Labs     12/22/21  0737      K 4.8   CL 98   CO2 24   BUN 22   CREATININE 1.0   GLUCOSE 175*     Calcium:  Recent Labs     12/22/21  0737   CALCIUM 9.3      Recent Labs     12/22/21  2100 12/23/21 2012 12/24/21  0810   POCGLU 143* 158* 125*          Problem list of patient:     Patient Active Problem List   Diagnosis Code    Syncope R55    Diabetes mellitus ([facility] [address] E11.9    CAD (coronary artery disease) I25.10    Hypertension I10    Atrial fibrillation ([facility]) I48.91    GERD (gastroesophageal reflux disease) K21.9    Chronic kidney disease, stage III (moderate) ([facility]) N18.30    COPD (chronic obstructive pulmonary disease) ([facility]) J44.9    Dysuria R30.0    Diabetic hypertension-nephrosis syndrome ([facility]) E11.21    Hypokalemia E87.6    Abnormal nuclear cardiac imaging test R93.1    Back pain, chronic M54.9, G89.29    Obesity (BMI 35.0-39.9 without comorbidity) E66.9    H/O class III angina pectoris Z86.79    HTN, goal below 130/80 I10    Non-rheumatic mitral regurgitation I34.0    Abnormal nuclear stress test R94.39    Osteopenia of multiple sites M85.89    Seronegative rheumatoid arthritis ([facility] [address] M06.00    Acute respiratory failure (HCC) J96.00    Diverticulitis K57.92    Diverticulitis of colon K57.32    Multiple falls R29.6    EMMA (acute kidney injury) ([facility] [address] N17.9    Urinary tract infection in female N39.0    Acute diverticulitis K57.92    Rectal bleeding K62.5    Acute on chronic clinical systolic heart failure (HCC) I50.23    Pneumonia due to infectious organism J18.9         ASSESSMENT/PLAN   Pneumonia  CHF   Deconditioning:she is very weak,requires assistance  Will ask speech and swallow to see patient      Arleen Zeng MD, MD, FACP 12/24/2021 12:02 PM

## 2021-12-24 NOTE — PROGRESS NOTES
Admit Date: 12/19/2021  Hospital day a few    Subjective:     Patient has sob and weakness .    Medication side effects: none    Scheduled Meds:   ipratropium-albuterol  1 ampule Inhalation BID    acetylcysteine  600 mg Inhalation BID    piperacillin-tazobactam  3,375 mg IntraVENous Q8H    bumetanide  1 mg IntraVENous BID    [Held by provider] albuterol  2.5 mg Nebulization 4x daily    sodium chloride flush  5-40 mL IntraVENous 2 times per day    aspirin  81 mg Oral Daily    atorvastatin  80 mg Oral Nightly    apixaban  5 mg Oral BID    azaTHIOprine  100 mg Oral Daily    calcium carbonate  1 tablet Oral BID    cloNIDine  0.1 mg Oral BID    pantoprazole  40 mg Oral QAM AC    DULoxetine  30 mg Oral BID    docusate sodium  100 mg Oral BID    fenofibrate  160 mg Oral Daily    gabapentin  100 mg Oral TID    hydroxychloroquine  200 mg Oral Daily    insulin glargine  8 Units SubCUTAneous Nightly    traZODone  100 mg Oral Nightly    ranolazine  500 mg Oral BID    predniSONE  5 mg Oral Daily    oxyCODONE-acetaminophen  1 tablet Oral TID    montelukast  5 mg Oral Nightly    metoprolol succinate  50 mg Oral Daily    magnesium oxide  400 mg Oral BID    losartan  50 mg Oral Daily    isosorbide mononitrate  60 mg Oral Daily    [Held by provider] budesonide-formoterol  2 puff Inhalation BID    [Held by provider] tiotropium  2 puff Inhalation Daily     Continuous Infusions:   dilTIAZem (CARDIZEM) 125 mg in dextrose 5% 125 mL infusion      sodium chloride 20 mL/hr at 12/22/21 0602    sodium chloride       PRN Meds:sodium chloride flush, sodium chloride, potassium chloride **OR** potassium alternative oral replacement **OR** potassium chloride, ondansetron **OR** ondansetron, acetaminophen **OR** acetaminophen, polyethylene glycol, nitroGLYCERIN, aluminum & magnesium hydroxide-simethicone, polyvinyl alcohol, magnesium hydroxide, loperamide    Review of Systems  Pertinent items are noted in HPI.    Objective:     Patient Vitals for the past 8 hrs:   BP Temp Temp src Pulse Resp SpO2   12/24/21 0745 (!) 171/75 97.1 °F (36.2 °C) Oral 90 16 99 %   12/24/21 0315 (!) 146/65 97.6 °F (36.4 °C) Oral 77 18 100 %     I/O last 3 completed shifts: In: 961.5 [P.O.:660; I.V.:301.5]  Out: 750 [Urine:750]    No change       Data Review:   CBC:  Lab Results   Component Value Date    WBC 9.7 12/22/2021    RBC 2.86 12/22/2021    HGB 9.0 12/22/2021    HCT 27.6 12/22/2021    MCV 96.5 12/22/2021    MCH 31.5 12/22/2021    MCHC 32.6 12/22/2021    RDW 19.8 12/13/2021     12/22/2021    MPV 9.8 12/22/2021     BMP  Lab Results   Component Value Date     12/22/2021    K 4.8 12/22/2021    K 4.8 12/19/2021    CL 98 12/22/2021    CO2 24 12/22/2021    BUN 22 12/22/2021    CREATININE 1.0 12/22/2021    CALCIUM 9.3 12/22/2021      COAG PROFILE:  Lab Results   Component Value Date    APTT 33.5 11/18/2021    INR 1.22 11/21/2021       Assessment:     Active Problems:    Acute on chronic clinical systolic heart failure (HCC)    Pneumonia due to infectious organism  Resolved Problems:    * No resolved hospital problems.  *      Plan:   Patient with sob and chest pain    h/o cad    possible pneumonia    continue current meds    ot/pt to see    echo

## 2021-12-24 NOTE — RT PROTOCOL NOTE
RT Inhaler-Nebulizer Bronchodilator Protocol Note    There is a bronchodilator order in the chart from a provider indicating to follow the RT Bronchodilator Protocol and there is an Initiate RT Inhaler-Nebulizer Bronchodilator Protocol order as well (see protocol at bottom of note). CXR Findings:  XR CHEST PORTABLE    Result Date: 12/22/2021  1. Poor inflation the lungs. Cardiomegaly. Moderate degenerative changes left shoulder. 2. Questionable tiny effusion left side. Moderate interstitial pneumonia/edema both mid and lower lung fields. Overall appearance of chest slightly worse than prior. **This report has been created using voice recognition software. It may contain minor errors which are inherent in voice recognition technology. ** Final report electronically signed by Dr. Osmar Mcnamara on 12/22/2021 11:08 AM    XR CHEST PORTABLE    Result Date: 12/22/2021  1. Poor inflation the lungs. Kyphotic positioning the patient. Mild cardiomegaly. No effusion. Old healed proximal humeral fractures bilaterally. 2. Moderate interstitial infiltrates scattered in both lungs, consistent with interstitial pneumonia. 3. Overall appearance of chest has worsened since prior. **This report has been created using voice recognition software. It may contain minor errors which are inherent in voice recognition technology. ** Final report electronically signed by Dr. Osmar Mcnamara on 12/22/2021 8:21 AM      The findings from the last RT Protocol Assessment were as follows:   History Pulmonary Disease: Chronic pulmonary disease  Respiratory Pattern: Dyspnea on exertion or RR 21-25 bpm  Breath Sounds: Slightly diminished and/or crackles  Cough: Strong, spontaneous, non-productive  Indication for Bronchodilator Therapy: Wheezing associated with pulm disorder  Bronchodilator Assessment Score: 6    Aerosolized bronchodilator medication orders have been revised according to the RT Inhaler-Nebulizer Bronchodilator Protocol below.    Respiratory Therapist to perform RT Therapy Protocol Assessment initially then follow the protocol. Repeat RT Therapy Protocol Assessment PRN for score 0-3 or on second treatment, BID, and PRN for scores above 3. No Indications - adjust the frequency to every 6 hours PRN wheezing or bronchospasm, if no treatments needed after 48 hours then discontinue using Per Protocol order mode. If indication present, adjust the RT bronchodilator orders based on the Bronchodilator Assessment Score as indicated below. Use Inhaler orders unless patient has one or more of the following: on home nebulizer, not able to hold breath for 10 seconds, is not alert and oriented, cannot activate and use MDI correctly, or respiratory rate 25 breaths per minute or more, then use the equivalent nebulizer order(s) with same Frequency and PRN reasons based on the score. If a patient is on this medication at home then do not decrease Frequency below that used at home. 0-3 - enter or revise RT bronchodilator order(s) to equivalent RT Bronchodilator order with Frequency of every 4 hours PRN for wheezing or increased work of breathing using Per Protocol order mode. 4-6 - enter or revise RT Bronchodilator order(s) to two equivalent RT bronchodilator orders with one order with BID Frequency and one order with Frequency of every 4 hours PRN wheezing or increased work of breathing using Per Protocol order mode. 7-10 - enter or revise RT Bronchodilator order(s) to two equivalent RT bronchodilator orders with one order with TID Frequency and one order with Frequency of every 4 hours PRN wheezing or increased work of breathing using Per Protocol order mode. 11-13 - enter or revise RT Bronchodilator order(s) to one equivalent RT bronchodilator order with QID Frequency and an Albuterol order with Frequency of every 4 hours PRN wheezing or increased work of breathing using Per Protocol order mode.       Greater than 13 - enter or revise RT Bronchodilator order(s) to one equivalent RT bronchodilator order with every 4 hours Frequency and an Albuterol order with Frequency of every 2 hours PRN wheezing or increased work of breathing using Per Protocol order mode. RT to enter RT Home Evaluation for COPD & MDI Assessment order using Per Protocol order mode.     Electronically signed by Jayme Burns RCP on 12/24/2021 at 8:00 AM

## 2021-12-24 NOTE — PROGRESS NOTES
[facility]      Patient - Ivon Victoria   MRN -  [id number]   [facility]   - 1940      Date of Admission -  2021  5:40 AM  Date of evaluation -  2021  Room - Prosper Jaffe MD Primary Care Physician - Rigo Yeboah,      Problem List      Active Hospital Problems    Diagnosis Date Noted    Pneumonia due to infectious organism [J18.9]     Acute on chronic clinical systolic heart failure [facility]) [I50.23] 2021     Reason for Consult    For management of acute hypoxic respiratory failure  HPI   History Obtained From: Patient and electronic medical record. Ivon Victoria is a 78 y.o. female who presents for undifferentiated shortness of breath and a new oxygen requirement from nursing home. H/o COPD GOLD stage 2 FEV1 64ppv 2021 on symbicort / spiriva / albuterol, Systolic + G2 Diastolic HF EF 42-41% 2/2 hypertrophic cardiomyopathy, CAD w/ total RCA occlusion + L to R collateralizations, Unspecified HARRIS , GERD, PAF on eliquis, Well-controlled IDDMT2 A1c 6.6 c/b neuropathy on gabapentin, CKD Stage III, MAGDI on CPAP, HTN, HLD on fenofibrate, Rheumatoid Arthritis on plaquenil / prednisone / imuran, and Depression. Pt is an unreliable historian but this is her baseline. Past 24 hrs   -She is on a 3 L of oxygen by nasal cannula  -Improving shortness of breath  -Not in distress  -Afebrile  -She did not use her BiPAP device last night. She supposed to be using BiPAP with 16 x 6 cm of water with FiO2 40%. -Rest of review of systems reviewed.     PMHx   Past Medical History      Diagnosis Date    Arthritis     Asthma     [facility]rial [facility]     Blood circulation, collateral     CAD (coronary artery disease)     [facility]     Chronic kidney disease, stage III (moderate) [facility]     COPD [facility]chronic obstructive pulmonary disease) [facility]     Depression     Diabetes mellitus [facility]     Diabetic hypertension-nephrosis syndrome [facility]     Dysuria     GERD (gastroesophageal reflux disease)     Hyperlipidemia     Hypertension     Kidney disease     stage 3 dr Memo Lee cerebral artery occlusion with cerebral infarction 1999    Dr Shashank Hanna informed patient after Heart Attack      Past Surgical History        Procedure Laterality Date    APPENDECTOMY      BACK SURGERY      lumbar spine    BLADDER SUSPENSION      CARDIAC CATHETERIZATION  2009    with stent  [facility]    CARPAL TUNNEL RELEASE      COLONOSCOPY  2013    ENDOSCOPY, COLON, DIAGNOSTIC      ESOPHAGEAL DILATATION      HYSTERECTOMY  1974    JOINT REPLACEMENT Left 1992    Left Knee    UPPER GASTROINTESTINAL ENDOSCOPY N/A 9/28/2021    EGD DILATION performed by Mery Montgomery MD at [facility]    UPPER GASTROINTESTINAL ENDOSCOPY Left 9/28/2021    EGD BIOPSY performed by Mery Montgomery MD at [facility]     Meds    Current Medications    ipratropium-albuterol  1 ampule Inhalation BID    acetylcysteine  600 mg Inhalation BID    piperacillin-tazobactam  3,375 mg IntraVENous Q8H    bumetanide  1 mg IntraVENous BID    [Held by provider] albuterol  2.5 mg Nebulization 4x daily    sodium chloride flush  5-40 mL IntraVENous 2 times per day    aspirin  81 mg Oral Daily    atorvastatin  80 mg Oral Nightly    apixaban  5 mg Oral BID    azaTHIOprine  100 mg Oral Daily    calcium carbonate  1 tablet Oral BID    cloNIDine  0.1 mg Oral BID    pantoprazole  40 mg Oral QAM AC    DULoxetine  30 mg Oral BID    docusate sodium  100 mg Oral BID    fenofibrate  160 mg Oral Daily    gabapentin  100 mg Oral TID    hydroxychloroquine  200 mg Oral Daily    insulin glargine  8 Units SubCUTAneous Nightly    traZODone  100 mg Oral Nightly    ranolazine  500 mg Oral BID    predniSONE  5 mg Oral Daily    oxyCODONE-acetaminophen  1 tablet Oral TID    montelukast  5 mg Oral Nightly  metoprolol succinate  50 mg Oral Daily    magnesium oxide  400 mg Oral BID    losartan  50 mg Oral Daily    isosorbide mononitrate  60 mg Oral Daily    [Held by provider] budesonide-formoterol  2 puff Inhalation BID    [Held by provider] tiotropium  2 puff Inhalation Daily     sodium chloride flush, sodium chloride, potassium chloride **OR** potassium alternative oral replacement **OR** potassium chloride, ondansetron **OR** ondansetron, acetaminophen **OR** acetaminophen, polyethylene glycol, nitroGLYCERIN, aluminum & magnesium hydroxide-simethicone, polyvinyl alcohol, magnesium hydroxide, loperamide  IV Drips/Infusions   sodium chloride 20 mL/hr at 12/22/21 0602    sodium chloride       Home Medications  Medications Prior to Admission: apixaban (ELIQUIS) 5 MG TABS tablet, Take 5 mg by mouth 2 times daily  bumetanide (BUMEX) 1 MG tablet, Take 0.5 tablets by mouth daily as needed (leg swelling, increase in daily weight > 3lbs, SOB)  magnesium hydroxide (MILK OF MAGNESIA) 400 MG/5ML suspension, Take 30 mLs by mouth daily as needed for Constipation  albuterol (PROVENTIL) 2.5 MG/0.5ML NEBU nebulizer solution, Take 2.5 mg by nebulization every 4-6 hours as needed for Wheezing or Shortness of Breath Do not take inhaler and nebulizer at the same time per Dr. Zohreh De Leon   aluminum & magnesium hydroxide-simethicone (MYLANTA) 400-400-40 MG/5ML SUSP, Take 10 mLs by mouth every 6 hours as needed For acid reflux  Artificial Saliva (BIOTENE DRY MOUTH MOISTURIZING) SOLN liquid, Take by mouth as needed Dry mouth  montelukast (SINGULAIR) 5 MG chewable tablet, Take 5 mg by mouth nightly  metoprolol succinate (TOPROL XL) 50 MG extended release tablet, Take 50 mg by mouth daily  fluticasone-umeclidin-vilant (TRELEGY ELLIPTA) 100-62.5-25 MCG/INH AEPB, Inhale 1 puff into the lungs daily SOB  OXYGEN, Inhale into the lungs 2-4L to keep SpO2 above 92%.  PRN for SOB  calcium carbonate (TUMS) 500 MG chewable tablet, Take 1 tablet by MISC, by Does not apply route On in AM; off at HS  carboxymethylcellulose (ARTIFICIAL TEARS) 1 % ophthalmic solution, Place 1 drop into both eyes daily as needed for Dry Eyes   EPINEPHrine (EPIPEN 2-LORAINE) 0.3 MG/0.3ML SOAJ injection, Inject 0.3 mLs into the muscle once for 1 dose Use as directed for allergic reaction  nitroGLYCERIN (NITROSTAT) 0.4 MG SL tablet, Place 0.4 mg under the tongue every 5 minutes as needed for Chest pain   Diet    ADULT DIET; Dysphagia - Soft and Bite Sized  ADULT ORAL NUTRITION SUPPLEMENT; Breakfast, Lunch, Dinner; Diabetic Oral Supplement  Allergies    Renflexis [infliximab], Strawberry (diagnostic), Nuts [peanut-containing drug products], Sulfa antibiotics, Tape [adhesive tape], Bactrim [sulfamethoxazole-trimethoprim], and Chocolate  Social History     Social History     Socioeconomic History    Marital status:      Spouse name: Not on file    Number of children: 2    Years of education: 8    Highest education level: Not on file   Occupational History    Not on file   Tobacco Use    Smoking status: Former Smoker     Packs/day: 0.15     Years: 10.00     Pack years: 1.50     Types: Cigarettes     Start date: 10/21/2013    Smokeless tobacco: Never Used   Vaping Use    Vaping Use: Never used   Substance and Sexual Activity    Alcohol use: No    Drug use: No    Sexual activity: Never   Other Topics Concern    Not on file   Social History Narrative    Not on file     Social Determinants of Health     Financial Resource Strain:     Difficulty of Paying Living Expenses: Not on file   Food Insecurity:     Worried About Running Out of Food in the Last Year: Not on file    Oz of Food in the Last Year: Not on file   Transportation Needs:     Lack of Transportation (Medical): Not on file    Lack of Transportation (Non-Medical):  Not on file   Physical Activity:     Days of Exercise per Week: Not on file    Minutes of Exercise per Session: Not on file   Stress:     Feeling of Stress : Not on file   Social Connections:     Frequency of Communication with Friends and Family: Not on file    Frequency of Social Gatherings with Friends and Family: Not on file    Attends Sikhism Services: Not on file    Active Member of Clubs or Organizations: Not on file    Attends Club or Organization Meetings: Not on file    Marital Status: Not on file   Intimate Partner Violence:     Fear of Current or Ex-Partner: Not on file    Emotionally Abused: Not on file    Physically Abused: Not on file    Sexually Abused: Not on file   Housing Stability:     Unable to Pay for Housing in the Last Year: Not on file    Number of [place] in the Last Year: Not on file    Unstable Housing in the Last Year: Not on file     Family History          Problem Relation Age of Onset    Heart Disease Mother     Diabetes Mother     Kidney Disease Mother     Heart Disease Father     Diabetes Father     Cancer Sister     Stroke Brother     Cancer Sister      Sleep History    Never diagnosed with sleep apnea in the past.  Occupational history   Occupation:  She is current working: No  Type of profession: retired. History of tobacco smoking:Yes  Amount of tobacco smokin.15 PPD. Years of tobacco smoking: 15.                                    Quit smoking: Yes. Quit LPAO:1821   Current smoker: No.       Amount of current tobacco smokin PPD  .   History of recreational or IV drug use in the past:NO     History of exposure to coal mines/coal dust: NO  History of exposure to foundry dust/welding: NO  History of exposure to quarry/silica/sandblasting: NO  History of exposure to asbestos/working with breaks/ships: NO  History of exposure to farm dust: NO  History of recent travel to long distances: NO  History of exposure to birds, pigeons, or chickens in the past:NO  Pet animals at home:No  Dogs: 0  Cats: 0    History of pulmonary embolism in the past: Yes History of DVT in the past:Yes        [x] Right lower extremity   [x] Left lower extremity. Vitals     height is 5' 5\" (1.651 m) and weight is 197 lb 12 oz (89.7 kg). Her oral temperature is 98.4 °F (36.9 °C). Her blood pressure is 110/55 (abnormal) and her pulse is 91. Her respiration is 20 and oxygen saturation is 97%. Body mass index is 32.91 kg/m². SUPPLEMENTAL O2: O2 Flow Rate (L/min): 2 L/min (weaned from 3)     I/O        Intake/Output Summary (Last 24 hours) at 12/24/2021 1434  Last data filed at 12/24/2021 1414  Gross per 24 hour   Intake 810 ml   Output 1150 ml   Net -340 ml     I/O last 3 completed shifts: In: 961.5 [P.O.:660; I.V.:301.5]  Out: 750 [Urine:750]   Patient Vitals for the past 96 hrs (Last 3 readings):   Weight   12/22/21 0545 197 lb 12 oz (89.7 kg)   12/21/21 0315 194 lb 3.6 oz (88.1 kg)       Exam   Constitutional: Patient appears in no distress on O2 via nasal cannula. Mouth/Throat: Oropharynx is clear and moist.  No oral thrush. Neck: Neck supple. Cardiovascular: S1 and S2 heard. No murmurs. Pulmonary/Chest: Bilateral air entry present. Good breath sounds on both sides, diminished at bases. No wheezes. Bilateral basal rales. Abdominal: Soft. Obese, no tenderness. Extremities: Patient exhibits no edema. Neurological: Patient is awake and alert.     Labs  - Old records and notes have been reviewed in [facility]   ABG  Lab Results   Component Value Date    PH 7.48 12/22/2021    PO2 65 12/22/2021    PCO2 35 12/22/2021    HCO3 26 12/22/2021    O2SAT 94 12/22/2021     Lab Results   Component Value Date    IFIO2 30 12/22/2021    SETPEEP 6.0 12/22/2021     CBC  Recent Labs     12/22/21  0737   WBC 9.7   RBC 2.86*   HGB 9.0*   HCT 27.6*   MCV 96.5   MCH 31.5   MCHC 32.6      MPV 9.8      BMP  Recent Labs     12/22/21  0737      K 4.8   CL 98   CO2 24   BUN 22   CREATININE 1.0   GLUCOSE 175*   CALCIUM 9.3     LFT  No results for input(s): AST, ALT, ALB, BILITOT, ALKPHOS, LIPASE in the last 72 hours. Invalid input(s): AMYLASE  TROP  Lab Results   Component Value Date    TROPONINT 0.177 12/19/2021    TROPONINT 0.198 12/19/2021    TROPONINT 0.275 12/19/2021     BNP  No results for input(s): BNP in the last 72 hours. Lactic Acid  No results for input(s): LACTA in the last 72 hours. INR  No results for input(s): INR, PROTIME in the last 72 hours. PTT  No results for input(s): APTT in the last 72 hours. Glucose  Recent Labs     12/23/21 2012 12/24/21  0810 12/24/21  1154   POCGLU 158* 125* 156*     UA No results for input(s): SPECGRAV, PHUR, COLORU, CLARITYU, MUCUS, PROTEINU, BLOODU, RBCUA, WBCUA, BACTERIA, NITRU, GLUCOSEU, BILIRUBINUR, UROBILINOGEN, KETUA, LABCAST, LABCASTTY, AMORPHOS in the last 72 hours. Invalid input(s): CRYSTALS. PFTs           Sleep studies   None in record    Cultures        EKG     Chronic RBBB  Echocardiogram   08/05/2020   Findings      Mitral Valve   The mitral valve structure was normal with normal leaflet separation. DOPPLER: The transmitral velocity was within the normal range with no   evidence for mitral stenosis. There was moderate central jetf mitral   regurgitation. Aortic Valve   The aortic valve was trileaflet with normal thickness and cuspal   separation. DOPPLER: Transaortic velocity was within the normal range with   no evidence of aortic stenosis. There was no evidence of aortic   regurgitation. Tricuspid Valve   The tricuspid valve structure was normal with normal leaflet separation. DOPPLER: There was no evidence of tricuspid stenosis. There was mild   tricuspid regurgitation. Pulmonic Valve   The pulmonic valve leaflets exhibited normal thickness, no calcification,   and normal cuspal separation. DOPPLER: The transpulmonic velocity was   within the normal range with no evidence for regurgitation. mild pulmonary   htn      Left Atrium   Left atrial size was dilated      Left Ventricle   Normal left ventricle size and systolic function. Ejection fraction was   estimated at 50-55%. There were no regional left ventricular wall motion   abnormalities and wall thickness was within normal limits. Features were consistent with a pseudonormal left ventricular filling   pattern, with concomitant abnormal relaxation and increased filling   pressure (grade 2 diastolic dysfunction). Right Atrium   Right atrial size was normal.      Right Ventricle   The right ventricular size was normal with normal systolic function and   wall thickness. Right ventricular systolic pressure of 40 mm Hg consistent with mild   pulmonary hypertension. Pericardial Effusion   The pericardium was normal in appearance with no evidence of a pericardial   effusion. Pleural Effusion   No evidence of pleural effusion. Aorta / Great Vessels   -Aortic root dimension within normal limits.   -The Pulmonary artery is within normal limits. -IVC size is within normal limits with normal respiratory phasic changes. Radiology    CXR  12/22/2021 @1058 vs 12/22/2021 @0749 vs 10/20/2021 @0921    1. Poor inflation of lungs. 2. Questionable tiny effusion of L side. Moderate interstitial pneumonia both mid and LL fields. Overall chest appears slightly worse than prior. CT Scans    1. Filling defects in the R & L pulmonary arteries and in the artery supplying the RLL consistent with pulmonary embolus, smaller than on previous CTA dated 11/06/2021. No new filling defects noted  2. Areas of abnormal density in the RU/M/LL lung fields new since previous study 11/06/2021 consistent with inflammatory process. 3. Right pleural effusion  (See actual reports for details)    FL MODIFIED BARIUM SWALLOW W VIDEO   12/21/2021   1. Laryngeal penetration of thin barium without evidence of aspiration. 2. Additional recommendations from the speech therapist will follow.        Assessment   - Acute Hypoxic Respiratory Failure 2/2 CHF Vs Aspiration Pneumonia -improving.  - COPD GOLD 2 FEV1 64ppv 50/24/[id number]  - Systolic + G2 Diastolic HF EF 76-23% 2/2 hypertrophic cardiomyopathy and ICM  - Aspiration Pneumonia -on treatment with Zosyn from ID service. .  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 - no sleep study in records  - CAD w/ total RCA occlusion + L to R collateralizations  - GERD  - PAF on eliquis  - Well-controlled IDDMT2 A1c 6.6 c/b neuropathy on gabapentin  - CKD Stage III  - H/o bilateral LE DVT  - H/o esophageal dilation - pt does not remember when or by whom. - Rheumatoid Arthritis - on Plaquenil / Prednisone / Imuran    Plan   -Continue BiPAP with 16/6 during nighttime and as needed during daytime.   -Continue DuoNebs q4h WA; de-escalate as appropriate  -Continue mucomyst BID to help expectorate sputum  -Follow-up pending cultures.  -Continue antibiotics per ID service by Dr. Madelyn Mccoy MD  -Monitor SpO2 wean supplemental O2 to maintain SpO2 >90%  -Advance diet as tolerated; appreciate SLP recommendations given MBS noted penetration of thin liquids SLP following has history of choking episodes has had previous esophageal dilation per pt.  Does not remember name of her Gastroenterologist.  -Aspiration precautions  -Acapella  -IS  -Optimize heart failure regimen per cardiology    Electronically signed by   Susana Merchant MD on 12/24/2021 at 2:34 PM

## 2021-12-25 LAB
GLUCOSE BLD-MCNC: 119 MG/DL (ref 70–108)
GLUCOSE BLD-MCNC: 162 MG/DL (ref 70–108)
GLUCOSE BLD-MCNC: 164 MG/DL (ref 70–108)
GLUCOSE BLD-MCNC: 194 MG/DL (ref 70–108)

## 2021-12-25 PROCEDURE — 2580000003 HC RX 258

## 2021-12-25 PROCEDURE — 99232 SBSQ HOSP IP/OBS MODERATE 35: CPT | Performed by: INTERNAL MEDICINE

## 2021-12-25 PROCEDURE — 94640 AIRWAY INHALATION TREATMENT: CPT

## 2021-12-25 PROCEDURE — 6360000002 HC RX W HCPCS

## 2021-12-25 PROCEDURE — 2580000003 HC RX 258: Performed by: INTERNAL MEDICINE

## 2021-12-25 PROCEDURE — 2500000003 HC RX 250 WO HCPCS: Performed by: INTERNAL MEDICINE

## 2021-12-25 PROCEDURE — 94760 N-INVAS EAR/PLS OXIMETRY 1: CPT

## 2021-12-25 PROCEDURE — 2700000000 HC OXYGEN THERAPY PER DAY

## 2021-12-25 PROCEDURE — 1200000003 HC TELEMETRY R&B

## 2021-12-25 PROCEDURE — 82948 REAGENT STRIP/BLOOD GLUCOSE: CPT

## 2021-12-25 PROCEDURE — 6360000002 HC RX W HCPCS: Performed by: INTERNAL MEDICINE

## 2021-12-25 PROCEDURE — 6370000000 HC RX 637 (ALT 250 FOR IP)

## 2021-12-25 PROCEDURE — 94669 MECHANICAL CHEST WALL OSCILL: CPT

## 2021-12-25 PROCEDURE — 6370000000 HC RX 637 (ALT 250 FOR IP): Performed by: INTERNAL MEDICINE

## 2021-12-25 RX ADMIN — IPRATROPIUM BROMIDE AND ALBUTEROL SULFATE 1 AMPULE: .5; 3 SOLUTION RESPIRATORY (INHALATION) at 18:43

## 2021-12-25 RX ADMIN — Medication 400 MG: at 09:39

## 2021-12-25 RX ADMIN — PREDNISONE 5 MG: 10 TABLET ORAL at 09:40

## 2021-12-25 RX ADMIN — RANOLAZINE 500 MG: 500 TABLET, FILM COATED, EXTENDED RELEASE ORAL at 21:12

## 2021-12-25 RX ADMIN — MONTELUKAST SODIUM 5 MG: 5 TABLET, CHEWABLE ORAL at 21:10

## 2021-12-25 RX ADMIN — APIXABAN 5 MG: 5 TABLET, FILM COATED ORAL at 09:40

## 2021-12-25 RX ADMIN — TRAZODONE HYDROCHLORIDE 100 MG: 100 TABLET ORAL at 21:04

## 2021-12-25 RX ADMIN — BUMETANIDE 1 MG: 0.25 INJECTION, SOLUTION INTRAMUSCULAR; INTRAVENOUS at 21:17

## 2021-12-25 RX ADMIN — PIPERACILLIN AND TAZOBACTAM 3375 MG: 3; .375 INJECTION, POWDER, LYOPHILIZED, FOR SOLUTION INTRAVENOUS at 21:32

## 2021-12-25 RX ADMIN — CLONIDINE HYDROCHLORIDE 0.1 MG: 0.1 TABLET ORAL at 21:10

## 2021-12-25 RX ADMIN — ISOSORBIDE MONONITRATE 60 MG: 60 TABLET, EXTENDED RELEASE ORAL at 09:39

## 2021-12-25 RX ADMIN — IPRATROPIUM BROMIDE AND ALBUTEROL SULFATE 1 AMPULE: .5; 3 SOLUTION RESPIRATORY (INHALATION) at 08:32

## 2021-12-25 RX ADMIN — APIXABAN 5 MG: 5 TABLET, FILM COATED ORAL at 21:11

## 2021-12-25 RX ADMIN — ANTACID TABLETS 500 MG: 500 TABLET, CHEWABLE ORAL at 09:40

## 2021-12-25 RX ADMIN — PIPERACILLIN AND TAZOBACTAM 3375 MG: 3; .375 INJECTION, POWDER, LYOPHILIZED, FOR SOLUTION INTRAVENOUS at 05:54

## 2021-12-25 RX ADMIN — ASPIRIN 81 MG CHEWABLE TABLET 81 MG: 81 TABLET CHEWABLE at 09:40

## 2021-12-25 RX ADMIN — ACETYLCYSTEINE 600 MG: 200 SOLUTION ORAL; RESPIRATORY (INHALATION) at 08:33

## 2021-12-25 RX ADMIN — PANTOPRAZOLE SODIUM 40 MG: 40 TABLET, DELAYED RELEASE ORAL at 05:11

## 2021-12-25 RX ADMIN — GABAPENTIN 100 MG: 100 CAPSULE ORAL at 09:40

## 2021-12-25 RX ADMIN — DOCUSATE SODIUM 100 MG: 100 CAPSULE ORAL at 09:40

## 2021-12-25 RX ADMIN — LOSARTAN POTASSIUM 50 MG: 50 TABLET, FILM COATED ORAL at 09:39

## 2021-12-25 RX ADMIN — GABAPENTIN 100 MG: 100 CAPSULE ORAL at 21:12

## 2021-12-25 RX ADMIN — SODIUM CHLORIDE: 9 INJECTION, SOLUTION INTRAVENOUS at 05:12

## 2021-12-25 RX ADMIN — ANTACID TABLETS 500 MG: 500 TABLET, CHEWABLE ORAL at 21:10

## 2021-12-25 RX ADMIN — HYDROXYCHLOROQUINE SULFATE 200 MG: 200 TABLET ORAL at 09:39

## 2021-12-25 RX ADMIN — DULOXETINE HYDROCHLORIDE 30 MG: 30 CAPSULE, DELAYED RELEASE ORAL at 21:09

## 2021-12-25 RX ADMIN — BUMETANIDE 1 MG: 0.25 INJECTION, SOLUTION INTRAMUSCULAR; INTRAVENOUS at 09:40

## 2021-12-25 RX ADMIN — RANOLAZINE 500 MG: 500 TABLET, FILM COATED, EXTENDED RELEASE ORAL at 09:39

## 2021-12-25 RX ADMIN — Medication 400 MG: at 21:09

## 2021-12-25 RX ADMIN — ACETYLCYSTEINE 600 MG: 200 SOLUTION ORAL; RESPIRATORY (INHALATION) at 18:50

## 2021-12-25 RX ADMIN — INSULIN GLARGINE 8 UNITS: 100 INJECTION, SOLUTION SUBCUTANEOUS at 20:58

## 2021-12-25 RX ADMIN — PIPERACILLIN AND TAZOBACTAM 3375 MG: 3; .375 INJECTION, POWDER, LYOPHILIZED, FOR SOLUTION INTRAVENOUS at 13:02

## 2021-12-25 RX ADMIN — METOPROLOL SUCCINATE 50 MG: 50 TABLET, FILM COATED, EXTENDED RELEASE ORAL at 09:39

## 2021-12-25 RX ADMIN — FENOFIBRATE 160 MG: 160 TABLET ORAL at 09:39

## 2021-12-25 RX ADMIN — AZATHIOPRINE 100 MG: 50 TABLET ORAL at 09:39

## 2021-12-25 RX ADMIN — OXYCODONE AND ACETAMINOPHEN 1 TABLET: 5; 325 TABLET ORAL at 21:04

## 2021-12-25 RX ADMIN — LOPERAMIDE HYDROCHLORIDE 2 MG: 2 CAPSULE ORAL at 16:04

## 2021-12-25 RX ADMIN — CLONIDINE HYDROCHLORIDE 0.1 MG: 0.1 TABLET ORAL at 09:40

## 2021-12-25 RX ADMIN — GABAPENTIN 100 MG: 100 CAPSULE ORAL at 14:07

## 2021-12-25 RX ADMIN — DULOXETINE HYDROCHLORIDE 30 MG: 30 CAPSULE, DELAYED RELEASE ORAL at 09:40

## 2021-12-25 ASSESSMENT — PAIN SCALES - GENERAL
PAINLEVEL_OUTOF10: 0
PAINLEVEL_OUTOF10: 0
PAINLEVEL_OUTOF10: 9
PAINLEVEL_OUTOF10: 0

## 2021-12-25 NOTE — RT PROTOCOL NOTE
work of breathing using Per Protocol order mode. 4-6 - enter or revise RT Bronchodilator order(s) to two equivalent RT bronchodilator orders with one order with BID Frequency and one order with Frequency of every 4 hours PRN wheezing or increased work of breathing using Per Protocol order mode. 7-10 - enter or revise RT Bronchodilator order(s) to two equivalent RT bronchodilator orders with one order with TID Frequency and one order with Frequency of every 4 hours PRN wheezing or increased work of breathing using Per Protocol order mode. 11-13 - enter or revise RT Bronchodilator order(s) to one equivalent RT bronchodilator order with QID Frequency and an Albuterol order with Frequency of every 4 hours PRN wheezing or increased work of breathing using Per Protocol order mode. Greater than 13 - enter or revise RT Bronchodilator order(s) to one equivalent RT bronchodilator order with every 4 hours Frequency and an Albuterol order with Frequency of every 2 hours PRN wheezing or increased work of breathing using Per Protocol order mode. RT to enter RT Home Evaluation for COPD & MDI Assessment order using Per Protocol order mode.     Electronically signed by Rene Mohr RCP on 12/25/2021 at 8:37 AM

## 2021-12-25 NOTE — PROGRESS NOTES
[facility]      Patient - Mihai Taylor   MRN -  [id number]   Acct # - [de-identified]   - 1940      Date of Admission -  2021  5:40 AM  Date of evaluation -  2021  Room - Dashawn Mitchell MD Primary Care Physician - Rigo Yeboah DO     Problem List      Active Hospital Problems    Diagnosis Date Noted    Pneumonia due to infectious organism [J18.9]     Acute on chronic clinical systolic heart failure [facility]) [I50.23] 2021     Reason for Consult    For management of acute respiratory failure with hypoxia  HPI   History Obtained From: Patient and electronic medical record. Mihai Taylor is a 78 y.o. female who presents for undifferentiated shortness of breath and a new oxygen requirement from nursing home. 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 Pt is an unreliable historian but this is her baseline. Past 24 hrs   -She is on a 3 L of oxygen by nasal cannula  -Improving shortness of breath  -Not in distress  -Afebrile  -She used her BiPAP device last night.   -Rest of review of systems reviewed.     PMHx   Past Medical History      Diagnosis Date    Arthritis     Asthma     Atrial fibrillation ([facility] [address]     Blood circulation, collateral     CAD (coronary artery disease)     CHF (congestive heart failure) (HCC)     Chronic kidney disease, stage III (moderate) [facility]     COPD (chronic obstructive pulmonary disease) ([facility]     Depression     Diabetes mellitus ([address]     Diabetic hypertension-nephrosis syndrome (HCC)     Dysuria     GERD (gastroesophageal reflux disease)     Hyperlipidemia     Hypertension     Kidney disease     stage 3 dr [facility] Unspecified cerebral artery occlusion with cerebral infarction 1999    Dr Audrey Garza informed patient after Heart Attack      Past Surgical History        Procedure Laterality Date    APPENDECTOMY      BACK SURGERY      lumbar spine    BLADDER SUSPENSION      CARDIAC CATHETERIZATION  2009    with stent  [facility]    CARPAL TUNNEL RELEASE      COLONOSCOPY  2013    ENDOSCOPY, COLON, DIAGNOSTIC      ESOPHAGEAL DILATATION      HYSTERECTOMY  1974    JOINT REPLACEMENT Left 1992    Left Knee    UPPER GASTROINTESTINAL ENDOSCOPY N/A 9/28/2021    EGD DILATION performed by Ferdie Gilford, MD [facility]    UPPER GASTROINTESTINAL ENDOSCOPY Left 9/28/2021    EGD BIOPSY performed by Ferdie Gilford, MD at [facility]     Meds    Current Medications    ipratropium-albuterol  1 ampule Inhalation BID    acetylcysteine  600 mg Inhalation BID    piperacillin-tazobactam  3,375 mg IntraVENous Q8H    bumetanide  1 mg IntraVENous BID    [Held by provider] albuterol  2.5 mg Nebulization 4x daily    sodium chloride flush  5-40 mL IntraVENous 2 times per day    aspirin  81 mg Oral Daily    atorvastatin  80 mg Oral Nightly    apixaban  5 mg Oral BID    azaTHIOprine  100 mg Oral Daily    calcium carbonate  1 tablet Oral BID    cloNIDine  0.1 mg Oral BID    pantoprazole  40 mg Oral QAM AC    DULoxetine  30 mg Oral BID    docusate sodium  100 mg Oral BID    fenofibrate  160 mg Oral Daily    gabapentin  100 mg Oral TID    hydroxychloroquine  200 mg Oral Daily    insulin glargine  8 Units SubCUTAneous Nightly    traZODone  100 mg Oral Nightly    ranolazine  500 mg Oral BID    predniSONE  5 mg Oral Daily    oxyCODONE-acetaminophen  1 tablet Oral TID    montelukast  5 mg Oral Nightly    metoprolol succinate  50 mg Oral Daily    magnesium oxide  400 mg Oral BID    losartan  50 mg Oral Daily    isosorbide mononitrate  60 mg Oral Daily    [Held by provider] budesonide-formoterol  2 puff Inhalation BID    [Held by provider] tiotropium  2 puff Inhalation Daily     sodium chloride flush, sodium chloride, potassium chloride **OR** potassium alternative oral replacement **OR** potassium chloride, ondansetron **OR** ondansetron, acetaminophen **OR** acetaminophen, polyethylene glycol, nitroGLYCERIN, aluminum & magnesium hydroxide-simethicone, polyvinyl alcohol, magnesium hydroxide, loperamide  IV Drips/Infusions   sodium chloride 20 mL/hr at 12/25/21 1424    sodium chloride       Home Medications  Medications Prior to Admission: apixaban (ELIQUIS) 5 MG TABS tablet, Take 5 mg by mouth 2 times daily  bumetanide (BUMEX) 1 MG tablet, Take 0.5 tablets by mouth daily as needed (leg swelling, increase in daily weight > 3lbs, SOB)  magnesium hydroxide (MILK OF MAGNESIA) 400 MG/5ML suspension, Take 30 mLs by mouth daily as needed for Constipation  albuterol (PROVENTIL) 2.5 MG/0.5ML NEBU nebulizer solution, Take 2.5 mg by nebulization every 4-6 hours as needed for Wheezing or Shortness of Breath Do not take inhaler and nebulizer at the same time per Dr. Melissa Andino   aluminum & magnesium hydroxide-simethicone (MYLANTA) 400-400-40 MG/5ML SUSP, Take 10 mLs by mouth every 6 hours as needed For acid reflux  Artificial Saliva (BIOTENE DRY MOUTH MOISTURIZING) SOLN liquid, Take by mouth as needed Dry mouth  montelukast (SINGULAIR) 5 MG chewable tablet, Take 5 mg by mouth nightly  metoprolol succinate (TOPROL XL) 50 MG extended release tablet, Take 50 mg by mouth daily  fluticasone-umeclidin-vilant (TRELEGY ELLIPTA) 100-62.5-25 MCG/INH AEPB, Inhale 1 puff into the lungs daily SOB  OXYGEN, Inhale into the lungs 2-4L to keep SpO2 above 92%.  PRN for SOB  calcium carbonate (TUMS) 500 MG chewable tablet, Take 1 tablet by mouth 2 times daily  fenofibrate micronized (LOFIBRA) 200 MG capsule, Take 200 mg by mouth daily   acetaminophen (TYLENOL) 500 MG tablet, Take 500 mg by mouth every 8 hours as needed for Pain   losartan (COZAAR) 50 MG tablet, Take 50 mg by mouth daily  loperamide (IMODIUM) 2 MG capsule, Take 2 mg by mouth every 12 hours as needed for Diarrhea   oxyCODONE-acetaminophen (PERCOCET) 5-325 MG per tablet, Take 1 tablet by mouth 3 times daily. For pain  hydroxychloroquine (PLAQUENIL) 200 MG tablet, TAKE 1 TABLET BY MOUTH EVERY DAY  cloNIDine (CATAPRES) 0.1 MG tablet, Take 1 tablet by mouth 2 times daily  predniSONE (DELTASONE) 5 MG tablet, TAKE 1 TABLET BY MOUTH EVERY DAY  DULoxetine (CYMBALTA) 30 MG extended release capsule, Take 30 mg by mouth 2 times daily  gabapentin (NEURONTIN) 100 MG capsule, Take 100 mg by mouth 3 times daily. atorvastatin (LIPITOR) 40 MG tablet, Take 2 tablets by mouth daily  docusate sodium (COLACE, DULCOLAX) 100 MG CAPS, Take 100 mg by mouth 2 times daily  insulin glargine (LANTUS) 100 UNIT/ML injection pen, Inject 8 Units into the skin nightly   dexlansoprazole (DEXILANT) 60 MG CPDR delayed release capsule, Take 60 mg by mouth daily  ranolazine (RANEXA) 500 MG SR tablet, Take 1 tablet by mouth 2 times daily. magnesium oxide (MAG-OX) 400 MG tablet, Take 400 mg by mouth 2 times daily   albuterol (PROVENTIL HFA;VENTOLIN HFA) 108 (90 BASE) MCG/ACT inhaler, Inhale 2 puffs into the lungs every 6 hours as needed for Shortness of Breath   traZODone (DESYREL) 50 MG tablet, Take 100 mg by mouth nightly   isosorbide mononitrate (IMDUR) 60 MG extended release tablet, Take 1 tablet by mouth daily  azaTHIOprine (IMURAN) 50 MG tablet, Take 2 tablets by mouth daily  spironolactone (ALDACTONE) 50 MG tablet, Take 1 tablet by mouth daily  Misc. Devices (HEAT THERAPY) MISC, by Does not apply route every 8 hours as needed For pain. To Left shoulder  Elastic Bandages & Supports (T.E.D.  ANTI-EMBOLISM STOCKINGS) MISC, by Does not apply route On in AM; off at HS  carboxymethylcellulose (ARTIFICIAL TEARS) 1 % ophthalmic solution, Place 1 drop into both eyes daily as needed for Dry Eyes   EPINEPHrine (EPIPEN 2-LORAINE) 0.3 MG/0.3ML SOAJ injection, Inject 0.3 mLs into the muscle once for 1 dose Use as directed for allergic reaction  nitroGLYCERIN (NITROSTAT) 0.4 MG SL tablet, Place 0.4 mg under the tongue every 5 minutes as needed for Chest pain   Diet    ADULT DIET; Dysphagia - Soft and Bite Sized  ADULT ORAL NUTRITION SUPPLEMENT; Breakfast, Lunch, Dinner; Diabetic Oral Supplement  Allergies    Renflexis [infliximab], Strawberry (diagnostic), Nuts [peanut-containing drug products], Sulfa antibiotics, Tape [adhesive tape], Bactrim [sulfamethoxazole-trimethoprim], and Chocolate  Social History     Social History     Socioeconomic History    Marital status:      Spouse name: Not on file    Number of children: 2    Years of education: 8    Highest education level: Not on file   Occupational History    Not on file   Tobacco Use    Smoking status: Former Smoker     Packs/day: 0.15     Years: 10.00     Pack years: 1.50     Types: Cigarettes     Start date: 10/21/2013    Smokeless tobacco: Never Used   Vaping Use    Vaping Use: Never used   Substance and Sexual Activity    Alcohol use: No    Drug use: No    Sexual activity: Never   Other Topics Concern    Not on file   Social History Narrative    Not on file     Social Determinants of Health     Financial Resource Strain:     Difficulty of Paying Living Expenses: Not on file   Food Insecurity:     Worried About Running Out of Food in the Last Year: Not on file    Oz of Food in the Last Year: Not on file   Transportation Needs:     Lack of Transportation (Medical): Not on file    Lack of Transportation (Non-Medical):  Not on file   Physical Activity:     Days of Exercise per Week: Not on file    Minutes of Exercise per Session: Not on file   Stress:     Feeling of Stress : Not on file   Social Connections:     Frequency of Communication with Friends and Family: Not on file    Frequency of Social Gatherings with Friends and Family: Not on file    Attends Bahai Services: Not on file    Active Member of Clubs or Organizations: Not on file    Attends Club or Organization Meetings: Not on file    Marital Status: Not on file   Intimate Partner Violence:     Fear of Current or Ex-Partner: Not on file    Emotionally Abused: Not on file    Physically Abused: Not on file    Sexually Abused: Not on file   Housing Stability:     Unable to Pay for Housing in the Last Year: Not on file    Number of [place] in the Last Year: Not on file    Unstable Housing in the Last Year: Not on file     Family History          Problem Relation Age of Onset    Heart Disease Mother     Diabetes Mother     Kidney Disease Mother     Heart Disease Father     Diabetes Father     Cancer Sister     Stroke Brother     Cancer Sister      Sleep History    Never diagnosed with sleep apnea in the past.  Occupational history   Occupation:  She is current working: No  Type of profession: retired. History of tobacco smoking:Yes  Amount of tobacco smokin.15 PPD. Years of tobacco smoking: 15.                                    Quit smoking: Yes. Quit TWDK:2264   Current smoker: No.       Amount of current tobacco smokin PPD  .   History of recreational or IV drug use in the past:NO     History of exposure to coal mines/coal dust: NO  History of exposure to foundry dust/welding: NO  History of exposure to quarry/silica/sandblasting: NO  History of exposure to asbestos/working with breaks/ships: NO  History of exposure to farm dust: NO  History of recent travel to long distances: NO  History of exposure to birds, pigeons, or chickens in the past:NO  Pet animals at home:No  Dogs: 0  Cats: 0    History of pulmonary embolism in the past: Yes            History of DVT in the past:Yes [x] Right lower extremity   [x] Left lower extremity. Vitals     height is 5' 5\" (1.651 m) and weight is 195 lb 12.8 oz (88.8 kg). Her oral temperature is 98 °F (36.7 °C). Her blood pressure is 152/70 (abnormal) and her pulse is 94. Her respiration is 22 and oxygen saturation is 97%. Body mass index is 32.58 kg/m². SUPPLEMENTAL O2: O2 Flow Rate (L/min): 2 L/min     I/O        Intake/Output Summary (Last 24 hours) at 12/25/2021 1741  Last data filed at 12/25/2021 1401  Gross per 24 hour   Intake 1201.22 ml   Output 1000 ml   Net 201.22 ml     I/O last 3 completed shifts: In: 1201.2 [P.O.:550; I.V.:651.2]  Out: 1000 [Urine:1000]   Patient Vitals for the past 96 hrs (Last 3 readings):   Weight   12/25/21 0413 195 lb 12.8 oz (88.8 kg)   12/22/21 0545 197 lb 12 oz (89.7 kg)       Exam   Constitutional: Patient appears in no distress on 2LPM via nasal cannula. Mouth/Throat: Oropharynx is clear and moist.  No oral thrush. Neck: Neck supple. Cardiovascular: S1 and S2 heard. No murmurs. Pulmonary/Chest: Bilateral air entry present. Good breath sounds on both sides, diminished at bases. No wheezes. Bilateral fine basal rales. Abdominal: Soft. No tenderness. Extremities: Patient exhibits bilateral trace leg edema. Neurological: Patient is awake and alert. Labs  - Old records and notes have been reviewed in [facility]   ABG  Lab Results   Component Value Date    PH 7.48 12/22/2021    PO2 65 12/22/2021    PCO2 35 12/22/2021    HCO3 26 12/22/2021    O2SAT 94 12/22/2021     Lab Results   Component Value Date    IFIO2 30 12/22/2021    SETPEEP 6.0 12/22/2021     CBC  No results for input(s): WBC, RBC, HGB, HCT, MCV, MCH, MCHC, RDW, PLT, MPV in the last 72 hours. BMP  No results for input(s): NA, K, CL, CO2, BUN, CREATININE, GLUCOSE, MG, PHOS, CALCIUM, IONCA, MG in the last 72 hours. LFT  No results for input(s): AST, ALT, ALB, BILITOT, ALKPHOS, LIPASE in the last 72 hours.     Invalid input(s): AMYLASE  TROP  Lab Results   Component Value Date    TROPONINT 0.177 12/19/2021    TROPONINT 0.198 12/19/2021    TROPONINT 0.275 12/19/2021     BNP  No results for input(s): BNP in the last 72 hours. Lactic Acid  No results for input(s): LACTA in the last 72 hours. INR  No results for input(s): INR, PROTIME in the last 72 hours. PTT  No results for input(s): APTT in the last 72 hours. Glucose  Recent Labs     12/25/21  0723 12/25/21  1134 12/25/21  1628   POCGLU 119* 164* 162*     UA No results for input(s): SPECGRAV, PHUR, COLORU, CLARITYU, MUCUS, PROTEINU, BLOODU, RBCUA, WBCUA, BACTERIA, NITRU, GLUCOSEU, BILIRUBINUR, UROBILINOGEN, KETUA, LABCAST, LABCASTTY, AMORPHOS in the last 72 hours. Invalid input(s): CRYSTALS. PFTs           Sleep studies   None in record    Cultures        EKG     Chronic RBBB  Echocardiogram   08/05/2020   Findings      Mitral Valve   The mitral valve structure was normal with normal leaflet separation. DOPPLER: The transmitral velocity was within the normal range with no   evidence for mitral stenosis. There was moderate central jetf mitral   regurgitation. Aortic Valve   The aortic valve was trileaflet with normal thickness and cuspal   separation. DOPPLER: Transaortic velocity was within the normal range with   no evidence of aortic stenosis. There was no evidence of aortic   regurgitation. Tricuspid Valve   The tricuspid valve structure was normal with normal leaflet separation. DOPPLER: There was no evidence of tricuspid stenosis. There was mild   tricuspid regurgitation. Pulmonic Valve   The pulmonic valve leaflets exhibited normal thickness, no calcification,   and normal cuspal separation. DOPPLER: The transpulmonic velocity was   within the normal range with no evidence for regurgitation. mild pulmonary   htn      Left Atrium   Left atrial size was dilated      Left Ventricle   Normal left ventricle size and systolic function.  Ejection fraction was estimated at 50-55%. There were no regional left ventricular wall motion   abnormalities and wall thickness was within normal limits. Features were consistent with a pseudonormal left ventricular filling   pattern, with concomitant abnormal relaxation and increased filling   pressure (grade 2 diastolic dysfunction). Right Atrium   Right atrial size was normal.      Right Ventricle   The right ventricular size was normal with normal systolic function and   wall thickness. Right ventricular systolic pressure of 40 mm Hg consistent with mild   pulmonary hypertension. Pericardial Effusion   The pericardium was normal in appearance with no evidence of a pericardial   effusion. Pleural Effusion   No evidence of pleural effusion. Aorta / Great Vessels   -Aortic root dimension within normal limits.   -The Pulmonary artery is within normal limits. -IVC size is within normal limits with normal respiratory phasic changes. Radiology    CXR  12/22/2021 @1058 vs 12/22/2021 @0749 vs 10/20/2021 @0921    1. Poor inflation of lungs. 2. Questionable tiny effusion of L side. Moderate interstitial pneumonia both mid and LL fields. Overall chest appears slightly worse than prior. CT Scans    1. Filling defects in the R & L pulmonary arteries and in the artery supplying the RLL consistent with pulmonary embolus, smaller than on previous CTA dated 11/06/2021. No new filling defects noted  2. Areas of abnormal density in the RU/M/LL lung fields new since previous study 11/06/2021 consistent with inflammatory process. 3. Right pleural effusion  (See actual reports for details)    FL MODIFIED BARIUM SWALLOW W VIDEO   12/21/2021   1. Laryngeal penetration of thin barium without evidence of aspiration. 2. Additional recommendations from the speech therapist will follow.        Assessment   - Acute Hypoxic Respiratory Failure 2/2 CHF Vs Aspiration Pneumonia -improving.  - COPD GOLD 2 FEV1 64ppv [id number]  - Systolic + G2 Diastolic HF EF 70-09% 2/2 hypertrophic cardiomyopathy and ICM  - Aspiration Pneumonia -on treatment with Zosyn from ID service. .  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 - no sleep study in records  - CAD w/ total RCA occlusion + L to R collateralizations  - GERD  - PAF on eliquis  - Well-controlled IDDMT2 A1c 6.6 c/b neuropathy on gabapentin  - CKD Stage III  - H/o bilateral LE DVT  - H/o esophageal dilation - pt does not remember when or by whom. - Rheumatoid Arthritis - on Plaquenil / Prednisone / Imuran    Plan   -Continue BiPAP with 16/6 during nighttime and as needed during daytime.   -Continue DuoNebs q4h WA; de-escalate as appropriate  -Continue mucomyst BID to help expectorate sputum  -Follow-up pending cultures.  -Continue antibiotics per ID service by Dr. Demetrio Briceño MD  -Monitor SpO2 wean supplemental O2 to maintain SpO2 >90%  -Advance diet as tolerated; appreciate SLP recommendations given MBS noted penetration of thin liquids SLP following has history of choking episodes has had previous esophageal dilation per pt.  Does not remember name of her Gastroenterologist.  -Aspiration precautions  -Acapella  -IS  -Optimize heart failure regimen per cardiology    Electronically signed by   Evy Pate MD on 12/25/2021 at 5:41 PM

## 2021-12-25 NOTE — PROGRESS NOTES
Admit Date: 12/19/2021  Hospital day 4    Subjective:     Patient has sob . Medication side effects: none    Scheduled Meds:   ipratropium-albuterol  1 ampule Inhalation BID    acetylcysteine  600 mg Inhalation BID    piperacillin-tazobactam  3,375 mg IntraVENous Q8H    bumetanide  1 mg IntraVENous BID    [Held by provider] albuterol  2.5 mg Nebulization 4x daily    sodium chloride flush  5-40 mL IntraVENous 2 times per day    aspirin  81 mg Oral Daily    atorvastatin  80 mg Oral Nightly    apixaban  5 mg Oral BID    azaTHIOprine  100 mg Oral Daily    calcium carbonate  1 tablet Oral BID    cloNIDine  0.1 mg Oral BID    pantoprazole  40 mg Oral QAM AC    DULoxetine  30 mg Oral BID    docusate sodium  100 mg Oral BID    fenofibrate  160 mg Oral Daily    gabapentin  100 mg Oral TID    hydroxychloroquine  200 mg Oral Daily    insulin glargine  8 Units SubCUTAneous Nightly    traZODone  100 mg Oral Nightly    ranolazine  500 mg Oral BID    predniSONE  5 mg Oral Daily    oxyCODONE-acetaminophen  1 tablet Oral TID    montelukast  5 mg Oral Nightly    metoprolol succinate  50 mg Oral Daily    magnesium oxide  400 mg Oral BID    losartan  50 mg Oral Daily    isosorbide mononitrate  60 mg Oral Daily    [Held by provider] budesonide-formoterol  2 puff Inhalation BID    [Held by provider] tiotropium  2 puff Inhalation Daily     Continuous Infusions:   sodium chloride 20 mL/hr at 12/25/21 0512    sodium chloride       PRN Meds:sodium chloride flush, sodium chloride, potassium chloride **OR** potassium alternative oral replacement **OR** potassium chloride, ondansetron **OR** ondansetron, acetaminophen **OR** acetaminophen, polyethylene glycol, nitroGLYCERIN, aluminum & magnesium hydroxide-simethicone, polyvinyl alcohol, magnesium hydroxide, loperamide    Review of Systems  Pertinent items are noted in HPI.     Objective:     Patient Vitals for the past 8 hrs:   BP Temp Temp src Pulse Resp SpO2

## 2021-12-26 LAB
GLUCOSE BLD-MCNC: 101 MG/DL (ref 70–108)
GLUCOSE BLD-MCNC: 164 MG/DL (ref 70–108)
GLUCOSE BLD-MCNC: 170 MG/DL (ref 70–108)
GLUCOSE BLD-MCNC: 180 MG/DL (ref 70–108)

## 2021-12-26 PROCEDURE — 1200000003 HC TELEMETRY R&B

## 2021-12-26 PROCEDURE — 6370000000 HC RX 637 (ALT 250 FOR IP): Performed by: INTERNAL MEDICINE

## 2021-12-26 PROCEDURE — 94660 CPAP INITIATION&MGMT: CPT

## 2021-12-26 PROCEDURE — 6370000000 HC RX 637 (ALT 250 FOR IP)

## 2021-12-26 PROCEDURE — 94669 MECHANICAL CHEST WALL OSCILL: CPT

## 2021-12-26 PROCEDURE — 2580000003 HC RX 258: Performed by: INTERNAL MEDICINE

## 2021-12-26 PROCEDURE — 94640 AIRWAY INHALATION TREATMENT: CPT

## 2021-12-26 PROCEDURE — 2700000000 HC OXYGEN THERAPY PER DAY

## 2021-12-26 PROCEDURE — 6360000002 HC RX W HCPCS: Performed by: INTERNAL MEDICINE

## 2021-12-26 PROCEDURE — 6360000002 HC RX W HCPCS

## 2021-12-26 PROCEDURE — 99232 SBSQ HOSP IP/OBS MODERATE 35: CPT | Performed by: INTERNAL MEDICINE

## 2021-12-26 PROCEDURE — 2500000003 HC RX 250 WO HCPCS: Performed by: INTERNAL MEDICINE

## 2021-12-26 PROCEDURE — 2580000003 HC RX 258

## 2021-12-26 PROCEDURE — 82948 REAGENT STRIP/BLOOD GLUCOSE: CPT

## 2021-12-26 PROCEDURE — 94761 N-INVAS EAR/PLS OXIMETRY MLT: CPT

## 2021-12-26 RX ADMIN — BUMETANIDE 1 MG: 0.25 INJECTION, SOLUTION INTRAMUSCULAR; INTRAVENOUS at 21:19

## 2021-12-26 RX ADMIN — ANTACID TABLETS 500 MG: 500 TABLET, CHEWABLE ORAL at 09:00

## 2021-12-26 RX ADMIN — IPRATROPIUM BROMIDE AND ALBUTEROL SULFATE 1 AMPULE: .5; 3 SOLUTION RESPIRATORY (INHALATION) at 15:20

## 2021-12-26 RX ADMIN — PIPERACILLIN AND TAZOBACTAM 3375 MG: 3; .375 INJECTION, POWDER, LYOPHILIZED, FOR SOLUTION INTRAVENOUS at 21:10

## 2021-12-26 RX ADMIN — DULOXETINE HYDROCHLORIDE 30 MG: 30 CAPSULE, DELAYED RELEASE ORAL at 09:01

## 2021-12-26 RX ADMIN — GABAPENTIN 100 MG: 100 CAPSULE ORAL at 15:11

## 2021-12-26 RX ADMIN — METOPROLOL SUCCINATE 50 MG: 50 TABLET, FILM COATED, EXTENDED RELEASE ORAL at 09:01

## 2021-12-26 RX ADMIN — PIPERACILLIN AND TAZOBACTAM 3375 MG: 3; .375 INJECTION, POWDER, LYOPHILIZED, FOR SOLUTION INTRAVENOUS at 06:05

## 2021-12-26 RX ADMIN — HYDROXYCHLOROQUINE SULFATE 200 MG: 200 TABLET ORAL at 09:01

## 2021-12-26 RX ADMIN — PANTOPRAZOLE SODIUM 40 MG: 40 TABLET, DELAYED RELEASE ORAL at 06:04

## 2021-12-26 RX ADMIN — ATORVASTATIN CALCIUM 80 MG: 80 TABLET, FILM COATED ORAL at 21:11

## 2021-12-26 RX ADMIN — APIXABAN 5 MG: 5 TABLET, FILM COATED ORAL at 21:13

## 2021-12-26 RX ADMIN — Medication 400 MG: at 21:14

## 2021-12-26 RX ADMIN — OXYCODONE AND ACETAMINOPHEN 1 TABLET: 5; 325 TABLET ORAL at 15:11

## 2021-12-26 RX ADMIN — RANOLAZINE 500 MG: 500 TABLET, FILM COATED, EXTENDED RELEASE ORAL at 09:02

## 2021-12-26 RX ADMIN — GABAPENTIN 100 MG: 100 CAPSULE ORAL at 09:01

## 2021-12-26 RX ADMIN — ANTACID TABLETS 500 MG: 500 TABLET, CHEWABLE ORAL at 21:13

## 2021-12-26 RX ADMIN — SODIUM CHLORIDE, PRESERVATIVE FREE 10 ML: 5 INJECTION INTRAVENOUS at 21:24

## 2021-12-26 RX ADMIN — LOSARTAN POTASSIUM 50 MG: 50 TABLET, FILM COATED ORAL at 09:00

## 2021-12-26 RX ADMIN — IPRATROPIUM BROMIDE AND ALBUTEROL SULFATE 1 AMPULE: .5; 3 SOLUTION RESPIRATORY (INHALATION) at 07:58

## 2021-12-26 RX ADMIN — INSULIN GLARGINE 8 UNITS: 100 INJECTION, SOLUTION SUBCUTANEOUS at 21:25

## 2021-12-26 RX ADMIN — DULOXETINE HYDROCHLORIDE 30 MG: 30 CAPSULE, DELAYED RELEASE ORAL at 21:12

## 2021-12-26 RX ADMIN — OXYCODONE AND ACETAMINOPHEN 1 TABLET: 5; 325 TABLET ORAL at 09:01

## 2021-12-26 RX ADMIN — CLONIDINE HYDROCHLORIDE 0.1 MG: 0.1 TABLET ORAL at 21:12

## 2021-12-26 RX ADMIN — GABAPENTIN 100 MG: 100 CAPSULE ORAL at 21:11

## 2021-12-26 RX ADMIN — ASPIRIN 81 MG CHEWABLE TABLET 81 MG: 81 TABLET CHEWABLE at 09:00

## 2021-12-26 RX ADMIN — ACETYLCYSTEINE 600 MG: 200 SOLUTION ORAL; RESPIRATORY (INHALATION) at 15:20

## 2021-12-26 RX ADMIN — TRAZODONE HYDROCHLORIDE 100 MG: 100 TABLET ORAL at 21:17

## 2021-12-26 RX ADMIN — APIXABAN 5 MG: 5 TABLET, FILM COATED ORAL at 09:01

## 2021-12-26 RX ADMIN — PREDNISONE 5 MG: 10 TABLET ORAL at 09:00

## 2021-12-26 RX ADMIN — ISOSORBIDE MONONITRATE 60 MG: 60 TABLET, EXTENDED RELEASE ORAL at 09:00

## 2021-12-26 RX ADMIN — CLONIDINE HYDROCHLORIDE 0.1 MG: 0.1 TABLET ORAL at 09:00

## 2021-12-26 RX ADMIN — FENOFIBRATE 160 MG: 160 TABLET ORAL at 09:01

## 2021-12-26 RX ADMIN — MONTELUKAST SODIUM 5 MG: 5 TABLET, CHEWABLE ORAL at 21:11

## 2021-12-26 RX ADMIN — Medication 400 MG: at 09:00

## 2021-12-26 RX ADMIN — PIPERACILLIN AND TAZOBACTAM 3375 MG: 3; .375 INJECTION, POWDER, LYOPHILIZED, FOR SOLUTION INTRAVENOUS at 15:12

## 2021-12-26 RX ADMIN — ACETYLCYSTEINE 600 MG: 200 SOLUTION ORAL; RESPIRATORY (INHALATION) at 07:58

## 2021-12-26 RX ADMIN — RANOLAZINE 500 MG: 500 TABLET, FILM COATED, EXTENDED RELEASE ORAL at 21:13

## 2021-12-26 RX ADMIN — AZATHIOPRINE 100 MG: 50 TABLET ORAL at 09:00

## 2021-12-26 RX ADMIN — DOCUSATE SODIUM 100 MG: 100 CAPSULE ORAL at 21:17

## 2021-12-26 RX ADMIN — OXYCODONE AND ACETAMINOPHEN 1 TABLET: 5; 325 TABLET ORAL at 21:17

## 2021-12-26 RX ADMIN — BUMETANIDE 1 MG: 0.25 INJECTION, SOLUTION INTRAMUSCULAR; INTRAVENOUS at 09:01

## 2021-12-26 ASSESSMENT — PAIN SCALES - GENERAL
PAINLEVEL_OUTOF10: 5
PAINLEVEL_OUTOF10: 0
PAINLEVEL_OUTOF10: 4
PAINLEVEL_OUTOF10: 0

## 2021-12-26 NOTE — PROGRESS NOTES
Admit Date: 12/19/2021  Hospital day 5    Subjective:     Patient sob . Medication side effects: none    Scheduled Meds:   ipratropium-albuterol  1 ampule Inhalation BID    acetylcysteine  600 mg Inhalation BID    piperacillin-tazobactam  3,375 mg IntraVENous Q8H    bumetanide  1 mg IntraVENous BID    [Held by provider] albuterol  2.5 mg Nebulization 4x daily    sodium chloride flush  5-40 mL IntraVENous 2 times per day    aspirin  81 mg Oral Daily    atorvastatin  80 mg Oral Nightly    apixaban  5 mg Oral BID    azaTHIOprine  100 mg Oral Daily    calcium carbonate  1 tablet Oral BID    cloNIDine  0.1 mg Oral BID    pantoprazole  40 mg Oral QAM AC    DULoxetine  30 mg Oral BID    docusate sodium  100 mg Oral BID    fenofibrate  160 mg Oral Daily    gabapentin  100 mg Oral TID    hydroxychloroquine  200 mg Oral Daily    insulin glargine  8 Units SubCUTAneous Nightly    traZODone  100 mg Oral Nightly    ranolazine  500 mg Oral BID    predniSONE  5 mg Oral Daily    oxyCODONE-acetaminophen  1 tablet Oral TID    montelukast  5 mg Oral Nightly    metoprolol succinate  50 mg Oral Daily    magnesium oxide  400 mg Oral BID    losartan  50 mg Oral Daily    isosorbide mononitrate  60 mg Oral Daily    [Held by provider] budesonide-formoterol  2 puff Inhalation BID    [Held by provider] tiotropium  2 puff Inhalation Daily     Continuous Infusions:   sodium chloride 20 mL/hr at 12/25/21 0512    sodium chloride       PRN Meds:sodium chloride flush, sodium chloride, potassium chloride **OR** potassium alternative oral replacement **OR** potassium chloride, ondansetron **OR** ondansetron, acetaminophen **OR** acetaminophen, polyethylene glycol, nitroGLYCERIN, aluminum & magnesium hydroxide-simethicone, polyvinyl alcohol, magnesium hydroxide, loperamide    Review of Systems  Pertinent items are noted in HPI.     Objective:     Patient Vitals for the past 8 hrs:   BP Temp Temp src Pulse Resp SpO2 12/26/21 1501 (!) 144/67 97.8 °F (36.6 °C) Oral 72 18 96 %     I/O last 3 completed shifts: In: 700 [P.O.:700]  Out: 300 [Urine:300]    No change     ECG: af.   Data Review:   CBC:  Lab Results   Component Value Date    WBC 9.7 12/22/2021    RBC 2.86 12/22/2021    HGB 9.0 12/22/2021    HCT 27.6 12/22/2021    MCV 96.5 12/22/2021    MCH 31.5 12/22/2021    MCHC 32.6 12/22/2021    RDW 19.8 12/13/2021     12/22/2021    MPV 9.8 12/22/2021     BMP  Lab Results   Component Value Date     12/22/2021    K 4.8 12/22/2021    K 4.8 12/19/2021    CL 98 12/22/2021    CO2 24 12/22/2021    BUN 22 12/22/2021    CREATININE 1.0 12/22/2021    CALCIUM 9.3 12/22/2021      COAG PROFILE:  Lab Results   Component Value Date    APTT 33.5 11/18/2021    INR 1.22 11/21/2021       Assessment:     Active Problems:    Acute on chronic clinical systolic heart failure (HCC)    Pneumonia due to infectious organism  Resolved Problems:    * No resolved hospital problems.  *      Plan:   Patient improving gradually    continue diuretics    dm on sliding scale    cad stable    recent pe    anaemia stable normal...

## 2021-12-26 NOTE — PROGRESS NOTES
[facility]      Patient - Haleigh Rene   MRN -  [id number]   Acct # - [de-identified]   - 1940      Date of Admission -  2021  5:40 AM  Date of evaluation -  2021  Room - [facility] Physician - Rigo Yeboah DO     Problem List      Active Hospital Problems    Diagnosis Date Noted    Pneumonia due to infectious organism [J18.9]     Acute on chronic clinical systolic heart failure [facility]) [I50.23] 2021     Reason for Consult    For management of acute hypoxic respiratory failure  HPI   History Obtained From: Patient and electronic medical record. Haleigh Rene is a 78 y.o. female who presents for undifferentiated shortness of breath and a new oxygen requirement from nursing home. H/o COPD GOLD stage 2 FEV1 64ppv 2021 on symbicort / spiriva / albuterol, Systolic + G2 Diastolic HF EF 97-71% 2/2 hypertrophic cardiomyopathy, CAD w/ total RCA occlusion + L to R collateralizations, Unspecified HARRIS , GERD, PAF on eliquis, Well-controlled IDDMT2 A1c 6.6 c/b neuropathy on gabapentin, CKD Stage III, MAGDI on CPAP, HTN, HLD on fenofibrate, Rheumatoid Arthritis on plaquenil / prednisone / imuran, and Depression. Pt is an unreliable historian but this is her baseline. Past 24 hrs   -She is on a 2L of oxygen by nasal cannula  -Improving shortness of breath  -Not in distress  -Afebrile  -She used her BiPAP device last night.   -Rest of review of systems reviewed.     PMHx   Past Medical History      Diagnosis Date    Arthritis     Asthma     Atrial fibrillation ([address]     Blood circulation, collateral     CAD (coronary artery disease)     CHF (congestive heart failure) (HCC)     Chronic kidney disease, stage III (moderate) [facility]     COPD (chronic obstructive pulmonary disease) (HCC)     Depression     Diabetes mellitus ([address]     Diabetic hypertension-nephrosis syndrome ([address]     Dysuria     GERD (gastroesophageal reflux disease)     Hyperlipidemia     Hypertension     Kidney disease     stage 3 dr Vandana Sultana Unspecified cerebral artery occlusion with cerebral infarction 1999    Dr Danish Wynn informed patient after Heart Attack      Past Surgical History        Procedure Laterality Date    APPENDECTOMY      BACK SURGERY      lumbar spine    BLADDER SUSPENSION      CARDIAC CATHETERIZATION  2009    with stent  [facility]    CARPAL TUNNEL RELEASE      COLONOSCOPY  2013    ENDOSCOPY, COLON, DIAGNOSTIC      ESOPHAGEAL DILATATION      HYSTERECTOMY  1974    JOINT REPLACEMENT Left 1992    Left Knee    UPPER GASTROINTESTINAL ENDOSCOPY N/A 9/28/2021    EGD DILATION performed by Bernardo Mccord MD at [facility]    UPPER GASTROINTESTINAL ENDOSCOPY Left 9/28/2021    EGD BIOPSY performed by [facility]ord [facility]     Meds    Current Medications    ipratropium-albuterol  1 ampule Inhalation BID    acetylcysteine  600 mg Inhalation BID    piperacillin-tazobactam  3,375 mg IntraVENous Q8H    bumetanide  1 mg IntraVENous BID    [Held by provider] albuterol  2.5 mg Nebulization 4x daily    sodium chloride flush  5-40 mL IntraVENous 2 times per day    aspirin  81 mg Oral Daily    atorvastatin  80 mg Oral Nightly    apixaban  5 mg Oral BID    azaTHIOprine  100 mg Oral Daily    calcium carbonate  1 tablet Oral BID    cloNIDine  0.1 mg Oral BID    pantoprazole  40 mg Oral QAM AC    DULoxetine  30 mg Oral BID    docusate sodium  100 mg Oral BID    fenofibrate  160 mg Oral Daily    gabapentin  100 mg Oral TID    hydroxychloroquine  200 mg Oral Daily    insulin glargine  8 Units SubCUTAneous Nightly    traZODone  100 mg Oral Nightly    ranolazine  500 mg Oral BID    predniSONE  5 mg Oral Daily    oxyCODONE-acetaminophen  1 tablet Oral TID    montelukast  5 mg Oral Nightly    metoprolol succinate  50 mg Oral Daily    magnesium oxide  400 mg Oral BID    losartan  50 mg Oral Daily    isosorbide mononitrate  60 mg Oral Daily    [Held by provider] budesonide-formoterol  2 puff Inhalation BID    [Held by provider] tiotropium  2 puff Inhalation Daily     sodium chloride flush, sodium chloride, potassium chloride **OR** potassium alternative oral replacement **OR** potassium chloride, ondansetron **OR** ondansetron, acetaminophen **OR** acetaminophen, polyethylene glycol, nitroGLYCERIN, aluminum & magnesium hydroxide-simethicone, polyvinyl alcohol, magnesium hydroxide, loperamide  IV Drips/Infusions   sodium chloride 20 mL/hr at 12/25/21 9815    sodium chloride       Home Medications  Medications Prior to Admission: apixaban (ELIQUIS) 5 MG TABS tablet, Take 5 mg by mouth 2 times daily  bumetanide (BUMEX) 1 MG tablet, Take 0.5 tablets by mouth daily as needed (leg swelling, increase in daily weight > 3lbs, SOB)  magnesium hydroxide (MILK OF MAGNESIA) 400 MG/5ML suspension, Take 30 mLs by mouth daily as needed for Constipation  albuterol (PROVENTIL) 2.5 MG/0.5ML NEBU nebulizer solution, Take 2.5 mg by nebulization every 4-6 hours as needed for Wheezing or Shortness of Breath Do not take inhaler and nebulizer at the same time per Dr. Mary Anne Rodriguez   aluminum & magnesium hydroxide-simethicone (MYLANTA) 400-400-40 MG/5ML SUSP, Take 10 mLs by mouth every 6 hours as needed For acid reflux  Artificial Saliva (BIOTENE DRY MOUTH MOISTURIZING) SOLN liquid, Take by mouth as needed Dry mouth  montelukast (SINGULAIR) 5 MG chewable tablet, Take 5 mg by mouth nightly  metoprolol succinate (TOPROL XL) 50 MG extended release tablet, Take 50 mg by mouth daily  fluticasone-umeclidin-vilant (TRELEGY ELLIPTA) 100-62.5-25 MCG/INH AEPB, Inhale 1 puff into the lungs daily SOB  OXYGEN, Inhale into the lungs 2-4L to keep SpO2 above 92%.  PRN for SOB  calcium carbonate (TUMS) 500 MG chewable tablet, Take 1 tablet by mouth 2 times daily  fenofibrate micronized (LOFIBRA) 200 MG capsule, Take 200 mg by mouth daily   acetaminophen (TYLENOL) 500 MG tablet, Take 500 mg by mouth every 8 hours as needed for Pain   losartan (COZAAR) 50 MG tablet, Take 50 mg by mouth daily  loperamide (IMODIUM) 2 MG capsule, Take 2 mg by mouth every 12 hours as needed for Diarrhea   oxyCODONE-acetaminophen (PERCOCET) 5-325 MG per tablet, Take 1 tablet by mouth 3 times daily. For pain  hydroxychloroquine (PLAQUENIL) 200 MG tablet, TAKE 1 TABLET BY MOUTH EVERY DAY  cloNIDine (CATAPRES) 0.1 MG tablet, Take 1 tablet by mouth 2 times daily  predniSONE (DELTASONE) 5 MG tablet, TAKE 1 TABLET BY MOUTH EVERY DAY  DULoxetine (CYMBALTA) 30 MG extended release capsule, Take 30 mg by mouth 2 times daily  gabapentin (NEURONTIN) 100 MG capsule, Take 100 mg by mouth 3 times daily. atorvastatin (LIPITOR) 40 MG tablet, Take 2 tablets by mouth daily  docusate sodium (COLACE, DULCOLAX) 100 MG CAPS, Take 100 mg by mouth 2 times daily  insulin glargine (LANTUS) 100 UNIT/ML injection pen, Inject 8 Units into the skin nightly   dexlansoprazole (DEXILANT) 60 MG CPDR delayed release capsule, Take 60 mg by mouth daily  ranolazine (RANEXA) 500 MG SR tablet, Take 1 tablet by mouth 2 times daily. magnesium oxide (MAG-OX) 400 MG tablet, Take 400 mg by mouth 2 times daily   albuterol (PROVENTIL HFA;VENTOLIN HFA) 108 (90 BASE) MCG/ACT inhaler, Inhale 2 puffs into the lungs every 6 hours as needed for Shortness of Breath   traZODone (DESYREL) 50 MG tablet, Take 100 mg by mouth nightly   isosorbide mononitrate (IMDUR) 60 MG extended release tablet, Take 1 tablet by mouth daily  azaTHIOprine (IMURAN) 50 MG tablet, Take 2 tablets by mouth daily  spironolactone (ALDACTONE) 50 MG tablet, Take 1 tablet by mouth daily  Misc. Devices (HEAT THERAPY) MISC, by Does not apply route every 8 hours as needed For pain. To Left shoulder  Elastic Bandages & Supports (T.E.D.  ANTI-EMBOLISM STOCKINGS) MISC, by Does not apply route On in AM; off at HS  carboxymethylcellulose (ARTIFICIAL TEARS) 1 % ophthalmic solution, Place 1 drop into both eyes daily as needed for Dry Eyes   EPINEPHrine (EPIPEN 2-LORAINE) 0.3 MG/0.3ML SOAJ injection, Inject 0.3 mLs into the muscle once for 1 dose Use as directed for allergic reaction  nitroGLYCERIN (NITROSTAT) 0.4 MG SL tablet, Place 0.4 mg under the tongue every 5 minutes as needed for Chest pain   Diet    ADULT DIET; Dysphagia - Soft and Bite Sized  ADULT ORAL NUTRITION SUPPLEMENT; Breakfast, Lunch, Dinner; Diabetic Oral Supplement  Allergies    Renflexis [infliximab], Strawberry (diagnostic), Nuts [peanut-containing drug products], Sulfa antibiotics, Tape [adhesive tape], Bactrim [sulfamethoxazole-trimethoprim], and Chocolate  Social History     Social History     Socioeconomic History    Marital status:      Spouse name: Not on file    Number of children: 2    Years of education: 8    Highest education level: Not on file   Occupational History    Not on file   Tobacco Use    Smoking status: Former Smoker     Packs/day: 0.15     Years: 10.00     Pack years: 1.50     Types: Cigarettes     Start date: 10/21/2013    Smokeless tobacco: Never Used   Vaping Use    Vaping Use: Never used   Substance and Sexual Activity    Alcohol use: No    Drug use: No    Sexual activity: Never   Other Topics Concern    Not on file   Social History Narrative    Not on file     Social Determinants of Health     Financial Resource Strain:     Difficulty of Paying Living Expenses: Not on file   Food Insecurity:     Worried About Running Out of Food in the Last Year: Not on file    Oz of Food in the Last Year: Not on file   Transportation Needs:     Lack of Transportation (Medical): Not on file    Lack of Transportation (Non-Medical):  Not on file   Physical Activity:     Days of Exercise per Week: Not on file    Minutes of Exercise per Session: Not on file   Stress:     Feeling of Stress : Not on file   Social Connections:     Frequency of Communication with Friends and Family: Not on file    Frequency of Social Gatherings with Friends and Family: Not on file    Attends Holiness Services: Not on file    Active Member of Clubs or Organizations: Not on file    Attends Club or Organization Meetings: Not on file    Marital Status: Not on file   Intimate Partner Violence:     Fear of Current or Ex-Partner: Not on file    Emotionally Abused: Not on file    Physically Abused: Not on file    Sexually Abused: Not on file   Housing Stability:     Unable to Pay for Housing in the Last Year: Not on file    Number of [place] in the Last Year: Not on file    Unstable Housing in the Last Year: Not on file     Family History          Problem Relation Age of Onset    Heart Disease Mother     Diabetes Mother     Kidney Disease Mother     Heart Disease Father     Diabetes Father     Cancer Sister     Stroke Brother     Cancer Sister      Sleep History    Never diagnosed with sleep apnea in the past.  Occupational history   Occupation:  She is current working: No  Type of profession: retired. History of tobacco smoking:Yes  Amount of tobacco smokin.15 PPD. Years of tobacco smoking: 15.                                    Quit smoking: Yes. Quit JWOP:5499   Current smoker: No.       Amount of current tobacco smokin PPD  .   History of recreational or IV drug use in the past:NO     History of exposure to coal mines/coal dust: NO  History of exposure to foundry dust/welding: NO  History of exposure to quarry/silica/sandblasting: NO  History of exposure to asbestos/working with breaks/ships: NO  History of exposure to farm dust: NO  History of recent travel to long distances: NO  History of exposure to birds, pigeons, or chickens in the past:NO  Pet animals at home:No  Dogs: 0  Cats: 0    History of pulmonary embolism in the past: Yes            History of DVT in the past:Yes        [x] Right lower extremity [x] Left lower extremity. Vitals     height is 5' 5\" (1.651 m) and weight is 196 lb 8 oz (89.1 kg). Her oral temperature is 97.8 °F (36.6 °C). Her blood pressure is 144/67 (abnormal) and her pulse is 72. Her respiration is 18 and oxygen saturation is 96%. Body mass index is 32.7 kg/m². SUPPLEMENTAL O2: O2 Flow Rate (L/min): 2 L/min     I/O        Intake/Output Summary (Last 24 hours) at 12/26/2021 1537  Last data filed at 12/26/2021 1504  Gross per 24 hour   Intake 700 ml   Output 700 ml   Net 0 ml     I/O last 3 completed shifts: In: 700 [P.O.:700]  Out: 300 [Urine:300]   Patient Vitals for the past 96 hrs (Last 3 readings):   Weight   12/26/21 0117 196 lb 8 oz (89.1 kg)   12/25/21 0413 195 lb 12.8 oz (88.8 kg)       Exam   Constitutional: Patient appears in no distress on 2LPM via nasal cannula. Mouth/Throat: Oropharynx is clear and moist.  No oral thrush. Neck: Neck supple. Cardiovascular: S1 and S2 heard. No murmurs. Pulmonary/Chest: Bilateral air entry present. Good breath sounds on both sides, diminished at bases. No wheezes. Bilateral basal rales. Abdominal: Soft. No tenderness. Extremities: Patient exhibits no edema. Neurological: Patient is awake and alert. Labs  - Old records and notes have been reviewed in [facility]   ABG  Lab Results   Component Value Date    PH 7.48 12/22/2021    PO2 65 12/22/2021    PCO2 35 12/22/2021    HCO3 26 12/22/2021    O2SAT 94 12/22/2021     Lab Results   Component Value Date    IFIO2 30 12/22/2021    SETPEEP 6.0 12/22/2021     CBC  No results for input(s): WBC, RBC, HGB, HCT, MCV, MCH, MCHC, RDW, PLT, MPV in the last 72 hours. BMP  No results for input(s): NA, K, CL, CO2, BUN, CREATININE, GLUCOSE, MG, PHOS, CALCIUM, IONCA, MG in the last 72 hours. LFT  No results for input(s): AST, ALT, ALB, BILITOT, ALKPHOS, LIPASE in the last 72 hours. Invalid input(s):   AMYLASE  TROP  Lab Results   Component Value Date    TROPONINT 0.177 12/19/2021 TROPONINT 0.198 12/19/2021    TROPONINT 0.275 12/19/2021     BNP  No results for input(s): BNP in the last 72 hours. Lactic Acid  No results for input(s): LACTA in the last 72 hours. INR  No results for input(s): INR, PROTIME in the last 72 hours. PTT  No results for input(s): APTT in the last 72 hours. Glucose  Recent Labs     12/25/21  1930 12/26/21  0801 12/26/21  1108   POCGLU 194* 101 170*     UA No results for input(s): SPECGRAV, PHUR, COLORU, CLARITYU, MUCUS, PROTEINU, BLOODU, RBCUA, WBCUA, BACTERIA, NITRU, GLUCOSEU, BILIRUBINUR, UROBILINOGEN, KETUA, LABCAST, LABCASTTY, AMORPHOS in the last 72 hours. Invalid input(s): CRYSTALS. PFTs           Sleep studies   None in record    Cultures        EKG     Chronic RBBB  Echocardiogram   08/05/2020   Findings      Mitral Valve   The mitral valve structure was normal with normal leaflet separation. DOPPLER: The transmitral velocity was within the normal range with no   evidence for mitral stenosis. There was moderate central jetf mitral   regurgitation. Aortic Valve   The aortic valve was trileaflet with normal thickness and cuspal   separation. DOPPLER: Transaortic velocity was within the normal range with   no evidence of aortic stenosis. There was no evidence of aortic   regurgitation. Tricuspid Valve   The tricuspid valve structure was normal with normal leaflet separation. DOPPLER: There was no evidence of tricuspid stenosis. There was mild   tricuspid regurgitation. Pulmonic Valve   The pulmonic valve leaflets exhibited normal thickness, no calcification,   and normal cuspal separation. DOPPLER: The transpulmonic velocity was   within the normal range with no evidence for regurgitation. mild pulmonary   htn      Left Atrium   Left atrial size was dilated      Left Ventricle   Normal left ventricle size and systolic function. Ejection fraction was   estimated at 50-55%.  There were no regional left ventricular wall motion abnormalities and wall thickness was within normal limits. Features were consistent with a pseudonormal left ventricular filling   pattern, with concomitant abnormal relaxation and increased filling   pressure (grade 2 diastolic dysfunction). Right Atrium   Right atrial size was normal.      Right Ventricle   The right ventricular size was normal with normal systolic function and   wall thickness. Right ventricular systolic pressure of 40 mm Hg consistent with mild   pulmonary hypertension. Pericardial Effusion   The pericardium was normal in appearance with no evidence of a pericardial   effusion. Pleural Effusion   No evidence of pleural effusion. Aorta / Great Vessels   -Aortic root dimension within normal limits.   -The Pulmonary artery is within normal limits. -IVC size is within normal limits with normal respiratory phasic changes. Radiology    CXR  12/22/2021 @1058 vs 12/22/2021 @0749 vs 10/20/2021 @0921    1. Poor inflation of lungs. 2. Questionable tiny effusion of L side. Moderate interstitial pneumonia both mid and LL fields. Overall chest appears slightly worse than prior. CT Scans    1. Filling defects in the R & L pulmonary arteries and in the artery supplying the RLL consistent with pulmonary embolus, smaller than on previous CTA dated 11/06/2021. No new filling defects noted  2. Areas of abnormal density in the RU/M/LL lung fields new since previous study 11/06/2021 consistent with inflammatory process. 3. Right pleural effusion  (See actual reports for details)    FL MODIFIED BARIUM SWALLOW W VIDEO   12/21/2021   1. Laryngeal penetration of thin barium without evidence of aspiration. 2. Additional recommendations from the speech therapist will follow.        Assessment   - Acute Hypoxic Respiratory Failure 2/2 CHF Vs Aspiration Pneumonia -improving.  - COPD GOLD 2 FEV1 64ppv 14/16/1920-[id number]  - Systolic + G2 Diastolic HF EF 92-60% 2/2 hypertrophic cardiomyopathy and ICM  - Aspiration Pneumonia -on treatment with Zosyn from ID service. .  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 - no sleep study in records  - CAD w/ total RCA occlusion + L to R collateralizations  - GERD  - PAF on eliquis  - Well-controlled IDDMT2 A1c 6.6 c/b neuropathy on gabapentin  - CKD Stage III  - H/o bilateral LE DVT  - H/o esophageal dilation - pt does not remember when or by whom. - Rheumatoid Arthritis - on Plaquenil / Prednisone / Imuran    Plan   -Continue BiPAP with 16/6 during nighttime and as needed during daytime.   -Continue DuoNebs q4h WA; de-escalate as appropriate  -Continue mucomyst BID to help expectorate sputum  -Follow-up pending cultures.  -Continue antibiotics per ID service by Dr. Shashank Patel MD  -Monitor SpO2 wean supplemental O2 to maintain SpO2 >90%  -Advance diet as tolerated; appreciate SLP recommendations. -Aspiration precautions  -Acapella  -IS  -Optimize heart failure regimen per cardiology.  -Eliquis 5mg po bid.     Electronically signed by   Jeny Bliss MD on 12/26/2021 at 3:36 PM

## 2021-12-27 LAB
GLUCOSE BLD-MCNC: 122 MG/DL (ref 70–108)
GLUCOSE BLD-MCNC: 171 MG/DL (ref 70–108)
GLUCOSE BLD-MCNC: 176 MG/DL (ref 70–108)
GLUCOSE BLD-MCNC: 188 MG/DL (ref 70–108)

## 2021-12-27 PROCEDURE — 2500000003 HC RX 250 WO HCPCS: Performed by: INTERNAL MEDICINE

## 2021-12-27 PROCEDURE — 94760 N-INVAS EAR/PLS OXIMETRY 1: CPT

## 2021-12-27 PROCEDURE — 2700000000 HC OXYGEN THERAPY PER DAY

## 2021-12-27 PROCEDURE — 99232 SBSQ HOSP IP/OBS MODERATE 35: CPT | Performed by: INTERNAL MEDICINE

## 2021-12-27 PROCEDURE — 82948 REAGENT STRIP/BLOOD GLUCOSE: CPT

## 2021-12-27 PROCEDURE — 6360000002 HC RX W HCPCS

## 2021-12-27 PROCEDURE — 1200000003 HC TELEMETRY R&B

## 2021-12-27 PROCEDURE — 6370000000 HC RX 637 (ALT 250 FOR IP): Performed by: INTERNAL MEDICINE

## 2021-12-27 PROCEDURE — 6360000002 HC RX W HCPCS: Performed by: INTERNAL MEDICINE

## 2021-12-27 PROCEDURE — 2580000003 HC RX 258: Performed by: INTERNAL MEDICINE

## 2021-12-27 PROCEDURE — 94640 AIRWAY INHALATION TREATMENT: CPT

## 2021-12-27 PROCEDURE — APPSS30 APP SPLIT SHARED TIME 16-30 MINUTES: Performed by: NURSE PRACTITIONER

## 2021-12-27 PROCEDURE — 6370000000 HC RX 637 (ALT 250 FOR IP)

## 2021-12-27 PROCEDURE — 2580000003 HC RX 258

## 2021-12-27 RX ORDER — BUMETANIDE 1 MG/1
1 TABLET ORAL 2 TIMES DAILY
Qty: 30 TABLET | Refills: 0 | OUTPATIENT
Start: 2021-12-27

## 2021-12-27 RX ADMIN — Medication 400 MG: at 08:17

## 2021-12-27 RX ADMIN — ANTACID TABLETS 500 MG: 500 TABLET, CHEWABLE ORAL at 08:17

## 2021-12-27 RX ADMIN — ACETYLCYSTEINE 600 MG: 200 SOLUTION ORAL; RESPIRATORY (INHALATION) at 18:29

## 2021-12-27 RX ADMIN — BUMETANIDE 1 MG: 0.25 INJECTION, SOLUTION INTRAMUSCULAR; INTRAVENOUS at 22:35

## 2021-12-27 RX ADMIN — LOSARTAN POTASSIUM 50 MG: 50 TABLET, FILM COATED ORAL at 08:17

## 2021-12-27 RX ADMIN — PIPERACILLIN AND TAZOBACTAM 3375 MG: 3; .375 INJECTION, POWDER, LYOPHILIZED, FOR SOLUTION INTRAVENOUS at 22:44

## 2021-12-27 RX ADMIN — OXYCODONE AND ACETAMINOPHEN 1 TABLET: 5; 325 TABLET ORAL at 22:34

## 2021-12-27 RX ADMIN — TRAZODONE HYDROCHLORIDE 100 MG: 100 TABLET ORAL at 22:28

## 2021-12-27 RX ADMIN — DULOXETINE HYDROCHLORIDE 30 MG: 30 CAPSULE, DELAYED RELEASE ORAL at 22:30

## 2021-12-27 RX ADMIN — MONTELUKAST SODIUM 5 MG: 5 TABLET, CHEWABLE ORAL at 22:29

## 2021-12-27 RX ADMIN — ANTACID TABLETS 500 MG: 500 TABLET, CHEWABLE ORAL at 22:30

## 2021-12-27 RX ADMIN — HYDROXYCHLOROQUINE SULFATE 200 MG: 200 TABLET ORAL at 08:16

## 2021-12-27 RX ADMIN — PIPERACILLIN AND TAZOBACTAM 3375 MG: 3; .375 INJECTION, POWDER, LYOPHILIZED, FOR SOLUTION INTRAVENOUS at 12:32

## 2021-12-27 RX ADMIN — OXYCODONE AND ACETAMINOPHEN 1 TABLET: 5; 325 TABLET ORAL at 15:48

## 2021-12-27 RX ADMIN — AZATHIOPRINE 100 MG: 50 TABLET ORAL at 08:17

## 2021-12-27 RX ADMIN — ASPIRIN 81 MG CHEWABLE TABLET 81 MG: 81 TABLET CHEWABLE at 08:16

## 2021-12-27 RX ADMIN — CLONIDINE HYDROCHLORIDE 0.1 MG: 0.1 TABLET ORAL at 22:33

## 2021-12-27 RX ADMIN — BUMETANIDE 1 MG: 0.25 INJECTION, SOLUTION INTRAMUSCULAR; INTRAVENOUS at 08:16

## 2021-12-27 RX ADMIN — GABAPENTIN 100 MG: 100 CAPSULE ORAL at 22:32

## 2021-12-27 RX ADMIN — RANOLAZINE 500 MG: 500 TABLET, FILM COATED, EXTENDED RELEASE ORAL at 08:17

## 2021-12-27 RX ADMIN — IPRATROPIUM BROMIDE AND ALBUTEROL SULFATE 1 AMPULE: .5; 3 SOLUTION RESPIRATORY (INHALATION) at 08:40

## 2021-12-27 RX ADMIN — DULOXETINE HYDROCHLORIDE 30 MG: 30 CAPSULE, DELAYED RELEASE ORAL at 08:16

## 2021-12-27 RX ADMIN — ACETYLCYSTEINE 600 MG: 200 SOLUTION ORAL; RESPIRATORY (INHALATION) at 08:40

## 2021-12-27 RX ADMIN — GABAPENTIN 100 MG: 100 CAPSULE ORAL at 08:17

## 2021-12-27 RX ADMIN — OXYCODONE AND ACETAMINOPHEN 1 TABLET: 5; 325 TABLET ORAL at 08:16

## 2021-12-27 RX ADMIN — PREDNISONE 5 MG: 10 TABLET ORAL at 08:17

## 2021-12-27 RX ADMIN — ISOSORBIDE MONONITRATE 60 MG: 60 TABLET, EXTENDED RELEASE ORAL at 08:18

## 2021-12-27 RX ADMIN — APIXABAN 5 MG: 5 TABLET, FILM COATED ORAL at 08:17

## 2021-12-27 RX ADMIN — ATORVASTATIN CALCIUM 80 MG: 80 TABLET, FILM COATED ORAL at 22:36

## 2021-12-27 RX ADMIN — CLONIDINE HYDROCHLORIDE 0.1 MG: 0.1 TABLET ORAL at 08:17

## 2021-12-27 RX ADMIN — SODIUM CHLORIDE, PRESERVATIVE FREE 10 ML: 5 INJECTION INTRAVENOUS at 22:35

## 2021-12-27 RX ADMIN — FENOFIBRATE 160 MG: 160 TABLET ORAL at 08:17

## 2021-12-27 RX ADMIN — IPRATROPIUM BROMIDE AND ALBUTEROL SULFATE 1 AMPULE: .5; 3 SOLUTION RESPIRATORY (INHALATION) at 18:28

## 2021-12-27 RX ADMIN — METOPROLOL SUCCINATE 50 MG: 50 TABLET, FILM COATED, EXTENDED RELEASE ORAL at 08:17

## 2021-12-27 RX ADMIN — GABAPENTIN 100 MG: 100 CAPSULE ORAL at 15:48

## 2021-12-27 RX ADMIN — PIPERACILLIN AND TAZOBACTAM 3375 MG: 3; .375 INJECTION, POWDER, LYOPHILIZED, FOR SOLUTION INTRAVENOUS at 05:01

## 2021-12-27 RX ADMIN — PANTOPRAZOLE SODIUM 40 MG: 40 TABLET, DELAYED RELEASE ORAL at 08:17

## 2021-12-27 RX ADMIN — RANOLAZINE 500 MG: 500 TABLET, FILM COATED, EXTENDED RELEASE ORAL at 22:34

## 2021-12-27 RX ADMIN — APIXABAN 5 MG: 5 TABLET, FILM COATED ORAL at 22:36

## 2021-12-27 RX ADMIN — Medication 400 MG: at 22:32

## 2021-12-27 ASSESSMENT — PAIN SCALES - GENERAL
PAINLEVEL_OUTOF10: 4
PAINLEVEL_OUTOF10: 7

## 2021-12-27 NOTE — PROGRESS NOTES
[facility]      Patient - Jessica Salter   MRN -  [id number]   Acct # - [de-identified]   - 1940      Date of Admission -  2021  5:40 AM  Date of evaluation -  2021  Room - [facility] MD Primary Care Physician - Rigo Yeboah DO     Problem List      Active Hospital Problems    Diagnosis Date Noted    Pneumonia due to infectious organism [J18.9]     Acute on chronic clinical systolic heart failure [facility]) [I50.23] 2021     Reason for Consult    Acute hypoxic respiratory failure  HPI   History Obtained From: Patient and electronic medical record. Jessica Salter is a 78 y.o. female who presents for undifferentiated shortness of breath and a new oxygen requirement from nursing home. H/o COPD GOLD stage 2 FEV1 64ppv 2021 on symbicort / spiriva / albuterol, Systolic + G2 Diastolic HF EF 83-80% 2/2 hypertrophic cardiomyopathy, CAD w/ total RCA occlusion + L to R collateralizations, Unspecified HARRIS , GERD, PAF on eliquis, Well-controlled IDDMT2 A1c 6.6 c/b neuropathy on gabapentin, CKD Stage III, MAGDI on CPAP, HTN, HLD on fenofibrate, Rheumatoid Arthritis on plaquenil / prednisone / imuran, and Depression. Pt is an unreliable historian but this is her baseline. Past 24 hrs   -on 3 LPM via NC  -ongoing cough  -Tolerating PO intake denies choking episodes  -Rest of review of systems reviewed.     PMHx   Past Medical History      Diagnosis Date    Arthritis     Asthma     Atrial fibrillation ([address]     Blood circulation, collateral     CAD (coronary artery disease)     [facility]est[facility] failure) [facility]     Chronic kidney disease, stage III (moderate) ([facility])     COPD (chronic obstructive pulmonary disease) ([facility])     Depression     Diabetes mellitus ([address]     Diabetic hypertension-nephrosis syndrome ([facility])     Dysuria     GERD (gastroesophageal reflux disease)     Hyperlipidemia     Hypertension     Kidney disease     stage 3 dr Felicia Nice Unspecified cerebral artery occlusion with cerebral infarction 1999    Dr Bernice Gore informed patient after Heart Attack      Past Surgical History        Procedure Laterality Date    APPENDECTOMY      BACK SURGERY      lumbar spine    BLADDER SUSPENSION      CARDIAC CATHETERIZATION  2009    with stent  [facility]    CARPAL TUNNEL RELEASE      COLONOSCOPY  2013    ENDOSCOPY, COLON, DIAGNOSTIC      ESOPHAGEAL DILATATION      HYSTERECTOMY  1974    JOINT REPLACEMENT Left 1992    Left Knee    UPPER GASTROINTESTINAL ENDOSCOPY N/A 9/28/2021    EGD DILATION performed by Umesh Baldwin MD at [facility]    UPPER GASTROINTESTINAL ENDOSCOPY Left 9/28/2021    EGD BIOPSY performed by Umesh Baldwin MD at [facility]     Meds    Current Medications    ipratropium-albuterol  1 ampule Inhalation BID    acetylcysteine  600 mg Inhalation BID    piperacillin-tazobactam  3,375 mg IntraVENous Q8H    bumetanide  1 mg IntraVENous BID    [Held by provider] albuterol  2.5 mg Nebulization 4x daily    sodium chloride flush  5-40 mL IntraVENous 2 times per day    aspirin  81 mg Oral Daily    atorvastatin  80 mg Oral Nightly    apixaban  5 mg Oral BID    azaTHIOprine  100 mg Oral Daily    calcium carbonate  1 tablet Oral BID    cloNIDine  0.1 mg Oral BID    pantoprazole  40 mg Oral QAM AC    DULoxetine  30 mg Oral BID    docusate sodium  100 mg Oral BID    fenofibrate  160 mg Oral Daily    gabapentin  100 mg Oral TID    hydroxychloroquine  200 mg Oral Daily    insulin glargine  8 Units SubCUTAneous Nightly    traZODone  100 mg Oral Nightly    ranolazine  500 mg Oral BID    predniSONE  5 mg Oral Daily    oxyCODONE-acetaminophen  1 tablet Oral TID    montelukast  5 mg Oral Nightly    metoprolol succinate  50 mg Oral Daily    magnesium oxide  400 mg Oral BID    losartan  50 mg Oral Daily    isosorbide mononitrate  60 mg Oral Daily  [Held by provider] budesonide-formoterol  2 puff Inhalation BID    [Held by provider] tiotropium  2 puff Inhalation Daily     sodium chloride flush, sodium chloride, potassium chloride **OR** potassium alternative oral replacement **OR** potassium chloride, ondansetron **OR** ondansetron, acetaminophen **OR** acetaminophen, polyethylene glycol, nitroGLYCERIN, aluminum & magnesium hydroxide-simethicone, polyvinyl alcohol, magnesium hydroxide, loperamide  IV Drips/Infusions   sodium chloride 20 mL/hr at 12/25/21 9501    sodium chloride       Home Medications  Medications Prior to Admission: apixaban (ELIQUIS) 5 MG TABS tablet, Take 5 mg by mouth 2 times daily  magnesium hydroxide (MILK OF MAGNESIA) 400 MG/5ML suspension, Take 30 mLs by mouth daily as needed for Constipation  albuterol (PROVENTIL) 2.5 MG/0.5ML NEBU nebulizer solution, Take 2.5 mg by nebulization every 4-6 hours as needed for Wheezing or Shortness of Breath Do not take inhaler and nebulizer at the same time per Dr. Rene Masters   aluminum & magnesium hydroxide-simethicone (MYLANTA) 400-400-40 MG/5ML SUSP, Take 10 mLs by mouth every 6 hours as needed For acid reflux  Artificial Saliva (BIOTENE DRY MOUTH MOISTURIZING) SOLN liquid, Take by mouth as needed Dry mouth  montelukast (SINGULAIR) 5 MG chewable tablet, Take 5 mg by mouth nightly  metoprolol succinate (TOPROL XL) 50 MG extended release tablet, Take 50 mg by mouth daily  fluticasone-umeclidin-vilant (TRELEGY ELLIPTA) 100-62.5-25 MCG/INH AEPB, Inhale 1 puff into the lungs daily SOB  OXYGEN, Inhale into the lungs 2-4L to keep SpO2 above 92%.  PRN for SOB  calcium carbonate (TUMS) 500 MG chewable tablet, Take 1 tablet by mouth 2 times daily  fenofibrate micronized (LOFIBRA) 200 MG capsule, Take 200 mg by mouth daily   acetaminophen (TYLENOL) 500 MG tablet, Take 500 mg by mouth every 8 hours as needed for Pain   losartan (COZAAR) 50 MG tablet, Take 50 mg by mouth daily  loperamide (IMODIUM) 2 MG capsule, Take 2 mg by mouth every 12 hours as needed for Diarrhea   oxyCODONE-acetaminophen (PERCOCET) 5-325 MG per tablet, Take 1 tablet by mouth 3 times daily. For pain  hydroxychloroquine (PLAQUENIL) 200 MG tablet, TAKE 1 TABLET BY MOUTH EVERY DAY  cloNIDine (CATAPRES) 0.1 MG tablet, Take 1 tablet by mouth 2 times daily  predniSONE (DELTASONE) 5 MG tablet, TAKE 1 TABLET BY MOUTH EVERY DAY  DULoxetine (CYMBALTA) 30 MG extended release capsule, Take 30 mg by mouth 2 times daily  gabapentin (NEURONTIN) 100 MG capsule, Take 100 mg by mouth 3 times daily. atorvastatin (LIPITOR) 40 MG tablet, Take 2 tablets by mouth daily  docusate sodium (COLACE, DULCOLAX) 100 MG CAPS, Take 100 mg by mouth 2 times daily  insulin glargine (LANTUS) 100 UNIT/ML injection pen, Inject 8 Units into the skin nightly   dexlansoprazole (DEXILANT) 60 MG CPDR delayed release capsule, Take 60 mg by mouth daily  ranolazine (RANEXA) 500 MG SR tablet, Take 1 tablet by mouth 2 times daily. magnesium oxide (MAG-OX) 400 MG tablet, Take 400 mg by mouth 2 times daily   albuterol (PROVENTIL HFA;VENTOLIN HFA) 108 (90 BASE) MCG/ACT inhaler, Inhale 2 puffs into the lungs every 6 hours as needed for Shortness of Breath   traZODone (DESYREL) 50 MG tablet, Take 100 mg by mouth nightly   isosorbide mononitrate (IMDUR) 60 MG extended release tablet, Take 1 tablet by mouth daily  azaTHIOprine (IMURAN) 50 MG tablet, Take 2 tablets by mouth daily  spironolactone (ALDACTONE) 50 MG tablet, Take 1 tablet by mouth daily  Misc. Devices (HEAT THERAPY) MISC, by Does not apply route every 8 hours as needed For pain. To Left shoulder  Elastic Bandages & Supports (T.E.D.  ANTI-EMBOLISM STOCKINGS) MISC, by Does not apply route On in AM; off at HS  carboxymethylcellulose (ARTIFICIAL TEARS) 1 % ophthalmic solution, Place 1 drop into both eyes daily as needed for Dry Eyes   EPINEPHrine (EPIPEN 2-LORAINE) 0.3 MG/0.3ML SOAJ injection, Inject 0.3 mLs into the muscle once for 1 dose Use as directed for allergic reaction  nitroGLYCERIN (NITROSTAT) 0.4 MG SL tablet, Place 0.4 mg under the tongue every 5 minutes as needed for Chest pain   Diet    ADULT DIET; Dysphagia - Soft and Bite Sized  ADULT ORAL NUTRITION SUPPLEMENT; Breakfast, Lunch, Dinner; Diabetic Oral Supplement  Allergies    Renflexis [infliximab], Strawberry (diagnostic), Nuts [peanut-containing drug products], Sulfa antibiotics, Tape [adhesive tape], Bactrim [sulfamethoxazole-trimethoprim], and Chocolate  Social History     Social History     Socioeconomic History    Marital status:      Spouse name: Not on file    Number of children: 2    Years of education: 8    Highest education level: Not on file   Occupational History    Not on file   Tobacco Use    Smoking status: Former Smoker     Packs/day: 0.15     Years: 10.00     Pack years: 1.50     Types: Cigarettes     Start date: 10/21/2013    Smokeless tobacco: Never Used   Vaping Use    Vaping Use: Never used   Substance and Sexual Activity    Alcohol use: No    Drug use: No    Sexual activity: Never   Other Topics Concern    Not on file   Social History Narrative    Not on file     Social Determinants of Health     Financial Resource Strain:     Difficulty of Paying Living Expenses: Not on file   Food Insecurity:     Worried About Running Out of Food in the Last Year: Not on file    Oz of Food in the Last Year: Not on file   Transportation Needs:     Lack of Transportation (Medical): Not on file    Lack of Transportation (Non-Medical):  Not on file   Physical Activity:     Days of Exercise per Week: Not on file    Minutes of Exercise per Session: Not on file   Stress:     Feeling of Stress : Not on file   Social Connections:     Frequency of Communication with Friends and Family: Not on file    Frequency of Social Gatherings with Friends and Family: Not on file    Attends Catholic Services: Not on file    Active Member of Clubs or Organizations: Not on file    Attends Club or Organization Meetings: Not on file    Marital Status: Not on file   Intimate Partner Violence:     Fear of Current or Ex-Partner: Not on file    Emotionally Abused: Not on file    Physically Abused: Not on file    Sexually Abused: Not on file   Housing Stability:     Unable to Pay for Housing in the Last Year: Not on file    Number of [place] in the Last Year: Not on file    Unstable Housing in the Last Year: Not on file     Family History          Problem Relation Age of Onset    Heart Disease Mother     Diabetes Mother     Kidney Disease Mother     Heart Disease Father     Diabetes Father     Cancer Sister     Stroke Brother     Cancer Sister      Sleep History    Never diagnosed with sleep apnea in the past.  Occupational history   Occupation:  She is current working: No  Type of profession: retired. History of tobacco smoking:Yes  Amount of tobacco smokin.15 PPD. Years of tobacco smoking: 15.                                    Quit smoking: Yes. Quit ICA   Current smoker: No.       Amount of current tobacco smokin PPD  . History of recreational or IV drug use in the past:NO     History of exposure to coal mines/coal dust: NO  History of exposure to foundry dust/welding: NO  History of exposure to quarry/silica/sandblasting: NO  History of exposure to asbestos/working with breaks/ships: NO  History of exposure to farm dust: NO  History of recent travel to long distances: NO  History of exposure to birds, pigeons, or chickens in the past:NO  Pet animals at home:No  Dogs: 0  Cats: 0    History of pulmonary embolism in the past: Yes            History of DVT in the past:Yes        [x] Right lower extremity   [x] Left lower extremity. Vitals     height is 5' 5\" (1.651 m) and weight is 189 lb 12.8 oz (86.1 kg). Her oral temperature is 97.7 °F (36.5 °C). Her blood pressure is 162/73 (abnormal) and her pulse is 69. Her respiration is 20 and oxygen saturation is 96%. Body mass index is 31.58 kg/m². SUPPLEMENTAL O2: O2 Flow Rate (L/min): 3 L/min     I/O        Intake/Output Summary (Last 24 hours) at 12/27/2021 1436  Last data filed at 12/27/2021 0950  Gross per 24 hour   Intake 1381.58 ml   Output 2200 ml   Net -818.42 ml     I/O last 3 completed shifts: In: 1931.6 [P.O.:940; I.V.:991.6]  Out: 2200 [Urine:2200]   Patient Vitals for the past 96 hrs (Last 3 readings):   Weight   12/27/21 0327 189 lb 12.8 oz (86.1 kg)   12/26/21 0117 196 lb 8 oz (89.1 kg)   12/25/21 0413 195 lb 12.8 oz (88.8 kg)       Exam   Physical Exam   Constitutional: No distress on O2 per NC. Patient appears moderately built and  moderately nourished. Head: Normocephalic and atraumatic. Mouth/Throat: Oropharynx is clear and moist.  No oral thrush. Eyes: Conjunctivae are normal. Pupils are equal, round. No scleral icterus. Neck: Neck supple. No tracheal deviation present. Cardiovascular: S1 and S2 with no murmur. No peripheral edema  Pulmonary/Chest: Normal effort with bilateral air entry, bibasilar rales. No stridor. No respiratory distress. Patient exhibits no tenderness. Abdominal: Soft. Bowel sounds audible. No distension or tenderness to palp. Musculoskeletal: Moves all extremities  Neurological: Patient is alert and follows simple commands     Labs  - Old records and notes have been reviewed in [facility]   ABG  Lab Results   Component Value Date    PH 7.48 12/22/2021    PO2 65 12/22/2021    PCO2 35 12/22/2021    HCO3 26 12/22/2021    O2SAT 94 12/22/2021     Lab Results   Component Value Date    IFIO2 30 12/22/2021    SETPEEP 6.0 12/22/2021     CBC  No results for input(s): WBC, RBC, HGB, HCT, MCV, MCH, MCHC, RDW, PLT, MPV in the last 72 hours. BMP  No results for input(s): NA, K, CL, CO2, BUN, CREATININE, GLUCOSE, MG, PHOS, CALCIUM, IONCA, MG in the last 72 hours.   LFT  No results for input(s): AST, ALT, ALB, BILITOT, ALKPHOS, LIPASE in the last 72 hours. Invalid input(s): AMYLASE  TROP  Lab Results   Component Value Date    TROPONINT 0.177 12/19/2021    TROPONINT 0.198 12/19/2021    TROPONINT 0.275 12/19/2021     BNP  No results for input(s): BNP in the last 72 hours. Lactic Acid  No results for input(s): LACTA in the last 72 hours. INR  No results for input(s): INR, PROTIME in the last 72 hours. PTT  No results for input(s): APTT in the last 72 hours. Glucose  Recent Labs     12/26/21 2020 12/27/21  0806 12/27/21  1137   POCGLU 164* 122* 176*     UA No results for input(s): SPECGRAV, PHUR, COLORU, CLARITYU, MUCUS, PROTEINU, BLOODU, RBCUA, WBCUA, BACTERIA, NITRU, GLUCOSEU, BILIRUBINUR, UROBILINOGEN, KETUA, LABCAST, LABCASTTY, AMORPHOS in the last 72 hours. Invalid input(s): CRYSTALS. PFTs           Sleep studies   None in record    Cultures        EKG     Chronic RBBB  Echocardiogram   08/05/2020   Findings      Mitral Valve   The mitral valve structure was normal with normal leaflet separation. DOPPLER: The transmitral velocity was within the normal range with no   evidence for mitral stenosis. There was moderate central jetf mitral   regurgitation. Aortic Valve   The aortic valve was trileaflet with normal thickness and cuspal   separation. DOPPLER: Transaortic velocity was within the normal range with   no evidence of aortic stenosis. There was no evidence of aortic   regurgitation. Tricuspid Valve   The tricuspid valve structure was normal with normal leaflet separation. DOPPLER: There was no evidence of tricuspid stenosis. There was mild   tricuspid regurgitation. Pulmonic Valve   The pulmonic valve leaflets exhibited normal thickness, no calcification,   and normal cuspal separation. DOPPLER: The transpulmonic velocity was   within the normal range with no evidence for regurgitation. mild pulmonary   htn      Left Atrium   Left atrial size was dilated      Left Ventricle   Normal left ventricle size and systolic function. Ejection fraction was   estimated at 50-55%. There were no regional left ventricular wall motion   abnormalities and wall thickness was within normal limits. Features were consistent with a pseudonormal left ventricular filling   pattern, with concomitant abnormal relaxation and increased filling   pressure (grade 2 diastolic dysfunction). Right Atrium   Right atrial size was normal.      Right Ventricle   The right ventricular size was normal with normal systolic function and   wall thickness. Right ventricular systolic pressure of 40 mm Hg consistent with mild   pulmonary hypertension. Pericardial Effusion   The pericardium was normal in appearance with no evidence of a pericardial   effusion. Pleural Effusion   No evidence of pleural effusion. Aorta / Great Vessels   -Aortic root dimension within normal limits.   -The Pulmonary artery is within normal limits. -IVC size is within normal limits with normal respiratory phasic changes. Radiology    CXR  12/22/2021 @1058 vs 12/22/2021 @0749 vs 10/20/2021 @0921    1. Poor inflation of lungs. 2. Questionable tiny effusion of L side. Moderate interstitial pneumonia both mid and LL fields. Overall chest appears slightly worse than prior. CT Scans    1. Filling defects in the R & L pulmonary arteries and in the artery supplying the RLL consistent with pulmonary embolus, smaller than on previous CTA dated 11/06/2021. No new filling defects noted  2. Areas of abnormal density in the RU/M/LL lung fields new since previous study 11/06/2021 consistent with inflammatory process. 3. Right pleural effusion  (See actual reports for details)    FL MODIFIED BARIUM SWALLOW W VIDEO   12/21/2021   1. Laryngeal penetration of thin barium without evidence of aspiration. 2. Additional recommendations from the speech therapist will follow.        Assessment   - Acute Hypoxic Respiratory Failure 2/2 CHF Vs Aspiration Pneumonia -improving.  - COPD GOLD 2 FEV1 64ppv [id number]  - Systolic + G2 Diastolic HF EF 06-61% 2/2 hypertrophic cardiomyopathy and ICM  - Aspiration Pneumonia -on treatment with Zosyn from ID service. .  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 - no sleep study in records  - CAD w/ total RCA occlusion + L to R collateralizations  - GERD  - PAF on eliquis  - Well-controlled IDDMT2 A1c 6.6 c/b neuropathy on gabapentin  - CKD Stage III  - H/o bilateral LE DVT  - H/o esophageal dilation - pt does not remember when or by whom. - Rheumatoid Arthritis - on Plaquenil / Prednisone / Imuran    Plan   -Continue BiPAP with 16/6 during nighttime and as needed during daytime.   -Continue DuoNebs q4h WA; de-escalate as appropriate  -Continue mucomyst BID to help expectorate sputum  -Continue antibiotics per ID service by Dr. Eliot Garcia MD  -Monitor SpO2 wean supplemental O2 to maintain SpO2 >90%  -Advance diet as tolerated; appreciate SLP recommendations. -Aspiration precautions  -Acapella  -IS  -Optimize heart failure regimen per cardiology.  -Eliquis 5mg po bid.  -Stable from Pulmonary standpoint, schedule patient for follow-up at [facility]. [facility] in 3 months with HRCT approximately 2 days prior to appointment with Dr. Taj Mcdaniels   -Pulmonary service will sign off reconsult if needed    Electronically signed by   LONDON Guzman CNP on 12/27/2021 at 2:36 PM     Addendum by Dr. Taj Mcdaniels MD:  I have seen and examined the patient independently. Face to face evaluation and examination was performed. The above evaluation and note has been reviewed. Labs and radiographs were reviewed. I Have discussed with Mr. Jim Sanderson CNP about this patient in detail. The above assessment and plan has been reviewed. Please see my modifications mentioned below.      My modifications:  She is on 2 L of oxygen by nasal cannula  Improving shortness of breath improving shortness of breath  Follow-up as above    Shu Oshea MD 12/27/2021 6:18 PM

## 2021-12-27 NOTE — PROGRESS NOTES
Progress note: Infectious diseases    Patient - Mariann Moody,  Age - 78 y.o.    - 1940      Room Number - 6C-97/398-Y   MRN -  [id number]   Acct # - [de-identified]  Date of Admission -  2021  5:40 AM    SUBJECTIVE:   No new issues   plan for ECF    OBJECTIVE   VITALS    height is 5' 5\" (1.651 m) and weight is 189 lb 12.8 oz (86.1 kg). Her oral temperature is 97.7 °F (36.5 °C). Her blood pressure is 162/73 (abnormal) and her pulse is 69. Her respiration is 20 and oxygen saturation is 96%. Wt Readings from Last 3 Encounters:   21 189 lb 12.8 oz (86.1 kg)   21 192 lb 7.4 oz (87.3 kg)   21 192 lb 7.4 oz (87.3 kg)       I/O (24 Hours)    Intake/Output Summary (Last 24 hours) at 2021 1622  Last data filed at 2021 0950  Gross per 24 hour   Intake 1381.58 ml   Output 1800 ml   Net -418.42 ml       General Appearance : awake and oriented sick looking on nasal oxygen  HEENT - slighlty pale   Neck - Supple, no mass.   Lungs -  Bilateral   air entry, scatterred rhonchi   Cardiovascular - Heart sounds are normal.     Abdomen - soft, not distended, nontender  Neurologic -awake and oriented  Skin - No bruising or bleeding  Extremities - +edema, no cyanosis, clubbing     MEDICATIONS:      ipratropium-albuterol  1 ampule Inhalation BID    acetylcysteine  600 mg Inhalation BID    piperacillin-tazobactam  3,375 mg IntraVENous Q8H    bumetanide  1 mg IntraVENous BID    [Held by provider] albuterol  2.5 mg Nebulization 4x daily    sodium chloride flush  5-40 mL IntraVENous 2 times per day    aspirin  81 mg Oral Daily    atorvastatin  80 mg Oral Nightly    apixaban  5 mg Oral BID    azaTHIOprine  100 mg Oral Daily    calcium carbonate  1 tablet Oral BID    cloNIDine  0.1 mg Oral BID    pantoprazole  40 mg Oral QAM AC    DULoxetine  30 mg Oral BID    docusate sodium  100 mg Oral BID    fenofibrate  160 mg Oral Daily    gabapentin  100 mg Oral TID    hydroxychloroquine  200 mg Oral Daily    insulin glargine  8 Units SubCUTAneous Nightly    traZODone  100 mg Oral Nightly    ranolazine  500 mg Oral BID    predniSONE  5 mg Oral Daily    oxyCODONE-acetaminophen  1 tablet Oral TID    montelukast  5 mg Oral Nightly    metoprolol succinate  50 mg Oral Daily    magnesium oxide  400 mg Oral BID    losartan  50 mg Oral Daily    isosorbide mononitrate  60 mg Oral Daily    [Held by provider] budesonide-formoterol  2 puff Inhalation BID    [Held by provider] tiotropium  2 puff Inhalation Daily      sodium chloride 20 mL/hr at 12/25/21 0512    sodium chloride       sodium chloride flush, sodium chloride, potassium chloride **OR** potassium alternative oral replacement **OR** potassium chloride, ondansetron **OR** ondansetron, acetaminophen **OR** acetaminophen, polyethylene glycol, nitroGLYCERIN, aluminum & magnesium hydroxide-simethicone, polyvinyl alcohol, magnesium hydroxide, loperamide      LABS:     CBC:   No results for input(s): WBC, HGB, PLT in the last 72 hours. BMP:    No results for input(s): NA, K, CL, CO2, BUN, CREATININE, GLUCOSE in the last 72 hours. Calcium:  No results for input(s): CALCIUM in the last 72 hours.    Recent Labs     12/26/21  2020 12/27/21  0806 12/27/21  1137   POCGLU 164* 122* 176*          Problem list of patient:     Patient Active Problem List   Diagnosis Code    Syncope R55    Diabetes mellitus ([facility] E11.9    CAD (coronary artery disease) I25.10    Hypertension I10    Atrial fibrillation ([facility]) I48.91    GERD (gastroesophageal reflux disease) K21.9    Chronic kidney disease, stage III (moderate) ([facility]) N18.30    COPD (chronic obstructive pulmonary disease) ([facility]) J44.9    Dysuria R30.0    Diabetic hypertension-nephrosis syndrome ([facility]) E11.21    Hypokalemia E87.6    Abnormal nuclear cardiac imaging test R93.1    Back pain, chronic M54.9, G89.29    Obesity (BMI 35.0-39.9 without comorbidity) E66.9    H/O class III angina pectoris Z86.79    HTN, goal below 130/80 I10    Non-rheumatic mitral regurgitation I34.0    Abnormal nuclear stress test R94.39    Osteopenia of multiple sites M85.89    Seroneg[facility] M06.00    Acute respiratory failure (HCC) J96.00    Diverticulitis K57.92    Diverticulitis of colon K57.32    Multiple falls R29.6    EMMA (acute kidney injury) ([facility] N17.9    Urinary tract infection in female N39.0    Acute diverticulitis K57.92    Rectal bleeding K62.5    Acute on chronic clinical systolic heart failure (HCC) I50.23    Pneumonia due to infectious organism J18.9         ASSESSMENT/PLAN   Pneumonia: treated  CHF   Deconditioning:plan for ECF at am.  Joseph Ann MD, MD, FACP 12/27/2021 4:22 PM

## 2021-12-27 NOTE — CARE COORDINATION
12/27/21, 2:29 PM EST    DISCHARGE PLANNING EVALUATION      Transport not available until 10:00 tomorrow, only available today is after 9:00 pm, facility cannot accept that late. Updated  Lois. Call made to daughter to update, voice mail was full and unable to leave a message.

## 2021-12-27 NOTE — PROGRESS NOTES
Inpatient Cardiac Rehabilitation Consult    Received consult for Phase II Cardiac Rehabilitation. Patient is not a good candidate for cardiac rehab per returning to [facility].

## 2021-12-27 NOTE — PROGRESS NOTES
Admit Date: 12/19/2021  Hospital day 7    Subjective:     Patient contue to improve . Medication side effects: none    Scheduled Meds:   ipratropium-albuterol  1 ampule Inhalation BID    acetylcysteine  600 mg Inhalation BID    piperacillin-tazobactam  3,375 mg IntraVENous Q8H    bumetanide  1 mg IntraVENous BID    [Held by provider] albuterol  2.5 mg Nebulization 4x daily    sodium chloride flush  5-40 mL IntraVENous 2 times per day    aspirin  81 mg Oral Daily    atorvastatin  80 mg Oral Nightly    apixaban  5 mg Oral BID    azaTHIOprine  100 mg Oral Daily    calcium carbonate  1 tablet Oral BID    cloNIDine  0.1 mg Oral BID    pantoprazole  40 mg Oral QAM AC    DULoxetine  30 mg Oral BID    docusate sodium  100 mg Oral BID    fenofibrate  160 mg Oral Daily    gabapentin  100 mg Oral TID    hydroxychloroquine  200 mg Oral Daily    insulin glargine  8 Units SubCUTAneous Nightly    traZODone  100 mg Oral Nightly    ranolazine  500 mg Oral BID    predniSONE  5 mg Oral Daily    oxyCODONE-acetaminophen  1 tablet Oral TID    montelukast  5 mg Oral Nightly    metoprolol succinate  50 mg Oral Daily    magnesium oxide  400 mg Oral BID    losartan  50 mg Oral Daily    isosorbide mononitrate  60 mg Oral Daily    [Held by provider] budesonide-formoterol  2 puff Inhalation BID    [Held by provider] tiotropium  2 puff Inhalation Daily     Continuous Infusions:   sodium chloride 20 mL/hr at 12/25/21 0512    sodium chloride       PRN Meds:sodium chloride flush, sodium chloride, potassium chloride **OR** potassium alternative oral replacement **OR** potassium chloride, ondansetron **OR** ondansetron, acetaminophen **OR** acetaminophen, polyethylene glycol, nitroGLYCERIN, aluminum & magnesium hydroxide-simethicone, polyvinyl alcohol, magnesium hydroxide, loperamide    Review of Systems  Pertinent items are noted in HPI.     Objective:     Patient Vitals for the past 8 hrs:   BP Temp Temp src Pulse Resp SpO2   12/27/21 1630 (!) 145/69 98.1 °F (36.7 °C) Oral 79 22 93 %     I/O last 3 completed shifts: In: 1381.6 [P.O.:390; I.V.:991.6]  Out: 2200 [Urine:2200]    No change     ECG: af.   Data Review:   CBC:  Lab Results   Component Value Date    WBC 9.7 12/22/2021    RBC 2.86 12/22/2021    HGB 9.0 12/22/2021    HCT 27.6 12/22/2021    MCV 96.5 12/22/2021    MCH 31.5 12/22/2021    MCHC 32.6 12/22/2021    RDW 19.8 12/13/2021     12/22/2021    MPV 9.8 12/22/2021     BMP  Lab Results   Component Value Date     12/22/2021    K 4.8 12/22/2021    K 4.8 12/19/2021    CL 98 12/22/2021    CO2 24 12/22/2021    BUN 22 12/22/2021    CREATININE 1.0 12/22/2021    CALCIUM 9.3 12/22/2021      COAG PROFILE:  Lab Results   Component Value Date    APTT 33.5 11/18/2021    INR 1.22 11/21/2021       Assessment:     Active Problems:    Acute on chronic clinical systolic heart failure (HCC)    Pneumonia due to infectious organism  Resolved Problems:    * No resolved hospital problems.  *      Plan:   Patient continue to improve no chest pain    sob is less h/h stable    htn under control   recent pe   on anticoagulation    dm    to [facility]

## 2021-12-28 VITALS
WEIGHT: 189.8 LBS | DIASTOLIC BLOOD PRESSURE: 80 MMHG | HEIGHT: 65 IN | BODY MASS INDEX: 31.62 KG/M2 | OXYGEN SATURATION: 100 % | RESPIRATION RATE: 20 BRPM | TEMPERATURE: 98.6 F | HEART RATE: 98 BPM | SYSTOLIC BLOOD PRESSURE: 171 MMHG

## 2021-12-28 LAB
GLUCOSE BLD-MCNC: 128 MG/DL (ref 70–108)
SARS-COV-2, NAAT: NOT DETECTED

## 2021-12-28 PROCEDURE — 6370000000 HC RX 637 (ALT 250 FOR IP): Performed by: INTERNAL MEDICINE

## 2021-12-28 PROCEDURE — 6370000000 HC RX 637 (ALT 250 FOR IP)

## 2021-12-28 PROCEDURE — 6360000002 HC RX W HCPCS

## 2021-12-28 PROCEDURE — 94640 AIRWAY INHALATION TREATMENT: CPT

## 2021-12-28 PROCEDURE — 2500000003 HC RX 250 WO HCPCS: Performed by: INTERNAL MEDICINE

## 2021-12-28 PROCEDURE — 6360000002 HC RX W HCPCS: Performed by: INTERNAL MEDICINE

## 2021-12-28 PROCEDURE — 2580000003 HC RX 258

## 2021-12-28 PROCEDURE — 87635 SARS-COV-2 COVID-19 AMP PRB: CPT

## 2021-12-28 PROCEDURE — 82948 REAGENT STRIP/BLOOD GLUCOSE: CPT

## 2021-12-28 RX ADMIN — CLONIDINE HYDROCHLORIDE 0.1 MG: 0.1 TABLET ORAL at 10:09

## 2021-12-28 RX ADMIN — METOPROLOL SUCCINATE 50 MG: 50 TABLET, FILM COATED, EXTENDED RELEASE ORAL at 10:08

## 2021-12-28 RX ADMIN — GABAPENTIN 100 MG: 100 CAPSULE ORAL at 10:07

## 2021-12-28 RX ADMIN — PREDNISONE 5 MG: 10 TABLET ORAL at 10:09

## 2021-12-28 RX ADMIN — BUMETANIDE 1 MG: 0.25 INJECTION, SOLUTION INTRAMUSCULAR; INTRAVENOUS at 10:06

## 2021-12-28 RX ADMIN — RANOLAZINE 500 MG: 500 TABLET, FILM COATED, EXTENDED RELEASE ORAL at 10:07

## 2021-12-28 RX ADMIN — APIXABAN 5 MG: 5 TABLET, FILM COATED ORAL at 10:07

## 2021-12-28 RX ADMIN — DULOXETINE HYDROCHLORIDE 30 MG: 30 CAPSULE, DELAYED RELEASE ORAL at 10:07

## 2021-12-28 RX ADMIN — OXYCODONE AND ACETAMINOPHEN 1 TABLET: 5; 325 TABLET ORAL at 10:06

## 2021-12-28 RX ADMIN — Medication 400 MG: at 10:07

## 2021-12-28 RX ADMIN — ISOSORBIDE MONONITRATE 60 MG: 60 TABLET, EXTENDED RELEASE ORAL at 10:07

## 2021-12-28 RX ADMIN — ASPIRIN 81 MG CHEWABLE TABLET 81 MG: 81 TABLET CHEWABLE at 10:06

## 2021-12-28 RX ADMIN — PIPERACILLIN AND TAZOBACTAM 3375 MG: 3; .375 INJECTION, POWDER, LYOPHILIZED, FOR SOLUTION INTRAVENOUS at 05:48

## 2021-12-28 RX ADMIN — ACETYLCYSTEINE 600 MG: 200 SOLUTION ORAL; RESPIRATORY (INHALATION) at 09:45

## 2021-12-28 RX ADMIN — LOSARTAN POTASSIUM 50 MG: 50 TABLET, FILM COATED ORAL at 10:08

## 2021-12-28 RX ADMIN — HYDROXYCHLOROQUINE SULFATE 200 MG: 200 TABLET ORAL at 10:07

## 2021-12-28 RX ADMIN — FENOFIBRATE 160 MG: 160 TABLET ORAL at 10:09

## 2021-12-28 RX ADMIN — AZATHIOPRINE 100 MG: 50 TABLET ORAL at 10:08

## 2021-12-28 RX ADMIN — IPRATROPIUM BROMIDE AND ALBUTEROL SULFATE 1 AMPULE: .5; 3 SOLUTION RESPIRATORY (INHALATION) at 09:45

## 2021-12-28 RX ADMIN — PANTOPRAZOLE SODIUM 40 MG: 40 TABLET, DELAYED RELEASE ORAL at 05:49

## 2021-12-28 ASSESSMENT — PAIN SCALES - GENERAL: PAINLEVEL_OUTOF10: 7

## 2021-12-28 NOTE — CARE COORDINATION
12/28/21, 10:22 AM EST    DISCHARGE PLANNING EVALUATION      Planning [facility] [place] [facility] today, transport by ambulance, RN has notified son, who is in agreement. Attempted to reach daughter, voice mail is full. 12/28/21, 10:24 AM EST    Patient goals/plan/ treatment preferences discussed by  and . Patient goals/plan/ treatment preferences reviewed with patient/ family. Patient/ family verbalize understanding of discharge plan and are in agreement with goal/plan/treatment preferences. Understanding was demonstrated using the teach back method. AVS provided by RN at time of discharge, which includes all necessary medical information pertaining to the patients current course of illness, treatment, post-discharge goals of care, and treatment preferences. Services After Discharge  Services At/After Discharge: PT,OT,Nursing Services,Aide Services   IMM Letter  IMM Letter given to Patient/Family/Significant other/Guardian/POA/by[de-identified] copy delivered to patient by mgr. Barreto  IMM Letter date given[de-identified] 12/27/21  IMM Letter time given[de-identified] [id number]

## 2021-12-28 NOTE — PROGRESS NOTES
Report called to Sydni Dykes , nurse at [facility]. All questions answered at this time.

## 2021-12-28 NOTE — PROGRESS NOTES
Patient discharged at this time. All IV's removed. Discharge instructions, medication changes and follow up appointments explained at this time. All questions answered at this time. AVS given to patient. All patient belongings returned. Chart broken down and placed in yellow bin. Patient left via stretcher with L[facility]. [facility] taking patient to [facility].

## 2021-12-28 NOTE — PROGRESS NOTES
Patient's son, Maya Shea, called at this time. Updated on patient condition and discharge planning. [facility]. Zain verbalized understanding.

## 2022-01-01 LAB
ALBUMIN SERPL-MCNC: 2.5 G/DL
ALP BLD-CCNC: 40 U/L
ALT SERPL-CCNC: 22 U/L
ANION GAP SERPL CALCULATED.3IONS-SCNC: 0.7 MMOL/L
AST SERPL-CCNC: 25 U/L
BASOPHILS ABSOLUTE: 0 /ΜL
BASOPHILS RELATIVE PERCENT: 0.4 %
BILIRUB SERPL-MCNC: 0.5 MG/DL (ref 0.1–1.4)
BUN BLDV-MCNC: 29 MG/DL
C-REACTIVE PROTEIN: 3.81
CALCIUM SERPL-MCNC: 9.9 MG/DL
CHLORIDE BLD-SCNC: 93 MMOL/L
CO2: 32 MMOL/L
CREAT SERPL-MCNC: 1.34 MG/DL
EOSINOPHILS ABSOLUTE: 0.3 /ΜL
EOSINOPHILS RELATIVE PERCENT: 3 %
GFR CALCULATED: 37
GLUCOSE BLD-MCNC: 133 MG/DL
HCT VFR BLD CALC: 24.5 % (ref 36–46)
HEMOGLOBIN: 8.2 G/DL (ref 12–16)
LYMPHOCYTES ABSOLUTE: 0.5 /ΜL
LYMPHOCYTES RELATIVE PERCENT: 5.5 %
MCH RBC QN AUTO: 32.3 PG
MCHC RBC AUTO-ENTMCNC: 33.5 G/DL
MCV RBC AUTO: 96.5 FL
MONOCYTES ABSOLUTE: 0.6 /ΜL
MONOCYTES RELATIVE PERCENT: 6.6 %
NEUTROPHILS ABSOLUTE: 8.3 /ΜL
NEUTROPHILS RELATIVE PERCENT: 84.5 %
PDW BLD-RTO: 18.3 %
PLATELET # BLD: 479 K/ΜL
PMV BLD AUTO: 7.4 FL
POTASSIUM SERPL-SCNC: 3.5 MMOL/L
RBC # BLD: 2.53 10^6/ΜL
SEDIMENTATION RATE, ERYTHROCYTE: 43
SODIUM BLD-SCNC: 136 MMOL/L
TOTAL PROTEIN: 6
WBC # BLD: 9.9 10^3/ML

## 2022-01-05 ENCOUNTER — TELEPHONE (OUTPATIENT)
Dept: RHEUMATOLOGY | Age: 82
End: 2022-01-05

## 2022-01-05 NOTE — TELEPHONE ENCOUNTER
----- Message from Cathi Herrmann DO sent at 1/4/2022  5:08 PM EST -----  Lab testing revealing ongoing anemia and continued low lymphocyte counts (lymphopenia). The kidney function tests are mildly elevated compared to prior evaluation. Test for inflammation (sed rate and C-reactive protein are elevated). Please continue holding the azathioprine and have your labs repeated in 2 weeks.

## 2022-01-05 NOTE — TELEPHONE ENCOUNTER
Spoke to DANIKA ECHAVARRIA regarding Dorthys labs with instruction to continue to hold azathioprine and repeat labs in 2 weeks.

## 2022-01-11 NOTE — DISCHARGE SUMMARY
[address] [place] [address]                               DISCHARGE SUMMARY    PATIENT NAME: Brandon Guerrero                    :        1940  MED REC NO:   [id number]                           ROOM:       8276  ACCOUNT NO:   [de-identified]                           ADMIT DATE: 2021  PROVIDER:     Chris Thorpe M.D.               [facility]: 2021    FINAL DIAGNOSES:  1. Acute exacerbation of chronic systolic and diastolic congestive  heart failure. 2.  Atherosclerotic coronary artery disease with a prior history of MI.  3.  Atrial fibrillation with a controlled ventricular response. 4.  Recent bilateral PE.  5.  Generalized weakness. 6.  Bilateral pneumonia. 7.  Diabetes mellitus. 8.  Chronic anemia. 9.  Chronic pain syndrome. 10.  Obesity. 11.  Poor conditioning and malnutrition. CONSULTATION:  1. The patient is seen by Pulmonary Service. 2.  The patient is seen by the GI Service. BRIEF HISTORY:  This is a patient who is an 78years old lady, admitted  to the hospital through the emergency room. She is a nursing home  resident. She has multiple medical problems as mentioned above. She  came in because of the shortness of breath. HOSPITALIZATION COURSE:  The patient admitted to the telemetry bed. CAT  scan of the chest revealed demonstration of her bilateral Pes. Also she  has infiltration in the right upper lobe and the left lower lobe. She  did receive IV antibiotics. She had shortness of breath and  bronchospasm, received bronchodilator. She also has congestive heart  failure, received the diuretics. The patient was anemic but her H and H were stable. The patient is in a  poor condition status. She did receive OT and PT. The patient gradually improved. IV antibiotics discontinued. The  patient did receive IV diuretics, IV medications and then changed to the  p.o. medication.   She was then transferred to the nursing home in a  stable condition. She is to follow up with family physician. She will  be seen in the office. She is to seek medical attention if she has any  change in clinical condition.         April Watt M.D.    D: 01/10/2022 5:16:04       T: 01/10/2022 9:28:38     [email or web address]  Job#: [id number]     Doc#: [id number]    CC:

## 2022-01-18 LAB
ALBUMIN SERPL-MCNC: 3.1 G/DL
ALP BLD-CCNC: 37 U/L
ALT SERPL-CCNC: 7 U/L
ANION GAP SERPL CALCULATED.3IONS-SCNC: 10 MMOL/L
AST SERPL-CCNC: 20 U/L
BASOPHILS ABSOLUTE: 0.1 /ΜL
BASOPHILS RELATIVE PERCENT: 0.7 %
BILIRUB SERPL-MCNC: 0.3 MG/DL (ref 0.1–1.4)
BUN BLDV-MCNC: 33 MG/DL
C-REACTIVE PROTEIN: 0.88
CALCIUM SERPL-MCNC: 9.6 MG/DL
CHLORIDE BLD-SCNC: 102 MMOL/L
CO2: 32 MMOL/L
CREAT SERPL-MCNC: 1.6 MG/DL
EOSINOPHILS ABSOLUTE: 0.2 /ΜL
EOSINOPHILS RELATIVE PERCENT: 2.5 %
GFR CALCULATED: 30
GLUCOSE BLD-MCNC: 85 MG/DL
HCT VFR BLD CALC: 26 % (ref 36–46)
HEMOGLOBIN: 8.7 G/DL (ref 12–16)
LYMPHOCYTES ABSOLUTE: 0.9 /ΜL
LYMPHOCYTES RELATIVE PERCENT: 12.3 %
MCH RBC QN AUTO: 31.7 PG
MCHC RBC AUTO-ENTMCNC: 33.5 G/DL
MCV RBC AUTO: 94.8 FL
MONOCYTES ABSOLUTE: 0.5 /ΜL
MONOCYTES RELATIVE PERCENT: 7.4 %
NEUTROPHILS ABSOLUTE: 5.4 /ΜL
NEUTROPHILS RELATIVE PERCENT: 77.1 %
PDW BLD-RTO: 16.8 %
PLATELET # BLD: 413 K/ΜL
PMV BLD AUTO: 7.8 FL
POTASSIUM SERPL-SCNC: 4.1 MMOL/L
RBC # BLD: 2.75 10^6/ΜL
SEDIMENTATION RATE, ERYTHROCYTE: 34
SODIUM BLD-SCNC: 140 MMOL/L
TOTAL PROTEIN: 6.4
WBC # BLD: 7 10^3/ML

## 2022-01-19 DIAGNOSIS — R79.89 CREATININE ELEVATION: ICD-10-CM

## 2022-01-19 DIAGNOSIS — M06.00 SERONEGATIVE RHEUMATOID ARTHRITIS (HCC): Primary | ICD-10-CM

## 2022-01-19 NOTE — PROGRESS NOTES
Diagnosis Orders   1. Seronegative rheumatoid arthritis ([facility] [address]     2. Creatinine elevation  Urinalysis with Microscopic    Sodium, urine, random    Creatinine, Random Urine    Protein / creatinine ratio, urine     - would like patient to f/u with cardiology given recent admission for acute on chronic systolic heart failure, bilateral pneumonia, diabetes  -Urine studies ordered to further evaluate the potential etiology of the kidney function test abnormalities.

## 2022-01-20 ENCOUNTER — TELEPHONE (OUTPATIENT)
Dept: RHEUMATOLOGY | Age: 82
End: 2022-01-20

## 2022-01-20 NOTE — TELEPHONE ENCOUNTER
----- Message from Steven Vee DO sent at 1/19/2022  5:22 PM EST -----  The anemia is stable compared to your last evaluation 2 weeks ago. Of note your kidney function test have worsened I would like for her to see her primary care provider and cardiologist to discuss further evaluation. The urine study has been ordered to evaluate for the possibility of a urinary tract infection.

## 2022-02-01 ENCOUNTER — TELEPHONE (OUTPATIENT)
Dept: RHEUMATOLOGY | Age: 82
End: 2022-02-01

## 2022-02-01 NOTE — TELEPHONE ENCOUNTER
Benjamín Obrien from [facility] called and stated that patient has changed her DNR and decided to no longer see any specialists, she is just going to see her pcp.

## 2022-04-23 ENCOUNTER — HOSPITAL ENCOUNTER (INPATIENT)
Age: 82
LOS: 3 days | Discharge: NURSING, REHAB OR LONG-TERM CARE | DRG: 193 | End: 2022-04-26
Attending: FAMILY MEDICINE | Admitting: FAMILY MEDICINE
Payer: MEDICARE

## 2022-04-23 ENCOUNTER — APPOINTMENT (OUTPATIENT)
Dept: GENERAL RADIOLOGY | Age: 82
DRG: 193 | End: 2022-04-23
Payer: MEDICARE

## 2022-04-23 DIAGNOSIS — R09.02 HYPOXIA: Primary | ICD-10-CM

## 2022-04-23 DIAGNOSIS — U07.1 COVID-19: ICD-10-CM

## 2022-04-23 DIAGNOSIS — N17.9 AKI (ACUTE KIDNEY INJURY) (HCC): ICD-10-CM

## 2022-04-23 DIAGNOSIS — J18.9 PNEUMONIA OF BOTH LUNGS DUE TO INFECTIOUS ORGANISM, UNSPECIFIED PART OF LUNG: ICD-10-CM

## 2022-04-23 PROBLEM — J96.01 ACUTE HYPOXEMIC RESPIRATORY FAILURE (HCC): Status: ACTIVE | Noted: 2022-04-23

## 2022-04-23 LAB
ALBUMIN SERPL-MCNC: 2.7 G/DL (ref 3.5–5.1)
ALP BLD-CCNC: 71 U/L (ref 38–126)
ALT SERPL-CCNC: 7 U/L (ref 11–66)
ANION GAP SERPL CALCULATED.3IONS-SCNC: 14 MEQ/L (ref 8–16)
AST SERPL-CCNC: 26 U/L (ref 5–40)
BASOPHILS # BLD: 0.1 %
BASOPHILS # BLD: 0.1 %
BASOPHILS ABSOLUTE: 0 THOU/MM3 (ref 0–0.1)
BASOPHILS ABSOLUTE: 0 THOU/MM3 (ref 0–0.1)
BILIRUB SERPL-MCNC: 0.2 MG/DL (ref 0.3–1.2)
BUN BLDV-MCNC: 110 MG/DL (ref 7–22)
CALCIUM SERPL-MCNC: 9.1 MG/DL (ref 8.5–10.5)
CHLORIDE BLD-SCNC: 91 MEQ/L (ref 98–111)
CO2: 28 MEQ/L (ref 23–33)
CREAT SERPL-MCNC: 3.6 MG/DL (ref 0.4–1.2)
EOSINOPHIL # BLD: 0.1 %
EOSINOPHIL # BLD: 0.1 %
EOSINOPHILS ABSOLUTE: 0 THOU/MM3 (ref 0–0.4)
EOSINOPHILS ABSOLUTE: 0 THOU/MM3 (ref 0–0.4)
ERYTHROCYTE [DISTWIDTH] IN BLOOD BY AUTOMATED COUNT: 14.2 % (ref 11.5–14.5)
ERYTHROCYTE [DISTWIDTH] IN BLOOD BY AUTOMATED COUNT: 14.2 % (ref 11.5–14.5)
ERYTHROCYTE [DISTWIDTH] IN BLOOD BY AUTOMATED COUNT: 49.6 FL (ref 35–45)
ERYTHROCYTE [DISTWIDTH] IN BLOOD BY AUTOMATED COUNT: 50.2 FL (ref 35–45)
FLU A ANTIGEN: NEGATIVE
FLU B ANTIGEN: NEGATIVE
GFR SERPL CREATININE-BSD FRML MDRD: 12 ML/MIN/1.73M2
GLUCOSE BLD-MCNC: 157 MG/DL (ref 70–108)
HCT VFR BLD CALC: 27.4 % (ref 37–47)
HCT VFR BLD CALC: 27.6 % (ref 37–47)
HEMOGLOBIN: 8.2 GM/DL (ref 12–16)
HEMOGLOBIN: 8.2 GM/DL (ref 12–16)
IMMATURE GRANS (ABS): 0.15 THOU/MM3 (ref 0–0.07)
IMMATURE GRANS (ABS): 0.19 THOU/MM3 (ref 0–0.07)
IMMATURE GRANULOCYTES: 1.2 %
IMMATURE GRANULOCYTES: 1.3 %
LACTIC ACID, SEPSIS: 1.7 MMOL/L (ref 0.5–1.9)
LYMPHOCYTES # BLD: 2.8 %
LYMPHOCYTES # BLD: 9.2 %
LYMPHOCYTES ABSOLUTE: 0.4 THOU/MM3 (ref 1–4.8)
LYMPHOCYTES ABSOLUTE: 1.1 THOU/MM3 (ref 1–4.8)
MCH RBC QN AUTO: 28.6 PG (ref 26–33)
MCH RBC QN AUTO: 28.8 PG (ref 26–33)
MCHC RBC AUTO-ENTMCNC: 29.7 GM/DL (ref 32.2–35.5)
MCHC RBC AUTO-ENTMCNC: 29.9 GM/DL (ref 32.2–35.5)
MCV RBC AUTO: 95.5 FL (ref 81–99)
MCV RBC AUTO: 96.8 FL (ref 81–99)
MONOCYTES # BLD: 3.3 %
MONOCYTES # BLD: 6.8 %
MONOCYTES ABSOLUTE: 0.5 THOU/MM3 (ref 0.4–1.3)
MONOCYTES ABSOLUTE: 0.8 THOU/MM3 (ref 0.4–1.3)
NUCLEATED RED BLOOD CELLS: 0 /100 WBC
NUCLEATED RED BLOOD CELLS: 0 /100 WBC
OSMOLALITY CALCULATION: 304.4 MOSMOL/KG (ref 275–300)
PLATELET # BLD: 375 THOU/MM3 (ref 130–400)
PLATELET # BLD: 381 THOU/MM3 (ref 130–400)
PMV BLD AUTO: 9 FL (ref 9.4–12.4)
PMV BLD AUTO: 9.1 FL (ref 9.4–12.4)
POTASSIUM REFLEX MAGNESIUM: 4 MEQ/L (ref 3.5–5.2)
PRO-BNP: 2648 PG/ML (ref 0–1800)
RBC # BLD: 2.85 MILL/MM3 (ref 4.2–5.4)
RBC # BLD: 2.87 MILL/MM3 (ref 4.2–5.4)
SARS-COV-2, NAAT: NOT  DETECTED
SEG NEUTROPHILS: 82.6 %
SEG NEUTROPHILS: 92.4 %
SEGMENTED NEUTROPHILS ABSOLUTE COUNT: 10.2 THOU/MM3 (ref 1.8–7.7)
SEGMENTED NEUTROPHILS ABSOLUTE COUNT: 13.9 THOU/MM3 (ref 1.8–7.7)
SODIUM BLD-SCNC: 133 MEQ/L (ref 135–145)
TOTAL PROTEIN: 7.5 G/DL (ref 6.1–8)
TROPONIN T: 0.03 NG/ML
TROPONIN T: 0.03 NG/ML
WBC # BLD: 12.4 THOU/MM3 (ref 4.8–10.8)
WBC # BLD: 15 THOU/MM3 (ref 4.8–10.8)

## 2022-04-23 PROCEDURE — 71045 X-RAY EXAM CHEST 1 VIEW: CPT

## 2022-04-23 PROCEDURE — 83605 ASSAY OF LACTIC ACID: CPT

## 2022-04-23 PROCEDURE — 93005 ELECTROCARDIOGRAM TRACING: CPT | Performed by: NURSE PRACTITIONER

## 2022-04-23 PROCEDURE — 84484 ASSAY OF TROPONIN QUANT: CPT

## 2022-04-23 PROCEDURE — 99223 1ST HOSP IP/OBS HIGH 75: CPT | Performed by: PHYSICIAN ASSISTANT

## 2022-04-23 PROCEDURE — 96374 THER/PROPH/DIAG INJ IV PUSH: CPT

## 2022-04-23 PROCEDURE — 36415 COLL VENOUS BLD VENIPUNCTURE: CPT

## 2022-04-23 PROCEDURE — 85025 COMPLETE CBC W/AUTO DIFF WBC: CPT

## 2022-04-23 PROCEDURE — 99285 EMERGENCY DEPT VISIT HI MDM: CPT

## 2022-04-23 PROCEDURE — 2580000003 HC RX 258: Performed by: NURSE PRACTITIONER

## 2022-04-23 PROCEDURE — 94761 N-INVAS EAR/PLS OXIMETRY MLT: CPT

## 2022-04-23 PROCEDURE — 83880 ASSAY OF NATRIURETIC PEPTIDE: CPT

## 2022-04-23 PROCEDURE — 80053 COMPREHEN METABOLIC PANEL: CPT

## 2022-04-23 PROCEDURE — 6360000002 HC RX W HCPCS: Performed by: NURSE PRACTITIONER

## 2022-04-23 PROCEDURE — 87804 INFLUENZA ASSAY W/OPTIC: CPT

## 2022-04-23 PROCEDURE — 87635 SARS-COV-2 COVID-19 AMP PRB: CPT

## 2022-04-23 PROCEDURE — 87040 BLOOD CULTURE FOR BACTERIA: CPT

## 2022-04-23 PROCEDURE — 1200000000 HC SEMI PRIVATE

## 2022-04-23 PROCEDURE — 94640 AIRWAY INHALATION TREATMENT: CPT

## 2022-04-23 PROCEDURE — 2700000000 HC OXYGEN THERAPY PER DAY

## 2022-04-23 PROCEDURE — 96375 TX/PRO/DX INJ NEW DRUG ADDON: CPT

## 2022-04-23 RX ORDER — RANOLAZINE 500 MG/1
500 TABLET, EXTENDED RELEASE ORAL 2 TIMES DAILY
Status: DISCONTINUED | OUTPATIENT
Start: 2022-04-24 | End: 2022-04-26 | Stop reason: HOSPADM

## 2022-04-23 RX ORDER — NICOTINE POLACRILEX 4 MG
15 LOZENGE BUCCAL PRN
Status: DISCONTINUED | OUTPATIENT
Start: 2022-04-23 | End: 2022-04-23 | Stop reason: CLARIF

## 2022-04-23 RX ORDER — SODIUM CHLORIDE 0.9 % (FLUSH) 0.9 %
10 SYRINGE (ML) INJECTION EVERY 12 HOURS SCHEDULED
Status: DISCONTINUED | OUTPATIENT
Start: 2022-04-23 | End: 2022-04-26 | Stop reason: HOSPADM

## 2022-04-23 RX ORDER — ACETAMINOPHEN 325 MG/1
650 TABLET ORAL EVERY 6 HOURS PRN
Status: DISCONTINUED | OUTPATIENT
Start: 2022-04-23 | End: 2022-04-26 | Stop reason: HOSPADM

## 2022-04-23 RX ORDER — HEPARIN SODIUM 5000 [USP'U]/ML
5000 INJECTION, SOLUTION INTRAVENOUS; SUBCUTANEOUS 3 TIMES DAILY
Status: DISCONTINUED | OUTPATIENT
Start: 2022-04-23 | End: 2022-04-24

## 2022-04-23 RX ORDER — INSULIN LISPRO 100 [IU]/ML
0-3 INJECTION, SOLUTION INTRAVENOUS; SUBCUTANEOUS NIGHTLY
Status: DISCONTINUED | OUTPATIENT
Start: 2022-04-23 | End: 2022-04-26 | Stop reason: HOSPADM

## 2022-04-23 RX ORDER — HYDROXYCHLOROQUINE SULFATE 200 MG/1
200 TABLET, FILM COATED ORAL DAILY
Status: DISCONTINUED | OUTPATIENT
Start: 2022-04-24 | End: 2022-04-26 | Stop reason: HOSPADM

## 2022-04-23 RX ORDER — SODIUM CHLORIDE 9 MG/ML
INJECTION, SOLUTION INTRAVENOUS CONTINUOUS
Status: DISCONTINUED | OUTPATIENT
Start: 2022-04-23 | End: 2022-04-24

## 2022-04-23 RX ORDER — ONDANSETRON 2 MG/ML
4 INJECTION INTRAMUSCULAR; INTRAVENOUS EVERY 6 HOURS PRN
Status: DISCONTINUED | OUTPATIENT
Start: 2022-04-23 | End: 2022-04-26 | Stop reason: HOSPADM

## 2022-04-23 RX ORDER — DEXTROSE MONOHYDRATE 25 G/50ML
12.5 INJECTION, SOLUTION INTRAVENOUS PRN
Status: DISCONTINUED | OUTPATIENT
Start: 2022-04-23 | End: 2022-04-23 | Stop reason: CLARIF

## 2022-04-23 RX ORDER — IPRATROPIUM BROMIDE AND ALBUTEROL SULFATE 2.5; .5 MG/3ML; MG/3ML
1 SOLUTION RESPIRATORY (INHALATION)
Status: DISCONTINUED | OUTPATIENT
Start: 2022-04-24 | End: 2022-04-26

## 2022-04-23 RX ORDER — ONDANSETRON 2 MG/ML
4 INJECTION INTRAMUSCULAR; INTRAVENOUS ONCE
Status: COMPLETED | OUTPATIENT
Start: 2022-04-23 | End: 2022-04-23

## 2022-04-23 RX ORDER — BUMETANIDE 1 MG/1
1 TABLET ORAL 2 TIMES DAILY
Status: DISCONTINUED | OUTPATIENT
Start: 2022-04-23 | End: 2022-04-26 | Stop reason: HOSPADM

## 2022-04-23 RX ORDER — ALBUTEROL SULFATE 2.5 MG/3ML
7.5 SOLUTION RESPIRATORY (INHALATION) ONCE
Status: COMPLETED | OUTPATIENT
Start: 2022-04-23 | End: 2022-04-23

## 2022-04-23 RX ORDER — 0.9 % SODIUM CHLORIDE 0.9 %
500 INTRAVENOUS SOLUTION INTRAVENOUS ONCE
Status: COMPLETED | OUTPATIENT
Start: 2022-04-23 | End: 2022-04-24

## 2022-04-23 RX ORDER — ALBUTEROL SULFATE 2.5 MG/3ML
2.5 SOLUTION RESPIRATORY (INHALATION) EVERY 6 HOURS PRN
Status: DISCONTINUED | OUTPATIENT
Start: 2022-04-23 | End: 2022-04-26 | Stop reason: HOSPADM

## 2022-04-23 RX ORDER — SODIUM CHLORIDE 0.9 % (FLUSH) 0.9 %
10 SYRINGE (ML) INJECTION PRN
Status: DISCONTINUED | OUTPATIENT
Start: 2022-04-23 | End: 2022-04-26 | Stop reason: HOSPADM

## 2022-04-23 RX ORDER — PREDNISONE 10 MG/1
5 TABLET ORAL DAILY
Status: DISCONTINUED | OUTPATIENT
Start: 2022-04-24 | End: 2022-04-26 | Stop reason: HOSPADM

## 2022-04-23 RX ORDER — INSULIN GLARGINE 100 [IU]/ML
8 INJECTION, SOLUTION SUBCUTANEOUS NIGHTLY
Status: DISCONTINUED | OUTPATIENT
Start: 2022-04-24 | End: 2022-04-26 | Stop reason: HOSPADM

## 2022-04-23 RX ORDER — ISOSORBIDE MONONITRATE 60 MG/1
60 TABLET, EXTENDED RELEASE ORAL DAILY
Status: DISCONTINUED | OUTPATIENT
Start: 2022-04-24 | End: 2022-04-26 | Stop reason: HOSPADM

## 2022-04-23 RX ORDER — INSULIN LISPRO 100 [IU]/ML
0-6 INJECTION, SOLUTION INTRAVENOUS; SUBCUTANEOUS
Status: DISCONTINUED | OUTPATIENT
Start: 2022-04-24 | End: 2022-04-26 | Stop reason: HOSPADM

## 2022-04-23 RX ORDER — AZATHIOPRINE 50 MG/1
100 TABLET ORAL DAILY
Status: DISCONTINUED | OUTPATIENT
Start: 2022-04-24 | End: 2022-04-26 | Stop reason: HOSPADM

## 2022-04-23 RX ORDER — POLYETHYLENE GLYCOL 3350 17 G/17G
17 POWDER, FOR SOLUTION ORAL DAILY PRN
Status: DISCONTINUED | OUTPATIENT
Start: 2022-04-23 | End: 2022-04-26 | Stop reason: HOSPADM

## 2022-04-23 RX ORDER — ATORVASTATIN CALCIUM 80 MG/1
80 TABLET, FILM COATED ORAL NIGHTLY
Status: DISCONTINUED | OUTPATIENT
Start: 2022-04-23 | End: 2022-04-26 | Stop reason: HOSPADM

## 2022-04-23 RX ORDER — ACETAMINOPHEN 650 MG/1
650 SUPPOSITORY RECTAL EVERY 6 HOURS PRN
Status: DISCONTINUED | OUTPATIENT
Start: 2022-04-23 | End: 2022-04-26 | Stop reason: HOSPADM

## 2022-04-23 RX ORDER — DOCUSATE SODIUM 100 MG/1
100 CAPSULE, LIQUID FILLED ORAL 2 TIMES DAILY
Status: DISCONTINUED | OUTPATIENT
Start: 2022-04-23 | End: 2022-04-26 | Stop reason: HOSPADM

## 2022-04-23 RX ORDER — CLONIDINE HYDROCHLORIDE 0.1 MG/1
0.1 TABLET ORAL 2 TIMES DAILY
Status: DISCONTINUED | OUTPATIENT
Start: 2022-04-24 | End: 2022-04-26 | Stop reason: HOSPADM

## 2022-04-23 RX ORDER — METHYLPREDNISOLONE SODIUM SUCCINATE 125 MG/2ML
80 INJECTION, POWDER, LYOPHILIZED, FOR SOLUTION INTRAMUSCULAR; INTRAVENOUS ONCE
Status: COMPLETED | OUTPATIENT
Start: 2022-04-23 | End: 2022-04-23

## 2022-04-23 RX ORDER — SODIUM CHLORIDE 9 MG/ML
INJECTION, SOLUTION INTRAVENOUS PRN
Status: DISCONTINUED | OUTPATIENT
Start: 2022-04-23 | End: 2022-04-26 | Stop reason: HOSPADM

## 2022-04-23 RX ORDER — DEXTROSE MONOHYDRATE 50 MG/ML
100 INJECTION, SOLUTION INTRAVENOUS PRN
Status: DISCONTINUED | OUTPATIENT
Start: 2022-04-23 | End: 2022-04-26 | Stop reason: HOSPADM

## 2022-04-23 RX ORDER — ONDANSETRON 4 MG/1
4 TABLET, ORALLY DISINTEGRATING ORAL EVERY 8 HOURS PRN
Status: DISCONTINUED | OUTPATIENT
Start: 2022-04-23 | End: 2022-04-26 | Stop reason: HOSPADM

## 2022-04-23 RX ORDER — ALBUTEROL SULFATE 90 UG/1
2 AEROSOL, METERED RESPIRATORY (INHALATION) EVERY 6 HOURS PRN
Status: DISCONTINUED | OUTPATIENT
Start: 2022-04-23 | End: 2022-04-26 | Stop reason: HOSPADM

## 2022-04-23 RX ADMIN — ONDANSETRON 4 MG: 2 INJECTION INTRAMUSCULAR; INTRAVENOUS at 21:09

## 2022-04-23 RX ADMIN — METHYLPREDNISOLONE SODIUM SUCCINATE 80 MG: 125 INJECTION, POWDER, FOR SOLUTION INTRAMUSCULAR; INTRAVENOUS at 21:02

## 2022-04-23 RX ADMIN — CEFTRIAXONE SODIUM 1000 MG: 1 INJECTION, POWDER, FOR SOLUTION INTRAMUSCULAR; INTRAVENOUS at 19:45

## 2022-04-23 RX ADMIN — SODIUM CHLORIDE 500 ML: 9 INJECTION, SOLUTION INTRAVENOUS at 20:36

## 2022-04-23 RX ADMIN — ALBUTEROL SULFATE 7.5 MG: 2.5 SOLUTION RESPIRATORY (INHALATION) at 18:44

## 2022-04-23 ASSESSMENT — ENCOUNTER SYMPTOMS
SORE THROAT: 0
COUGH: 1
VOMITING: 0
WHEEZING: 1
ABDOMINAL DISTENTION: 0
NAUSEA: 0
COLOR CHANGE: 0
CONSTIPATION: 0
EYE PAIN: 0
RHINORRHEA: 0
DIARRHEA: 0
SHORTNESS OF BREATH: 1
EYE DISCHARGE: 0

## 2022-04-23 ASSESSMENT — PAIN - FUNCTIONAL ASSESSMENT
PAIN_FUNCTIONAL_ASSESSMENT: NONE - DENIES PAIN

## 2022-04-23 NOTE — ED TRIAGE NOTES
Pt presents to the ED by EMS from [facility] in [facility] with c/c SOB. EMS states that pt has been SOB since she was released from covid isolation on 4/16 but SOB got significantly worse today. Pt wears 2L O2 AAT, oxygen increased to 4L NC today by nursing home. Pt denies pain on arrival. EKG completed on arrival. Telemetry in place. Pt afebrile.

## 2022-04-23 NOTE — ED NOTES
Pt resting on cot. Vitals stable. Call light in reach. Telemetry in place.       Timothy Gillespie RN  04/23/22 [id number]

## 2022-04-24 LAB
ALLEN TEST: POSITIVE
ANION GAP SERPL CALCULATED.3IONS-SCNC: 15 MEQ/L (ref 8–16)
BASE EXCESS (CALCULATED): 8.7 MMOL/L (ref -2.5–2.5)
BASOPHILS # BLD: 0.1 %
BASOPHILS ABSOLUTE: 0 THOU/MM3 (ref 0–0.1)
BUN BLDV-MCNC: 91 MG/DL (ref 7–22)
CALCIUM SERPL-MCNC: 9.3 MG/DL (ref 8.5–10.5)
CHLORIDE BLD-SCNC: 95 MEQ/L (ref 98–111)
CO2: 28 MEQ/L (ref 23–33)
COLLECTED BY:: ABNORMAL
CREAT SERPL-MCNC: 2.1 MG/DL (ref 0.4–1.2)
DEVICE: ABNORMAL
EKG ATRIAL RATE: 108 BPM
EKG ATRIAL RATE: 81 BPM
EKG P AXIS: 51 DEGREES
EKG P-R INTERVAL: 232 MS
EKG Q-T INTERVAL: 370 MS
EKG Q-T INTERVAL: 486 MS
EKG QRS DURATION: 134 MS
EKG QRS DURATION: 136 MS
EKG QTC CALCULATION (BAZETT): 464 MS
EKG QTC CALCULATION (BAZETT): 564 MS
EKG R AXIS: -18 DEGREES
EKG R AXIS: 7 DEGREES
EKG T AXIS: -34 DEGREES
EKG T AXIS: -37 DEGREES
EKG VENTRICULAR RATE: 81 BPM
EKG VENTRICULAR RATE: 95 BPM
EOSINOPHIL # BLD: 0 %
EOSINOPHILS ABSOLUTE: 0 THOU/MM3 (ref 0–0.4)
ERYTHROCYTE [DISTWIDTH] IN BLOOD BY AUTOMATED COUNT: 14.2 % (ref 11.5–14.5)
ERYTHROCYTE [DISTWIDTH] IN BLOOD BY AUTOMATED COUNT: 49.2 FL (ref 35–45)
GFR SERPL CREATININE-BSD FRML MDRD: 22 ML/MIN/1.73M2
GLUCOSE BLD-MCNC: 170 MG/DL (ref 70–108)
GLUCOSE BLD-MCNC: 171 MG/DL (ref 70–108)
GLUCOSE BLD-MCNC: 186 MG/DL (ref 70–108)
GLUCOSE BLD-MCNC: 197 MG/DL (ref 70–108)
GLUCOSE BLD-MCNC: 216 MG/DL (ref 70–108)
GLUCOSE BLD-MCNC: 250 MG/DL (ref 70–108)
HCO3: 32 MMOL/L (ref 23–28)
HCT VFR BLD CALC: 29.6 % (ref 37–47)
HEMOGLOBIN: 8.9 GM/DL (ref 12–16)
IMMATURE GRANS (ABS): 0.23 THOU/MM3 (ref 0–0.07)
IMMATURE GRANULOCYTES: 2 %
LACTIC ACID, SEPSIS: 0.9 MMOL/L (ref 0.5–1.9)
LYMPHOCYTES # BLD: 3.8 %
LYMPHOCYTES ABSOLUTE: 0.4 THOU/MM3 (ref 1–4.8)
MAGNESIUM: 3.1 MG/DL (ref 1.6–2.4)
MCH RBC QN AUTO: 28.6 PG (ref 26–33)
MCHC RBC AUTO-ENTMCNC: 30.1 GM/DL (ref 32.2–35.5)
MCV RBC AUTO: 95.2 FL (ref 81–99)
MONOCYTES # BLD: 1.5 %
MONOCYTES ABSOLUTE: 0.2 THOU/MM3 (ref 0.4–1.3)
NUCLEATED RED BLOOD CELLS: 0 /100 WBC
O2 SATURATION: 86 %
OSMOLALITY CALCULATION: 309.1 MOSMOL/KG (ref 275–300)
PCO2: 37 MMHG (ref 35–45)
PH BLOOD GAS: 7.54 (ref 7.35–7.45)
PLATELET # BLD: 369 THOU/MM3 (ref 130–400)
PMV BLD AUTO: 9.2 FL (ref 9.4–12.4)
PO2: 45 MMHG (ref 71–104)
POTASSIUM REFLEX MAGNESIUM: 3.5 MEQ/L (ref 3.5–5.2)
RBC # BLD: 3.11 MILL/MM3 (ref 4.2–5.4)
SEG NEUTROPHILS: 92.6 %
SEGMENTED NEUTROPHILS ABSOLUTE COUNT: 10.8 THOU/MM3 (ref 1.8–7.7)
SODIUM BLD-SCNC: 138 MEQ/L (ref 135–145)
SOURCE, BLOOD GAS: ABNORMAL
TROPONIN T: 0.01 NG/ML
TROPONIN T: 0.02 NG/ML
WBC # BLD: 11.7 THOU/MM3 (ref 4.8–10.8)

## 2022-04-24 PROCEDURE — 93005 ELECTROCARDIOGRAM TRACING: CPT | Performed by: PHYSICIAN ASSISTANT

## 2022-04-24 PROCEDURE — 80048 BASIC METABOLIC PNL TOTAL CA: CPT

## 2022-04-24 PROCEDURE — 93010 ELECTROCARDIOGRAM REPORT: CPT | Performed by: INTERNAL MEDICINE

## 2022-04-24 PROCEDURE — 6370000000 HC RX 637 (ALT 250 FOR IP): Performed by: PHYSICIAN ASSISTANT

## 2022-04-24 PROCEDURE — 99233 SBSQ HOSP IP/OBS HIGH 50: CPT | Performed by: STUDENT IN AN ORGANIZED HEALTH CARE EDUCATION/TRAINING PROGRAM

## 2022-04-24 PROCEDURE — 36415 COLL VENOUS BLD VENIPUNCTURE: CPT

## 2022-04-24 PROCEDURE — 6360000002 HC RX W HCPCS: Performed by: PHYSICIAN ASSISTANT

## 2022-04-24 PROCEDURE — 94760 N-INVAS EAR/PLS OXIMETRY 1: CPT

## 2022-04-24 PROCEDURE — 36600 WITHDRAWAL OF ARTERIAL BLOOD: CPT

## 2022-04-24 PROCEDURE — 2500000003 HC RX 250 WO HCPCS: Performed by: PHYSICIAN ASSISTANT

## 2022-04-24 PROCEDURE — 82803 BLOOD GASES ANY COMBINATION: CPT

## 2022-04-24 PROCEDURE — 85025 COMPLETE CBC W/AUTO DIFF WBC: CPT

## 2022-04-24 PROCEDURE — 2580000003 HC RX 258: Performed by: NURSE PRACTITIONER

## 2022-04-24 PROCEDURE — 83605 ASSAY OF LACTIC ACID: CPT

## 2022-04-24 PROCEDURE — 6370000000 HC RX 637 (ALT 250 FOR IP): Performed by: STUDENT IN AN ORGANIZED HEALTH CARE EDUCATION/TRAINING PROGRAM

## 2022-04-24 PROCEDURE — 1200000000 HC SEMI PRIVATE

## 2022-04-24 PROCEDURE — 84484 ASSAY OF TROPONIN QUANT: CPT

## 2022-04-24 PROCEDURE — 83735 ASSAY OF MAGNESIUM: CPT

## 2022-04-24 PROCEDURE — 2580000003 HC RX 258: Performed by: PHYSICIAN ASSISTANT

## 2022-04-24 PROCEDURE — 94640 AIRWAY INHALATION TREATMENT: CPT

## 2022-04-24 PROCEDURE — 82948 REAGENT STRIP/BLOOD GLUCOSE: CPT

## 2022-04-24 RX ORDER — PREDNISONE 20 MG/1
40 TABLET ORAL DAILY
Status: DISCONTINUED | OUTPATIENT
Start: 2022-04-24 | End: 2022-04-26 | Stop reason: HOSPADM

## 2022-04-24 RX ORDER — BENZONATATE 100 MG/1
200 CAPSULE ORAL 3 TIMES DAILY PRN
Status: DISCONTINUED | OUTPATIENT
Start: 2022-04-24 | End: 2022-04-26 | Stop reason: HOSPADM

## 2022-04-24 RX ORDER — IPRATROPIUM BROMIDE AND ALBUTEROL SULFATE 2.5; .5 MG/3ML; MG/3ML
1 SOLUTION RESPIRATORY (INHALATION) EVERY 4 HOURS PRN
Status: DISCONTINUED | OUTPATIENT
Start: 2022-04-24 | End: 2022-04-24

## 2022-04-24 RX ORDER — AZITHROMYCIN 250 MG/1
500 TABLET, FILM COATED ORAL DAILY
Status: DISCONTINUED | OUTPATIENT
Start: 2022-04-24 | End: 2022-04-26 | Stop reason: HOSPADM

## 2022-04-24 RX ADMIN — INSULIN GLARGINE 8 UNITS: 100 INJECTION, SOLUTION SUBCUTANEOUS at 03:52

## 2022-04-24 RX ADMIN — RANOLAZINE 500 MG: 500 TABLET, EXTENDED RELEASE ORAL at 02:46

## 2022-04-24 RX ADMIN — CLONIDINE HYDROCHLORIDE 0.1 MG: 0.1 TABLET ORAL at 20:27

## 2022-04-24 RX ADMIN — CLONIDINE HYDROCHLORIDE 0.1 MG: 0.1 TABLET ORAL at 00:33

## 2022-04-24 RX ADMIN — INSULIN LISPRO 2 UNITS: 100 INJECTION, SOLUTION INTRAVENOUS; SUBCUTANEOUS at 09:18

## 2022-04-24 RX ADMIN — CEFTRIAXONE SODIUM 1000 MG: 10 INJECTION, POWDER, FOR SOLUTION INTRAVENOUS at 20:01

## 2022-04-24 RX ADMIN — HYDROXYCHLOROQUINE SULFATE 200 MG: 200 TABLET, FILM COATED ORAL at 09:12

## 2022-04-24 RX ADMIN — SODIUM CHLORIDE: 9 INJECTION, SOLUTION INTRAVENOUS at 00:14

## 2022-04-24 RX ADMIN — RANOLAZINE 500 MG: 500 TABLET, EXTENDED RELEASE ORAL at 20:27

## 2022-04-24 RX ADMIN — ISOSORBIDE MONONITRATE 60 MG: 60 TABLET, EXTENDED RELEASE ORAL at 09:23

## 2022-04-24 RX ADMIN — ATORVASTATIN CALCIUM 80 MG: 80 TABLET, FILM COATED ORAL at 00:33

## 2022-04-24 RX ADMIN — ATORVASTATIN CALCIUM 80 MG: 80 TABLET, FILM COATED ORAL at 20:27

## 2022-04-24 RX ADMIN — AZATHIOPRINE 100 MG: 50 TABLET ORAL at 09:11

## 2022-04-24 RX ADMIN — IPRATROPIUM BROMIDE AND ALBUTEROL SULFATE 1 AMPULE: .5; 3 SOLUTION RESPIRATORY (INHALATION) at 14:59

## 2022-04-24 RX ADMIN — CLONIDINE HYDROCHLORIDE 0.1 MG: 0.1 TABLET ORAL at 09:24

## 2022-04-24 RX ADMIN — APIXABAN 2.5 MG: 2.5 TABLET, FILM COATED ORAL at 03:56

## 2022-04-24 RX ADMIN — INSULIN LISPRO 1 UNITS: 100 INJECTION, SOLUTION INTRAVENOUS; SUBCUTANEOUS at 17:34

## 2022-04-24 RX ADMIN — INSULIN LISPRO 1 UNITS: 100 INJECTION, SOLUTION INTRAVENOUS; SUBCUTANEOUS at 20:36

## 2022-04-24 RX ADMIN — DOCUSATE SODIUM 100 MG: 100 CAPSULE, LIQUID FILLED ORAL at 00:38

## 2022-04-24 RX ADMIN — SODIUM CHLORIDE: 9 INJECTION, SOLUTION INTRAVENOUS at 06:22

## 2022-04-24 RX ADMIN — IPRATROPIUM BROMIDE AND ALBUTEROL SULFATE 1 AMPULE: .5; 3 SOLUTION RESPIRATORY (INHALATION) at 10:07

## 2022-04-24 RX ADMIN — RANOLAZINE 500 MG: 500 TABLET, EXTENDED RELEASE ORAL at 09:10

## 2022-04-24 RX ADMIN — DOCUSATE SODIUM 100 MG: 100 CAPSULE, LIQUID FILLED ORAL at 09:09

## 2022-04-24 RX ADMIN — INSULIN LISPRO 1 UNITS: 100 INJECTION, SOLUTION INTRAVENOUS; SUBCUTANEOUS at 00:38

## 2022-04-24 RX ADMIN — AZITHROMYCIN MONOHYDRATE 500 MG: 250 TABLET ORAL at 09:25

## 2022-04-24 RX ADMIN — SODIUM CHLORIDE, PRESERVATIVE FREE 10 ML: 5 INJECTION INTRAVENOUS at 20:28

## 2022-04-24 RX ADMIN — APIXABAN 2.5 MG: 2.5 TABLET, FILM COATED ORAL at 09:09

## 2022-04-24 RX ADMIN — PREDNISONE 40 MG: 20 TABLET ORAL at 15:34

## 2022-04-24 RX ADMIN — APIXABAN 2.5 MG: 2.5 TABLET, FILM COATED ORAL at 20:27

## 2022-04-24 RX ADMIN — INSULIN GLARGINE 8 UNITS: 100 INJECTION, SOLUTION SUBCUTANEOUS at 20:37

## 2022-04-24 NOTE — PROGRESS NOTES
Hospitalist Progress Note      Patient:  Karen Buenrostro    Unit/Bed:5K-20/020-A  YOB: 1940  MRN: [id number]   Acct: [de-identified]   PCP: Christiano Yeboah DO  Date of Admission: 4/23/2022    Assessment/Plan:    1. Acute on chronic hypoxia  2. Suspected superimposed bacterial pneumonia  3. 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 Patient's B/requirements of 2 L with recent increase to 4 L at [facility]. Currently stable on 4 L.  b. Patient diagnosed and treated outpatient for COVID-19 on 4/6/2022.  c. Current influenza and COVID testing negative. d. Chest x-ray with concern for possible atypical pneumonia infection versus post COVID pulmonary fibrosis. e. Patient has DNR CC paperwork provided from [facility]. I called and confirmed with the [facility] at the patient is not back to DNR CC. I also spoke to the patient regarding her goals of care. She is mildly confused alert to person, but not place time or situation. She states that she does not want any aggressive measures and just wants to [place] home, and go to sleep\". I called and spoke with the patient's son/POA who wishes to honor her DNR CC paperwork and provide comfort measures. I offered hospice services which she is excepted. I reached out to the hospice team who will be in touch with the son, who currently resides in [facility]. His name is Valarie Pascual. Contact information: Phone number [phone] or home phone of [phone]  f. For the time being, we will continue with nebulized bronchodilators, prednisone, and azithromycin/rocephin pending discussions with hospice team.   4. Acute encephalopathy:   a. Patient with intermittent episodes of encephalopathy, with increasing frequency and intensity over the preceding 7 to 10 days. b. ABG shows no signs of CO2 retention/CO2 narcosis.   c. Patient is currently pleasantly confused alert to person, not place time or situation. Given her [facility] status, no further work-up elected at this time. 5. EMMA on CKD:   a. Initially felt to be prerenal azotemia as a combination of home diuretic use as well as decreased oral intake with increased confusion. b. Patient started on IV fluids with improvement in renal function. c. Patient with bibasilar crackles a history of HFrEF. Fluids discontinued at this time. Will monitor respiratory status closely  6. Chronic pain:   a. Follows with pain management outpatient. b. Patient currently comfortable with no complaints of pain. Will monitor and treat as needed  7. Rheumatoid arthritis, seronegative:  a. Previously followed with Dr. Raul Mccall outpatient. Recently discontinued follow-up appointments with subspecialties and changed her code status to DNR CC, instead wishing only follow with physician at [facility]. 8. History of CHF:   a. Does not appear to be acutely decompensated, however there is some mounting evidence of increased volume overload in the setting of IV fluid use due to EMMA on presentation. IV fluids have been discontinued. b. Currently holding diuretics. Will monitor volume status closely. Will treat with diuretics for comfort if patient has increased respiratory distress/work of breathing. 9. Coronary artery disease   10. mild troponin elevation:   a. Likely due to underlying CHF in the setting of EMMA on CKD. b. EKG without acute ischemia. Patient is DNR CC. No further ischemic work-up necessary at this time  c. Continue Imdur, Ranexa  11. Atrial fibrillation:  a. Currently rate controlled. Continue Eliquis. 12. Diabetes mellitus type 2:   a. Continue basal bolus insulin. Hypoglycemia protocol. Disposition: Anticipate discharge to [facility] with hospice care pending hospice meeting earlier this week.     Chief Complaint: Confusion, increased lethargy    Hospital Course:    4/23: Patient mated to hospital from [address] [place] [address] due to increased lethargy and confusion as well as hypoxia. Patient reportedly has chronic hypoxia on 2 L, she is requiring increased supplemental oxygen to 4 L. She has had decreased oral intake and increased use of nebulized bronchodilators, and increasing oxygen requirements. She also has worsening encephalopathy of several days duration. The patient was recently diagnosed with COVID-19 pneumonia in early April 2022. She has been doing poorly since that time. Patient is recently discontinued follow-up appointments with subspecialists. 4/24: Upon speaking with the staff at [facility], it is unclear the events precipitating hospitalization. The patient does have a signed DNR CC form. In speaking with the patient, she is pleasantly confused but states that she does not want any aggressive management or therapy as treatment. Subjective (past 24 hours):   Patient is pleasantly confused. She denies any significant shortness of breath or chest pains. [place] any discomfort. She does complain of a wet cough. When asked about CODE STATUS, the patient states that she is ready to go home and go to sleep and she does not want any treatment. I called and spoke to the staff at [facility] who confirmed that the patient does have an on file and [facility] form. We also have this in our records at the hospital here. They were unsure of the events precipitating hospitalization. I called and spoke to the patient's Kim Valencia, who wishes to honor the patient's DNR CC form. Is agreed to consultation with the hospice team and no aggressive measures outside of comfort care at the time being. I did discuss with him that she was on some antibiotics and prednisone which she was okay continuing until discussions with hospice. He currently is in [facility], but stated that he would drive up to [facility] to see her/discuss goals of care and plans.       Past medical history, family history, social history and allergies reviewed again and is unchanged since admission. ROS (12 point review of systems completed. Pertinent positives noted. Otherwise ROS is negative)     Medications:  Reviewed    Infusion Medications    sodium chloride      dextrose       Scheduled Medications    apixaban  2.5 mg Oral BID    cefTRIAXone (ROCEPHIN) IV  1,000 mg IntraVENous Q24H    azithromycin  500 mg Oral Daily    predniSONE  40 mg Oral Daily    sodium chloride flush  10 mL IntraVENous 2 times per day    atorvastatin  80 mg Oral Nightly    azaTHIOprine  100 mg Oral Daily    cloNIDine  0.1 mg Oral BID    docusate sodium  100 mg Oral BID    hydroxychloroquine  200 mg Oral Daily    isosorbide mononitrate  60 mg Oral Daily    [Held by provider] predniSONE  5 mg Oral Daily    [Held by provider] bumetanide  1 mg Oral BID    ranolazine  500 mg Oral BID    insulin glargine  8 Units SubCUTAneous Nightly    insulin lispro  0-6 Units SubCUTAneous TID WC    insulin lispro  0-3 Units SubCUTAneous Nightly    ipratropium-albuterol  1 ampule Inhalation Q4H WA     PRN Meds: benzonatate, sodium chloride flush, sodium chloride, ondansetron **OR** ondansetron, polyethylene glycol, acetaminophen **OR** acetaminophen, albuterol sulfate HFA, albuterol, glucagon (rDNA), dextrose, glucose, dextrose bolus (hypoglycemia) **OR** dextrose bolus (hypoglycemia)      Intake/Output Summary (Last 24 hours) at 4/24/2022 1347  Last data filed at 4/24/2022 1320  Gross per 24 hour   Intake 1916.72 ml   Output 700 ml   Net 1216.72 ml       Diet:  ADULT DIET; Regular    Exam:  BP (!) 185/90   Pulse 84   Temp 98.3 °F (36.8 °C)   Resp 22   Ht 5' 5\" (1.651 m)   Wt 182 lb 11.2 oz (82.9 kg)   SpO2 96%   BMI 30.40 kg/m²   General appearance: Elderly chronically ill-appearing female resting bed. No acute distress. Mildly lethargic. Requires frequent reorientation. Repeatedly dozing off during my examination. HEENT: Pupils equal, round, and reactive to light. Conjunctivae/corneas clear. Neck: Supple, with full range of motion. No jugular venous distention. Trachea midline. Respiratory: Coarse upper airway diffuse rhonchi and wet rales noted throughout all lung fields. There is bibasilar breath sounds. Cardiovascular: Irregularly irregular rhythm. Normal heart rate. Abdomen: Soft, non-tender, non-distended with normal bowel sounds. Musculoskeletal: passive and active ROM x 4 extremities. Skin: Skin color, texture, turgor normal.  No rashes or lesions. Neurologic: Patient is pleasantly confused. Alert to person, but not time place or situation. There is no apparent focal deficits. She is very lethargic and constant dozing off during my exam, but is easily reoriented and arouses to voice  Capillary Refill: Brisk,< 3 seconds   Peripheral Pulses: +2 palpable, equal bilaterally     Labs:   Recent Labs     04/23/22 1839 04/23/22  2300 04/24/22  0522   WBC 12.4* 15.0* 11.7*   HGB 8.2* 8.2* 8.9*   HCT 27.6* 27.4* 29.6*    375 369     Recent Labs     04/23/22 1839 04/24/22  0522   * 138   K 4.0 3.5   CL 91* 95*   CO2 28 28   * 91*   CREATININE 3.6* 2.1*   CALCIUM 9.1 9.3     Recent Labs     04/23/22 1839   AST 26   ALT 7*   BILITOT 0.2*   ALKPHOS 71     No results for input(s): INR in the last 72 hours. No results for input(s): Shellia Bugler in the last 72 hours.     Microbiology:    Blood culture #1:   Lab Results   Component Value Date    BC No growth-preliminary  04/23/2022       Blood culture #2:No results found for: [place]    Organism:  Lab Results   Component Value Date    ORG Staphylococcus aureus 12/19/2021       No results found for: LABGRAM    MRSA culture only:No results found for: [facility]    Urine culture: No results found for: LABURIN    Respiratory culture: No results found for: CULTRESP    Aerobic and Anaerobic :  No results found for: LABAERO  No results found for: LABANAE    Urinalysis:      Lab Results   Component Value Date    NITRU NEGATIVE 11/06/2021    WBCUA 2-4 11/06/2021    BACTERIA FEW 11/06/2021    RBCUA 0-2 11/06/2021    BLOODU NEGATIVE 11/06/2021    SPECGRAV 1.019 11/08/2020    GLUCOSEU NEGATIVE 11/06/2021       Radiology:  XR CHEST PORTABLE   Final Result   1. Diffuse increased interstitial pulmonary markings and increased groundglass opacities bilaterally. Findings can relate to pulmonary edema. Multilobar pneumonia can also have this appearance. **This report has been created using voice recognition software. It may contain minor errors which are inherent in voice recognition technology. **      Final report electronically signed by Dr Gita Shepard on 4/23/2022 6:39 PM        XR CHEST PORTABLE    Result Date: 4/23/2022  PROCEDURE: XR CHEST PORTABLE CLINICAL INFORMATION: shortness of breath COMPARISON: Chest radiograph 12/22/2021 TECHNIQUE: AP portable chest radiograph performed. FINDINGS: Diffuse increase interstitial pulmonary markings and increased groundglass opacities bilaterally. Cardiac silhouette is mildly enlarged. No pleural effusion. No pneumothorax. No acute bony abnormality. 1. Diffuse increased interstitial pulmonary markings and increased groundglass opacities bilaterally. Findings can relate to pulmonary edema. Multilobar pneumonia can also have this appearance. **This report has been created using voice recognition software. It may contain minor errors which are inherent in voice recognition technology. ** Final report electronically signed by Dr Gita Shepard on 4/23/2022 6:39 PM      Electronically signed by Patrick Wellington DO on 4/24/2022 at 1:47 PM

## 2022-04-24 NOTE — ED NOTES
Pt resting on cot. Vitals stable. Telemetry in place.  Denies pain     Demetris Jansen RN  04/23/22 2037

## 2022-04-24 NOTE — H&P
Hospitalist History and Physical          Patient: Irasema Workman  : 1940  MRN: [id number]     Acct: [de-identified]    PCP: Humberto Yeboah DO  Date of Admission: 2022  Date of Service: Pt seen/examined on 22  and Admitted to Inpatient with expected LOS greater than two midnights due to medical therapy. Assessment and Plan:    Dyspnea 2/2 to probably pneumonia- post COVID sequelae? Currently on 4L NC - ween supplemental oxygen as tolerated- possible baseline- 2- 4L? CXR groundglass opacities bilaterally  History of recent COVID19 infection- currently negative  Flu also negative   Leukocytosis 12.4 afebrile   Blood cultures x2,   Rocephin and zithromax in interim   IS, duobenbs, supportive care     EMMA on stage III CKD  Creatinine 3.6, baseline around 1.3   Give IVF, Bumex held   Avoid other nephrotoxic agents     Elevated troponin  Initial troponin 0.031 down to 0.028  Suspect some baseline plus elevation from MEMA- continue to trend   EKG pending     PAF  eliquis for AC  Several BP medications- unclear rate control agent   Monitor for RVR     Chronic CHFpHF  Echo 22 shows EF 40 -45%   Daily weights, strict I and O's   Elevation in BNP likely related to EMMA/CKD above     Inflammatory arthritis    Continue Imuran and plaquenil     CAD  Imdur, ranexa, statin     COPD   PRN bronchodilators, duonebs, IS    Essential hypertension  Bumex held 2/2 EMMA- resume other medications     Type II diabetes   lantus 8 units nightly   Low SSI, hypogylcemia protocol     =======================================================================      Chief Complaint:  Shortness of breath     History Of Present Illness:  Irasema Workman is a 78 y.o. female with PMHx of  CAD, PAF, CKD, HTN, IIDM, CHF who presents to [facility] with shortness of breath. Patient states she has productive cough and dyspnea over the past two weeks that waxes and wanes with intensity.  She was recently infected with COVID19 on 4/6  She wears between 2 - 4 liters of oxygen at baseline appears more often the latter amount. Patient states her symptoms are worsened with exertion. She denies fevers, chills, chest pain , dizziness, N/V/D     ED course  Vitals on arrival: 98.5F, RR 20, Hr 97, /85  CBC, BMP, troponin, CXR, EKG       Past Medical History:        Diagnosis Date    Arthritis     Asthma 1978    Atrial fibrillation ([address]     Blood circulation, collateral     CAD (coronary artery disease)     CHF (congestive heart failure) ([address] 1999    Chronic kidney disease, stage III (moderate) (HCC)     COPD (chronic obstructive pulmonary disease) ([address]     Depression     Diabetes mellitus ([address]     Diabetic hypertension-nephrosis syndrome ([address]     Dysuria     GERD (gastroesophageal reflux disease)     Hyperlipidemia     Hypertension     Kidney disease     stage 3 dr Zaki Bowles cerebral artery occlusion with cerebral infarction 1999    Dr Louis Coley informed patient after Heart Attack       Past Surgical History:        Procedure Laterality Date    APPENDECTOMY      BACK SURGERY      lumbar spine    BLADDER SUSPENSION      CARDIAC CATHETERIZATION  2009    with stent  [facility]    CARPAL TUNNEL RELEASE      COLONOSCOPY  2013    ENDOSCOPY, COLON, DIAGNOSTIC      ESOPHAGEAL DILATATION      HYSTERECTOMY  1974    JOINT REPLACEMENT Left 1992    Left Knee    UPPER GASTROINTESTINAL ENDOSCOPY N/A 9/28/2021    EGD DILATION performed by Jama Coy MD at [address] [facility] ENDOSCOPY Left 9/28/2021    EGD BIOPSY performed by Jama Coy MD at [facility] Endoscopy       Medications Prior to Admission:   Prior to Admission medications    Medication Sig Start Date End Date Taking?  Authorizing Provider   bumetanide (BUMEX) 1 MG tablet Take 1 tablet by mouth 2 times daily 12/27/21   Jamar Medrano MD   apixaban (ELIQUIS) 5 MG TABS tablet Take 5 mg by mouth 2 times daily Historical Provider, MD   magnesium hydroxide (MILK OF MAGNESIA) 400 MG/5ML suspension Take 30 mLs by mouth daily as needed for Constipation    Historical Provider, MD   isosorbide mononitrate (IMDUR) 60 MG extended release tablet Take 1 tablet by mouth daily 11/9/21 12/9/21  Carmen Bhandari MD   azaTHIOprine (IMURAN) 50 MG tablet Take 2 tablets by mouth daily 11/1/21   Kaitlin Yates,    spironolactone (ALDACTONE) 50 MG tablet Take 1 tablet by mouth daily 10/1/21   Gin Adamson, DO   albuterol (PROVENTIL) 2.5 MG/0.5ML NEBU nebulizer solution Take 2.5 mg by nebulization every 4-6 hours as needed for Wheezing or Shortness of Breath Do not take inhaler and nebulizer at the same time per Dr. Kennedy Moment Provider, MD   aluminum & magnesium hydroxide-simethicone (MYLANTA) 400-400-40 MG/5ML SUSP Take 10 mLs by mouth every 6 hours as needed For acid reflux    Historical Provider, MD   Artificial Saliva (BIOTENE DRY MOUTH MOISTURIZING) SOLN liquid Take by mouth as needed Dry mouth    Historical Provider, MD   montelukast (SINGULAIR) 5 MG chewable tablet Take 5 mg by mouth nightly    Historical Provider, MD   metoprolol succinate (TOPROL XL) 50 MG extended release tablet Take 50 mg by mouth daily    Historical Provider, MD   fluticasone-umeclidin-vilant (TRELEGY ELLIPTA) 100-62.5-25 MCG/INH AEPB Inhale 1 puff into the lungs daily SOB    Historical Provider, MD   Misc. Devices (HEAT THERAPY) MISC by Does not apply route every 8 hours as needed For pain. To Left shoulder    Historical Provider, MD   Elastic Bandages & Supports (T.E.D. ANTI-EMBOLISM STOCKINGS) MISC by Does not apply route On in AM; off at [facility]    Historical Provider, MD   OXYGEN Inhale into the lungs 2-4L to keep SpO2 above 92%.  PRN for SOB    Historical Provider, MD   calcium carbonate (TUMS) 500 MG chewable tablet Take 1 tablet by mouth 2 times daily    Historical Provider, MD   fenofibrate micronized (LOFIBRA) 200 MG capsule Take 200 mg by mouth daily     Historical Provider, MD   acetaminophen (TYLENOL) 500 MG tablet Take 500 mg by mouth every 8 hours as needed for Pain     Historical Provider, MD   carboxymethylcellulose (ARTIFICIAL TEARS) 1 % ophthalmic solution Place 1 drop into both eyes daily as needed for Dry Eyes     Historical Provider, MD   losartan (COZAAR) 50 MG tablet Take 50 mg by mouth daily    Historical Provider, MD   loperamide (IMODIUM) 2 MG capsule Take 2 mg by mouth every 12 hours as needed for Diarrhea     Historical Provider, MD   oxyCODONE-acetaminophen (PERCOCET) 5-325 MG per tablet Take 1 tablet by mouth 3 times daily. For pain    Historical Provider, MD   hydroxychloroquine (PLAQUENIL) 200 MG tablet TAKE 1 TABLET BY MOUTH EVERY DAY 9/8/21   Pedro Pizarro DO   EPINEPHrine (EPIPEN 2-LORAINE) 0.3 MG/0.3ML SOAJ injection Inject 0.3 mLs into the muscle once for 1 dose Use as directed for allergic reaction 6/8/21 11/19/21  Merline Sheffield, MD   cloNIDine (CATAPRES) 0.1 MG tablet Take 1 tablet by mouth 2 times daily 11/11/20   Page Blush, MD   predniSONE (DELTASONE) 5 MG tablet TAKE 1 TABLET BY MOUTH EVERY DAY 11/6/20   LONDON Campos - CNP   DULoxetine (CYMBALTA) 30 MG extended release capsule Take 30 mg by mouth 2 times daily    Historical Provider, MD   gabapentin (NEURONTIN) 100 MG capsule Take 100 mg by mouth 3 times daily. Historical Provider, MD   atorvastatin (LIPITOR) 40 MG tablet Take 2 tablets by mouth daily 8/10/20   Rohith Savers, DO   docusate sodium (COLACE, DULCOLAX) 100 MG CAPS Take 100 mg by mouth 2 times daily 8/10/20   Rohith Saveyung, DO   insulin glargine (LANTUS) 100 UNIT/ML injection pen Inject 8 Units into the skin nightly     Historical Provider, MD   dexlansoprazole (DEXILANT) 60 MG CPDR delayed release capsule Take 60 mg by mouth daily    Historical Provider, MD   ranolazine (RANEXA) 500 MG SR tablet Take 1 tablet by mouth 2 times daily.  9/27/14   Isabel Delacruz MD   magnesium oxide (MAG-OX) 400 MG tablet Take 400 mg by mouth 2 times daily     Historical Provider, MD   albuterol (PROVENTIL HFA;VENTOLIN HFA) 108 (90 BASE) MCG/ACT inhaler Inhale 2 puffs into the lungs every 6 hours as needed for Shortness of Breath     Historical Provider, MD   traZODone (DESYREL) 50 MG tablet Take 100 mg by mouth nightly     Historical Provider, MD   nitroGLYCERIN (NITROSTAT) 0.4 MG SL tablet Place 0.4 mg under the tongue every 5 minutes as needed for Chest pain     Historical Provider, MD       Allergies:  Renflexis [infliximab], Strawberry (diagnostic), Nuts [peanut-containing drug products], Sulfa antibiotics, Tape [adhesive tape], Bactrim [sulfamethoxazole-trimethoprim], and Chocolate    Social History:    The patient currently lives Replaced by [facility]    Tobacco use:   reports that she has quit smoking. Her smoking use included cigarettes. She started smoking about 8 years ago. She has a 1.50 pack-year smoking history. She has never used smokeless tobacco.  Alcohol use:   reports no history of alcohol use. Drug use:  reports no history of drug use. Family History:  as follows:      Problem Relation Age of Onset    Heart Disease Mother     Diabetes Mother     Kidney Disease Mother     Heart Disease Father     Diabetes Father     Cancer Sister     Stroke Brother     Cancer Sister        Review of Systems:   Pertinent positives and negatives as noted in the HPI. All other systems reviewed and negative. Physical Exam:    BP (!) 154/103   Pulse 113   Temp 98.5 °F (36.9 °C) (Oral)   Resp 22   Ht 5' 5\" (1.651 m)   Wt 172 lb (78 kg)   SpO2 93%   BMI 28.62 kg/m²       General appearance: appears ill   Eyes:  Pupils equal, round, and reactive to light. Conjunctivae/corneas clear. HENT: Head normal in appearance. External nares normal.  Oral mucosa moist without lesions. Hearing grossly intact. Neck: Supple, with full range of motion. Trachea midline. No gross JVD appreciated.   Respiratory:  Tachypnea with diffuse rhonchi and crackles   Cardiovascular: Normal rate, regular rhythm with normal S1/S2 without murmurs. No lower extremity edema. Abdomen: Soft, non-tender, non-distended with normal bowel sounds. Musculoskeletal: There is no joint swelling or tenderness. Normal tone. No abnormal movements. Skin: Warm and dry. No rashes or lesions. Neurologic:  No focal sensory/motor deficits in the upper and lower extremities. Cranial nerves:  grossly non-focal 2-12. Psychiatric: Alert and oriented, normal insight and thought content. Capillary Refill: Brisk,< 3 seconds. Peripheral Pulses: +2 palpable, equal bilaterally. Labs:     Recent Labs     04/23/22 1839   WBC 12.4*   HGB 8.2*   HCT 27.6*        Recent Labs     04/23/22 1839   *   K 4.0   CL 91*   CO2 28   *   CREATININE 3.6*   CALCIUM 9.1     Recent Labs     04/23/22 1839   AST 26   ALT 7*   BILITOT 0.2*   ALKPHOS 71     No results for input(s): INR in the last 72 hours. No results for input(s): Jadene Moh in the last 72 hours. Lab Results   Component Value Date    NITRU NEGATIVE 11/06/2021    WBCUA 2-4 11/06/2021    BACTERIA FEW 11/06/2021    RBCUA 0-2 11/06/2021    BLOODU NEGATIVE 11/06/2021    SPECGRAV 1.019 11/08/2020    GLUCOSEU NEGATIVE 11/06/2021         Radiology:     XR CHEST PORTABLE   Final Result   1. Diffuse increased interstitial pulmonary markings and increased groundglass opacities bilaterally. Findings can relate to pulmonary edema. Multilobar pneumonia can also have this appearance. **This report has been created using voice recognition software. It may contain minor errors which are inherent in voice recognition technology. **      Final report electronically signed by Dr Kami Ordoñez on 4/23/2022 6:39 PM              Diet: regular diet   DVT prophylaxis: eliquis   Code Status: Prior  Disposition: admit to 5K     Thank you Rigo Yeboah DO for the opportunity to be involved in this patient's care.     Electronically signed by ASHER Stephens on 4/23/2022 at 9:10 PM.

## 2022-04-24 NOTE — ED NOTES
ED to inpatient nurses report    Chief Complaint   Patient presents with    Shortness of Breath      Present to ED from nursing home  LOC: alert and orientated to name, place, date  Vital signs   Vitals:    04/23/22 1844 04/23/22 1904 04/23/22 1945 04/23/22 2036   BP:   (!) 162/72 (!) 154/103   Pulse:  98 109 113   Resp:  20 20 22   Temp:       TempSrc:       SpO2: 94% 94% 94% 93%   Weight:       Height:          Oxygen Baseline 2L NC    Current needs required 4L NC   LDAs:   Peripheral IV 04/23/22 Right Antecubital (Active)     Mobility: Requires assistance * 1  Pending ED orders: NS infusion  Present condition: stable    Electronically signed by Halley Hsu RN on 4/23/2022 at 1000 Carmen Aguilar RN  04/23/22 2114

## 2022-04-24 NOTE — ED PROVIDER NOTES
[facility] ENCOUN[facility]          Pt Name: Mery Tolbert  MRN: [id number]  [place] 1940  Date of evaluation: 4/23/2022  Treating Resident Physician: Greg Lloyd MD  Supervising Physician: Oscar Tony MD    [address] [place] [address]       Chief Complaint   Patient presents with    Shortness of Breath     History obtained from the patient. HISTORY OF PRESENT ILLNESS    HPI  Mery Tolbert is a 78 y.o. female past medical history of COPD, COVID-19, and past hx of PE who presents to the emergency department for evaluation of breath. Patient stated she was increasingly short of breath for the last day. Patient had recent diagnosis of COVID 19 on 4/6/2022. Patient normally wears O2 at 4 L per nasal cannula at baseline. Patient states the symptoms are worsened with exertion and alleviated by nothing. The patient has no other acute complaints at this time. REVIEW OF SYSTEMS   Review of Systems   Constitutional: Positive for fatigue. Negative for fever. HENT: Negative for ear pain, rhinorrhea and sore throat. Eyes: Negative for pain and discharge. Respiratory: Positive for cough, shortness of breath and wheezing. Cardiovascular: Negative for chest pain, palpitations and leg swelling. Gastrointestinal: Negative for abdominal distention, constipation, diarrhea, nausea and vomiting. Endocrine: Negative for polydipsia and polyuria. Genitourinary: Negative for difficulty urinating and dysuria. Musculoskeletal: Negative for arthralgias. Skin: Negative for color change, pallor and rash. Neurological: Positive for weakness. Negative for dizziness, seizures, syncope and numbness. Psychiatric/Behavioral: Negative for agitation and confusion.          PAST MEDICAL AND SURGICAL HISTORY     Past Medical History:   Diagnosis Date    Arthritis     Asthma 1978    [facility]rial fibrillation [facility] [address]     Blood circulation, collateral     CAD (coronary artery disease)     CHF [facility]congestive heart failure) ([facility]) 1999    Chronic kidney disease, stage III (moderate) ([facility])     COPD (chronic obstructive pulmonary disease) ([facility] [address]     Depression     Diabetes mellitus ([facility] [address]     Diabetic hypertension-nephrosis syndrome ([facility])     Dysuria     GERD (gastroesophageal reflux disease)     Hyperlipidemia     Hypertension     Kidney disease     stage 3 dr [facility] cerebral artery occlusion with cerebral infarction 1999    Dr Jadiel Morales informed patient after Heart Attack     Past Surgical History:   Procedure Laterality Date    APPENDECTOMY      BACK SURGERY      lumbar spine    BLADDER SUSPENSION      CARDIAC CATHETERIZATION  2009    with stent  [facility]    CARPAL TUNNEL RELEASE      COLONOSCOPY  2013    ENDOSCOPY, COLON, DIAGNOSTIC      ESOPHAGEAL DILATATION      HYSTERECTOMY  1974    JOINT REPLACEMENT Left 1992    Left Knee    UPPER GASTROINTESTINAL ENDOSCOPY N/A 9/28/2021    EGD DILATION performed by Rylie Chase MD [facility]    UPPER GASTROINTESTINAL ENDOSCOPY Left 9/28/2021    EGD BIOPSY performed by Rylie Chase MD at [facility]         MEDICATIONS     Current Facility-Administered Medications:     0.9 % sodium chloride bolus, 500 mL, IntraVENous, Once, Michelle Mixon MD, Last Rate: 247.9 mL/hr at 04/23/22 2036, 500 mL at 04/23/22 2036    0.9 % sodium chloride infusion, , IntraVENous, Continuous, Michelle Mixon MD    Current Outpatient Medications:     bumetanide (BUMEX) 1 MG tablet, Take 1 tablet by mouth 2 times daily, Disp: 30 tablet, Rfl: 0    apixaban (ELIQUIS) 5 MG TABS tablet, Take 5 mg by mouth 2 times daily, Disp: , Rfl:     magnesium hydroxide (MILK OF MAGNESIA) 400 MG/5ML suspension, Take 30 mLs by mouth daily as needed for Constipation, Disp: , Rfl:     isosorbide mononitrate (IMDUR) 60 MG extended release tablet, Take 1 tablet by mouth daily, Disp: 30 tablet, Rfl: 0    azaTHIOprine (IMURAN) 50 MG tablet, Take 2 tablets by mouth daily, Disp: 60 tablet, Rfl: 0    spironolactone (ALDACTONE) 50 MG tablet, Take 1 tablet by mouth daily, Disp: 30 tablet, Rfl: 3    albuterol (PROVENTIL) 2.5 MG/0.5ML NEBU nebulizer solution, Take 2.5 mg by nebulization every 4-6 hours as needed for Wheezing or Shortness of Breath Do not take inhaler and nebulizer at the same time per Dr. Yamilex Hidalgo , Disp: , Rfl:     aluminum & magnesium hydroxide-simethicone (MYLANTA) 400-400-40 MG/5ML SUSP, Take 10 mLs by mouth every 6 hours as needed For acid reflux, Disp: , Rfl:     Artificial Saliva (BIOTENE DRY MOUTH MOISTURIZING) SOLN liquid, Take by mouth as needed Dry mouth, Disp: , Rfl:     montelukast (SINGULAIR) 5 MG chewable tablet, Take 5 mg by mouth nightly, Disp: , Rfl:     metoprolol succinate (TOPROL XL) 50 MG extended release tablet, Take 50 mg by mouth daily, Disp: , Rfl:     fluticasone-umeclidin-vilant (TRELEGY ELLIPTA) 100-62.5-25 MCG/INH AEPB, Inhale 1 puff into the lungs daily SOB, Disp: , Rfl:     Misc. Devices (HEAT THERAPY) MISC, by Does not apply route every 8 hours as needed For pain. To Left shoulder, Disp: , Rfl:     Elastic Bandages & Supports (T.E.D. ANTI-EMBOLISM STOCKINGS) MISC, by Does not apply route On in AM; off at HS, Disp: , Rfl:     OXYGEN, Inhale into the lungs 2-4L to keep SpO2 above 92%.  PRN for SOB, Disp: , Rfl:     calcium carbonate (TUMS) 500 MG chewable tablet, Take 1 tablet by mouth 2 times daily, Disp: , Rfl:     fenofibrate micronized (LOFIBRA) 200 MG capsule, Take 200 mg by mouth daily , Disp: , Rfl:     acetaminophen (TYLENOL) 500 MG tablet, Take 500 mg by mouth every 8 hours as needed for Pain , Disp: , Rfl:     carboxymethylcellulose (ARTIFICIAL TEARS) 1 % ophthalmic solution, Place 1 drop into both eyes daily as needed for Dry Eyes , Disp: , Rfl:     losartan (COZAAR) 50 MG tablet, Take 50 mg by mouth daily, Disp: , Rfl:     loperamide (IMODIUM) 2 MG capsule, Take 2 mg by mouth every 12 hours as needed for Diarrhea , Disp: , Rfl:     oxyCODONE-acetaminophen (PERCOCET) 5-325 MG per tablet, Take 1 tablet by mouth 3 times daily. For pain, Disp: , Rfl:     hydroxychloroquine (PLAQUENIL) 200 MG tablet, TAKE 1 TABLET BY MOUTH EVERY DAY, Disp: 90 tablet, Rfl: 1    EPINEPHrine (EPIPEN 2-LORAINE) 0.3 MG/0.3ML SOAJ injection, Inject 0.3 mLs into the muscle once for 1 dose Use as directed for allergic reaction, Disp: 0.3 mL, Rfl: 0    cloNIDine (CATAPRES) 0.1 MG tablet, Take 1 tablet by mouth 2 times daily, Disp: 60 tablet, Rfl: 3    predniSONE (DELTASONE) 5 MG tablet, TAKE 1 TABLET BY MOUTH EVERY DAY, Disp: 30 tablet, Rfl: 3    DULoxetine (CYMBALTA) 30 MG extended release capsule, Take 30 mg by mouth 2 times daily, Disp: , Rfl:     gabapentin (NEURONTIN) 100 MG capsule, Take 100 mg by mouth 3 times daily. , Disp: , Rfl:     atorvastatin (LIPITOR) 40 MG tablet, Take 2 tablets by mouth daily, Disp: 60 tablet, Rfl: 2    docusate sodium (COLACE, DULCOLAX) 100 MG CAPS, Take 100 mg by mouth 2 times daily, Disp: 60 capsule, Rfl: 1    insulin glargine (LANTUS) 100 UNIT/ML injection pen, Inject 8 Units into the skin nightly , Disp: , Rfl:     dexlansoprazole (DEXILANT) 60 MG CPDR delayed release capsule, Take 60 mg by mouth daily, Disp: , Rfl:     ranolazine (RANEXA) 500 MG SR tablet, Take 1 tablet by mouth 2 times daily. , Disp: 60 tablet, Rfl: 3    magnesium oxide (MAG-OX) 400 MG tablet, Take 400 mg by mouth 2 times daily , Disp: , Rfl:     albuterol (PROVENTIL HFA;VENTOLIN HFA) 108 (90 BASE) MCG/ACT inhaler, Inhale 2 puffs into the lungs every 6 hours as needed for Shortness of Breath , Disp: , Rfl:     traZODone (DESYREL) 50 MG tablet, Take 100 mg by mouth nightly , Disp: , Rfl:     nitroGLYCERIN (NITROSTAT) 0.4 MG SL tablet, Place 0.4 mg under the tongue every 5 minutes as needed for Chest pain , Disp: , Rfl:       SOCIAL HISTORY     Social History     Social History Narrative    Not on file Social History     Tobacco Use    Smoking status: Former Smoker     Packs/day: 0.15     Years: 10.00     Pack years: 1.50     Types: Cigarettes     Start date: 10/21/2013    Smokeless tobacco: Never Used   Vaping Use    Vaping Use: Never used   Substance Use Topics    Alcohol use: No    Drug use: No         ALLERGIES     Allergies   Allergen Reactions    Renflexis [Infliximab] Shortness Of Breath and Palpitations    Strawberry (Diagnostic) Anaphylaxis     Diverticulitis    Nuts [Peanut-Containing Drug Products]      Pt suffers from diverticulitis and nut products irritate her stomach.  Sulfa Antibiotics     Tape Luis Nae Tape] Other (See Comments)     tears skin off    Bactrim [Sulfamethoxazole-Trimethoprim] Rash     Little red rash    Chocolate Rash         FAMILY HISTORY     Family History   Problem Relation Age of Onset    Heart Disease Mother     Diabetes Mother     Kidney Disease Mother     Heart Disease Father     Diabetes Father     Cancer Sister     Stroke Brother     Cancer Sister          PREVIOUS RECORDS   Previous records reviewed: today       PHYSICAL EXAM     ED Triage Vitals [04/23/22 1809]   BP Temp Temp Source Pulse Resp SpO2 Height Weight   (!) 156/85 98.5 °F (36.9 °C) Oral 97 20 95 % 5' 5\" (1.651 m) 172 lb (78 kg)     Initial vital signs and nursing assessment reviewed and normal. Body mass index is 28.62 kg/m². Pulsoximetry is normal per my interpretation. Additional Vital Signs:  Vitals:    04/23/22 2036   BP: (!) 154/103   Pulse: 113   Resp: 22   Temp:    SpO2: 93%       Physical Exam  Constitutional:       Appearance: Normal appearance. HENT:      Head: Normocephalic. Right Ear: External ear normal.      Left Ear: External ear normal.      Nose: Nose normal.      Mouth/Throat:      Mouth: Mucous membranes are moist.      Pharynx: Oropharynx is clear. Eyes:      Extraocular Movements: Extraocular movements intact.       Conjunctiva/sclera: Conjunctivae normal.      Pupils: Pupils are equal, round, and reactive to light. Cardiovascular:      Rate and Rhythm: Normal rate and regular rhythm. Pulses: Normal pulses. Heart sounds: Normal heart sounds. Pulmonary:      Effort: Tachypnea present. Breath sounds: Wheezing present. Chest:      Chest wall: No tenderness or crepitus. Abdominal:      General: Bowel sounds are normal.      Palpations: Abdomen is soft. Musculoskeletal:         General: Normal range of motion. Cervical back: Normal range of motion and neck supple. Skin:     General: Skin is warm and dry. Capillary Refill: Capillary refill takes less than 2 seconds. Neurological:      General: No focal deficit present. Mental Status: She is alert and oriented to person, place, and time. Psychiatric:         Mood and Affect: Mood normal.         Behavior: Behavior normal.             MEDICAL DECISION MAKING   Initial Assessment:   Patient on arrival noted with a pulse ox of 91 to 93% on room 4 L per nasal cannula. Patient also noted with diffuse wheezing and tachypnea with a respiratory rate of 24. Patient has recent diagnosis of COVID-19 2 this could be complications secondary to COVID-19 as well as related to COPD exacerbation. Patient differential diagnosis includes but is not limited to COPD exacerbation, [id number] complications, bilateral pneumonia, pulmonary embolism, dehydration. Patient's chest x-ray was consistent with COVID-19 and did have improvement after breathing treatments which was likely secondary to COPD exacerbation. Less likely patient had PE due to wheezing that resolved with breathing treatment. Consideration for CTA was given however patient also has EMMA. We will admit risk outweighs benefit at this time for additional imaging. Plan: We will admit to hospitalist for further evaluation and treatment. Start normal saline at 75 MLS per hour.   Solu-Medrol 80 mg IV now x1                ED RESULTS   Laboratory results:  Labs Reviewed   CBC WITH AUTO DIFFERENTIAL - Abnormal; Notable for the following components:       Result Value    WBC 12.4 (*)     RBC 2.85 (*)     Hemoglobin 8.2 (*)     Hematocrit 27.6 (*)     MCHC 29.7 (*)     RDW-SD 50.2 (*)     MPV 9.1 (*)     Segs Absolute 10.2 (*)     Immature Grans (Abs) 0.15 (*)     All other components within normal limits   TROPONIN - Abnormal; Notable for the following components:    Troponin T 0.031 (*)     All other components within normal limits   BRAIN NATRIURETIC PEPTIDE - Abnormal; Notable for the following components:    Pro-BNP 2648.0 (*)     All other components within normal limits   COMPREHENSIVE METABOLIC PANEL W/ REFLEX TO MG FOR LOW K - Abnormal; Notable for the following components:    Glucose 157 (*)     CREATININE 3.6 (*)      (*)     Sodium 133 (*)     Chloride 91 (*)     Albumin 2.7 (*)     Total Bilirubin 0.2 (*)     ALT 7 (*)     All other components within normal limits   GLOMERULAR FILTRATION RATE, ESTIMATED - Abnormal; Notable for the following components:    Est, Glom Filt Rate 12 (*)     All other components within normal limits   OSMOLALITY - Abnormal; Notable for the following components:    Osmolality Calc 304.4 (*)     All other components within normal limits   ANION GAP       Radiologic studies results:  XR CHEST PORTABLE   Final Result   1. Diffuse increased interstitial pulmonary markings and increased groundglass opacities bilaterally. Findings can relate to pulmonary edema. Multilobar pneumonia can also have this appearance. **This report has been created using voice recognition software. It may contain minor errors which are inherent in voice recognition technology. **      Final report electronically signed by Dr Carmela Collier on 4/23/2022 6:39 PM          ED Medications administered this visit:   Medications   0.9 % sodium chloride bolus (500 mLs IntraVENous New Bag 4/23/22 2036)   0.9 % sodium chloride infusion (has no administration in time range)   albuterol (PROVENTIL) nebulizer solution 7.5 mg (7.5 mg Nebulization Given 4/23/22 1844)   cefTRIAXone (ROCEPHIN) 1,000 mg in sterile water 10 mL IV syringe (1,000 mg IntraVENous Given 4/23/22 1945)   methylPREDNISolone sodium (SOLU-MEDROL) injection 80 mg (80 mg IntraVENous Given 4/23/22 2102)   ondansetron (ZOFRAN) injection 4 mg (4 mg IntraVENous Given 4/23/22 2109)         ED COURSE        Patient chest x-ray bilateral diffuse glasslike opacities and was consistent with COVID-19. Patient did have breathing treatments and wheezing and  tachypnea was resolved post treatment. MEDICATION CHANGES     New Prescriptions    No medications on file         FINAL DISPOSITION     Final diagnoses:   Hypoxia   Pneumonia of both lungs due to infectious organism, unspecified part of lung   EMMA (acute kidney injury) ([facility] [address]     Condition: condition: fair   Dispo: admit to telemetry       This transcription was electronically signed. Parts of this transcriptions may have been dictated by use of voice recognition software and electronically transcribed, and parts may have been transcribed with the assistance of an ED scribe. The transcription may contain errors not detected in proofreading. Please refer to my supervising physician's documentation if my documentation differs.     Electronically Signed: Ehsan Benites MD, 04/23/22, 9:09 PM       Ehsan Benites MD  Resident  04/23/22 5050

## 2022-04-24 NOTE — PLAN OF CARE
Problem: Safety - Adult  Goal: Free from fall injury  4/24/2022 1316 by Meghna Hung RN  Outcome: Progressing  4/24/2022 1316 by Meghna Hung RN  Outcome: Progressing     Problem: ABCDS Injury Assessment  Goal: Absence of physical injury  4/24/2022 1316 by Meghna Hung RN  Outcome: Progressing  4/24/2022 1316 by Meghna Hung RN  Outcome: Progressing     Problem: Skin/Tissue Integrity  Goal: Absence of new skin breakdown  Description: 1. Monitor for areas of redness and/or skin breakdown  2. Assess vascular access sites hourly  3. Every 4-6 hours minimum:  Change oxygen saturation probe site  4. Every 4-6 hours:  If on nasal continuous positive airway pressure, respiratory therapy assess nares and determine need for appliance change or resting period.   4/24/2022 1316 by Meghna Hung RN  Outcome: Progressing  4/24/2022 1316 by Meghna Hung RN  Outcome: Progressing     Problem: Chronic Conditions and Co-morbidities  Goal: Patient's chronic conditions and co-morbidity symptoms are monitored and maintained or improved  4/24/2022 1316 by Meghna Hung RN  Outcome: Progressing  4/24/2022 1316 by Meghna Hung RN  Outcome: Progressing

## 2022-04-25 LAB
GLUCOSE BLD-MCNC: 155 MG/DL (ref 70–108)
GLUCOSE BLD-MCNC: 157 MG/DL (ref 70–108)
GLUCOSE BLD-MCNC: 215 MG/DL (ref 70–108)
GLUCOSE BLD-MCNC: 220 MG/DL (ref 70–108)

## 2022-04-25 PROCEDURE — 2700000000 HC OXYGEN THERAPY PER DAY

## 2022-04-25 PROCEDURE — 82948 REAGENT STRIP/BLOOD GLUCOSE: CPT

## 2022-04-25 PROCEDURE — 6370000000 HC RX 637 (ALT 250 FOR IP): Performed by: PHYSICIAN ASSISTANT

## 2022-04-25 PROCEDURE — 94640 AIRWAY INHALATION TREATMENT: CPT

## 2022-04-25 PROCEDURE — 2500000003 HC RX 250 WO HCPCS: Performed by: PHYSICIAN ASSISTANT

## 2022-04-25 PROCEDURE — 94761 N-INVAS EAR/PLS OXIMETRY MLT: CPT

## 2022-04-25 PROCEDURE — 1200000000 HC SEMI PRIVATE

## 2022-04-25 PROCEDURE — 6360000002 HC RX W HCPCS: Performed by: PHYSICIAN ASSISTANT

## 2022-04-25 PROCEDURE — 2580000003 HC RX 258: Performed by: PHYSICIAN ASSISTANT

## 2022-04-25 PROCEDURE — 99233 SBSQ HOSP IP/OBS HIGH 50: CPT | Performed by: STUDENT IN AN ORGANIZED HEALTH CARE EDUCATION/TRAINING PROGRAM

## 2022-04-25 PROCEDURE — 6370000000 HC RX 637 (ALT 250 FOR IP): Performed by: STUDENT IN AN ORGANIZED HEALTH CARE EDUCATION/TRAINING PROGRAM

## 2022-04-25 RX ORDER — LORAZEPAM 2 MG/ML
1 INJECTION INTRAMUSCULAR EVERY 4 HOURS PRN
Status: DISCONTINUED | OUTPATIENT
Start: 2022-04-25 | End: 2022-04-26 | Stop reason: HOSPADM

## 2022-04-25 RX ADMIN — CEFTRIAXONE SODIUM 1000 MG: 10 INJECTION, POWDER, FOR SOLUTION INTRAVENOUS at 20:00

## 2022-04-25 RX ADMIN — CLONIDINE HYDROCHLORIDE 0.1 MG: 0.1 TABLET ORAL at 20:03

## 2022-04-25 RX ADMIN — APIXABAN 2.5 MG: 2.5 TABLET, FILM COATED ORAL at 20:03

## 2022-04-25 RX ADMIN — IPRATROPIUM BROMIDE AND ALBUTEROL SULFATE 1 AMPULE: .5; 3 SOLUTION RESPIRATORY (INHALATION) at 08:53

## 2022-04-25 RX ADMIN — AZITHROMYCIN MONOHYDRATE 500 MG: 250 TABLET ORAL at 09:46

## 2022-04-25 RX ADMIN — RANOLAZINE 500 MG: 500 TABLET, EXTENDED RELEASE ORAL at 20:03

## 2022-04-25 RX ADMIN — IPRATROPIUM BROMIDE AND ALBUTEROL SULFATE 1 AMPULE: .5; 3 SOLUTION RESPIRATORY (INHALATION) at 12:37

## 2022-04-25 RX ADMIN — INSULIN LISPRO 1 UNITS: 100 INJECTION, SOLUTION INTRAVENOUS; SUBCUTANEOUS at 20:16

## 2022-04-25 RX ADMIN — INSULIN LISPRO 2 UNITS: 100 INJECTION, SOLUTION INTRAVENOUS; SUBCUTANEOUS at 17:19

## 2022-04-25 RX ADMIN — SODIUM CHLORIDE, PRESERVATIVE FREE 10 ML: 5 INJECTION INTRAVENOUS at 09:50

## 2022-04-25 RX ADMIN — APIXABAN 2.5 MG: 2.5 TABLET, FILM COATED ORAL at 09:46

## 2022-04-25 RX ADMIN — PREDNISONE 40 MG: 20 TABLET ORAL at 09:46

## 2022-04-25 RX ADMIN — AZATHIOPRINE 100 MG: 50 TABLET ORAL at 09:46

## 2022-04-25 RX ADMIN — ATORVASTATIN CALCIUM 80 MG: 80 TABLET, FILM COATED ORAL at 20:02

## 2022-04-25 RX ADMIN — SODIUM CHLORIDE, PRESERVATIVE FREE 10 ML: 5 INJECTION INTRAVENOUS at 20:03

## 2022-04-25 RX ADMIN — INSULIN GLARGINE 8 UNITS: 100 INJECTION, SOLUTION SUBCUTANEOUS at 20:17

## 2022-04-25 RX ADMIN — CLONIDINE HYDROCHLORIDE 0.1 MG: 0.1 TABLET ORAL at 09:46

## 2022-04-25 RX ADMIN — ACETAMINOPHEN 650 MG: 325 TABLET ORAL at 17:24

## 2022-04-25 RX ADMIN — LORAZEPAM 1 MG: 2 INJECTION INTRAMUSCULAR; INTRAVENOUS at 20:11

## 2022-04-25 RX ADMIN — IPRATROPIUM BROMIDE AND ALBUTEROL SULFATE 1 AMPULE: .5; 3 SOLUTION RESPIRATORY (INHALATION) at 16:33

## 2022-04-25 ASSESSMENT — PAIN SCALES - GENERAL: PAINLEVEL_OUTOF10: 5

## 2022-04-25 ASSESSMENT — PAIN DESCRIPTION - LOCATION: LOCATION: BACK

## 2022-04-25 ASSESSMENT — PAIN DESCRIPTION - DESCRIPTORS: DESCRIPTORS: ACHING

## 2022-04-25 NOTE — DISCHARGE INSTR - COC
Continuity of Care Form    Patient Name: Sharyn Almanzar   :  1940  MRN:  [id number]    Admit date:  2022  Discharge date:  2022    Code Status Order: DNR-CC   Advance Directives:      Admitting Physician:  Sobia Berger MD  PCP: Deyanira Downs DO    Discharging Nurse: DR MARGARITA MARTINS [facility] Unit/Room#: 5K-20/020-A  Discharging Unit Phone Number: [phone]    Emergency Contact:   Extended Emergency Contact Information  Primary Emergency Contact: Jane Contreras  Address: [address] [place] [address], [address] [place] [address] [address]  Phone: [phone]  Mobile Phone: [phone]  Relation: Child   needed? No  Secondary Emergency Contact: JUAN RAMON Franklin  Address: [address] [place] [address] [address]  Phone: [phone]  Mobile Phone: [phone]  Relation: Child   needed?  No    Past Surgical History:  Past Surgical History:   Procedure Laterality Date    APPENDECTOMY      BACK SURGERY      lumbar spine    BLADDER SUSPENSION      CARDIAC CATHETERIZATION  2009    with stent  [facility]    CARPAL TUNNEL RELEASE      COLONOSCOPY  2013    ENDOSCOPY, COLON, DIAGNOSTIC      ESOPHAGEAL DILATATION      HYSTERECTOMY  1974    JOINT REPLACEMENT Left 1992    Left Knee    UPPER GASTROINTESTINAL ENDOSCOPY N/A 2021    EGD DILATION performed by Rylie Chase MD at [facility] ENDOSCOPY Left 2021    EGD BIOPSY performed by Rylie Chase MD at [facility]       Immunization History:   Immunization History   Administered Date(s) Administered    COVID-19, Pfizer Purple top, DILUTE for use, 12+ yrs, 30mcg/0.3mL dose 2021, 2021    Influenza Virus Vaccine 10/04/2012       Active Problems:  Patient Active Problem List   Diagnosis Code    Syncope R55    Diabetes mellitus ([facility] E11.9    CAD (coronary artery disease) I25.10    Hypertension I10    Atrial fibrillation ([facility] I48.91    GERD (gastroesophageal reflux disease) K21.9    Chronic kidney disease, stage III (moderate) ([facility]) N18.30    COPD (chronic obstructive pulmonary disease) ([facility]) J44.9    Dysuria R30.0    Diabetic hypertension-nephrosis syndrome ([facility]) E11.21    Hypokalemia E87.6    Abnormal nuclear cardiac imaging test R93.1    Back pain, chronic M54.9, G89.29    Obesity (BMI 35.0-39.9 without comorbidity) E66.9    H/O class III angina pectoris Z86.79    HTN, goal below 130/80 I10    Non-rheumatic mitral regurgitation I34.0    Abnormal nuclear stress test R94.39    Osteopenia of multiple sites M85.89    Seronegative rheumatoid arthritis ([facility] M06.00    Acute respiratory failure ([facility]) J96.00    Diverticulitis K57.92    Diverticulitis of colon K57.32    Multiple falls R29.6    EMMA (acute kidney injury) ([facility] N17.9    Urinary tract infection in female N39.0    Acute diverticulitis K57.92    Rectal bleeding K62.5    Acute on chronic clinical systolic heart failure ([facility] I50.23    Pneumonia due to infectious organism J18.9    Acute hypoxemic respiratory failure ([facility]) J96.01    Inflammatory arthritis M19.90       Isolation/Infection:   Isolation            No Isolation          Patient Infection Status       Infection Onset Added Last Indicated Last Indicated By Review Planned Expiration Resolved Resolved By    None active    Resolved    COVID-19 (Rule Out) 04/23/22 04/23/22 04/23/22 COVID-19, Rapid (Ordered)   04/23/22 Rule-Out Test Resulted    COVID-19 (Rule Out) 12/23/21 12/23/21 12/23/21 COVID-19, Rapid (Ordered)   12/23/21 Rule-Out Test Resulted    COVID-19 (Rule Out) 12/22/21 12/22/21 12/23/21 COVID-19 & Influenza Combo (Ordered)   12/23/21 Lauro Cooper RN    COVID-19 (Rule Out) 12/19/21 12/19/21 12/19/21 SARS-CoV-2 NAAT (Rapid) (Ordered)   12/19/21 Rule-Out Test Resulted    COVID-19 (Rule Out) 11/18/21 11/18/21 11/18/21 COVID-19, Rapid (Ordered)   11/18/21 Rule-Out Test Resulted    COVID-19 (Rule Out) 11/06/21 11/06/21 11/06/21 COVID-19, Rapid (Ordered)   11/06/21 Rule-Out Test Resulted    COVID-19 (Rule Out) 11/11/20 11/11/20 11/12/20 COVID-19 (Ordered)   11/12/20 Rule-Out Test Resulted    COVID-19 (Rule Out) 11/05/20 11/05/20 11/06/20 COVID-19 (Ordered)   11/06/20 Rule-Out Test Resulted    COVID-19 (Rule Out) 09/28/20 09/28/20 09/28/20 COVID-19 (Ordered)   09/29/20 Rule-Out Test Resulted    COVID-19 (Rule Out) 08/08/20 08/08/20 08/08/20 COVID-19 (Ordered)   08/08/20 Rule-Out Test Resulted            Nurse Assessment:  Last Vital Signs: BP (!) 163/99   Pulse 65   Temp 98 °F (36.7 °C) (Oral)   Resp 18   Ht 5' 5\" (1.651 m)   Wt 184 lb 8 oz (83.7 kg)   SpO2 96%   BMI 30.70 kg/m²     Last documented pain score (0-10 scale):    Last Weight:   Wt Readings from Last 1 Encounters:   04/25/22 184 lb 8 oz (83.7 kg)     Mental Status:  oriented and alert    IV Access:  - None    Nursing Mobility/ADLs:  Walking   Assisted  Transfer  Assisted  Bathing  Assisted  Dressing  Assisted  Toileting  Assisted  Feeding  Assisted  Med Admin  Assisted  Med Delivery   crushed    Wound Care Documentation and Therapy:        Elimination:  Continence: Bowel: No  Bladder: No  Urinary Catheter: 04/26/2022   Colostomy/Ileostomy/Ileal Conduit: No       Date of Last BM: 04/25/2022    Intake/Output Summary (Last 24 hours) at 4/25/2022 1311  Last data filed at 4/25/2022 0511  Gross per 24 hour   Intake 320 ml   Output 950 ml   Net -630 ml     I/O last 3 completed shifts: In: 2116.7 [P.O.:952; I.V.:1164.7]  Out: 1500 [Urine:1500]    Safety Concerns: At Risk for Falls    Impairments/Disabilities:      None    Nutrition Therapy:  Current Nutrition Therapy:   - Oral Diet:  General    Routes of Feeding: Oral  Liquids:  Thin Liquids  Daily Fluid Restriction: no  Last Modified Barium Swallow with Video (Video Swallowing Test): not done    Treatments at the Time of Hospital Discharge:   Respiratory Treatments: NONE  Oxygen Therapy:  is on oxygen at 3 L/min per nasal cannula. Ventilator:    - No ventilator support    Rehab Therapies: hospice  Weight Bearing Status/Restrictions: No weight bearing restrictions  Other Medical Equipment (for information only, NOT a DME order):  hospice  Other Treatments: none    Patient's personal belongings (please select all that are sent with patient):  None    RN SIGNATURE:  Electronically signed by Louis Castrejon RN on 4/26/22 at 11:40 AM EDT    CASE MANAGEMENT/SOCIAL WORK SECTION    Inpatient Status Date: 4/23/22    Readmission Risk Assessment Score:  Readmission Risk              Risk of Unplanned Readmission:  41           Discharging to Facility/ Agency   Name: [facility], [address] [place] [address]    Dialysis Facility (if applicable)   Name:  Address:  Dialysis Schedule:  Phone:  Fax:    / signature: Electronically signed by CORY Severino on 4/25/22 at 1:12 PM EDT    PHYSICIAN SECTION    Prognosis: Guarded    Condition at Discharge: Stable    Rehab Potential (if transferring to Rehab): Guarded    Recommended Labs or Other Treatments After Discharge: Patient/family have agreed to [facility] with hospice care. Pain/anxiolytics to be managed by the hospice team.    Physician Certification: I certify the above information and transfer of Marina Torres  is necessary for the continuing treatment of the diagnosis listed and that she requires [facility] for greater 30 days.      Update Admission H&P: No change in H&P    PHYSICIAN SIGNATURE:  Electronically signed by Julián Rouse DO on 4/26/22 at 11:27 AM EDT

## 2022-04-25 NOTE — PROGRESS NOTES
Hospice nurse Carlitos Ferrera noted in [place]- where hospice charts for service. \"CALL RECEIVED FROM DR MILLAN FOR HOSPICE REFERRAL. PT IS ALREADY DNRCC[facility] SENT INTO ER FOR HYPOXIA. HX OF COVID 3 WEEKS AGO, CHF & CKD. PT CONFUSED & REMAINS HYPOXIC BUT PER DR MILLAN SYMPTOMS WELL CONTROLLED AT THIS TIME. PT'S SON - NAMED MARY - LIVES IN [place] AND HAS PLANS TO VISIT. I CALLED MARY TO CONFIRM WISH FOR HOSPICE SERVICES UPON RETURN TO [facility]. AGREEABLE TO HOSPICE SERVICES UPON DISCHARGE BACK TO [facility]. STATES \"IF THAT'S WHAT SHE WANTS\". REPORTS PLAN TO LEAVE EARLY MONDAY MORNING AND WON'T ARRIVE TILL TUESDAY MORNING AT THE EARLIEST, POSSIBLY AS LATE AS WEDNESDAY. \"  This nurse will follow up with son this morning, after able to visit patient.

## 2022-04-25 NOTE — CARE COORDINATION
04/25/22 1455   Service Assessment   Patient Orientation Person   History Provided By Patient   Primary Caregiver Self   Patient's Healthcare Decision Maker is: Patient Declined (Legal Next of [address] [facility] as Decision Maker)   PCP Verified by CM No   Prior Functional Level Assistance with the following:   Current Functional Level Assistance with the following:   Can patient return to prior living arrangement Yes   Ability to make needs known: Unable   Family able to assist with home care needs: No   Would you like for me to discuss the discharge plan with any other family members/significant others, and if so, who? No   Social/Functional History   ADL Assistance Needs assistance   Ambulation Assistance Needs assistance   Transfer Assistance Needs assistance   Active  No   Occupation Retired   Discharge Planning   Type of [address]   Patient expects to be discharged to: Skilled nursing facility   Condition of Participation: Discharge Planning   The Plan for Transition of Care is related to the following treatment goals: Plan for return to [facility] with Hospice tomorrow.      4/25/22, 2:58 PM EDT  DISCHARGE PLANNING EVALUATION:    Daniel Soto       Admitted: 4/23/2022/ [facility] day: 2   Location: [facility]20/020A Reason for admit: Hypoxia [R09.02]  EMMA (acute kidney injury) ([address] [N17.9]  Pneumonia of both lungs due to infectious organism, unspecified part of lung [J18.9]  Acute hypoxemic respiratory failure ([address] [J96.01]  COVID-19 [U07.1]   PMH:  has a past medical history of Arthritis, Asthma, Atrial fibrillation ([address] Blood circulation, collateral, CAD (coronary artery disease), CHF (congestive heart failure) ([address] CHF (congestive heart failure) ([address] Chronic kidney disease, stage III (moderate) ([address] COPD (chronic obstructive pulmonary disease) ([address] Depression, Diabetes mellitus ([address] Diabetic hypertension-nephrosis syndrome ([address] Diverticulitis of colon without hemorrhage, Dysuria, GERD (gastroesophageal reflux disease), GERD (gastroesophageal reflux disease), Hyperlipidemia, Hypertension, Kidney disease, Nonrheumatic mitral valve disorder, Rheumatoid arthritis ([facility] and Unspecified cerebral artery occlusion with cerebral infarction. Procedure: N/A  Barriers to Discharge:  Patient presents with cough, wheezing, and shortness of breath. Bumex on hold, Eliquis, oral Zithromax, Rocephin, DM management, Duoneb nebulizer, prn Tylenol, Albuterol and Zofran, regular diet, oxygen, incentive spirometry, turn q 2 hr., up with assistance. PCP: Hollis Yeboah DO  Readmission Risk Score: 23.1 ( )%    Patient Goals/Plan/Treatment Preferences: Patient is from [facility]. Plan on her return with Hospice tomorrow. Transportation/Food Security/Housekeeping Addressed:  No issues identified.

## 2022-04-25 NOTE — PLAN OF CARE
Problem: Discharge Planning  Goal: Discharge to home or other facility with appropriate resources  Outcome: Progressing   See SW note 4/25.

## 2022-04-25 NOTE — PLAN OF CARE
Problem: Safety - Adult  Goal: Free from fall injury  4/25/2022 1920 by Ovidio Betancur RN  Outcome: Progressing  4/25/2022 0536 by Morgan Carreno RN  Outcome: Progressing     Problem: Safety - Adult  Goal: Free from fall injury  4/25/2022 1920 by Ovidio Betancur RN  Outcome: Progressing  4/25/2022 0536 by Morgan Carreno RN  Outcome: Progressing     Problem: ABCDS Injury Assessment  Goal: Absence of physical injury  4/25/2022 1920 by Ovidio Betancur RN  Outcome: Progressing  4/25/2022 0536 by Morgan Carreno RN  Outcome: Progressing     Problem: Skin/Tissue Integrity  Goal: Absence of new skin breakdown  Description: 1. Monitor for areas of redness and/or skin breakdown  2. Assess vascular access sites hourly  3. Every 4-6 hours minimum:  Change oxygen saturation probe site  4. Every 4-6 hours:  If on nasal continuous positive airway pressure, respiratory therapy assess nares and determine need for appliance change or resting period.   4/25/2022 1920 by Ovidio Betancur RN  Outcome: Progressing  4/25/2022 0536 by Morgan Carreno RN  Outcome: Progressing     Problem: Discharge Planning  Goal: Discharge to home or other facility with appropriate resources  4/25/2022 1920 by Ovidio Betancur RN  Outcome: Progressing  4/25/2022 1207 by CORY Villar  Outcome: Progressing     Problem: Chronic Conditions and Co-morbidities  Goal: Patient's chronic conditions and co-morbidity symptoms are monitored and maintained or improved  4/25/2022 1920 by Ovidio Betancur RN  Outcome: Progressing  4/25/2022 0536 by Morgan Carreno RN  Outcome: Progressing     Problem: Pain  Goal: Verbalizes/displays adequate comfort level or baseline comfort level  4/25/2022 1920 by Ovidio Betancur RN  Outcome: Progressing  4/25/2022 0536 by Morgan Carreno RN  Outcome: Progressing     Problem: Respiratory - Adult  Goal: Achieves optimal ventilation and oxygenation  4/25/2022 1920 by Ovidio Betancur RN  Outcome: Progressing  4/25/2022 0536 by Nabil Vincent RN  Outcome: Progressing  Goal: Lung sounds clear or within normal limits for patient  4/25/2022 0900 by Jenna Solis RCP  Outcome: Progressing   Care plan reviewed with patient and family. Patient and family verbalize understanding of the plan of care and contribute to goal setting.

## 2022-04-25 NOTE — CARE COORDINATION
4/25/22, 12:04 PM EDT    DISCHARGE PLANNING EVALUATION      Spoke with Hospice, they did talk with son, traveling from [facility], they will meet tomorrow morning, plans for discharge back to [facility] and admit to Hospice. CESARIO did call [facility], transport set for 12pm, SW faxed transport paperwork to [facility]. Call to [facility] with [facility] provided updated on plans for pt to return to them tomorrow and admit to hospice and son Wilfredo Burt on his way. CESARIO updated on transport time at noon.

## 2022-04-25 NOTE — PROGRESS NOTES
Physician Progress Note      Alonso Dodd  CSN #:                  [id number]  :                       1940  ADMIT DATE:       2022 6:01 PM  DISCH DATE:  RESPONDING  PROVIDER #:        DIDIER ANDERSON          QUERY TEXT:    Pt admitted with bacterial pneumonia and has acute encephalopathy documented. If possible, please document in progress notes and discharge summary further   specificity regarding the type of encephalopathy:    The medical record reflects the following:  Risk Factors: bacterial PNA  Clinical Indicators: disoriented to time, situation, intermittent episodes of   confusion increasing in frequency and intensity in preceding 7-10 days  Treatment: Supportive, imaging, labs  Options provided:  -- Metabolic encephalopathy  -- Septic encephalopathy  -- Toxic encephalopathy  -- Toxic metabolic encephalopathy  -- Other - I will add my own diagnosis  -- Disagree - Not applicable / Not valid  -- Disagree - Clinically unable to determine / Unknown  -- Refer to Clinical Documentation Reviewer    PROVIDER RESPONSE TEXT:    This patient has metabolic encephalopathy.     Query created by: Felipe Mitchell on 2022 10:41 AM      Electronically signed by:  Landon Potter 2022 12:34 PM

## 2022-04-25 NOTE — PROGRESS NOTES
Visit made to work on plan of care to honor patient's wishes after discussion with Dr. Lubna Munroe. Dee Dee lying in bed, denied adverse symptoms with no S/S of. Alert to name and place this morning. Told her that Dr. Lubna Munroe had updated hospice that she does not wish aggressive measures and wishes to go home with not returning to hospital. She voiced her wish is \"to go home and not return\". Explained to her that hospice is when patient wishing for no further return to hospital and meeting criteria for hospice care. Agreeable to service when returning to [facility]. Did ask if agreeable to [facility] to provide care, she said yes. Call placed to son Zayra Vigil, he is on way to [address] [place] [address] and currently in [place]. Son driving straight through and hopes to be to [address] this evening. Updated him of conversation with his mother. He is agreeable to honor her wishes. Asked if he would wish for nurse to share information about service with him. Let him know that had discussed with Dr. Lubna Munroe and plan is for patient to be discharged on 04.26.2022. offered to meet with him to speak about hospice care prior to discharge facility, set time of 0930 on 04.26.2022 to meet with son. Updated Dr. Lubna Munroe if meeting time, agreeable to this and plan of discharge on 04.26.2022. Saji Persaud present and asked her to see if able to get time of 1200 for transport- to allow presentation to be completed. She will updated nurse of time obtained later in day. Primary nurse Brandon Kelly RN updated of plan of discharge to [facility].

## 2022-04-25 NOTE — PLAN OF CARE
Problem: Respiratory - Adult  Goal: Lung sounds clear or within normal limits for patient  Outcome: Progressing   Continue aerosols as ordered to improve breath sounds

## 2022-04-25 NOTE — PLAN OF CARE
Problem: Safety - Adult  Goal: Free from fall injury  Outcome: Progressing     Problem: ABCDS Injury Assessment  Goal: Absence of physical injury  Outcome: Progressing     Problem: Skin/Tissue Integrity  Goal: Absence of new skin breakdown  Description: 1. Monitor for areas of redness and/or skin breakdown  2. Assess vascular access sites hourly  3. Every 4-6 hours minimum:  Change oxygen saturation probe site  4. Every 4-6 hours:  If on nasal continuous positive airway pressure, respiratory therapy assess nares and determine need for appliance change or resting period.   Outcome: Progressing     Problem: Chronic Conditions and Co-morbidities  Goal: Patient's chronic conditions and co-morbidity symptoms are monitored and maintained or improved  Outcome: Progressing     Problem: Pain  Goal: Verbalizes/displays adequate comfort level or baseline comfort level  Outcome: Progressing     Problem: Respiratory - Adult  Goal: Achieves optimal ventilation and oxygenation  Outcome: Progressing

## 2022-04-25 NOTE — PROGRESS NOTES
Hospitalist Progress Note      Patient:  Marina Torres    Unit/Bed:5K-20/020-A  YOB: 1940  MRN: [id number]   Acct: [de-identified]   PCP: Rashmi Yeboah DO  Date of Admission: 4/23/2022    Assessment/Plan:    1. Acute on chronic hypoxia  2. Suspected superimposed bacterial pneumonia  3. 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 Patient's B/l requirements of 2 L with recent increase to 4 L at [facility]. Currently stable on 4 L.  b. Patient diagnosed and treated outpatient for COVID-19 on 4/6/2022.  c. Patient is a DNR-CC since 1/2022. I confirmed this with [facility] as well as the Patient's POA/son and the patient herself, although she is mildly confused. She does states that she wants to go home and not be treated in the hospital and is ready to \"go to sleep\". d. Chest x-ray with concern for possible atypical pneumonia infection versus post COVID pulmonary fibrosis. e. I will continue empiric abx for now pending the arrival of the patient's son, Micah Herbert, with whom I spoke on the phone. He was in agreement with this plan. f. Case discussed extensively with hospice team.  Patient currently in no distress. Planning meeting with the patient and her son tonight. 4. Acute metabolic encephalopathy:   a. Patient with intermittent episodes of encephalopathy, with increasing frequency and intensity over the preceding 7 to 10 days. b. ABG shows no signs of CO2 retention/CO2 narcosis. c. Patient is currently pleasantly confused alert to person, not place time or situation. Given [facility], no further work-up elected at this time after discussing with the patient's family. 5. EMMA on CKD:   a. Initially felt to be prerenal azotemia as a combination of home diuretic use as well as decreased oral intake with increased confusion. b. Patient started on IV fluids with improvement in renal function.   c. Patient with bibasilar crackles a history of HFrEF. Fluids discontinued at this time. Will monitor respiratory status closely  6. Chronic pain:   a. Follows with pain management outpatient. b. Patient currently comfortable with no complaints of pain. Will monitor and treat as needed  7. Rheumatoid arthritis, seronegative:  a. Previously followed with Dr. Db Rousseau outpatient. Recently discontinued follow-up appointments with subspecialties and changed her code status to DNR CC, instead wishing only follow with physician at [facility]. 8. History of CHF:   a. Does not appear to be acutely decompensated, however there is some mounting evidence of increased volume overload in the setting of IV fluid use due to EMMA on presentation. IV fluids have been discontinued. b. Currently holding diuretics. Will monitor volume status closely. Will treat with diuretics for comfort if patient has increased respiratory distress/work of breathing. 9. Coronary artery disease   10. mild troponin elevation:   a. Likely due to underlying CHF in the setting of EMMA on CKD. b. EKG without acute ischemia. Patient is DNR CC. No further ischemic work-up necessary at this time  c. Continue Imdur, Ranexa  11. Atrial fibrillation:  a. Currently rate controlled. Continue Eliquis. 12. Diabetes mellitus type 2:   a. Continue basal bolus insulin. Hypoglycemia protocol. Disposition: Anticipate discharge to [facility] with hospice care pending hospice meeting earlier this week. Chief Complaint: Confusion, increased lethargy    Hospital Course:    4/23: Patient mated to hospital from [address] [place] [address] due to increased lethargy and confusion as well as hypoxia. Patient reportedly has chronic hypoxia on 2 L, she is requiring increased supplemental oxygen to 4 L. She has had decreased oral intake and increased use of nebulized bronchodilators, and increasing oxygen requirements.   She also has worsening encephalopathy of several days duration. The patient was recently diagnosed with COVID-19 pneumonia in early April 2022. She has been doing poorly since that time. Patient is recently discontinued follow-up appointments with subspecialists. 4/24: Upon speaking with the staff at [facility], it is unclear the events precipitating hospitalization. The patient does have a signed DNR CC form. In speaking with the patient, she is pleasantly confused but states that she does not want any aggressive management or therapy as treatment. Subjective (past 24 hours):   Patient pleasantly confused. Reports mild shortness of breath at rest.  Continued to have a wet rhonchi, which she states has been present for \"years\". Denies chest pains, fevers, or chills. Past medical history, family history, social history and allergies reviewed again and is unchanged since admission. ROS (12 point review of systems completed. Pertinent positives noted.  Otherwise ROS is negative)     Medications:  Reviewed    Infusion Medications    sodium chloride      dextrose       Scheduled Medications    apixaban  2.5 mg Oral BID    cefTRIAXone (ROCEPHIN) IV  1,000 mg IntraVENous Q24H    azithromycin  500 mg Oral Daily    predniSONE  40 mg Oral Daily    sodium chloride flush  10 mL IntraVENous 2 times per day    atorvastatin  80 mg Oral Nightly    azaTHIOprine  100 mg Oral Daily    cloNIDine  0.1 mg Oral BID    docusate sodium  100 mg Oral BID    hydroxychloroquine  200 mg Oral Daily    isosorbide mononitrate  60 mg Oral Daily    [Held by provider] predniSONE  5 mg Oral Daily    [Held by provider] bumetanide  1 mg Oral BID    ranolazine  500 mg Oral BID    insulin glargine  8 Units SubCUTAneous Nightly    insulin lispro  0-6 Units SubCUTAneous TID     insulin lispro  0-3 Units SubCUTAneous Nightly    ipratropium-albuterol  1 ampule Inhalation Q4H WA     PRN Meds: benzonatate, sodium chloride flush, sodium chloride, ondansetron **OR** ondansetron, polyethylene glycol, acetaminophen **OR** acetaminophen, albuterol sulfate HFA, albuterol, glucagon (rDNA), dextrose, glucose, dextrose bolus (hypoglycemia) **OR** dextrose bolus (hypoglycemia)      Intake/Output Summary (Last 24 hours) at 4/25/2022 1216  Last data filed at 4/25/2022 0511  Gross per 24 hour   Intake 320 ml   Output 950 ml   Net -630 ml       Diet:  ADULT DIET; Regular    Exam:  BP (!) 163/99   Pulse 65   Temp 98 °F (36.7 °C) (Oral)   Resp 18   Ht 5' 5\" (1.651 m)   Wt 184 lb 8 oz (83.7 kg)   SpO2 96%   BMI 30.70 kg/m²   General appearance: Elderly chronically ill-appearing female resting bed. No acute distress. She is more awake and interactive than yesterday, but still requires frequent re-orientation. HEENT: Pupils equal, round, and reactive to light. Conjunctivae/corneas clear. Neck: Supple, with full range of motion. No jugular venous distention. Trachea midline. Respiratory: Coarse upper airway diffuse rhonchi and wet rales noted throughout all lung fields. There are diminished bibasilar breath sounds. Cardiovascular: Irregularly irregular rhythm. Normal heart rate. Abdomen: Soft, non-tender, non-distended with normal bowel sounds. Musculoskeletal: passive and active ROM x 4 extremities. Skin: Skin color, texture, turgor normal.  No rashes or lesions. Neurologic: Pleasantly confused. Alert to person and place, but not situation or time. Requires frequent re-orientation. No focal deficits.    Capillary Refill: Brisk,< 3 seconds   Peripheral Pulses: +2 palpable, equal bilaterally     Labs:   Recent Labs     04/23/22 1839 04/23/22 2300 04/24/22 0522   WBC 12.4* 15.0* 11.7*   HGB 8.2* 8.2* 8.9*   HCT 27.6* 27.4* 29.6*    375 369     Recent Labs     04/23/22 1839 04/24/22 0522   * 138   K 4.0 3.5   CL 91* 95*   CO2 28 28   * 91*   CREATININE 3.6* 2.1*   CALCIUM 9.1 9.3     Recent Labs     04/23/22 1839   AST 26   ALT 7*   BILITOT 0.2* ALKPHOS 71     No results for input(s): INR in the last 72 hours. No results for input(s): Maggie Beat in the last 72 hours. Microbiology:    Blood culture #1:   Lab Results   Component Value Date    BC No growth-preliminary  04/23/2022       Blood culture #2:No results found for: [place]    Organism:  Lab Results   Component Value Date    ORG Staphylococcus aureus 12/19/2021       No results found for: LABGRAM    MRSA culture only:No results found for: [facility]    Urine culture: No results found for: LABURIN    Respiratory culture: No results found for: CULTRESP    Aerobic and Anaerobic :  No results found for: LABAERO  No results found for: LABANAE    Urinalysis:      Lab Results   Component Value Date    NITRU NEGATIVE 11/06/2021    WBCUA 2-4 11/06/2021    BACTERIA FEW 11/06/2021    RBCUA 0-2 11/06/2021    BLOODU NEGATIVE 11/06/2021    SPECGRAV 1.019 11/08/2020    GLUCOSEU NEGATIVE 11/06/2021       Radiology:  XR CHEST PORTABLE   Final Result   1. Diffuse increased interstitial pulmonary markings and increased groundglass opacities bilaterally. Findings can relate to pulmonary edema. Multilobar pneumonia can also have this appearance. **This report has been created using voice recognition software. It may contain minor errors which are inherent in voice recognition technology. **      Final report electronically signed by Dr Guadalupe Singh on 4/23/2022 6:39 PM        XR CHEST PORTABLE    Result Date: 4/23/2022  PROCEDURE: XR CHEST PORTABLE CLINICAL INFORMATION: shortness of breath COMPARISON: Chest radiograph 12/22/2021 TECHNIQUE: AP portable chest radiograph performed. FINDINGS: Diffuse increase interstitial pulmonary markings and increased groundglass opacities bilaterally. Cardiac silhouette is mildly enlarged. No pleural effusion. No pneumothorax. No acute bony abnormality. 1. Diffuse increased interstitial pulmonary markings and increased groundglass opacities bilaterally. Findings can relate to pulmonary edema. Multilobar pneumonia can also have this appearance. **This report has been created using voice recognition software. It may contain minor errors which are inherent in voice recognition technology. ** Final report electronically signed by Dr Abiel Dey on 4/23/2022 6:39 PM      Electronically signed by Jenni Callahan DO on 4/25/2022 at 12:16 PM

## 2022-04-26 VITALS
RESPIRATION RATE: 22 BRPM | BODY MASS INDEX: 30.07 KG/M2 | WEIGHT: 180.5 LBS | SYSTOLIC BLOOD PRESSURE: 139 MMHG | HEIGHT: 65 IN | OXYGEN SATURATION: 96 % | DIASTOLIC BLOOD PRESSURE: 86 MMHG | HEART RATE: 142 BPM | TEMPERATURE: 98.3 F

## 2022-04-26 LAB — GLUCOSE BLD-MCNC: 129 MG/DL (ref 70–108)

## 2022-04-26 PROCEDURE — 99239 HOSP IP/OBS DSCHRG MGMT >30: CPT | Performed by: STUDENT IN AN ORGANIZED HEALTH CARE EDUCATION/TRAINING PROGRAM

## 2022-04-26 PROCEDURE — 6370000000 HC RX 637 (ALT 250 FOR IP): Performed by: STUDENT IN AN ORGANIZED HEALTH CARE EDUCATION/TRAINING PROGRAM

## 2022-04-26 PROCEDURE — 2580000003 HC RX 258: Performed by: PHYSICIAN ASSISTANT

## 2022-04-26 PROCEDURE — 6370000000 HC RX 637 (ALT 250 FOR IP): Performed by: PHYSICIAN ASSISTANT

## 2022-04-26 PROCEDURE — 82948 REAGENT STRIP/BLOOD GLUCOSE: CPT

## 2022-04-26 PROCEDURE — 6360000002 HC RX W HCPCS: Performed by: PHYSICIAN ASSISTANT

## 2022-04-26 RX ORDER — IPRATROPIUM BROMIDE AND ALBUTEROL SULFATE 2.5; .5 MG/3ML; MG/3ML
1 SOLUTION RESPIRATORY (INHALATION) EVERY 4 HOURS PRN
Status: DISCONTINUED | OUTPATIENT
Start: 2022-04-26 | End: 2022-04-26 | Stop reason: HOSPADM

## 2022-04-26 RX ADMIN — ISOSORBIDE MONONITRATE 60 MG: 60 TABLET, EXTENDED RELEASE ORAL at 11:10

## 2022-04-26 RX ADMIN — SODIUM CHLORIDE, PRESERVATIVE FREE 10 ML: 5 INJECTION INTRAVENOUS at 11:19

## 2022-04-26 RX ADMIN — AZATHIOPRINE 100 MG: 50 TABLET ORAL at 11:07

## 2022-04-26 RX ADMIN — RANOLAZINE 500 MG: 500 TABLET, EXTENDED RELEASE ORAL at 11:07

## 2022-04-26 RX ADMIN — LORAZEPAM 1 MG: 2 INJECTION INTRAMUSCULAR; INTRAVENOUS at 00:17

## 2022-04-26 RX ADMIN — PREDNISONE 40 MG: 20 TABLET ORAL at 11:07

## 2022-04-26 RX ADMIN — LORAZEPAM 1 MG: 2 INJECTION INTRAMUSCULAR; INTRAVENOUS at 04:16

## 2022-04-26 RX ADMIN — LORAZEPAM 1 MG: 2 INJECTION INTRAMUSCULAR; INTRAVENOUS at 07:59

## 2022-04-26 RX ADMIN — CLONIDINE HYDROCHLORIDE 0.1 MG: 0.1 TABLET ORAL at 11:08

## 2022-04-26 RX ADMIN — ACETAMINOPHEN 650 MG: 325 TABLET ORAL at 04:13

## 2022-04-26 RX ADMIN — APIXABAN 2.5 MG: 2.5 TABLET, FILM COATED ORAL at 11:08

## 2022-04-26 RX ADMIN — DOCUSATE SODIUM 100 MG: 100 CAPSULE, LIQUID FILLED ORAL at 11:08

## 2022-04-26 RX ADMIN — HYDROXYCHLOROQUINE SULFATE 200 MG: 200 TABLET, FILM COATED ORAL at 11:08

## 2022-04-26 RX ADMIN — AZITHROMYCIN MONOHYDRATE 500 MG: 250 TABLET ORAL at 11:08

## 2022-04-26 ASSESSMENT — PAIN SCALES - GENERAL: PAINLEVEL_OUTOF10: 5

## 2022-04-26 ASSESSMENT — PAIN DESCRIPTION - LOCATION: LOCATION: BACK

## 2022-04-26 NOTE — DISCHARGE SUMMARY
Hospitalist Discharge Summary        Patient: Ivan Koo  YOB: 1940  MRN: [id number]   Acct: [de-identified]    Primary Care Physician: Dusty Aaron DO    Admit date  4/23/2022    Discharge date:  4/26/2022 2:42 PM    Chief Complaint on presentation :-    Lethargy and hypoxia    Discharge Assessment and Plan:-   1. Acute on chronic hypoxia  2. Suspected superimposed bacterial pneumonia  3. 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 Patient was treated empirically with Rocephin and azithromycin. She also received p.o. prednisone. b. Discussed with the patient's family who agreed to honor her wishes for DNR comfort care measures only. The patient was mildly confused, but also voiced she do not want to receive treatment in the hospital and just wanted to go home and be comfortable. c. The hospice team was consulted who spoke with the patient and her family. The patiently discharged back to [facility] with hospice care. Antibiotics have been discontinued as was p.o. prednisone. d. Hospice to handle/prescribe and follow-up on anxiolytics and pain medications for comfort. 4. Acute metabolic encephalopathy  a. Family elected for no further work-up as patient requested to be DNR comfort care several months prior with documented paperwork. b. Patient mentation stable at time of discharge. Alert to person and time, struggles with place or situation. 5. Acute kidney injury on CKD  a. Improved with IV fluids. Volume status remained euvolemic during hospitalization  Chronic conditions:  6. Chronic pain  7. Of rheumatoid arthritis, seronegative  8. CHF  9. Coronary artery disease  10. Atrial fibrillation  11. Diabetes mellitus type 2    Initial H and P and Hospital course:-  Patient is a very pleasant 25-year-old female with medical history of CHF, seronegative RA, chronic hypoxia who presented to hospital from [facility] due to concerns for worsening lethargy and hypoxia.   She was also noted to have intermittent episodes of confusion for several days prior to arrival.  She was requiring increase of 4 oxygen 4 L with a baseline of 2 L. She was started empiric antibiotics with Rocephin azithromycin and also received p.o. prednisone. The patient was diagnosed and treated outpatient for COVID-19 pneumonia approximately 3 weeks prior to this hospitalization. Chest x-ray on arrival showed diffuse interstitial markings which may be consistent with prior COVID-19 infection as well as superimposed bacterial pneumonia. The patient presented with signed and dated paperwork for DNR comfort care measures from January 2022. Had tried to discuss with the patient, however she was mildly confused. She did state that she did not want to receive treatments and wished to go home and be comfortable rather be treated in the hospital.  I then called the patient's Buck Valdivia, to discuss [address] and goals of care with him. He elected to honor her wishes of DNR comfort care and wished to speak with the hospice services. Patient's son drove up from [facility] to meet with myself and the hospice team.  Multiple family members were present at the bedside. They elected for discharge from the hospital to [facility] with comfort care measures only. The patient was comfortable at time of discharge and will follow with hospice upon arrival to the [facility]. Medications for comfort prescribed.     Physical Exam:-  Vitals:   Patient Vitals for the past 24 hrs:   BP Temp Temp src Pulse Resp SpO2 Weight   04/26/22 0758 139/86 98.3 °F (36.8 °C) Oral 142 22 96 % --   04/26/22 0511 -- -- -- -- -- -- 180 lb 8 oz (81.9 kg)   04/25/22 1957 (!) 156/92 98.6 °F (37 °C) Oral 83 16 99 % --   04/25/22 1548 (!) 140/93 98.1 °F (36.7 °C) Oral 88 18 95 % --     Weight:   Weight: 180 lb 8 oz (81.9 kg)   24 hour intake/output:     Intake/Output Summary (Last 24 hours) at 4/26/2022 1442  Last data filed at 4/26/2022 1000  Gross per 24 hour Intake 120 ml   Output 350 ml   Net -230 ml       General appearance: Elderly chronically ill-appearing female resting bed. No acute distress. She is more awake and interactive than yesterday, but still requires frequent re-orientation. HEENT: Pupils equal, round, and reactive to light. Conjunctivae/corneas clear. Neck: Supple, with full range of motion. No jugular venous distention. Trachea midline. Respiratory:  Mild coarse upper airway rhonchi. No wheezes or rales. Cardiovascular: Irregularly irregular rhythm. Normal heart rate. Abdomen: Soft, non-tender, non-distended with normal bowel sounds. Musculoskeletal: passive and active ROM x 4 extremities. Skin: Skin color, texture, turgor normal.  No rashes or lesions. Neurologic: Pleasantly confused. Alert to person and place, but not situation or time. Requires frequent re-orientation. No focal deficits.    Capillary Refill: Brisk,< 3 seconds   Peripheral Pulses: +2 palpable, equal bilaterally       Discharge Medications:-      Medication List      CONTINUE taking these medications    * albuterol 2.5 MG/0.5ML Nebu nebulizer solution  Commonly known as: PROVENTIL     * albuterol sulfate  (90 Base) MCG/ACT inhaler     aluminum & magnesium hydroxide-simethicone 400-400-40 MG/5ML Susp  Commonly known as: MYLANTA     Artificial Tears 1 % ophthalmic solution  Generic drug: carboxymethylcellulose     atorvastatin 40 MG tablet  Commonly known as: LIPITOR  Take 2 tablets by mouth daily     azaTHIOprine 50 MG tablet  Commonly known as: IMURAN  Take 2 tablets by mouth daily     biotene dry mouth moisturizing Soln liquid     bumetanide 1 MG tablet  Commonly known as: BUMEX  Take 1 tablet by mouth 2 times daily     calcium carbonate 500 MG chewable tablet  Commonly known as: TUMS     cloNIDine 0.1 MG tablet  Commonly known as: CATAPRES  Take 1 tablet by mouth 2 times daily     Dexilant 60 MG Cpdr delayed release capsule  Generic drug: dexlansoprazole docusate 100 MG Caps  Commonly known as: COLACE, DULCOLAX  Take 100 mg by mouth 2 times daily     Eliquis 5 MG Tabs tablet  Generic drug: apixaban     EPINEPHrine 0.3 MG/0.3ML Soaj injection  Commonly known as: EpiPen 2-Luther  Inject 0.3 mLs into the muscle once for 1 dose Use as directed for allergic reaction     fenofibrate micronized 200 MG capsule  Commonly known as: LOFIBRA     fluticasone-umeclidin-vilant 100-62.5-25 MCG/INH Aepb  Commonly known as: TRELEGY ELLIPTA     Heat Therapy Misc     hydroxychloroquine 200 MG tablet  Commonly known as: PLAQUENIL  TAKE 1 TABLET BY MOUTH EVERY DAY     insulin glargine 100 UNIT/ML injection pen  Commonly known as: LANTUS;BASAGLAR     isosorbide mononitrate 60 MG extended release tablet  Commonly known as: IMDUR  Take 1 tablet by mouth daily     loperamide 2 MG capsule  Commonly known as: IMODIUM     losartan 50 MG tablet  Commonly known as: COZAAR     magnesium hydroxide 400 MG/5ML suspension  Commonly known as: MILK OF MAGNESIA     magnesium oxide 400 MG tablet  Commonly known as: MAG-OX     metoprolol succinate 50 MG extended release tablet  Commonly known as: TOPROL XL     montelukast 5 MG chewable tablet  Commonly known as: SINGULAIR     nitroGLYCERIN 0.4 MG SL tablet  Commonly known as: NITROSTAT     OXYGEN     predniSONE 5 MG tablet  Commonly known as: DELTASONE  TAKE 1 TABLET BY MOUTH EVERY DAY     ranolazine 500 MG extended release tablet  Commonly known as: Ranexa  Take 1 tablet by mouth 2 times daily. T.E.D. Anti-Embolism Stockings Misc         * This list has 2 medication(s) that are the same as other medications prescribed for you. Read the directions carefully, and ask your doctor or other care provider to review them with you.             STOP taking these medications    acetaminophen 500 MG tablet  Commonly known as: TYLENOL     DULoxetine 30 MG extended release capsule  Commonly known as: CYMBALTA     gabapentin 100 MG capsule  Commonly known as: NEURONTIN     oxyCODONE-acetaminophen 5-325 MG per tablet  Commonly known as: PERCOCET     spironolactone 50 MG tablet  Commonly known as: ALDACTONE     traZODone 50 MG tablet  Commonly known as: DESYREL             Labs :-  Recent Results (from the past 72 hour(s))   EKG 12 Lead    Collection Time: 04/23/22  6:00 PM   Result Value Ref Range    Ventricular Rate 95 BPM    Atrial Rate 108 BPM    QRS Duration 134 ms    Q-T Interval 370 ms    QTc Calculation (Bazett) 464 ms    R Axis 7 degrees    T Axis -37 degrees   CBC with Auto Differential    Collection Time: 04/23/22  6:39 PM   Result Value Ref Range    WBC 12.4 (H) 4.8 - 10.8 thou/mm3    RBC 2.85 (L) 4.20 - 5.40 mill/mm3    Hemoglobin 8.2 (L) 12.0 - 16.0 gm/dl    Hematocrit 27.6 (L) 37.0 - 47.0 %    MCV 96.8 81.0 - 99.0 fL    MCH 28.8 26.0 - 33.0 pg    MCHC 29.7 (L) 32.2 - 35.5 gm/dl    RDW-CV 14.2 11.5 - 14.5 %    RDW-SD 50.2 (H) 35.0 - 45.0 fL    Platelets 892 469 - 838 thou/mm3    MPV 9.1 (L) 9.4 - 12.4 fL    Seg Neutrophils 82.6 %    Lymphocytes 9.2 %    Monocytes 6.8 %    Eosinophils 0.1 %    Basophils 0.1 %    Immature Granulocytes 1.2 %    Segs Absolute 10.2 (H) 1.8 - 7.7 thou/mm3    Lymphocytes Absolute 1.1 1.0 - 4.8 thou/mm3    Monocytes Absolute 0.8 0.4 - 1.3 thou/mm3    Eosinophils Absolute 0.0 0.0 - 0.4 thou/mm3    Basophils Absolute 0.0 0.0 - 0.1 thou/mm3    Immature Grans (Abs) 0.15 (H) 0.00 - 0.07 thou/mm3    nRBC 0 /100 wbc   Troponin    Collection Time: 04/23/22  6:39 PM   Result Value Ref Range    Troponin T 0.031 (A) ng/ml   Brain Natriuretic Peptide    Collection Time: 04/23/22  6:39 PM   Result Value Ref Range    Pro-BNP 2648.0 (H) 0.0 - 1800.0 pg/mL   Comprehensive Metabolic Panel w/ Reflex to MG    Collection Time: 04/23/22  6:39 PM   Result Value Ref Range    Glucose 157 (H) 70 - 108 mg/dL    CREATININE 3.6 (HH) 0.4 - 1.2 mg/dL     (H) 7 - 22 mg/dL    Sodium 133 (L) 135 - 145 meq/L    Potassium reflex Magnesium 4.0 3.5 - 5.2 meq/L Chloride 91 (L) 98 - 111 meq/L    CO2 28 23 - 33 meq/L    Calcium 9.1 8.5 - 10.5 mg/dL    AST 26 5 - 40 U/L    Alkaline Phosphatase 71 38 - 126 U/L    Total Protein 7.5 6.1 - 8.0 g/dL    Albumin 2.7 (L) 3.5 - 5.1 g/dL    Total Bilirubin 0.2 (L) 0.3 - 1.2 mg/dL    ALT 7 (L) 11 - 66 U/L   Anion Gap    Collection Time: 04/23/22  6:39 PM   Result Value Ref Range    Anion Gap 14.0 8.0 - 16.0 meq/L   Glomerular Filtration Rate, Estimated    Collection Time: 04/23/22  6:39 PM   Result Value Ref Range    Est, Glom Filt Rate 12 (A) ml/min/1.73m2   Osmolality    Collection Time: 04/23/22  6:39 PM   Result Value Ref Range    Osmolality Calc 304.4 (H) 275.0 - 300.0 mOsmol/kg   Rapid influenza A/B antigens    Collection Time: 04/23/22 10:41 PM    Specimen: Nares   Result Value Ref Range    Flu A Antigen Negative NEGATIVE    Flu B Antigen Negative NEGATIVE   COVID-19, Rapid    Collection Time: 04/23/22 10:41 PM   Result Value Ref Range    SARS-CoV-2, NAAT NOT  DETECTED NOT DETECTED   Troponin    Collection Time: 04/23/22 11:00 PM   Result Value Ref Range    Troponin T 0.028 (A) ng/ml   Lactate, Sepsis    Collection Time: 04/23/22 11:00 PM   Result Value Ref Range    Lactic Acid, Sepsis 1.7 0.5 - 1.9 mmol/L   CBC with Auto Differential    Collection Time: 04/23/22 11:00 PM   Result Value Ref Range    WBC 15.0 (H) 4.8 - 10.8 thou/mm3    RBC 2.87 (L) 4.20 - 5.40 mill/mm3    Hemoglobin 8.2 (L) 12.0 - 16.0 gm/dl    Hematocrit 27.4 (L) 37.0 - 47.0 %    MCV 95.5 81.0 - 99.0 fL    MCH 28.6 26.0 - 33.0 pg    MCHC 29.9 (L) 32.2 - 35.5 gm/dl    RDW-CV 14.2 11.5 - 14.5 %    RDW-SD 49.6 (H) 35.0 - 45.0 fL    Platelets 171 561 - 682 thou/mm3    MPV 9.0 (L) 9.4 - 12.4 fL    Seg Neutrophils 92.4 %    Lymphocytes 2.8 %    Monocytes 3.3 %    Eosinophils 0.1 %    Basophils 0.1 %    Immature Granulocytes 1.3 %    Segs Absolute 13.9 (H) 1.8 - 7.7 thou/mm3    Lymphocytes Absolute 0.4 (L) 1.0 - 4.8 thou/mm3    Monocytes Absolute 0.5 0.4 - 1.3 thou/mm3 Eosinophils Absolute 0.0 0.0 - 0.4 thou/mm3    Basophils Absolute 0.0 0.0 - 0.1 thou/mm3    Immature Grans (Abs) 0.19 (H) 0.00 - 0.07 thou/mm3    nRBC 0 /100 wbc   Culture, Blood 2    Collection Time: 04/23/22 11:00 PM    Specimen: Blood   Result Value Ref Range    Blood Culture, Routine No growth-preliminary     Culture, Blood 1    Collection Time: 04/23/22 11:02 PM    Specimen: Blood   Result Value Ref Range    Blood Culture, Routine No growth-preliminary     POCT Glucose    Collection Time: 04/24/22 12:31 AM   Result Value Ref Range    POC Glucose 216 (H) 70 - 108 mg/dl   CBC with Auto Differential    Collection Time: 04/24/22  5:22 AM   Result Value Ref Range    WBC 11.7 (H) 4.8 - 10.8 thou/mm3    RBC 3.11 (L) 4.20 - 5.40 mill/mm3    Hemoglobin 8.9 (L) 12.0 - 16.0 gm/dl    Hematocrit 29.6 (L) 37.0 - 47.0 %    MCV 95.2 81.0 - 99.0 fL    MCH 28.6 26.0 - 33.0 pg    MCHC 30.1 (L) 32.2 - 35.5 gm/dl    RDW-CV 14.2 11.5 - 14.5 %    RDW-SD 49.2 (H) 35.0 - 45.0 fL    Platelets 618 733 - 637 thou/mm3    MPV 9.2 (L) 9.4 - 12.4 fL    Seg Neutrophils 92.6 %    Lymphocytes 3.8 %    Monocytes 1.5 %    Eosinophils 0.0 %    Basophils 0.1 %    Immature Granulocytes 2.0 %    Segs Absolute 10.8 (H) 1.8 - 7.7 thou/mm3    Lymphocytes Absolute 0.4 (L) 1.0 - 4.8 thou/mm3    Monocytes Absolute 0.2 (L) 0.4 - 1.3 thou/mm3    Eosinophils Absolute 0.0 0.0 - 0.4 thou/mm3    Basophils Absolute 0.0 0.0 - 0.1 thou/mm3    Immature Grans (Abs) 0.23 (H) 0.00 - 0.07 thou/mm3    nRBC 0 /100 wbc   Troponin    Collection Time: 04/24/22  5:22 AM   Result Value Ref Range    Troponin T 0.017 (A) ng/ml   Lactate, Sepsis    Collection Time: 04/24/22  5:22 AM   Result Value Ref Range    Lactic Acid, Sepsis 0.9 0.5 - 1.9 mmol/L   Basic Metabolic Panel w/ Reflex to MG    Collection Time: 04/24/22  5:22 AM   Result Value Ref Range    Sodium 138 135 - 145 meq/L    Potassium reflex Magnesium 3.5 3.5 - 5.2 meq/L    Chloride 95 (L) 98 - 111 meq/L    CO2 28 23 - 33 meq/L    Glucose 197 (H) 70 - 108 mg/dL    BUN 91 (H) 7 - 22 mg/dL    CREATININE 2.1 (H) 0.4 - 1.2 mg/dL    Calcium 9.3 8.5 - 10.5 mg/dL   Anion Gap    Collection Time: 04/24/22  5:22 AM   Result Value Ref Range    Anion Gap 15.0 8.0 - 16.0 meq/L   Glomerular Filtration Rate, Estimated    Collection Time: 04/24/22  5:22 AM   Result Value Ref Range    Est, Glom Filt Rate 22 (A) ml/min/1.73m2   Osmolality    Collection Time: 04/24/22  5:22 AM   Result Value Ref Range    Osmolality Calc 309.1 (H) 275.0 - 300.0 mOsmol/kg   Magnesium    Collection Time: 04/24/22  5:22 AM   Result Value Ref Range    Magnesium 3.1 (H) 1.6 - 2.4 mg/dL   EKG 12 Lead    Collection Time: 04/24/22  6:32 AM   Result Value Ref Range    Ventricular Rate 81 BPM    Atrial Rate 81 BPM    P-R Interval 232 ms    QRS Duration 136 ms    Q-T Interval 486 ms    QTc Calculation (Bazett) 564 ms    P Axis 51 degrees    R Axis -18 degrees    T Axis -34 degrees   POCT Glucose    Collection Time: 04/24/22  9:06 AM   Result Value Ref Range    POC Glucose 250 (H) 70 - 108 mg/dl   Troponin    Collection Time: 04/24/22 11:33 AM   Result Value Ref Range    Troponin T 0.014 (A) ng/ml   POCT Glucose    Collection Time: 04/24/22 12:18 PM   Result Value Ref Range    POC Glucose 186 (H) 70 - 108 mg/dl   Blood Gas, Arterial    Collection Time: 04/24/22 12:52 PM   Result Value Ref Range    pH, Blood Gas 7.54 (H) 7.35 - 7.45    PCO2 37 35 - 45 mmhg    PO2 45 (L) 71 - 104 mmhg    HCO3 32 (H) 23 - 28 mmol/l    Base Excess (Calculated) 8.7 (H) -2.5 - 2.5 mmol/l    O2 Sat 86 %    DEVICE Room Air     Carrington Test Positive     Source: R Radial     COLLECTED BY: [id number]    POCT Glucose    Collection Time: 04/24/22  4:44 PM   Result Value Ref Range    POC Glucose 170 (H) 70 - 108 mg/dl   POCT Glucose    Collection Time: 04/24/22  7:20 PM   Result Value Ref Range    POC Glucose 171 (H) 70 - 108 mg/dl   POCT Glucose    Collection Time: 04/25/22  7:41 AM   Result Value Ref Range    POC Glucose 157 (H) 70 - 108 mg/dl   POCT Glucose    Collection Time: 04/25/22 11:32 AM   Result Value Ref Range    POC Glucose 155 (H) 70 - 108 mg/dl   POCT Glucose    Collection Time: 04/25/22  4:42 PM   Result Value Ref Range    POC Glucose 215 (H) 70 - 108 mg/dl   POCT Glucose    Collection Time: 04/25/22  8:04 PM   Result Value Ref Range    POC Glucose 220 (H) 70 - 108 mg/dl   POCT Glucose    Collection Time: 04/26/22  7:46 AM   Result Value Ref Range    POC Glucose 129 (H) 70 - 108 mg/dl        Microbiology:    Blood culture #1:   Lab Results   Component Value Date    BC No growth-preliminary  04/23/2022       Blood culture #2:No results found for: Cardolorese Roller    Organism:  No results found for: LABGRAM    MRSA culture only:No results found for: [employer]    Urine culture: No results found for: LABURIN  Lab Results   Component Value Date    ORG Staphylococcus aureus 12/19/2021        Respiratory culture: No results found for: CULTRESP    Aerobic and Anaerobic :  No results found for: LABAERO  No results found for: LABANAE    Urinalysis:      Lab Results   Component Value Date    NITRU NEGATIVE 11/06/2021    WBCUA 2-4 11/06/2021    BACTERIA FEW 11/06/2021    RBCUA 0-2 11/06/2021    BLOODU NEGATIVE 11/06/2021    SPECGRAV 1.019 11/08/2020    GLUCOSEU NEGATIVE 11/06/2021       Radiology:-  XR CHEST PORTABLE    Result Date: 4/23/2022  PROCEDURE: XR CHEST PORTABLE CLINICAL INFORMATION: shortness of breath COMPARISON: Chest radiograph 12/22/2021 TECHNIQUE: AP portable chest radiograph performed. FINDINGS: Diffuse increase interstitial pulmonary markings and increased groundglass opacities bilaterally. Cardiac silhouette is mildly enlarged. No pleural effusion. No pneumothorax. No acute bony abnormality. 1. Diffuse increased interstitial pulmonary markings and increased groundglass opacities bilaterally. Findings can relate to pulmonary edema. Multilobar pneumonia can also have this appearance.  **This report has been created using voice recognition software. It may contain minor errors which are inherent in voice recognition technology. ** Final report electronically signed by Dr Qamar Lucas on 4/23/2022 6:39 PM       Follow-up scheduled after discharge :-    in 5-7 days with Rigo Yeboah DO  Follow up with Hospice upon arrival to [facility].     Consultations during this hospital stay:-  [] NONE [] Cardiology  [] Nephrology  [] Hemo onco   [] GI   [] ID  [] Endocrine  [] Pulm    [] Neuro    [] Psych   [] Urology  [] ENT   [] G SURGERY   []Ortho    []CV surg    [] Palliative  [x] Hospice [] Pain management   []    []TCU   [] PT/OT  OTHERS:-    Disposition: SNF  Condition at Discharge: Stable    Time Spent:- 50 minutes    Electronically signed by Yuniel Uribe DO on 4/26/22 at 2:42 PM EDT   Discharging Hospitalist

## 2022-04-26 NOTE — RT PROTOCOL NOTE
RT Inhaler-Nebulizer Bronchodilator Protocol Note    There is a bronchodilator order in the chart from a provider indicating to follow the RT Bronchodilator Protocol and there is an Initiate RT Inhaler-Nebulizer Bronchodilator Protocol order as well (see protocol at bottom of note). CXR Findings:  No results found. The findings from the last RT Protocol Assessment were as follows:   History Pulmonary Disease: Smoker 15 pack years or more  Respiratory Pattern: Dyspnea on exertion or RR 21-25 bpm  Breath Sounds: Clear breath sounds  Cough: Strong, spontaneous, non-productive  Indication for Bronchodilator Therapy: Decreased or absent breath sounds  Bronchodilator Assessment Score: 3    Aerosolized bronchodilator medication orders have been revised according to the RT Inhaler-Nebulizer Bronchodilator Protocol below. Respiratory Therapist to perform RT Therapy Protocol Assessment initially then follow the protocol. Repeat RT Therapy Protocol Assessment PRN for score 0-3 or on second treatment, BID, and PRN for scores above 3. No Indications - adjust the frequency to every 6 hours PRN wheezing or bronchospasm, if no treatments needed after 48 hours then discontinue using Per Protocol order mode. If indication present, adjust the RT bronchodilator orders based on the Bronchodilator Assessment Score as indicated below. Use Inhaler orders unless patient has one or more of the following: on home nebulizer, not able to hold breath for 10 seconds, is not alert and oriented, cannot activate and use MDI correctly, or respiratory rate 25 breaths per minute or more, then use the equivalent nebulizer order(s) with same Frequency and PRN reasons based on the score. If a patient is on this medication at home then do not decrease Frequency below that used at home.     0-3 - enter or revise RT bronchodilator order(s) to equivalent RT Bronchodilator order with Frequency of every 4 hours PRN for wheezing or increased work of breathing using Per Protocol order mode. 4-6 - enter or revise RT Bronchodilator order(s) to two equivalent RT bronchodilator orders with one order with BID Frequency and one order with Frequency of every 4 hours PRN wheezing or increased work of breathing using Per Protocol order mode. 7-10 - enter or revise RT Bronchodilator order(s) to two equivalent RT bronchodilator orders with one order with TID Frequency and one order with Frequency of every 4 hours PRN wheezing or increased work of breathing using Per Protocol order mode. 11-13 - enter or revise RT Bronchodilator order(s) to one equivalent RT bronchodilator order with QID Frequency and an Albuterol order with Frequency of every 4 hours PRN wheezing or increased work of breathing using Per Protocol order mode. Greater than 13 - enter or revise RT Bronchodilator order(s) to one equivalent RT bronchodilator order with every 4 hours Frequency and an Albuterol order with Frequency of every 2 hours PRN wheezing or increased work of breathing using Per Protocol order mode. RT to enter RT Home Evaluation for COPD & MDI Assessment order using Per Protocol order mode.     Electronically signed by Arminda Laurent RCP on 4/26/2022 at 7:57 AM

## 2022-04-26 NOTE — PROGRESS NOTES
Chart review completed. Met bedside with son Cami Peters and nieces Yusuf Chandra and Teresa Guthrie. Patient with eyes closed, non-labored breathing and appearance of comfort. Family reports patient looks much better than yesterday and preferred she be allowed to sleep. Presentation of hospice concepts, philosophies, and services including room and board are not hospice covered benefit with all questions answered to verbalized understanding. Family discussed plan of care and requested raya placement, primary Opal notifed. Family requested to see if [address] [place] [address] would be able to accommodate. Sapphire Verma notified and following up. Updated Dr. Jaya Whitt and comfort med Rx's signed to go with patient on discharge.

## 2022-04-26 NOTE — PROGRESS NOTES
18 Fr raya cath placed with clear yellow urine returned, stat lock placed on right thigh. Patient tolerated procedure well. EPC cream applied to red sacrum/ coccyx, skin intact. Linens changed. Patient positioned for comfort and able to drink orange juice with a straw well.

## 2022-04-26 NOTE — CARE COORDINATION
4/26/22, 10:14 AM EDT    DISCHARGE PLANNING EVALUATION      Spoke with Hospice nurse, family meeting this morning, they would like patient moved to the [facility] with continued plans for Hospice. [facility] did try calling [facility], no answer. Call to [facility], spoke with nurse Tasha Langley, she is checking with DON and get back with SW.     11:10 AM  Call from David with [facility], she reports they may have a semi private room, they are trying to get in contact with son to discuss. CESARIO did visit with son and nieces, son had not received a call yet. Call from David again, cannot reach son, Yetmikhail Zuñiga did did bring phone to son for them to visit. Son was in the understanding [place] was assisted living, however it is not, he would like to plan for pt to return to [place]. [facility] did update Hospice nurse on this. 4/26/22, 11:12 AM EDT    Patient goals/plan/ treatment preferences discussed by  and . Patient goals/plan/ treatment preferences reviewed with patient/ family. Patient/ family verbalize understanding of discharge plan and are in agreement with goal/plan/treatment preferences. Understanding was demonstrated using the teach back method. AVS provided by RN at time of discharge, which includes all necessary medical information pertaining to the patients current course of illness, treatment, post-discharge goals of care, and treatment preferences.         IMM Letter  IMM Letter given to Patient/Family/Significant other/Guardian/POA/by[de-identified] Pt Access  IMM Letter date given[de-identified] 04/23/22  IMM Letter time given[de-identified] 2121

## 2022-04-29 LAB
BLOOD CULTURE, ROUTINE: NORMAL
BLOOD CULTURE, ROUTINE: NORMAL

## 2022-08-27 NOTE — ED NOTES
ED to inpatient nurses report    Chief Complaint   Patient presents with    Allergic Reaction      Present to ED from home  LOC: alert and orientated to name, place, date  Vital signs   Vitals:    08/05/20 1641 08/05/20 1651 08/05/20 1817 08/05/20 1848   BP:   (!) 181/65    Pulse:   92    Resp: 27 18 22    Temp:    98.1 °F (36.7 °C)   TempSrc:    Oral   SpO2: 95% 99%        Oxygen Baseline RA    Current needs required 2L Bipap/Cpap No  LDAs:   Peripheral IV 08/05/20 Right Wrist (Active)   Site Assessment Clean;Dry; Intact 08/05/20 1634   Line Status Blood return noted;Normal saline locked 08/05/20 1634   Dressing Status Clean;Dry; Intact 08/05/20 1634   Dressing Intervention New 08/05/20 1634     Mobility: Requires assistance * 1  Pending ED orders: none  Present condition: stable    Electronically signed by Martín Menjivar RN on 8/5/2020 at [address]  08/05/20 4659 DC instructions
